# Patient Record
Sex: MALE | Race: WHITE | NOT HISPANIC OR LATINO | Employment: OTHER | URBAN - NONMETROPOLITAN AREA
[De-identification: names, ages, dates, MRNs, and addresses within clinical notes are randomized per-mention and may not be internally consistent; named-entity substitution may affect disease eponyms.]

---

## 2017-03-02 PROBLEM — D68.62: Status: ACTIVE | Noted: 2017-03-02

## 2017-03-02 PROBLEM — R79.89 LOW VITAMIN B12 LEVEL: Status: ACTIVE | Noted: 2017-03-02

## 2017-03-02 PROBLEM — R79.89 ELEVATED HOMOCYSTEINE: Status: ACTIVE | Noted: 2017-03-02

## 2017-03-02 PROBLEM — D50.9 IRON DEFICIENCY ANEMIA: Status: ACTIVE | Noted: 2017-03-02

## 2017-09-18 PROBLEM — D64.9 ANEMIA: Status: ACTIVE | Noted: 2017-09-18

## 2017-12-27 PROBLEM — I20.9 ANGINA, CLASS II: Status: ACTIVE | Noted: 2017-12-27

## 2018-03-19 PROBLEM — R79.1 ELEVATED PARTIAL THROMBOPLASTIN TIME (PTT): Status: ACTIVE | Noted: 2018-03-19

## 2019-09-03 PROBLEM — I25.10 CORONARY ARTERY DISEASE: Status: ACTIVE | Noted: 2019-09-03

## 2019-09-03 PROBLEM — E78.00 HYPERCHOLESTEREMIA: Status: ACTIVE | Noted: 2019-09-03

## 2019-09-03 PROBLEM — N18.30 STAGE 3 CHRONIC KIDNEY DISEASE: Status: ACTIVE | Noted: 2019-09-03

## 2019-09-03 PROBLEM — R58 BLEEDING: Status: ACTIVE | Noted: 2019-09-03

## 2020-04-28 ENCOUNTER — HISTORICAL (OUTPATIENT)
Dept: ADMINISTRATIVE | Facility: HOSPITAL | Age: 80
End: 2020-04-28

## 2020-04-28 LAB
BASOPHILS NFR BLD: 0 10 (ref 0–0.1)
BASOPHILS NFR BLD: 0.1 % (ref 0–1.5)
EOSINOPHIL NFR BLD: 0 10 (ref 0–0.7)
EOSINOPHIL NFR BLD: 0.1 % (ref 0–7)
ERYTHROCYTE [DISTWIDTH] IN BLOOD BY AUTOMATED COUNT: 15.6 % (ref 11.5–14.5)
GRAN #: 6.88 10 (ref 2–7.5)
GRAN%: 1.1 %
GRAN%: 76.9 % (ref 50–80)
HCT VFR BLD AUTO: 31.4 % (ref 43.5–53.7)
HGB BLD-MCNC: 10.2 G/DL (ref 14.1–18.1)
IMMATURE GRANULOCYTES #: 0.1 10
LYMPH #: 1.2 10 (ref 1–3.5)
LYMPH%: 12.8 % (ref 12–50)
MCH RBC QN AUTO: 28.9 PG (ref 27–31)
MCHC RBC AUTO-ENTMCNC: 32.5 G% (ref 32–35)
MCV RBC AUTO: 89 FL (ref 80–97)
MONO #: 0.8 10 (ref 0–0.8)
MONO%: 9 % (ref 0–12)
PMV BLD AUTO: 182 10 (ref 142–424)
PMV BLD AUTO: 9.8 FL (ref 7.4–10.4)
RBC # BLD AUTO: 3.53 M/UL (ref 4.69–6.13)
WBC # BLD AUTO: 9 10 (ref 4–10.2)

## 2020-06-01 ENCOUNTER — HISTORICAL (OUTPATIENT)
Dept: ADMINISTRATIVE | Facility: HOSPITAL | Age: 80
End: 2020-06-01

## 2020-06-01 LAB
ALBUMIN SERPL BCP-MCNC: 3 G/DL (ref 3.5–5)
ALBUMIN/GLOB SERPL ELPH: 0.6 {RATIO} (ref 1.5–2.2)
ALP SERPL-CCNC: 79 U/L (ref 50–136)
ALT SERPL W P-5'-P-CCNC: 20 U/L (ref 16–61)
ANION GAP SERPL CALC-SCNC: 11.5 MEQ/L (ref 10–20)
AST SERPL-CCNC: 15 U/L (ref 15–37)
BASOPHILS NFR BLD: 0 10 (ref 0–0.1)
BASOPHILS NFR BLD: 0.3 % (ref 0–1.5)
BILIRUB SERPL-MCNC: 0.4 MG/DL (ref 0.2–1)
BUN SERPL-MCNC: 24 MG/DL (ref 7–18)
CALCIUM SERPL-MCNC: 8.8 MG/DL (ref 8.5–10.1)
CHLORIDE SERPL-SCNC: 103 MMOL/L (ref 98–107)
CO2 SERPL-SCNC: 23 MMOL/L (ref 22–32)
CREAT SERPL-MCNC: 1.48 MG/DL (ref 0.7–1.3)
EGFR: 49 ML/MIN/1.73M
EOSINOPHIL NFR BLD: 0 10 (ref 0–0.7)
EOSINOPHIL NFR BLD: 0.1 % (ref 0–7)
ERYTHROCYTE [DISTWIDTH] IN BLOOD BY AUTOMATED COUNT: 15.2 % (ref 11.5–14.5)
ERYTHROCYTE [SEDIMENTATION RATE] IN BLOOD: 125 MM/HR (ref 0–20)
GLOBULIN: 4.7 G/DL (ref 2.3–3.5)
GLUCOSE SERPL-MCNC: 90 MG/DL (ref 70–99)
GRAN #: 5.51 10 (ref 2–7.5)
GRAN%: 1.1 %
GRAN%: 70.4 % (ref 50–80)
HCT VFR BLD AUTO: 28.2 % (ref 43.5–53.7)
HGB BLD-MCNC: 9.3 G/DL (ref 14.1–18.1)
IMMATURE GRANULOCYTES #: 0.09 10
LYMPH #: 1.2 10 (ref 1–3.5)
LYMPH%: 15.1 % (ref 12–50)
MAGNESIUM SERPL-MCNC: 2.02 MG/DL (ref 1.8–2.4)
MCH RBC QN AUTO: 28.9 PG (ref 27–31)
MCHC RBC AUTO-ENTMCNC: 33 G% (ref 32–35)
MCV RBC AUTO: 87.6 FL (ref 80–97)
MONO #: 1 10 (ref 0–0.8)
MONO%: 13 % (ref 0–12)
OSMOC: 270 MOSM/KG (ref 275–295)
PMV BLD AUTO: 10.3 FL (ref 7.4–10.4)
PMV BLD AUTO: 162 10 (ref 142–424)
POTASSIUM SERPL-SCNC: 4.5 MMOL/L (ref 3.5–5.1)
PROT SERPL-MCNC: 7.7 G/DL (ref 6.4–8.2)
RBC # BLD AUTO: 3.22 M/UL (ref 4.69–6.13)
SODIUM BLD-SCNC: 133 MMOL/L (ref 136–145)
WBC # BLD AUTO: 7.8 10 (ref 4–10.2)

## 2020-06-25 LAB — SARS-COV-2 RNA RESP QL NAA+PROBE: NOT DETECTED

## 2020-11-25 ENCOUNTER — LAB VISIT (OUTPATIENT)
Dept: LAB | Facility: HOSPITAL | Age: 80
End: 2020-11-25
Attending: INTERNAL MEDICINE
Payer: MEDICARE

## 2020-11-25 DIAGNOSIS — M13.0 POLYARTHRITIS: Primary | ICD-10-CM

## 2020-11-25 DIAGNOSIS — N91.2 AMENIA: Primary | ICD-10-CM

## 2020-11-25 LAB
ALBUMIN SERPL BCP-MCNC: 3.2 G/DL (ref 3.5–5.2)
ALP SERPL-CCNC: 52 U/L (ref 55–135)
ALT SERPL W/O P-5'-P-CCNC: 17 U/L (ref 10–44)
ANION GAP SERPL CALC-SCNC: 10 MMOL/L (ref 8–16)
AST SERPL-CCNC: 18 U/L (ref 10–40)
BASOPHILS # BLD AUTO: 0.02 K/UL (ref 0–0.2)
BASOPHILS NFR BLD: 0.3 % (ref 0–1.9)
BILIRUB SERPL-MCNC: 0.5 MG/DL (ref 0.1–1)
BUN SERPL-MCNC: 24 MG/DL (ref 8–23)
C3 SERPL-MCNC: 90 MG/DL (ref 50–180)
C4 SERPL-MCNC: 19 MG/DL (ref 11–44)
CALCIUM SERPL-MCNC: 9.3 MG/DL (ref 8.7–10.5)
CHLORIDE SERPL-SCNC: 107 MMOL/L (ref 95–110)
CO2 SERPL-SCNC: 22 MMOL/L (ref 23–29)
CORTIS SERPL-MCNC: 11.6 UG/DL
CREAT SERPL-MCNC: 1.4 MG/DL (ref 0.5–1.4)
DIFFERENTIAL METHOD: ABNORMAL
EOSINOPHIL # BLD AUTO: 0 K/UL (ref 0–0.5)
EOSINOPHIL NFR BLD: 0 % (ref 0–8)
ERYTHROCYTE [DISTWIDTH] IN BLOOD BY AUTOMATED COUNT: 16 % (ref 11.5–14.5)
EST. GFR  (AFRICAN AMERICAN): 54.9 ML/MIN/1.73 M^2
EST. GFR  (NON AFRICAN AMERICAN): 47.4 ML/MIN/1.73 M^2
GLUCOSE SERPL-MCNC: 80 MG/DL (ref 70–110)
HCT VFR BLD AUTO: 31.9 % (ref 40–54)
HGB BLD-MCNC: 10.4 G/DL (ref 14–18)
IMM GRANULOCYTES # BLD AUTO: 0.08 K/UL (ref 0–0.04)
IMM GRANULOCYTES NFR BLD AUTO: 1 % (ref 0–0.5)
LYMPHOCYTES # BLD AUTO: 1 K/UL (ref 1–4.8)
LYMPHOCYTES NFR BLD: 12.9 % (ref 18–48)
MCH RBC QN AUTO: 29.1 PG (ref 27–31)
MCHC RBC AUTO-ENTMCNC: 32.6 G/DL (ref 32–36)
MCV RBC AUTO: 89 FL (ref 82–98)
MONOCYTES # BLD AUTO: 0.9 K/UL (ref 0.3–1)
MONOCYTES NFR BLD: 11.4 % (ref 4–15)
NEUTROPHILS # BLD AUTO: 5.7 K/UL (ref 1.8–7.7)
NEUTROPHILS NFR BLD: 74.4 % (ref 38–73)
NRBC BLD-RTO: 0 /100 WBC
PLATELET # BLD AUTO: 168 K/UL (ref 150–350)
PMV BLD AUTO: 10.6 FL (ref 9.2–12.9)
POTASSIUM SERPL-SCNC: 4.2 MMOL/L (ref 3.5–5.1)
PROT SERPL-MCNC: 7.2 G/DL (ref 6–8.4)
PTH-INTACT SERPL-MCNC: 80 PG/ML (ref 9–77)
RBC # BLD AUTO: 3.58 M/UL (ref 4.6–6.2)
SODIUM SERPL-SCNC: 139 MMOL/L (ref 136–145)
WBC # BLD AUTO: 7.66 K/UL (ref 3.9–12.7)

## 2020-11-25 PROCEDURE — 83970 ASSAY OF PARATHORMONE: CPT

## 2020-11-25 PROCEDURE — 84443 ASSAY THYROID STIM HORMONE: CPT

## 2020-11-25 PROCEDURE — 85025 COMPLETE CBC W/AUTO DIFF WBC: CPT

## 2020-11-25 PROCEDURE — 86160 COMPLEMENT ANTIGEN: CPT | Mod: 59

## 2020-11-25 PROCEDURE — 82533 TOTAL CORTISOL: CPT

## 2020-11-25 PROCEDURE — 36415 COLL VENOUS BLD VENIPUNCTURE: CPT

## 2020-11-25 PROCEDURE — 86160 COMPLEMENT ANTIGEN: CPT

## 2020-11-25 PROCEDURE — 80053 COMPREHEN METABOLIC PANEL: CPT

## 2020-11-26 LAB — TSH SERPL DL<=0.005 MIU/L-ACNC: 2.33 UIU/ML (ref 0.4–4)

## 2020-11-27 DIAGNOSIS — M81.0 AGE-RELATED OSTEOPOROSIS WITHOUT CURRENT PATHOLOGICAL FRACTURE: ICD-10-CM

## 2020-11-27 DIAGNOSIS — M13.0 POLYARTHRITIS: Primary | ICD-10-CM

## 2020-12-02 ENCOUNTER — HOSPITAL ENCOUNTER (OUTPATIENT)
Dept: RADIOLOGY | Facility: HOSPITAL | Age: 80
Discharge: HOME | End: 2020-12-02
Attending: INTERNAL MEDICINE
Payer: MEDICARE

## 2020-12-02 DIAGNOSIS — M81.0 AGE-RELATED OSTEOPOROSIS WITHOUT CURRENT PATHOLOGICAL FRACTURE: ICD-10-CM

## 2020-12-02 DIAGNOSIS — M13.0 POLYARTHRITIS: ICD-10-CM

## 2020-12-02 PROCEDURE — 77080 DXA BONE DENSITY AXIAL: CPT | Mod: TC

## 2021-01-09 ENCOUNTER — IMMUNIZATION (OUTPATIENT)
Dept: OBSTETRICS AND GYNECOLOGY | Facility: CLINIC | Age: 81
End: 2021-01-09
Payer: MEDICARE

## 2021-01-09 DIAGNOSIS — Z23 NEED FOR VACCINATION: ICD-10-CM

## 2021-01-09 PROCEDURE — 91300 COVID-19, MRNA, LNP-S, PF, 30 MCG/0.3 ML DOSE VACCINE: CPT | Mod: PBBFAC

## 2021-01-09 PROCEDURE — 91300 COVID-19, MRNA, LNP-S, PF, 30 MCG/0.3 ML DOSE VACCINE: ICD-10-PCS | Mod: ,,, | Performed by: ANESTHESIOLOGY

## 2021-01-09 PROCEDURE — 91300 COVID-19, MRNA, LNP-S, PF, 30 MCG/0.3 ML DOSE VACCINE: CPT | Mod: ,,, | Performed by: ANESTHESIOLOGY

## 2021-01-09 PROCEDURE — 0001A COVID-19, MRNA, LNP-S, PF, 30 MCG/0.3 ML DOSE VACCINE: CPT | Mod: PBBFAC | Performed by: ANESTHESIOLOGY

## 2021-01-30 ENCOUNTER — IMMUNIZATION (OUTPATIENT)
Dept: OBSTETRICS AND GYNECOLOGY | Facility: CLINIC | Age: 81
End: 2021-01-30
Payer: MEDICARE

## 2021-01-30 DIAGNOSIS — Z23 NEED FOR VACCINATION: Primary | ICD-10-CM

## 2021-01-30 PROCEDURE — 0002A COVID-19, MRNA, LNP-S, PF, 30 MCG/0.3 ML DOSE VACCINE: CPT | Mod: PBBFAC

## 2021-01-30 PROCEDURE — 91300 COVID-19, MRNA, LNP-S, PF, 30 MCG/0.3 ML DOSE VACCINE: CPT | Mod: PBBFAC

## 2021-07-22 PROBLEM — I25.10 CAD (CORONARY ARTERY DISEASE): Status: ACTIVE | Noted: 2021-07-22

## 2021-08-20 ENCOUNTER — IMMUNIZATION (OUTPATIENT)
Dept: OBSTETRICS AND GYNECOLOGY | Facility: CLINIC | Age: 81
End: 2021-08-20
Payer: MEDICARE

## 2021-08-20 DIAGNOSIS — Z23 NEED FOR VACCINATION: Primary | ICD-10-CM

## 2021-08-20 PROCEDURE — 0003A COVID-19, MRNA, LNP-S, PF, 30 MCG/0.3 ML DOSE VACCINE: CPT | Mod: CV19,,, | Performed by: ANESTHESIOLOGY

## 2021-08-20 PROCEDURE — 91300 COVID-19, MRNA, LNP-S, PF, 30 MCG/0.3 ML DOSE VACCINE: ICD-10-PCS | Mod: ,,, | Performed by: ANESTHESIOLOGY

## 2021-08-20 PROCEDURE — 0003A COVID-19, MRNA, LNP-S, PF, 30 MCG/0.3 ML DOSE VACCINE: ICD-10-PCS | Mod: CV19,,, | Performed by: ANESTHESIOLOGY

## 2021-08-20 PROCEDURE — 91300 COVID-19, MRNA, LNP-S, PF, 30 MCG/0.3 ML DOSE VACCINE: CPT | Mod: ,,, | Performed by: ANESTHESIOLOGY

## 2021-10-25 ENCOUNTER — HOSPITAL ENCOUNTER (EMERGENCY)
Facility: HOSPITAL | Age: 81
Discharge: HOME | End: 2021-10-25
Attending: EMERGENCY MEDICINE
Payer: OTHER GOVERNMENT

## 2021-10-25 ENCOUNTER — HOSPITAL ENCOUNTER (OUTPATIENT)
Facility: HOSPITAL | Age: 81
Discharge: HOME HEALTH | End: 2021-10-27
Attending: STUDENT IN AN ORGANIZED HEALTH CARE EDUCATION/TRAINING PROGRAM | Admitting: INTERNAL MEDICINE
Payer: MEDICARE

## 2021-10-25 VITALS
WEIGHT: 194 LBS | DIASTOLIC BLOOD PRESSURE: 63 MMHG | HEART RATE: 76 BPM | TEMPERATURE: 99 F | OXYGEN SATURATION: 97 % | SYSTOLIC BLOOD PRESSURE: 137 MMHG | RESPIRATION RATE: 16 BRPM | BODY MASS INDEX: 27.77 KG/M2 | HEIGHT: 70 IN

## 2021-10-25 DIAGNOSIS — S20.229A CONTUSION OF BACK WALL OF THORAX, INITIAL ENCOUNTER: ICD-10-CM

## 2021-10-25 DIAGNOSIS — S00.03XA CONTUSION OF SCALP, INITIAL ENCOUNTER: Primary | ICD-10-CM

## 2021-10-25 DIAGNOSIS — R41.82 AMS (ALTERED MENTAL STATUS): Primary | ICD-10-CM

## 2021-10-25 DIAGNOSIS — R40.4 TRANSIENT ALTERATION OF AWARENESS: ICD-10-CM

## 2021-10-25 DIAGNOSIS — R41.0 DELIRIUM: ICD-10-CM

## 2021-10-25 LAB
ALBUMIN SERPL BCP-MCNC: 2.6 G/DL (ref 3.5–5.2)
ALP SERPL-CCNC: 75 U/L (ref 55–135)
ALT SERPL W/O P-5'-P-CCNC: 38 U/L (ref 10–44)
AMMONIA PLAS-SCNC: 46 UMOL/L (ref 10–50)
AMPHET+METHAMPHET UR QL: NEGATIVE
ANION GAP SERPL CALC-SCNC: 6 MMOL/L (ref 8–16)
AST SERPL-CCNC: 23 U/L (ref 10–40)
BARBITURATES UR QL SCN>200 NG/ML: NEGATIVE
BASOPHILS # BLD AUTO: 0.01 K/UL (ref 0–0.2)
BASOPHILS NFR BLD: 0.1 % (ref 0–1.9)
BENZODIAZ UR QL SCN>200 NG/ML: NEGATIVE
BILIRUB SERPL-MCNC: 0.4 MG/DL (ref 0.1–1)
BILIRUB UR QL STRIP: NEGATIVE
BUN SERPL-MCNC: 31 MG/DL (ref 8–23)
BZE UR QL SCN: NEGATIVE
CALCIUM SERPL-MCNC: 9.2 MG/DL (ref 8.7–10.5)
CANNABINOIDS UR QL SCN: NEGATIVE
CHLORIDE SERPL-SCNC: 109 MMOL/L (ref 95–110)
CLARITY UR: CLEAR
CO2 SERPL-SCNC: 26 MMOL/L (ref 23–29)
COLOR UR: YELLOW
CREAT SERPL-MCNC: 1.8 MG/DL (ref 0.5–1.4)
CREAT UR-MCNC: 61 MG/DL (ref 23–375)
DIFFERENTIAL METHOD: ABNORMAL
EOSINOPHIL # BLD AUTO: 0 K/UL (ref 0–0.5)
EOSINOPHIL NFR BLD: 0.1 % (ref 0–8)
ERYTHROCYTE [DISTWIDTH] IN BLOOD BY AUTOMATED COUNT: 14.9 % (ref 11.5–14.5)
EST. GFR  (AFRICAN AMERICAN): 40.2 ML/MIN/1.73 M^2
EST. GFR  (NON AFRICAN AMERICAN): 34.8 ML/MIN/1.73 M^2
GLUCOSE SERPL-MCNC: 121 MG/DL (ref 70–110)
GLUCOSE UR QL STRIP: NEGATIVE
HCT VFR BLD AUTO: 25.8 % (ref 40–54)
HGB BLD-MCNC: 8.5 G/DL (ref 14–18)
HGB UR QL STRIP: NEGATIVE
IMM GRANULOCYTES # BLD AUTO: 0.04 K/UL (ref 0–0.04)
IMM GRANULOCYTES NFR BLD AUTO: 0.6 % (ref 0–0.5)
KETONES UR QL STRIP: NEGATIVE
LEUKOCYTE ESTERASE UR QL STRIP: NEGATIVE
LYMPHOCYTES # BLD AUTO: 1 K/UL (ref 1–4.8)
LYMPHOCYTES NFR BLD: 14.1 % (ref 18–48)
MCH RBC QN AUTO: 29.1 PG (ref 27–31)
MCHC RBC AUTO-ENTMCNC: 32.9 G/DL (ref 32–36)
MCV RBC AUTO: 88 FL (ref 82–98)
METHADONE UR QL SCN>300 NG/ML: NEGATIVE
MONOCYTES # BLD AUTO: 0.8 K/UL (ref 0.3–1)
MONOCYTES NFR BLD: 11.3 % (ref 4–15)
NEUTROPHILS # BLD AUTO: 5.3 K/UL (ref 1.8–7.7)
NEUTROPHILS NFR BLD: 73.8 % (ref 38–73)
NITRITE UR QL STRIP: NEGATIVE
NRBC BLD-RTO: 0 /100 WBC
OPIATES UR QL SCN: NEGATIVE
PCP UR QL SCN>25 NG/ML: NEGATIVE
PH UR STRIP: 5 [PH] (ref 5–8)
PLATELET # BLD AUTO: 165 K/UL (ref 150–450)
PMV BLD AUTO: 11.4 FL (ref 9.2–12.9)
POCT GLUCOSE: 106 MG/DL (ref 70–110)
POCT GLUCOSE: 123 MG/DL (ref 70–110)
POTASSIUM SERPL-SCNC: 4.1 MMOL/L (ref 3.5–5.1)
PROT SERPL-MCNC: 7.2 G/DL (ref 6–8.4)
PROT UR QL STRIP: NEGATIVE
RBC # BLD AUTO: 2.92 M/UL (ref 4.6–6.2)
SODIUM SERPL-SCNC: 141 MMOL/L (ref 136–145)
SP GR UR STRIP: 1.01 (ref 1–1.03)
TOXICOLOGY INFORMATION: NORMAL
URN SPEC COLLECT METH UR: NORMAL
UROBILINOGEN UR STRIP-ACNC: NEGATIVE EU/DL
WBC # BLD AUTO: 7.15 K/UL (ref 3.9–12.7)

## 2021-10-25 PROCEDURE — 99284 EMERGENCY DEPT VISIT MOD MDM: CPT | Mod: 25

## 2021-10-25 PROCEDURE — 93010 EKG 12-LEAD: ICD-10-PCS | Mod: ,,, | Performed by: INTERNAL MEDICINE

## 2021-10-25 PROCEDURE — 36415 COLL VENOUS BLD VENIPUNCTURE: CPT | Performed by: STUDENT IN AN ORGANIZED HEALTH CARE EDUCATION/TRAINING PROGRAM

## 2021-10-25 PROCEDURE — 63600175 PHARM REV CODE 636 W HCPCS: Performed by: STUDENT IN AN ORGANIZED HEALTH CARE EDUCATION/TRAINING PROGRAM

## 2021-10-25 PROCEDURE — 82962 GLUCOSE BLOOD TEST: CPT

## 2021-10-25 PROCEDURE — 93005 ELECTROCARDIOGRAM TRACING: CPT

## 2021-10-25 PROCEDURE — 80307 DRUG TEST PRSMV CHEM ANLYZR: CPT | Performed by: STUDENT IN AN ORGANIZED HEALTH CARE EDUCATION/TRAINING PROGRAM

## 2021-10-25 PROCEDURE — 96372 THER/PROPH/DIAG INJ SC/IM: CPT

## 2021-10-25 PROCEDURE — 85025 COMPLETE CBC W/AUTO DIFF WBC: CPT | Performed by: STUDENT IN AN ORGANIZED HEALTH CARE EDUCATION/TRAINING PROGRAM

## 2021-10-25 PROCEDURE — 96361 HYDRATE IV INFUSION ADD-ON: CPT

## 2021-10-25 PROCEDURE — 96360 HYDRATION IV INFUSION INIT: CPT

## 2021-10-25 PROCEDURE — 63600175 PHARM REV CODE 636 W HCPCS: Performed by: EMERGENCY MEDICINE

## 2021-10-25 PROCEDURE — 81003 URINALYSIS AUTO W/O SCOPE: CPT | Mod: 59 | Performed by: STUDENT IN AN ORGANIZED HEALTH CARE EDUCATION/TRAINING PROGRAM

## 2021-10-25 PROCEDURE — 82140 ASSAY OF AMMONIA: CPT | Performed by: STUDENT IN AN ORGANIZED HEALTH CARE EDUCATION/TRAINING PROGRAM

## 2021-10-25 PROCEDURE — 80053 COMPREHEN METABOLIC PANEL: CPT | Performed by: STUDENT IN AN ORGANIZED HEALTH CARE EDUCATION/TRAINING PROGRAM

## 2021-10-25 PROCEDURE — 93010 ELECTROCARDIOGRAM REPORT: CPT | Mod: ,,, | Performed by: INTERNAL MEDICINE

## 2021-10-25 PROCEDURE — 99285 EMERGENCY DEPT VISIT HI MDM: CPT | Mod: 25,27

## 2021-10-25 RX ORDER — KETOROLAC TROMETHAMINE 30 MG/ML
30 INJECTION, SOLUTION INTRAMUSCULAR; INTRAVENOUS
Status: COMPLETED | OUTPATIENT
Start: 2021-10-25 | End: 2021-10-25

## 2021-10-25 RX ADMIN — SODIUM CHLORIDE, SODIUM LACTATE, POTASSIUM CHLORIDE, AND CALCIUM CHLORIDE 1000 ML: .6; .31; .03; .02 INJECTION, SOLUTION INTRAVENOUS at 11:10

## 2021-10-25 RX ADMIN — KETOROLAC TROMETHAMINE 30 MG: 30 INJECTION, SOLUTION INTRAMUSCULAR at 11:10

## 2021-10-25 RX ADMIN — SODIUM CHLORIDE, SODIUM LACTATE, POTASSIUM CHLORIDE, AND CALCIUM CHLORIDE 500 ML: .6; .31; .03; .02 INJECTION, SOLUTION INTRAVENOUS at 06:10

## 2021-10-26 PROBLEM — R41.0 DELIRIUM: Status: ACTIVE | Noted: 2021-10-26

## 2021-10-26 PROBLEM — E86.0 DEHYDRATION: Status: ACTIVE | Noted: 2021-10-26

## 2021-10-26 PROBLEM — R41.82 ALTERED MENTAL STATUS: Status: ACTIVE | Noted: 2021-10-26

## 2021-10-26 PROBLEM — S09.90XA HEAD INJURY: Status: ACTIVE | Noted: 2021-10-26

## 2021-10-26 LAB
ALBUMIN SERPL BCP-MCNC: 2.5 G/DL (ref 3.5–5.2)
ALP SERPL-CCNC: 69 U/L (ref 55–135)
ALT SERPL W/O P-5'-P-CCNC: 35 U/L (ref 10–44)
ANION GAP SERPL CALC-SCNC: 7 MMOL/L (ref 8–16)
AST SERPL-CCNC: 22 U/L (ref 10–40)
BILIRUB SERPL-MCNC: 0.4 MG/DL (ref 0.1–1)
BUN SERPL-MCNC: 25 MG/DL (ref 8–23)
CALCIUM SERPL-MCNC: 9.3 MG/DL (ref 8.7–10.5)
CHLORIDE SERPL-SCNC: 110 MMOL/L (ref 95–110)
CO2 SERPL-SCNC: 24 MMOL/L (ref 23–29)
CREAT SERPL-MCNC: 1.5 MG/DL (ref 0.5–1.4)
CTP QC/QA: YES
ERYTHROCYTE [DISTWIDTH] IN BLOOD BY AUTOMATED COUNT: 14.8 % (ref 11.5–14.5)
EST. GFR  (AFRICAN AMERICAN): 50.1 ML/MIN/1.73 M^2
EST. GFR  (NON AFRICAN AMERICAN): 43.3 ML/MIN/1.73 M^2
GLUCOSE SERPL-MCNC: 126 MG/DL (ref 70–110)
HCT VFR BLD AUTO: 26.7 % (ref 40–54)
HGB BLD-MCNC: 8.8 G/DL (ref 14–18)
MCH RBC QN AUTO: 29.1 PG (ref 27–31)
MCHC RBC AUTO-ENTMCNC: 33 G/DL (ref 32–36)
MCV RBC AUTO: 88 FL (ref 82–98)
PLATELET # BLD AUTO: 170 K/UL (ref 150–450)
PMV BLD AUTO: 11.1 FL (ref 9.2–12.9)
POCT GLUCOSE: 115 MG/DL (ref 70–110)
POTASSIUM SERPL-SCNC: 3.9 MMOL/L (ref 3.5–5.1)
PROT SERPL-MCNC: 6.9 G/DL (ref 6–8.4)
RBC # BLD AUTO: 3.02 M/UL (ref 4.6–6.2)
SARS-COV-2 RDRP RESP QL NAA+PROBE: NEGATIVE
SODIUM SERPL-SCNC: 141 MMOL/L (ref 136–145)
TROPONIN I SERPL DL<=0.01 NG/ML-MCNC: 0.2 NG/ML (ref 0–0.03)
WBC # BLD AUTO: 8.15 K/UL (ref 3.9–12.7)

## 2021-10-26 PROCEDURE — 80053 COMPREHEN METABOLIC PANEL: CPT | Performed by: INTERNAL MEDICINE

## 2021-10-26 PROCEDURE — 85027 COMPLETE CBC AUTOMATED: CPT | Performed by: INTERNAL MEDICINE

## 2021-10-26 PROCEDURE — G0378 HOSPITAL OBSERVATION PER HR: HCPCS

## 2021-10-26 PROCEDURE — 36415 COLL VENOUS BLD VENIPUNCTURE: CPT | Performed by: INTERNAL MEDICINE

## 2021-10-26 PROCEDURE — G0378 HOSPITAL OBSERVATION PER HR: HCPCS | Mod: OBSCO

## 2021-10-26 PROCEDURE — 25000003 PHARM REV CODE 250: Performed by: STUDENT IN AN ORGANIZED HEALTH CARE EDUCATION/TRAINING PROGRAM

## 2021-10-26 PROCEDURE — 25000003 PHARM REV CODE 250: Performed by: INTERNAL MEDICINE

## 2021-10-26 PROCEDURE — 11000001 HC ACUTE MED/SURG PRIVATE ROOM

## 2021-10-26 PROCEDURE — 96361 HYDRATE IV INFUSION ADD-ON: CPT

## 2021-10-26 PROCEDURE — 84484 ASSAY OF TROPONIN QUANT: CPT | Performed by: INTERNAL MEDICINE

## 2021-10-26 PROCEDURE — U0002 COVID-19 LAB TEST NON-CDC: HCPCS | Performed by: STUDENT IN AN ORGANIZED HEALTH CARE EDUCATION/TRAINING PROGRAM

## 2021-10-26 PROCEDURE — 97166 OT EVAL MOD COMPLEX 45 MIN: CPT

## 2021-10-26 RX ORDER — ACETAMINOPHEN 325 MG/1
650 TABLET ORAL EVERY 6 HOURS PRN
Status: DISCONTINUED | OUTPATIENT
Start: 2021-10-26 | End: 2021-10-27 | Stop reason: HOSPADM

## 2021-10-26 RX ORDER — DULOXETIN HYDROCHLORIDE 60 MG/1
60 CAPSULE, DELAYED RELEASE ORAL 2 TIMES DAILY
Status: DISCONTINUED | OUTPATIENT
Start: 2021-10-26 | End: 2021-10-26

## 2021-10-26 RX ORDER — FOLIC ACID 1 MG/1
1 TABLET ORAL DAILY
Status: DISCONTINUED | OUTPATIENT
Start: 2021-10-26 | End: 2021-10-27 | Stop reason: HOSPADM

## 2021-10-26 RX ORDER — EZETIMIBE 10 MG/1
10 TABLET ORAL DAILY
Status: DISCONTINUED | OUTPATIENT
Start: 2021-10-26 | End: 2021-10-27 | Stop reason: HOSPADM

## 2021-10-26 RX ORDER — TALC
6 POWDER (GRAM) TOPICAL NIGHTLY PRN
Status: DISCONTINUED | OUTPATIENT
Start: 2021-10-26 | End: 2021-10-27 | Stop reason: HOSPADM

## 2021-10-26 RX ORDER — TRAMADOL HYDROCHLORIDE 50 MG/1
50 TABLET ORAL EVERY 6 HOURS PRN
Status: DISCONTINUED | OUTPATIENT
Start: 2021-10-26 | End: 2021-10-27 | Stop reason: HOSPADM

## 2021-10-26 RX ORDER — HYDROXYZINE HYDROCHLORIDE 25 MG/1
25 TABLET, FILM COATED ORAL EVERY 6 HOURS PRN
Status: DISCONTINUED | OUTPATIENT
Start: 2021-10-26 | End: 2021-10-27 | Stop reason: HOSPADM

## 2021-10-26 RX ORDER — SODIUM CHLORIDE 9 MG/ML
INJECTION, SOLUTION INTRAVENOUS CONTINUOUS
Status: DISCONTINUED | OUTPATIENT
Start: 2021-10-26 | End: 2021-10-27 | Stop reason: HOSPADM

## 2021-10-26 RX ORDER — METOPROLOL SUCCINATE 50 MG/1
50 TABLET, EXTENDED RELEASE ORAL DAILY
Status: DISCONTINUED | OUTPATIENT
Start: 2021-10-26 | End: 2021-10-27 | Stop reason: HOSPADM

## 2021-10-26 RX ORDER — SODIUM CHLORIDE 0.9 % (FLUSH) 0.9 %
10 SYRINGE (ML) INJECTION
Status: DISCONTINUED | OUTPATIENT
Start: 2021-10-26 | End: 2021-10-27 | Stop reason: HOSPADM

## 2021-10-26 RX ORDER — LISINOPRIL 5 MG/1
5 TABLET ORAL DAILY
Status: DISCONTINUED | OUTPATIENT
Start: 2021-10-26 | End: 2021-10-27 | Stop reason: HOSPADM

## 2021-10-26 RX ORDER — ATORVASTATIN CALCIUM 40 MG/1
40 TABLET, FILM COATED ORAL DAILY
Status: DISCONTINUED | OUTPATIENT
Start: 2021-10-26 | End: 2021-10-27 | Stop reason: HOSPADM

## 2021-10-26 RX ADMIN — ATORVASTATIN CALCIUM 40 MG: 40 TABLET, FILM COATED ORAL at 08:10

## 2021-10-26 RX ADMIN — SODIUM CHLORIDE: 0.9 INJECTION, SOLUTION INTRAVENOUS at 08:10

## 2021-10-26 RX ADMIN — METOPROLOL SUCCINATE 50 MG: 50 TABLET, EXTENDED RELEASE ORAL at 08:10

## 2021-10-26 RX ADMIN — TICAGRELOR 90 MG: 90 TABLET ORAL at 08:10

## 2021-10-26 RX ADMIN — TRAMADOL HYDROCHLORIDE 50 MG: 50 TABLET, FILM COATED ORAL at 05:10

## 2021-10-26 RX ADMIN — LISINOPRIL 5 MG: 5 TABLET ORAL at 08:10

## 2021-10-26 RX ADMIN — EZETIMIBE 10 MG: 10 TABLET ORAL at 08:10

## 2021-10-26 RX ADMIN — FOLIC ACID 1 MG: 1 TABLET ORAL at 08:10

## 2021-10-26 RX ADMIN — SODIUM CHLORIDE: 0.9 INJECTION, SOLUTION INTRAVENOUS at 01:10

## 2021-10-27 VITALS
HEART RATE: 86 BPM | RESPIRATION RATE: 20 BRPM | SYSTOLIC BLOOD PRESSURE: 176 MMHG | WEIGHT: 192.88 LBS | DIASTOLIC BLOOD PRESSURE: 77 MMHG | OXYGEN SATURATION: 97 % | HEIGHT: 70 IN | TEMPERATURE: 99 F | BODY MASS INDEX: 27.61 KG/M2

## 2021-10-27 LAB
BASOPHILS # BLD AUTO: 0.02 K/UL (ref 0–0.2)
BASOPHILS NFR BLD: 0.2 % (ref 0–1.9)
DIFFERENTIAL METHOD: ABNORMAL
EOSINOPHIL # BLD AUTO: 0 K/UL (ref 0–0.5)
EOSINOPHIL NFR BLD: 0.1 % (ref 0–8)
ERYTHROCYTE [DISTWIDTH] IN BLOOD BY AUTOMATED COUNT: 14.7 % (ref 11.5–14.5)
HCT VFR BLD AUTO: 26.9 % (ref 40–54)
HGB BLD-MCNC: 8.9 G/DL (ref 14–18)
IMM GRANULOCYTES # BLD AUTO: 0.08 K/UL (ref 0–0.04)
IMM GRANULOCYTES NFR BLD AUTO: 0.9 % (ref 0–0.5)
LYMPHOCYTES # BLD AUTO: 1.2 K/UL (ref 1–4.8)
LYMPHOCYTES NFR BLD: 14.1 % (ref 18–48)
MCH RBC QN AUTO: 28.9 PG (ref 27–31)
MCHC RBC AUTO-ENTMCNC: 33.1 G/DL (ref 32–36)
MCV RBC AUTO: 87 FL (ref 82–98)
MONOCYTES # BLD AUTO: 1.1 K/UL (ref 0.3–1)
MONOCYTES NFR BLD: 11.9 % (ref 4–15)
NEUTROPHILS # BLD AUTO: 6.4 K/UL (ref 1.8–7.7)
NEUTROPHILS NFR BLD: 72.8 % (ref 38–73)
NRBC BLD-RTO: 0 /100 WBC
PLATELET # BLD AUTO: 182 K/UL (ref 150–450)
PMV BLD AUTO: 10.8 FL (ref 9.2–12.9)
RBC # BLD AUTO: 3.08 M/UL (ref 4.6–6.2)
WBC # BLD AUTO: 8.82 K/UL (ref 3.9–12.7)

## 2021-10-27 PROCEDURE — G0378 HOSPITAL OBSERVATION PER HR: HCPCS

## 2021-10-27 PROCEDURE — 25000003 PHARM REV CODE 250: Performed by: INTERNAL MEDICINE

## 2021-10-27 PROCEDURE — 36415 COLL VENOUS BLD VENIPUNCTURE: CPT | Performed by: INTERNAL MEDICINE

## 2021-10-27 PROCEDURE — 85025 COMPLETE CBC W/AUTO DIFF WBC: CPT | Performed by: INTERNAL MEDICINE

## 2021-10-27 RX ADMIN — EZETIMIBE 10 MG: 10 TABLET ORAL at 08:10

## 2021-10-27 RX ADMIN — ATORVASTATIN CALCIUM 40 MG: 40 TABLET, FILM COATED ORAL at 08:10

## 2021-10-27 RX ADMIN — METOPROLOL SUCCINATE 50 MG: 50 TABLET, EXTENDED RELEASE ORAL at 08:10

## 2021-10-27 RX ADMIN — TICAGRELOR 90 MG: 90 TABLET ORAL at 08:10

## 2021-10-27 RX ADMIN — LISINOPRIL 5 MG: 5 TABLET ORAL at 08:10

## 2021-10-27 RX ADMIN — FOLIC ACID 1 MG: 1 TABLET ORAL at 08:10

## 2021-11-01 ENCOUNTER — HOSPITAL ENCOUNTER (OUTPATIENT)
Dept: RADIOLOGY | Facility: HOSPITAL | Age: 81
Discharge: HOME | End: 2021-11-01
Attending: INTERNAL MEDICINE
Payer: MEDICARE

## 2021-11-01 DIAGNOSIS — R52 PAIN: ICD-10-CM

## 2021-11-01 DIAGNOSIS — R52 PAIN: Primary | ICD-10-CM

## 2021-11-01 PROCEDURE — 71046 X-RAY EXAM CHEST 2 VIEWS: CPT | Mod: TC

## 2021-12-29 ENCOUNTER — APPOINTMENT (OUTPATIENT)
Dept: LAB | Facility: HOSPITAL | Age: 81
End: 2021-12-29
Attending: INTERNAL MEDICINE
Payer: MEDICARE

## 2021-12-29 DIAGNOSIS — R09.81 NASAL CONGESTION: Primary | ICD-10-CM

## 2021-12-29 LAB
INFLUENZA A, MOLECULAR: NEGATIVE
INFLUENZA B, MOLECULAR: NEGATIVE
SARS-COV-2 RNA RESP QL NAA+PROBE: NOT DETECTED
SPECIMEN SOURCE: NORMAL

## 2021-12-29 PROCEDURE — 87502 INFLUENZA DNA AMP PROBE: CPT | Performed by: INTERNAL MEDICINE

## 2021-12-29 PROCEDURE — U0002 COVID-19 LAB TEST NON-CDC: HCPCS | Performed by: INTERNAL MEDICINE

## 2022-05-20 ENCOUNTER — IMMUNIZATION (OUTPATIENT)
Dept: PRIMARY CARE CLINIC | Facility: CLINIC | Age: 82
End: 2022-05-20
Payer: MEDICARE

## 2022-05-20 DIAGNOSIS — Z23 NEED FOR VACCINATION: Primary | ICD-10-CM

## 2022-05-20 PROCEDURE — 91305 COVID-19, MRNA, LNP-S, PF, 30 MCG/0.3 ML DOSE VACCINE (PFIZER): CPT | Mod: PBBFAC,PN

## 2023-02-15 ENCOUNTER — HOSPITAL ENCOUNTER (EMERGENCY)
Facility: HOSPITAL | Age: 83
Discharge: ANOTHER HOSPITAL | End: 2023-02-15
Attending: STUDENT IN AN ORGANIZED HEALTH CARE EDUCATION/TRAINING PROGRAM
Payer: MEDICARE

## 2023-02-15 VITALS
HEART RATE: 84 BPM | WEIGHT: 190 LBS | RESPIRATION RATE: 20 BRPM | BODY MASS INDEX: 26.6 KG/M2 | HEIGHT: 71 IN | TEMPERATURE: 98 F | OXYGEN SATURATION: 97 % | SYSTOLIC BLOOD PRESSURE: 152 MMHG | DIASTOLIC BLOOD PRESSURE: 70 MMHG

## 2023-02-15 DIAGNOSIS — R79.89 ELEVATED TROPONIN: ICD-10-CM

## 2023-02-15 DIAGNOSIS — R06.02 SOB (SHORTNESS OF BREATH): ICD-10-CM

## 2023-02-15 DIAGNOSIS — R06.89 RESPIRATORY INSUFFICIENCY: ICD-10-CM

## 2023-02-15 DIAGNOSIS — R07.9 CHEST PAIN: ICD-10-CM

## 2023-02-15 DIAGNOSIS — N28.9 RENAL INSUFFICIENCY: ICD-10-CM

## 2023-02-15 DIAGNOSIS — I21.4 NSTEMI (NON-ST ELEVATED MYOCARDIAL INFARCTION): Primary | ICD-10-CM

## 2023-02-15 PROBLEM — I25.5 ISCHEMIC CARDIOMYOPATHY: Status: ACTIVE | Noted: 2023-02-15

## 2023-02-15 LAB
ALBUMIN SERPL BCP-MCNC: 3.3 G/DL (ref 3.5–5.2)
ALP SERPL-CCNC: 74 U/L (ref 55–135)
ALT SERPL W/O P-5'-P-CCNC: 23 U/L (ref 10–44)
ANION GAP SERPL CALC-SCNC: 1 MMOL/L (ref 8–16)
APTT BLDCRRT: 43.9 SEC (ref 21–32)
AST SERPL-CCNC: 22 U/L (ref 10–40)
BASOPHILS # BLD AUTO: 0.01 K/UL (ref 0–0.2)
BASOPHILS NFR BLD: 0.1 % (ref 0–1.9)
BILIRUB SERPL-MCNC: 0.7 MG/DL (ref 0.1–1)
BUN SERPL-MCNC: 25 MG/DL (ref 8–23)
CALCIUM SERPL-MCNC: 9.2 MG/DL (ref 8.7–10.5)
CHLORIDE SERPL-SCNC: 107 MMOL/L (ref 95–110)
CO2 SERPL-SCNC: 29 MMOL/L (ref 23–29)
CREAT SERPL-MCNC: 1.7 MG/DL (ref 0.5–1.4)
CTP QC/QA: YES
DIFFERENTIAL METHOD: ABNORMAL
EOSINOPHIL # BLD AUTO: 0 K/UL (ref 0–0.5)
EOSINOPHIL NFR BLD: 0 % (ref 0–8)
ERYTHROCYTE [DISTWIDTH] IN BLOOD BY AUTOMATED COUNT: 14.7 % (ref 11.5–14.5)
EST. GFR  (NO RACE VARIABLE): 40 ML/MIN/1.73 M^2
GLUCOSE SERPL-MCNC: 126 MG/DL (ref 70–110)
HCT VFR BLD AUTO: 29.5 % (ref 40–54)
HGB BLD-MCNC: 9.8 G/DL (ref 14–18)
IMM GRANULOCYTES # BLD AUTO: 0.15 K/UL (ref 0–0.04)
IMM GRANULOCYTES NFR BLD AUTO: 2.1 % (ref 0–0.5)
INR PPP: 1.5 (ref 0.8–1.2)
LYMPHOCYTES # BLD AUTO: 1.2 K/UL (ref 1–4.8)
LYMPHOCYTES NFR BLD: 16.6 % (ref 18–48)
MCH RBC QN AUTO: 29.6 PG (ref 27–31)
MCHC RBC AUTO-ENTMCNC: 33.2 G/DL (ref 32–36)
MCV RBC AUTO: 89 FL (ref 82–98)
MONOCYTES # BLD AUTO: 0.7 K/UL (ref 0.3–1)
MONOCYTES NFR BLD: 10.4 % (ref 4–15)
NEUTROPHILS # BLD AUTO: 5 K/UL (ref 1.8–7.7)
NEUTROPHILS NFR BLD: 70.8 % (ref 38–73)
NRBC BLD-RTO: 0 /100 WBC
NT-PROBNP SERPL-MCNC: 8265 PG/ML (ref 5–1800)
PLATELET # BLD AUTO: 135 K/UL (ref 150–450)
PMV BLD AUTO: 11.4 FL (ref 9.2–12.9)
POTASSIUM SERPL-SCNC: 4 MMOL/L (ref 3.5–5.1)
PROT SERPL-MCNC: 7.8 G/DL (ref 6–8.4)
PROTHROMBIN TIME: 15.1 SEC (ref 9–12.5)
RBC # BLD AUTO: 3.31 M/UL (ref 4.6–6.2)
SARS-COV-2 RDRP RESP QL NAA+PROBE: NEGATIVE
SODIUM SERPL-SCNC: 137 MMOL/L (ref 136–145)
TROPONIN I SERPL DL<=0.01 NG/ML-MCNC: 1149.7 PG/ML (ref 0–60)
WBC # BLD AUTO: 7.1 K/UL (ref 3.9–12.7)

## 2023-02-15 PROCEDURE — 93005 ELECTROCARDIOGRAM TRACING: CPT

## 2023-02-15 PROCEDURE — 85610 PROTHROMBIN TIME: CPT | Performed by: STUDENT IN AN ORGANIZED HEALTH CARE EDUCATION/TRAINING PROGRAM

## 2023-02-15 PROCEDURE — 96374 THER/PROPH/DIAG INJ IV PUSH: CPT

## 2023-02-15 PROCEDURE — 93010 ELECTROCARDIOGRAM REPORT: CPT | Mod: ,,, | Performed by: INTERNAL MEDICINE

## 2023-02-15 PROCEDURE — 99291 CRITICAL CARE FIRST HOUR: CPT

## 2023-02-15 PROCEDURE — 93010 EKG 12-LEAD: ICD-10-PCS | Mod: ,,, | Performed by: INTERNAL MEDICINE

## 2023-02-15 PROCEDURE — 25000003 PHARM REV CODE 250: Performed by: STUDENT IN AN ORGANIZED HEALTH CARE EDUCATION/TRAINING PROGRAM

## 2023-02-15 PROCEDURE — 85025 COMPLETE CBC W/AUTO DIFF WBC: CPT | Performed by: STUDENT IN AN ORGANIZED HEALTH CARE EDUCATION/TRAINING PROGRAM

## 2023-02-15 PROCEDURE — 85730 THROMBOPLASTIN TIME PARTIAL: CPT | Performed by: STUDENT IN AN ORGANIZED HEALTH CARE EDUCATION/TRAINING PROGRAM

## 2023-02-15 PROCEDURE — 80053 COMPREHEN METABOLIC PANEL: CPT | Performed by: STUDENT IN AN ORGANIZED HEALTH CARE EDUCATION/TRAINING PROGRAM

## 2023-02-15 PROCEDURE — 84484 ASSAY OF TROPONIN QUANT: CPT | Performed by: STUDENT IN AN ORGANIZED HEALTH CARE EDUCATION/TRAINING PROGRAM

## 2023-02-15 PROCEDURE — 63600175 PHARM REV CODE 636 W HCPCS: Performed by: STUDENT IN AN ORGANIZED HEALTH CARE EDUCATION/TRAINING PROGRAM

## 2023-02-15 PROCEDURE — 83880 ASSAY OF NATRIURETIC PEPTIDE: CPT | Performed by: STUDENT IN AN ORGANIZED HEALTH CARE EDUCATION/TRAINING PROGRAM

## 2023-02-15 PROCEDURE — 36415 COLL VENOUS BLD VENIPUNCTURE: CPT | Mod: 59 | Performed by: STUDENT IN AN ORGANIZED HEALTH CARE EDUCATION/TRAINING PROGRAM

## 2023-02-15 RX ORDER — ASPIRIN 325 MG
325 TABLET ORAL
Status: COMPLETED | OUTPATIENT
Start: 2023-02-15 | End: 2023-02-15

## 2023-02-15 RX ORDER — HEPARIN SODIUM,PORCINE/D5W 25000/250
0-40 INTRAVENOUS SOLUTION INTRAVENOUS CONTINUOUS
Status: DISCONTINUED | OUTPATIENT
Start: 2023-02-15 | End: 2023-02-15 | Stop reason: HOSPADM

## 2023-02-15 RX ADMIN — ASPIRIN 325 MG ORAL TABLET 325 MG: 325 PILL ORAL at 09:02

## 2023-02-15 RX ADMIN — HEPARIN SODIUM 12 UNITS/KG/HR: 10000 INJECTION, SOLUTION INTRAVENOUS at 10:02

## 2023-02-15 NOTE — ED NOTES
"NEUROLOGICAL:   Patient is awake , alert , and oriented x 4 . Pupils are PERRL. Gait is steady.   Moves all extremities without difficulty.   Patient reports no neuro complaints..  GCS 15    HEENT:   Head appears normocephalic  and symmetric .   Eyes appear WNL to both eyes. Patient reports no complaints  to both eyes .   Ears appear WNL. Patient reports no complaints  to both ears.   Nares appear patent . Patient reports no nose complaints .  Mouth appears moist, pink, and teeth intact. Patient reports no mouth complaints.   Throat appears pink and moist . Patient reports no throat complaints.    CARDIOVASCULAR:     On palpation no edema noted , noted to none.   Patient reports substernal chest pain  and shortness of breath  . 10/10 pain scale - took 1 Ntg, which relieved the pain "some"  Patient vitals are NOT WNL. Pulse ox RA 89%    RESPIRATORY:   Airway Clear, Open, and Patent.  Respirations are labored.   Breath sounds inspiratory wheeze to all lung fields.   Patient reports shortness of breath on exertion.     GASTROINTESTINAL:   Abdomen is soft  and non-tender x 4 quadrants. Bowel sounds are normoactive to all quadrants .   Patient reports no GI complaints .     GENITOURINARY:   Patient reports no  complaints.     MUSCULOSKELETAL:   full range of motion to all extremities, no swelling noted , no tenderness noted, and no weakness noted.   Patient reports no musculoskeletal complaints     SKIN:   Skin appears warm , dry , good turgor, color normal for race, and intact. Patient reports no skin complaints.   "

## 2023-02-15 NOTE — ED NOTES
Per Zoey at [facility], [place] [facility] [place] Colmenares will set up STAT Transfer

## 2023-02-15 NOTE — ED PROVIDER NOTES
Encounter Date: 2/15/2023       History     Chief Complaint   Patient presents with    Chest Pain    Shortness of Breath     Pt stated that he began experiencing chest pain radiating to left arm along with dyspnea. Hx CAD / stents - NTG x1 taken prior to arrival with improvement.      82-year-old male with history of coronary artery disease with stent on Plavix, of anemia, arthritis, bronchitis, CKD2, CAD, GERD, hypercholesterolemia and hypertension presents with chest pain and shortness of breath since last night.  Patient said chest pain started last night substernal with radiation down the left arm improvement on nitroglycerin.  Patient also endorses some shortness of breath during this time.  Denies any leg swelling, travels, cough, trauma, fever    Review of patient's allergies indicates:   Allergen Reactions    Cardura [doxazosin] Hives    Lyrica [pregabalin] Other (See Comments)    Paxil [paroxetine hcl] Other (See Comments)    Restoril [temazepam] Hallucinations    Vioxx [rofecoxib] Swelling    Zithromax [azithromycin] Hives     Past Medical History:   Diagnosis Date    Acid reflux     Anemia     Arthritis     Bronchitis     Cataract     CKD (chronic kidney disease)     45% FUNCTION    Coronary artery disease     Encounter for blood transfusion     Enlarged prostate     Enlarged prostate     GERD (gastroesophageal reflux disease)     Hemorrhoids     Hypercholesteremia     Hypertension     Leaky heart valve     Leaky heart valve     Lupus anticoagulant disorder     Skin cancer     Unspecified chronic bronchitis     UTI (urinary tract infection)      Past Surgical History:   Procedure Laterality Date    ACF      BACK SURGERY      RODS IN BACK; 4 SURGIERIES    CARDIAC ANGIOGRAM WITH STENT      CATARACT SX Bilateral     HIP SURGERY Right     THR    KNEE SURGERY Right     ATS    LEFT HEART CATHETERIZATION Left 07/22/2021    Procedure: Left heart cath;  Surgeon: Curtis Loredo MD;  Location: [facility] CATH;   Service: Cardiology;  Laterality: Left;    LEFT HEART CATHETERIZATION Left 2022    Procedure: Left heart cath;  Surgeon: Giorgi Barker MD;  Location: [facility] CATH;  Service: Cardiology;  Laterality: Left;    SHOULDER SURGERY Bilateral     SPINAL CORD STIMULATOR IMPLANT       Family History   Problem Relation Age of Onset    Lung cancer Brother     Throat cancer Brother     Heart disease Father     Cancer Brother     Cancer Brother     Heart disease Brother         PPM    Heart disease Brother     Heart disease Brother      Social History     Tobacco Use    Smoking status: Former     Types: Cigarettes     Quit date:      Years since quittin.1    Smokeless tobacco: Never   Substance Use Topics    Alcohol use: Yes     Comment: RARELY    Drug use: No     Review of Systems   Constitutional: Negative.    HENT: Negative.     Respiratory:  Positive for shortness of breath.    Cardiovascular:  Positive for chest pain.   Gastrointestinal: Negative.    Genitourinary: Negative.    Musculoskeletal: Negative.    Skin: Negative.    Neurological: Negative.    Psychiatric/Behavioral: Negative.     All other systems reviewed and are negative.    Physical Exam     Initial Vitals   BP Pulse Resp Temp SpO2   02/15/23 0859 02/15/23 0859 02/15/23 0859 02/15/23 0902 02/15/23 0859   (!) 152/70 88 (!) 26 98.3 °F (36.8 °C) (!) 90 %      MAP       --                Physical Exam    Nursing note and vitals reviewed.  Constitutional: Vital signs are normal.   Chronically ill   HENT:   Head: Normocephalic and atraumatic.   Eyes: Conjunctivae and lids are normal.   Neck: Trachea normal. Neck supple.   Cardiovascular:  Normal rate, regular rhythm, normal heart sounds and normal pulses.           Pulmonary/Chest: Breath sounds normal. He has no wheezes. He has no rhonchi.   Abdominal: Abdomen is soft. Bowel sounds are normal. He exhibits no distension. There is no abdominal tenderness. There is no rebound and no guarding.   Musculoskeletal:          General: Normal range of motion.      Cervical back: Neck supple.     Neurological: He is alert and oriented to person, place, and time. He has normal strength. GCS eye subscore is 4. GCS verbal subscore is 5. GCS motor subscore is 6.   Skin: Skin is warm. Capillary refill takes less than 2 seconds.   Psychiatric: He has a normal mood and affect. His speech is normal. Thought content normal.       ED Course   Critical Care    Date/Time: 2/15/2023 9:16 AM  Performed by: Salomón Lemus MD  Authorized by: Salomón Lemus MD   Other critical care time: 45 minutes  Total critical care time (exclusive of procedural time) : 45 minutes      Labs Reviewed   CBC W/ AUTO DIFFERENTIAL - Abnormal; Notable for the following components:       Result Value    RBC 3.31 (*)     Hemoglobin 9.8 (*)     Hematocrit 29.5 (*)     RDW 14.7 (*)     Platelets 135 (*)     Immature Granulocytes 2.1 (*)     Immature Grans (Abs) 0.15 (*)     Lymph % 16.6 (*)     All other components within normal limits   COMPREHENSIVE METABOLIC PANEL - Abnormal; Notable for the following components:    Glucose 126 (*)     BUN 25 (*)     Creatinine 1.7 (*)     Albumin 3.3 (*)     Anion Gap 1 (*)     eGFR 40.0 (*)     All other components within normal limits   TROPONIN I HIGH SENSITIVITY - Abnormal; Notable for the following components:    Troponin I High Sensitivity 1149.7 (*)     All other components within normal limits   NT-PRO NATRIURETIC PEPTIDE - Abnormal; Notable for the following components:    NT-proBNP 8265 (*)     All other components within normal limits   PROTIME-INR - Abnormal; Notable for the following components:    Prothrombin Time 15.1 (*)     INR 1.5 (*)     All other components within normal limits   APTT - Abnormal; Notable for the following components:    aPTT 43.9 (*)     All other components within normal limits   SARS-COV-2 RDRP GENE    Narrative:     This test utilizes isothermal nucleic acid amplification technology to detect  the SARS-CoV-2 RdRp nucleic acid segment. The analytical sensitivity (limit of detection) is 500 copies/swab.     A POSITIVE result is indicative of the presence of SARS-CoV-2 RNA; clinical correlation with patient history and other diagnostic information is necessary to determine patient infection status.    A NEGATIVE result means that SARS-CoV-2 nucleic acids are not present above the limit of detection. A NEGATIVE result should be treated as presumptive. It does not rule out the possibility of COVID-19 and should not be the sole basis for treatment decisions. If COVID-19 is strongly suspected based on clinical and exposure history, re-testing using an alternate molecular assay should be considered.     This test is only for use under the Food and Drug Administration s Emergency Use Authorization (EUA).     Commercial kits are provided by [employer]. Performance characteristics of the EUA have been independently verified by [facility] Department of Pathology and Laboratory Medicine.   _________________________________________________________________   The authorized Fact Sheet for Healthcare Providers and the authorized Fact Sheet for Patients of the ID NOW COVID-19 are available on the FDA website:    https://www.fda.gov/media/691823/download      https://www.fda.gov/media/717350/download         EKG Readings: (Independently Interpreted)   Initial Reading: No STEMI. Rhythm: Normal Sinus Rhythm. Conduction: Normal. Axis: Normal.   Nonspecific ST abnormality.  ST depression and V5 V6 without reciprocal change   ECG Results              EKG 12-lead (In process)  Result time 02/15/23 09:08:55      In process by Interface, Lab In [facility] (02/15/23 09:08:55)                   Narrative:    Test Reason : R07.9,    Vent. Rate : 092 BPM     Atrial Rate : 092 BPM     P-R Int : 174 ms          QRS Dur : 098 ms      QT Int : 382 ms       P-R-T Axes : 058 016 112 degrees     QTc Int : 472 ms    Normal  sinus rhythm  Nonspecific ST and T wave abnormality  Prolonged QT  Abnormal ECG  When compared with ECG of 25-OCT-2021 18:13,  ST now depressed in Inferior leads  T wave inversion now evident in Lateral leads    Referred By: [email or web address]   SELF           Confirmed By:                                   Imaging Results              X-Ray Chest 1 View (Final result)  Result time 02/15/23 09:35:27      Final result by Jkae Morifn MD (02/15/23 09:35:27)                   Impression:      Diffuse interstitial prominence of the lungs, possibly reflecting mild interstitial edema.      Electronically signed by: Jake Morfin MD  Date:    02/15/2023  Time:    09:35               Narrative:    EXAMINATION:  XR CHEST 1 VIEW    CLINICAL HISTORY:  Chest pain, shortness of breath    COMPARISON:  09/20/2022    FINDINGS:  Cardiac silhouette does not appear enlarged.  Diffuse interstitial prominence of the lungs may reflect mild interstitial edema.  No focal consolidation or pleural effusion.  No acute osseous finding.                                       Medications   heparin 25,000 units in dextrose 5% (100 units/ml) IV bolus from bag INITIAL BOLUS (max bolus 4000 units) (has no administration in time range)   heparin 25,000 units in dextrose 5% 250 mL (100 units/mL) infusion LOW INTENSITY nomogram - [facility] (has no administration in time range)   heparin 25,000 units in dextrose 5% (100 units/ml) IV bolus from bag - ADDITIONAL PRN BOLUS - 60 units/kg (max bolus 4000 units) (has no administration in time range)   heparin 25,000 units in dextrose 5% (100 units/ml) IV bolus from bag - ADDITIONAL PRN BOLUS - 30 units/kg (max bolus 4000 units) (has no administration in time range)   aspirin tablet 325 mg (325 mg Oral Given 2/15/23 0950)     Medical Decision Making:   Initial Assessment:   82-year-old male with history of coronary artery disease with stent on Plavix, of anemia, arthritis, bronchitis, CKD2, CAD, GERD, hypercholesterolemia and  hypertension presents with chest pain and shortness of breath since last night.  Patient hypoxic with improvement on 2 liters.  Will check for ACS, CHF, infection.  Most likely admit for further medical  Clinical Tests:   Lab Tests: Ordered and Reviewed  The following lab test(s) were unremarkable: CBC, CMP, Troponin, PT, PTT and BNP  Radiological Study: Ordered and Reviewed  Medical Tests: Reviewed and Ordered           ED Course as of 02/15/23 1007   Wed Feb 15, 2023   0944 Held off until morning due to concerns of bleed but hemoglobin appears to be baseline.  Troponin noted.  Will give aspirin.  Started heparin.  Transfer patient to intermittent general cardiology [HD]      ED Course User Index  [HD] Salomón Lemus MD                 Clinical Impression:   Final diagnoses:  [R07.9] Chest pain  [R06.02] SOB (shortness of breath)  [I21.4] NSTEMI (non-ST elevated myocardial infarction) (Primary)  [R77.8] Elevated troponin  [N28.9] Renal insufficiency  [R06.89] Respiratory insufficiency        ED Disposition Condition    Transfer to Another Facility Fair                Salomón Lemus MD  02/15/23 1007

## 2023-02-16 DIAGNOSIS — I25.10 CORONARY ARTERY DISEASE, UNSPECIFIED VESSEL OR LESION TYPE, UNSPECIFIED WHETHER ANGINA PRESENT, UNSPECIFIED WHETHER NATIVE OR TRANSPLANTED HEART: Primary | ICD-10-CM

## 2023-02-17 ENCOUNTER — TELEPHONE (OUTPATIENT)
Dept: CARDIOTHORACIC SURGERY | Facility: CLINIC | Age: 83
End: 2023-02-17
Payer: OTHER GOVERNMENT

## 2023-02-17 NOTE — TELEPHONE ENCOUNTER
Attempted to return call to patient. Rang and then hang up. No option to leave message.    Julie Haase RN  CTS Nurse Navigator [phone]      ----- Message from Ambar Dodd sent at 2/17/2023  9:22 AM CST -----  Contact: Giovana @ [phone]  EDDIE PRICE wife calling regarding Patient Advice (message) for #calling to speak with someone in office about surgery 2/22, asking for urgent call back

## 2023-02-19 ENCOUNTER — HOSPITAL ENCOUNTER (INPATIENT)
Facility: HOSPITAL | Age: 83
LOS: 12 days | Discharge: NURSING, REHAB OR LONG-TERM CARE | DRG: 235 | End: 2023-03-03
Attending: INTERNAL MEDICINE | Admitting: INTERNAL MEDICINE
Payer: OTHER GOVERNMENT

## 2023-02-19 ENCOUNTER — ANESTHESIA EVENT (OUTPATIENT)
Dept: SURGERY | Facility: HOSPITAL | Age: 83
DRG: 235 | End: 2023-02-19
Payer: OTHER GOVERNMENT

## 2023-02-19 DIAGNOSIS — I49.9 IRREGULAR CARDIAC RHYTHM: ICD-10-CM

## 2023-02-19 DIAGNOSIS — I65.29 CAROTID STENOSIS: ICD-10-CM

## 2023-02-19 DIAGNOSIS — I25.10 CAD (CORONARY ARTERY DISEASE): ICD-10-CM

## 2023-02-19 DIAGNOSIS — Q24.9 HEART ABNORMALITY: ICD-10-CM

## 2023-02-19 DIAGNOSIS — I25.10 ATHEROSCLEROSIS OF NATIVE CORONARY ARTERY OF NATIVE HEART WITHOUT ANGINA PECTORIS: ICD-10-CM

## 2023-02-19 DIAGNOSIS — I50.9: ICD-10-CM

## 2023-02-19 DIAGNOSIS — I25.10 MULTIPLE VESSEL CORONARY ARTERY DISEASE: ICD-10-CM

## 2023-02-19 LAB
ALBUMIN SERPL-MCNC: 3.5 G/DL (ref 3.4–4.8)
ALBUMIN/GLOB SERPL: 1 RATIO (ref 1.1–2)
ALP SERPL-CCNC: 70 UNIT/L (ref 40–150)
ALT SERPL-CCNC: 24 UNIT/L (ref 0–55)
APTT PPP: 86.9 SECONDS (ref 23.2–33.7)
AST SERPL-CCNC: 29 UNIT/L (ref 5–34)
BASOPHILS # BLD AUTO: 0.02 X10(3)/MCL (ref 0–0.2)
BASOPHILS NFR BLD AUTO: 0.4 %
BILIRUBIN DIRECT+TOT PNL SERPL-MCNC: 0.9 MG/DL
BUN SERPL-MCNC: 29.5 MG/DL (ref 8.4–25.7)
CALCIUM SERPL-MCNC: 9.3 MG/DL (ref 8.8–10)
CHLORIDE SERPL-SCNC: 99 MMOL/L (ref 98–107)
CO2 SERPL-SCNC: 22 MMOL/L (ref 23–31)
CREAT SERPL-MCNC: 1.68 MG/DL (ref 0.73–1.18)
EOSINOPHIL # BLD AUTO: 0 X10(3)/MCL (ref 0–0.9)
EOSINOPHIL NFR BLD AUTO: 0 %
ERYTHROCYTE [DISTWIDTH] IN BLOOD BY AUTOMATED COUNT: 14.6 % (ref 11.5–17)
GFR SERPLBLD CREATININE-BSD FMLA CKD-EPI: 41 MLS/MIN/1.73/M2
GLOBULIN SER-MCNC: 3.5 GM/DL (ref 2.4–3.5)
GLUCOSE SERPL-MCNC: 87 MG/DL (ref 82–115)
HCT VFR BLD AUTO: 30.7 % (ref 42–52)
HGB BLD-MCNC: 10.2 G/DL (ref 14–18)
IMM GRANULOCYTES # BLD AUTO: 0.07 X10(3)/MCL (ref 0–0.04)
IMM GRANULOCYTES NFR BLD AUTO: 1.4 %
INR BLD: 1.24 (ref 0–1.3)
LYMPHOCYTES # BLD AUTO: 1.11 X10(3)/MCL (ref 0.6–4.6)
LYMPHOCYTES NFR BLD AUTO: 21.9 %
MAGNESIUM SERPL-MCNC: 1.98 MG/DL (ref 1.6–2.6)
MCH RBC QN AUTO: 29.4 PG
MCHC RBC AUTO-ENTMCNC: 33.2 G/DL (ref 33–36)
MCV RBC AUTO: 88.5 FL (ref 80–94)
MONOCYTES # BLD AUTO: 0.8 X10(3)/MCL (ref 0.1–1.3)
MONOCYTES NFR BLD AUTO: 15.8 %
NEUTROPHILS # BLD AUTO: 3.07 X10(3)/MCL (ref 2.1–9.2)
NEUTROPHILS NFR BLD AUTO: 60.5 %
NRBC BLD AUTO-RTO: 0 %
PLATELET # BLD AUTO: 141 X10(3)/MCL (ref 130–400)
PMV BLD AUTO: 11.3 FL (ref 7.4–10.4)
POTASSIUM SERPL-SCNC: 4.1 MMOL/L (ref 3.5–5.1)
PROT SERPL-MCNC: 7 GM/DL (ref 5.8–7.6)
PROTHROMBIN TIME: 15.5 SECONDS (ref 12.5–14.5)
RBC # BLD AUTO: 3.47 X10(6)/MCL (ref 4.7–6.1)
SODIUM SERPL-SCNC: 137 MMOL/L (ref 136–145)
WBC # SPEC AUTO: 5.1 X10(3)/MCL (ref 4.5–11.5)

## 2023-02-19 PROCEDURE — 85025 COMPLETE CBC W/AUTO DIFF WBC: CPT | Performed by: NURSE PRACTITIONER

## 2023-02-19 PROCEDURE — 80053 COMPREHEN METABOLIC PANEL: CPT | Performed by: NURSE PRACTITIONER

## 2023-02-19 PROCEDURE — 85610 PROTHROMBIN TIME: CPT | Performed by: NURSE PRACTITIONER

## 2023-02-19 PROCEDURE — 85730 THROMBOPLASTIN TIME PARTIAL: CPT | Performed by: NURSE PRACTITIONER

## 2023-02-19 PROCEDURE — 11000001 HC ACUTE MED/SURG PRIVATE ROOM

## 2023-02-19 PROCEDURE — 25000003 PHARM REV CODE 250: Performed by: INTERNAL MEDICINE

## 2023-02-19 PROCEDURE — 21400001 HC TELEMETRY ROOM

## 2023-02-19 PROCEDURE — 83735 ASSAY OF MAGNESIUM: CPT | Performed by: NURSE PRACTITIONER

## 2023-02-19 PROCEDURE — 25000003 PHARM REV CODE 250: Performed by: NURSE PRACTITIONER

## 2023-02-19 RX ORDER — METOPROLOL SUCCINATE 50 MG/1
50 TABLET, EXTENDED RELEASE ORAL DAILY
Status: DISCONTINUED | OUTPATIENT
Start: 2023-02-20 | End: 2023-02-19

## 2023-02-19 RX ORDER — LABETALOL HYDROCHLORIDE 5 MG/ML
20 INJECTION, SOLUTION INTRAVENOUS EVERY 4 HOURS PRN
Status: DISCONTINUED | OUTPATIENT
Start: 2023-02-19 | End: 2023-03-03 | Stop reason: HOSPADM

## 2023-02-19 RX ORDER — HYDRALAZINE HYDROCHLORIDE 20 MG/ML
10 INJECTION INTRAMUSCULAR; INTRAVENOUS EVERY 4 HOURS PRN
Status: DISCONTINUED | OUTPATIENT
Start: 2023-02-19 | End: 2023-02-19

## 2023-02-19 RX ORDER — ACETAMINOPHEN 325 MG/1
650 TABLET ORAL EVERY 6 HOURS PRN
Status: DISCONTINUED | OUTPATIENT
Start: 2023-02-19 | End: 2023-03-03 | Stop reason: HOSPADM

## 2023-02-19 RX ORDER — FOLIC ACID 1 MG/1
1 TABLET ORAL DAILY
Status: DISCONTINUED | OUTPATIENT
Start: 2023-02-20 | End: 2023-02-24

## 2023-02-19 RX ORDER — METOPROLOL TARTRATE 50 MG/1
50 TABLET ORAL 2 TIMES DAILY
Status: DISCONTINUED | OUTPATIENT
Start: 2023-02-19 | End: 2023-02-24

## 2023-02-19 RX ORDER — PANTOPRAZOLE SODIUM 40 MG/1
40 TABLET, DELAYED RELEASE ORAL DAILY
Status: DISCONTINUED | OUTPATIENT
Start: 2023-02-20 | End: 2023-02-24

## 2023-02-19 RX ORDER — ASPIRIN 81 MG/1
81 TABLET ORAL DAILY
Status: DISCONTINUED | OUTPATIENT
Start: 2023-02-20 | End: 2023-02-24

## 2023-02-19 RX ORDER — LISINOPRIL 5 MG/1
5 TABLET ORAL DAILY
Status: DISCONTINUED | OUTPATIENT
Start: 2023-02-20 | End: 2023-02-21

## 2023-02-19 RX ORDER — ATORVASTATIN CALCIUM 40 MG/1
40 TABLET, FILM COATED ORAL DAILY
Status: DISCONTINUED | OUTPATIENT
Start: 2023-02-20 | End: 2023-02-25

## 2023-02-19 RX ORDER — MORPHINE SULFATE 4 MG/ML
2 INJECTION, SOLUTION INTRAMUSCULAR; INTRAVENOUS
Status: DISCONTINUED | OUTPATIENT
Start: 2023-02-19 | End: 2023-03-03 | Stop reason: HOSPADM

## 2023-02-19 RX ORDER — HEPARIN SODIUM,PORCINE/D5W 25000/250
4 INTRAVENOUS SOLUTION INTRAVENOUS CONTINUOUS
Status: DISPENSED | OUTPATIENT
Start: 2023-02-19 | End: 2023-02-24

## 2023-02-19 RX ORDER — HYDRALAZINE HYDROCHLORIDE 20 MG/ML
20 INJECTION INTRAMUSCULAR; INTRAVENOUS EVERY 4 HOURS PRN
Status: DISCONTINUED | OUTPATIENT
Start: 2023-02-19 | End: 2023-03-03 | Stop reason: HOSPADM

## 2023-02-19 RX ORDER — HYDROCODONE BITARTRATE AND ACETAMINOPHEN 7.5; 325 MG/1; MG/1
1 TABLET ORAL EVERY 6 HOURS PRN
Status: DISCONTINUED | OUTPATIENT
Start: 2023-02-19 | End: 2023-03-03 | Stop reason: HOSPADM

## 2023-02-19 RX ORDER — NITROGLYCERIN 0.4 MG/1
0.4 TABLET SUBLINGUAL EVERY 5 MIN PRN
Status: DISCONTINUED | OUTPATIENT
Start: 2023-02-19 | End: 2023-02-24

## 2023-02-19 RX ORDER — ZOLPIDEM TARTRATE 5 MG/1
5 TABLET ORAL NIGHTLY PRN
Status: DISCONTINUED | OUTPATIENT
Start: 2023-02-19 | End: 2023-03-03 | Stop reason: HOSPADM

## 2023-02-19 RX ADMIN — METOPROLOL TARTRATE 50 MG: 50 TABLET, FILM COATED ORAL at 08:02

## 2023-02-19 RX ADMIN — PRAZOSIN HYDROCHLORIDE 3 MG: 1 CAPSULE ORAL at 08:02

## 2023-02-20 LAB
ABS NEUT (OLG): 3.34 X10(3)/MCL (ref 2.1–9.2)
APTT PPP: 87.3 SECONDS (ref 23.2–33.7)
BASOPHILS NFR BLD MANUAL: 0.05 X10(3)/MCL (ref 0–0.2)
BASOPHILS NFR BLD MANUAL: 1 %
ERYTHROCYTE [DISTWIDTH] IN BLOOD BY AUTOMATED COUNT: 14.6 % (ref 11.5–17)
HCT VFR BLD AUTO: 29.7 % (ref 42–52)
HGB BLD-MCNC: 9.9 G/DL (ref 14–18)
IMM GRANULOCYTES # BLD AUTO: 0.07 X10(3)/MCL (ref 0–0.04)
IMM GRANULOCYTES NFR BLD AUTO: 1.5 %
INSTRUMENT WBC (OLG): 4.7 X10(3)/MCL
LYMPHOCYTES NFR BLD MANUAL: 0.66 X10(3)/MCL
LYMPHOCYTES NFR BLD MANUAL: 14 %
MCH RBC QN AUTO: 29.4 PG
MCHC RBC AUTO-ENTMCNC: 33.3 G/DL (ref 33–36)
MCV RBC AUTO: 88.1 FL (ref 80–94)
MONOCYTES NFR BLD MANUAL: 0.66 X10(3)/MCL (ref 0.1–1.3)
MONOCYTES NFR BLD MANUAL: 14 %
NEUTROPHILS NFR BLD MANUAL: 71 %
NRBC BLD AUTO-RTO: 0 %
PLATELET # BLD AUTO: 143 X10(3)/MCL (ref 130–400)
PLATELET # BLD EST: NORMAL 10*3/UL
PMV BLD AUTO: 11.3 FL (ref 7.4–10.4)
RBC # BLD AUTO: 3.37 X10(6)/MCL (ref 4.7–6.1)
RBC MORPH BLD: NORMAL
WBC # SPEC AUTO: 4.7 X10(3)/MCL (ref 4.5–11.5)

## 2023-02-20 PROCEDURE — 25000003 PHARM REV CODE 250: Performed by: NURSE PRACTITIONER

## 2023-02-20 PROCEDURE — 21400001 HC TELEMETRY ROOM

## 2023-02-20 PROCEDURE — 63600175 PHARM REV CODE 636 W HCPCS: Performed by: NURSE PRACTITIONER

## 2023-02-20 PROCEDURE — 85730 THROMBOPLASTIN TIME PARTIAL: CPT | Performed by: NURSE PRACTITIONER

## 2023-02-20 PROCEDURE — 25000003 PHARM REV CODE 250: Performed by: INTERNAL MEDICINE

## 2023-02-20 PROCEDURE — 85027 COMPLETE CBC AUTOMATED: CPT | Performed by: NURSE PRACTITIONER

## 2023-02-20 RX ORDER — NITROGLYCERIN 20 MG/100ML
0-400 INJECTION INTRAVENOUS CONTINUOUS
Status: DISCONTINUED | OUTPATIENT
Start: 2023-02-20 | End: 2023-02-24

## 2023-02-20 RX ADMIN — PANTOPRAZOLE SODIUM 40 MG: 40 TABLET, DELAYED RELEASE ORAL at 09:02

## 2023-02-20 RX ADMIN — ATORVASTATIN CALCIUM 40 MG: 40 TABLET, FILM COATED ORAL at 09:02

## 2023-02-20 RX ADMIN — METOPROLOL TARTRATE 50 MG: 50 TABLET, FILM COATED ORAL at 09:02

## 2023-02-20 RX ADMIN — LISINOPRIL 5 MG: 5 TABLET ORAL at 09:02

## 2023-02-20 RX ADMIN — FOLIC ACID 1 MG: 1 TABLET ORAL at 09:02

## 2023-02-20 RX ADMIN — ASPIRIN 81 MG: 81 TABLET, COATED ORAL at 09:02

## 2023-02-20 RX ADMIN — ACETAMINOPHEN 325MG 650 MG: 325 TABLET ORAL at 08:02

## 2023-02-20 RX ADMIN — HEPARIN SODIUM 4 UNITS/KG/HR: 10000 INJECTION, SOLUTION INTRAVENOUS at 11:02

## 2023-02-20 RX ADMIN — NITROGLYCERIN 10 MCG/MIN: 20 INJECTION INTRAVENOUS at 11:02

## 2023-02-20 RX ADMIN — PRAZOSIN HYDROCHLORIDE 3 MG: 1 CAPSULE ORAL at 08:02

## 2023-02-20 RX ADMIN — METOPROLOL TARTRATE 50 MG: 50 TABLET, FILM COATED ORAL at 08:02

## 2023-02-20 NOTE — PLAN OF CARE
Spouse stated that patient had [facility] for Home Health in the past.       02/20/23 1441   Discharge Assessment   Assessment Type Discharge Planning Assessment   Confirmed/corrected address, phone number and insurance Yes   Confirmed Demographics Correct on Facesheet   Source of Information patient;family   Does patient/caregiver understand observation status Yes   Communicated BAMBI with patient/caregiver Yes;Date not available/Unable to determine   People in Home spouse   Do you expect to return to your current living situation? Yes   Prior to hospitilization cognitive status: Alert/Oriented   Current cognitive status: Alert/Oriented   Equipment Currently Used at Home cane, straight;walker, rolling   Readmission within 30 days? Yes   Do you currently have service(s) that help you manage your care at home? No   Do you take prescription medications? Yes   Do you have prescription coverage? Yes   Coverage Medicare Part A & B, [facility]   Do you have any problems affording any of your prescribed medications? No   Who is going to help you get home at discharge? Family will take home.   How do you get to doctors appointments? family or friend will provide   Are you on dialysis? No   Discharge Plan A Home Health   Discharge Plan B Home with family   Discharge Plan discussed with: Patient;Spouse/sig other   Name(s) and Number(s) NavarreteGiovana   Spouse    [phone]

## 2023-02-20 NOTE — ANESTHESIA PREPROCEDURE EVALUATION
Anesthesia Pre-Operative Evaluation         Patient Name: Hunter Navarrete  YOB: 1940  MRN: [id number]    SUBJECTIVE:     Pre-operative evaluation for Procedure(s) (LRB):  CORONARY ARTERY BYPASS GRAFT (CABG) (N/A)  HARVEST-VEIN-ENDOVASCULAR (N/A)     02/20/2023    Hunter Navarrete is a 82 y.o. male w/ a significant PMHx of HTN, HLD, ICM (EF 45-50%), CAD, NSTEMI on 2/15/23 (s/p stent), lupus anticoagulant. [facility] with significant coronary calcification, elevated LVEDP. Transferred to [facility] for CABG evaluation.    Patient now presents for the above procedure(s).    TTE 2/16/23:  1. The study quality is good.   2. Global left ventricular systolic function is borderline normal. The   left ventricular ejection fraction is 45-50%.     3. The left atrium is mildly enlarged.   4. Moderate calcification of the aortic valve is noted.   5. Mild aortic valve stenosis is present. Mild to moderate (1-2+)   aortic regurgitation.     6. Mild to moderate mitral annular calcification is noted.   7. Mild (1+) mitral regurgitation.   8. Trace tricuspid regurgitation.   9. The pulmonary artery systolic pressure is 40 mmHg.   10. Optison used to enhance endocardial border.     LDA:        Peripheral IV - Single Lumen 02/15/23 0000 18 G Left Antecubital (Active)   Site Assessment Clean;Dry;Intact 02/20/23 0400   Extremity Assessment Distal to IV No abnormal discoloration 02/19/23 2000   Line Status Flushed;Infusing 02/20/23 0400   Dressing Status Clean;Intact;Dry 02/20/23 0400   Dressing Intervention Integrity maintained 02/20/23 0400   Dressing Change Due 02/19/23 02/19/23 0705   Site Change Due 02/19/23 02/19/23 0705   Number of days: 5       Prev airway: None documented.    Drips: None documented.      Patient Active Problem List   Diagnosis    Lupus anticoagulant inhibitor syndrome    Iron deficiency anemia    Elevated homocysteine    Low vitamin B12 level    Anemia    Angina, class III    Elevated partial  thromboplastin time (PTT)    Coronary artery disease    Stage 3 chronic kidney disease    Hypercholesteremia    Bleeding    CAD (coronary artery disease)    Altered mental status    Hypertension    Head injury    Dehydration    NSTEMI (non-ST elevated myocardial infarction)    Ischemic cardiomyopathy       Review of patient's allergies indicates:   Allergen Reactions    Cardura [doxazosin] Hives    Lyrica [pregabalin] Other (See Comments)    Paxil [paroxetine hcl] Other (See Comments)    Restoril [temazepam] Hallucinations    Vioxx [rofecoxib] Swelling    Zithromax [azithromycin] Hives       Current Inpatient Medications:      No current facility-administered medications on file prior to encounter.     Current Outpatient Medications on File Prior to Encounter   Medication Sig Dispense Refill    albuterol-ipratropium (DUO-NEB) 2.5 mg-0.5 mg/3 mL nebulizer solution Take 3 mLs by nebulization every 4 (four) hours as needed for Wheezing. Rescue 75 mL 0    aspirin (ECOTRIN) 81 MG EC tablet Take 81 mg by mouth once daily.      atorvastatin (LIPITOR) 40 MG tablet Take 40 mg by mouth once daily.      calcium carbonate 400 mg calcium (1,000 mg) Chew Take 2 tablets by mouth once daily.      calcium carbonate-vitamin D3 500 mg-10 mcg (400 unit) Tab Take 1 tablet by mouth once daily.      clopidogreL (PLAVIX) 75 mg tablet Take 75 mg by mouth once daily.      ezetimibe (ZETIA) 10 mg tablet Take 10 mg by mouth once daily.      FLUoxetine 10 MG capsule Take 10 mg by mouth once daily.      folic acid (FOLVITE) 1 MG tablet Take 1 mg by mouth once daily.      furosemide (LASIX) 40 MG tablet Take 1 tablet (40 mg total) by mouth once daily. 30 tablet 11    gabapentin (NEURONTIN) 100 MG capsule 200 MG IN THE MORNING, 100 MG AT LUNCH, 100 MG AT SUPPERTIME      hydrALAZINE (APRESOLINE) 25 MG tablet Take 1 tablet (25 mg total) by mouth every 8 (eight) hours. 90 tablet 11    HYDROcodone-acetaminophen (NORCO)  5-325 mg per tablet Take 1 tablet by mouth every 8 (eight) hours as needed for Pain (USUALLY TAKES ONE TAB IN THE MORNING).      hydrOXYzine HCl (ATARAX) 25 MG tablet Take 25 mg by mouth every 6 (six) hours as needed for Itching.      isosorbide mononitrate (IMDUR) 30 MG 24 hr tablet Take 1 tablet (30 mg total) by mouth once daily. 30 tablet 11    lisinopriL 10 MG tablet Take 5 mg by mouth once daily.       meclizine (ANTIVERT) 25 mg tablet Take 25 mg by mouth 3 (three) times daily as needed.      metoprolol succinate (TOPROL-XL) 50 MG 24 hr tablet Take 50 mg by mouth once daily.      metoprolol tartrate (LOPRESSOR) 50 MG tablet Take 1 tablet (50 mg total) by mouth 3 (three) times daily. 90 tablet 11    nitroGLYCERIN (NITROSTAT) 0.4 MG SL tablet Place 0.4 mg under the tongue every 5 (five) minutes as needed.      omeprazole (PRILOSEC) 20 MG capsule Take 20 mg by mouth once daily.       predniSONE (DELTASONE) 2.5 MG tablet Take 2.5 mg by mouth once daily.      quetiapine (SEROQUEL) 50 MG tablet Take 50 mg by mouth every evening.      rOPINIRole (REQUIP) 1 MG tablet Take 1 mg by mouth 2 (two) times daily.       terazosin (HYTRIN) 5 MG capsule Take 3 mg by mouth every evening.      tiZANidine 4 mg Cap Take 4 mg by mouth 3 (three) times daily as needed.      traMADoL (ULTRAM) 50 mg tablet Take by mouth every 6 (six) hours as needed. 1/2 TO 1 TABLET 4 TIMES A DAY PRN         Past Surgical History:   Procedure Laterality Date    ACF      BACK SURGERY      RODS IN BACK; 4 SURGIERIES    BONE MARROW BIOPSY      CARDIAC ANGIOGRAM WITH STENT      CATARACT SX Bilateral     CORONARY ANGIOGRAPHY N/A 2/15/2023    Procedure: ANGIOGRAM, CORONARY ARTERY;  Surgeon: Rito Vega MD;  [facility] C[facility];  Service: Cardiology;  Laterality: N/A;    HIP SURGERY Right     THR    KNEE SURGERY Right     ATS    LEFT HEART CATHETERIZATION Left 07/22/2021    Procedure: Left heart cath;  Surgeon: Curtis Loredo MD;   Location: [facility] CATH;  Service: Cardiology;  Laterality: Left;    LEFT HEART CATHETERIZATION Left 2022    Procedure: Left heart cath;  Surgeon: Giorgi Barker MD;  Location: [facility] CATH;  Service: Cardiology;  Laterality: Left;    SHOULDER SURGERY Bilateral     SPINAL CORD STIMULATOR IMPLANT         Social History     Socioeconomic History    Marital status:    Tobacco Use    Smoking status: Former     Types: Cigarettes     Quit date:      Years since quittin.1    Smokeless tobacco: Never   Substance and Sexual Activity    Alcohol use: Yes     Comment: RARELY    Drug use: No     Social Determinants of Health     Financial Resource Strain: Low Risk     Difficulty of Paying Living Expenses: Not hard at all   Food Insecurity: No Food Insecurity    Worried About Running Out of Food in the Last Year: Never true    Ran Out of Food in the Last Year: Never true   Transportation Needs: No Transportation Needs    Lack of Transportation (Medical): No    Lack of Transportation (Non-Medical): No   Physical Activity: Inactive    Days of Exercise per Week: 0 days    Minutes of Exercise per Session: 0 min   Stress: No Stress Concern Present    Feeling of Stress : Not at all   Social Connections: Moderately Integrated    Frequency of Communication with Friends and Family: More than three times a week    Frequency of Social Gatherings with Friends and Family: More than three times a week    Attends [employer] Services: Never    Active Member of Clubs or Organizations: Yes    Attends Club or Organization Meetings: Never    Marital Status:        OBJECTIVE:     Vital Signs Range (Last 24H):  Temp:  [36.3 °C (97.4 °F)-37.1 °C (98.8 °F)]   Pulse:  [69-96]   Resp:  [18-20]   BP: (131-168)/(61-81)   SpO2:  [91 %-96 %]       Significant Labs:  Lab Results   Component Value Date    WBC 4.7 2023    HGB 9.9 (L) 2023    HCT 29.7 (L) 2023     2023    ALT 24 2023     AST 29 02/19/2023     02/19/2023    K 4.1 02/19/2023    CL 98 02/19/2023    CREATININE 1.68 (H) 02/19/2023    BUN 29.5 (H) 02/19/2023    CO2 22 (L) 02/19/2023    TSH 2.89 02/16/2023    INR 1.24 02/19/2023    HGBA1C 6.0 02/16/2023       Diagnostic Studies: No relevant studies.    EKG:   Results for orders placed or performed during the hospital encounter of 02/15/23   EKG 12-lead    Collection Time: 02/16/23  9:57 AM    Narrative    Test Reason : R07.9,    Vent. Rate : 103 BPM     Atrial Rate : 103 BPM     P-R Int : 166 ms          QRS Dur : 102 ms      QT Int : 372 ms       P-R-T Axes : 045 012 099 degrees     QTc Int : 487 ms    Sinus tachycardia  Marked ST abnormality, possible inferior subendocardial injury  Abnormal ECG  When compared with ECG of 15-FEB-2023 11:29,  No significant change was found  Confirmed by Rito Vega MD ([id number]) on 2/16/2023 4:38:35 PM    Referred By: Delvin Allan MD           Confirmed By:Rito Vega MD       2D ECHO:  TTE:  No results found for this or any previous visit.    VICKY:  No results found for this or any previous visit.    ASSESSMENT/PLAN:                                                                                                                  02/20/2023  Hunter Navarrete is a 82 y.o., male.      Pre-op Assessment    I have reviewed the Patient Summary Reports.     I have reviewed the Nursing Notes. I have reviewed the NPO Status.   I have reviewed the Medications.     Review of Systems  Anesthesia Hx:  No problems with previous Anesthesia  History of prior surgery of interest to airway management or planning: Denies Family Hx of Anesthesia complications.   Denies Personal Hx of Anesthesia complications.   Hematology/Oncology:         -- Anemia: Hematology Comments: Lupus anticoagulant   Cardiovascular:   Exercise tolerance: good Hypertension Past MI CAD  CABG/stent  Angina CHF hyperlipidemia ECG has been reviewed. ICM EF 45-50%   Pulmonary:   Denies COPD.   Denies Asthma.  Denies Sleep Apnea.    Renal/:   Chronic Renal Disease, CKD    Hepatic/GI:   Denies GERD.    Neurological:   Denies CVA.  Denies Headaches. Denies Seizures.    Endocrine:   Denies Diabetes.  Denies Obesity / BMI > 30  Psych:   Denies Psychiatric History.          Physical Exam  General: Well nourished, Cooperative, Alert and Oriented    Airway:  Mallampati: II   Mouth Opening: Normal  TM Distance: Normal  Tongue: Normal  Neck ROM: Normal ROM    Dental:  Many missing upper/lower      Anesthesia Plan  Type of Anesthesia, risks & benefits discussed:    Anesthesia Type: Gen ETT  Intra-op Monitoring Plan: Standard ASA Monitors, VICKY, Art Line and Central Line  Post Op Pain Control Plan: multimodal analgesia and IV/PO Opioids PRN  Induction:  IV  Airway Plan: Direct and Video, Post-Induction  ASA Score: 3  Day of Surgery Review of History & Physical: H&P Update referred to the surgeon/provider.    Ready For Surgery From Anesthesia Perspective.     .

## 2023-02-20 NOTE — CONSULTS
[facility]                       [address] [place] [address]    PATIENT NAME:       EDDIE GAMBOA  YOB: 1940  CSN:                [id number]   MRN:                [id number]  ADMIT DATE:         02/19/2023 17:43:00  PHYSICIAN:          Spencer Hagan MD                            CONSULTATION    DATE OF CONSULT:  02/20/2023 00:00:00    HISTORY OF PRESENT ILLNESS:  An 82-year-old male, patient of silvia Luke presented to the ER with chest pain and increased troponin.  He was   transferred to  [facility] for cardiology services.  He had multivessel disease   and elevated LVEDP.  He was further transferred for [facility] for further   diagnosis and treatment.  He has not ambulated well in a while.  He has leg   weakness.    PAST MEDICAL HISTORY:  Ischemic cardiomyopathy, hyperlipidemia, hypertension,   CKD, lupus, mild AS, pseudoaneurysm right  , abdominal aortic aneurysm,   status post right total hip replacement, back surgery, spinal cord stimulator.    FAMILY HISTORY:  Cancer, heart disease.    SOCIAL HISTORY:  Former smoker.    MEDICATIONS:  Per MAR.    ALLERGIES:  CARDURA, LYRICA, PAXIL, RESTORIL, VIOXX, ZITHROMAX.     REVIEW OF SYSTEMS:  Essentially positive for chest pain on exertion, inability to ambulate well,   chronic pain.    PHYSICAL EXAM:  GENERAL:  No acute distress.  VITAL SIGNS:  Stable.  ENT:  Trachea midline.  NECK:  No carotid bruits.  EYES:  EOMI, PERRLA.    LUNGS:  Clear anteriorly bilaterally.  HEART:  Normal S1 and S2.    ABDOMEN:  Soft.  EXTREMITIES:  Warm.  NEUROLOGIC:  Affect appropriate.  2+ motor power.  MUSCULOSKELETAL:  No gross deformity.    ASSESSMENT AND PLAN:  An 82-year-old with multiple medical problems, ischemic   cardiomyopathy, lupus anticoagulant, chronic renal failure, and inability to   ambulate.  I believe he is at significant high risk for coronary artery bypass    grafting.  I would like to explore stenting options, as he will be better suited   for that type of treatment.  Please reconsult if needed.        ______________________________  MD EDIN Reyes/ALONDRA  DD:  02/20/2023  Time:  03:11PM  DT:  02/20/2023  Time:  03:37PM  Job #:  [id number]      CONSULTATION

## 2023-02-20 NOTE — H&P
[facility] - 3rd Floor Medical Telemetry  Cardiology  History and Physical     Patient Name: Hunter Navarrete  MRN: [id number]  Admission Date: 2/19/2023  Code Status: Prior   Attending Provider: Lul Isaac MD   Primary Care Physician: Mariella Bethea MD  Principal Problem:<principal problem not specified>    Patient information was obtained from patient, past medical records, and ER records.     Subjective:     Chief Complaint:  chest pain    HPI:   Mr. Navarrete is an 81 y/o male, followed by Dr. Loredo who presented to [facility] ER with c/o chest pain. HS troponin 1149; therefore he was transferred to [facility] for cardiology services where he underwent LHC revealing multivessel disease and elevated LVEDP. He was started on diuretics, Plavix was placed on hold and he was transferred to [facility] for CABG evaluation. Patient is reporting mild chest tightness 3/10 currently      PMH: ICMO, Native CAD, HLD, HTN, CARLYLE, CKD, lupus anticoagulant inhibitor syndrome, VHD- mild AS, AAA, pseudoaneurysm RFA  PSH: right total hip replacement, LHC, back surgery, spinal cord stimulator implant, knee surgery, cataract surgery  Family History: Brother- cancer, heart disease, lung cancer; father heart disease  Social History: Former smoker, occasional ETOH; denies illicit drug use    Previous Cardiac Diagnostics:   CUS 02/20/23:  The right internal carotid artery demonstrated 50-69% stenosis.  The left internal carotid artery demonstrated 50-69% stenosis.  Bilateral vertebral arteries were patent with antegrade flow.    LHC 02/15/2023:  LM 0% stenosis  mLAD proximal 70% ISR  D1 ostial 50% stenosis  D2 ostial 50% stenosis  Lcx: previous stent; proximal Lcx  90% ISR; mid Lcx 30% stenosis  OM1 proximal 80% stenosis  RCA patent stents to ostium. Mid RCA proximal subsection- 60% stenosis; Mid RCA distal subsection 70& stenosis; distal RCA proximal subsection 100% stenosis. Fills left to right    TTE 02/16/2023:  Global  LV SF is borderline normal. LVEF 45-50%. LA is mildly enlarged. Moderate calcification of the aortic valve is noted. Mild AS is present. Mild to moderate AR. Mild to moderate MAC is noted. Mild MR. Trace TR. PASP 40 mmHg. Optison used to enhance endocardial border.     [facility] 09/22/2022:  LM 0% stenosis  mLAD proximal subsection 50% ISR  D1 ostial 50% stenosis  D2 ostial 50% stenosis  pLCx mid subsection 70% ISR reduced to 10%, Preprocedure AMALIA III flow  noted. Post procedure AMALIA III was present. Lesion previous treated on 7/22/21 with PTA/DAYAN  OM1 proximal 80% stenosis  mLCx distal subsection 30% stenosis  RCA proximal RCA stent widely patent  mRCA proximal subsection 60% subsection  mRCA distal subsection 70% stenosis  dRCA proximal subsection 100% stenosis. Fills left to right    CUS 05/13/2022:  60-79% stenosis in pRICA and mLICA  Antegrade flow to bilateral vertebral arteries      Past Medical History:   Diagnosis Date    Acid reflux     Anemia     Aortic aneurysm, abdominal     Arthritis     Bronchitis     Cataract     Celiac artery stenosis     50% by CTA abdomen 7/27/2020    CKD (chronic kidney disease)     45% FUNCTION    Coronary artery disease     Encounter for blood transfusion     Enlarged prostate     GERD (gastroesophageal reflux disease)     Hemorrhoids     Hypercholesteremia     Hypertension     Iron deficiency anemia, unspecified     Leaky heart valve     Lupus anticoagulant disorder     Renal artery stenosis     by CTA 7/27/2020    Skin cancer     Unspecified chronic bronchitis     UTI (urinary tract infection)        Past Surgical History:   Procedure Laterality Date    ACF      BACK SURGERY      RODS IN BACK; 4 SURGIERIES    BONE MARROW BIOPSY      CARDIAC ANGIOGRAM WITH STENT      CATARACT SX Bilateral     CORONARY ANGIOGRAPHY N/A 2/15/2023    Procedure: ANGIOGRAM, CORONARY ARTERY;  Surgeon: Rito Vega MD;  Location: [facility] CATH;  Service: Cardiology;  Laterality: N/A;    HIP SURGERY Right      THR    KNEE SURGERY Right     ATS    LEFT HEART CATHETERIZATION Left 07/22/2021    Procedure: Left heart cath;  Surgeon: Curtis Loredo MD;  Location: [facility] CATH;  Service: Cardiology;  Laterality: Left;    LEFT HEART CATHETERIZATION Left 09/22/2022    Procedure: Left heart cath;  Surgeon: Giorgi Barker MD;  Location: [facility] CATH;  Service: Cardiology;  Laterality: Left;    SHOULDER SURGERY Bilateral     SPINAL CORD STIMULATOR IMPLANT         Review of patient's allergies indicates:   Allergen Reactions    Cardura [doxazosin] Hives    Lyrica [pregabalin] Other (See Comments)    Paxil [paroxetine hcl] Other (See Comments)    Restoril [temazepam] Hallucinations    Vioxx [rofecoxib] Swelling    Zithromax [azithromycin] Hives       Current Facility-Administered Medications on File Prior to Encounter   Medication    [DISCONTINUED] acetaminophen tablet 650 mg    [DISCONTINUED] albuterol-ipratropium 2.5 mg-0.5 mg/3 mL nebulizer solution 3 mL    [DISCONTINUED] aspirin EC tablet 81 mg    [DISCONTINUED] atorvastatin tablet 40 mg    [DISCONTINUED] ezetimibe tablet 10 mg    [DISCONTINUED] FLUoxetine capsule 10 mg    [DISCONTINUED] folic acid tablet 1 mg    [DISCONTINUED] furosemide tablet 40 mg    [DISCONTINUED] heparin 25,000 units in dextrose 5% (100 units/ml) IV bolus from bag - ADDITIONAL PRN BOLUS - 30 units/kg (max bolus 4000 units)    [DISCONTINUED] heparin 25,000 units in dextrose 5% (100 units/ml) IV bolus from bag - ADDITIONAL PRN BOLUS - 60 units/kg (max bolus 4000 units)    [DISCONTINUED] heparin 25,000 units in dextrose 5% (100 units/ml) IV bolus from bag INITIAL BOLUS (max bolus 4000 units)    [DISCONTINUED] heparin 25,000 units in dextrose 5% 250 mL (100 units/mL) infusion LOW INTENSITY nomogram - [facility]    [DISCONTINUED] hydrALAZINE tablet 25 mg    [DISCONTINUED] HYDROcodone-acetaminophen 5-325 mg per tablet 1 tablet    [DISCONTINUED] isosorbide mononitrate 24 hr tablet 30 mg    [DISCONTINUED] melatonin tablet 6  mg    [DISCONTINUED] metoprolol tartrate (LOPRESSOR) tablet 50 mg    [DISCONTINUED] nitroGLYCERIN SL tablet 0.4 mg    [DISCONTINUED] ondansetron injection 4 mg    [DISCONTINUED] QUEtiapine tablet 50 mg    [DISCONTINUED] rOPINIRole tablet 1 mg    [DISCONTINUED] sodium chloride 0.9% flush 10 mL    [DISCONTINUED] sodium chloride 0.9% flush 10 mL    [DISCONTINUED] tamsulosin 24 hr capsule 0.4 mg     Current Outpatient Medications on File Prior to Encounter   Medication Sig    albuterol-ipratropium (DUO-NEB) 2.5 mg-0.5 mg/3 mL nebulizer solution Take 3 mLs by nebulization every 4 (four) hours as needed for Wheezing. Rescue    aspirin (ECOTRIN) 81 MG EC tablet Take 81 mg by mouth once daily.    atorvastatin (LIPITOR) 40 MG tablet Take 40 mg by mouth once daily.    calcium carbonate 400 mg calcium (1,000 mg) Chew Take 2 tablets by mouth once daily.    calcium carbonate-vitamin D3 500 mg-10 mcg (400 unit) Tab Take 1 tablet by mouth once daily.    clopidogreL (PLAVIX) 75 mg tablet Take 75 mg by mouth once daily.    ezetimibe (ZETIA) 10 mg tablet Take 10 mg by mouth once daily.    FLUoxetine 10 MG capsule Take 10 mg by mouth once daily.    folic acid (FOLVITE) 1 MG tablet Take 1 mg by mouth once daily.    furosemide (LASIX) 40 MG tablet Take 1 tablet (40 mg total) by mouth once daily.    gabapentin (NEURONTIN) 100 MG capsule 200 MG IN THE MORNING, 100 MG AT LUNCH, 100 MG AT SUPPERTIME    hydrALAZINE (APRESOLINE) 25 MG tablet Take 1 tablet (25 mg total) by mouth every 8 (eight) hours.    HYDROcodone-acetaminophen (NORCO) 5-325 mg per tablet Take 1 tablet by mouth every 8 (eight) hours as needed for Pain (USUALLY TAKES ONE TAB IN THE MORNING).    hydrOXYzine HCl (ATARAX) 25 MG tablet Take 25 mg by mouth every 6 (six) hours as needed for Itching.    isosorbide mononitrate (IMDUR) 30 MG 24 hr tablet Take 1 tablet (30 mg total) by mouth once daily.    lisinopriL 10 MG tablet Take 5 mg by mouth once daily.     meclizine (ANTIVERT)  25 mg tablet Take 25 mg by mouth 3 (three) times daily as needed.    metoprolol succinate (TOPROL-XL) 50 MG 24 hr tablet Take 50 mg by mouth once daily.    metoprolol tartrate (LOPRESSOR) 50 MG tablet Take 1 tablet (50 mg total) by mouth 3 (three) times daily.    nitroGLYCERIN (NITROSTAT) 0.4 MG SL tablet Place 0.4 mg under the tongue every 5 (five) minutes as needed.    omeprazole (PRILOSEC) 20 MG capsule Take 20 mg by mouth once daily.     predniSONE (DELTASONE) 2.5 MG tablet Take 2.5 mg by mouth once daily.    quetiapine (SEROQUEL) 50 MG tablet Take 50 mg by mouth every evening.    rOPINIRole (REQUIP) 1 MG tablet Take 1 mg by mouth 2 (two) times daily.     terazosin (HYTRIN) 5 MG capsule Take 3 mg by mouth every evening.    tiZANidine 4 mg Cap Take 4 mg by mouth 3 (three) times daily as needed.    traMADoL (ULTRAM) 50 mg tablet Take by mouth every 6 (six) hours as needed. 1/2 TO 1 TABLET 4 TIMES A DAY PRN     Family History       Problem Relation (Age of Onset)    Cancer Brother, Brother    Heart disease Father, Brother, Brother, Brother    Lung cancer Brother    Throat cancer Brother          Tobacco Use    Smoking status: Former     Types: Cigarettes     Quit date:      Years since quittin.1    Smokeless tobacco: Never   Substance and Sexual Activity    Alcohol use: Yes     Comment: RARELY    Drug use: No    Sexual activity: Not on file     Review of Systems   Cardiovascular:  Positive for chest pain. Negative for dyspnea on exertion, irregular heartbeat and orthopnea.   Respiratory:  Negative for cough and shortness of breath.    Skin: Negative.    Musculoskeletal: Negative.    Gastrointestinal: Negative.    Genitourinary: Negative.    Neurological: Negative.    Psychiatric/Behavioral: Negative.       Objective:     Vital Signs (Most Recent):  Temp: 98.4 °F (36.9 °C) (23 1108)  Pulse: 71 (23 1111)  Resp: 18 (23 0723)  BP: (!) 164/79 (23 1111)  SpO2: (!) 94 % (23 1111)    Vital Signs (24h Range):  Temp:  [97.4 °F (36.3 °C)-98.8 °F (37.1 °C)] 98.4 °F (36.9 °C)  Pulse:  [71-96] 71  Resp:  [18-20] 18  SpO2:  [91 %-96 %] 94 %  BP: (147-171)/(61-81) 164/79     Weight: 86.1 kg (189 lb 13.1 oz)  Body mass index is 27.24 kg/m².    SpO2: (!) 94 %         Intake/Output Summary (Last 24 hours) at 2/20/2023 1412  Last data filed at 2/20/2023 0634  Gross per 24 hour   Intake 1130 ml   Output --   Net 1130 ml       Lines/Drains/Airways       Peripheral Intravenous Line  Duration                  Peripheral IV - Single Lumen 02/15/23 0000 18 G Left Antecubital 5 days                    Physical Exam  HENT:      Mouth/Throat:      Mouth: Mucous membranes are moist.   Cardiovascular:      Rate and Rhythm: Normal rate and regular rhythm.      Pulses: Normal pulses.      Heart sounds: Normal heart sounds.   Pulmonary:      Effort: Pulmonary effort is normal.      Breath sounds: Normal breath sounds.   Abdominal:      Palpations: Abdomen is soft.   Musculoskeletal:         General: Normal range of motion.   Skin:     General: Skin is warm.      Capillary Refill: Capillary refill takes less than 2 seconds.   Neurological:      General: No focal deficit present.      Mental Status: He is alert.   Psychiatric:         Mood and Affect: Mood normal.       EKG:         Significant Labs: BMP:   Recent Labs   Lab 02/19/23  0536 02/19/23 1949   GLU 97  --    * 137   K 4.2 4.1   CL 98  --    CO2 30* 22*   BUN 30* 29.5*   CREATININE 1.56* 1.68*   CALCIUM 9.1 9.3   MG  --  1.98   , CBC   Recent Labs   Lab 02/19/23  0536 02/19/23 1949 02/19/23 1949 02/20/23  0314   WBC 6.20  6.20 5.1  --  4.7   HGB 10.2*  10.2* 10.2*  --  9.9*   HCT 29.7*  29.7* 30.7*   < > 29.7*     160 141  --  143    < > = values in this interval not displayed.   , Lipid Panel No results for input(s): CHOL, HDL, LDLCALC, TRIG, CHOLHDL in the last 48 hours., and Troponin No results for input(s): TROPONINI in the last 48  hours.    Significant Imaging:   EXAMINATION:  XR CHEST 1 VIEW     CLINICAL HISTORY:  Chest pain, shortness of breath     COMPARISON:  09/20/2022     FINDINGS:  Cardiac silhouette does not appear enlarged.  Diffuse interstitial prominence of the lungs may reflect mild interstitial edema.  No focal consolidation or pleural effusion.  No acute osseous finding.     Impression:     Diffuse interstitial prominence of the lungs, possibly reflecting mild interstitial edema.        Electronically signed by: Jake Morfin MD  Date:                                            02/15/2023  Time:                                           09:35  Assessment and Plan:   NSTEMI, Type I  MVCAD  --[facility] 02/15/23: LM 0% stenosis. mLAD proximal 70% ISR. D1 ostial 50% stenosis. D2 ostial 50% stenosis. Lcx: previous stent; proximal Lcx  90% ISR; mid Lcx 30% stenosis. OM1 proximal 80% stenosis. RCA patent stents to ostium. Mid RCA proximal subsection- 60% stenosis; Mid RCA distal subsection 70& stenosis; distal RCA proximal subsection 100% stenosis. Fills left to right  --[facility] 09/22/22: LM 0% stenosis. mLAD proximal subsection 50% ISR,. D1 ostial 50% stenosis  D2 ostial 50% stenosis. pLCx mid subsection 70% ISR reduced to 10%, Preprocedure AMALIA III flow  noted. Post procedure AMALIA III was present. Lesion previous treated on 7/22/21 with PTA/DAYAN. OM1 proximal 80% stenosis. mLCx distal subsection 30% stenosis. RCA proximal RCA stent widely patent. mRCA proximal subsection 60% subsection. mRCA distal subsection 70% stenosis. dRCA proximal subsection 100% stenosis. Fills left to right  ICMO  --EF 45-50% TTE 2/15/23 improved from 40% 7/21  VHD  --Mild AS, Mild to moderate AR. Mild to moderate MAC is noted. Mild MR. Trace TR.  B RICKEY  --CUS 05/22: 60-79% stenosis pRICA/mLICA  HLD  HTN  Lupus anticoagulant inhibitor syndrome  CARLYLE  VHD  --mild AS  AAA    Plan:  Consult CV surgery for CABG evaluation  Obtain Carotid US due to known B IRCKEY  Continue heparin  drip, aspirin, and atorvastatin  Start Tridil drip and titrate for chest pain  Continue protonix for PUD prophylaxis  Monitor H/H      VTE Risk Mitigation (From admission, onward)           Ordered     heparin 25,000 units in dextrose 5% 250 mL (100 units/mL) infusion LOW INTENSITY nomogram - [facility]  Continuous        Question Answer Comment   Begin at (in units/kg/hr) 12    Heparin Infusion Adjustment (DO NOT MODIFY ANSWER) \\[email or web address]\heparin LOW INTENSITY nomogram for [facility] [email or web address]        02/19/23 1825                    Thomas Cuevas MD  Cardiology  [facility] - 3rd Floor Medical Telemetry  02/20/2023 7:22 AM

## 2023-02-20 NOTE — NURSING
Nurses Note -- 4 Eyes      2/19/2023   7:47 PM      Skin assessed during: Admit      [x] No Pressure Injuries Present    [x]Prevention Measures Documented      [] Yes- Altered Skin Integrity Present or Discovered   [] LDA Added if Not in Epic (Describe Wound)   [] New Altered Skin Integrity was Present on Admit and Documented in LDA   [] Wound Image Taken    Wound Care Consulted? No    Attending Nurse:  Emi Mcgee RN     Second RN/Staff Member:  Gary MYERS RN

## 2023-02-21 LAB
ANION GAP SERPL CALC-SCNC: 10 MEQ/L
APTT PPP: 117.2 SECONDS (ref 23.2–33.7)
APTT PPP: 69.8 SECONDS (ref 23.2–33.7)
APTT PPP: 76.2 SECONDS (ref 23.2–33.7)
BASOPHILS # BLD AUTO: 0.02 X10(3)/MCL (ref 0–0.2)
BASOPHILS NFR BLD AUTO: 0.4 %
BUN SERPL-MCNC: 35.4 MG/DL (ref 8.4–25.7)
CALCIUM SERPL-MCNC: 9 MG/DL (ref 8.8–10)
CHLORIDE SERPL-SCNC: 101 MMOL/L (ref 98–107)
CO2 SERPL-SCNC: 23 MMOL/L (ref 23–31)
CREAT SERPL-MCNC: 2.07 MG/DL (ref 0.73–1.18)
CREAT/UREA NIT SERPL: 17
EOSINOPHIL # BLD AUTO: 0 X10(3)/MCL (ref 0–0.9)
EOSINOPHIL NFR BLD AUTO: 0 %
ERYTHROCYTE [DISTWIDTH] IN BLOOD BY AUTOMATED COUNT: 14.7 % (ref 11.5–17)
GFR SERPLBLD CREATININE-BSD FMLA CKD-EPI: 32 MLS/MIN/1.73/M2
GLUCOSE SERPL-MCNC: 98 MG/DL (ref 82–115)
HCT VFR BLD AUTO: 27.1 % (ref 42–52)
HGB BLD-MCNC: 8.8 G/DL (ref 14–18)
IMM GRANULOCYTES # BLD AUTO: 0.08 X10(3)/MCL (ref 0–0.04)
IMM GRANULOCYTES NFR BLD AUTO: 1.5 %
LYMPHOCYTES # BLD AUTO: 1.3 X10(3)/MCL (ref 0.6–4.6)
LYMPHOCYTES NFR BLD AUTO: 24.7 %
MCH RBC QN AUTO: 29.2 PG
MCHC RBC AUTO-ENTMCNC: 32.5 G/DL (ref 33–36)
MCV RBC AUTO: 90 FL (ref 80–94)
MONOCYTES # BLD AUTO: 0.98 X10(3)/MCL (ref 0.1–1.3)
MONOCYTES NFR BLD AUTO: 18.6 %
NEUTROPHILS # BLD AUTO: 2.88 X10(3)/MCL (ref 2.1–9.2)
NEUTROPHILS NFR BLD AUTO: 54.8 %
NRBC BLD AUTO-RTO: 0 %
PLATELET # BLD AUTO: 132 X10(3)/MCL (ref 130–400)
PMV BLD AUTO: 11.4 FL (ref 7.4–10.4)
POTASSIUM SERPL-SCNC: 4.2 MMOL/L (ref 3.5–5.1)
RBC # BLD AUTO: 3.01 X10(6)/MCL (ref 4.7–6.1)
SODIUM SERPL-SCNC: 134 MMOL/L (ref 136–145)
WBC # SPEC AUTO: 5.3 X10(3)/MCL (ref 4.5–11.5)

## 2023-02-21 PROCEDURE — 27000221 HC OXYGEN, UP TO 24 HOURS

## 2023-02-21 PROCEDURE — 25000003 PHARM REV CODE 250: Performed by: INTERNAL MEDICINE

## 2023-02-21 PROCEDURE — 85025 COMPLETE CBC W/AUTO DIFF WBC: CPT | Performed by: NURSE PRACTITIONER

## 2023-02-21 PROCEDURE — 63600175 PHARM REV CODE 636 W HCPCS: Performed by: NURSE PRACTITIONER

## 2023-02-21 PROCEDURE — 25000003 PHARM REV CODE 250: Performed by: NURSE PRACTITIONER

## 2023-02-21 PROCEDURE — 93010 EKG 12-LEAD: ICD-10-PCS | Mod: ,,, | Performed by: INTERNAL MEDICINE

## 2023-02-21 PROCEDURE — 93010 ELECTROCARDIOGRAM REPORT: CPT | Mod: ,,, | Performed by: INTERNAL MEDICINE

## 2023-02-21 PROCEDURE — 94761 N-INVAS EAR/PLS OXIMETRY MLT: CPT

## 2023-02-21 PROCEDURE — 85730 THROMBOPLASTIN TIME PARTIAL: CPT | Performed by: INTERNAL MEDICINE

## 2023-02-21 PROCEDURE — 21400001 HC TELEMETRY ROOM

## 2023-02-21 PROCEDURE — 80048 BASIC METABOLIC PNL TOTAL CA: CPT | Performed by: NURSE PRACTITIONER

## 2023-02-21 PROCEDURE — 93005 ELECTROCARDIOGRAM TRACING: CPT

## 2023-02-21 RX ORDER — SODIUM CHLORIDE 9 MG/ML
INJECTION, SOLUTION INTRAVENOUS CONTINUOUS
Status: DISCONTINUED | OUTPATIENT
Start: 2023-02-21 | End: 2023-02-24

## 2023-02-21 RX ADMIN — PANTOPRAZOLE SODIUM 40 MG: 40 TABLET, DELAYED RELEASE ORAL at 09:02

## 2023-02-21 RX ADMIN — ACETAMINOPHEN 325MG 650 MG: 325 TABLET ORAL at 06:02

## 2023-02-21 RX ADMIN — NITROGLYCERIN 25 MCG/MIN: 20 INJECTION INTRAVENOUS at 11:02

## 2023-02-21 RX ADMIN — FOLIC ACID 1 MG: 1 TABLET ORAL at 09:02

## 2023-02-21 RX ADMIN — ASPIRIN 81 MG: 81 TABLET, COATED ORAL at 09:02

## 2023-02-21 RX ADMIN — SODIUM CHLORIDE: 9 INJECTION, SOLUTION INTRAVENOUS at 06:02

## 2023-02-21 RX ADMIN — LISINOPRIL 5 MG: 5 TABLET ORAL at 09:02

## 2023-02-21 RX ADMIN — PRAZOSIN HYDROCHLORIDE 3 MG: 1 CAPSULE ORAL at 10:02

## 2023-02-21 RX ADMIN — ATORVASTATIN CALCIUM 40 MG: 40 TABLET, FILM COATED ORAL at 09:02

## 2023-02-21 RX ADMIN — METOPROLOL TARTRATE 50 MG: 50 TABLET, FILM COATED ORAL at 10:02

## 2023-02-21 RX ADMIN — HEPARIN SODIUM 2 UNITS/KG/HR: 10000 INJECTION, SOLUTION INTRAVENOUS at 07:02

## 2023-02-21 RX ADMIN — METOPROLOL TARTRATE 50 MG: 50 TABLET, FILM COATED ORAL at 09:02

## 2023-02-21 NOTE — PROGRESS NOTES
[facility] - 3rd Floor Medical Telemetry  Cardiology  Progress Note    Patient Name: Hunter Navarrete  MRN: [id number]  Admission Date: 2/19/2023  Hospital Length of Stay: 2 days  Code Status: Prior   Attending Physician: Lul Isaac MD   Primary Care Physician: Mariella Bethea MD  Expected Discharge Date:   Principal Problem:<principal problem not specified>    Subjective:   Chief Complaint:  Chest Pain (Known MV CAD)     HPI:   Mr. Navarrete is an 83 y/o male, followed by Dr. Loredo who presented to [facility] with c/o chest pain. HS troponin 1149; therefore he was transferred to [facility] for cardiology services where he underwent LHC revealing multivessel disease and elevated LVEDP. He was started on diuretics, Plavix was placed on hold and he was transferred to [facility] for CABG evaluation. Patient is reporting mild chest tightness 3/10 currently    Hospital Course:   2.21.23:  Vitals Stable. Shoulder Pain this morning. On Nitro & Heparin Infusion. EKG with no overt ischemic changes.      PMH: ICMO, Native CAD, HLD, HTN, CARLYLE, CKD, lupus anticoagulant inhibitor syndrome, VHD- mild AS, AAA, pseudoaneurysm RFA  PSH: right total hip replacement, LHC, back surgery, spinal cord stimulator implant, knee surgery, cataract surgery  Family History: Brother- cancer, heart disease, lung cancer; father heart disease  Social History: Former smoker, occasional ETOH; denies illicit drug use     Previous Cardiac Diagnostics:   CUS 02/20/23:  The right internal carotid artery demonstrated 50-69% stenosis.  The left internal carotid artery demonstrated 50-69% stenosis.  Bilateral vertebral arteries were patent with antegrade flow.     [facility] 02/15/2023:  LM 0% stenosis  mLAD proximal 70% ISR  D1 ostial 50% stenosis  D2 ostial 50% stenosis  Lcx: previous stent; proximal Lcx  90% ISR; mid Lcx 30% stenosis  OM1 proximal 80% stenosis  RCA patent stents to ostium. Mid RCA proximal subsection- 60% stenosis; Mid RCA  distal subsection 70& stenosis; distal RCA proximal subsection 100% stenosis. Fills left to right     TTE 02/16/2023:  Global LV SF is borderline normal. LVEF 45-50%. LA is mildly enlarged. Moderate calcification of the aortic valve is noted. Mild AS is present. Mild to moderate AR. Mild to moderate MAC is noted. Mild MR. Trace TR. PASP 40 mmHg. Optison used to enhance endocardial border.      [facility] 09/22/2022:  LM 0% stenosis  mLAD proximal subsection 50% ISR  D1 ostial 50% stenosis  D2 ostial 50% stenosis  pLCx mid subsection 70% ISR reduced to 10%, Preprocedure AMALIA III flow  noted. Post procedure AMALIA III was present. Lesion previous treated on 7/22/21 with PTA/DAYAN  OM1 proximal 80% stenosis  mLCx distal subsection 30% stenosis  RCA proximal RCA stent widely patent  mRCA proximal subsection 60% subsection  mRCA distal subsection 70% stenosis  dRCA proximal subsection 100% stenosis. Fills left to right     CUS 05/13/2022:  60-79% stenosis in pRICA and mLICA  Antegrade flow to bilateral vertebral arteries    Review of Systems   Cardiovascular:  Negative for chest pain.   All other systems reviewed and are negative.    Objective:     Vital Signs (Most Recent):  Temp: 98.8 °F (37.1 °C) (02/21/23 1232)  Pulse: (!) 56 (02/21/23 1309)  Resp: 18 (02/21/23 1232)  BP: 136/66 (02/21/23 1309)  SpO2: 95 % (02/21/23 1232)   Vital Signs (24h Range):  Temp:  [98 °F (36.7 °C)-99 °F (37.2 °C)] 98.8 °F (37.1 °C)  Pulse:  [] 56  Resp:  [18-20] 18  SpO2:  [92 %-95 %] 95 %  BP: (101-156)/(55-76) 136/66     Weight: 86.1 kg (189 lb 13.1 oz)  Body mass index is 27.24 kg/m².    SpO2: 95 %         Intake/Output Summary (Last 24 hours) at 2/21/2023 1323  Last data filed at 2/20/2023 1419  Gross per 24 hour   Intake 1160 ml   Output --   Net 1160 ml       Lines/Drains/Airways       Peripheral Intravenous Line  Duration                  Peripheral IV - Single Lumen 02/21/23 0800 20 G Anterior;Left;Proximal Forearm <1 day                     Significant Labs:   Recent Results (from the past 72 hour(s))   POCT glucose    Collection Time: 02/18/23  4:34 PM   Result Value Ref Range    POC Glucose 86 74 - 106 mg/dL   APTT    Collection Time: 02/18/23  6:58 PM   Result Value Ref Range    aPTT 84.4 (H) 25.2 - 35.6 sec   POCT glucose    Collection Time: 02/18/23  8:58 PM   Result Value Ref Range    POC Glucose 103 74 - 106 mg/dL   CBC auto differential    Collection Time: 02/19/23  5:36 AM   Result Value Ref Range    WBC 6.20 3.90 - 12.70 K/uL    RBC 3.50 (L) 4.60 - 6.20 M/uL    Hemoglobin 10.2 (L) 14.0 - 18.0 g/dL    Hematocrit 29.7 (L) 40.0 - 54.0 %    MCV 85 82 - 98 fL    MCH 29.2 27.0 - 31.0 pg    MCHC 34.4 32.0 - 36.0 g/dL    RDW 15.4 (H) 11.5 - 14.5 %    Platelets 160 150 - 450 K/uL    MPV 8.6 7.4 - 10.4 fL    Gran # (ANC) 4.3 1.8 - 7.7 K/uL    Lymph # 1.0 1.0 - 4.8 K/uL    Mono # 0.9 0.3 - 1.0 K/uL    Eos # 0.0 0.0 - 0.5 K/uL    Baso # 0.00 0.00 - 0.20 K/uL    nRBC 0 0 /100 WBC    Gran % 69.5 38.0 - 73.0 %    Lymph % 16.0 (L) 18.0 - 48.0 %    Mono % 13.8 4.0 - 15.0 %    Eosinophil % 0.1 0.0 - 8.0 %    Basophil % 0.6 0.0 - 1.9 %    Differential Method Automated    Comprehensive Metabolic Panel    Collection Time: 02/19/23  5:36 AM   Result Value Ref Range    Sodium 134 (L) 136 - 145 mmol/L    Potassium 4.2 3.5 - 5.1 mmol/L    Chloride 98 95 - 110 mmol/L    CO2 30 (H) 23 - 29 mmol/L    Glucose 97 74 - 106 mg/dL    BUN 30 (H) 9 - 20 mg/dL    Creatinine 1.56 (H) 0.80 - 1.50 mg/dL    Calcium 9.1 8.4 - 10.2 mg/dL    Total Protein 7.3 6.3 - 8.2 g/dL    Albumin 4.1 3.5 - 5.2 g/dL    Total Bilirubin 0.9 0.2 - 1.3 mg/dL    Alkaline Phosphatase 79 38 - 145 U/L    AST 36 17 - 59 U/L    ALT 20 10 - 44 U/L    Anion Gap 6 (L) 8 - 16 mmol/L    eGFR 44 (A) >60 mL/min/1.73 m^2   CBC auto differential    Collection Time: 02/19/23  5:36 AM   Result Value Ref Range    WBC 6.20 3.90 - 12.70 K/uL    RBC 3.50 (L) 4.60 - 6.20 M/uL    Hemoglobin 10.2 (L) 14.0 - 18.0 g/dL     Hematocrit 29.7 (L) 40.0 - 54.0 %    MCV 85 82 - 98 fL    MCH 29.2 27.0 - 31.0 pg    MCHC 34.4 32.0 - 36.0 g/dL    RDW 15.4 (H) 11.5 - 14.5 %    Platelets 160 150 - 450 K/uL    MPV 8.6 7.4 - 10.4 fL    Gran # (ANC) 4.3 1.8 - 7.7 K/uL    Lymph # 1.0 1.0 - 4.8 K/uL    Mono # 0.9 0.3 - 1.0 K/uL    Eos # 0.0 0.0 - 0.5 K/uL    Baso # 0.00 0.00 - 0.20 K/uL    nRBC 0 0 /100 WBC    Gran % 69.5 38.0 - 73.0 %    Lymph % 16.0 (L) 18.0 - 48.0 %    Mono % 13.8 4.0 - 15.0 %    Eosinophil % 0.1 0.0 - 8.0 %    Basophil % 0.6 0.0 - 1.9 %    Differential Method Automated    APTT    Collection Time: 02/19/23  5:36 AM   Result Value Ref Range    aPTT 87.4 (H) 25.2 - 35.6 sec   POCT glucose    Collection Time: 02/19/23  6:19 AM   Result Value Ref Range    POC Glucose 97 74 - 106 mg/dL   POCT glucose    Collection Time: 02/19/23 10:52 AM   Result Value Ref Range    POC Glucose 121 (A) 74 - 106 mg/dL   PTT Heparin Monitoring    Collection Time: 02/19/23  7:49 PM   Result Value Ref Range    PTT Heparin Monitor 86.9 (H) 23.2 - 33.7 seconds   Protime-INR    Collection Time: 02/19/23  7:49 PM   Result Value Ref Range    PT 15.5 (H) 12.5 - 14.5 seconds    INR 1.24 0.00 - 1.30   Comprehensive Metabolic Panel    Collection Time: 02/19/23  7:49 PM   Result Value Ref Range    Sodium Level 137 136 - 145 mmol/L    Potassium Level 4.1 3.5 - 5.1 mmol/L    Chloride 99 98 - 107 mmol/L    Carbon Dioxide 22 (L) 23 - 31 mmol/L    Glucose Level 87 82 - 115 mg/dL    Blood Urea Nitrogen 29.5 (H) 8.4 - 25.7 mg/dL    Creatinine 1.68 (H) 0.73 - 1.18 mg/dL    Calcium Level Total 9.3 8.8 - 10.0 mg/dL    Protein Total 7.0 5.8 - 7.6 gm/dL    Albumin Level 3.5 3.4 - 4.8 g/dL    Globulin 3.5 2.4 - 3.5 gm/dL    Albumin/Globulin Ratio 1.0 (L) 1.1 - 2.0 ratio    Bilirubin Total 0.9 <=1.5 mg/dL    Alkaline Phosphatase 70 40 - 150 unit/L    Alanine Aminotransferase 24 0 - 55 unit/L    Aspartate Aminotransferase 29 5 - 34 unit/L    eGFR 41 mls/min/1.73/m2   Magnesium     Collection Time: 02/19/23  7:49 PM   Result Value Ref Range    Magnesium Level 1.98 1.60 - 2.60 mg/dL   CBC with Differential    Collection Time: 02/19/23  7:49 PM   Result Value Ref Range    WBC 5.1 4.5 - 11.5 x10(3)/mcL    RBC 3.47 (L) 4.70 - 6.10 x10(6)/mcL    Hgb 10.2 (L) 14.0 - 18.0 g/dL    Hct 30.7 (L) 42.0 - 52.0 %    MCV 88.5 80.0 - 94.0 fL    MCH 29.4 pg    MCHC 33.2 33.0 - 36.0 g/dL    RDW 14.6 11.5 - 17.0 %    Platelet 141 130 - 400 x10(3)/mcL    MPV 11.3 (H) 7.4 - 10.4 fL    Neut % 60.5 %    Lymph % 21.9 %    Mono % 15.8 %    Eos % 0.0 %    Basophil % 0.4 %    Lymph # 1.11 0.6 - 4.6 x10(3)/mcL    Neut # 3.07 2.1 - 9.2 x10(3)/mcL    Mono # 0.80 0.1 - 1.3 x10(3)/mcL    Eos # 0.00 0 - 0.9 x10(3)/mcL    Baso # 0.02 0 - 0.2 x10(3)/mcL    IG# 0.07 (H) 0 - 0.04 x10(3)/mcL    IG% 1.4 %    NRBC% 0.0 %   APTT    Collection Time: 02/20/23  3:14 AM   Result Value Ref Range    PTT 87.3 (H) 23.2 - 33.7 seconds   CBC with Differential    Collection Time: 02/20/23  3:14 AM   Result Value Ref Range    WBC 4.7 4.5 - 11.5 x10(3)/mcL    RBC 3.37 (L) 4.70 - 6.10 x10(6)/mcL    Hgb 9.9 (L) 14.0 - 18.0 g/dL    Hct 29.7 (L) 42.0 - 52.0 %    MCV 88.1 80.0 - 94.0 fL    MCH 29.4 pg    MCHC 33.3 33.0 - 36.0 g/dL    RDW 14.6 11.5 - 17.0 %    Platelet 143 130 - 400 x10(3)/mcL    MPV 11.3 (H) 7.4 - 10.4 fL    IG# 0.07 (H) 0 - 0.04 x10(3)/mcL    IG% 1.5 %    NRBC% 0.0 %   Manual Differential    Collection Time: 02/20/23  3:14 AM   Result Value Ref Range    Neut Man 71 %    Lymph Man 14 %    Monocyte Man 14 %    Basophil Man 1 %    Instr WBC 4.7 x10(3)/mcL    Abs Mono 0.658 0.1 - 1.3 x10(3)/mcL    Abs Baso 0.047 0 - 0.2 x10(3)/mcL    Abs Lymp 0.658 0.6 - 4.6 x10(3)/mcL    Abs Neut 3.337 2.1 - 9.2 x10(3)/mcL    RBC Morph Normal Normal    Platelet Est Normal Normal, Adequate   CV Ultrasound Bilateral Doppler Carotid    Collection Time: 02/20/23  9:19 AM   Result Value Ref Range    Left ICA/CCA ratio 1.73     Right ICA/CCA ratio 2.08      Left Highest .00     Left Highest      Right Highest .00     Right Highest CCA 74     Right Highest EDV 16.00     LT Highest EDV 19.00     Right CCA prox sys 70 cm/s    Right CCA prox mayberry 7 cm/s    Right CCA dist sys 74 cm/s    Right CCA dist mayberry 0 cm/s    Right ICA prox sys 154 cm/s    Right ICA prox mayberry 8 cm/s    Right ICA mid sys 105 cm/s    Right ICA mid mayberry 16 cm/s    Right ICA dist sys 86 cm/s    Right ICA dist mayberry 13 cm/s    Right ECA sys 270 cm/s    Right vertebral sys 50 cm/s    Left CCA prox sys 130 cm/s    Left CCA prox mayberry 0 cm/s    Left CCA dist sys 111 cm/s    Left CCA dist mayberry 13 cm/s    Left ICA prox sys 173 cm/s    Left ICA prox mayberry 15 cm/s    Left ICA mid sys 192 cm/s    Left ICA mid mayberry 19 cm/s    Left ICA dist sys 133 cm/s    Left ICA dist mayberry 15 cm/s    Left ECA sys 194 cm/s    Left vertebral sys 62 cm/s    Right vertebral mayberry 4 cm/s    Right ECA mayberry 0 cm/s    Left vertebral mayberry 0 cm/s    Left ECA mayberry 0 cm/s   APTT    Collection Time: 02/21/23  3:37 AM   Result Value Ref Range    .2 (H) 23.2 - 33.7 seconds   Basic Metabolic Panel    Collection Time: 02/21/23  3:37 AM   Result Value Ref Range    Sodium Level 134 (L) 136 - 145 mmol/L    Potassium Level 4.2 3.5 - 5.1 mmol/L    Chloride 101 98 - 107 mmol/L    Carbon Dioxide 23 23 - 31 mmol/L    Glucose Level 98 82 - 115 mg/dL    Blood Urea Nitrogen 35.4 (H) 8.4 - 25.7 mg/dL    Creatinine 2.07 (H) 0.73 - 1.18 mg/dL    BUN/Creatinine Ratio 17     Calcium Level Total 9.0 8.8 - 10.0 mg/dL    Anion Gap 10.0 mEq/L    eGFR 32 mls/min/1.73/m2   CBC with Differential    Collection Time: 02/21/23  3:37 AM   Result Value Ref Range    WBC 5.3 4.5 - 11.5 x10(3)/mcL    RBC 3.01 (L) 4.70 - 6.10 x10(6)/mcL    Hgb 8.8 (L) 14.0 - 18.0 g/dL    Hct 27.1 (L) 42.0 - 52.0 %    MCV 90.0 80.0 - 94.0 fL    MCH 29.2 pg    MCHC 32.5 (L) 33.0 - 36.0 g/dL    RDW 14.7 11.5 - 17.0 %    Platelet 132 130 - 400 x10(3)/mcL    MPV 11.4 (H) 7.4  - 10.4 fL    Neut % 54.8 %    Lymph % 24.7 %    Mono % 18.6 %    Eos % 0.0 %    Basophil % 0.4 %    Lymph # 1.30 0.6 - 4.6 x10(3)/mcL    Neut # 2.88 2.1 - 9.2 x10(3)/mcL    Mono # 0.98 0.1 - 1.3 x10(3)/mcL    Eos # 0.00 0 - 0.9 x10(3)/mcL    Baso # 0.02 0 - 0.2 x10(3)/mcL    IG# 0.08 (H) 0 - 0.04 x10(3)/mcL    IG% 1.5 %    NRBC% 0.0 %     EKG:      Telemetry:  Sinus Rhythm    Physical Exam  Vitals and nursing note reviewed.   Constitutional:       Appearance: Normal appearance.   HENT:      Head: Normocephalic.      Mouth/Throat:      Mouth: Mucous membranes are moist.      Pharynx: Oropharynx is clear.   Cardiovascular:      Rate and Rhythm: Normal rate and regular rhythm.      Heart sounds: Normal heart sounds.   Pulmonary:      Effort: Pulmonary effort is normal. No respiratory distress.      Breath sounds: Normal breath sounds.   Abdominal:      General: There is no distension.      Palpations: Abdomen is soft.      Tenderness: There is no abdominal tenderness. There is no guarding.   Musculoskeletal:      Cervical back: Neck supple.   Skin:     General: Skin is warm and dry.   Neurological:      General: No focal deficit present.      Mental Status: He is alert. Mental status is at baseline.   Psychiatric:         Behavior: Behavior normal.       Current Inpatient Medications:    Current Facility-Administered Medications:     acetaminophen tablet 650 mg, 650 mg, Oral, Q6H PRN, Lul Isaac MD, 650 mg at 02/20/23 2008    aspirin EC tablet 81 mg, 81 mg, Oral, Daily, Rein PATRICK Lawson, MAGDAP, 81 mg at 02/21/23 0945    atorvastatin tablet 40 mg, 40 mg, Oral, Daily, MAGDA PradhanP, 40 mg at 02/21/23 0945    folic acid tablet 1 mg, 1 mg, Oral, Daily, Lul Isaac MD, 1 mg at 02/21/23 0945    heparin 25,000 units in dextrose 5% 250 mL (100 units/mL) infusion LOW INTENSITY nomogram - [facility], 4 Units/kg/hr, Intravenous, Continuous, SHIRLEY Pradhan, Last Rate: 1.7 mL/hr at 02/21/23 0709, 2 Units/kg/hr at 02/21/23 0709     hydrALAZINE injection 20 mg, 20 mg, Intravenous, Q4H PRN, Ga Q. Emily, NP    HYDROcodone-acetaminophen 7.5-325 mg per tablet 1 tablet, 1 tablet, Oral, Q6H PRN, Ga Q. Emily, NP    labetaloL injection 20 mg, 20 mg, Intravenous, Q4H PRN, Ga Q. Emily, NP    lisinopriL tablet 5 mg, 5 mg, Oral, Daily, Lul Isaac MD, 5 mg at 02/21/23 0945    metoprolol tartrate (LOPRESSOR) tablet 50 mg, 50 mg, Oral, BID, Ga Q. Emily, NP, 50 mg at 02/21/23 0945    morphine injection 2 mg, 2 mg, Intravenous, Q2H PRN, Ga Q. Emily, NP    nitroGLYCERIN in 5 % dextrose 50 mg/250 mL (200 mcg/mL) infusion, 0-400 mcg/min, Intravenous, Continuous, SHIRLEY Torres, Last Rate: 7.5 mL/hr at 02/21/23 1039, 25 mcg/min at 02/21/23 1039    nitroGLYCERIN SL tablet 0.4 mg, 0.4 mg, Sublingual, Q5 Min PRN, SHIRLEY Pradhan    pantoprazole EC tablet 40 mg, 40 mg, Oral, Daily, SHIRLEY Pradhan, 40 mg at 02/21/23 0945    prazosin capsule 3 mg, 3 mg, Oral, QHS, Lul Isaac MD, 3 mg at 02/20/23 2008    zolpidem tablet 5 mg, 5 mg, Oral, Nightly PRN, Ga Q. Emily, NP    VTE Risk Mitigation (From admission, onward)           Ordered     heparin 25,000 units in dextrose 5% 250 mL (100 units/mL) infusion LOW INTENSITY nomogram - [facility]  Continuous        Question Answer Comment   Begin at (in units/kg/hr) 12    Heparin Infusion Adjustment (DO NOT MODIFY ANSWER) [email or web address]\Images\[email or web address]\HeparinInfusions\heparin LOW INTENSITY nomogram for [facility] [id number]        02/19/23 2223                  Assessment:   NSTEMI, Type I  MVCAD  --[facility] 02/15/23: LM 0% stenosis. mLAD proximal 70% ISR. D1 ostial 50% stenosis. D2 ostial 50% stenosis. Lcx: previous stent; proximal Lcx  90% ISR; mid Lcx 30% stenosis. OM1 proximal 80% stenosis. RCA patent stents to ostium. Mid RCA proximal subsection- 60% stenosis; Mid RCA distal subsection 70& stenosis; distal RCA proximal subsection 100% stenosis. Fills left to right  --[facility] 09/22/22: LM 0%  stenosis. mLAD proximal subsection 50% ISR,. D1 ostial 50% stenosis  D2 ostial 50% stenosis. pLCx mid subsection 70% ISR reduced to 10%, Preprocedure AMALIA III flow  noted. Post procedure AMALIA III was present. Lesion previous treated on 7/22/21 with PTA/DAYAN. OM1 proximal 80% stenosis. mLCx distal subsection 30% stenosis. RCA proximal RCA stent widely patent. mRCA proximal subsection 60% subsection. mRCA distal subsection 70% stenosis. dRCA proximal subsection 100% stenosis. Fills left to right  Hypertension (Controlled)  ICMO  --EF 45-50% TTE 2/15/23 improved from 40% 7/21  VHD  --Mild AS, Mild to moderate AR. Mild to moderate MAC is noted. Mild MR. Trace TR.  B RICKEY    - Moderate Bilateral by US  HLD  Lupus anticoagulant inhibitor syndrome  CARLYLE  VHD  --mild AS  Renal Insufficiency  AAA  Anemia     Plan:   Continue Current Medical Therapy  Hold Lisinopril given worsening renal indices  Continue PPI  Check EKG (Done Reviewed)  Surgical Note Reviewed.  Will explore stenting options with interventional team this hospitalization  Labs in AM  Pt. seen and examined by me and plan made under my supervision.

## 2023-02-22 ENCOUNTER — ANESTHESIA (OUTPATIENT)
Dept: SURGERY | Facility: HOSPITAL | Age: 83
DRG: 235 | End: 2023-02-22
Payer: OTHER GOVERNMENT

## 2023-02-22 LAB
ABO + RH BLD: NORMAL
ABO + RH BLD: NORMAL
ABS NEUT (OLG): 4.1 X10(3)/MCL (ref 2.1–9.2)
ANION GAP SERPL CALC-SCNC: 8 MEQ/L
ANISOCYTOSIS BLD QL SMEAR: ABNORMAL
APTT PPP: 88.1 SECONDS (ref 23.2–33.7)
BLD PROD TYP BPU: NORMAL
BLD PROD TYP BPU: NORMAL
BLOOD UNIT EXPIRATION DATE: NORMAL
BLOOD UNIT EXPIRATION DATE: NORMAL
BLOOD UNIT TYPE CODE: 6200
BLOOD UNIT TYPE CODE: 6200
BUN SERPL-MCNC: 39.9 MG/DL (ref 8.4–25.7)
CALCIUM SERPL-MCNC: 8.7 MG/DL (ref 8.8–10)
CHLORIDE SERPL-SCNC: 103 MMOL/L (ref 98–107)
CO2 SERPL-SCNC: 24 MMOL/L (ref 23–31)
CREAT SERPL-MCNC: 1.93 MG/DL (ref 0.73–1.18)
CREAT/UREA NIT SERPL: 21
CROSSMATCH INTERPRETATION: NORMAL
CROSSMATCH INTERPRETATION: NORMAL
DISPENSE STATUS: NORMAL
DISPENSE STATUS: NORMAL
ERYTHROCYTE [DISTWIDTH] IN BLOOD BY AUTOMATED COUNT: 14.7 % (ref 11.5–17)
GFR SERPLBLD CREATININE-BSD FMLA CKD-EPI: 34 MLS/MIN/1.73/M2
GLUCOSE SERPL-MCNC: 93 MG/DL (ref 82–115)
GROUP & RH: NORMAL
HCT VFR BLD AUTO: 25.3 % (ref 42–52)
HGB BLD-MCNC: 8.2 G/DL (ref 14–18)
HYPOCHROMIA BLD QL SMEAR: ABNORMAL
IMM GRANULOCYTES # BLD AUTO: 0.07 X10(3)/MCL (ref 0–0.04)
IMM GRANULOCYTES NFR BLD AUTO: 1.3 %
INDIRECT COOMBS GEL: NORMAL
INR BLD: 1.32 (ref 0–1.3)
INSTRUMENT WBC (OLG): 5.4 X10(3)/MCL
LYMPHOCYTES NFR BLD MANUAL: 0.65 X10(3)/MCL
LYMPHOCYTES NFR BLD MANUAL: 12 %
MAGNESIUM SERPL-MCNC: 1.9 MG/DL (ref 1.6–2.6)
MCH RBC QN AUTO: 29.4 PG
MCHC RBC AUTO-ENTMCNC: 32.4 G/DL (ref 33–36)
MCV RBC AUTO: 90.7 FL (ref 80–94)
MONOCYTES NFR BLD MANUAL: 0.7 X10(3)/MCL (ref 0.1–1.3)
MONOCYTES NFR BLD MANUAL: 13 %
NEUTROPHILS NFR BLD MANUAL: 76 %
NRBC BLD AUTO-RTO: 0 %
OVALOCYTES (OLG): ABNORMAL
PLATELET # BLD AUTO: 137 X10(3)/MCL (ref 130–400)
PLATELET # BLD EST: NORMAL 10*3/UL
PMV BLD AUTO: 11.2 FL (ref 7.4–10.4)
POIKILOCYTOSIS BLD QL SMEAR: ABNORMAL
POTASSIUM SERPL-SCNC: 4.2 MMOL/L (ref 3.5–5.1)
PROTHROMBIN TIME: 16.2 SECONDS (ref 12.5–14.5)
RBC # BLD AUTO: 2.79 X10(6)/MCL (ref 4.7–6.1)
RBC MORPH BLD: ABNORMAL
SODIUM SERPL-SCNC: 135 MMOL/L (ref 136–145)
UNIT NUMBER: NORMAL
UNIT NUMBER: NORMAL
WBC # SPEC AUTO: 5.4 X10(3)/MCL (ref 4.5–11.5)

## 2023-02-22 PROCEDURE — 85730 THROMBOPLASTIN TIME PARTIAL: CPT | Performed by: INTERNAL MEDICINE

## 2023-02-22 PROCEDURE — 86920 COMPATIBILITY TEST SPIN: CPT | Performed by: PHYSICIAN ASSISTANT

## 2023-02-22 PROCEDURE — 25000003 PHARM REV CODE 250: Performed by: NURSE PRACTITIONER

## 2023-02-22 PROCEDURE — 86900 BLOOD TYPING SEROLOGIC ABO: CPT | Performed by: INTERNAL MEDICINE

## 2023-02-22 PROCEDURE — P9016 RBC LEUKOCYTES REDUCED: HCPCS | Performed by: NURSE PRACTITIONER

## 2023-02-22 PROCEDURE — 86920 COMPATIBILITY TEST SPIN: CPT | Performed by: NURSE PRACTITIONER

## 2023-02-22 PROCEDURE — 27000221 HC OXYGEN, UP TO 24 HOURS

## 2023-02-22 PROCEDURE — 21400001 HC TELEMETRY ROOM

## 2023-02-22 PROCEDURE — 25000003 PHARM REV CODE 250: Performed by: INTERNAL MEDICINE

## 2023-02-22 PROCEDURE — 36430 TRANSFUSION BLD/BLD COMPNT: CPT

## 2023-02-22 PROCEDURE — 97162 PT EVAL MOD COMPLEX 30 MIN: CPT

## 2023-02-22 PROCEDURE — 83735 ASSAY OF MAGNESIUM: CPT | Performed by: NURSE PRACTITIONER

## 2023-02-22 PROCEDURE — 85610 PROTHROMBIN TIME: CPT | Performed by: NURSE PRACTITIONER

## 2023-02-22 PROCEDURE — 85025 COMPLETE CBC W/AUTO DIFF WBC: CPT | Performed by: NURSE PRACTITIONER

## 2023-02-22 PROCEDURE — 85027 COMPLETE CBC AUTOMATED: CPT | Performed by: NURSE PRACTITIONER

## 2023-02-22 PROCEDURE — 80048 BASIC METABOLIC PNL TOTAL CA: CPT | Performed by: NURSE PRACTITIONER

## 2023-02-22 PROCEDURE — 63600175 PHARM REV CODE 636 W HCPCS: Performed by: NURSE PRACTITIONER

## 2023-02-22 RX ORDER — FUROSEMIDE 10 MG/ML
20 INJECTION INTRAMUSCULAR; INTRAVENOUS ONCE
Status: COMPLETED | OUTPATIENT
Start: 2023-02-22 | End: 2023-02-22

## 2023-02-22 RX ORDER — HYDROCODONE BITARTRATE AND ACETAMINOPHEN 500; 5 MG/1; MG/1
TABLET ORAL
Status: DISCONTINUED | OUTPATIENT
Start: 2023-02-22 | End: 2023-03-03 | Stop reason: HOSPADM

## 2023-02-22 RX ADMIN — ACETAMINOPHEN 325MG 650 MG: 325 TABLET ORAL at 05:02

## 2023-02-22 RX ADMIN — METOPROLOL TARTRATE 50 MG: 50 TABLET, FILM COATED ORAL at 09:02

## 2023-02-22 RX ADMIN — PANTOPRAZOLE SODIUM 40 MG: 40 TABLET, DELAYED RELEASE ORAL at 09:02

## 2023-02-22 RX ADMIN — METOPROLOL TARTRATE 50 MG: 50 TABLET, FILM COATED ORAL at 10:02

## 2023-02-22 RX ADMIN — PRAZOSIN HYDROCHLORIDE 3 MG: 1 CAPSULE ORAL at 10:02

## 2023-02-22 RX ADMIN — FUROSEMIDE 20 MG: 10 INJECTION, SOLUTION INTRAMUSCULAR; INTRAVENOUS at 06:02

## 2023-02-22 RX ADMIN — FOLIC ACID 1 MG: 1 TABLET ORAL at 09:02

## 2023-02-22 RX ADMIN — ASPIRIN 81 MG: 81 TABLET, COATED ORAL at 09:02

## 2023-02-22 RX ADMIN — ATORVASTATIN CALCIUM 40 MG: 40 TABLET, FILM COATED ORAL at 09:02

## 2023-02-22 NOTE — PROGRESS NOTES
[facility] - 3rd Floor Medical Telemetry  Cardiology  Progress Note    Patient Name: Hunter Navarrete  MRN: [id number]  Admission Date: 2/19/2023  Hospital Length of Stay: 3 days  Code Status: Prior   Attending Physician: Lul Isaac MD   Primary Care Physician: Mariella Bethea MD  Expected Discharge Date:   Principal Problem:<principal problem not specified>    Subjective:   Chief Complaint:  Chest Pain (Known MV CAD)     HPI:   Mr. Navarrete is an 83 y/o male, followed by Dr. Loredo who presented to [facility] ER with c/o chest pain. HS troponin 1149; therefore he was transferred to [facility] for cardiology services where he underwent LHC revealing multivessel disease and elevated LVEDP. He was started on diuretics, Plavix was placed on hold and he was transferred to [facility] for CABG evaluation. Patient is reporting mild chest tightness 3/10 currently    Hospital Course:   2.21.23:  Vitals Stable. Shoulder Pain this morning. On Nitro & Heparin Infusion. EKG with no overt ischemic changes.   2.22.23: Patient resting in bed. NAD. Denies CP/SOB. Creatinine 1.93 today- mildly decreased from 2.07 yesterday with addition of IVFs. H/H downtrending- 8.2/25.3 from 8.8/27.1 yesterday.      PMH: ICMO, Native CAD, HLD, HTN, CARLYLE, CKD, lupus anticoagulant inhibitor syndrome, VHD- mild AS, AAA, pseudoaneurysm RFA  PSH: right total hip replacement, LHC, back surgery, spinal cord stimulator implant, knee surgery, cataract surgery  Family History: Brother- cancer, heart disease, lung cancer; father heart disease  Social History: Former smoker, occasional ETOH; denies illicit drug use     Previous Cardiac Diagnostics:   CUS 02/20/23:  The right internal carotid artery demonstrated 50-69% stenosis.  The left internal carotid artery demonstrated 50-69% stenosis.  Bilateral vertebral arteries were patent with antegrade flow.     LHC 02/15/2023:  LM 0% stenosis  mLAD proximal 70% ISR  D1 ostial 50% stenosis  D2 ostial  50% stenosis  Lcx: previous stent; proximal Lcx  90% ISR; mid Lcx 30% stenosis  OM1 proximal 80% stenosis  RCA patent stents to ostium. Mid RCA proximal subsection- 60% stenosis; Mid RCA distal subsection 70& stenosis; distal RCA proximal subsection 100% stenosis. Fills left to right     TTE 02/16/2023:  Global LV SF is borderline normal. LVEF 45-50%. LA is mildly enlarged. Moderate calcification of the aortic valve is noted. Mild AS is present. Mild to moderate AR. Mild to moderate MAC is noted. Mild MR. Trace TR. PASP 40 mmHg. Optison used to enhance endocardial border.      [facility] 09/22/2022:  LM 0% stenosis  mLAD proximal subsection 50% ISR  D1 ostial 50% stenosis  D2 ostial 50% stenosis  pLCx mid subsection 70% ISR reduced to 10%, Preprocedure AMALIA III flow  noted. Post procedure AMALIA III was present. Lesion previous treated on 7/22/21 with PTA/DAYAN  OM1 proximal 80% stenosis  mLCx distal subsection 30% stenosis  RCA proximal RCA stent widely patent  mRCA proximal subsection 60% subsection  mRCA distal subsection 70% stenosis  dRCA proximal subsection 100% stenosis. Fills left to right     CUS 05/13/2022:  60-79% stenosis in pRICA and mLICA  Antegrade flow to bilateral vertebral arteries    Review of Systems   Cardiovascular:  Negative for chest pain.   All other systems reviewed and are negative.    Objective:     Vital Signs (Most Recent):  Temp: 98.6 °F (37 °C) (02/22/23 0716)  Pulse: 80 (02/22/23 0716)  Resp: 20 (02/22/23 0716)  BP: 130/67 (02/22/23 0716)  SpO2: 98 % (02/22/23 1019)   Vital Signs (24h Range):  Temp:  [97.9 °F (36.6 °C)-98.9 °F (37.2 °C)] 98.6 °F (37 °C)  Pulse:  [56-87] 80  Resp:  [18-20] 20  SpO2:  [94 %-98 %] 98 %  BP: (101-153)/(49-73) 130/67     Weight: 88 kg (194 lb)  Body mass index is 27.84 kg/m².    SpO2: 98 %         Intake/Output Summary (Last 24 hours) at 2/22/2023 1045  Last data filed at 2/22/2023 0720  Gross per 24 hour   Intake 1320 ml   Output 400 ml   Net 920 ml          Lines/Drains/Airways       Peripheral Intravenous Line  Duration                  Peripheral IV - Single Lumen 02/21/23 0800 20 G Anterior;Left;Proximal Forearm 1 day         Peripheral IV - Single Lumen 02/21/23 1840 20 G Anterior;Right Forearm <1 day                    Significant Labs:   Recent Results (from the past 72 hour(s))   POCT glucose    Collection Time: 02/19/23 10:52 AM   Result Value Ref Range    POC Glucose 121 (A) 74 - 106 mg/dL   PTT Heparin Monitoring    Collection Time: 02/19/23  7:49 PM   Result Value Ref Range    PTT Heparin Monitor 86.9 (H) 23.2 - 33.7 seconds   Protime-INR    Collection Time: 02/19/23  7:49 PM   Result Value Ref Range    PT 15.5 (H) 12.5 - 14.5 seconds    INR 1.24 0.00 - 1.30   Comprehensive Metabolic Panel    Collection Time: 02/19/23  7:49 PM   Result Value Ref Range    Sodium Level 137 136 - 145 mmol/L    Potassium Level 4.1 3.5 - 5.1 mmol/L    Chloride 99 98 - 107 mmol/L    Carbon Dioxide 22 (L) 23 - 31 mmol/L    Glucose Level 87 82 - 115 mg/dL    Blood Urea Nitrogen 29.5 (H) 8.4 - 25.7 mg/dL    Creatinine 1.68 (H) 0.73 - 1.18 mg/dL    Calcium Level Total 9.3 8.8 - 10.0 mg/dL    Protein Total 7.0 5.8 - 7.6 gm/dL    Albumin Level 3.5 3.4 - 4.8 g/dL    Globulin 3.5 2.4 - 3.5 gm/dL    Albumin/Globulin Ratio 1.0 (L) 1.1 - 2.0 ratio    Bilirubin Total 0.9 <=1.5 mg/dL    Alkaline Phosphatase 70 40 - 150 unit/L    Alanine Aminotransferase 24 0 - 55 unit/L    Aspartate Aminotransferase 29 5 - 34 unit/L    eGFR 41 mls/min/1.73/m2   Magnesium    Collection Time: 02/19/23  7:49 PM   Result Value Ref Range    Magnesium Level 1.98 1.60 - 2.60 mg/dL   CBC with Differential    Collection Time: 02/19/23  7:49 PM   Result Value Ref Range    WBC 5.1 4.5 - 11.5 x10(3)/mcL    RBC 3.47 (L) 4.70 - 6.10 x10(6)/mcL    Hgb 10.2 (L) 14.0 - 18.0 g/dL    Hct 30.7 (L) 42.0 - 52.0 %    MCV 88.5 80.0 - 94.0 fL    MCH 29.4 pg    MCHC 33.2 33.0 - 36.0 g/dL    RDW 14.6 11.5 - 17.0 %    Platelet  141 130 - 400 x10(3)/mcL    MPV 11.3 (H) 7.4 - 10.4 fL    Neut % 60.5 %    Lymph % 21.9 %    Mono % 15.8 %    Eos % 0.0 %    Basophil % 0.4 %    Lymph # 1.11 0.6 - 4.6 x10(3)/mcL    Neut # 3.07 2.1 - 9.2 x10(3)/mcL    Mono # 0.80 0.1 - 1.3 x10(3)/mcL    Eos # 0.00 0 - 0.9 x10(3)/mcL    Baso # 0.02 0 - 0.2 x10(3)/mcL    IG# 0.07 (H) 0 - 0.04 x10(3)/mcL    IG% 1.4 %    NRBC% 0.0 %   APTT    Collection Time: 02/20/23  3:14 AM   Result Value Ref Range    PTT 87.3 (H) 23.2 - 33.7 seconds   CBC with Differential    Collection Time: 02/20/23  3:14 AM   Result Value Ref Range    WBC 4.7 4.5 - 11.5 x10(3)/mcL    RBC 3.37 (L) 4.70 - 6.10 x10(6)/mcL    Hgb 9.9 (L) 14.0 - 18.0 g/dL    Hct 29.7 (L) 42.0 - 52.0 %    MCV 88.1 80.0 - 94.0 fL    MCH 29.4 pg    MCHC 33.3 33.0 - 36.0 g/dL    RDW 14.6 11.5 - 17.0 %    Platelet 143 130 - 400 x10(3)/mcL    MPV 11.3 (H) 7.4 - 10.4 fL    IG# 0.07 (H) 0 - 0.04 x10(3)/mcL    IG% 1.5 %    NRBC% 0.0 %   Manual Differential    Collection Time: 02/20/23  3:14 AM   Result Value Ref Range    Neut Man 71 %    Lymph Man 14 %    Monocyte Man 14 %    Basophil Man 1 %    Instr WBC 4.7 x10(3)/mcL    Abs Mono 0.658 0.1 - 1.3 x10(3)/mcL    Abs Baso 0.047 0 - 0.2 x10(3)/mcL    Abs Lymp 0.658 0.6 - 4.6 x10(3)/mcL    Abs Neut 3.337 2.1 - 9.2 x10(3)/mcL    RBC Morph Normal Normal    Platelet Est Normal Normal, Adequate   CV Ultrasound Bilateral Doppler Carotid    Collection Time: 02/20/23  9:19 AM   Result Value Ref Range    Left ICA/CCA ratio 1.73     Right ICA/CCA ratio 2.08     Left Highest .00     Left Highest      Right Highest .00     Right Highest CCA 74     Right Highest EDV 16.00     LT Highest EDV 19.00     Right CCA prox sys 70 cm/s    Right CCA prox mayberry 7 cm/s    Right CCA dist sys 74 cm/s    Right CCA dist mayberry 0 cm/s    Right ICA prox sys 154 cm/s    Right ICA prox mayberry 8 cm/s    Right ICA mid sys 105 cm/s    Right ICA mid mayberry 16 cm/s    Right ICA dist sys 86 cm/s    Right  ICA dist mayberry 13 cm/s    Right ECA sys 270 cm/s    Right vertebral sys 50 cm/s    Left CCA prox sys 130 cm/s    Left CCA prox mayberry 0 cm/s    Left CCA dist sys 111 cm/s    Left CCA dist mayberry 13 cm/s    Left ICA prox sys 173 cm/s    Left ICA prox mayberry 15 cm/s    Left ICA mid sys 192 cm/s    Left ICA mid mayberry 19 cm/s    Left ICA dist sys 133 cm/s    Left ICA dist mayberry 15 cm/s    Left ECA sys 194 cm/s    Left vertebral sys 62 cm/s    Right vertebral mayberry 4 cm/s    Right ECA mayberry 0 cm/s    Left vertebral mayberry 0 cm/s    Left ECA mayberry 0 cm/s   APTT    Collection Time: 02/21/23  3:37 AM   Result Value Ref Range    .2 (H) 23.2 - 33.7 seconds   Basic Metabolic Panel    Collection Time: 02/21/23  3:37 AM   Result Value Ref Range    Sodium Level 134 (L) 136 - 145 mmol/L    Potassium Level 4.2 3.5 - 5.1 mmol/L    Chloride 101 98 - 107 mmol/L    Carbon Dioxide 23 23 - 31 mmol/L    Glucose Level 98 82 - 115 mg/dL    Blood Urea Nitrogen 35.4 (H) 8.4 - 25.7 mg/dL    Creatinine 2.07 (H) 0.73 - 1.18 mg/dL    BUN/Creatinine Ratio 17     Calcium Level Total 9.0 8.8 - 10.0 mg/dL    Anion Gap 10.0 mEq/L    eGFR 32 mls/min/1.73/m2   CBC with Differential    Collection Time: 02/21/23  3:37 AM   Result Value Ref Range    WBC 5.3 4.5 - 11.5 x10(3)/mcL    RBC 3.01 (L) 4.70 - 6.10 x10(6)/mcL    Hgb 8.8 (L) 14.0 - 18.0 g/dL    Hct 27.1 (L) 42.0 - 52.0 %    MCV 90.0 80.0 - 94.0 fL    MCH 29.2 pg    MCHC 32.5 (L) 33.0 - 36.0 g/dL    RDW 14.7 11.5 - 17.0 %    Platelet 132 130 - 400 x10(3)/mcL    MPV 11.4 (H) 7.4 - 10.4 fL    Neut % 54.8 %    Lymph % 24.7 %    Mono % 18.6 %    Eos % 0.0 %    Basophil % 0.4 %    Lymph # 1.30 0.6 - 4.6 x10(3)/mcL    Neut # 2.88 2.1 - 9.2 x10(3)/mcL    Mono # 0.98 0.1 - 1.3 x10(3)/mcL    Eos # 0.00 0 - 0.9 x10(3)/mcL    Baso # 0.02 0 - 0.2 x10(3)/mcL    IG# 0.08 (H) 0 - 0.04 x10(3)/mcL    IG% 1.5 %    NRBC% 0.0 %   APTT    Collection Time: 02/21/23  1:07 PM   Result Value Ref Range    PTT 76.2 (H) 23.2 - 33.7  seconds   APTT    Collection Time: 02/21/23  7:38 PM   Result Value Ref Range    PTT 69.8 (H) 23.2 - 33.7 seconds   APTT    Collection Time: 02/22/23  3:47 AM   Result Value Ref Range    PTT 88.1 (H) 23.2 - 33.7 seconds   Basic Metabolic Panel    Collection Time: 02/22/23  3:47 AM   Result Value Ref Range    Sodium Level 135 (L) 136 - 145 mmol/L    Potassium Level 4.2 3.5 - 5.1 mmol/L    Chloride 103 98 - 107 mmol/L    Carbon Dioxide 24 23 - 31 mmol/L    Glucose Level 93 82 - 115 mg/dL    Blood Urea Nitrogen 39.9 (H) 8.4 - 25.7 mg/dL    Creatinine 1.93 (H) 0.73 - 1.18 mg/dL    BUN/Creatinine Ratio 21     Calcium Level Total 8.7 (L) 8.8 - 10.0 mg/dL    Anion Gap 8.0 mEq/L    eGFR 34 mls/min/1.73/m2   Magnesium    Collection Time: 02/22/23  3:47 AM   Result Value Ref Range    Magnesium Level 1.90 1.60 - 2.60 mg/dL   Protime-INR    Collection Time: 02/22/23  3:47 AM   Result Value Ref Range    PT 16.2 (H) 12.5 - 14.5 seconds    INR 1.32 (H) 0.00 - 1.30   CBC with Differential    Collection Time: 02/22/23  3:47 AM   Result Value Ref Range    WBC 5.4 4.5 - 11.5 x10(3)/mcL    RBC 2.79 (L) 4.70 - 6.10 x10(6)/mcL    Hgb 8.2 (L) 14.0 - 18.0 g/dL    Hct 25.3 (L) 42.0 - 52.0 %    MCV 90.7 80.0 - 94.0 fL    MCH 29.4 pg    MCHC 32.4 (L) 33.0 - 36.0 g/dL    RDW 14.7 11.5 - 17.0 %    Platelet 137 130 - 400 x10(3)/mcL    MPV 11.2 (H) 7.4 - 10.4 fL    IG# 0.07 (H) 0 - 0.04 x10(3)/mcL    IG% 1.3 %    NRBC% 0.0 %   Manual Differential    Collection Time: 02/22/23  3:47 AM   Result Value Ref Range    Neut Man 76 %    Lymph Man 12 %    Monocyte Man 13 %    Instr WBC 5.4 x10(3)/mcL    Abs Mono 0.702 0.1 - 1.3 x10(3)/mcL    Abs Lymp 0.648 0.6 - 4.6 x10(3)/mcL    Abs Neut 4.104 2.1 - 9.2 x10(3)/mcL    RBC Morph Abnormal (A) Normal    Anisocyte 1+ (A) (none)    Poik 2+ (A) (none)    Hypochrom 3+ (A) (none)    Ovalocytes 1+ (A) (none)    Platelet Est Normal Normal, Adequate       Telemetry:  Sinus Rhythm    Physical Exam  Vitals and nursing  note reviewed.   Constitutional:       Appearance: Normal appearance.   HENT:      Head: Normocephalic.      Mouth/Throat:      Mouth: Mucous membranes are moist.      Pharynx: Oropharynx is clear.   Cardiovascular:      Rate and Rhythm: Normal rate and regular rhythm.      Heart sounds: Normal heart sounds.   Pulmonary:      Effort: Pulmonary effort is normal. No respiratory distress.      Breath sounds: Normal breath sounds.   Abdominal:      General: There is no distension.      Palpations: Abdomen is soft.      Tenderness: There is no abdominal tenderness. There is no guarding.   Musculoskeletal:      Cervical back: Neck supple.   Skin:     General: Skin is warm and dry.   Neurological:      General: No focal deficit present.      Mental Status: He is alert. Mental status is at baseline.   Psychiatric:         Behavior: Behavior normal.       Current Inpatient Medications:    Current Facility-Administered Medications:     0.9%  NaCl infusion, , Intravenous, Continuous, MAGDA TorresP, Last Rate: 75 mL/hr at 02/21/23 1833, New Bag at 02/21/23 1833    acetaminophen tablet 650 mg, 650 mg, Oral, Q6H PRN, Lul Isaac MD, 650 mg at 02/21/23 1851    aspirin EC tablet 81 mg, 81 mg, Oral, Daily, Rein PATRICK Lawson, FNP, 81 mg at 02/22/23 0933    atorvastatin tablet 40 mg, 40 mg, Oral, Daily, Rein PATRICK Lawson, FNP, 40 mg at 02/22/23 0934    folic acid tablet 1 mg, 1 mg, Oral, Daily, uLl Isaac MD, 1 mg at 02/22/23 0934    heparin 25,000 units in dextrose 5% 250 mL (100 units/mL) infusion LOW INTENSITY nomogram - [facility], 4 Units/kg/hr, Intravenous, Continuous, MAGDA PradhanP, Last Rate: 1.7 mL/hr at 02/21/23 0709, 2 Units/kg/hr at 02/21/23 0709    hydrALAZINE injection 20 mg, 20 mg, Intravenous, Q4H PRN, Ga TIDWELL. Emily, NP    HYDROcodone-acetaminophen 7.5-325 mg per tablet 1 tablet, 1 tablet, Oral, Q6H PRN, Ga Q. Emily, NP    labetaloL injection 20 mg, 20 mg, Intravenous, Q4H PRN, Ga Q. Emily, NP     metoprolol tartrate (LOPRESSOR) tablet 50 mg, 50 mg, Oral, BID, Ga Q. Emily, NP, 50 mg at 02/22/23 0933    morphine injection 2 mg, 2 mg, Intravenous, Q2H PRN, Ga Q. Eimly, NP    nitroGLYCERIN in 5 % dextrose 50 mg/250 mL (200 mcg/mL) infusion, 0-400 mcg/min, Intravenous, Continuous, SHIRLEY Torres, Last Rate: 7.5 mL/hr at 02/21/23 2343, 25 mcg/min at 02/21/23 2343    nitroGLYCERIN SL tablet 0.4 mg, 0.4 mg, Sublingual, Q5 Min PRN, Rein T Renny, FNP    pantoprazole EC tablet 40 mg, 40 mg, Oral, Daily, Rein T Renny, FNP, 40 mg at 02/22/23 0933    prazosin capsule 3 mg, 3 mg, Oral, QHS, Lul Isaac MD, 3 mg at 02/21/23 2234    zolpidem tablet 5 mg, 5 mg, Oral, Nightly PRN, Ga Q. Emily, NP    VTE Risk Mitigation (From admission, onward)           Ordered     heparin 25,000 units in dextrose 5% 250 mL (100 units/mL) infusion LOW INTENSITY nomogram - [facility]  Continuous        Question Answer Comment   Begin at (in units/kg/hr) 12    Heparin Infusion Adjustment (DO NOT MODIFY ANSWER) [email or web address]\Images\[email or web address]\HeparinInfusions\heparin LOW INTENSITY nomogram for [facility] [email or web address]        02/19/23 9092                  Assessment:   NSTEMI, Type I  MVCAD  --[facility] 02/15/23: LM 0% stenosis. mLAD proximal 70% ISR. D1 ostial 50% stenosis. D2 ostial 50% stenosis. Lcx: previous stent; proximal Lcx  90% ISR; mid Lcx 30% stenosis. OM1 proximal 80% stenosis. RCA patent stents to ostium. Mid RCA proximal subsection- 60% stenosis; Mid RCA distal subsection 70& stenosis; distal RCA proximal subsection 100% stenosis. Fills left to right  --[facility] 09/22/22: LM 0% stenosis. mLAD proximal subsection 50% ISR,. D1 ostial 50% stenosis  D2 ostial 50% stenosis. pLCx mid subsection 70% ISR reduced to 10%, Preprocedure AMALIA III flow  noted. Post procedure AMALIA III was present. Lesion previous treated on 7/22/21 with PTA/DAYAN. OM1 proximal 80% stenosis. mLCx distal subsection 30% stenosis. RCA proximal RCA stent widely patent.  mRCA proximal subsection 60% subsection. mRCA distal subsection 70% stenosis. dRCA proximal subsection 100% stenosis. Fills left to right  Hypertension (Controlled)  ICMO  --EF 45-50% TTE 2/15/23 improved from 40% 7/21  VHD  --Mild AS, Mild to moderate AR. Mild to moderate MAC is noted. Mild MR. Trace TR.  B RICKEY    - Moderate Bilateral by US  HLD  Lupus anticoagulant inhibitor syndrome  CARLYLE  VHD  --mild AS  Renal Insufficiency  AAA  Anemia     Plan:   Continue asa, statin, BB, and heparin drip  Hold Lisinopril given worsening renal indices. Continue IVFs  Anemia worsening. Continue folic acid. Give 2 units PRBC with lasix 20mg IVP inbetween units due to known CAD and anemia  Continue PPI  Will Plan for LHC once renal function allows.  Consult PT for strengthening/mobilization    Danette Crenshaw, MAGDAP  Cardiology  [facility] - 3rd Floor Medical Telemetry  02/22/2023

## 2023-02-22 NOTE — PROGRESS NOTES
Inpatient Nutrition Evaluation    Admit Date: 2/19/2023   Total duration of encounter: 3 days    Nutrition Recommendation/Prescription     Continue current diet as tolerated.  Encouraged adequate PO intake.   Add JACE BID to promote wound healing (provides 90 kcal and 2.5 g protein per serving).  Monitor PO intake, tolerance, and weight.    Nutrition Assessment     Chart Review    Reason Seen: continuous nutrition monitoring partial thickness wound    Malnutrition Screening Tool Results   Have you recently lost weight without trying?: No  Have you been eating poorly because of a decreased appetite?: No   MST Score: 0     Diagnosis:  NSTEMI, Type I  MVCAD  --[facility] 02/15/23: LM 0% stenosis. mLAD proximal 70% ISR. D1 ostial 50% stenosis. D2 ostial 50% stenosis. Lcx: previous stent; proximal Lcx  90% ISR; mid Lcx 30% stenosis. OM1 proximal 80% stenosis. RCA patent stents to ostium. Mid RCA proximal subsection- 60% stenosis; Mid RCA distal subsection 70& stenosis; distal RCA proximal subsection 100% stenosis. Fills left to right  --[facility] 09/22/22: LM 0% stenosis. mLAD proximal subsection 50% ISR,. D1 ostial 50% stenosis  D2 ostial 50% stenosis. pLCx mid subsection 70% ISR reduced to 10%, Preprocedure AMALIA III flow  noted. Post procedure AMALIA III was present. Lesion previous treated on 7/22/21 with PTA/DAYAN. OM1 proximal 80% stenosis. mLCx distal subsection 30% stenosis. RCA proximal RCA stent widely patent. mRCA proximal subsection 60% subsection. mRCA distal subsection 70% stenosis. dRCA proximal subsection 100% stenosis. Fills left to right  Hypertension (Controlled)  ICMO  --EF 45-50% TTE 2/15/23 improved from 40% 7/21  VHD  --Mild AS, Mild to moderate AR. Mild to moderate MAC is noted. Mild MR. Trace TR.  B RICKEY    - Moderate Bilateral by US  HLD  Lupus anticoagulant inhibitor syndrome  CARLYLE  VHD  --mild AS  Renal Insufficiency  AAA  Anemia    Relevant Medical History:   Acid reflux       Anemia      Aortic aneurysm,  abdominal      Arthritis      Bronchitis      Cataract      Celiac artery stenosis       50% by CTA abdomen 7/27/2020    CKD (chronic kidney disease)       45% FUNCTION    Coronary artery disease      Encounter for blood transfusion      Enlarged prostate      GERD (gastroesophageal reflux disease)      Hemorrhoids      Hypercholesteremia      Hypertension      Iron deficiency anemia, unspecified      Leaky heart valve      Lupus anticoagulant disorder      Renal artery stenosis       by CTA 7/27/2020    Skin cancer      Unspecified chronic bronchitis      UTI (urinary tract infection)        Nutrition-Related Medications: Scheduled Meds:   aspirin  81 mg Oral Daily    atorvastatin  40 mg Oral Daily    folic acid  1 mg Oral Daily    furosemide (LASIX) injection  20 mg Intravenous Once    metoprolol tartrate  50 mg Oral BID    pantoprazole  40 mg Oral Daily    prazosin  3 mg Oral QHS     Continuous Infusions:   sodium chloride 0.9% 75 mL/hr at 02/21/23 1833    heparin (porcine) in D5W 2 Units/kg/hr (02/21/23 0709)    nitroGLYCERIN 25 mcg/min (02/21/23 2343)     PRN Meds:.sodium chloride, acetaminophen, hydrALAZINE, HYDROcodone-acetaminophen, labetalol, morphine, nitroGLYCERIN, zolpidem      Nutrition-Related Labs:  2/22/2023: H/H 8.2/25.3, Na 135, BUN 39.9, Crea 1.93, Ca 8.7, Gluc 93    Diet Order: Diet heart healthy  Oral Supplement Order: none  Appetite/Oral Intake: good/% of meals  Factors Affecting Nutritional Intake: none identified  Food/Amish/Cultural Preferences: none reported  Food Allergies: none reported    Skin Integrity: bruised (ecchymotic)  Wound(s):   partial thickness wound per report.    Comments    2/22/2023: Pt reports decreased appetite/PO intake ~3 days prior to admit. Pt reports appetite and PO intake improvement. Pt denies N/V and GI complaints. No chewing/swallowing difficulties noted. Pt denies unplanned wt loss. Will provide JACE BID to help with wound healing. Will monitor.  "NOTE: Pt under CABG evaluation.    Anthropometrics    Height: 5' 10" (177.8 cm)    Last Weight: 88 kg (194 lb) (02/22/23 0450) Weight Method: Standard Scale  BMI (Calculated): 27.8  BMI Classification: overweight (BMI 25-29.9)        Ideal Body Weight (IBW), Male: 166 lb     % Ideal Body Weight, Male (lb): 114.08 %                          Usual Weight Provided By: unable to obtain usual weight    Wt Readings from Last 3 Encounters:   02/22/23 0450 88 kg (194 lb)   02/20/23 0634 86.1 kg (189 lb 13.1 oz)   02/19/23 1823 85.9 kg (189 lb 6 oz)   02/15/23 1735 90.9 kg (200 lb 8 oz)   02/15/23 1135 91.5 kg (201 lb 12.8 oz)   02/15/23 0859 86.2 kg (190 lb)      Weight Change(s) Since Admission:  Admit Weight: 85.9 kg (189 lb 6 oz) (02/19/23 1823)  2/22/2023: 88 kg    Patient Education    Not applicable.    Monitoring & Evaluation     Dietitian will monitor food and beverage intake, weight, and electrolyte/renal panel.  Nutrition Risk/Follow-Up: low (follow-up in 5-7 days)  Patients assigned 'low nutrition risk' status do not qualify for a full nutritional assessment but will be monitored and re-evaluated in a 5-7 day time period. Please consult if re-evaluation needed sooner.    "

## 2023-02-22 NOTE — PLAN OF CARE
Problem: Physical Therapy  Goal: Physical Therapy Goal  Description: Goals to be met by: 3/22/23     Patient will increase functional independence with mobility by performin. Sit to stand transfer with Modified Bond  2. Gait  x 250 feet with Stand-by Assistance using Rolling Walker.     Outcome: Ongoing, Progressing

## 2023-02-22 NOTE — PT/OT/SLP EVAL
"Physical Therapy Evaluation    Patient Name:  Hunter Navarrete   MRN:  [id number]    Recommendations:     Discharge Recommendations: home (pending)   Discharge Equipment Recommendations: none   Barriers to discharge:  medical dx    Assessment:     Hunter Navarrete is a 82 y.o. male admitted with a medical diagnosis of NSTEMI, CAD s/p [facility] 2/15.  He presents with the following impairments/functional limitations: gait instability, weakness, impaired endurance, impaired balance, decreased lower extremity function, impaired functional mobility, impaired self care skills, impaired cardiopulmonary response to activity.    Rehab Prognosis: Good; patient would benefit from acute skilled PT services to address these deficits and reach maximum level of function.    Recent Surgery: * No surgery found *      Plan:     During this hospitalization, patient to be seen 5 x/week to address the identified rehab impairments via gait training, therapeutic activities, therapeutic exercises and progress toward the following goals:    Plan of Care Expires:  03/22/23    Subjective     Chief Complaint: n/a  Patient/Family Comments/goals: "for this to all be done"  Pain/Comfort:  Pain Rating 1: 0/10    Patients cultural, spiritual, Scientology conflicts given the current situation: no    Living Environment:  Pt lives with his spouse in a [facility]  Prior to admission, patients level of function was independent.    Equipment used at home: cane, straight, walker, rolling.  DME owned (not currently used): rolling walker and single point cane. Spouse reports they will be getting a lift chair upon d/c.   Patient ambulated within the home without an AD and uses a cane when out of the house  Upon discharge, patient will have assistance from family.    Objective:     Communicated with NSG prior to session.  Patient found HOB elevated with peripheral IV, pulse ox (continuous), telemetry  upon PT entry to room.    General Precautions: Standard, fall  Orthopedic " Precautions:N/A   Braces: N/A  Respiratory Status: Room air    Exams:  Cognitive Exam:  Patient is oriented to Person, Place, Time, and Situation  RLE Strength: WFL  LLE Strength: WFL    Functional Mobility:  Bed Mobility:     Supine to Sit: modified independence  Transfers:     Sit to Stand:  contact guard assistance with rolling walker  Gait: pt ambulated approx 100 feet with use of RW and Vijaya, occasional assistance needed for steadiness; step-through pattern      Patient left up in chair with all lines intact, call button in reach, family present, and food tray in front .    GOALS:   Multidisciplinary Problems       Physical Therapy Goals          Problem: Physical Therapy    Goal Priority Disciplines Outcome Goal Variances Interventions   Physical Therapy Goal     PT, PT/OT Ongoing, Progressing     Description: Goals to be met by: 3/22/23     Patient will increase functional independence with mobility by performin. Sit to stand transfer with Modified [place]  2. Gait  x 250 feet with Stand-by Assistance using Rolling Walker.                          History:     Past Medical History:   Diagnosis Date    Acid reflux     Anemia     Aortic aneurysm, abdominal     Arthritis     Bronchitis     Cataract     Celiac artery stenosis     50% by CTA abdomen 2020    CKD (chronic kidney disease)     45% FUNCTION    Coronary artery disease     Encounter for blood transfusion     Enlarged prostate     GERD (gastroesophageal reflux disease)     Hemorrhoids     Hypercholesteremia     Hypertension     Iron deficiency anemia, unspecified     Leaky heart valve     Lupus anticoagulant disorder     Renal artery stenosis     by CTA 2020    Skin cancer     Unspecified chronic bronchitis     UTI (urinary tract infection)        Past Surgical History:   Procedure Laterality Date    ACF      BACK SURGERY      RODS IN BACK; 4 SURGIERIES    BONE MARROW BIOPSY      CARDIAC ANGIOGRAM WITH STENT      CATARACT SX  Bilateral     CORONARY ANGIOGRAPHY N/A 2/15/2023    Procedure: ANGIOGRAM, CORONARY ARTERY;  Surgeon: Rito Vega MD;  Location: [facility] CATH;  Service: Cardiology;  Laterality: N/A;    HIP SURGERY Right     THR    KNEE SURGERY Right     ATS    LEFT HEART CATHETERIZATION Left 07/22/2021    Procedure: Left heart cath;  Surgeon: Curtis Loredo MD;  Location: [facility] CATH;  Service: Cardiology;  Laterality: Left;    LEFT HEART CATHETERIZATION Left 09/22/2022    Procedure: Left heart cath;  Surgeon: Giorgi Barker MD;  Location: [facility] CATH;  Service: Cardiology;  Laterality: Left;    SHOULDER SURGERY Bilateral     SPINAL CORD STIMULATOR IMPLANT         Time Tracking:     PT Received On: 02/22/23  PT Start Time: 1136     PT Stop Time: 1152  PT Total Time (min): 16 min     Billable Minutes: Evaluation mod      02/22/2023

## 2023-02-23 LAB
ANION GAP SERPL CALC-SCNC: 8 MEQ/L
APTT PPP: 77.4 SECONDS (ref 23.2–33.7)
BASOPHILS # BLD AUTO: 0.03 X10(3)/MCL (ref 0–0.2)
BASOPHILS NFR BLD AUTO: 0.5 %
BUN SERPL-MCNC: 35.6 MG/DL (ref 8.4–25.7)
CALCIUM SERPL-MCNC: 9.8 MG/DL (ref 8.8–10)
CHLORIDE SERPL-SCNC: 103 MMOL/L (ref 98–107)
CO2 SERPL-SCNC: 24 MMOL/L (ref 23–31)
CREAT SERPL-MCNC: 1.65 MG/DL (ref 0.73–1.18)
CREAT/UREA NIT SERPL: 22
EOSINOPHIL # BLD AUTO: 0.01 X10(3)/MCL (ref 0–0.9)
EOSINOPHIL NFR BLD AUTO: 0.2 %
ERYTHROCYTE [DISTWIDTH] IN BLOOD BY AUTOMATED COUNT: 15.5 % (ref 11.5–17)
GFR SERPLBLD CREATININE-BSD FMLA CKD-EPI: 41 MLS/MIN/1.73/M2
GLUCOSE SERPL-MCNC: 93 MG/DL (ref 82–115)
HCT VFR BLD AUTO: 31.5 % (ref 42–52)
HGB BLD-MCNC: 10.5 G/DL (ref 14–18)
IMM GRANULOCYTES # BLD AUTO: 0.08 X10(3)/MCL (ref 0–0.04)
IMM GRANULOCYTES NFR BLD AUTO: 1.4 %
LYMPHOCYTES # BLD AUTO: 1.23 X10(3)/MCL (ref 0.6–4.6)
LYMPHOCYTES NFR BLD AUTO: 21.4 %
MCH RBC QN AUTO: 28.8 PG
MCHC RBC AUTO-ENTMCNC: 33.3 G/DL (ref 33–36)
MCV RBC AUTO: 86.3 FL (ref 80–94)
MONOCYTES # BLD AUTO: 0.98 X10(3)/MCL (ref 0.1–1.3)
MONOCYTES NFR BLD AUTO: 17 %
NEUTROPHILS # BLD AUTO: 3.43 X10(3)/MCL (ref 2.1–9.2)
NEUTROPHILS NFR BLD AUTO: 59.5 %
NRBC BLD AUTO-RTO: 0 %
PLATELET # BLD AUTO: 143 X10(3)/MCL (ref 130–400)
PMV BLD AUTO: 11.1 FL (ref 7.4–10.4)
POTASSIUM SERPL-SCNC: 4.3 MMOL/L (ref 3.5–5.1)
RBC # BLD AUTO: 3.65 X10(6)/MCL (ref 4.7–6.1)
SODIUM SERPL-SCNC: 135 MMOL/L (ref 136–145)
WBC # SPEC AUTO: 5.8 X10(3)/MCL (ref 4.5–11.5)

## 2023-02-23 PROCEDURE — 99024 POSTOP FOLLOW-UP VISIT: CPT | Mod: ,,, | Performed by: PHYSICIAN ASSISTANT

## 2023-02-23 PROCEDURE — 25000003 PHARM REV CODE 250: Performed by: NURSE PRACTITIONER

## 2023-02-23 PROCEDURE — 25000003 PHARM REV CODE 250: Performed by: INTERNAL MEDICINE

## 2023-02-23 PROCEDURE — 21400001 HC TELEMETRY ROOM

## 2023-02-23 PROCEDURE — 99024 PR POST-OP FOLLOW-UP VISIT: ICD-10-PCS | Mod: ,,, | Performed by: PHYSICIAN ASSISTANT

## 2023-02-23 PROCEDURE — 80048 BASIC METABOLIC PNL TOTAL CA: CPT | Performed by: NURSE PRACTITIONER

## 2023-02-23 PROCEDURE — 97530 THERAPEUTIC ACTIVITIES: CPT

## 2023-02-23 PROCEDURE — 97116 GAIT TRAINING THERAPY: CPT

## 2023-02-23 PROCEDURE — 85025 COMPLETE CBC W/AUTO DIFF WBC: CPT | Performed by: NURSE PRACTITIONER

## 2023-02-23 PROCEDURE — 85730 THROMBOPLASTIN TIME PARTIAL: CPT | Performed by: INTERNAL MEDICINE

## 2023-02-23 RX ORDER — LIDOCAINE HYDROCHLORIDE 10 MG/ML
INJECTION INFILTRATION; PERINEURAL
Status: DISPENSED
Start: 2023-02-23 | End: 2023-02-24

## 2023-02-23 RX ORDER — HEPARIN 100 UNIT/ML
SYRINGE INTRAVENOUS
Status: DISPENSED
Start: 2023-02-23 | End: 2023-02-24

## 2023-02-23 RX ORDER — MUPIROCIN 20 MG/G
OINTMENT TOPICAL
Status: DISCONTINUED | OUTPATIENT
Start: 2023-02-23 | End: 2023-02-24 | Stop reason: HOSPADM

## 2023-02-23 RX ORDER — HYDROCODONE BITARTRATE AND ACETAMINOPHEN 500; 5 MG/1; MG/1
TABLET ORAL
Status: DISCONTINUED | OUTPATIENT
Start: 2023-02-23 | End: 2023-03-03 | Stop reason: HOSPADM

## 2023-02-23 RX ADMIN — ASPIRIN 81 MG: 81 TABLET, COATED ORAL at 08:02

## 2023-02-23 RX ADMIN — PANTOPRAZOLE SODIUM 40 MG: 40 TABLET, DELAYED RELEASE ORAL at 08:02

## 2023-02-23 RX ADMIN — PRAZOSIN HYDROCHLORIDE 3 MG: 1 CAPSULE ORAL at 08:02

## 2023-02-23 RX ADMIN — ATORVASTATIN CALCIUM 40 MG: 40 TABLET, FILM COATED ORAL at 08:02

## 2023-02-23 RX ADMIN — METOPROLOL TARTRATE 50 MG: 50 TABLET, FILM COATED ORAL at 08:02

## 2023-02-23 RX ADMIN — FOLIC ACID 1 MG: 1 TABLET ORAL at 08:02

## 2023-02-23 RX ADMIN — NITROGLYCERIN 25 MCG/MIN: 20 INJECTION INTRAVENOUS at 12:02

## 2023-02-23 NOTE — PROGRESS NOTES
[facility] - 3rd Floor Medical Telemetry  Cardiology  Progress Note    Patient Name: Hunter Navarrete  MRN: [id number]  Admission Date: 2/19/2023  Hospital Length of Stay: 4 days  Code Status: Full Code   Attending Physician: Lul Isaac MD   Primary Care Physician: Mariella Bethea MD  Expected Discharge Date:   Principal Problem:<principal problem not specified>    Subjective:   Chief Complaint:  Chest Pain (Known MV CAD)     HPI:   Mr. Navarrete is an 81 y/o male, followed by Dr. Loredo who presented to [facility] ER with c/o chest pain. HS troponin 1149; therefore he was transferred to [facility] for cardiology services where he underwent LHC revealing multivessel disease and elevated LVEDP. He was started on diuretics, Plavix was placed on hold and he was transferred to [facility] for CABG evaluation. Patient is reporting mild chest tightness 3/10 currently    Hospital Course:   2.21.23:  Vitals Stable. Shoulder Pain this morning. On Nitro & Heparin Infusion. EKG with no overt ischemic changes.   2.22.23: Patient resting in bed. NAD. Denies CP/SOB. Creatinine 1.93 today- mildly decreased from 2.07 yesterday with addition of IVFs. H/H downtrending- 8.2/25.3 from 8.8/27.1 yesterday.   2.23.23: Patient sitting up in bedside chair. NAD. VSS. Creatinine improved to 1.65 today. H/H    10.5/31.5 post transfusion 2  units PRBCs.    PMH: ICMO, Native CAD, HLD, HTN, CARLYLE, CKD, lupus anticoagulant inhibitor syndrome, VHD- mild AS, AAA, pseudoaneurysm RFA  PSH: right total hip replacement, LHC, back surgery, spinal cord stimulator implant, knee surgery, cataract surgery  Family History: Brother- cancer, heart disease, lung cancer; father heart disease  Social History: Former smoker, occasional ETOH; denies illicit drug use     Previous Cardiac Diagnostics:   CUS 02/20/23:  The right internal carotid artery demonstrated 50-69% stenosis.  The left internal carotid artery demonstrated 50-69% stenosis.  Bilateral  vertebral arteries were patent with antegrade flow.     [facility] 02/15/2023:  LM 0% stenosis  mLAD proximal 70% ISR  D1 ostial 50% stenosis  D2 ostial 50% stenosis  Lcx: previous stent; proximal Lcx  90% ISR; mid Lcx 30% stenosis  OM1 proximal 80% stenosis  RCA patent stents to ostium. Mid RCA proximal subsection- 60% stenosis; Mid RCA distal subsection 70& stenosis; distal RCA proximal subsection 100% stenosis. Fills left to right     TTE 02/16/2023:  Global LV SF is borderline normal. LVEF 45-50%. LA is mildly enlarged. Moderate calcification of the aortic valve is noted. Mild AS is present. Mild to moderate AR. Mild to moderate MAC is noted. Mild MR. Trace TR. PASP 40 mmHg. Optison used to enhance endocardial border.      [facility] 09/22/2022:  LM 0% stenosis  mLAD proximal subsection 50% ISR  D1 ostial 50% stenosis  D2 ostial 50% stenosis  pLCx mid subsection 70% ISR reduced to 10%, Preprocedure AMALIA III flow  noted. Post procedure AMALIA III was present. Lesion previous treated on 7/22/21 with PTA/DAYAN  OM1 proximal 80% stenosis  mLCx distal subsection 30% stenosis  RCA proximal RCA stent widely patent  mRCA proximal subsection 60% subsection  mRCA distal subsection 70% stenosis  dRCA proximal subsection 100% stenosis. Fills left to right     CUS 05/13/2022:  60-79% stenosis in pRICA and mLICA  Antegrade flow to bilateral vertebral arteries    Review of Systems   Cardiovascular:  Negative for chest pain, dyspnea on exertion and irregular heartbeat.   All other systems reviewed and are negative.    Objective:     Vital Signs (Most Recent):  Temp: 98.5 °F (36.9 °C) (02/23/23 1103)  Pulse: 64 (02/23/23 1103)  Resp: 18 (02/23/23 1103)  BP: 133/66 (02/23/23 1103)  SpO2: 95 % (02/23/23 1103)   Vital Signs (24h Range):  Temp:  [97.9 °F (36.6 °C)-98.7 °F (37.1 °C)] 98.5 °F (36.9 °C)  Pulse:  [64-95] 64  Resp:  [17-18] 18  SpO2:  [92 %-96 %] 95 %  BP: (107-152)/(56-72) 133/66     Weight: 87.8 kg (193 lb 9 oz)  Body mass index is  27.77 kg/m².    SpO2: 95 %         Intake/Output Summary (Last 24 hours) at 2/23/2023 1357  Last data filed at 2/23/2023 1335  Gross per 24 hour   Intake 2073.75 ml   Output 300 ml   Net 1773.75 ml         Lines/Drains/Airways       Peripheral Intravenous Line  Duration                  Peripheral IV - Single Lumen 02/21/23 0800 20 G Anterior;Left;Proximal Forearm 2 days         Peripheral IV - Single Lumen 02/22/23 2300 20 G Anterior;Right Upper Arm <1 day                    Significant Labs:   Recent Results (from the past 72 hour(s))   APTT    Collection Time: 02/21/23  3:37 AM   Result Value Ref Range    .2 (H) 23.2 - 33.7 seconds   Basic Metabolic Panel    Collection Time: 02/21/23  3:37 AM   Result Value Ref Range    Sodium Level 134 (L) 136 - 145 mmol/L    Potassium Level 4.2 3.5 - 5.1 mmol/L    Chloride 101 98 - 107 mmol/L    Carbon Dioxide 23 23 - 31 mmol/L    Glucose Level 98 82 - 115 mg/dL    Blood Urea Nitrogen 35.4 (H) 8.4 - 25.7 mg/dL    Creatinine 2.07 (H) 0.73 - 1.18 mg/dL    BUN/Creatinine Ratio 17     Calcium Level Total 9.0 8.8 - 10.0 mg/dL    Anion Gap 10.0 mEq/L    eGFR 32 mls/min/1.73/m2   CBC with Differential    Collection Time: 02/21/23  3:37 AM   Result Value Ref Range    WBC 5.3 4.5 - 11.5 x10(3)/mcL    RBC 3.01 (L) 4.70 - 6.10 x10(6)/mcL    Hgb 8.8 (L) 14.0 - 18.0 g/dL    Hct 27.1 (L) 42.0 - 52.0 %    MCV 90.0 80.0 - 94.0 fL    MCH 29.2 pg    MCHC 32.5 (L) 33.0 - 36.0 g/dL    RDW 14.7 11.5 - 17.0 %    Platelet 132 130 - 400 x10(3)/mcL    MPV 11.4 (H) 7.4 - 10.4 fL    Neut % 54.8 %    Lymph % 24.7 %    Mono % 18.6 %    Eos % 0.0 %    Basophil % 0.4 %    Lymph # 1.30 0.6 - 4.6 x10(3)/mcL    Neut # 2.88 2.1 - 9.2 x10(3)/mcL    Mono # 0.98 0.1 - 1.3 x10(3)/mcL    Eos # 0.00 0 - 0.9 x10(3)/mcL    Baso # 0.02 0 - 0.2 x10(3)/mcL    IG# 0.08 (H) 0 - 0.04 x10(3)/mcL    IG% 1.5 %    NRBC% 0.0 %   APTT    Collection Time: 02/21/23  1:07 PM   Result Value Ref Range    PTT 76.2 (H) 23.2 - 33.7  seconds   APTT    Collection Time: 02/21/23  7:38 PM   Result Value Ref Range    PTT 69.8 (H) 23.2 - 33.7 seconds   APTT    Collection Time: 02/22/23  3:47 AM   Result Value Ref Range    PTT 88.1 (H) 23.2 - 33.7 seconds   Basic Metabolic Panel    Collection Time: 02/22/23  3:47 AM   Result Value Ref Range    Sodium Level 135 (L) 136 - 145 mmol/L    Potassium Level 4.2 3.5 - 5.1 mmol/L    Chloride 103 98 - 107 mmol/L    Carbon Dioxide 24 23 - 31 mmol/L    Glucose Level 93 82 - 115 mg/dL    Blood Urea Nitrogen 39.9 (H) 8.4 - 25.7 mg/dL    Creatinine 1.93 (H) 0.73 - 1.18 mg/dL    BUN/Creatinine Ratio 21     Calcium Level Total 8.7 (L) 8.8 - 10.0 mg/dL    Anion Gap 8.0 mEq/L    eGFR 34 mls/min/1.73/m2   Magnesium    Collection Time: 02/22/23  3:47 AM   Result Value Ref Range    Magnesium Level 1.90 1.60 - 2.60 mg/dL   Protime-INR    Collection Time: 02/22/23  3:47 AM   Result Value Ref Range    PT 16.2 (H) 12.5 - 14.5 seconds    INR 1.32 (H) 0.00 - 1.30   CBC with Differential    Collection Time: 02/22/23  3:47 AM   Result Value Ref Range    WBC 5.4 4.5 - 11.5 x10(3)/mcL    RBC 2.79 (L) 4.70 - 6.10 x10(6)/mcL    Hgb 8.2 (L) 14.0 - 18.0 g/dL    Hct 25.3 (L) 42.0 - 52.0 %    MCV 90.7 80.0 - 94.0 fL    MCH 29.4 pg    MCHC 32.4 (L) 33.0 - 36.0 g/dL    RDW 14.7 11.5 - 17.0 %    Platelet 137 130 - 400 x10(3)/mcL    MPV 11.2 (H) 7.4 - 10.4 fL    IG# 0.07 (H) 0 - 0.04 x10(3)/mcL    IG% 1.3 %    NRBC% 0.0 %   Manual Differential    Collection Time: 02/22/23  3:47 AM   Result Value Ref Range    Neut Man 76 %    Lymph Man 12 %    Monocyte Man 13 %    Instr WBC 5.4 x10(3)/mcL    Abs Mono 0.702 0.1 - 1.3 x10(3)/mcL    Abs Lymp 0.648 0.6 - 4.6 x10(3)/mcL    Abs Neut 4.104 2.1 - 9.2 x10(3)/mcL    RBC Morph Abnormal (A) Normal    Anisocyte 1+ (A) (none)    Poik 2+ (A) (none)    Hypochrom 3+ (A) (none)    Ovalocytes 1+ (A) (none)    Platelet Est Normal Normal, Adequate   Prepare RBC 2 Units; symptomatic anemia    Collection Time:  02/22/23 12:21 PM   Result Value Ref Range    UNIT NUMBER [id number]     UNIT ABO/RH A POS     DISPENSE STATUS Transfused     Unit Expiration [id number]     Product Code S4818F48     Unit Blood Type Code 6200     CROSSMATCH INTERPRETATION Compatible     UNIT NUMBER [id number]     UNIT ABO/RH A POS     DISPENSE STATUS Transfused     Unit Expiration [id number]     Product Code G3058R28     Unit Blood Type Code 6200     CROSSMATCH INTERPRETATION Compatible    Type & Screen    Collection Time: 02/22/23 12:21 PM   Result Value Ref Range    Group & Rh A POS     Indirect Jose R GEL NEG    PTT Heparin Monitoring    Collection Time: 02/23/23  3:59 AM   Result Value Ref Range    PTT Heparin Monitor 77.4 (H) 23.2 - 33.7 seconds   CBC with Differential    Collection Time: 02/23/23  3:59 AM   Result Value Ref Range    WBC 5.8 4.5 - 11.5 x10(3)/mcL    RBC 3.65 (L) 4.70 - 6.10 x10(6)/mcL    Hgb 10.5 (L) 14.0 - 18.0 g/dL    Hct 31.5 (L) 42.0 - 52.0 %    MCV 86.3 80.0 - 94.0 fL    MCH 28.8 pg    MCHC 33.3 33.0 - 36.0 g/dL    RDW 15.5 11.5 - 17.0 %    Platelet 143 130 - 400 x10(3)/mcL    MPV 11.1 (H) 7.4 - 10.4 fL    Neut % 59.5 %    Lymph % 21.4 %    Mono % 17.0 %    Eos % 0.2 %    Basophil % 0.5 %    Lymph # 1.23 0.6 - 4.6 x10(3)/mcL    Neut # 3.43 2.1 - 9.2 x10(3)/mcL    Mono # 0.98 0.1 - 1.3 x10(3)/mcL    Eos # 0.01 0 - 0.9 x10(3)/mcL    Baso # 0.03 0 - 0.2 x10(3)/mcL    IG# 0.08 (H) 0 - 0.04 x10(3)/mcL    IG% 1.4 %    NRBC% 0.0 %   Basic Metabolic Panel    Collection Time: 02/23/23  7:03 AM   Result Value Ref Range    Sodium Level 135 (L) 136 - 145 mmol/L    Potassium Level 4.3 3.5 - 5.1 mmol/L    Chloride 103 98 - 107 mmol/L    Carbon Dioxide 24 23 - 31 mmol/L    Glucose Level 93 82 - 115 mg/dL    Blood Urea Nitrogen 35.6 (H) 8.4 - 25.7 mg/dL    Creatinine 1.65 (H) 0.73 - 1.18 mg/dL    BUN/Creatinine Ratio 22     Calcium Level Total 9.8 8.8 - 10.0 mg/dL    Anion Gap 8.0 mEq/L    eGFR 41 mls/min/1.73/m2        Telemetry:  Sinus Rhythm    Physical Exam  Vitals and nursing note reviewed.   Constitutional:       Appearance: Normal appearance.   HENT:      Head: Normocephalic.      Mouth/Throat:      Mouth: Mucous membranes are moist.      Pharynx: Oropharynx is clear.   Cardiovascular:      Rate and Rhythm: Normal rate and regular rhythm.      Heart sounds: Normal heart sounds.   Pulmonary:      Effort: Pulmonary effort is normal. No respiratory distress.      Breath sounds: Normal breath sounds.   Abdominal:      General: There is no distension.      Palpations: Abdomen is soft.      Tenderness: There is no abdominal tenderness. There is no guarding.   Musculoskeletal:      Cervical back: Neck supple.   Skin:     General: Skin is warm and dry.   Neurological:      General: No focal deficit present.      Mental Status: He is alert. Mental status is at baseline.   Psychiatric:         Behavior: Behavior normal.       Current Inpatient Medications:    Current Facility-Administered Medications:     0.9%  NaCl infusion (for blood administration), , Intravenous, Q24H PRN, Danette Crenshaw, FNP    0.9%  NaCl infusion (for blood administration), , Intravenous, Q24H PRN, Trenton Harvey PA-C    0.9%  NaCl infusion, , Intravenous, Continuous, MAGDA TorresP, Last Rate: 75 mL/hr at 02/21/23 1833, New Bag at 02/21/23 1833    acetaminophen tablet 650 mg, 650 mg, Oral, Q6H PRN, Lul Isaac MD, 650 mg at 02/22/23 1700    aspirin EC tablet 81 mg, 81 mg, Oral, Daily, Rein T Renny, FNP, 81 mg at 02/23/23 0840    atorvastatin tablet 40 mg, 40 mg, Oral, Daily, Rein T Renny, FNP, 40 mg at 02/23/23 0840    folic acid tablet 1 mg, 1 mg, Oral, Daily, Lul Isaac MD, 1 mg at 02/23/23 0840    heparin 25,000 units in dextrose 5% 250 mL (100 units/mL) infusion LOW INTENSITY nomogram - [facility], 4 Units/kg/hr, Intravenous, Continuous, Trenton Harvey PA-C, Last Rate: 1.7 mL/hr at 02/21/23 0709, 2 Units/kg/hr at 02/21/23 0709     hydrALAZINE injection 20 mg, 20 mg, Intravenous, Q4H PRN, Ga Q. Emily, NP    HYDROcodone-acetaminophen 7.5-325 mg per tablet 1 tablet, 1 tablet, Oral, Q6H PRN, Ga Q. Emily, NP    labetaloL injection 20 mg, 20 mg, Intravenous, Q4H PRN, Ga Q. Emily, NP    metoprolol tartrate (LOPRESSOR) tablet 50 mg, 50 mg, Oral, BID, Ga Q. Emily, NP, 50 mg at 02/23/23 0840    morphine injection 2 mg, 2 mg, Intravenous, Q2H PRN, Ga Q. Emily, NP    nitroGLYCERIN in 5 % dextrose 50 mg/250 mL (200 mcg/mL) infusion, 0-400 mcg/min, Intravenous, Continuous, Danette Crenshaw, FNP, Last Rate: 7.5 mL/hr at 02/23/23 0025, 25 mcg/min at 02/23/23 0025    nitroGLYCERIN SL tablet 0.4 mg, 0.4 mg, Sublingual, Q5 Min PRN, Rein PATRICK Lawson, FNP    pantoprazole EC tablet 40 mg, 40 mg, Oral, Daily, Rein PATRICK Lawson, MAGDAP, 40 mg at 02/23/23 0840    prazosin capsule 3 mg, 3 mg, Oral, QHS, Lul Isaac MD, 3 mg at 02/22/23 2213    zolpidem tablet 5 mg, 5 mg, Oral, Nightly PRN, Ga Q. Emily, NP    VTE Risk Mitigation (From admission, onward)           Ordered     heparin 25,000 units in dextrose 5% 250 mL (100 units/mL) infusion LOW INTENSITY nomogram - [facility]  Continuous        Question Answer Comment   Begin at (in units/kg/hr) 12    Heparin Infusion Adjustment (DO NOT MODIFY ANSWER) [email or web address]\[email or web address]\Images\Pharmacy\HeparinInfusions\heparin LOW INTENSITY nomogram for [facility] [id number]        02/19/23 2167                  Assessment:   NSTEMI, Type I  MVCAD  --[facility] 02/15/23: LM 0% stenosis. mLAD proximal 70% ISR. D1 ostial 50% stenosis. D2 ostial 50% stenosis. Lcx: previous stent; proximal Lcx  90% ISR; mid Lcx 30% stenosis. OM1 proximal 80% stenosis. RCA patent stents to ostium. Mid RCA proximal subsection- 60% stenosis; Mid RCA distal subsection 70& stenosis; distal RCA proximal subsection 100% stenosis. Fills left to right  --[facility] 09/22/22: LM 0% stenosis. mLAD proximal subsection 50% ISR,. D1 ostial 50% stenosis  D2 ostial 50% stenosis.  pLCx mid subsection 70% ISR reduced to 10%, Preprocedure AMALIA III flow  noted. Post procedure AMALIA III was present. Lesion previous treated on 7/22/21 with PTA/DAYAN. OM1 proximal 80% stenosis. mLCx distal subsection 30% stenosis. RCA proximal RCA stent widely patent. mRCA proximal subsection 60% subsection. mRCA distal subsection 70% stenosis. dRCA proximal subsection 100% stenosis. Fills left to right  Hypertension (Controlled)  ICMO  --EF 45-50% TTE 2/15/23 improved from 40% 7/21  VHD  --Mild AS, Mild to moderate AR. Mild to moderate MAC is noted. Mild MR. Trace TR.  B RICKEY    - Moderate Bilateral by US  HLD  Lupus anticoagulant inhibitor syndrome  CARLYLE  VHD  --mild AS  Renal Insufficiency  AAA  Anemia     Plan:   Continue asa, statin, BB, and heparin drip  Continue to hold Lisinopril given impaired renal indices. Continue IVFs  Anemia improved post transfusion 2 units of PRBCs. Continue folic acid.   Continue PPI  Discussed LHC with Dr. Meng- feels patient would be better served undergoing CABG. Dr. Hagan to speak to patient and family risks/benefits with proceeding with CABG tomorrow.  Continue PT for strengthening/mobilization  BMP,CBC in am

## 2023-02-23 NOTE — CONSULTS
[facility]                       [address] [place] [address]    PATIENT NAME:       EDDIE GAMBOA  YOB: 1940  CSN:                [id number]   MRN:                [id number]  ADMIT DATE:         02/19/2023 17:43:00  PHYSICIAN:          Spencer Hagan MD                            CONSULTATION    DATE OF CONSULT:  02/20/2023 00:00:00    HISTORY OF PRESENT ILLNESS:  An 82-year-old male, patient of silvia Luke presented to the ER with chest pain and increased troponin.  He was   transferred to  [facility] for cardiology services.  He had multivessel disease   and elevated LVEDP.  He was further transferred for [facility] for further   diagnosis and treatment.  He has not ambulated well in a while.  He has leg   weakness.    PAST MEDICAL HISTORY:  Ischemic cardiomyopathy, hyperlipidemia, hypertension,   CKD, lupus, mild AS, pseudoaneurysm right  , abdominal aortic aneurysm,   status post right total hip replacement, back surgery, spinal cord stimulator.    FAMILY HISTORY:  Cancer, heart disease.    SOCIAL HISTORY:  Former smoker.    MEDICATIONS:  Per MAR.    ALLERGIES:  CARDURA, LYRICA, PAXIL, RESTORIL, VIOXX, ZITHROMAX.     REVIEW OF SYSTEMS:  Essentially positive for chest pain on exertion, inability to ambulate well,   chronic pain.    PHYSICAL EXAM:  GENERAL:  No acute distress.  VITAL SIGNS:  Stable.  ENT:  Trachea midline.  NECK:  No carotid bruits.  EYES:  EOMI, PERRLA.    LUNGS:  Clear anteriorly bilaterally.  HEART:  Normal S1 and S2.    ABDOMEN:  Soft.  EXTREMITIES:  Warm.  NEUROLOGIC:  Affect appropriate.  2+ motor power.  MUSCULOSKELETAL:  No gross deformity.    ASSESSMENT AND PLAN:  An 82-year-old with multiple medical problems, ischemic   cardiomyopathy, lupus anticoagulant, chronic renal failure, and inability to   ambulate.  I believe he is at significant high risk for coronary artery bypass    grafting.  I would like to explore stenting options, as he will be better suited   for that type of treatment.  Please reconsult if needed.        ______________________________  MD EDIN Reyes/ALONDRA  DD:  02/20/2023  Time:  03:11PM  DT:  02/20/2023  Time:  03:37PM  Job    F/u visit- 2/23/23  [facility] requesting re-eval for CABG as he is not a good candidate for PTCA due to the specifics of his coronary disease.     Long discussion Dr Hagan with patient and family     Will plan CABG tomorrow

## 2023-02-23 NOTE — PT/OT/SLP PROGRESS
Physical Therapy Treatment    Patient Name:  Hunter Navarrete   MRN:  [id number]    Recommendations:     Discharge Recommendations:  (pending since pt is now scheduled for a CABG)  Discharge Equipment Recommendations: none  Barriers to discharge:  medical dx    Assessment:     Hunter Navarrete is a 82 y.o. male admitted with a medical diagnosis of NSTEMI, CAD s/p C. Pt is pending CABG, schedule for tomorrow; will f/u.  He presents with the following impairments/functional limitations: weakness, gait instability, impaired endurance, impaired balance, impaired functional mobility, impaired self care skills, decreased lower extremity function.    Rehab Prognosis: Good; patient would benefit from acute skilled PT services to address these deficits and reach maximum level of function.    Recent Surgery: Procedure(s) (LRB):  CORONARY ARTERY BYPASS GRAFT (CABG) (N/A)      Plan:     During this hospitalization, patient to be seen 5 x/week to address the identified rehab impairments via gait training, therapeutic activities, therapeutic exercises and progress toward the following goals:    Plan of Care Expires:  03/22/23    Subjective     Chief Complaint: n/a  Patient/Family Comments/goals: to get stronger   Pain/Comfort:  Pain Rating 1: 0/10      Objective:     Communicated with NSG prior to session.  Patient found up in chair with peripheral IV, pulse ox (continuous), telemetry upon PT entry to room.     General Precautions: Standard, fall  Orthopedic Precautions: N/A  Braces: N/A  Respiratory Status: Room air  Skin Integrity: Visible skin intact      Functional Mobility:  Transfers:     Sit to Stand:  contact guard assistance with rolling walker  When using B UE to push up on chair  Gait: pt ambulated approx 270 feet with use of RW; step through pattern, no overt LOB    Therapeutic Activities/Exercises:  Sit<>stand  Performed from bedside chair  Pt instructed to place B UE on knees in order to have more of a focus on LE in  preparation for upcoming CABG  Required min-modA    Education:  Patient and spouse provided with verbal education regarding POC.  Understanding was verbalized.     Patient left up in chair with all lines intact and call button in reach..    GOALS:   Multidisciplinary Problems       Physical Therapy Goals          Problem: Physical Therapy    Goal Priority Disciplines Outcome Goal Variances Interventions   Physical Therapy Goal     PT, PT/OT Ongoing, Progressing     Description: Goals to be met by: 3/22/23     Patient will increase functional independence with mobility by performin. Sit to stand transfer with Modified [place]  2. Gait  x 250 feet with Stand-by Assistance using Rolling Walker.                          Time Tracking:     PT Received On: 23  PT Start Time: 1332     PT Stop Time: 1355  PT Total Time (min): 23 min     Billable Minutes: Gait Training 1 and Therapeutic Activity 1    Treatment Type: Treatment  PT/PTA: PT     PTA Visit Number: 2023

## 2023-02-24 LAB
ANION GAP SERPL CALC-SCNC: 4 MEQ/L
ANION GAP SERPL CALC-SCNC: 6 MEQ/L
APTT PPP: 63.7 SECONDS (ref 23.2–33.7)
APTT PPP: 66.8 SECONDS (ref 23.2–33.7)
APTT PPP: 82.6 SECONDS (ref 23.2–33.7)
BASOPHILS # BLD AUTO: 0.01 X10(3)/MCL (ref 0–0.2)
BASOPHILS # BLD AUTO: 0.01 X10(3)/MCL (ref 0–0.2)
BASOPHILS # BLD AUTO: 0.02 X10(3)/MCL (ref 0–0.2)
BASOPHILS NFR BLD AUTO: 0.1 %
BASOPHILS NFR BLD AUTO: 0.1 %
BASOPHILS NFR BLD AUTO: 0.4 %
BSA FOR ECHO PROCEDURE: 2.06 M2
BUN SERPL-MCNC: 28.7 MG/DL (ref 8.4–25.7)
BUN SERPL-MCNC: 28.8 MG/DL (ref 8.4–25.7)
CALCIUM SERPL-MCNC: 9 MG/DL (ref 8.8–10)
CALCIUM SERPL-MCNC: 9.7 MG/DL (ref 8.8–10)
CHLORIDE SERPL-SCNC: 105 MMOL/L (ref 98–107)
CHLORIDE SERPL-SCNC: 107 MMOL/L (ref 98–107)
CO2 SERPL-SCNC: 21 MMOL/L (ref 23–31)
CO2 SERPL-SCNC: 24 MMOL/L (ref 23–31)
CORRECTED TEMPERATURE (PCO2): 45 MMHG (ref 35–45)
CORRECTED TEMPERATURE (PH): 7.37 (ref 7.35–7.45)
CORRECTED TEMPERATURE (PO2): 94 MMHG (ref 80–100)
CREAT SERPL-MCNC: 1.36 MG/DL (ref 0.73–1.18)
CREAT SERPL-MCNC: 1.42 MG/DL (ref 0.73–1.18)
CREAT/UREA NIT SERPL: 20
CREAT/UREA NIT SERPL: 21
EOSINOPHIL # BLD AUTO: 0 X10(3)/MCL (ref 0–0.9)
EOSINOPHIL # BLD AUTO: 0 X10(3)/MCL (ref 0–0.9)
EOSINOPHIL # BLD AUTO: 0.01 X10(3)/MCL (ref 0–0.9)
EOSINOPHIL NFR BLD AUTO: 0 %
EOSINOPHIL NFR BLD AUTO: 0 %
EOSINOPHIL NFR BLD AUTO: 0.1 %
ERYTHROCYTE [DISTWIDTH] IN BLOOD BY AUTOMATED COUNT: 15 % (ref 11.5–17)
ERYTHROCYTE [DISTWIDTH] IN BLOOD BY AUTOMATED COUNT: 15.1 % (ref 11.5–17)
ERYTHROCYTE [DISTWIDTH] IN BLOOD BY AUTOMATED COUNT: 15.4 % (ref 11.5–17)
FIBRINOGEN PPP-MCNC: 384 MG/DL (ref 210–463)
GFR SERPLBLD CREATININE-BSD FMLA CKD-EPI: 50 MLS/MIN/1.73/M2
GFR SERPLBLD CREATININE-BSD FMLA CKD-EPI: 52 MLS/MIN/1.73/M2
GLUCOSE SERPL-MCNC: 121 MG/DL (ref 82–115)
GLUCOSE SERPL-MCNC: 157 MG/DL (ref 82–115)
HCO3 UR-SCNC: 26 MMOL/L (ref 22–26)
HCT VFR BLD AUTO: 28.1 % (ref 42–52)
HCT VFR BLD AUTO: 30.9 % (ref 42–52)
HCT VFR BLD AUTO: 35 % (ref 42–52)
HCT VFR BLD AUTO: 36.3 % (ref 42–52)
HGB BLD-MCNC: 10.1 G/DL (ref 14–18)
HGB BLD-MCNC: 11.2 G/DL (ref 12–16)
HGB BLD-MCNC: 11.6 G/DL (ref 14–18)
HGB BLD-MCNC: 12 G/DL (ref 14–18)
HGB BLD-MCNC: 9.1 G/DL (ref 14–18)
IMM GRANULOCYTES # BLD AUTO: 0.05 X10(3)/MCL (ref 0–0.04)
IMM GRANULOCYTES # BLD AUTO: 0.13 X10(3)/MCL (ref 0–0.04)
IMM GRANULOCYTES # BLD AUTO: 0.21 X10(3)/MCL (ref 0–0.04)
IMM GRANULOCYTES NFR BLD AUTO: 1 %
IMM GRANULOCYTES NFR BLD AUTO: 1.4 %
IMM GRANULOCYTES NFR BLD AUTO: 3.1 %
INR BLD: 1.72 (ref 0–1.3)
LEFT CCA DIST DIAS: 13 CM/S
LEFT CCA DIST SYS: 111 CM/S
LEFT CCA PROX DIAS: 0 CM/S
LEFT CCA PROX SYS: 130 CM/S
LEFT ECA DIAS: 0 CM/S
LEFT ECA SYS: 194 CM/S
LEFT ICA DIST DIAS: 15 CM/S
LEFT ICA DIST SYS: 133 CM/S
LEFT ICA MID DIAS: 19 CM/S
LEFT ICA MID SYS: 192 CM/S
LEFT ICA PROX DIAS: 15 CM/S
LEFT ICA PROX SYS: 173 CM/S
LEFT VERTEBRAL DIAS: 0 CM/S
LEFT VERTEBRAL SYS: 62 CM/S
LYMPHOCYTES # BLD AUTO: 0.64 X10(3)/MCL (ref 0.6–4.6)
LYMPHOCYTES # BLD AUTO: 0.92 X10(3)/MCL (ref 0.6–4.6)
LYMPHOCYTES # BLD AUTO: 1.2 X10(3)/MCL (ref 0.6–4.6)
LYMPHOCYTES NFR BLD AUTO: 13.6 %
LYMPHOCYTES NFR BLD AUTO: 24.2 %
LYMPHOCYTES NFR BLD AUTO: 6.9 %
MCH RBC QN AUTO: 29 PG
MCH RBC QN AUTO: 29.3 PG
MCH RBC QN AUTO: 29.4 PG
MCHC RBC AUTO-ENTMCNC: 32.4 G/DL (ref 33–36)
MCHC RBC AUTO-ENTMCNC: 32.7 G/DL (ref 33–36)
MCHC RBC AUTO-ENTMCNC: 33.1 G/DL (ref 33–36)
MCV RBC AUTO: 88.5 FL (ref 80–94)
MCV RBC AUTO: 88.8 FL (ref 80–94)
MCV RBC AUTO: 90.9 FL (ref 80–94)
MONOCYTES # BLD AUTO: 0.3 X10(3)/MCL (ref 0.1–1.3)
MONOCYTES # BLD AUTO: 0.68 X10(3)/MCL (ref 0.1–1.3)
MONOCYTES # BLD AUTO: 0.77 X10(3)/MCL (ref 0.1–1.3)
MONOCYTES NFR BLD AUTO: 15.5 %
MONOCYTES NFR BLD AUTO: 4.4 %
MONOCYTES NFR BLD AUTO: 7.4 %
MRSA PCR SCRN (OHS): NOT DETECTED
NEUTROPHILS # BLD AUTO: 2.92 X10(3)/MCL (ref 2.1–9.2)
NEUTROPHILS # BLD AUTO: 5.3 X10(3)/MCL (ref 2.1–9.2)
NEUTROPHILS # BLD AUTO: 7.75 X10(3)/MCL (ref 2.1–9.2)
NEUTROPHILS NFR BLD AUTO: 58.9 %
NEUTROPHILS NFR BLD AUTO: 78.7 %
NEUTROPHILS NFR BLD AUTO: 84.2 %
NRBC BLD AUTO-RTO: 0 %
OHS CV CAROTID RIGHT ICA EDV HIGHEST: 16
OHS CV CAROTID ULTRASOUND LEFT ICA/CCA RATIO: 1.73
OHS CV CAROTID ULTRASOUND RIGHT ICA/CCA RATIO: 2.08
OHS CV PV CAROTID LEFT HIGHEST CCA: 130
OHS CV PV CAROTID LEFT HIGHEST ICA: 192
OHS CV PV CAROTID RIGHT HIGHEST CCA: 74
OHS CV PV CAROTID RIGHT HIGHEST ICA: 154
OHS CV US CAROTID LEFT HIGHEST EDV: 19
PCO2 BLDA: 40 MMHG
PCO2 BLDA: 43 MMHG
PCO2 BLDA: 45 MMHG (ref 35–45)
PH SMN: 7.35 [PH]
PH SMN: 7.37 [PH] (ref 7.35–7.45)
PH SMN: 7.38 [PH]
PLATELET # BLD AUTO: 146 X10(3)/MCL (ref 130–400)
PLATELET # BLD AUTO: 66 X10(3)/MCL (ref 130–400)
PLATELET # BLD AUTO: 99 X10(3)/MCL (ref 130–400)
PMV BLD AUTO: 11.2 FL (ref 7.4–10.4)
PMV BLD AUTO: 11.4 FL (ref 7.4–10.4)
PMV BLD AUTO: 11.5 FL (ref 7.4–10.4)
PO2 BLDA: 61 MMHG
PO2 BLDA: 71 MMHG
PO2 BLDA: 94 MMHG (ref 80–100)
POC BASE DEFICIT: -1.3 MMOL/L
POC BASE DEFICIT: -2 MMOL/L
POC BASE DEFICIT: 0.4 MMOL/L (ref -2–2)
POC COHB: 2.1 %
POC HCO3: 23.7 MMOL/L
POC HCO3: 23.7 MMOL/L
POC IONIZED CALCIUM: 1.26 MMOL/L (ref 1.12–1.23)
POC IONIZED CALCIUM: 1.29 MMOL/L (ref 1.12–1.23)
POC IONIZED CALCIUM: 1.36 MMOL/L (ref 1.12–1.23)
POC METHB: 0.7 % (ref 0.4–1.5)
POC O2HB: 95.2 % (ref 94–97)
POC SATURATED O2: 90 %
POC SATURATED O2: 93 %
POC SATURATED O2: 97.1 %
POC TEMPERATURE: 37 C
POC TEMPERATURE: 37 C
POC TEMPERATURE: 37 °C
POCT GLUCOSE: 102 MG/DL (ref 70–110)
POCT GLUCOSE: 121 MG/DL (ref 70–110)
POCT GLUCOSE: 122 MG/DL (ref 70–110)
POCT GLUCOSE: 122 MG/DL (ref 70–110)
POCT GLUCOSE: 124 MG/DL (ref 70–110)
POCT GLUCOSE: 129 MG/DL (ref 70–110)
POCT GLUCOSE: 130 MG/DL (ref 70–110)
POCT GLUCOSE: 130 MG/DL (ref 70–110)
POCT GLUCOSE: 141 MG/DL (ref 70–110)
POCT GLUCOSE: 141 MG/DL (ref 70–110)
POCT GLUCOSE: 145 MG/DL (ref 70–110)
POCT GLUCOSE: 153 MG/DL (ref 70–110)
POCT GLUCOSE: 89 MG/DL (ref 70–110)
POCT GLUCOSE: 95 MG/DL (ref 70–110)
POTASSIUM BLD-SCNC: 4.3 MMOL/L (ref 3.5–5)
POTASSIUM BLD-SCNC: 4.6 MMOL/L
POTASSIUM BLD-SCNC: 4.8 MMOL/L
POTASSIUM SERPL-SCNC: 4.5 MMOL/L (ref 3.5–5.1)
POTASSIUM SERPL-SCNC: 4.6 MMOL/L (ref 3.5–5.1)
POTASSIUM SERPL-SCNC: 5.6 MMOL/L (ref 3.5–5.1)
PROTHROMBIN TIME: 19.9 SECONDS (ref 12.5–14.5)
RBC # BLD AUTO: 3.09 X10(6)/MCL (ref 4.7–6.1)
RBC # BLD AUTO: 3.48 X10(6)/MCL (ref 4.7–6.1)
RBC # BLD AUTO: 4.1 X10(6)/MCL (ref 4.7–6.1)
RIGHT CCA DIST DIAS: 0 CM/S
RIGHT CCA DIST SYS: 74 CM/S
RIGHT CCA PROX DIAS: 7 CM/S
RIGHT CCA PROX SYS: 70 CM/S
RIGHT ECA DIAS: 0 CM/S
RIGHT ECA SYS: 270 CM/S
RIGHT ICA DIST DIAS: 13 CM/S
RIGHT ICA DIST SYS: 86 CM/S
RIGHT ICA MID DIAS: 16 CM/S
RIGHT ICA MID SYS: 105 CM/S
RIGHT ICA PROX DIAS: 8 CM/S
RIGHT ICA PROX SYS: 154 CM/S
RIGHT VERTEBRAL DIAS: 4 CM/S
RIGHT VERTEBRAL SYS: 50 CM/S
SODIUM BLD-SCNC: 134 MMOL/L (ref 137–145)
SODIUM BLD-SCNC: 135 MMOL/L (ref 137–145)
SODIUM BLD-SCNC: 135 MMOL/L (ref 137–145)
SODIUM SERPL-SCNC: 133 MMOL/L (ref 136–145)
SODIUM SERPL-SCNC: 134 MMOL/L (ref 136–145)
SPECIMEN SOURCE: ABNORMAL
WBC # SPEC AUTO: 5 X10(3)/MCL (ref 4.5–11.5)
WBC # SPEC AUTO: 6.8 X10(3)/MCL (ref 4.5–11.5)
WBC # SPEC AUTO: 9.2 X10(3)/MCL (ref 4.5–11.5)

## 2023-02-24 PROCEDURE — 80048 BASIC METABOLIC PNL TOTAL CA: CPT | Performed by: THORACIC SURGERY (CARDIOTHORACIC VASCULAR SURGERY)

## 2023-02-24 PROCEDURE — 33533 CABG ARTERIAL SINGLE: CPT | Mod: AS,,, | Performed by: PHYSICIAN ASSISTANT

## 2023-02-24 PROCEDURE — 63600175 PHARM REV CODE 636 W HCPCS: Performed by: NURSE PRACTITIONER

## 2023-02-24 PROCEDURE — 94761 N-INVAS EAR/PLS OXIMETRY MLT: CPT

## 2023-02-24 PROCEDURE — 36620 INSERTION CATHETER ARTERY: CPT | Performed by: NURSE ANESTHETIST, CERTIFIED REGISTERED

## 2023-02-24 PROCEDURE — 85025 COMPLETE CBC W/AUTO DIFF WBC: CPT | Performed by: NURSE PRACTITIONER

## 2023-02-24 PROCEDURE — 33533 CABG ARTERIAL SINGLE: CPT | Mod: ,,, | Performed by: THORACIC SURGERY (CARDIOTHORACIC VASCULAR SURGERY)

## 2023-02-24 PROCEDURE — 25000003 PHARM REV CODE 250: Performed by: THORACIC SURGERY (CARDIOTHORACIC VASCULAR SURGERY)

## 2023-02-24 PROCEDURE — 85610 PROTHROMBIN TIME: CPT | Performed by: THORACIC SURGERY (CARDIOTHORACIC VASCULAR SURGERY)

## 2023-02-24 PROCEDURE — 37799 UNLISTED PX VASCULAR SURGERY: CPT

## 2023-02-24 PROCEDURE — 33517 CABG ARTERY-VEIN SINGLE: CPT | Mod: ,,, | Performed by: THORACIC SURGERY (CARDIOTHORACIC VASCULAR SURGERY)

## 2023-02-24 PROCEDURE — 37000009 HC ANESTHESIA EA ADD 15 MINS: Performed by: THORACIC SURGERY (CARDIOTHORACIC VASCULAR SURGERY)

## 2023-02-24 PROCEDURE — 20000000 HC ICU ROOM

## 2023-02-24 PROCEDURE — 63600175 PHARM REV CODE 636 W HCPCS: Performed by: PHYSICIAN ASSISTANT

## 2023-02-24 PROCEDURE — 36000713 HC OR TIME LEV V EA ADD 15 MIN: Performed by: THORACIC SURGERY (CARDIOTHORACIC VASCULAR SURGERY)

## 2023-02-24 PROCEDURE — 63600175 PHARM REV CODE 636 W HCPCS: Performed by: NURSE ANESTHETIST, CERTIFIED REGISTERED

## 2023-02-24 PROCEDURE — 82803 BLOOD GASES ANY COMBINATION: CPT

## 2023-02-24 PROCEDURE — 99900035 HC TECH TIME PER 15 MIN (STAT)

## 2023-02-24 PROCEDURE — 63600175 PHARM REV CODE 636 W HCPCS: Mod: JG | Performed by: THORACIC SURGERY (CARDIOTHORACIC VASCULAR SURGERY)

## 2023-02-24 PROCEDURE — C1887 CATHETER, GUIDING: HCPCS | Performed by: THORACIC SURGERY (CARDIOTHORACIC VASCULAR SURGERY)

## 2023-02-24 PROCEDURE — C1729 CATH, DRAINAGE: HCPCS | Performed by: THORACIC SURGERY (CARDIOTHORACIC VASCULAR SURGERY)

## 2023-02-24 PROCEDURE — 25000003 PHARM REV CODE 250: Performed by: PHYSICIAN ASSISTANT

## 2023-02-24 PROCEDURE — 27201423 OPTIME MED/SURG SUP & DEVICES STERILE SUPPLY: Performed by: THORACIC SURGERY (CARDIOTHORACIC VASCULAR SURGERY)

## 2023-02-24 PROCEDURE — 27200966 HC CLOSED SUCTION SYSTEM

## 2023-02-24 PROCEDURE — 33533 PR CABG, ARTERIAL, SINGLE: ICD-10-PCS | Mod: ,,, | Performed by: THORACIC SURGERY (CARDIOTHORACIC VASCULAR SURGERY)

## 2023-02-24 PROCEDURE — C9248 INJ, CLEVIDIPINE BUTYRATE: HCPCS | Mod: JG | Performed by: THORACIC SURGERY (CARDIOTHORACIC VASCULAR SURGERY)

## 2023-02-24 PROCEDURE — 33508 PR ENDOSCOPY W/VIDEO-ASST VEIN HARVEST,CABG: ICD-10-PCS | Mod: 59,,, | Performed by: THORACIC SURGERY (CARDIOTHORACIC VASCULAR SURGERY)

## 2023-02-24 PROCEDURE — 63600175 PHARM REV CODE 636 W HCPCS: Performed by: INTERNAL MEDICINE

## 2023-02-24 PROCEDURE — 33508 ENDOSCOPIC VEIN HARVEST: CPT | Mod: 59,,, | Performed by: THORACIC SURGERY (CARDIOTHORACIC VASCULAR SURGERY)

## 2023-02-24 PROCEDURE — 33517 CABG ARTERY-VEIN SINGLE: CPT | Mod: AS,,, | Performed by: PHYSICIAN ASSISTANT

## 2023-02-24 PROCEDURE — 33533 PR CABG, ARTERIAL, SINGLE: ICD-10-PCS | Mod: AS,,, | Performed by: PHYSICIAN ASSISTANT

## 2023-02-24 PROCEDURE — S0017 INJECTION, AMINOCAPROIC ACID: HCPCS | Performed by: NURSE ANESTHETIST, CERTIFIED REGISTERED

## 2023-02-24 PROCEDURE — 27000221 HC OXYGEN, UP TO 24 HOURS

## 2023-02-24 PROCEDURE — 85025 COMPLETE CBC W/AUTO DIFF WBC: CPT | Performed by: THORACIC SURGERY (CARDIOTHORACIC VASCULAR SURGERY)

## 2023-02-24 PROCEDURE — 63600175 PHARM REV CODE 636 W HCPCS: Performed by: THORACIC SURGERY (CARDIOTHORACIC VASCULAR SURGERY)

## 2023-02-24 PROCEDURE — 76937 US GUIDE VASCULAR ACCESS: CPT | Performed by: NURSE ANESTHETIST, CERTIFIED REGISTERED

## 2023-02-24 PROCEDURE — 99900031 HC PATIENT EDUCATION (STAT)

## 2023-02-24 PROCEDURE — 25000003 PHARM REV CODE 250: Performed by: NURSE PRACTITIONER

## 2023-02-24 PROCEDURE — P9016 RBC LEUKOCYTES REDUCED: HCPCS | Performed by: PHYSICIAN ASSISTANT

## 2023-02-24 PROCEDURE — 85730 THROMBOPLASTIN TIME PARTIAL: CPT | Performed by: INTERNAL MEDICINE

## 2023-02-24 PROCEDURE — C1894 INTRO/SHEATH, NON-LASER: HCPCS | Performed by: THORACIC SURGERY (CARDIOTHORACIC VASCULAR SURGERY)

## 2023-02-24 PROCEDURE — 25000242 PHARM REV CODE 250 ALT 637 W/ HCPCS: Performed by: STUDENT IN AN ORGANIZED HEALTH CARE EDUCATION/TRAINING PROGRAM

## 2023-02-24 PROCEDURE — 33517 PR CABG, ARTERY-VEIN, SINGLE: ICD-10-PCS | Mod: AS,,, | Performed by: PHYSICIAN ASSISTANT

## 2023-02-24 PROCEDURE — 85384 FIBRINOGEN ACTIVITY: CPT | Performed by: THORACIC SURGERY (CARDIOTHORACIC VASCULAR SURGERY)

## 2023-02-24 PROCEDURE — 94002 VENT MGMT INPAT INIT DAY: CPT

## 2023-02-24 PROCEDURE — 33517 PR CABG, ARTERY-VEIN, SINGLE: ICD-10-PCS | Mod: ,,, | Performed by: THORACIC SURGERY (CARDIOTHORACIC VASCULAR SURGERY)

## 2023-02-24 PROCEDURE — 94640 AIRWAY INHALATION TREATMENT: CPT

## 2023-02-24 PROCEDURE — 36000712 HC OR TIME LEV V 1ST 15 MIN: Performed by: THORACIC SURGERY (CARDIOTHORACIC VASCULAR SURGERY)

## 2023-02-24 PROCEDURE — 85014 HEMATOCRIT: CPT | Performed by: THORACIC SURGERY (CARDIOTHORACIC VASCULAR SURGERY)

## 2023-02-24 PROCEDURE — 84132 ASSAY OF SERUM POTASSIUM: CPT | Performed by: THORACIC SURGERY (CARDIOTHORACIC VASCULAR SURGERY)

## 2023-02-24 PROCEDURE — 85730 THROMBOPLASTIN TIME PARTIAL: CPT | Performed by: THORACIC SURGERY (CARDIOTHORACIC VASCULAR SURGERY)

## 2023-02-24 PROCEDURE — 25000003 PHARM REV CODE 250: Performed by: NURSE ANESTHETIST, CERTIFIED REGISTERED

## 2023-02-24 PROCEDURE — S5010 5% DEXTROSE AND 0.45% SALINE: HCPCS | Performed by: PHYSICIAN ASSISTANT

## 2023-02-24 PROCEDURE — 87641 MR-STAPH DNA AMP PROBE: CPT | Performed by: INTERNAL MEDICINE

## 2023-02-24 PROCEDURE — 37000008 HC ANESTHESIA 1ST 15 MINUTES: Performed by: THORACIC SURGERY (CARDIOTHORACIC VASCULAR SURGERY)

## 2023-02-24 RX ORDER — DOCUSATE SODIUM 100 MG/1
100 CAPSULE, LIQUID FILLED ORAL 2 TIMES DAILY
Status: DISCONTINUED | OUTPATIENT
Start: 2023-02-25 | End: 2023-03-03 | Stop reason: HOSPADM

## 2023-02-24 RX ORDER — FAMOTIDINE 10 MG/ML
20 INJECTION INTRAVENOUS DAILY
Status: DISCONTINUED | OUTPATIENT
Start: 2023-02-24 | End: 2023-02-25

## 2023-02-24 RX ORDER — HEPARIN SODIUM 1000 [USP'U]/ML
INJECTION, SOLUTION INTRAVENOUS; SUBCUTANEOUS
Status: DISCONTINUED | OUTPATIENT
Start: 2023-02-24 | End: 2023-02-24

## 2023-02-24 RX ORDER — ACETAMINOPHEN 650 MG/20.3ML
650 LIQUID ORAL EVERY 6 HOURS PRN
Status: DISCONTINUED | OUTPATIENT
Start: 2023-02-24 | End: 2023-03-03 | Stop reason: HOSPADM

## 2023-02-24 RX ORDER — MUPIROCIN 20 MG/G
OINTMENT TOPICAL 2 TIMES DAILY
Status: DISPENSED | OUTPATIENT
Start: 2023-02-24 | End: 2023-03-01

## 2023-02-24 RX ORDER — ALBUMIN HUMAN 50 G/1000ML
12.5 SOLUTION INTRAVENOUS
Status: DISCONTINUED | OUTPATIENT
Start: 2023-02-24 | End: 2023-03-03 | Stop reason: HOSPADM

## 2023-02-24 RX ORDER — DEXMEDETOMIDINE HYDROCHLORIDE 4 UG/ML
0-1.4 INJECTION, SOLUTION INTRAVENOUS CONTINUOUS
Status: DISCONTINUED | OUTPATIENT
Start: 2023-02-24 | End: 2023-02-24

## 2023-02-24 RX ORDER — FENTANYL CITRATE 50 UG/ML
INJECTION, SOLUTION INTRAMUSCULAR; INTRAVENOUS
Status: DISCONTINUED | OUTPATIENT
Start: 2023-02-24 | End: 2023-02-24

## 2023-02-24 RX ORDER — HEPARIN SODIUM 1000 [USP'U]/ML
INJECTION, SOLUTION INTRAVENOUS; SUBCUTANEOUS
Status: DISCONTINUED | OUTPATIENT
Start: 2023-02-24 | End: 2023-02-24 | Stop reason: HOSPADM

## 2023-02-24 RX ORDER — VASOPRESSIN 20 [USP'U]/ML
INJECTION, SOLUTION INTRAMUSCULAR; SUBCUTANEOUS
Status: DISCONTINUED | OUTPATIENT
Start: 2023-02-24 | End: 2023-02-24

## 2023-02-24 RX ORDER — CEFAZOLIN SODIUM 2 G/50ML
2 SOLUTION INTRAVENOUS
Status: DISCONTINUED | OUTPATIENT
Start: 2023-02-24 | End: 2023-02-24

## 2023-02-24 RX ORDER — CALCIUM CHLORIDE INJECTION 100 MG/ML
INJECTION, SOLUTION INTRAVENOUS
Status: DISCONTINUED | OUTPATIENT
Start: 2023-02-24 | End: 2023-02-24 | Stop reason: HOSPADM

## 2023-02-24 RX ORDER — POTASSIUM CHLORIDE 14.9 MG/ML
40 INJECTION INTRAVENOUS
Status: DISCONTINUED | OUTPATIENT
Start: 2023-02-24 | End: 2023-03-03 | Stop reason: HOSPADM

## 2023-02-24 RX ORDER — CALCIUM GLUCONATE 20 MG/ML
2 INJECTION, SOLUTION INTRAVENOUS
Status: DISCONTINUED | OUTPATIENT
Start: 2023-02-24 | End: 2023-03-03 | Stop reason: HOSPADM

## 2023-02-24 RX ORDER — LACTULOSE 10 G/15ML
20 SOLUTION ORAL EVERY 6 HOURS PRN
Status: CANCELLED | OUTPATIENT
Start: 2023-02-24

## 2023-02-24 RX ORDER — OXYCODONE HYDROCHLORIDE 5 MG/1
10 TABLET ORAL EVERY 4 HOURS PRN
Status: DISCONTINUED | OUTPATIENT
Start: 2023-02-24 | End: 2023-03-03 | Stop reason: HOSPADM

## 2023-02-24 RX ORDER — ROCURONIUM BROMIDE 10 MG/ML
INJECTION, SOLUTION INTRAVENOUS
Status: DISCONTINUED | OUTPATIENT
Start: 2023-02-24 | End: 2023-02-24

## 2023-02-24 RX ORDER — MAGNESIUM SULFATE HEPTAHYDRATE 40 MG/ML
2 INJECTION, SOLUTION INTRAVENOUS
Status: DISCONTINUED | OUTPATIENT
Start: 2023-02-24 | End: 2023-03-03 | Stop reason: HOSPADM

## 2023-02-24 RX ORDER — CEFAZOLIN SODIUM 2 G/50ML
2 SOLUTION INTRAVENOUS ONCE
Status: COMPLETED | OUTPATIENT
Start: 2023-02-24 | End: 2023-02-24

## 2023-02-24 RX ORDER — FOLIC ACID 1 MG/1
1 TABLET ORAL DAILY
Status: DISCONTINUED | OUTPATIENT
Start: 2023-02-25 | End: 2023-02-25

## 2023-02-24 RX ORDER — ENOXAPARIN SODIUM 100 MG/ML
40 INJECTION SUBCUTANEOUS EVERY 24 HOURS
Status: DISCONTINUED | OUTPATIENT
Start: 2023-02-25 | End: 2023-02-25

## 2023-02-24 RX ORDER — AMINOCAPROIC ACID 250 MG/ML
INJECTION, SOLUTION INTRAVENOUS
Status: DISCONTINUED | OUTPATIENT
Start: 2023-02-24 | End: 2023-02-24

## 2023-02-24 RX ORDER — PROTAMINE SULFATE 10 MG/ML
INJECTION, SOLUTION INTRAVENOUS
Status: DISCONTINUED | OUTPATIENT
Start: 2023-02-24 | End: 2023-02-24

## 2023-02-24 RX ORDER — ETOMIDATE 2 MG/ML
INJECTION INTRAVENOUS
Status: DISCONTINUED | OUTPATIENT
Start: 2023-02-24 | End: 2023-02-24

## 2023-02-24 RX ORDER — HYDROCODONE BITARTRATE AND ACETAMINOPHEN 5; 325 MG/1; MG/1
1 TABLET ORAL EVERY 4 HOURS PRN
Status: DISCONTINUED | OUTPATIENT
Start: 2023-02-24 | End: 2023-03-03 | Stop reason: HOSPADM

## 2023-02-24 RX ORDER — CALCIUM CHLORIDE INJECTION 100 MG/ML
INJECTION, SOLUTION INTRAVENOUS
Status: DISCONTINUED | OUTPATIENT
Start: 2023-02-24 | End: 2023-02-24

## 2023-02-24 RX ORDER — CEFAZOLIN SODIUM 2 G/50ML
2 SOLUTION INTRAVENOUS
Status: COMPLETED | OUTPATIENT
Start: 2023-02-24 | End: 2023-02-25

## 2023-02-24 RX ORDER — SUCRALFATE 1 G/1
1 TABLET ORAL
Status: DISCONTINUED | OUTPATIENT
Start: 2023-02-25 | End: 2023-03-03 | Stop reason: HOSPADM

## 2023-02-24 RX ORDER — METOPROLOL TARTRATE 25 MG/1
12.5 TABLET ORAL 2 TIMES DAILY
Status: DISCONTINUED | OUTPATIENT
Start: 2023-02-25 | End: 2023-02-26

## 2023-02-24 RX ORDER — POTASSIUM CHLORIDE 14.9 MG/ML
20 INJECTION INTRAVENOUS
Status: DISCONTINUED | OUTPATIENT
Start: 2023-02-24 | End: 2023-03-03 | Stop reason: HOSPADM

## 2023-02-24 RX ORDER — MAGNESIUM SULFATE HEPTAHYDRATE 40 MG/ML
4 INJECTION, SOLUTION INTRAVENOUS
Status: DISCONTINUED | OUTPATIENT
Start: 2023-02-24 | End: 2023-03-03 | Stop reason: HOSPADM

## 2023-02-24 RX ORDER — MIDAZOLAM HYDROCHLORIDE 1 MG/ML
INJECTION INTRAMUSCULAR; INTRAVENOUS
Status: DISCONTINUED | OUTPATIENT
Start: 2023-02-24 | End: 2023-02-24

## 2023-02-24 RX ORDER — LOPERAMIDE HYDROCHLORIDE 2 MG/1
2 CAPSULE ORAL CONTINUOUS PRN
Status: CANCELLED | OUTPATIENT
Start: 2023-02-24

## 2023-02-24 RX ORDER — LIDOCAINE HYDROCHLORIDE 20 MG/ML
INJECTION, SOLUTION EPIDURAL; INFILTRATION; INTRACAUDAL; PERINEURAL
Status: DISCONTINUED | OUTPATIENT
Start: 2023-02-24 | End: 2023-02-24

## 2023-02-24 RX ORDER — MORPHINE SULFATE 10 MG/ML
4 INJECTION INTRAMUSCULAR; INTRAVENOUS; SUBCUTANEOUS EVERY 4 HOURS PRN
Status: DISCONTINUED | OUTPATIENT
Start: 2023-02-24 | End: 2023-02-25

## 2023-02-24 RX ORDER — METOCLOPRAMIDE HYDROCHLORIDE 5 MG/ML
5 INJECTION INTRAMUSCULAR; INTRAVENOUS EVERY 6 HOURS PRN
Status: CANCELLED | OUTPATIENT
Start: 2023-02-24

## 2023-02-24 RX ORDER — ASPIRIN 81 MG/1
81 TABLET ORAL DAILY
Status: DISCONTINUED | OUTPATIENT
Start: 2023-02-25 | End: 2023-03-03 | Stop reason: HOSPADM

## 2023-02-24 RX ORDER — CALCIUM GLUCONATE 20 MG/ML
1 INJECTION, SOLUTION INTRAVENOUS
Status: DISCONTINUED | OUTPATIENT
Start: 2023-02-24 | End: 2023-03-03 | Stop reason: HOSPADM

## 2023-02-24 RX ORDER — CALCIUM GLUCONATE 20 MG/ML
3 INJECTION, SOLUTION INTRAVENOUS
Status: DISCONTINUED | OUTPATIENT
Start: 2023-02-24 | End: 2023-03-03 | Stop reason: HOSPADM

## 2023-02-24 RX ORDER — PROPOFOL 10 MG/ML
VIAL (ML) INTRAVENOUS
Status: DISCONTINUED | OUTPATIENT
Start: 2023-02-24 | End: 2023-02-24

## 2023-02-24 RX ORDER — IPRATROPIUM BROMIDE AND ALBUTEROL SULFATE 2.5; .5 MG/3ML; MG/3ML
3 SOLUTION RESPIRATORY (INHALATION) EVERY 4 HOURS PRN
Status: DISCONTINUED | OUTPATIENT
Start: 2023-02-24 | End: 2023-03-03 | Stop reason: HOSPADM

## 2023-02-24 RX ORDER — ONDANSETRON 2 MG/ML
4 INJECTION INTRAMUSCULAR; INTRAVENOUS EVERY 4 HOURS PRN
Status: DISCONTINUED | OUTPATIENT
Start: 2023-02-24 | End: 2023-03-03 | Stop reason: HOSPADM

## 2023-02-24 RX ORDER — DEXTROSE MONOHYDRATE AND SODIUM CHLORIDE 5; .45 G/100ML; G/100ML
INJECTION, SOLUTION INTRAVENOUS CONTINUOUS
Status: DISCONTINUED | OUTPATIENT
Start: 2023-02-24 | End: 2023-02-25

## 2023-02-24 RX ORDER — PAPAVERINE HYDROCHLORIDE 30 MG/ML
INJECTION INTRAMUSCULAR; INTRAVENOUS
Status: DISCONTINUED | OUTPATIENT
Start: 2023-02-24 | End: 2023-02-24 | Stop reason: HOSPADM

## 2023-02-24 RX ADMIN — MUPIROCIN: 20 OINTMENT TOPICAL at 05:02

## 2023-02-24 RX ADMIN — PROTAMINE SULFATE 100 MG: 10 INJECTION, SOLUTION INTRAVENOUS at 10:02

## 2023-02-24 RX ADMIN — MORPHINE SULFATE 2 MG: 4 INJECTION INTRAVENOUS at 03:02

## 2023-02-24 RX ADMIN — MIDAZOLAM HYDROCHLORIDE 1.5 MG: 1 INJECTION, SOLUTION INTRAMUSCULAR; INTRAVENOUS at 09:02

## 2023-02-24 RX ADMIN — ONDANSETRON 4 MG: 2 INJECTION INTRAMUSCULAR; INTRAVENOUS at 11:02

## 2023-02-24 RX ADMIN — HYDROCODONE BITARTRATE AND ACETAMINOPHEN 1 TABLET: 5; 325 TABLET ORAL at 05:02

## 2023-02-24 RX ADMIN — FENTANYL CITRATE 250 MCG: 50 INJECTION, SOLUTION INTRAMUSCULAR; INTRAVENOUS at 07:02

## 2023-02-24 RX ADMIN — HYDROCORTISONE SODIUM SUCCINATE 100 MG: 100 INJECTION, POWDER, FOR SOLUTION INTRAMUSCULAR; INTRAVENOUS at 08:02

## 2023-02-24 RX ADMIN — IPRATROPIUM BROMIDE AND ALBUTEROL SULFATE 3 ML: 2.5; .5 SOLUTION RESPIRATORY (INHALATION) at 06:02

## 2023-02-24 RX ADMIN — FENTANYL CITRATE 100 MCG: 50 INJECTION, SOLUTION INTRAMUSCULAR; INTRAVENOUS at 08:02

## 2023-02-24 RX ADMIN — MUPIROCIN: 20 OINTMENT TOPICAL at 08:02

## 2023-02-24 RX ADMIN — HEPARIN SODIUM 25000 UNITS: 1000 INJECTION, SOLUTION INTRAVENOUS; SUBCUTANEOUS at 09:02

## 2023-02-24 RX ADMIN — SODIUM CHLORIDE: 9 INJECTION, SOLUTION INTRAVENOUS at 07:02

## 2023-02-24 RX ADMIN — ROCURONIUM BROMIDE 25 MG: 10 INJECTION, SOLUTION INTRAVENOUS at 09:02

## 2023-02-24 RX ADMIN — PROPOFOL 20 MG: 10 INJECTION, EMULSION INTRAVENOUS at 10:02

## 2023-02-24 RX ADMIN — CLEVIPIDINE 2 MG/HR: 0.5 EMULSION INTRAVENOUS at 01:02

## 2023-02-24 RX ADMIN — EPINEPHRINE 0.04 MCG/KG/MIN: 1 INJECTION INTRAMUSCULAR; INTRAVENOUS; SUBCUTANEOUS at 09:02

## 2023-02-24 RX ADMIN — PROPOFOL 20 MG: 10 INJECTION, EMULSION INTRAVENOUS at 08:02

## 2023-02-24 RX ADMIN — OXYCODONE HYDROCHLORIDE 10 MG: 5 TABLET ORAL at 10:02

## 2023-02-24 RX ADMIN — AMINOCAPROIC ACID 5 G: 250 INJECTION, SOLUTION INTRAVENOUS at 10:02

## 2023-02-24 RX ADMIN — INSULIN HUMAN 1 UNITS/HR: 1 INJECTION, SOLUTION INTRAVENOUS at 12:02

## 2023-02-24 RX ADMIN — ROCURONIUM BROMIDE 30 MG: 10 INJECTION, SOLUTION INTRAVENOUS at 08:02

## 2023-02-24 RX ADMIN — PROTAMINE SULFATE 250 MG: 10 INJECTION, SOLUTION INTRAVENOUS at 10:02

## 2023-02-24 RX ADMIN — CALCIUM CHLORIDE INJECTION 250 MG: 100 INJECTION, SOLUTION INTRAVENOUS at 10:02

## 2023-02-24 RX ADMIN — MIDAZOLAM HYDROCHLORIDE 2 MG: 1 INJECTION, SOLUTION INTRAMUSCULAR; INTRAVENOUS at 07:02

## 2023-02-24 RX ADMIN — ROCURONIUM BROMIDE 20 MG: 10 INJECTION, SOLUTION INTRAVENOUS at 10:02

## 2023-02-24 RX ADMIN — CLEVIPIDINE 3 MG/HR: 0.5 EMULSION INTRAVENOUS at 11:02

## 2023-02-24 RX ADMIN — AMINOCAPROIC ACID 5 G: 250 INJECTION, SOLUTION INTRAVENOUS at 08:02

## 2023-02-24 RX ADMIN — DEXMEDETOMIDINE HYDROCHLORIDE 0.4 MCG/KG/HR: 400 INJECTION INTRAVENOUS at 09:02

## 2023-02-24 RX ADMIN — CALCIUM CHLORIDE INJECTION 250 MG: 100 INJECTION, SOLUTION INTRAVENOUS at 11:02

## 2023-02-24 RX ADMIN — METOPROLOL TARTRATE 50 MG: 50 TABLET, FILM COATED ORAL at 05:02

## 2023-02-24 RX ADMIN — VASOPRESSIN 0.5 UNITS: 20 INJECTION INTRAVENOUS at 09:02

## 2023-02-24 RX ADMIN — ETOMIDATE 18 MG: 2 INJECTION INTRAVENOUS at 07:02

## 2023-02-24 RX ADMIN — LIDOCAINE HYDROCHLORIDE 80 MG: 20 INJECTION, SOLUTION EPIDURAL; INFILTRATION; INTRACAUDAL; PERINEURAL at 07:02

## 2023-02-24 RX ADMIN — ROCURONIUM BROMIDE 70 MG: 10 INJECTION, SOLUTION INTRAVENOUS at 07:02

## 2023-02-24 RX ADMIN — CEFAZOLIN SODIUM 2 G: 2 SOLUTION INTRAVENOUS at 03:02

## 2023-02-24 RX ADMIN — DEXTROSE AND SODIUM CHLORIDE: 5; 450 INJECTION, SOLUTION INTRAVENOUS at 12:02

## 2023-02-24 RX ADMIN — CEFAZOLIN SODIUM 2 G: 2 SOLUTION INTRAVENOUS at 07:02

## 2023-02-24 RX ADMIN — DEXTROSE AND SODIUM CHLORIDE: 5; 450 INJECTION, SOLUTION INTRAVENOUS at 11:02

## 2023-02-24 RX ADMIN — MORPHINE SULFATE 2 MG: 4 INJECTION INTRAVENOUS at 08:02

## 2023-02-24 RX ADMIN — FENTANYL CITRATE 150 MCG: 50 INJECTION, SOLUTION INTRAMUSCULAR; INTRAVENOUS at 08:02

## 2023-02-24 NOTE — ANESTHESIA PROCEDURE NOTES
Central Line    Diagnosis: Coronary Artery Bypass  Patient location during procedure: done in OR    Staffing  Authorizing Provider: Mabel Vargas MD  Performing Provider: Mabel Vargas MD    Staffing  Performed: anesthesiologist   Anesthesiologist: Mabel Vargas MD  Anesthesiologist was present at the time of the procedure.  Preanesthetic Checklist  Completed: patient identified, IV checked, site marked, risks and benefits discussed, surgical consent, monitors and equipment checked, pre-op evaluation, timeout performed and anesthesia consent given  Indication   Indication: hemodynamic monitoring, vascular access, med administration     Anesthesia   general anesthesia    Central Line   Skin Prep: skin prepped with ChloraPrep, skin prep agent completely dried prior to procedure  Sterile Barriers Followed: Yes    All five maximal barriers used- gloves, gown, cap, mask, and large sterile sheet    hand hygiene performed prior to central venous catheter insertion  Location: right internal jugular.   Catheter type: introducer  Catheter Size: 10 Fr  Inserted Catheter Length: 16 cm  Ultrasound: vascular probe with ultrasound   Vessel Caliber: medium, patent, compressibility normal  Needle advanced into vessel with real time Ultrasound guidance.  Image recorded and saved.  sterile gel and probe cover used in ultrasound-guided central venous catheter insertion  Manometry: none  Insertion Attempts: 1   Securement:line sutured, chlorhexidine patch, sterile dressing applied and blood return through all ports    Post-Procedure   X-Ray: successful placement   Adverse Events:none      Guidewire Guidewire removed intact.

## 2023-02-24 NOTE — H&P
[facility] - [facility] ICU  Pulmonary Critical Care Note    Patient Name: Hunter Navarrete  MRN: [id number]  Admission Date: 2/19/2023  Hospital Length of Stay: 5 days  Code Status: Full Code  Attending Provider: Lul Isaac MD  Primary Care Provider: Mariella Bethea MD     Subjective:     HPI:   This is an 82 year old male with a PMH of CAD, HTN, HLD, CKD, mild AS, and lupus anticoagulant inhibitor syndrome who presented to an outside hospital with chest pain. He underwent a LHC which revealed multivessel disease and EF 45-50%. He was transferred here for CV surgery evaluation and today was admitted on 2/24/23 and underwent CAB x 2 (operative report pending). He received 2u PBRCs intraoperatively. Upon arrival to ICU he was accidentally extubated and required reintubation.     Hospital Course/Significant events:  As above    24 Hour Interval History:  Currently intubated and sedated. On a small amount of epi for blood pressure support. Chest tubes with minimal output at this time.     Past Medical History:   Diagnosis Date    Acid reflux     Anemia     Aortic aneurysm, abdominal     Arthritis     Bronchitis     Cataract     Celiac artery stenosis     50% by CTA abdomen 7/27/2020    CKD (chronic kidney disease)     45% FUNCTION    Coronary artery disease     Encounter for blood transfusion     Enlarged prostate     GERD (gastroesophageal reflux disease)     Hemorrhoids     Hypercholesteremia     Hypertension     Iron deficiency anemia, unspecified     Leaky heart valve     Lupus anticoagulant disorder     Renal artery stenosis     by CTA 7/27/2020    Skin cancer     Unspecified chronic bronchitis     UTI (urinary tract infection)        Past Surgical History:   Procedure Laterality Date    ACF      BACK SURGERY      RODS IN BACK; 4 SURGIERIES    BONE MARROW BIOPSY      CARDIAC ANGIOGRAM WITH STENT      CATARACT SX Bilateral     CORONARY ANGIOGRAPHY N/A 2/15/2023    Procedure: ANGIOGRAM, CORONARY ARTERY;   Surgeon: Rito Vega MD;  Location: [facility] CATH;  Service: Cardiology;  Laterality: N/A;    HIP SURGERY Right     THR    KNEE SURGERY Right     ATS    LEFT HEART CATHETERIZATION Left 2021    Procedure: Left heart cath;  Surgeon: Curtis Loredo MD;  Location: [facility] CATH;  Service: Cardiology;  Laterality: Left;    LEFT HEART CATHETERIZATION Left 2022    Procedure: Left heart cath;  Surgeon: Giorgi Barker MD;  Location: [facility] CATH;  Service: Cardiology;  Laterality: Left;    SHOULDER SURGERY Bilateral     SPINAL CORD STIMULATOR IMPLANT         Social History     Socioeconomic History    Marital status:    Tobacco Use    Smoking status: Former     Types: Cigarettes     Quit date:      Years since quittin.1    Smokeless tobacco: Never   Substance and Sexual Activity    Alcohol use: Yes     Comment: RARELY    Drug use: No     Social Determinants of Health     Financial Resource Strain: Low Risk     Difficulty of Paying Living Expenses: Not hard at all   Food Insecurity: No Food Insecurity    Worried About Running Out of Food in the Last Year: Never true    Ran Out of Food in the Last Year: Never true   Transportation Needs: No Transportation Needs    Lack of Transportation (Medical): No    Lack of Transportation (Non-Medical): No   Physical Activity: Inactive    Days of Exercise per Week: 0 days    Minutes of Exercise per Session: 0 min   Stress: No Stress Concern Present    Feeling of Stress : Not at all   Social Connections: Moderately Integrated    Frequency of Communication with Friends and Family: More than three times a week    Frequency of Social Gatherings with Friends and Family: More than three times a week    Attends Baptism Services: Never    Active Member of Clubs or Organizations: Yes    Attends Club or Organization Meetings: Never    Marital Status:            Current Outpatient Medications   Medication Instructions    albuterol-ipratropium (DUO-NEB) 2.5 mg-0.5 mg/3  mL nebulizer solution 3 mLs, Nebulization, Every 4 hours PRN, Rescue    aspirin (ECOTRIN) 81 mg, Oral, Daily    atorvastatin (LIPITOR) 40 mg, Oral, Daily    calcium carbonate 400 mg calcium (1,000 mg) Chew 2 tablets, Oral, Daily    calcium carbonate-vitamin D3 500 mg-10 mcg (400 unit) Tab 1 tablet, Oral, Daily    clopidogreL (PLAVIX) 75 mg, Oral, Daily    ezetimibe (ZETIA) 10 mg, Oral, Daily    FLUoxetine 10 mg, Oral, Daily    folic acid (FOLVITE) 1 mg, Oral, Daily    furosemide (LASIX) 40 mg, Oral, Daily    gabapentin (NEURONTIN) 100 MG capsule 200 MG IN THE MORNING, 100 MG AT LUNCH, 100 MG AT SUPPERTIME    hydrALAZINE (APRESOLINE) 25 mg, Oral, Every 8 hours    HYDROcodone-acetaminophen (NORCO) 5-325 mg per tablet 1 tablet, Oral, Every 8 hours PRN    hydrOXYzine HCL (ATARAX) 25 mg, Oral, Every 6 hours PRN    isosorbide mononitrate (IMDUR) 30 mg, Oral, Daily    lisinopriL 5 mg, Oral, Daily    meclizine (ANTIVERT) 25 mg, Oral, 3 times daily PRN    metoprolol succinate (TOPROL-XL) 50 mg, Oral, Daily    metoprolol tartrate (LOPRESSOR) 50 mg, Oral, 3 times daily    nitroGLYCERIN (NITROSTAT) 0.4 mg, Sublingual, Every 5 min PRN    omeprazole (PRILOSEC) 20 mg, Oral, Daily    predniSONE (DELTASONE) 2.5 mg, Oral, Daily    QUEtiapine (SEROQUEL) 50 mg, Oral, Nightly    rOPINIRole (REQUIP) 1 mg, Oral, 2 times daily    terazosin (HYTRIN) 3 mg, Oral, Nightly    tiZANidine 4 mg, Oral, 3 times daily PRN    traMADoL (ULTRAM) 50 mg tablet Oral, Every 6 hours PRN, 1/2 TO 1 TABLET 4 TIMES A DAY PRN       Current Inpatient Medications   [START ON 2/25/2023] aspirin  81 mg Oral Daily    atorvastatin  40 mg Oral Daily    ceFAZolin (ANCEF) IVPB  2 g Intravenous Q8H    [START ON 2/25/2023] docusate sodium  100 mg Oral BID    [START ON 2/25/2023] enoxaparin  40 mg Subcutaneous Daily    famotidine (PF)  20 mg Intravenous Daily    [START ON 2/25/2023] folic acid  1 mg Oral Daily    [START ON 2/25/2023] metoprolol tartrate  12.5 mg Oral BID     mupirocin   Nasal BID    prazosin  3 mg Oral QHS    [START ON 2/25/2023] sucralfate  1 g Oral QID (AC & HS)       Current Intravenous Infusions   dexmedeTOMIDine (Precedex) infusion (titrating)      dextrose 5 % and 0.45 % NaCl      insulin regular 1 units/mL infusion orderable (CTS POST-OP)           Review of Systems   Unable to perform ROS: Intubated        Objective:       Intake/Output Summary (Last 24 hours) at 2/24/2023 1151  Last data filed at 2/24/2023 1055  Gross per 24 hour   Intake 2706 ml   Output 2418 ml   Net 288 ml         Vital Signs (Most Recent):  Temp: 99 °F (37.2 °C) (02/24/23 0334)  Pulse: 65 (02/24/23 1115)  Resp: 20 (02/24/23 1115)  BP: (!) 129/59 (02/24/23 1115)  SpO2: (!) 94 % (02/24/23 1115)    Body mass index is 27.62 kg/m².  Weight: 87.3 kg (192 lb 7.4 oz) Vital Signs (24h Range):  Temp:  [98.5 °F (36.9 °C)-99 °F (37.2 °C)] 99 °F (37.2 °C)  Pulse:  [65-89] 65  Resp:  [18-20] 20  SpO2:  [91 %-96 %] 94 %  BP: (129-171)/(56-68) 129/59     Physical Exam  GENERAL -  male lying in bed  HEENT - ET tube present   CHEST - slightly diminished breath sounds on left otherwise clear. Mediastinal incision dressing present c/d/i  ABD - mildly obese  EXT - RLE ace wrap present   NEURO - still sedated post operatively     Lines/Drains/Airways       Central Venous Catheter Line  Duration              Introducer Single Lumen 02/24/23 0903 <1 day     Introducer with Double Lumen 02/24/23 0903 <1 day    Introducer 02/24/23 0903 <1 day              Drain  Duration                  Chest Tube 02/24/23 1000 Tube - 1 Anterior Mediastinal 28 Fr. <1 day         Chest Tube 02/24/23 1000 Tube - 1 Left Midaxillary 28 Fr. <1 day         NG/OG Tube 02/24/23 1050 [place] sump 18 Fr. Right mouth <1 day         Urethral Catheter 02/24/23 0750 Silicone 16 Fr. <1 day              Arterial Line  Duration             Arterial Line 02/24/23 0720 Right Radial <1 day              Peripheral Intravenous Line  Duration                   Peripheral IV - Single Lumen 02/21/23 0800 20 G Anterior;Left;Proximal Forearm 3 days                    Significant Labs:    Lab Results   Component Value Date    WBC 6.8 02/24/2023    HGB 9.1 (L) 02/24/2023    HCT 28.1 (L) 02/24/2023    MCV 90.9 02/24/2023    PLT 66 (L) 02/24/2023         BMP  Lab Results   Component Value Date     (L) 02/24/2023    K 5.6 (H) 02/24/2023    CHLORIDE 105 02/24/2023    CO2 24 02/24/2023    BUN 28.8 (H) 02/24/2023    CREATININE 1.36 (H) 02/24/2023    CALCIUM 9.0 02/24/2023    AGAP 4.0 02/24/2023    ESTGFRAFRICA 50.1 (A) 10/26/2021    EGFRNONAA 43.3 (A) 10/26/2021       ABG  Recent Labs   Lab 02/24/23 0723   PH 7.37   PO2 94   PCO2 45   HCO3 26.0       Mechanical Ventilation Support:  Vent Mode: SIMV (PRVC) + PS (02/24/23 1125)  Ventilator Initiated: Yes (02/24/23 1125)  Set Rate: 20 BPM (02/24/23 1125)  Vt Set: 500 mL (02/24/23 1125)  Pressure Support: 10 cmH20 (02/24/23 1125)  PEEP/CPAP: 5 cmH20 (02/24/23 1125)  Oxygen Concentration (%): 70 (02/24/23 1125)  Peak Airway Pressure: 19 cmH20 (02/24/23 1125)  Total Ve: 9.5 L/m (02/24/23 1125)    Significant Imaging:  Post operative CXR pending         Assessment/Plan:     Assessment  Multivessel CAD s/p CABG x 2 (operative report pending)  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

## 2023-02-24 NOTE — ANESTHESIA PROCEDURE NOTES
VICKY    Diagnosis: Coronary artery disease  Patient location during procedure: OR  Exam type: Baseline    Staffing  Performed: anesthesiologist     Anesthesiologist: Mabel Vargas MD        Anesthesiologist Present  Yes      Setup & Induction  Patient preparation: bite block inserted  Probe Insertion: easy  Exam: completeDoppler Echo: 2D, 3D and color flow mapping.        After induction of general endotracheal anesthesia in the operating room, transesophageal echo probe was placed atraumatically the esophagus for evaluation of cardiovascular structures.      1. Ascending aorta, transverse arch and descending thoracic aorta to the level of diaphragm are well visualized.  There is a significant amount of calcium in the aortic root and the anterior portion of the sinotubular junction.  The remainder of the ascending aorta is free from obvious calcifications, but there is grade 2 and 3 atherosclerotic disease in the descending aorta.      2. Right atrium, left atrial, left atrial appendage are well-visualized.  Left atrium is mildly enlarged, there is no thrombus, tumor evidence of an interatrial septal defect.      3. Tricuspid valve is unremarkable with trivial regurgitation only seen.      4. Right ventricular size and function appears grossly normal.      5. Pulmonic valve is unremarkable.      6. Mitral valve annulus is nondilated, the leaflets appear freely mobile, there is a mild myxomatous appearance of the A2 segment with trivial regurgitation only and no stenosis.    7. The aortic valve is calcified trileaflet valve, the non coronary cusp has a calcification, and it appears to be restricted.  There is moderate insufficiency.  Maximum and mean gradients across the valve are likely underestimated but calculated at 30 and 13.  The aortic valve area is calculated by the continuity equation at 0.88 cm2.    8. The left ventricle is hypertrophic in the anterior and lateral portion, anterior wall measuring 2.1 cm in  thickness.  There appears to be an inferior/inferoseptal scar with myocardial Flattening and endocardial loss of contour.  The mid anterior and lateral walls appeared to be fairly normal kinetic the inferior toward the apex is also significantly hypokinetic.  Overall ejection fraction is moderately depressed EF is about 45%.    9. No pericardial or pleural effusions are identified.      The above findings were discussed with the surgeon prior to surgical incision.

## 2023-02-24 NOTE — PROGRESS NOTES
[facility] - 3rd Floor Medical Telemetry  Cardiology  Progress Note    Patient Name: Hunter Navarrete  MRN: [id number]  Admission Date: 2/19/2023  Hospital Length of Stay: 5 days  Code Status: Full Code   Attending Physician: Lul Isaac MD   Primary Care Physician: Mariella Bethea MD  Expected Discharge Date:   Principal Problem:<principal problem not specified>    Subjective:   Chief Complaint:  Chest Pain (Known MV CAD)     HPI:   Mr. Navarrete is an 83 y/o male, followed by Dr. Loredo who presented to [facility] ER with c/o chest pain. HS troponin 1149; therefore he was transferred to [facility] for cardiology services where he underwent LHC revealing multivessel disease and elevated LVEDP. He was started on diuretics, Plavix was placed on hold and he was transferred to [facility] for CABG evaluation. Patient is reporting mild chest tightness 3/10 currently    Hospital Course:   2.21.23:  Vitals Stable. Shoulder Pain this morning. On Nitro & Heparin Infusion. EKG with no overt ischemic changes.   2.22.23: Patient resting in bed. NAD. Denies CP/SOB. Creatinine 1.93 today- mildly decreased from 2.07 yesterday with addition of IVFs. H/H downtrending- 8.2/25.3 from 8.8/27.1 yesterday.   2.23.23: Patient sitting up in bedside chair. NAD. VSS. Creatinine improved to 1.65 today. H/H    10.5/31.5 post transfusion 2  units PRBCs.  2.24.23: Patient underwent 2V CABG today and is currently in ICU on MV, requiring pressor support. Mediastinal drain patent.     PMH: ICMO, Native CAD, HLD, HTN, CARLYLE, CKD, lupus anticoagulant inhibitor syndrome, VHD- mild AS, AAA, pseudoaneurysm RFA  PSH: right total hip replacement, LHC, back surgery, spinal cord stimulator implant, knee surgery, cataract surgery  Family History: Brother- cancer, heart disease, lung cancer; father heart disease  Social History: Former smoker, occasional ETOH; denies illicit drug use     Previous Cardiac Diagnostics:   [facility] 02/20/23:  The right  internal carotid artery demonstrated 50-69% stenosis.  The left internal carotid artery demonstrated 50-69% stenosis.  Bilateral vertebral arteries were patent with antegrade flow.     [facility] 02/15/2023:  LM 0% stenosis  mLAD proximal 70% ISR  D1 ostial 50% stenosis  D2 ostial 50% stenosis  Lcx: previous stent; proximal Lcx  90% ISR; mid Lcx 30% stenosis  OM1 proximal 80% stenosis  RCA patent stents to ostium. Mid RCA proximal subsection- 60% stenosis; Mid RCA distal subsection 70& stenosis; distal RCA proximal subsection 100% stenosis. Fills left to right     [facility] 02/16/2023:  Global LV SF is borderline normal. LVEF 45-50%. LA is mildly enlarged. Moderate calcification of the aortic valve is noted. Mild AS is present. Mild to moderate AR. Mild to moderate MAC is noted. Mild MR. Trace TR. PASP 40 mmHg. Optison used to enhance endocardial border.      [facility] 09/22/2022:  LM 0% stenosis  mLAD proximal subsection 50% ISR  D1 ostial 50% stenosis  D2 ostial 50% stenosis  pLCx mid subsection 70% ISR reduced to 10%, Preprocedure AMALIA III flow  noted. Post procedure AMALIA III was present. Lesion previous treated on 7/22/21 with PTA/DAYAN  OM1 proximal 80% stenosis  mLCx distal subsection 30% stenosis  RCA proximal RCA stent widely patent  mRCA proximal subsection 60% subsection  mRCA distal subsection 70% stenosis  dRCA proximal subsection 100% stenosis. Fills left to right     [facility] 05/13/2022:  60-79% stenosis in pRICA and mLICA  Antegrade flow to bilateral vertebral arteries    Review of Systems   Cardiovascular:  Negative for chest pain, dyspnea on exertion and irregular heartbeat.   All other systems reviewed and are negative.    Objective:     Vital Signs (Most Recent):  Temp: (!) 95.7 °F (35.4 °C) (02/24/23 1300)  Pulse: 69 (02/24/23 1330)  Resp: 20 (02/24/23 1330)  BP: 134/66 (02/24/23 1300)  SpO2: (!) 93 % (02/24/23 1330)   Vital Signs (24h Range):  Temp:  [95.7 °F (35.4 °C)-99 °F (37.2 °C)] 95.7 °F (35.4 °C)  Pulse:  [64-89]  69  Resp:  [18-20] 20  SpO2:  [91 %-96 %] 93 %  BP: (129-171)/(56-71) 134/66  Arterial Line BP: (111-148)/(45-62) 134/54     Weight: 87.3 kg (192 lb 7.4 oz)  Body mass index is 27.62 kg/m².    SpO2: (!) 93 %         Intake/Output Summary (Last 24 hours) at 2/24/2023 1344  Last data filed at 2/24/2023 1300  Gross per 24 hour   Intake 2166 ml   Output 2843 ml   Net -677 ml         Lines/Drains/Airways       Central Venous Catheter Line  Duration              Introducer Single Lumen 02/24/23 0903 <1 day     Introducer with Double Lumen 02/24/23 0903 <1 day    Introducer 02/24/23 0903 <1 day              Drain  Duration                  Chest Tube 02/24/23 1000 Tube - 1 Anterior Mediastinal 28 Fr. <1 day         Chest Tube 02/24/23 1000 Tube - 1 Left Midaxillary 28 Fr. <1 day         NG/OG Tube 02/24/23 1050 [place] sump 18 Fr. Right mouth <1 day         Urethral Catheter 02/24/23 0750 Silicone 16 Fr. <1 day              Airway  Duration                  Airway - Non-Surgical 02/24/23 1125 Endotracheal Tube <1 day              Arterial Line  Duration             Arterial Line 02/24/23 0720 Right Radial <1 day              Peripheral Intravenous Line  Duration                  Peripheral IV - Single Lumen 02/21/23 0800 20 G Anterior;Left;Proximal Forearm 3 days                    Significant Labs:   Recent Results (from the past 72 hour(s))   APTT    Collection Time: 02/21/23  7:38 PM   Result Value Ref Range    PTT 69.8 (H) 23.2 - 33.7 seconds   APTT    Collection Time: 02/22/23  3:47 AM   Result Value Ref Range    PTT 88.1 (H) 23.2 - 33.7 seconds   Basic Metabolic Panel    Collection Time: 02/22/23  3:47 AM   Result Value Ref Range    Sodium Level 135 (L) 136 - 145 mmol/L    Potassium Level 4.2 3.5 - 5.1 mmol/L    Chloride 103 98 - 107 mmol/L    Carbon Dioxide 24 23 - 31 mmol/L    Glucose Level 93 82 - 115 mg/dL    Blood Urea Nitrogen 39.9 (H) 8.4 - 25.7 mg/dL    Creatinine 1.93 (H) 0.73 - 1.18 mg/dL    BUN/Creatinine  Ratio 21     Calcium Level Total 8.7 (L) 8.8 - 10.0 mg/dL    Anion Gap 8.0 mEq/L    eGFR 34 mls/min/1.73/m2   Magnesium    Collection Time: 02/22/23  3:47 AM   Result Value Ref Range    Magnesium Level 1.90 1.60 - 2.60 mg/dL   Protime-INR    Collection Time: 02/22/23  3:47 AM   Result Value Ref Range    PT 16.2 (H) 12.5 - 14.5 seconds    INR 1.32 (H) 0.00 - 1.30   CBC with Differential    Collection Time: 02/22/23  3:47 AM   Result Value Ref Range    WBC 5.4 4.5 - 11.5 x10(3)/mcL    RBC 2.79 (L) 4.70 - 6.10 x10(6)/mcL    Hgb 8.2 (L) 14.0 - 18.0 g/dL    Hct 25.3 (L) 42.0 - 52.0 %    MCV 90.7 80.0 - 94.0 fL    MCH 29.4 pg    MCHC 32.4 (L) 33.0 - 36.0 g/dL    RDW 14.7 11.5 - 17.0 %    Platelet 137 130 - 400 x10(3)/mcL    MPV 11.2 (H) 7.4 - 10.4 fL    IG# 0.07 (H) 0 - 0.04 x10(3)/mcL    IG% 1.3 %    NRBC% 0.0 %   Manual Differential    Collection Time: 02/22/23  3:47 AM   Result Value Ref Range    Neut Man 76 %    Lymph Man 12 %    Monocyte Man 13 %    Instr WBC 5.4 x10(3)/mcL    Abs Mono 0.702 0.1 - 1.3 x10(3)/mcL    Abs Lymp 0.648 0.6 - 4.6 x10(3)/mcL    Abs Neut 4.104 2.1 - 9.2 x10(3)/mcL    RBC Morph Abnormal (A) Normal    Anisocyte 1+ (A) (none)    Poik 2+ (A) (none)    Hypochrom 3+ (A) (none)    Ovalocytes 1+ (A) (none)    Platelet Est Normal Normal, Adequate   Prepare RBC 2 Units; symptomatic anemia    Collection Time: 02/22/23 12:21 PM   Result Value Ref Range    UNIT NUMBER [id number]     UNIT ABO/RH A POS     DISPENSE STATUS Transfused     Unit Expiration [id number]     Product Code K2512B71     Unit Blood Type Code 6200     CROSSMATCH INTERPRETATION Compatible     UNIT NUMBER [id number]     UNIT ABO/RH A POS     DISPENSE STATUS Transfused     Unit Expiration [id number]     Product Code M8160W73     Unit Blood Type Code 6200     CROSSMATCH INTERPRETATION Compatible    Type & Screen    Collection Time: 02/22/23 12:21 PM   Result Value Ref Range    Group & Rh A POS     Indirect Jose R GEL NEG     Prepare RBC 4 Units; CABG tomorrow    Collection Time: 02/22/23 12:21 PM   Result Value Ref Range    UNIT NUMBER [id number]     UNIT ABO/RH A POS     DISPENSE STATUS Issued     Unit Expiration [id number]     Product Code O9191S01     Unit Blood Type Code 6200     CROSSMATCH INTERPRETATION Compatible     UNIT NUMBER [id number]     UNIT ABO/RH A POS     DISPENSE STATUS Issued     Unit Expiration [id number]     Product Code X8058J51     Unit Blood Type Code 6200     CROSSMATCH INTERPRETATION Compatible     UNIT NUMBER [id number]     UNIT ABO/RH A POS     DISPENSE STATUS Selected     Unit Expiration [id number]     Product Code L6297H82     Unit Blood Type Code 6200     CROSSMATCH INTERPRETATION Compatible     UNIT NUMBER [id number]     UNIT ABO/RH A POS     DISPENSE STATUS Selected     Unit Expiration [id number]     Product Code G9311H87     Unit Blood Type Code 6200     CROSSMATCH INTERPRETATION Compatible    PTT Heparin Monitoring    Collection Time: 02/23/23  3:59 AM   Result Value Ref Range    PTT Heparin Monitor 77.4 (H) 23.2 - 33.7 seconds   CBC with Differential    Collection Time: 02/23/23  3:59 AM   Result Value Ref Range    WBC 5.8 4.5 - 11.5 x10(3)/mcL    RBC 3.65 (L) 4.70 - 6.10 x10(6)/mcL    Hgb 10.5 (L) 14.0 - 18.0 g/dL    Hct 31.5 (L) 42.0 - 52.0 %    MCV 86.3 80.0 - 94.0 fL    MCH 28.8 pg    MCHC 33.3 33.0 - 36.0 g/dL    RDW 15.5 11.5 - 17.0 %    Platelet 143 130 - 400 x10(3)/mcL    MPV 11.1 (H) 7.4 - 10.4 fL    Neut % 59.5 %    Lymph % 21.4 %    Mono % 17.0 %    Eos % 0.2 %    Basophil % 0.5 %    Lymph # 1.23 0.6 - 4.6 x10(3)/mcL    Neut # 3.43 2.1 - 9.2 x10(3)/mcL    Mono # 0.98 0.1 - 1.3 x10(3)/mcL    Eos # 0.01 0 - 0.9 x10(3)/mcL    Baso # 0.03 0 - 0.2 x10(3)/mcL    IG# 0.08 (H) 0 - 0.04 x10(3)/mcL    IG% 1.4 %    NRBC% 0.0 %   Basic Metabolic Panel    Collection Time: 02/23/23  7:03 AM   Result Value Ref Range    Sodium Level 135 (L) 136 - 145 mmol/L    Potassium Level 4.3 3.5  - 5.1 mmol/L    Chloride 103 98 - 107 mmol/L    Carbon Dioxide 24 23 - 31 mmol/L    Glucose Level 93 82 - 115 mg/dL    Blood Urea Nitrogen 35.6 (H) 8.4 - 25.7 mg/dL    Creatinine 1.65 (H) 0.73 - 1.18 mg/dL    BUN/Creatinine Ratio 22     Calcium Level Total 9.8 8.8 - 10.0 mg/dL    Anion Gap 8.0 mEq/L    eGFR 41 mls/min/1.73/m2   MRSA PCR    Collection Time: 02/24/23  2:16 AM   Result Value Ref Range    MRSA PCR SCRN ([facility]) Not Detected Not Detected   APTT    Collection Time: 02/24/23  3:32 AM   Result Value Ref Range    PTT 82.6 (H) 23.2 - 33.7 seconds   PTT Heparin Monitoring    Collection Time: 02/24/23  3:32 AM   Result Value Ref Range    PTT Heparin Monitor 66.8 (H) 23.2 - 33.7 seconds   CBC with Differential    Collection Time: 02/24/23  3:32 AM   Result Value Ref Range    WBC 5.0 4.5 - 11.5 x10(3)/mcL    RBC 3.48 (L) 4.70 - 6.10 x10(6)/mcL    Hgb 10.1 (L) 14.0 - 18.0 g/dL    Hct 30.9 (L) 42.0 - 52.0 %    MCV 88.8 80.0 - 94.0 fL    MCH 29.0 pg    MCHC 32.7 (L) 33.0 - 36.0 g/dL    RDW 15.4 11.5 - 17.0 %    Platelet 146 130 - 400 x10(3)/mcL    MPV 11.5 (H) 7.4 - 10.4 fL    Neut % 58.9 %    Lymph % 24.2 %    Mono % 15.5 %    Eos % 0.0 %    Basophil % 0.4 %    Lymph # 1.20 0.6 - 4.6 x10(3)/mcL    Neut # 2.92 2.1 - 9.2 x10(3)/mcL    Mono # 0.77 0.1 - 1.3 x10(3)/mcL    Eos # 0.00 0 - 0.9 x10(3)/mcL    Baso # 0.02 0 - 0.2 x10(3)/mcL    IG# 0.05 (H) 0 - 0.04 x10(3)/mcL    IG% 1.0 %    NRBC% 0.0 %   POCT ARTERIAL BLOOD GAS    Collection Time: 02/24/23  7:23 AM   Result Value Ref Range    POC PH 7.37 7.35 - 7.45    POC PCO2 45 35 - 45 mmHg    POC PO2 94 80 - 100 mmHg    POC HEMOGLOBIN 11.2 (A) 12.0 - 16.0 g/dL    POC SATURATED O2 97.1 %    POC O2Hb 95.2 94.0 - 97.0 %    POC COHb 2.1 %    POC MetHb 0.70 0.40 - 1.5 %    POC Potassium 4.3 3.5 - 5.0 mmol/l    POC Sodium 135 (A) 137 - 145 mmol/l    POC Ionized Calcium 1.26 (A) 1.12 - 1.23 mmol/l    Correct Temperature (PH) 7.37 7.35 - 7.45    Corrected Temperature (pCO2) 45 35  - 45 mmHg    Corrected Temperature (pO2) 94 80 - 100 mmHg    POC HCO3 26.0 22.0 - 26.0 mmol/l    Base Deficit 0.40 -2.0 - 2.0 mmol/l    POC Temp 37.0 °C    Specimen source Arterial sample    POCT glucose    Collection Time: 02/24/23  7:32 AM   Result Value Ref Range    POCT Glucose 95 70 - 110 mg/dL   Basic Metabolic Panel    Collection Time: 02/24/23  8:58 AM   Result Value Ref Range    Sodium Level 133 (L) 136 - 145 mmol/L    Potassium Level 5.6 (H) 3.5 - 5.1 mmol/L    Chloride 105 98 - 107 mmol/L    Carbon Dioxide 24 23 - 31 mmol/L    Glucose Level 121 (H) 82 - 115 mg/dL    Blood Urea Nitrogen 28.8 (H) 8.4 - 25.7 mg/dL    Creatinine 1.36 (H) 0.73 - 1.18 mg/dL    BUN/Creatinine Ratio 21     Calcium Level Total 9.0 8.8 - 10.0 mg/dL    Anion Gap 4.0 mEq/L    eGFR 52 mls/min/1.73/m2   CBC with Differential    Collection Time: 02/24/23  8:58 AM   Result Value Ref Range    WBC 6.8 4.5 - 11.5 x10(3)/mcL    RBC 3.09 (L) 4.70 - 6.10 x10(6)/mcL    Hgb 9.1 (L) 14.0 - 18.0 g/dL    Hct 28.1 (L) 42.0 - 52.0 %    MCV 90.9 80.0 - 94.0 fL    MCH 29.4 pg    MCHC 32.4 (L) 33.0 - 36.0 g/dL    RDW 15.1 11.5 - 17.0 %    Platelet 66 (L) 130 - 400 x10(3)/mcL    MPV 11.2 (H) 7.4 - 10.4 fL    Neut % 78.7 %    Lymph % 13.6 %    Mono % 4.4 %    Eos % 0.1 %    Basophil % 0.1 %    Lymph # 0.92 0.6 - 4.6 x10(3)/mcL    Neut # 5.30 2.1 - 9.2 x10(3)/mcL    Mono # 0.30 0.1 - 1.3 x10(3)/mcL    Eos # 0.01 0 - 0.9 x10(3)/mcL    Baso # 0.01 0 - 0.2 x10(3)/mcL    IG# 0.21 (H) 0 - 0.04 x10(3)/mcL    IG% 3.1 %    NRBC% 0.0 %   Protime-INR    Collection Time: 02/24/23  8:58 AM   Result Value Ref Range    PT 19.9 (H) 12.5 - 14.5 seconds    INR 1.72 (H) 0.00 - 1.30   APTT    Collection Time: 02/24/23  8:58 AM   Result Value Ref Range    PTT 63.7 (H) 23.2 - 33.7 seconds   Fibrinogen    Collection Time: 02/24/23  9:46 AM   Result Value Ref Range    Fibrinogen 384.0 210.0 - 463.0 mg/dL   POCT glucose    Collection Time: 02/24/23 11:17 AM   Result Value Ref  Range    POCT Glucose 89 70 - 110 mg/dL   POCT glucose    Collection Time: 02/24/23 12:09 PM   Result Value Ref Range    POCT Glucose 141 (H) 70 - 110 mg/dL   POCT ARTERIAL BLOOD GAS    Collection Time: 02/24/23 12:12 PM   Result Value Ref Range    POC PH 7.38     POC PCO2 40 mmHg    POC PO2 61 (A) mmHg    POC SATURATED O2 90 %    POC Potassium 4.6 mmol/l    POC Sodium 134 (A) 137 - 145 mmol/l    POC Ionized Calcium 1.36 (A) 1.12 - 1.23 mmol/l    POC HCO3 23.7 mmol/l    Base Deficit -1.3 mmol/l    POC Temp 37.0 C    Specimen source Arterial sample    POCT glucose    Collection Time: 02/24/23 12:56 PM   Result Value Ref Range    POCT Glucose 153 (H) 70 - 110 mg/dL       Telemetry:  Sinus Rhythm    Physical Exam  Vitals and nursing note reviewed.   Constitutional:       Appearance: Normal appearance.   HENT:      Head: Normocephalic.   Cardiovascular:      Rate and Rhythm: Normal rate and regular rhythm.      Heart sounds: Normal heart sounds.      Comments: Mediastinal drain patent  Pulmonary:      Effort: Pulmonary effort is normal. No respiratory distress.      Breath sounds: Normal breath sounds.      Comments: Intubated on MV  Abdominal:      General: There is no distension.      Palpations: Abdomen is soft.      Tenderness: There is no abdominal tenderness. There is no guarding.   Musculoskeletal:      Cervical back: Neck supple.   Skin:     General: Skin is warm and dry.       Current Inpatient Medications:    Current Facility-Administered Medications:     0.9%  NaCl infusion (for blood administration), , Intravenous, Q24H PRN, SHIRLEY Torres    0.9%  NaCl infusion (for blood administration), , Intravenous, Q24H PRN, Trenton Harvey PA-C    acetaminophen oral solution 650 mg, 650 mg, Per OG tube, Q6H PRN, GILMAR Estrada    acetaminophen tablet 650 mg, 650 mg, Oral, Q6H PRN, Lul Isaac MD, 650 mg at 02/22/23 1700    albumin human 5% bottle 12.5 g, 12.5 g, Intravenous, PRN, Leon Reyes  PA    [START ON 2/25/2023] aspirin EC tablet 81 mg, 81 mg, Oral, Daily, GILMAR Estrada    atorvastatin tablet 40 mg, 40 mg, Oral, Daily, Pablo Lawson, FNP, 40 mg at 02/23/23 0840    calcium gluconate 1 g in NS IVPB (premixed), 1 g, Intravenous, PRN, GILMAR Estrada    calcium gluconate 1 g in NS IVPB (premixed), 2 g, Intravenous, PRN, GILMAR Estrada    calcium gluconate 1 g in NS IVPB (premixed), 3 g, Intravenous, PRN, GILMAR Estrada    cefazolin (ANCEF) 2 gram in dextrose 5% 50 mL IVPB (premix), 2 g, Intravenous, Q8H, GILMAR Estrada    clevidipine (CLEVIPREX) 25 mg/50 mL infusion, 0-16 mg/hr, Intravenous, Continuous, Spencer Hagan MD, Last Rate: 6 mL/hr at 02/24/23 1130, 3 mg/hr at 02/24/23 1130    dexmedetomidine (PRECEDEX) 400mcg/100mL 0.9% NaCL infusion, 0-1.4 mcg/kg/hr, Intravenous, Continuous, GILMAR Estrada, Stopped at 02/24/23 1130    dextrose 10% bolus 125 mL 125 mL, 12.5 g, Intravenous, PRN, GILMAR Estrada    dextrose 10% bolus 250 mL 250 mL, 25 g, Intravenous, PRN, GILMAR Estrada    dextrose 5 % and 0.45 % NaCl infusion, , Intravenous, Continuous, GILMAR Estrada, Last Rate: 100 mL/hr at 02/24/23 1210, New Bag at 02/24/23 1210    [START ON 2/25/2023] docusate sodium capsule 100 mg, 100 mg, Oral, BID, GILMAR Estrada    [START ON 2/25/2023] enoxaparin injection 40 mg, 40 mg, Subcutaneous, Daily, GILMAR Estrada    famotidine (PF) injection 20 mg, 20 mg, Intravenous, Daily, GILMAR Estrada    [START ON 2/25/2023] folic acid tablet 1 mg, 1 mg, Oral, Daily, GILMAR Estrada    hydrALAZINE injection 20 mg, 20 mg, Intravenous, Q4H PRN, Ga Diaz, NP    HYDROcodone-acetaminophen 5-325 mg per tablet 1 tablet, 1 tablet, Oral, Q4H PRN, GILMAR Estrada    HYDROcodone-acetaminophen 7.5-325 mg per tablet 1 tablet, 1 tablet, Oral, Q6H PRN, Ga Johnsono, NP    insulin regular in 0.9 % NaCl 100 unit/100 mL (1 unit/mL)  infusion, 1 Units/hr, Intravenous, Continuous, GILMAR Estrada, Last Rate: 1 mL/hr at 02/24/23 1210, 1 Units/hr at 02/24/23 1210    labetaloL injection 20 mg, 20 mg, Intravenous, Q4H PRN, Ga Q. Emily, NP    magnesium sulfate 2g in water 50mL IVPB (premix), 2 g, Intravenous, PRN, Leon Reyes, PA    magnesium sulfate 2g in water 50mL IVPB (premix), 4 g, Intravenous, PRN, GILMAR Estrada    [START ON 2/25/2023] metoprolol tartrate (LOPRESSOR) split tablet 12.5 mg, 12.5 mg, Oral, BID, GILMAR Estrada    morphine injection 2 mg, 2 mg, Intravenous, Q2H PRN, Ga Q. Emily, NP    morphine injection 4 mg, 4 mg, Intravenous, Q4H PRN, GILMAR Estrada    mupirocin 2 % ointment, , Nasal, BID, GILMAR Estrada    ondansetron injection 4 mg, 4 mg, Intravenous, Q4H PRN, GILMAR Estrada    oxyCODONE immediate release tablet 10 mg, 10 mg, Oral, Q4H PRN, GILMAR Estrada    potassium chloride 20 mEq in 100 mL IVPB (FOR CENTRAL LINE ADMINISTRATION ONLY), 20 mEq, Intravenous, PRN, Leon Reyes, PA    potassium chloride 20 mEq in 100 mL IVPB (FOR CENTRAL LINE ADMINISTRATION ONLY), 40 mEq, Intravenous, PRN, Leon Reyes, PA    potassium chloride 20 mEq in 100 mL IVPB (FOR CENTRAL LINE ADMINISTRATION ONLY), 20 mEq, Intravenous, PRN, Leon Reyes, PA    prazosin capsule 3 mg, 3 mg, Oral, QHS, Lul Isaac MD, 3 mg at 02/23/23 2030    sodium phosphate 15 mmol in dextrose 5 % (D5W) 250 mL IVPB, 15 mmol, Intravenous, PRN, GILMAR Estrada    sodium phosphate 20.01 mmol in dextrose 5 % (D5W) 250 mL IVPB, 20.01 mmol, Intravenous, PRN, GILMAR Estrada    sodium phosphate 30 mmol in dextrose 5 % (D5W) 250 mL IVPB, 30 mmol, Intravenous, PRN, GILMAR Estrada    [START ON 2/25/2023] sucralfate tablet 1 g, 1 g, Oral, QID (AC & HS), GILMAR Estrada    zolpidem tablet 5 mg, 5 mg, Oral, Nightly PRN, Ga Diaz NP    VTE Risk Mitigation (From admission,  onward)           Ordered     enoxaparin injection 40 mg  Daily         02/24/23 1046     Place sequential compression device  Until discontinued         02/24/23 1046     IP VTE HIGH RISK PATIENT  Once         02/24/23 1034     heparin, porcine (PF) (heparin flush 100 units/mL) 100 unit/mL injection flush         02/23/23 2215     Place sequential compression device  Until discontinued         02/23/23 1934     heparin 25,000 units in dextrose 5% 250 mL (100 units/mL) infusion LOW INTENSITY nomogram - [facility]  Continuous        Question Answer Comment   Begin at (in units/kg/hr) 12    Heparin Infusion Adjustment (DO NOT MODIFY ANSWER) [email or web address]\[email or web address]\heparin LOW INTENSITY nomogram for [facility] [id number]        02/19/23 4949                  Assessment:   NSTEMI, Type I  MVCAD  --[facility] 02/15/23: LM 0% stenosis. mLAD proximal 70% ISR. D1 ostial 50% stenosis. D2 ostial 50% stenosis. Lcx: previous stent; proximal Lcx  90% ISR; mid Lcx 30% stenosis. OM1 proximal 80% stenosis. RCA patent stents to ostium. Mid RCA proximal subsection- 60% stenosis; Mid RCA distal subsection 70& stenosis; distal RCA proximal subsection 100% stenosis. Fills left to right  --[facility] 09/22/22: LM 0% stenosis. mLAD proximal subsection 50% ISR,. D1 ostial 50% stenosis  D2 ostial 50% stenosis. pLCx mid subsection 70% ISR reduced to 10%, Preprocedure AMALIA III flow  noted. Post procedure AMALIA III was present. Lesion previous treated on 7/22/21 with PTA/DAYAN. OM1 proximal 80% stenosis. mLCx distal subsection 30% stenosis. RCA proximal RCA stent widely patent. mRCA proximal subsection 60% subsection. mRCA distal subsection 70% stenosis. dRCA proximal subsection 100% stenosis. Fills left to right  Hypertension (Controlled)  ICMO  --EF 45-50% TTE 2/15/23 improved from 40% 7/21  VHD  --Mild AS, Mild to moderate AR. Mild to moderate MAC is noted. Mild MR. Trace TR.  B RICKEY    - Moderate Bilateral by US  HLD  Lupus anticoagulant  inhibitor syndrome  CARLYLE  VHD  --mild AS  Renal Insufficiency  AAA  Anemia     Plan:   Continue asa and statin,  Continue to hold lisinopril and BB  Wean pressors for MAP > 65  Monitor H/H  Continue PPI  Ventilator management per intensivist    I have seen the patient, reviewed the Nurse Practitioner's consult note, assessment and plan. I have personally interviewed and examined the patient at bedside and agree with the findings.

## 2023-02-24 NOTE — ANESTHESIA PROCEDURE NOTES
Intubation    Date/Time: 2/24/2023 7:47 AM  Performed by: Becki Ku CRNA  Authorized by: Mabel Vargas MD     Intubation:     Induction:  Intravenous    Intubated:  Postinduction    Mask Ventilation:  Easy with oral airway    Attempts:  1    Attempted By:  Student    Method of Intubation:  Direct    Blade:  Gatica 2    Laryngeal View Grade: Grade I - full view of cords      Difficult Airway Encountered?: No      Complications:  None    Airway Device:  Oral endotracheal tube    Airway Device Size:  8.0    Style/Cuff Inflation:  Cuffed (inflated to minimal occlusive pressure)    Tube secured:  23    Secured at:  The lips    Placement Verified By:  Capnometry    Complicating Factors:  None    Findings Post-Intubation:  BS equal bilateral and atraumatic/condition of teeth unchanged  Notes:      Intubated per MARIAH Mathis.

## 2023-02-24 NOTE — PLAN OF CARE
Problem: Adult Inpatient Plan of Care  Goal: Plan of Care Review  Outcome: Ongoing, Progressing  Goal: Patient-Specific Goal (Individualized)  Outcome: Ongoing, Progressing  Goal: Absence of Hospital-Acquired Illness or Injury  Outcome: Ongoing, Progressing  Goal: Optimal Comfort and Wellbeing  Outcome: Ongoing, Progressing  Goal: Readiness for Transition of Care  Outcome: Ongoing, Progressing     Problem: Impaired Wound Healing  Goal: Optimal Wound Healing  Outcome: Ongoing, Progressing     Problem: Fall Injury Risk  Goal: Absence of Fall and Fall-Related Injury  Outcome: Ongoing, Progressing     Problem: Infection  Goal: Absence of Infection Signs and Symptoms  Outcome: Ongoing, Progressing     Problem: Skin Injury Risk Increased  Goal: Skin Health and Integrity  Outcome: Ongoing, Progressing     Problem: Communication Impairment (Mechanical Ventilation, Invasive)  Goal: Effective Communication  Outcome: Ongoing, Progressing     Problem: Device-Related Complication Risk (Mechanical Ventilation, Invasive)  Goal: Optimal Device Function  Outcome: Ongoing, Progressing     Problem: Inability to Wean (Mechanical Ventilation, Invasive)  Goal: Mechanical Ventilation Liberation  Outcome: Ongoing, Progressing     Problem: Nutrition Impairment (Mechanical Ventilation, Invasive)  Goal: Optimal Nutrition Delivery  Outcome: Ongoing, Progressing     Problem: Skin and Tissue Injury (Mechanical Ventilation, Invasive)  Goal: Absence of Device-Related Skin and Tissue Injury  Outcome: Ongoing, Progressing     Problem: Ventilator-Induced Lung Injury (Mechanical Ventilation, Invasive)  Goal: Absence of Ventilator-Induced Lung Injury  Outcome: Ongoing, Progressing     Problem: Communication Impairment (Artificial Airway)  Goal: Effective Communication  Outcome: Ongoing, Progressing     Problem: Device-Related Complication Risk (Artificial Airway)  Goal: Optimal Device Function  Outcome: Ongoing, Progressing     Problem: Skin and  Tissue Injury (Artificial Airway)  Goal: Absence of Device-Related Skin or Tissue Injury  Outcome: Ongoing, Progressing     Problem: Noninvasive Ventilation Acute  Goal: Effective Unassisted Ventilation and Oxygenation  Outcome: Ongoing, Progressing

## 2023-02-24 NOTE — PT/OT/SLP PROGRESS
Physical Therapy      Patient Name:  Hunter Navarrete   MRN:  [id number]    Patient not seen today for PT. Pt currently off floor for CABG. Pt to f/u post-operatively as appropriate.

## 2023-02-24 NOTE — PROCEDURES
[facility]   Endotracheal Intubation  Procedure Note    SUMMARY     Date of Procedure: 2/24/2023    Procedure: Endotracheal Intubation    Perfomed by: Rito Keller MD        Indication:  Inability to protect airway .    Pre-Procedure Diagnosis:  Stroke    Post-Procedure Diagnosis:  Stroke        Description of the Findings of the Procedure:     Sedation: etomidate.  Paralytic: rocuronium.  Lidocaine: no.  Atropine: no.  Equipment: glidescope  Cricoid Pressure:  none  Number of attempts: 1.  ETT location confirmed by by auscultation.        Complications: None; patient tolerated the procedure well.       Condition: critical        Tube secured at 23 cm from mouth, 8.0 ETT tube

## 2023-02-24 NOTE — ANESTHESIA PROCEDURE NOTES
Arterial    Diagnosis: CABG    Patient location during procedure: holding area  Procedure start time: 2/24/2023 7:20 AM  Timeout: 2/24/2023 7:19 AM  Procedure end time: 2/24/2023 7:25 AM    Staffing  Authorizing Provider: Mabel Vargas MD  Performing Provider: Becki Ku CRNA    Anesthesiologist was present at the time of the procedure.    Preanesthetic Checklist  Completed: patient identified, IV checked, site marked, risks and benefits discussed, surgical consent, monitors and equipment checked, pre-op evaluation, timeout performed and anesthesia consent givenArterial  Skin Prep: chlorhexidine gluconate  Local Infiltration: none  Orientation: right  Location: radial    Catheter Size: 20 G  Catheter placement by Anatomical landmarks and Ultrasound guidance. Heme positive aspiration all ports.   Vessel Caliber: large, medium, patent, compressibility normal  Needle advanced into vessel with real time Ultrasound guidance.  Guidewire confirmed in vessel.  Sterile sheath used.  Image recorded and saved.Insertion Attempts: 1  Assessment  Dressing: secured with tape and tegaderm  Patient: Tolerated well  Additional Notes  Arterial line performed by OMID Mathis

## 2023-02-24 NOTE — TRANSFER OF CARE
"Anesthesia Transfer of Care Note    Patient: Hunter Navarrete    Procedure(s) Performed: Procedure(s) (LRB):  CORONARY ARTERY BYPASS GRAFT (CABG) (N/A)    Patient location: ICU    Anesthesia Type: general    Transport from OR: Continuos invasive BP monitoring in transport. Continuous SpO2 monitoring in transport. Continuous ECG monitoring in transport. Transported from OR intubated on 100% O2  with adequate ventilation controlled by transport ventilator. Upon arrival to PACU/ICU, patient attached to ventilator and auscultated to confirm bilateral breath sounds and adequate TV    Post pain: adequate analgesia    Post assessment: no apparent anesthetic complications and tolerated procedure well    Post vital signs: stable    Level of consciousness: sedated    Nausea/Vomiting: no nausea/vomiting    Complications: none    Transfer of care protocol was followed      Last vitals:   Visit Vitals  BP (!) 129/59 (BP Location: Left arm, Patient Position: Lying)   Pulse 65   Temp 37.2 °C (99 °F) (Oral)   Resp 20   Ht 5' 10" (1.778 m)   Wt 87.3 kg (192 lb 7.4 oz)   SpO2 (!) 94%   BMI 27.62 kg/m²     "

## 2023-02-24 NOTE — PT/OT/SLP PROGRESS
Occupational Therapy      Patient Name:  Hunter Navarrete   MRN:  [id number]    Pt off floor for CABG. Will f/u when appropriate.    2/24/2023

## 2023-02-24 NOTE — NURSING
Nurses Note -- 4 Eyes      2/24/2023   7:35 AM      Skin assessed during: Admit      [x] No Pressure Injuries Present    []Prevention Measures Documented      [] Yes- Altered Skin Integrity Present or Discovered   [] LDA Added if Not in Epic (Describe Wound)   [] New Altered Skin Integrity was Present on Admit and Documented in LDA   [] Wound Image Taken    Wound Care Consulted? No    Attending Nurse:  Gary Mccormick RN     Second RN/Staff Member:  Debo LANGFORD

## 2023-02-24 NOTE — OP NOTE
[facility]                       [address] [place] [address]    PATIENT NAME:      EDDIE GAMBOA  YOB: 1940  CSN:               [id number]  MRN:               [id number]  ADMIT DATE:        02/19/2023 17:43:00  PHYSICIAN:         Spencer Hagan MD                          OPERATIVE REPORT      DATE OF SURGERY:    02/24/2023 00:00:00    SURGEON:  Spencer Hagan MD    PREOPERATIVE DIAGNOSIS:  Severe coronary artery disease.    PROCEDURE:    1. Coronary artery bypass grafting x2, left internal mammary artery to the left   anterior descending, saphenous venous graft to obtuse marginal.  2. Endoscopic venous harvesting, right greater saphenous vein.    POSTOPERATIVE DIAGNOSIS:  Severe coronary artery disease.    ASSISTANT:  Leon Reyes.    BLOOD LOSS:  Per perfusion.    ANESTHESIA:  General.    TECHNIQUE:  Under informed consent, the patient was taken to the OR, supine   position.  General anesthesia was induced and therefore maintained for the   remainder of the procedure.  All pressure points padded equally.  Skin of the   chest, abdomen, legs were prepped and draped in the usual sterile fashion.  IV   antibiotics were administered.  Endoscopic venous harvesting was performed of   right lower extremity.  The left lower extremity did not have a good vein in it.    The vein from the right lower extremity was marginal, but usable.  Midline   incision was made followed by taking down subcutaneous tissue and fascia,   exposing the sternum that was penetrated in the midline. Mammary was harvested   on a pedicle.  Patient was heparinized.  Midline retractor was placed.    Pericardium was opened.  The pericardium was very adhered to the heart and some   dissection was performed to be able to retract the pericardium to the sternum.    The heart still had a lot of adhesions at that time.  Ascending aorta    cannulation was performed.  The mammary was cut.  Distal pedicle was ligated and   clipped.  Proximal pedicle was controlled with bulldog.  It was shaped for   later anastomosis.  Mammary had good flow in it.  Dual-stage venous cannula was   placed in the right atrium. When the ACT was therapeutic, the patient went on   full cardiopulmonary bypass with good emptying.  Cross-clamp was placed followed   by antegrade dose of cardioplegia with 1 L of cold blood with good   isoelectricity throughout the whole case.  The patient's temperature was allowed   to drift to 34 degrees Celsius.  The remaining adhesions of the pericardium to   the heart were taken down, both sharply and bluntly.  Examination of the   inferior aspect of the heart revealed a lot of scarring.  There was no target in   that area that was suitable for bypass.  The OM vessel was opened, 1.5 to 1.75   mm in diameter.  It was anastomosed to reverse segment of saphenous vein with   good flow down the graft.  The vein was cut to the appropriate length.  Next,   the patient was being rewarmed.  Cross-clamp was removed.  Partial occlusion   clamp was placed.  Arteriotomies were performed.  Proximal anastomosis was made.    Partial occlusion clamp was released.  Left chest evacuated of fluid. Proximal   and distal anastomoses were checked for hemostasis.  When the patient was   warmed, he came off pump with no problem.  Heparin was reversed with protamine.    Mediastinum and left chest were drained.  Postop VICKY revealed better   contraction of the anterolateral wall.  The inferior wall was scarred.  The   patient was decannulated, sternum was wired, and the wounds were closed in   layers of absorbable sutures.  The patient tolerated the procedure well.  He was   transferred to the ICU in acceptable condition.        ______________________________  MD EDIN Reyes/ALONDRA  DD:  02/24/2023  Time:  11:23AM  DT:  02/24/2023  Time:  11:45AM  Job #:   [id number]      OPERATIVE REPORT

## 2023-02-25 LAB
ANION GAP SERPL CALC-SCNC: 7 MEQ/L
BASOPHILS # BLD AUTO: 0.01 X10(3)/MCL (ref 0–0.2)
BASOPHILS NFR BLD AUTO: 0.1 %
BUN SERPL-MCNC: 27.3 MG/DL (ref 8.4–25.7)
CALCIUM SERPL-MCNC: 9.3 MG/DL (ref 8.8–10)
CHLORIDE SERPL-SCNC: 108 MMOL/L (ref 98–107)
CO2 SERPL-SCNC: 22 MMOL/L (ref 23–31)
CREAT SERPL-MCNC: 1.55 MG/DL (ref 0.73–1.18)
CREAT/UREA NIT SERPL: 18
EOSINOPHIL # BLD AUTO: 0 X10(3)/MCL (ref 0–0.9)
EOSINOPHIL NFR BLD AUTO: 0 %
ERYTHROCYTE [DISTWIDTH] IN BLOOD BY AUTOMATED COUNT: 15.3 % (ref 11.5–17)
GFR SERPLBLD CREATININE-BSD FMLA CKD-EPI: 45 MLS/MIN/1.73/M2
GLUCOSE SERPL-MCNC: 106 MG/DL (ref 82–115)
HCT VFR BLD AUTO: 35.1 % (ref 42–52)
HGB BLD-MCNC: 11.3 G/DL (ref 14–18)
IMM GRANULOCYTES # BLD AUTO: 0.15 X10(3)/MCL (ref 0–0.04)
IMM GRANULOCYTES NFR BLD AUTO: 1.4 %
LYMPHOCYTES # BLD AUTO: 0.7 X10(3)/MCL (ref 0.6–4.6)
LYMPHOCYTES NFR BLD AUTO: 6.4 %
MCH RBC QN AUTO: 29.1 PG
MCHC RBC AUTO-ENTMCNC: 32.2 G/DL (ref 33–36)
MCV RBC AUTO: 90.5 FL (ref 80–94)
MONOCYTES # BLD AUTO: 1.7 X10(3)/MCL (ref 0.1–1.3)
MONOCYTES NFR BLD AUTO: 15.5 %
NEUTROPHILS # BLD AUTO: 8.41 X10(3)/MCL (ref 2.1–9.2)
NEUTROPHILS NFR BLD AUTO: 76.6 %
NRBC BLD AUTO-RTO: 0 %
PLATELET # BLD AUTO: 111 X10(3)/MCL (ref 130–400)
PMV BLD AUTO: 11.3 FL (ref 7.4–10.4)
POCT GLUCOSE: 101 MG/DL (ref 70–110)
POCT GLUCOSE: 105 MG/DL (ref 70–110)
POCT GLUCOSE: 107 MG/DL (ref 70–110)
POCT GLUCOSE: 110 MG/DL (ref 70–110)
POCT GLUCOSE: 120 MG/DL (ref 70–110)
POCT GLUCOSE: 123 MG/DL (ref 70–110)
POCT GLUCOSE: 125 MG/DL (ref 70–110)
POCT GLUCOSE: 94 MG/DL (ref 70–110)
POTASSIUM SERPL-SCNC: 4.2 MMOL/L (ref 3.5–5.1)
RBC # BLD AUTO: 3.88 X10(6)/MCL (ref 4.7–6.1)
SODIUM SERPL-SCNC: 137 MMOL/L (ref 136–145)
WBC # SPEC AUTO: 11 X10(3)/MCL (ref 4.5–11.5)

## 2023-02-25 PROCEDURE — 63600175 PHARM REV CODE 636 W HCPCS: Performed by: NURSE PRACTITIONER

## 2023-02-25 PROCEDURE — 25000003 PHARM REV CODE 250: Performed by: INTERNAL MEDICINE

## 2023-02-25 PROCEDURE — 25000003 PHARM REV CODE 250: Performed by: NURSE PRACTITIONER

## 2023-02-25 PROCEDURE — 80048 BASIC METABOLIC PNL TOTAL CA: CPT | Performed by: INTERNAL MEDICINE

## 2023-02-25 PROCEDURE — 25000003 PHARM REV CODE 250: Performed by: PHYSICIAN ASSISTANT

## 2023-02-25 PROCEDURE — 99024 POSTOP FOLLOW-UP VISIT: CPT | Mod: ,,, | Performed by: PHYSICIAN ASSISTANT

## 2023-02-25 PROCEDURE — 85025 COMPLETE CBC W/AUTO DIFF WBC: CPT | Performed by: PHYSICIAN ASSISTANT

## 2023-02-25 PROCEDURE — 63600175 PHARM REV CODE 636 W HCPCS: Performed by: PHYSICIAN ASSISTANT

## 2023-02-25 PROCEDURE — 97162 PT EVAL MOD COMPLEX 30 MIN: CPT

## 2023-02-25 PROCEDURE — 21400001 HC TELEMETRY ROOM

## 2023-02-25 PROCEDURE — 97166 OT EVAL MOD COMPLEX 45 MIN: CPT

## 2023-02-25 PROCEDURE — 63600175 PHARM REV CODE 636 W HCPCS: Performed by: INTERNAL MEDICINE

## 2023-02-25 PROCEDURE — 27000221 HC OXYGEN, UP TO 24 HOURS

## 2023-02-25 PROCEDURE — 63600175 PHARM REV CODE 636 W HCPCS: Mod: JG | Performed by: THORACIC SURGERY (CARDIOTHORACIC VASCULAR SURGERY)

## 2023-02-25 PROCEDURE — 99024 PR POST-OP FOLLOW-UP VISIT: ICD-10-PCS | Mod: ,,, | Performed by: PHYSICIAN ASSISTANT

## 2023-02-25 PROCEDURE — 63600175 PHARM REV CODE 636 W HCPCS: Performed by: STUDENT IN AN ORGANIZED HEALTH CARE EDUCATION/TRAINING PROGRAM

## 2023-02-25 PROCEDURE — C9248 INJ, CLEVIDIPINE BUTYRATE: HCPCS | Mod: JG | Performed by: THORACIC SURGERY (CARDIOTHORACIC VASCULAR SURGERY)

## 2023-02-25 RX ORDER — ACETAMINOPHEN 10 MG/ML
1000 INJECTION, SOLUTION INTRAVENOUS ONCE
Status: COMPLETED | OUTPATIENT
Start: 2023-02-25 | End: 2023-02-25

## 2023-02-25 RX ORDER — GABAPENTIN 100 MG/1
100 CAPSULE ORAL 3 TIMES DAILY
Status: DISCONTINUED | OUTPATIENT
Start: 2023-02-25 | End: 2023-03-03 | Stop reason: HOSPADM

## 2023-02-25 RX ORDER — FUROSEMIDE 40 MG/1
40 TABLET ORAL DAILY
Status: DISCONTINUED | OUTPATIENT
Start: 2023-02-25 | End: 2023-02-26

## 2023-02-25 RX ORDER — FLUOXETINE 10 MG/1
10 CAPSULE ORAL DAILY
Status: DISCONTINUED | OUTPATIENT
Start: 2023-02-25 | End: 2023-03-03 | Stop reason: HOSPADM

## 2023-02-25 RX ORDER — LISINOPRIL 5 MG/1
5 TABLET ORAL DAILY
Status: DISCONTINUED | OUTPATIENT
Start: 2023-02-25 | End: 2023-02-27

## 2023-02-25 RX ORDER — HYDROXYZINE HYDROCHLORIDE 25 MG/1
25 TABLET, FILM COATED ORAL EVERY 6 HOURS PRN
Status: DISCONTINUED | OUTPATIENT
Start: 2023-02-25 | End: 2023-03-03 | Stop reason: HOSPADM

## 2023-02-25 RX ORDER — PANTOPRAZOLE SODIUM 40 MG/1
40 TABLET, DELAYED RELEASE ORAL DAILY
Status: DISCONTINUED | OUTPATIENT
Start: 2023-02-25 | End: 2023-03-03 | Stop reason: HOSPADM

## 2023-02-25 RX ORDER — MECLIZINE HYDROCHLORIDE 25 MG/1
25 TABLET ORAL 3 TIMES DAILY PRN
Status: DISCONTINUED | OUTPATIENT
Start: 2023-02-25 | End: 2023-03-03 | Stop reason: HOSPADM

## 2023-02-25 RX ORDER — PROMETHAZINE HYDROCHLORIDE 25 MG/ML
12.5 INJECTION, SOLUTION INTRAMUSCULAR; INTRAVENOUS ONCE
Status: COMPLETED | OUTPATIENT
Start: 2023-02-25 | End: 2023-02-25

## 2023-02-25 RX ORDER — HYDRALAZINE HYDROCHLORIDE 25 MG/1
25 TABLET, FILM COATED ORAL EVERY 8 HOURS
Status: DISCONTINUED | OUTPATIENT
Start: 2023-02-25 | End: 2023-02-26

## 2023-02-25 RX ORDER — ROPINIROLE 0.25 MG/1
1 TABLET, FILM COATED ORAL 2 TIMES DAILY
Status: DISCONTINUED | OUTPATIENT
Start: 2023-02-25 | End: 2023-03-03 | Stop reason: HOSPADM

## 2023-02-25 RX ORDER — EZETIMIBE 10 MG/1
10 TABLET ORAL DAILY
Status: DISCONTINUED | OUTPATIENT
Start: 2023-02-25 | End: 2023-03-03 | Stop reason: HOSPADM

## 2023-02-25 RX ORDER — FERROUS SULFATE, DRIED 160(50) MG
1 TABLET, EXTENDED RELEASE ORAL DAILY
Status: DISCONTINUED | OUTPATIENT
Start: 2023-02-25 | End: 2023-02-25

## 2023-02-25 RX ORDER — ACETAMINOPHEN 10 MG/ML
1000 INJECTION, SOLUTION INTRAVENOUS ONCE
Status: DISCONTINUED | OUTPATIENT
Start: 2023-02-25 | End: 2023-02-25

## 2023-02-25 RX ORDER — TAMSULOSIN HYDROCHLORIDE 0.4 MG/1
0.4 CAPSULE ORAL DAILY
Status: DISCONTINUED | OUTPATIENT
Start: 2023-02-25 | End: 2023-02-25

## 2023-02-25 RX ORDER — PREDNISONE 2.5 MG/1
2.5 TABLET ORAL DAILY
Status: DISCONTINUED | OUTPATIENT
Start: 2023-02-25 | End: 2023-02-28

## 2023-02-25 RX ORDER — TIZANIDINE 4 MG/1
4 TABLET ORAL EVERY 8 HOURS
Status: DISCONTINUED | OUTPATIENT
Start: 2023-02-25 | End: 2023-02-25

## 2023-02-25 RX ORDER — ATORVASTATIN CALCIUM 40 MG/1
40 TABLET, FILM COATED ORAL DAILY
Status: DISCONTINUED | OUTPATIENT
Start: 2023-02-25 | End: 2023-03-03 | Stop reason: HOSPADM

## 2023-02-25 RX ORDER — QUETIAPINE FUMARATE 25 MG/1
50 TABLET, FILM COATED ORAL NIGHTLY
Status: DISCONTINUED | OUTPATIENT
Start: 2023-02-25 | End: 2023-03-03 | Stop reason: HOSPADM

## 2023-02-25 RX ORDER — FOLIC ACID 1 MG/1
1 TABLET ORAL DAILY
Status: DISCONTINUED | OUTPATIENT
Start: 2023-02-25 | End: 2023-03-03 | Stop reason: HOSPADM

## 2023-02-25 RX ORDER — TIZANIDINE 4 MG/1
4 TABLET ORAL EVERY 8 HOURS PRN
Status: DISCONTINUED | OUTPATIENT
Start: 2023-02-25 | End: 2023-03-03 | Stop reason: HOSPADM

## 2023-02-25 RX ORDER — CALCIUM CARBONATE 200(500)MG
500 TABLET,CHEWABLE ORAL DAILY
Status: DISCONTINUED | OUTPATIENT
Start: 2023-02-25 | End: 2023-03-03 | Stop reason: HOSPADM

## 2023-02-25 RX ADMIN — GABAPENTIN 100 MG: 100 CAPSULE ORAL at 03:02

## 2023-02-25 RX ADMIN — ONDANSETRON 4 MG: 2 INJECTION INTRAMUSCULAR; INTRAVENOUS at 01:02

## 2023-02-25 RX ADMIN — GABAPENTIN 100 MG: 100 CAPSULE ORAL at 09:02

## 2023-02-25 RX ADMIN — ROPINIROLE HYDROCHLORIDE 1 MG: 0.25 TABLET, FILM COATED ORAL at 09:02

## 2023-02-25 RX ADMIN — ACETAMINOPHEN 1000 MG: 10 INJECTION, SOLUTION INTRAVENOUS at 06:02

## 2023-02-25 RX ADMIN — FOLIC ACID 1 MG: 1 TABLET ORAL at 09:02

## 2023-02-25 RX ADMIN — FLUOXETINE 10 MG: 10 CAPSULE ORAL at 11:02

## 2023-02-25 RX ADMIN — METOPROLOL TARTRATE 12.5 MG: 25 TABLET, FILM COATED ORAL at 09:02

## 2023-02-25 RX ADMIN — OXYCODONE HYDROCHLORIDE 10 MG: 5 TABLET ORAL at 11:02

## 2023-02-25 RX ADMIN — PANTOPRAZOLE SODIUM 40 MG: 40 TABLET, DELAYED RELEASE ORAL at 09:02

## 2023-02-25 RX ADMIN — CALCIUM CARBONATE (ANTACID) CHEW TAB 500 MG 500 MG: 500 CHEW TAB at 09:02

## 2023-02-25 RX ADMIN — ACETAMINOPHEN 1000 MG: 10 INJECTION, SOLUTION INTRAVENOUS at 05:02

## 2023-02-25 RX ADMIN — SUCRALFATE 1 G: 1 TABLET ORAL at 06:02

## 2023-02-25 RX ADMIN — SUCRALFATE 1 G: 1 TABLET ORAL at 11:02

## 2023-02-25 RX ADMIN — SUCRALFATE 1 G: 1 TABLET ORAL at 09:02

## 2023-02-25 RX ADMIN — MUPIROCIN: 20 OINTMENT TOPICAL at 09:02

## 2023-02-25 RX ADMIN — ACETAMINOPHEN 1000 MG: 10 INJECTION, SOLUTION INTRAVENOUS at 11:02

## 2023-02-25 RX ADMIN — MUPIROCIN: 20 OINTMENT TOPICAL at 10:02

## 2023-02-25 RX ADMIN — PRAZOSIN HYDROCHLORIDE 3 MG: 1 CAPSULE ORAL at 09:02

## 2023-02-25 RX ADMIN — CEFAZOLIN SODIUM 2 G: 2 SOLUTION INTRAVENOUS at 09:02

## 2023-02-25 RX ADMIN — ATORVASTATIN CALCIUM 40 MG: 40 TABLET, FILM COATED ORAL at 09:02

## 2023-02-25 RX ADMIN — OXYCODONE HYDROCHLORIDE 10 MG: 5 TABLET ORAL at 05:02

## 2023-02-25 RX ADMIN — CLEVIPIDINE 2 MG/HR: 0.5 EMULSION INTRAVENOUS at 03:02

## 2023-02-25 RX ADMIN — FUROSEMIDE 40 MG: 40 TABLET ORAL at 11:02

## 2023-02-25 RX ADMIN — DOCUSATE SODIUM 100 MG: 100 CAPSULE, LIQUID FILLED ORAL at 09:02

## 2023-02-25 RX ADMIN — CEFAZOLIN SODIUM 2 G: 2 SOLUTION INTRAVENOUS at 12:02

## 2023-02-25 RX ADMIN — MORPHINE SULFATE 2 MG: 4 INJECTION INTRAVENOUS at 03:02

## 2023-02-25 RX ADMIN — PROMETHAZINE HYDROCHLORIDE 12.5 MG: 25 INJECTION INTRAMUSCULAR; INTRAVENOUS at 03:02

## 2023-02-25 RX ADMIN — QUETIAPINE FUMARATE 50 MG: 25 TABLET ORAL at 09:02

## 2023-02-25 RX ADMIN — Medication 81 MG: at 09:02

## 2023-02-25 RX ADMIN — ONDANSETRON 4 MG: 2 INJECTION INTRAMUSCULAR; INTRAVENOUS at 05:02

## 2023-02-25 NOTE — PLAN OF CARE
Problem: Occupational Therapy  Goal: Occupational Therapy Goal  Description: Pt will ambulate to bathroom with RW SBA  Pt will complete toilet t/f SBA  Pt will complete toileting tasks min A  Pt will complete LE dressing with AE PRN SBA  Pt will complete UE dressing with SBA  Pt will complete grooming in standing at sink SBA  Outcome: Ongoing, Progressing

## 2023-02-25 NOTE — PROGRESS NOTES
CT SURGERY PROGRESS NOTE  Hunter Navarrete  82 y.o.  1940    Patients Procedure: Procedure(s) (LRB):  CORONARY ARTERY BYPASS GRAFT (CABG) (N/A)    Subjective  Interval History: POD 1    ROS    Medication List  Infusions   clevidipine 2 mg/hr (02/25/23 0330)    dextrose 5 % and 0.45 % NaCl 100 mL/hr at 02/24/23 2303    insulin regular 1 units/mL infusion orderable (CTS POST-OP) 1.5 Units/hr (02/24/23 1600)     Scheduled   aspirin  81 mg Oral Daily    atorvastatin  40 mg Oral Daily    ceFAZolin (ANCEF) IVPB  2 g Intravenous Q8H    docusate sodium  100 mg Oral BID    enoxaparin  40 mg Subcutaneous Daily    famotidine (PF)  20 mg Intravenous Daily    folic acid  1 mg Oral Daily    metoprolol tartrate  12.5 mg Oral BID    mupirocin   Nasal BID    prazosin  3 mg Oral QHS    sucralfate  1 g Oral QID (AC & HS)       Objective:  Recent Vitals:  Temp:  [95.7 °F (35.4 °C)-97.5 °F (36.4 °C)] 97.5 °F (36.4 °C)  Pulse:  [64-97] 94  Resp:  [14-33] 20  SpO2:  [89 %-98 %] 94 %  BP: ()/(42-78) 134/60  Arterial Line BP: (103-148)/() 107/98    Physical Exam     I/O last 24 hrs:  Intake/Output - Last 3 Shifts         02/23 0700  02/24 0659 02/24 0700 02/25 0659 02/25 0700 02/26 0659    P.O. 1020      I.V. (mL/kg)  2565.9 (28.4)     Blood  636     IV Piggyback  1245     Total Intake(mL/kg) 1020 (11.7) 4446.9 (49.2)     Urine (mL/kg/hr) 300 (0.1) 1695 (0.8) 35 (0.6)    Emesis/NG output  0     Stool 0  0    Blood  1908     Chest Tube  114 10    Total Output 300 3717 45    Net +720 +729.9 -45           Urine Occurrence 3 x      Stool Occurrence 0 x  0 x    Emesis Occurrence  1 x             Labs  ABGs:   Recent Labs   Lab 02/24/23  1551   PH 7.35   PCO2 43   PO2 71*   HCO3 23.7   POCSATURATED 93     BMP:   Recent Labs   Lab 02/25/23  0207      K 4.2   CO2 22*   BUN 27.3*   CREATININE 1.55*   CALCIUM 9.3     CBC:   Recent Labs   Lab 02/25/23  0207   WBC 11.0   RBC 3.88*   HGB 11.3*   HCT 35.1*   *   MCV 90.5    MCH 29.1   MCHC 32.2*     CMP:   Recent Labs   Lab 02/25/23  0207   CALCIUM 9.3      K 4.2   CO2 22*   BUN 27.3*   CREATININE 1.55*     Coagulation:   Recent Labs   Lab 02/24/23  0858   INR 1.72*         Imaging:   CXR: X-Ray Chest AP Portable    Result Date: 2/24/2023  Postoperative changes of the chest with lines and tubes as described. Electronically signed by: Indiana Epstein Date:    02/24/2023 Time:    12:50         ASSESSMENT/PLAN:    Pain mgt   Cont chest tubes  Pulm toilet     Case and plan of care discussed with MD Trenton Harvey PA-C

## 2023-02-25 NOTE — PROGRESS NOTES
[facility] - 3rd Floor Medical Telemetry  Cardiology  Progress Note    Patient Name: Hunter Navarrete  MRN: [id number]  Admission Date: 2/19/2023  Hospital Length of Stay: 6 days  Code Status: Full Code   Attending Physician: Rito Keller MD   Primary Care Physician: Mariella Bethea MD  Expected Discharge Date:   Principal Problem:<principal problem not specified>    Subjective:   Chief Complaint:  Chest Pain (Known MV CAD)     HPI:   Mr. Navarrete is an 81 y/o male, followed by Dr. Loredo who presented to [facility] with c/o chest pain. HS troponin 1149; therefore he was transferred to [facility] for cardiology services where he underwent LHC revealing multivessel disease and elevated LVEDP. He was started on diuretics, Plavix was placed on hold and he was transferred to [facility] for CABG evaluation. Patient is reporting mild chest tightness 3/10 currently    Hospital Course:   2.21.23:  Vitals Stable. Shoulder Pain this morning. On Nitro & Heparin Infusion. EKG with no overt ischemic changes.   2.22.23: Patient resting in bed. NAD. Denies CP/SOB. Creatinine 1.93 today- mildly decreased from 2.07 yesterday with addition of IVFs. H/H downtrending- 8.2/25.3 from 8.8/27.1 yesterday.   2.23.23: Patient sitting up in bedside chair. NAD. VSS. Creatinine improved to 1.65 today. H/H    10.5/31.5 post transfusion 2  units PRBCs.  2.24.23: Patient underwent 2V CABG today and is currently in ICU on MV, requiring pressor support. Mediastinal drain patent. He received 2 units of PRBCs intraop  2.25.23: Awake in bed. C/o incisional sternal pain. CT x 2 patent. Pressors have been weaned off. Currently on cleviprex drip    PMH: ICMO, Native CAD, HLD, HTN, CARLYLE, CKD, lupus anticoagulant inhibitor syndrome, VHD- mild AS, AAA, pseudoaneurysm RFA  PSH: right total hip replacement, LHC, back surgery, spinal cord stimulator implant, knee surgery, cataract surgery  Family History: Brother- cancer, heart disease,  lung cancer; father heart disease  Social History: Former smoker, occasional ETOH; denies illicit drug use     Previous Cardiac Diagnostics:   CUS 02/20/23:  The right internal carotid artery demonstrated 50-69% stenosis.  The left internal carotid artery demonstrated 50-69% stenosis.  Bilateral vertebral arteries were patent with antegrade flow.     [facility] 02/15/2023:  LM 0% stenosis  mLAD proximal 70% ISR  D1 ostial 50% stenosis  D2 ostial 50% stenosis  Lcx: previous stent; proximal Lcx  90% ISR; mid Lcx 30% stenosis  OM1 proximal 80% stenosis  RCA patent stents to ostium. Mid RCA proximal subsection- 60% stenosis; Mid RCA distal subsection 70& stenosis; distal RCA proximal subsection 100% stenosis. Fills left to right     TTE 02/16/2023:  Global LV SF is borderline normal. LVEF 45-50%. LA is mildly enlarged. Moderate calcification of the aortic valve is noted. Mild AS is present. Mild to moderate AR. Mild to moderate MAC is noted. Mild MR. Trace TR. PASP 40 mmHg. Optison used to enhance endocardial border.      [facility] 09/22/2022:  LM 0% stenosis  mLAD proximal subsection 50% ISR  D1 ostial 50% stenosis  D2 ostial 50% stenosis  pLCx mid subsection 70% ISR reduced to 10%, Preprocedure AMALIA III flow  noted. Post procedure AMALIA III was present. Lesion previous treated on 7/22/21 with PTA/DAYAN  OM1 proximal 80% stenosis  mLCx distal subsection 30% stenosis  RCA proximal RCA stent widely patent  mRCA proximal subsection 60% subsection  mRCA distal subsection 70% stenosis  dRCA proximal subsection 100% stenosis. Fills left to right     CUS 05/13/2022:  60-79% stenosis in pRICA and mLICA  Antegrade flow to bilateral vertebral arteries    Review of Systems   Cardiovascular:  Positive for chest pain. Negative for dyspnea on exertion and irregular heartbeat.   All other systems reviewed and are negative.    Objective:     Vital Signs (Most Recent):  Temp: 98.2 °F (36.8 °C) (02/25/23 0800)  Pulse: 96 (02/25/23 0845)  Resp: 16  (02/25/23 1124)  BP: 120/62 (02/25/23 1120)  SpO2: 95 % (02/25/23 0845)   Vital Signs (24h Range):  Temp:  [95.7 °F (35.4 °C)-98.2 °F (36.8 °C)] 98.2 °F (36.8 °C)  Pulse:  [64-97] 96  Resp:  [14-33] 16  SpO2:  [89 %-98 %] 95 %  BP: ()/(42-78) 120/62  Arterial Line BP: (103-148)/() 107/98     Weight: 90.3 kg (199 lb 1.2 oz)  Body mass index is 28.56 kg/m².    SpO2: 95 %         Intake/Output Summary (Last 24 hours) at 2/25/2023 1128  Last data filed at 2/25/2023 0700  Gross per 24 hour   Intake 2760.85 ml   Output 1544 ml   Net 1216.85 ml         Lines/Drains/Airways       Central Venous Catheter Line  Duration              Introducer Single Lumen 02/24/23 0903 1 day     Introducer with Double Lumen 02/24/23 0903 1 day    Introducer 02/24/23 0903 1 day              Drain  Duration                  Chest Tube 02/24/23 1000 Tube - 1 Anterior Mediastinal 28 Fr. 1 day         Chest Tube 02/24/23 1000 Tube - 1 Left Midaxillary 28 Fr. 1 day         Urethral Catheter 02/24/23 0750 Silicone 16 Fr. 1 day              Arterial Line  Duration             Arterial Line 02/24/23 0720 Right Radial 1 day              Peripheral Intravenous Line  Duration                  Peripheral IV - Single Lumen 02/21/23 0800 20 G Anterior;Left;Proximal Forearm 4 days                    Significant Labs:   Recent Results (from the past 72 hour(s))   Prepare RBC 2 Units; symptomatic anemia    Collection Time: 02/22/23 12:21 PM   Result Value Ref Range    UNIT NUMBER [id number]     UNIT ABO/RH A POS     DISPENSE STATUS Transfused     Unit Expiration [id number]     Product Code V1062V99     Unit Blood Type Code 6200     CROSSMATCH INTERPRETATION Compatible     UNIT NUMBER [id number]     UNIT ABO/RH A POS     DISPENSE STATUS Transfused     Unit Expiration [id number]     Product Code D5878Q48     Unit Blood Type Code 6200     CROSSMATCH INTERPRETATION Compatible    Type & Screen    Collection Time: 02/22/23 12:21 PM   Result  Value Ref Range    Group & Rh A POS     Indirect Jose R GEL NEG    Prepare RBC 4 Units; CABG tomorrow    Collection Time: 02/22/23 12:21 PM   Result Value Ref Range    UNIT NUMBER [id number]     UNIT ABO/RH A POS     DISPENSE STATUS Transfused     Unit Expiration [id number]     Product Code C9740L75     Unit Blood Type Code 6200     CROSSMATCH INTERPRETATION Compatible     UNIT NUMBER [id number]     UNIT ABO/RH A POS     DISPENSE STATUS Transfused     Unit Expiration [id number]     Product Code N8023E73     Unit Blood Type Code 6200     CROSSMATCH INTERPRETATION Compatible     UNIT NUMBER [id number]     UNIT ABO/RH A POS     DISPENSE STATUS Selected     Unit Expiration [id number]     Product Code O0534E25     Unit Blood Type Code 6200     CROSSMATCH INTERPRETATION Compatible     UNIT NUMBER [id number]     UNIT ABO/RH A POS     DISPENSE STATUS Selected     Unit Expiration [id number]     Product Code V7810Z32     Unit Blood Type Code 6200     CROSSMATCH INTERPRETATION Compatible    PTT Heparin Monitoring    Collection Time: 02/23/23  3:59 AM   Result Value Ref Range    PTT Heparin Monitor 77.4 (H) 23.2 - 33.7 seconds   CBC with Differential    Collection Time: 02/23/23  3:59 AM   Result Value Ref Range    WBC 5.8 4.5 - 11.5 x10(3)/mcL    RBC 3.65 (L) 4.70 - 6.10 x10(6)/mcL    Hgb 10.5 (L) 14.0 - 18.0 g/dL    Hct 31.5 (L) 42.0 - 52.0 %    MCV 86.3 80.0 - 94.0 fL    MCH 28.8 pg    MCHC 33.3 33.0 - 36.0 g/dL    RDW 15.5 11.5 - 17.0 %    Platelet 143 130 - 400 x10(3)/mcL    MPV 11.1 (H) 7.4 - 10.4 fL    Neut % 59.5 %    Lymph % 21.4 %    Mono % 17.0 %    Eos % 0.2 %    Basophil % 0.5 %    Lymph # 1.23 0.6 - 4.6 x10(3)/mcL    Neut # 3.43 2.1 - 9.2 x10(3)/mcL    Mono # 0.98 0.1 - 1.3 x10(3)/mcL    Eos # 0.01 0 - 0.9 x10(3)/mcL    Baso # 0.03 0 - 0.2 x10(3)/mcL    IG# 0.08 (H) 0 - 0.04 x10(3)/mcL    IG% 1.4 %    NRBC% 0.0 %   Basic Metabolic Panel    Collection Time: 02/23/23  7:03 AM   Result Value Ref  Range    Sodium Level 135 (L) 136 - 145 mmol/L    Potassium Level 4.3 3.5 - 5.1 mmol/L    Chloride 103 98 - 107 mmol/L    Carbon Dioxide 24 23 - 31 mmol/L    Glucose Level 93 82 - 115 mg/dL    Blood Urea Nitrogen 35.6 (H) 8.4 - 25.7 mg/dL    Creatinine 1.65 (H) 0.73 - 1.18 mg/dL    BUN/Creatinine Ratio 22     Calcium Level Total 9.8 8.8 - 10.0 mg/dL    Anion Gap 8.0 mEq/L    eGFR 41 mls/min/1.73/m2   MRSA PCR    Collection Time: 02/24/23  2:16 AM   Result Value Ref Range    MRSA PCR SCRN ([facility]) Not Detected Not Detected   APTT    Collection Time: 02/24/23  3:32 AM   Result Value Ref Range    PTT 82.6 (H) 23.2 - 33.7 seconds   PTT Heparin Monitoring    Collection Time: 02/24/23  3:32 AM   Result Value Ref Range    PTT Heparin Monitor 66.8 (H) 23.2 - 33.7 seconds   CBC with Differential    Collection Time: 02/24/23  3:32 AM   Result Value Ref Range    WBC 5.0 4.5 - 11.5 x10(3)/mcL    RBC 3.48 (L) 4.70 - 6.10 x10(6)/mcL    Hgb 10.1 (L) 14.0 - 18.0 g/dL    Hct 30.9 (L) 42.0 - 52.0 %    MCV 88.8 80.0 - 94.0 fL    MCH 29.0 pg    MCHC 32.7 (L) 33.0 - 36.0 g/dL    RDW 15.4 11.5 - 17.0 %    Platelet 146 130 - 400 x10(3)/mcL    MPV 11.5 (H) 7.4 - 10.4 fL    Neut % 58.9 %    Lymph % 24.2 %    Mono % 15.5 %    Eos % 0.0 %    Basophil % 0.4 %    Lymph # 1.20 0.6 - 4.6 x10(3)/mcL    Neut # 2.92 2.1 - 9.2 x10(3)/mcL    Mono # 0.77 0.1 - 1.3 x10(3)/mcL    Eos # 0.00 0 - 0.9 x10(3)/mcL    Baso # 0.02 0 - 0.2 x10(3)/mcL    IG# 0.05 (H) 0 - 0.04 x10(3)/mcL    IG% 1.0 %    NRBC% 0.0 %   POCT ARTERIAL BLOOD GAS    Collection Time: 02/24/23  7:23 AM   Result Value Ref Range    POC PH 7.37 7.35 - 7.45    POC PCO2 45 35 - 45 mmHg    POC PO2 94 80 - 100 mmHg    POC HEMOGLOBIN 11.2 (A) 12.0 - 16.0 g/dL    POC SATURATED O2 97.1 %    POC O2Hb 95.2 94.0 - 97.0 %    POC COHb 2.1 %    POC MetHb 0.70 0.40 - 1.5 %    POC Potassium 4.3 3.5 - 5.0 mmol/l    POC Sodium 135 (A) 137 - 145 mmol/l    POC Ionized Calcium 1.26 (A) 1.12 - 1.23 mmol/l     Correct Temperature (PH) 7.37 7.35 - 7.45    Corrected Temperature (pCO2) 45 35 - 45 mmHg    Corrected Temperature (pO2) 94 80 - 100 mmHg    POC HCO3 26.0 22.0 - 26.0 mmol/l    Base Deficit 0.40 -2.0 - 2.0 mmol/l    POC Temp 37.0 °C    Specimen source Arterial sample    POCT glucose    Collection Time: 02/24/23  7:32 AM   Result Value Ref Range    POCT Glucose 95 70 - 110 mg/dL   Basic Metabolic Panel    Collection Time: 02/24/23  8:58 AM   Result Value Ref Range    Sodium Level 133 (L) 136 - 145 mmol/L    Potassium Level 5.6 (H) 3.5 - 5.1 mmol/L    Chloride 105 98 - 107 mmol/L    Carbon Dioxide 24 23 - 31 mmol/L    Glucose Level 121 (H) 82 - 115 mg/dL    Blood Urea Nitrogen 28.8 (H) 8.4 - 25.7 mg/dL    Creatinine 1.36 (H) 0.73 - 1.18 mg/dL    BUN/Creatinine Ratio 21     Calcium Level Total 9.0 8.8 - 10.0 mg/dL    Anion Gap 4.0 mEq/L    eGFR 52 mls/min/1.73/m2   CBC with Differential    Collection Time: 02/24/23  8:58 AM   Result Value Ref Range    WBC 6.8 4.5 - 11.5 x10(3)/mcL    RBC 3.09 (L) 4.70 - 6.10 x10(6)/mcL    Hgb 9.1 (L) 14.0 - 18.0 g/dL    Hct 28.1 (L) 42.0 - 52.0 %    MCV 90.9 80.0 - 94.0 fL    MCH 29.4 pg    MCHC 32.4 (L) 33.0 - 36.0 g/dL    RDW 15.1 11.5 - 17.0 %    Platelet 66 (L) 130 - 400 x10(3)/mcL    MPV 11.2 (H) 7.4 - 10.4 fL    Neut % 78.7 %    Lymph % 13.6 %    Mono % 4.4 %    Eos % 0.1 %    Basophil % 0.1 %    Lymph # 0.92 0.6 - 4.6 x10(3)/mcL    Neut # 5.30 2.1 - 9.2 x10(3)/mcL    Mono # 0.30 0.1 - 1.3 x10(3)/mcL    Eos # 0.01 0 - 0.9 x10(3)/mcL    Baso # 0.01 0 - 0.2 x10(3)/mcL    IG# 0.21 (H) 0 - 0.04 x10(3)/mcL    IG% 3.1 %    NRBC% 0.0 %   Protime-INR    Collection Time: 02/24/23  8:58 AM   Result Value Ref Range    PT 19.9 (H) 12.5 - 14.5 seconds    INR 1.72 (H) 0.00 - 1.30   APTT    Collection Time: 02/24/23  8:58 AM   Result Value Ref Range    PTT 63.7 (H) 23.2 - 33.7 seconds   Fibrinogen    Collection Time: 02/24/23  9:46 AM   Result Value Ref Range    Fibrinogen 384.0 210.0 - 463.0  mg/dL   POCT glucose    Collection Time: 02/24/23 11:17 AM   Result Value Ref Range    POCT Glucose 89 70 - 110 mg/dL   POCT glucose    Collection Time: 02/24/23 12:09 PM   Result Value Ref Range    POCT Glucose 141 (H) 70 - 110 mg/dL   POCT ARTERIAL BLOOD GAS    Collection Time: 02/24/23 12:12 PM   Result Value Ref Range    POC PH 7.38     POC PCO2 40 mmHg    POC PO2 61 (A) mmHg    POC SATURATED O2 90 %    POC Potassium 4.6 mmol/l    POC Sodium 134 (A) 137 - 145 mmol/l    POC Ionized Calcium 1.36 (A) 1.12 - 1.23 mmol/l    POC HCO3 23.7 mmol/l    Base Deficit -1.3 mmol/l    POC Temp 37.0 C    Specimen source Arterial sample    Basic Metabolic Panel    Collection Time: 02/24/23 12:52 PM   Result Value Ref Range    Sodium Level 134 (L) 136 - 145 mmol/L    Potassium Level 4.6 3.5 - 5.1 mmol/L    Chloride 107 98 - 107 mmol/L    Carbon Dioxide 21 (L) 23 - 31 mmol/L    Glucose Level 157 (H) 82 - 115 mg/dL    Blood Urea Nitrogen 28.7 (H) 8.4 - 25.7 mg/dL    Creatinine 1.42 (H) 0.73 - 1.18 mg/dL    BUN/Creatinine Ratio 20     Calcium Level Total 9.7 8.8 - 10.0 mg/dL    Anion Gap 6.0 mEq/L    eGFR 50 mls/min/1.73/m2   CBC with Differential    Collection Time: 02/24/23 12:52 PM   Result Value Ref Range    WBC 9.2 4.5 - 11.5 x10(3)/mcL    RBC 4.10 (L) 4.70 - 6.10 x10(6)/mcL    Hgb 12.0 (L) 14.0 - 18.0 g/dL    Hct 36.3 (L) 42.0 - 52.0 %    MCV 88.5 80.0 - 94.0 fL    MCH 29.3 pg    MCHC 33.1 33.0 - 36.0 g/dL    RDW 15.0 11.5 - 17.0 %    Platelet 99 (L) 130 - 400 x10(3)/mcL    MPV 11.4 (H) 7.4 - 10.4 fL    Neut % 84.2 %    Lymph % 6.9 %    Mono % 7.4 %    Eos % 0.0 %    Basophil % 0.1 %    Lymph # 0.64 0.6 - 4.6 x10(3)/mcL    Neut # 7.75 2.1 - 9.2 x10(3)/mcL    Mono # 0.68 0.1 - 1.3 x10(3)/mcL    Eos # 0.00 0 - 0.9 x10(3)/mcL    Baso # 0.01 0 - 0.2 x10(3)/mcL    IG# 0.13 (H) 0 - 0.04 x10(3)/mcL    IG% 1.4 %    NRBC% 0.0 %   POCT glucose    Collection Time: 02/24/23 12:56 PM   Result Value Ref Range    POCT Glucose 153 (H) 70 -  110 mg/dL   POCT glucose    Collection Time: 02/24/23  1:50 PM   Result Value Ref Range    POCT Glucose 102 70 - 110 mg/dL   POCT glucose    Collection Time: 02/24/23  2:49 PM   Result Value Ref Range    POCT Glucose 145 (H) 70 - 110 mg/dL   POCT glucose    Collection Time: 02/24/23  3:51 PM   Result Value Ref Range    POCT Glucose 141 (H) 70 - 110 mg/dL   POCT ARTERIAL BLOOD GAS    Collection Time: 02/24/23  3:51 PM   Result Value Ref Range    POC PH 7.35     POC PCO2 43 mmHg    POC PO2 71 (A) mmHg    POC SATURATED O2 93 %    POC Potassium 4.8 mmol/l    POC Sodium 135 (A) 137 - 145 mmol/l    POC Ionized Calcium 1.29 (A) 1.12 - 1.23 mmol/l    POC HCO3 23.7 mmol/l    Base Deficit -2.0 mmol/l    POC Temp 37.0 C    Specimen source Arterial sample    Transesophageal echo (VICKY)    Collection Time: 02/24/23  3:51 PM   Result Value Ref Range    BSA 2.06 m2   Hemoglobin and Hematocrit    Collection Time: 02/24/23  4:50 PM   Result Value Ref Range    Hgb 11.6 (L) 14.0 - 18.0 g/dL    Hct 35.0 (L) 42.0 - 52.0 %   Potassium    Collection Time: 02/24/23  4:50 PM   Result Value Ref Range    Potassium Level 4.5 3.5 - 5.1 mmol/L   POCT glucose    Collection Time: 02/24/23  4:56 PM   Result Value Ref Range    POCT Glucose 124 (H) 70 - 110 mg/dL   POCT glucose    Collection Time: 02/24/23  6:03 PM   Result Value Ref Range    POCT Glucose 129 (H) 70 - 110 mg/dL   POCT glucose    Collection Time: 02/24/23  7:06 PM   Result Value Ref Range    POCT Glucose 130 (H) 70 - 110 mg/dL   POCT glucose    Collection Time: 02/24/23  8:12 PM   Result Value Ref Range    POCT Glucose 130 (H) 70 - 110 mg/dL   POCT glucose    Collection Time: 02/24/23  9:02 PM   Result Value Ref Range    POCT Glucose 122 (H) 70 - 110 mg/dL   POCT glucose    Collection Time: 02/24/23 10:00 PM   Result Value Ref Range    POCT Glucose 121 (H) 70 - 110 mg/dL   POCT glucose    Collection Time: 02/24/23 11:04 PM   Result Value Ref Range    POCT Glucose 122 (H) 70 - 110 mg/dL    POCT glucose    Collection Time: 02/25/23 12:08 AM   Result Value Ref Range    POCT Glucose 120 (H) 70 - 110 mg/dL   POCT glucose    Collection Time: 02/25/23  2:04 AM   Result Value Ref Range    POCT Glucose 110 70 - 110 mg/dL   Basic Metabolic Panel    Collection Time: 02/25/23  2:07 AM   Result Value Ref Range    Sodium Level 137 136 - 145 mmol/L    Potassium Level 4.2 3.5 - 5.1 mmol/L    Chloride 108 (H) 98 - 107 mmol/L    Carbon Dioxide 22 (L) 23 - 31 mmol/L    Glucose Level 106 82 - 115 mg/dL    Blood Urea Nitrogen 27.3 (H) 8.4 - 25.7 mg/dL    Creatinine 1.55 (H) 0.73 - 1.18 mg/dL    BUN/Creatinine Ratio 18     Calcium Level Total 9.3 8.8 - 10.0 mg/dL    Anion Gap 7.0 mEq/L    eGFR 45 mls/min/1.73/m2   CBC with Differential    Collection Time: 02/25/23  2:07 AM   Result Value Ref Range    WBC 11.0 4.5 - 11.5 x10(3)/mcL    RBC 3.88 (L) 4.70 - 6.10 x10(6)/mcL    Hgb 11.3 (L) 14.0 - 18.0 g/dL    Hct 35.1 (L) 42.0 - 52.0 %    MCV 90.5 80.0 - 94.0 fL    MCH 29.1 pg    MCHC 32.2 (L) 33.0 - 36.0 g/dL    RDW 15.3 11.5 - 17.0 %    Platelet 111 (L) 130 - 400 x10(3)/mcL    MPV 11.3 (H) 7.4 - 10.4 fL    Neut % 76.6 %    Lymph % 6.4 %    Mono % 15.5 %    Eos % 0.0 %    Basophil % 0.1 %    Lymph # 0.70 0.6 - 4.6 x10(3)/mcL    Neut # 8.41 2.1 - 9.2 x10(3)/mcL    Mono # 1.70 (H) 0.1 - 1.3 x10(3)/mcL    Eos # 0.00 0 - 0.9 x10(3)/mcL    Baso # 0.01 0 - 0.2 x10(3)/mcL    IG# 0.15 (H) 0 - 0.04 x10(3)/mcL    IG% 1.4 %    NRBC% 0.0 %   POCT glucose    Collection Time: 02/25/23  3:22 AM   Result Value Ref Range    POCT Glucose 101 70 - 110 mg/dL   POCT glucose    Collection Time: 02/25/23  5:51 AM   Result Value Ref Range    POCT Glucose 94 70 - 110 mg/dL   POCT glucose    Collection Time: 02/25/23  6:59 AM   Result Value Ref Range    POCT Glucose 105 70 - 110 mg/dL   POCT glucose    Collection Time: 02/25/23  9:52 AM   Result Value Ref Range    POCT Glucose 125 (H) 70 - 110 mg/dL       Telemetry:  Sinus Rhythm    Physical  Exam  Vitals and nursing note reviewed.   Constitutional:       Appearance: Normal appearance.   HENT:      Head: Normocephalic.   Cardiovascular:      Rate and Rhythm: Normal rate and regular rhythm.      Heart sounds: Normal heart sounds.      Comments: Mediastinal drain patent  Pulmonary:      Effort: Pulmonary effort is normal. No respiratory distress.      Breath sounds: Normal breath sounds.   Abdominal:      General: There is no distension.      Palpations: Abdomen is soft.      Tenderness: There is no abdominal tenderness. There is no guarding.   Musculoskeletal:      Cervical back: Neck supple.   Skin:     General: Skin is warm and dry.      Comments: Sternotomy dressing CDI       Current Inpatient Medications:    Current Facility-Administered Medications:     0.9%  NaCl infusion (for blood administration), , Intravenous, Q24H PRN, SHIRLEY Torres    0.9%  NaCl infusion (for blood administration), , Intravenous, Q24H PRN, Trenton Harvey PA-C    acetaminophen 1,000 mg/100 mL (10 mg/mL) injection 1,000 mg, 1,000 mg, Intravenous, Once, Trenton Harvey PA-C, Last Rate: 400 mL/hr at 02/25/23 1124, 1,000 mg at 02/25/23 1124    acetaminophen oral solution 650 mg, 650 mg, Per OG tube, Q6H PRN, GILMAR Estrada    acetaminophen tablet 650 mg, 650 mg, Oral, Q6H PRN, Lul Isaac MD, 650 mg at 02/22/23 1700    albumin human 5% bottle 12.5 g, 12.5 g, Intravenous, PRN, GILMAR Estrada    albuterol-ipratropium 2.5 mg-0.5 mg/3 mL nebulizer solution 3 mL, 3 mL, Nebulization, Q4H PRN, Nima Baez DO, 3 mL at 02/24/23 1810    aspirin EC tablet 81 mg, 81 mg, Oral, Daily, GILMAR Estrada, 81 mg at 02/25/23 0937    atorvastatin tablet 40 mg, 40 mg, Oral, Daily, Trenton Harvey PA-C, 40 mg at 02/25/23 0937    calcium carbonate 200 mg calcium (500 mg) chewable tablet 500 mg, 500 mg, Oral, Daily, Trenton Harvey PA-C, 500 mg at 02/25/23 0937    calcium gluconate 1 g in NS IVPB (premixed), 1 g,  Intravenous, PRN, GILMAR Estrada    calcium gluconate 1 g in NS IVPB (premixed), 2 g, Intravenous, PRN, GILMAR Estrada    calcium gluconate 1 g in NS IVPB (premixed), 3 g, Intravenous, PRN, GILMAR Estrada    clevidipine (CLEVIPREX) 25 mg/50 mL infusion, 0-16 mg/hr, Intravenous, Continuous, Spencer Hagan MD, Last Rate: 4 mL/hr at 02/25/23 0330, 2 mg/hr at 02/25/23 0330    dextrose 10% bolus 125 mL 125 mL, 12.5 g, Intravenous, PRN, GILMAR Estrada    dextrose 10% bolus 250 mL 250 mL, 25 g, Intravenous, PRN, GILMAR Estrada    dextrose 5 % and 0.45 % NaCl infusion, , Intravenous, Continuous, GILMAR Estrada, Last Rate: 100 mL/hr at 02/24/23 2303, New Bag at 02/24/23 2303    docusate sodium capsule 100 mg, 100 mg, Oral, BID, GILMAR Estrada, 100 mg at 02/25/23 0937    ezetimibe tablet 10 mg, 10 mg, Oral, Daily, Trenton Harvey PA-C    FLUoxetine capsule 10 mg, 10 mg, Oral, Daily, Trenton Harvey PA-C, 10 mg at 02/25/23 1101    folic acid tablet 1 mg, 1 mg, Oral, Daily, Trenton Harvey PA-C, 1 mg at 02/25/23 0937    furosemide tablet 40 mg, 40 mg, Oral, Daily, Kelley Chery AGNP, 40 mg at 02/25/23 1120    gabapentin capsule 100 mg, 100 mg, Oral, TID, Trenton Harvey PA-C, 100 mg at 02/25/23 0937    hydrALAZINE injection 20 mg, 20 mg, Intravenous, Q4H PRN, Ga Q. Emily, NP    hydrALAZINE tablet 25 mg, 25 mg, Oral, Q8H, Trenton Harvey PA-C    HYDROcodone-acetaminophen 5-325 mg per tablet 1 tablet, 1 tablet, Oral, Q4H PRN, GILMAR Estrada, 1 tablet at 02/24/23 1708    HYDROcodone-acetaminophen 7.5-325 mg per tablet 1 tablet, 1 tablet, Oral, Q6H PRN, Ga Johnsono, MANAN    hydrOXYzine HCL tablet 25 mg, 25 mg, Oral, Q6H PRN, Trenton Harvey PA-C    insulin regular in 0.9 % NaCl 100 unit/100 mL (1 unit/mL) infusion, 1 Units/hr, Intravenous, Continuous, GILMAR Estrada, Last Rate: 1.5 mL/hr at 02/24/23 1600, 1.5 Units/hr at 02/24/23 1600    labetaloL  injection 20 mg, 20 mg, Intravenous, Q4H PRN, Ga Q. Emily, NP    lisinopriL tablet 5 mg, 5 mg, Oral, Daily, Trenton Harvey PA-C    magnesium sulfate 2g in water 50mL IVPB (premix), 2 g, Intravenous, PRN, GILMAR Estrada    magnesium sulfate 2g in water 50mL IVPB (premix), 4 g, Intravenous, PRN, GILMAR Estrada    meclizine tablet 25 mg, 25 mg, Oral, TID PRN, Trenton Harvey PA-C    metoprolol tartrate (LOPRESSOR) split tablet 12.5 mg, 12.5 mg, Oral, BID, GILMAR Estrada, 12.5 mg at 02/25/23 0937    morphine injection 2 mg, 2 mg, Intravenous, Q2H PRN, Ga Q. Emily, NP, 2 mg at 02/25/23 0330    morphine injection 4 mg, 4 mg, Intravenous, Q4H PRN, GILMAR Estrada    mupirocin 2 % ointment, , Nasal, BID, GILMAR Estrada, Given at 02/25/23 1058    ondansetron injection 4 mg, 4 mg, Intravenous, Q4H PRN, GILMAR Estrada, 4 mg at 02/25/23 0502    oxyCODONE immediate release tablet 10 mg, 10 mg, Oral, Q4H PRN, GILMAR Estrada, 10 mg at 02/25/23 1124    pantoprazole EC tablet 40 mg, 40 mg, Oral, Daily, Trenton Harvey PA-C, 40 mg at 02/25/23 0937    potassium chloride 20 mEq in 100 mL IVPB (FOR CENTRAL LINE ADMINISTRATION ONLY), 20 mEq, Intravenous, PRN, GILMAR Estrada    potassium chloride 20 mEq in 100 mL IVPB (FOR CENTRAL LINE ADMINISTRATION ONLY), 40 mEq, Intravenous, PRN, GILMAR Estrada    potassium chloride 20 mEq in 100 mL IVPB (FOR CENTRAL LINE ADMINISTRATION ONLY), 20 mEq, Intravenous, PRN, GILMAR Estrada    prazosin capsule 3 mg, 3 mg, Oral, QHS, Lul Isaac MD, 3 mg at 02/23/23 2030    predniSONE tablet 2.5 mg, 2.5 mg, Oral, Daily, Trenton Harvey PA-C    QUEtiapine tablet 50 mg, 50 mg, Oral, QHS, Trenton Harvey PA-C    rOPINIRole tablet 1 mg, 1 mg, Oral, BID, Trenton Harvey PA-C, 1 mg at 02/25/23 0937    sodium phosphate 15 mmol in dextrose 5 % (D5W) 250 mL IVPB, 15 mmol, Intravenous, PRN, GILMAR Estrada    sodium phosphate  20.01 mmol in dextrose 5 % (D5W) 250 mL IVPB, 20.01 mmol, Intravenous, PRN, GILMAR Estrada    sodium phosphate 30 mmol in dextrose 5 % (D5W) 250 mL IVPB, 30 mmol, Intravenous, PRN, GILMAR Estrada    sucralfate tablet 1 g, 1 g, Oral, QID (AC & HS), GILMAR Estrada, 1 g at 02/25/23 1101    tiZANidine tablet 4 mg, 4 mg, Oral, Q8H, Trenton Harvey PA-C    zolpidem tablet 5 mg, 5 mg, Oral, Nightly PRN, Ga Diaz NP    VTE Risk Mitigation (From admission, onward)           Ordered     Place sequential compression device  Until discontinued         02/24/23 1046     IP VTE HIGH RISK PATIENT  Once         02/24/23 1034     heparin, porcine (PF) (heparin flush 100 units/mL) 100 unit/mL injection flush         02/23/23 2215     Place sequential compression device  Until discontinued         02/23/23 1934     heparin 25,000 units in dextrose 5% 250 mL (100 units/mL) infusion LOW INTENSITY nomogram - [facility]  Continuous        Question Answer Comment   Begin at (in units/kg/hr) 12    Heparin Infusion Adjustment (DO NOT MODIFY ANSWER) [email or web address]\HeparinInfusions\heparin LOW INTENSITY nomogram for [facility] [id number]        02/19/23 4545                  Assessment:   NSTEMI, Type I  MVCAD  --s/p CABG 2V LIMA-LAD, rSVG-OM 02/24/23 with Right GSV EVH  --[facility] 02/15/23: LM 0% stenosis. mLAD proximal 70% ISR. D1 ostial 50% stenosis. D2 ostial 50% stenosis. Lcx: previous stent; proximal Lcx  90% ISR; mid Lcx 30% stenosis. OM1 proximal 80% stenosis. RCA patent stents to ostium. Mid RCA proximal subsection- 60% stenosis; Mid RCA distal subsection 70& stenosis; distal RCA proximal subsection 100% stenosis. Fills left to right  --[facility] 09/22/22: LM 0% stenosis. mLAD proximal subsection 50% ISR,. D1 ostial 50% stenosis  D2 ostial 50% stenosis. pLCx mid subsection 70% ISR reduced to 10%, Preprocedure AMALIA III flow  noted. Post procedure AMALIA III was present. Lesion previous treated on 7/22/21 with  PTA/DAYAN. OM1 proximal 80% stenosis. mLCx distal subsection 30% stenosis. RCA proximal RCA stent widely patent. mRCA proximal subsection 60% subsection. mRCA distal subsection 70% stenosis. dRCA proximal subsection 100% stenosis. Fills left to right  Hypertension (Controlled)  ICMO  --EF 45-50% TTE 2/15/23 improved from 40% 7/21  VHD  --Mild AS, Mild to moderate AR. Mild to moderate MAC is noted. Mild MR. Trace TR.  B RICKEY    - Moderate Bilateral by US  HLD  Lupus anticoagulant inhibitor syndrome  CARLYLE  VHD  --mild AS  Renal Insufficiency  AAA  Anemia     Plan:   Continue asa and statin,  Resume lisinopril, Metoprolol tartrate, and hydralazine, Wean Cleviprex as tolerated  Monitor H/H  Continue PPI  CT management per CV surgery    Pt. seen and examined by me and plan made under my supervision.

## 2023-02-25 NOTE — PT/OT/SLP EVAL
Occupational Therapy   Evaluation    Name: Hunter Navarrete  MRN: [id number]  Admitting Diagnosis: CABG x 2, ICMO EF 45-50% , CKD   Recent Surgery: Procedure(s) (LRB):  CORONARY ARTERY BYPASS GRAFT (CABG) (N/A) 1 Day Post-Op    Recommendations:     Discharge Recommendations: rehabilitation facility, home with home health (pending progress)  Discharge Equipment Recommendations:     Barriers to discharge:       Assessment:     Hunter Navarrete is a 82 y.o. male with a medical diagnosis of CABG x2.  He presents with generalized weakness, decreased activity tolerance, decreased balance, sternal precautions, c/o pain. Performance deficits affecting function: weakness, impaired endurance, impaired functional mobility, impaired self care skills, impaired balance, pain, other (comment) (sternal precautions).      Rehab Prognosis: Good; patient would benefit from acute skilled OT services to address these deficits and reach maximum level of function.       Plan:     Patient to be seen 6 x/week to address the above listed problems via self-care/home management, therapeutic activities, therapeutic exercises  Plan of Care Expires:    Plan of Care Reviewed with: patient    Subjective     Chief Complaint: chest pain 3-4/10  Patient/Family Comments/goals: to return to PLOF    Occupational Profile:  Living Environment: resides at home with spouse, no steps, tub/shower combo with shower stool  Previous level of function: I with ADL's and mobility, no AD indoors, uses cane for outdoors  Roles and Routines:   Equipment Used at Home: cane, straight, rollator  Assistance upon Discharge: states spouse can assist as needed    Pain/Comfort:  Pain Rating 1: 3/10  Location 1: chest  Pain Addressed 2: Nurse notified  Pain Rating Post-Intervention 2: 4/10    Patients cultural, spiritual, Sabianism conflicts given the current situation:      Objective:     Communicated with: RN prior to session.  Patient found HOB elevated with   oxygen, tele,  pulse ox, chest tubes x2, medellin cath upon OT entry to room.    General Precautions: Standard, sternal  Orthopedic Precautions:    Braces:    Respiratory Status: Room air upon entering room pt on room air eating jello O2 sats at 88%, RN requested to place pt back on 3L O2  93% O2 sat with 3L O2, 116/58 HR 86    Occupational Performance:    Bed Mobility:    Patient completed Rolling/Turning to Left with  maximal assistance  Patient completed Scooting/Bridging with maximal assistance  Patient completed Supine to Sit with maximal assistance    Functional Mobility/Transfers:  Patient completed Sit <> Stand Transfer with moderate assistance  with  sternal pxns   Patient completed Bed <> Chair Transfer using Step Transfer technique with moderate assistance with rolling walker  Functional Mobility: pt ambulated leesa 40ft with RW CGA, vc's for breathing technique    Activities of Daily Living:  Feeding:  modified independence .  Upper Body Dressing: moderate assistance .  Lower Body Dressing: total assistance .  Toileting: total assistance lacie    Cognitive/Visual Perceptual:  Cognitive/Psychosocial Skills:     -       Oriented to: Person, Place, and Situation   -       Follows Commands/attention:Follows two-step commands  -       Mood/Affect/Coping skills/emotional control: Cooperative    Physical Exam:  Balance: -       sitting SBA, standing CGA with UE support  Upper Extremity Strength:    -       Right Upper Extremity: WFL  -       Left Upper Extremity: WFL  Sternal pxns    AMPAC 6 Click ADL:  AMPAC Total Score:      Treatment & Education:  Education on OT POC, general sternal pxns, will need additional education and training on sternal pxns with ADL tasks    Patient left up in chair with all lines intact, call button in reach, and RN present    GOALS:   Multidisciplinary Problems       Occupational Therapy Goals          Problem: Occupational Therapy    Goal Priority Disciplines Outcome Interventions   Occupational  Therapy Goal     OT, PT/OT Ongoing, Progressing    Description: Pt will ambulate to bathroom with RW SBA  Pt will complete toilet t/f SBA  Pt will complete toileting tasks min A  Pt will complete LE dressing with AE PRN SBA  Pt will complete UE dressing with SBA  Pt will complete grooming in standing at sink SBA                       History:     Past Medical History:   Diagnosis Date    Acid reflux     Anemia     Aortic aneurysm, abdominal     Arthritis     Bronchitis     Cataract     Celiac artery stenosis     50% by CTA abdomen 7/27/2020    CKD (chronic kidney disease)     45% FUNCTION    Coronary artery disease     Encounter for blood transfusion     Enlarged prostate     GERD (gastroesophageal reflux disease)     Hemorrhoids     Hypercholesteremia     Hypertension     Iron deficiency anemia, unspecified     Leaky heart valve     Lupus anticoagulant disorder     Renal artery stenosis     by CTA 7/27/2020    Skin cancer     Unspecified chronic bronchitis     UTI (urinary tract infection)          Past Surgical History:   Procedure Laterality Date    ACF      BACK SURGERY      RODS IN BACK; 4 SURGIERIES    BONE MARROW BIOPSY      CARDIAC ANGIOGRAM WITH STENT      CATARACT SX Bilateral     CORONARY ANGIOGRAPHY N/A 2/15/2023    Procedure: ANGIOGRAM, CORONARY ARTERY;  Surgeon: Rito Vega MD;  [facility];  Service: Cardiology;  Laterality: N/A;    HIP SURGERY Right     THR    KNEE SURGERY Right     ATS    LEFT HEART CATHETERIZATION Left 07/22/2021    Procedure: Left heart cath;  Surgeon: Curtis Loredo MD;  [facility];  Service: Cardiology;  Laterality: Left;    LEFT HEART CATHETERIZATION Left 09/22/2022    Procedure: Left heart cath;  Surgeon: Giorgi Barker MD;  [facility];  Service: Cardiology;  Laterality: Left;    SHOULDER SURGERY Bilateral     SPINAL CORD STIMULATOR IMPLANT         Time Tracking:     OT Date of Treatment:    OT Start Time: 1256  OT Stop Time: 1315  OT Total Time  (min): 19 min    Billable Minutes:Evaluation mod comp    2/25/2023

## 2023-02-25 NOTE — PT/OT/SLP EVAL
Physical Therapy Evaluation    Patient Name:  Hunter Navarrete   MRN:  [id number]    Recommendations:     Discharge Recommendations: home health PT   Discharge Equipment Recommendations: none   Barriers to discharge:  decreased fxmobility    Assessment:     Hunter Navarrete is a 82 y.o. male admitted with a medical diagnosis of <principal problem not specified>.  He presents with the following impairments/functional limitations: weakness, impaired endurance, impaired functional mobility .    Rehab Prognosis: Good; patient would benefit from acute skilled PT services to address these deficits and reach maximum level of function.    Recent Surgery: Procedure(s) (LRB):  CORONARY ARTERY BYPASS GRAFT (CABG) (N/A) 1 Day Post-Op    Plan:     During this hospitalization, patient to be seen 5 x/week to address the identified rehab impairments via gait training, therapeutic activities, therapeutic exercises and progress toward the following goals:    Plan of Care Expires:  03/22/23    Subjective     Chief Complaint: sob  Patient/Family Comments/goals:   Pain/Comfort:       Patients cultural, spiritual, Hinduism conflicts given the current situation: no    Living Environment:  Pt lives with his wife in a house with no steps  Prior to admission, patients level of function was ind   Equipment used at home: cane, straight, rollator.  DME owned (not currently used):  Upon discharge, patient will have assistance from wife    Objective:     Communicated with nurse prior to session.  Patient found supine with blood pressure cuff, chest tube, medellin catheter, oxygen, peripheral IV, telemetry  upon PT entry to room.    General Precautions: Standard, sternal  Orthopedic Precautions:N/A   Braces: N/A  Respiratory Status: Non-rebreather mask, flow 3 L/min    Exams:  LUE Strength: WFL  RLE ROM: WFL  RLE Strength: WFL  LLE ROM: WFL    Functional Mobility:  Bed Mobility:     Supine to Sit: maximal assistance  Transfers:     Sit to Stand:   moderate assistance with rolling walker  Bed to Chair: moderate assistance with  rolling walker  using  Step Transfer  Gait: ambulated about 40ft with 4ww and oxygen. Walk limited by SOB      AM-PAC 6 CLICK MOBILITY  Total Score:        Treatment & Education:      Patient left up in chair with all lines intact and call button in reach.    GOALS:   Multidisciplinary Problems       Physical Therapy Goals          Problem: Physical Therapy    Goal Priority Disciplines Outcome Goal Variances Interventions   Physical Therapy Goal     PT, PT/OT Ongoing, Progressing     Description: Goals to be met by: 3/0323     Patient will increase functional independence with mobility by performin. Pt will be ada with sit to supine and supine to sit  2. Pt will be sba with sit to stand  3. Pt will ambulate 120ft with a rw sba                           History:     Past Medical History:   Diagnosis Date    Acid reflux     Anemia     Aortic aneurysm, abdominal     Arthritis     Bronchitis     Cataract     Celiac artery stenosis     50% by CTA abdomen 2020    CKD (chronic kidney disease)     45% FUNCTION    Coronary artery disease     Encounter for blood transfusion     Enlarged prostate     GERD (gastroesophageal reflux disease)     Hemorrhoids     Hypercholesteremia     Hypertension     Iron deficiency anemia, unspecified     Leaky heart valve     Lupus anticoagulant disorder     Renal artery stenosis     by CTA 2020    Skin cancer     Unspecified chronic bronchitis     UTI (urinary tract infection)        Past Surgical History:   Procedure Laterality Date    ACF      BACK SURGERY      RODS IN BACK; 4 SURGIERIES    BONE MARROW BIOPSY      CARDIAC ANGIOGRAM WITH STENT      CATARACT SX Bilateral     CORONARY ANGIOGRAPHY N/A 2/15/2023    Procedure: ANGIOGRAM, CORONARY ARTERY;  Surgeon: Rito Vega MD;  Location: [facility] CATH;  Service: Cardiology;  Laterality: N/A;    HIP SURGERY Right     THR    KNEE SURGERY Right     ATS     LEFT HEART CATHETERIZATION Left 07/22/2021    Procedure: Left heart cath;  Surgeon: Curtis Loredo MD;  [facility];  Service: Cardiology;  Laterality: Left;    LEFT HEART CATHETERIZATION Left 09/22/2022    Procedure: Left heart cath;  Surgeon: Giorgi Barker MD;  [facility];  Service: Cardiology;  Laterality: Left;    SHOULDER SURGERY Bilateral     SPINAL CORD STIMULATOR IMPLANT         Time Tracking:     PT Received On: 02/25/23  PT Start Time: 1300     PT Stop Time: 1325  PT Total Time (min): 25 min     Billable Minutes: Evaluation 25 02/25/2023

## 2023-02-25 NOTE — NURSING
Nurses Note -- 4 Eyes      2/25/2023   4:44 PM      Skin assessed during: Q Shift Change      [x] No Pressure Injuries Present    []Prevention Measures Documented      [] Yes- Altered Skin Integrity Present or Discovered   [] LDA Added if Not in Epic (Describe Wound)   [] New Altered Skin Integrity was Present on Admit and Documented in LDA   [] Wound Image Taken    Wound Care Consulted? No    Attending Nurse:  Camila Hu RN     Second RN/Staff Member:  Clyde Márquez RN

## 2023-02-25 NOTE — PROGRESS NOTES
[facility] - [facility]  Pulmonary Critical Care Note    Patient Name: Hunter Navarrete  MRN: [id number]  Admission Date: 2/19/2023  Hospital Length of Stay: 6 days  Code Status: Full Code  Attending Provider: Rito Keller MD  Primary Care Provider: Mariella Bethea MD     Subjective:     HPI:   This is an 82 year old male with a PMH of CAD, HTN, HLD, CKD, mild AS, and lupus anticoagulant inhibitor syndrome who presented to an outside hospital with chest pain. He underwent a LHC which revealed multivessel disease and EF 45-50%. He was transferred here for CV surgery evaluation and today was admitted on 2/24/23 and underwent CABG x 2 .He received 2u PBRCs intraoperatively. Upon arrival to ICU he was accidentally extubated and required reintubation.     Hospital Course/Significant events:  2/24: CABG x 2 LIMA to LAD, R-SVG to OM            Re-intubated due to inability to protect airway, extubated successfully later on per     protocol    24 Hour Interval History:  AFVSS. 24 hrTotal 100cc output from 2 CT, UOP 1260. On Oxymask 4L satting 96%. No longer requiring epi gtt, continued on nsulin gtt 1.5un/hr. C/o sternal/anterior chest pain, able to sit up in chair but obviously still hurting from the surgery    Past Medical History:   Diagnosis Date    Acid reflux     Anemia     Aortic aneurysm, abdominal     Arthritis     Bronchitis     Cataract     Celiac artery stenosis     50% by CTA abdomen 7/27/2020    CKD (chronic kidney disease)     45% FUNCTION    Coronary artery disease     Encounter for blood transfusion     Enlarged prostate     GERD (gastroesophageal reflux disease)     Hemorrhoids     Hypercholesteremia     Hypertension     Iron deficiency anemia, unspecified     Leaky heart valve     Lupus anticoagulant disorder     Renal artery stenosis     by CTA 7/27/2020    Skin cancer     Unspecified chronic bronchitis     UTI (urinary tract infection)        Past Surgical History:   Procedure  Laterality Date    ACF      BACK SURGERY      RODS IN BACK; 4 SURGIERIES    BONE MARROW BIOPSY      CARDIAC ANGIOGRAM WITH STENT      CATARACT SX Bilateral     CORONARY ANGIOGRAPHY N/A 2/15/2023    Procedure: ANGIOGRAM, CORONARY ARTERY;  Surgeon: Rito Vega MD;  Location: [facility] CATH;  Service: Cardiology;  Laterality: N/A;    HIP SURGERY Right     THR    KNEE SURGERY Right     ATS    LEFT HEART CATHETERIZATION Left 2021    Procedure: Left heart cath;  Surgeon: Curtis Loredo MD;  Location: [facility] CATH;  Service: Cardiology;  Laterality: Left;    LEFT HEART CATHETERIZATION Left 2022    Procedure: Left heart cath;  Surgeon: Giorgi Barker MD;  Location: [facility] CATH;  Service: Cardiology;  Laterality: Left;    SHOULDER SURGERY Bilateral     SPINAL CORD STIMULATOR IMPLANT         Social History     Socioeconomic History    Marital status:    Tobacco Use    Smoking status: Former     Types: Cigarettes     Quit date:      Years since quittin.1    Smokeless tobacco: Never   Substance and Sexual Activity    Alcohol use: Yes     Comment: RARELY    Drug use: No     Social Determinants of Health     Financial Resource Strain: Low Risk     Difficulty of Paying Living Expenses: Not hard at all   Food Insecurity: No Food Insecurity    Worried About Running Out of Food in the Last Year: Never true    Ran Out of Food in the Last Year: Never true   Transportation Needs: No Transportation Needs    Lack of Transportation (Medical): No    Lack of Transportation (Non-Medical): No   Physical Activity: Inactive    Days of Exercise per Week: 0 days    Minutes of Exercise per Session: 0 min   Stress: No Stress Concern Present    Feeling of Stress : Not at all   Social Connections: Moderately Integrated    Frequency of Communication with Friends and Family: More than three times a week    Frequency of Social Gatherings with Friends and Family: More than three times a week    Attends Yarsani Services: Never     Active Member of Clubs or Organizations: Yes    Attends Club or Organization Meetings: Never    Marital Status:            Current Outpatient Medications   Medication Instructions    albuterol-ipratropium (DUO-NEB) 2.5 mg-0.5 mg/3 mL nebulizer solution 3 mLs, Nebulization, Every 4 hours PRN, Rescue    aspirin (ECOTRIN) 81 mg, Oral, Daily    atorvastatin (LIPITOR) 40 mg, Oral, Daily    calcium carbonate 400 mg calcium (1,000 mg) Chew 2 tablets, Oral, Daily    calcium carbonate-vitamin D3 500 mg-10 mcg (400 unit) Tab 1 tablet, Oral, Daily    clopidogreL (PLAVIX) 75 mg, Oral, Daily    ezetimibe (ZETIA) 10 mg, Oral, Daily    FLUoxetine 10 mg, Oral, Daily    folic acid (FOLVITE) 1 mg, Oral, Daily    furosemide (LASIX) 40 mg, Oral, Daily    gabapentin (NEURONTIN) 100 MG capsule 200 MG IN THE MORNING, 100 MG AT LUNCH, 100 MG AT SUPPERTIME    hydrALAZINE (APRESOLINE) 25 mg, Oral, Every 8 hours    HYDROcodone-acetaminophen (NORCO) 5-325 mg per tablet 1 tablet, Oral, Every 8 hours PRN    hydrOXYzine HCL (ATARAX) 25 mg, Oral, Every 6 hours PRN    isosorbide mononitrate (IMDUR) 30 mg, Oral, Daily    lisinopriL 5 mg, Oral, Daily    meclizine (ANTIVERT) 25 mg, Oral, 3 times daily PRN    metoprolol succinate (TOPROL-XL) 50 mg, Oral, Daily    metoprolol tartrate (LOPRESSOR) 50 mg, Oral, 3 times daily    nitroGLYCERIN (NITROSTAT) 0.4 mg, Sublingual, Every 5 min PRN    omeprazole (PRILOSEC) 20 mg, Oral, Daily    predniSONE (DELTASONE) 2.5 mg, Oral, Daily    QUEtiapine (SEROQUEL) 50 mg, Oral, Nightly    rOPINIRole (REQUIP) 1 mg, Oral, 2 times daily    terazosin (HYTRIN) 3 mg, Oral, Nightly    tiZANidine 4 mg, Oral, 3 times daily PRN    traMADoL (ULTRAM) 50 mg tablet Oral, Every 6 hours PRN, 1/2 TO 1 TABLET 4 TIMES A DAY PRN       Current Inpatient Medications   aspirin  81 mg Oral Daily    atorvastatin  40 mg Oral Daily    ceFAZolin (ANCEF) IVPB  2 g Intravenous Q8H    docusate sodium  100 mg Oral BID    enoxaparin  40 mg  Subcutaneous Daily    famotidine (PF)  20 mg Intravenous Daily    folic acid  1 mg Oral Daily    metoprolol tartrate  12.5 mg Oral BID    mupirocin   Nasal BID    prazosin  3 mg Oral QHS    sucralfate  1 g Oral QID (AC & HS)       Current Intravenous Infusions   clevidipine 2 mg/hr (02/25/23 0330)    dextrose 5 % and 0.45 % NaCl 100 mL/hr at 02/24/23 2303    insulin regular 1 units/mL infusion orderable (CTS POST-OP) 1.5 Units/hr (02/24/23 1600)         Review of Systems   Constitutional:  Positive for malaise/fatigue.   HENT: Negative.     Eyes: Negative.    Respiratory:  Negative for cough, shortness of breath and wheezing.    Cardiovascular:         Chest wall pain   Gastrointestinal:  Negative for heartburn, nausea and vomiting.   Genitourinary: Negative.    Musculoskeletal: Negative.    Skin: Negative.    Neurological:  Negative for dizziness and headaches.   Endo/Heme/Allergies: Negative.    Psychiatric/Behavioral: Negative.          Objective:       Intake/Output Summary (Last 24 hours) at 2/25/2023 0519  Last data filed at 2/25/2023 0100  Gross per 24 hour   Intake 2882 ml   Output 3778 ml   Net -896 ml           Vital Signs (Most Recent):  Temp: 97.5 °F (36.4 °C) (02/24/23 1600)  Pulse: 86 (02/25/23 0200)  Resp: (!) 25 (02/25/23 0502)  BP: 126/67 (02/25/23 0200)  SpO2: (!) 89 % (02/25/23 0200)    Body mass index is 27.62 kg/m².  Weight: 87.3 kg (192 lb 7.4 oz) Vital Signs (24h Range):  Temp:  [95.7 °F (35.4 °C)-97.5 °F (36.4 °C)] 97.5 °F (36.4 °C)  Pulse:  [64-86] 86  Resp:  [14-29] 25  SpO2:  [89 %-98 %] 89 %  BP: (117-147)/(50-78) 126/67  Arterial Line BP: (111-148)/() 132/46     Physical Exam  GENERAL -  male sitting up in chair, mod distress due to pain  HEENT - on Oxymask 4L, PERRL, EOMI  CHEST - slightly diminished breath sounds on left otherwise clear. Mediastinal incision dressing present c/d/I, 2 CT with minimal drainage  ABD - soft, mild TTP, NABs  EXT - RLE ace wrap present   NEURO  - AAOx4, following all commands, moving all extremities spontaneously    Lines/Drains/Airways       Central Venous Catheter Line  Duration              Introducer Single Lumen 02/24/23 0903 <1 day     Introducer with Double Lumen 02/24/23 0903 <1 day    Introducer 02/24/23 0903 <1 day              Drain  Duration                  Chest Tube 02/24/23 1000 Tube - 1 Anterior Mediastinal 28 Fr. <1 day         Chest Tube 02/24/23 1000 Tube - 1 Left Midaxillary 28 Fr. <1 day         Urethral Catheter 02/24/23 0750 Silicone 16 Fr. <1 day              Arterial Line  Duration             Arterial Line 02/24/23 0720 Right Radial <1 day              Peripheral Intravenous Line  Duration                  Peripheral IV - Single Lumen 02/21/23 0800 20 G Anterior;Left;Proximal Forearm 3 days                    Significant Labs:    Lab Results   Component Value Date    WBC 11.0 02/25/2023    HGB 11.3 (L) 02/25/2023    HCT 35.1 (L) 02/25/2023    MCV 90.5 02/25/2023     (L) 02/25/2023         BMP  Lab Results   Component Value Date     02/25/2023    K 4.2 02/25/2023    CHLORIDE 108 (H) 02/25/2023    CO2 22 (L) 02/25/2023    BUN 27.3 (H) 02/25/2023    CREATININE 1.55 (H) 02/25/2023    CALCIUM 9.3 02/25/2023    AGAP 7.0 02/25/2023    ESTGFRAFRICA 50.1 (A) 10/26/2021    EGFRNONAA 43.3 (A) 10/26/2021       ABG  Recent Labs   Lab 02/24/23  1551   PH 7.35   PO2 71*   PCO2 43   HCO3 23.7         Mechanical Ventilation Support:  Vent Mode: CPAP / PSV (02/24/23 1545)  Ventilator Initiated: Yes (02/24/23 1125)  Set Rate: 16 BPM (02/24/23 1355)  Vt Set: 500 mL (02/24/23 1355)  Pressure Support: 10 cmH20 (02/24/23 1545)  PEEP/CPAP: 5 cmH20 (02/24/23 1545)  Oxygen Concentration (%): 30 (02/24/23 1545)  Peak Airway Pressure: 14 cmH20 (02/24/23 1545)  Total Ve: 8.7 L/m (02/24/23 1545)  F/VT Ratio<105 (RSBI): (!) 21.28 (02/24/23 1545)    Significant Imaging:  CXR 2/25 with persistent bilateral atelectasis & post-sternotomy  changes        Assessment/Plan:     Assessment  Multivessel CAD s/p CABG x 2  2/24  LIMA to LAD, R-SVG to OM  ICMO EF 45-50%  CKD3A  Mild AS      Plan  Continue post operative CV surgery orders per protocol  CT management per CVTS  Down titrate insulin gtt per protocol as tolerate  OOB, PT/OT, can likely advance diet as tolerated once evaluated by CVTS  Continue pain control with morphine 2q2h  H/H stable, Cr slightly elevated but stable with good UOP.  continue D51/2NS @100cc/hr  Can downgrade once insulin drip is lisa to be discontinued     Tom Mayfield MD  Pulmonary Critical Care Medicine  [facility] - [facility] ICU

## 2023-02-25 NOTE — CONSULTS
[facility]  Nephrology Consultation  Patient Name: Hunter Navarrete  Age: 82 y.o.  : 1940  MRN: [id number]  Admission Date: 2023    Date of Consultation: 23    Consultation Requested By: CV Surgery     Reason for Consultation: DANIELLA    Chief Complaint: No chief complaint on file.      History of Present Illness:  Mr Hunter Navarrete is a 82 y.o. White male with past medical history of CKD III and Lupus followed by Nephrology in [place]. He initially presented to [facility] facility with CP and later underwent LHC showing multivessel disease. Patient was later transferred to this facility and underwent CABG x2 on 23. He is now sitting up in bed on oxymask with no distress. There is a urinary catheter in place with clear light yellow urine draining. CXR this morning shows interval progression of left lower lung zone atelectasis. Nephrology consulted for CKD.     Patient is allergic to cardura [doxazosin], lyrica [pregabalin], paxil [paroxetine hcl], restoril [temazepam], vioxx [rofecoxib], and zithromax [azithromycin].     Review of systems: 12 point review of systems conducted, negative except as stated in the HPI.     Past Medical History:  has a past medical history of Acid reflux, Anemia, Aortic aneurysm, abdominal, Arthritis, Bronchitis, Cataract, Celiac artery stenosis, CKD (chronic kidney disease), Coronary artery disease, Encounter for blood transfusion, Enlarged prostate, GERD (gastroesophageal reflux disease), Hemorrhoids, Hypercholesteremia, Hypertension, Iron deficiency anemia, unspecified, Leaky heart valve, Lupus anticoagulant disorder, Renal artery stenosis, Skin cancer, Unspecified chronic bronchitis, and UTI (urinary tract infection).    Procedure History:  has a past surgical history that includes Knee surgery (Right); Shoulder surgery (Bilateral); Hip surgery (Right); CATARACT SX (Bilateral); CARDIAC ANGIOGRAM WITH STENT; Back surgery; Left heart  "catheterization (Left, 07/22/2021); Spinal cord stimulator implant; ACF; Left heart catheterization (Left, 09/22/2022); Bone marrow biopsy; and Coronary angiography (N/A, 2/15/2023).    Family History: family history includes Cancer in his brother and brother; Heart disease in his brother, brother, brother, and father; Lung cancer in his brother; Throat cancer in his brother.    Social History:  reports that he quit smoking about 21 years ago. His smoking use included cigarettes. He has never used smokeless tobacco. He reports current alcohol use. He reports that he does not use drugs.    Physical Exam:   BP (!) 134/50   Pulse 96   Temp 98.2 °F (36.8 °C)   Resp (!) 21   Ht 5' 10" (1.778 m)   Wt 90.3 kg (199 lb 1.2 oz)   SpO2 95%   BMI 28.56 kg/m²  Body mass index is 28.56 kg/m².    General Appearance:  Alert, cooperative, no distress, appropriate for age   Head:  Normocephalic, no obvious abnormality  EENT:  PERRL, EOM's intact, conjunctiva and corneas clear, mucosa clear and moist, teeth intact                     Neck:  Supple, symmetrical, no tenderness, R IJ central line   Lungs: bilaterally diminished, respirations somewhat shallow likely due to sternal incision and CT, oxymask Spo2 93%  Heart: tachycardic rhythm, mediastinal drains intact   Abdomen:  Soft, non-tender  Genitourinary:  urinary catheter with light yellow urine   Musculoskeletal:  no peripheral edema, RLE ACE wrap  Skin:  Skin warm, dry, sternal incision covered with occlusive dressing   Neurologic:  Alert and oriented x3, no cranial nerve deficits, normal strength and tone      Inpatient Medications:     Current Facility-Administered Medications:     0.9%  NaCl infusion (for blood administration), , Intravenous, Q24H PRN, SHIRLEY Torres    0.9%  NaCl infusion (for blood administration), , Intravenous, Q24H PRN, Trenton Harvey PA-C    acetaminophen oral solution 650 mg, 650 mg, Per OG tube, Q6H PRN, GILMAR Estrada    " acetaminophen tablet 650 mg, 650 mg, Oral, Q6H PRN, Lul Isaac MD, 650 mg at 02/22/23 1700    albumin human 5% bottle 12.5 g, 12.5 g, Intravenous, PRN, GILMAR Estrada    albuterol-ipratropium 2.5 mg-0.5 mg/3 mL nebulizer solution 3 mL, 3 mL, Nebulization, Q4H PRN, Nima Baez DO, 3 mL at 02/24/23 1810    aspirin EC tablet 81 mg, 81 mg, Oral, Daily, GILMAR Estrada, 81 mg at 02/25/23 0937    atorvastatin tablet 40 mg, 40 mg, Oral, Daily, Trenton Harvey PA-C, 40 mg at 02/25/23 0937    calcium carbonate 200 mg calcium (500 mg) chewable tablet 500 mg, 500 mg, Oral, Daily, Trenton Harvey PA-C, 500 mg at 02/25/23 0937    calcium gluconate 1 g in NS IVPB (premixed), 1 g, Intravenous, PRN, GILMAR Estrada    calcium gluconate 1 g in NS IVPB (premixed), 2 g, Intravenous, PRN, GILMAR Estrada    calcium gluconate 1 g in NS IVPB (premixed), 3 g, Intravenous, PRN, GILMAR Estrada    clevidipine (CLEVIPREX) 25 mg/50 mL infusion, 0-16 mg/hr, Intravenous, Continuous, Spencer Hagan MD, Last Rate: 4 mL/hr at 02/25/23 0330, 2 mg/hr at 02/25/23 0330    dextrose 10% bolus 125 mL 125 mL, 12.5 g, Intravenous, PRN, GILMAR Estrada    dextrose 10% bolus 250 mL 250 mL, 25 g, Intravenous, PRN, GILMAR Estrada    dextrose 5 % and 0.45 % NaCl infusion, , Intravenous, Continuous, GILMAR Estrada, Last Rate: 100 mL/hr at 02/24/23 2303, New Bag at 02/24/23 2303    docusate sodium capsule 100 mg, 100 mg, Oral, BID, GILMAR Estrada, 100 mg at 02/25/23 0937    ezetimibe tablet 10 mg, 10 mg, Oral, Daily, Trenton Harvey PA-C    FLUoxetine capsule 10 mg, 10 mg, Oral, Daily, Trenton Harvey PA-C, 10 mg at 02/25/23 1101    folic acid tablet 1 mg, 1 mg, Oral, Daily, Trenton Harvey PA-C, 1 mg at 02/25/23 0937    furosemide tablet 40 mg, 40 mg, Oral, Daily, ROSA MARIA Roman    gabapentin capsule 100 mg, 100 mg, Oral, TID, Trenton Harvey PA-C, 100 mg at 02/25/23 0937     hydrALAZINE injection 20 mg, 20 mg, Intravenous, Q4H PRN, Ga TIDWELL. Emily, NP    hydrALAZINE tablet 25 mg, 25 mg, Oral, Q8H, Trenton Harvey PA-C    HYDROcodone-acetaminophen 5-325 mg per tablet 1 tablet, 1 tablet, Oral, Q4H PRN, GILMAR Estrada, 1 tablet at 02/24/23 1708    HYDROcodone-acetaminophen 7.5-325 mg per tablet 1 tablet, 1 tablet, Oral, Q6H PRN, Ga Johnsono, NP    hydrOXYzine HCL tablet 25 mg, 25 mg, Oral, Q6H PRN, Trenton Harvey PA-C    insulin regular in 0.9 % NaCl 100 unit/100 mL (1 unit/mL) infusion, 1 Units/hr, Intravenous, Continuous, GILMAR Estrada, Last Rate: 1.5 mL/hr at 02/24/23 1600, 1.5 Units/hr at 02/24/23 1600    labetaloL injection 20 mg, 20 mg, Intravenous, Q4H PRN, Ga JohnsonoMANAN    lisinopriL tablet 5 mg, 5 mg, Oral, Daily, Trenton Harvey PA-C    magnesium sulfate 2g in water 50mL IVPB (premix), 2 g, Intravenous, PRN, GILMAR Estrada    magnesium sulfate 2g in water 50mL IVPB (premix), 4 g, Intravenous, PRN, GILMAR Estrada    meclizine tablet 25 mg, 25 mg, Oral, TID PRN, Trenton Harvey PA-C    metoprolol tartrate (LOPRESSOR) split tablet 12.5 mg, 12.5 mg, Oral, BID, GILMAR Estrada, 12.5 mg at 02/25/23 0937    morphine injection 2 mg, 2 mg, Intravenous, Q2H PRN, Ga Diaz NP, 2 mg at 02/25/23 0330    morphine injection 4 mg, 4 mg, Intravenous, Q4H PRN, GILMAR Estrada    mupirocin 2 % ointment, , Nasal, BID, GILMAR Estrada, Given at 02/25/23 1058    ondansetron injection 4 mg, 4 mg, Intravenous, Q4H PRN, GILMAR Estrada, 4 mg at 02/25/23 0502    oxyCODONE immediate release tablet 10 mg, 10 mg, Oral, Q4H PRN, GILMAR Estrada, 10 mg at 02/25/23 0502    pantoprazole EC tablet 40 mg, 40 mg, Oral, Daily, Trenton Harvey PA-C, 40 mg at 02/25/23 0937    potassium chloride 20 mEq in 100 mL IVPB (FOR CENTRAL LINE ADMINISTRATION ONLY), 20 mEq, Intravenous, PRN, GILMAR Estrada    potassium chloride 20 mEq in  100 mL IVPB (FOR CENTRAL LINE ADMINISTRATION ONLY), 40 mEq, Intravenous, PRN, GILMAR Estrada    potassium chloride 20 mEq in 100 mL IVPB (FOR CENTRAL LINE ADMINISTRATION ONLY), 20 mEq, Intravenous, PRN, GILMAR Estrada    prazosin capsule 3 mg, 3 mg, Oral, QHS, Lul Isaac MD, 3 mg at 02/23/23 2030    predniSONE tablet 2.5 mg, 2.5 mg, Oral, Daily, Trenton Harvey PA-C    QUEtiapine tablet 50 mg, 50 mg, Oral, QHS, Trenton Harvey PA-C    rOPINIRole tablet 1 mg, 1 mg, Oral, BID, Trenton Harvey PA-C, 1 mg at 02/25/23 0937    sodium phosphate 15 mmol in dextrose 5 % (D5W) 250 mL IVPB, 15 mmol, Intravenous, PRN, GILMAR Estrada    sodium phosphate 20.01 mmol in dextrose 5 % (D5W) 250 mL IVPB, 20.01 mmol, Intravenous, PRN, GILMAR Estrada    sodium phosphate 30 mmol in dextrose 5 % (D5W) 250 mL IVPB, 30 mmol, Intravenous, PRN, GILMAR Estrada    sucralfate tablet 1 g, 1 g, Oral, QID (AC & HS), GILMAR Estrada, 1 g at 02/25/23 1101    tiZANidine tablet 4 mg, 4 mg, Oral, Q8H, Trenton Harvey PA-C    zolpidem tablet 5 mg, 5 mg, Oral, Nightly PRN, Ga Diaz NP     Imaging:  X-Ray Chest 1 View   Final Result      Interval progression of left lower lung zone atelectasis.  Otherwise, no significant interval change with interval extubation.         Electronically signed by: Markie Yung   Date:    02/25/2023   Time:    07:45      X-Ray Chest AP Portable   Final Result      Postoperative changes of the chest with lines and tubes as described.         Electronically signed by: Indiana Epstein   Date:    02/24/2023   Time:    12:50      X-Ray Chest PA And Lateral   Final Result      No acute cardiopulmonary process identified.         Electronically signed by: Markie Yung   Date:    02/23/2023   Time:    18:35          Laboratory Data:  Recent Labs   Lab 02/19/23  0536 02/19/23  1949 02/25/23  0207   *   < > 137   K 4.2   < > 4.2   CL 98  --   --    CO2 30*   < > 22*   BUN  30*   < > 27.3*   CREATININE 1.56*   < > 1.55*   GLUCOSE  --    < > 106   CALCIUM 9.1   < > 9.3    < > = values in this interval not displayed.     Recent Labs   Lab 02/25/23  0207   WBC 11.0   HGB 11.3*   HCT 35.1*   *         Impression:   CKD III followed by nephrology in [facility]   CAD s/p CABG x2 vessel on 2/24  SLE on prednisone 5mg daily   HTN    Plan:   -Discontinue IVF and start home dose of Lasix oral 40mg daily   -Renal indices appear to be stable. He reports history of CKD III.   We will continue to monitor with you     Thank you very much for your consultation.     Kelley Chery NP  Nephrology  2/25/2023 11:12 AM

## 2023-02-25 NOTE — PLAN OF CARE
Problem: Physical Therapy  Goal: Physical Therapy Goal  Description: Goals to be met by: 3/0323     Patient will increase functional independence with mobility by performin. Pt will be ada with sit to supine and supine to sit  2. Pt will be sba with sit to stand  3. Pt will ambulate 120ft with a rw sba      Outcome: Ongoing, Progressing

## 2023-02-26 LAB
ABO + RH BLD: NORMAL
ABS NEUT (OLG): 8.61 X10(3)/MCL (ref 2.1–9.2)
ANION GAP SERPL CALC-SCNC: 9 MEQ/L
ANISOCYTOSIS BLD QL SMEAR: ABNORMAL
BLD PROD TYP BPU: NORMAL
BLOOD UNIT EXPIRATION DATE: NORMAL
BLOOD UNIT TYPE CODE: 6200
BUN SERPL-MCNC: 37 MG/DL (ref 8.4–25.7)
CALCIUM SERPL-MCNC: 8.7 MG/DL (ref 8.8–10)
CHLORIDE SERPL-SCNC: 105 MMOL/L (ref 98–107)
CO2 SERPL-SCNC: 22 MMOL/L (ref 23–31)
CREAT SERPL-MCNC: 1.98 MG/DL (ref 0.73–1.18)
CREAT/UREA NIT SERPL: 19
CROSSMATCH INTERPRETATION: NORMAL
DISPENSE STATUS: NORMAL
ERYTHROCYTE [DISTWIDTH] IN BLOOD BY AUTOMATED COUNT: 15.8 % (ref 11.5–17)
GFR SERPLBLD CREATININE-BSD FMLA CKD-EPI: 33 MLS/MIN/1.73/M2
GLUCOSE SERPL-MCNC: 110 MG/DL (ref 82–115)
HCT VFR BLD AUTO: 33.2 % (ref 42–52)
HGB BLD-MCNC: 10.4 G/DL (ref 14–18)
IMM GRANULOCYTES # BLD AUTO: 0.27 X10(3)/MCL (ref 0–0.04)
IMM GRANULOCYTES NFR BLD AUTO: 2.7 %
INSTRUMENT WBC (OLG): 9.9 X10(3)/MCL
LYMPHOCYTES NFR BLD MANUAL: 0.3 X10(3)/MCL
LYMPHOCYTES NFR BLD MANUAL: 3 %
MCH RBC QN AUTO: 29.2 PG
MCHC RBC AUTO-ENTMCNC: 31.3 G/DL (ref 33–36)
MCV RBC AUTO: 93.3 FL (ref 80–94)
MONOCYTES NFR BLD MANUAL: 0.99 X10(3)/MCL (ref 0.1–1.3)
MONOCYTES NFR BLD MANUAL: 10 %
NEUTROPHILS NFR BLD MANUAL: 87 %
NRBC BLD AUTO-RTO: 0 %
PLATELET # BLD AUTO: 91 X10(3)/MCL (ref 130–400)
PLATELET # BLD EST: ABNORMAL 10*3/UL
PMV BLD AUTO: 11.2 FL (ref 7.4–10.4)
POCT GLUCOSE: 103 MG/DL (ref 70–110)
POCT GLUCOSE: 117 MG/DL (ref 70–110)
POTASSIUM SERPL-SCNC: 4.8 MMOL/L (ref 3.5–5.1)
RBC # BLD AUTO: 3.56 X10(6)/MCL (ref 4.7–6.1)
RBC MORPH BLD: ABNORMAL
SODIUM SERPL-SCNC: 136 MMOL/L (ref 136–145)
UNIT NUMBER: NORMAL
WBC # SPEC AUTO: 9.9 X10(3)/MCL (ref 4.5–11.5)

## 2023-02-26 PROCEDURE — 63600175 PHARM REV CODE 636 W HCPCS: Performed by: INTERNAL MEDICINE

## 2023-02-26 PROCEDURE — 25000003 PHARM REV CODE 250: Performed by: INTERNAL MEDICINE

## 2023-02-26 PROCEDURE — 25000003 PHARM REV CODE 250: Performed by: PHYSICIAN ASSISTANT

## 2023-02-26 PROCEDURE — 25000003 PHARM REV CODE 250: Performed by: NURSE PRACTITIONER

## 2023-02-26 PROCEDURE — 25000242 PHARM REV CODE 250 ALT 637 W/ HCPCS: Performed by: STUDENT IN AN ORGANIZED HEALTH CARE EDUCATION/TRAINING PROGRAM

## 2023-02-26 PROCEDURE — 27000221 HC OXYGEN, UP TO 24 HOURS

## 2023-02-26 PROCEDURE — 99024 PR POST-OP FOLLOW-UP VISIT: ICD-10-PCS | Mod: ,,, | Performed by: PHYSICIAN ASSISTANT

## 2023-02-26 PROCEDURE — 94799 UNLISTED PULMONARY SVC/PX: CPT

## 2023-02-26 PROCEDURE — 94640 AIRWAY INHALATION TREATMENT: CPT

## 2023-02-26 PROCEDURE — 85025 COMPLETE CBC W/AUTO DIFF WBC: CPT | Performed by: PHYSICIAN ASSISTANT

## 2023-02-26 PROCEDURE — 99024 POSTOP FOLLOW-UP VISIT: CPT | Mod: ,,, | Performed by: PHYSICIAN ASSISTANT

## 2023-02-26 PROCEDURE — 85027 COMPLETE CBC AUTOMATED: CPT | Performed by: PHYSICIAN ASSISTANT

## 2023-02-26 PROCEDURE — 36410 VNPNXR 3YR/> PHY/QHP DX/THER: CPT

## 2023-02-26 PROCEDURE — 99900035 HC TECH TIME PER 15 MIN (STAT)

## 2023-02-26 PROCEDURE — 80048 BASIC METABOLIC PNL TOTAL CA: CPT | Performed by: INTERNAL MEDICINE

## 2023-02-26 PROCEDURE — 21400001 HC TELEMETRY ROOM

## 2023-02-26 PROCEDURE — C1751 CATH, INF, PER/CENT/MIDLINE: HCPCS

## 2023-02-26 PROCEDURE — 76937 US GUIDE VASCULAR ACCESS: CPT

## 2023-02-26 RX ORDER — TAMSULOSIN HYDROCHLORIDE 0.4 MG/1
0.4 CAPSULE ORAL DAILY
Status: DISCONTINUED | OUTPATIENT
Start: 2023-02-26 | End: 2023-02-27

## 2023-02-26 RX ORDER — SODIUM CHLORIDE 0.9 % (FLUSH) 0.9 %
10 SYRINGE (ML) INJECTION EVERY 6 HOURS
Status: DISCONTINUED | OUTPATIENT
Start: 2023-02-26 | End: 2023-03-03 | Stop reason: HOSPADM

## 2023-02-26 RX ORDER — DEXTROSE MONOHYDRATE AND SODIUM CHLORIDE 5; .9 G/100ML; G/100ML
INJECTION, SOLUTION INTRAVENOUS CONTINUOUS
Status: DISCONTINUED | OUTPATIENT
Start: 2023-02-26 | End: 2023-03-01

## 2023-02-26 RX ORDER — SODIUM CHLORIDE 0.9 % (FLUSH) 0.9 %
10 SYRINGE (ML) INJECTION
Status: DISCONTINUED | OUTPATIENT
Start: 2023-02-26 | End: 2023-03-03 | Stop reason: HOSPADM

## 2023-02-26 RX ADMIN — ATORVASTATIN CALCIUM 40 MG: 40 TABLET, FILM COATED ORAL at 08:02

## 2023-02-26 RX ADMIN — MUPIROCIN: 20 OINTMENT TOPICAL at 08:02

## 2023-02-26 RX ADMIN — ROPINIROLE HYDROCHLORIDE 1 MG: 0.25 TABLET, FILM COATED ORAL at 09:02

## 2023-02-26 RX ADMIN — Medication 81 MG: at 08:02

## 2023-02-26 RX ADMIN — ROPINIROLE HYDROCHLORIDE 1 MG: 0.25 TABLET, FILM COATED ORAL at 10:02

## 2023-02-26 RX ADMIN — CALCIUM CARBONATE (ANTACID) CHEW TAB 500 MG 500 MG: 500 CHEW TAB at 08:02

## 2023-02-26 RX ADMIN — QUETIAPINE FUMARATE 50 MG: 25 TABLET ORAL at 09:02

## 2023-02-26 RX ADMIN — EZETIMIBE 10 MG: 10 TABLET ORAL at 08:02

## 2023-02-26 RX ADMIN — IPRATROPIUM BROMIDE AND ALBUTEROL SULFATE 3 ML: 2.5; .5 SOLUTION RESPIRATORY (INHALATION) at 08:02

## 2023-02-26 RX ADMIN — GABAPENTIN 100 MG: 100 CAPSULE ORAL at 09:02

## 2023-02-26 RX ADMIN — MUPIROCIN: 20 OINTMENT TOPICAL at 09:02

## 2023-02-26 RX ADMIN — METOPROLOL TARTRATE 12.5 MG: 25 TABLET, FILM COATED ORAL at 08:02

## 2023-02-26 RX ADMIN — SUCRALFATE 1 G: 1 TABLET ORAL at 06:02

## 2023-02-26 RX ADMIN — FLUOXETINE 10 MG: 10 CAPSULE ORAL at 08:02

## 2023-02-26 RX ADMIN — OXYCODONE HYDROCHLORIDE 10 MG: 5 TABLET ORAL at 04:02

## 2023-02-26 RX ADMIN — OXYCODONE HYDROCHLORIDE 10 MG: 5 TABLET ORAL at 08:02

## 2023-02-26 RX ADMIN — ACETAMINOPHEN 325MG 650 MG: 325 TABLET ORAL at 04:02

## 2023-02-26 RX ADMIN — SUCRALFATE 1 G: 1 TABLET ORAL at 09:02

## 2023-02-26 RX ADMIN — GABAPENTIN 100 MG: 100 CAPSULE ORAL at 08:02

## 2023-02-26 RX ADMIN — DEXTROSE AND SODIUM CHLORIDE: 5; 900 INJECTION, SOLUTION INTRAVENOUS at 08:02

## 2023-02-26 RX ADMIN — DOCUSATE SODIUM 100 MG: 100 CAPSULE, LIQUID FILLED ORAL at 09:02

## 2023-02-26 RX ADMIN — TAMSULOSIN HYDROCHLORIDE 0.4 MG: 0.4 CAPSULE ORAL at 09:02

## 2023-02-26 RX ADMIN — FOLIC ACID 1 MG: 1 TABLET ORAL at 08:02

## 2023-02-26 RX ADMIN — PANTOPRAZOLE SODIUM 40 MG: 40 TABLET, DELAYED RELEASE ORAL at 08:02

## 2023-02-26 RX ADMIN — DOCUSATE SODIUM 100 MG: 100 CAPSULE, LIQUID FILLED ORAL at 08:02

## 2023-02-26 NOTE — PROGRESS NOTES
NEPHROLOGY: Progress     82-year-old [place] with stage III CKD be, GFR between 1.6 and 2.0 and GFR between 33 and 44.  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  Chest and mediastinal tubes have just been removed, he is in no distress            Current Facility-Administered Medications:     0.9%  NaCl infusion (for blood administration), , Intravenous, Q24H PRN, SHIRLEY Torres    0.9%  NaCl infusion (for blood administration), , Intravenous, Q24H PRN, Trenton Harvey PA-C    acetaminophen oral solution 650 mg, 650 mg, Per OG tube, Q6H PRN, GILMAR Estrada    acetaminophen tablet 650 mg, 650 mg, Oral, Q6H PRN, Lul Isaac MD, 650 mg at 02/22/23 1700    albumin human 5% bottle 12.5 g, 12.5 g, Intravenous, PRN, GILMAR Estrada    albuterol-ipratropium 2.5 mg-0.5 mg/3 mL nebulizer solution 3 mL, 3 mL, Nebulization, Q4H PRN, Nima Baez DO, 3 mL at 02/24/23 1810    aspirin EC tablet 81 mg, 81 mg, Oral, Daily, GILMAR Estrada, 81 mg at 02/25/23 0937    atorvastatin tablet 40 mg, 40 mg, Oral, Daily, Trenton Harvey PA-C, 40 mg at 02/25/23 0937    calcium carbonate 200 mg calcium (500 mg) chewable tablet 500 mg, 500 mg, Oral, Daily, Trenton Harvey PA-C, 500 mg at 02/25/23 0937    calcium gluconate 1 g in NS IVPB (premixed), 1 g, Intravenous, PRN, GILMAR Estrada    calcium gluconate 1 g in NS IVPB (premixed), 2 g, Intravenous, PRN, GILMAR Estrada    calcium gluconate 1 g in NS IVPB (premixed), 3 g, Intravenous, PRN, GILMAR Estrada    dextrose 10% bolus 125 mL 125 mL, 12.5 g, Intravenous, PRN, Leon Reyes PA    dextrose 10% bolus 250 mL 250 mL, 25 g, Intravenous, PRN, GILMAR Estrada    docusate sodium capsule 100 mg, 100 mg, Oral, BID, Leon Reyes,  PA, 100 mg at 02/25/23 2114    ezetimibe tablet 10 mg, 10 mg, Oral, Daily, Trenton Harvey PA-C    FLUoxetine capsule 10 mg, 10 mg, Oral, Daily, Trenton Harvey PA-C, 10 mg at 02/25/23 1101    folic acid tablet 1 mg, 1 mg, Oral, Daily, Trenton Harvey PA-C, 1 mg at 02/25/23 0937    furosemide tablet 40 mg, 40 mg, Oral, Daily, Kelley Chery, AGNP, 40 mg at 02/25/23 1120    gabapentin capsule 100 mg, 100 mg, Oral, TID, Trenton Harvey PA-C, 100 mg at 02/25/23 2114    hydrALAZINE injection 20 mg, 20 mg, Intravenous, Q4H PRN, Ga Johnsono, MANAN    hydrALAZINE tablet 25 mg, 25 mg, Oral, Q8H, Trenton Harvey PA-C    HYDROcodone-acetaminophen 5-325 mg per tablet 1 tablet, 1 tablet, Oral, Q4H PRN, GILMAR Estrada, 1 tablet at 02/24/23 1708    HYDROcodone-acetaminophen 7.5-325 mg per tablet 1 tablet, 1 tablet, Oral, Q6H PRN, Ga Diaz NP    hydrOXYzine HCL tablet 25 mg, 25 mg, Oral, Q6H PRN, Trenton Harvey PA-C    labetaloL injection 20 mg, 20 mg, Intravenous, Q4H PRN, Ga Johnsono, NP    lisinopriL tablet 5 mg, 5 mg, Oral, Daily, Trenton Harvey PA-C    magnesium sulfate 2g in water 50mL IVPB (premix), 2 g, Intravenous, PRN, GILMAR Estrada    magnesium sulfate 2g in water 50mL IVPB (premix), 4 g, Intravenous, PRN, GILMAR Estrada    meclizine tablet 25 mg, 25 mg, Oral, TID PRN, Trenton Harvey PA-C    metoprolol tartrate (LOPRESSOR) split tablet 12.5 mg, 12.5 mg, Oral, BID, GILMAR Estrada, 12.5 mg at 02/25/23 2114    morphine injection 2 mg, 2 mg, Intravenous, Q2H PRN, Ga TIDWELL. Emily, NP, 2 mg at 02/25/23 0330    mupirocin 2 % ointment, , Nasal, BID, GILMAR Estrada, Given at 02/25/23 2116    ondansetron injection 4 mg, 4 mg, Intravenous, Q4H PRN, GILMAR Estrada, 4 mg at 02/25/23 1326    oxyCODONE immediate release tablet 10 mg, 10 mg, Oral, Q4H PRN, GILMAR Estrada, 10 mg at 02/26/23 0427    pantoprazole EC tablet 40 mg, 40 mg, Oral, Daily, Trenton BUTT  "STEPHANIE Harvey, 40 mg at 02/25/23 0937    potassium chloride 20 mEq in 100 mL IVPB (FOR CENTRAL LINE ADMINISTRATION ONLY), 20 mEq, Intravenous, PRN, GILMAR Estrada    potassium chloride 20 mEq in 100 mL IVPB (FOR CENTRAL LINE ADMINISTRATION ONLY), 40 mEq, Intravenous, PRN, GILMAR Estrada    potassium chloride 20 mEq in 100 mL IVPB (FOR CENTRAL LINE ADMINISTRATION ONLY), 20 mEq, Intravenous, PRN, GILMAR Estrada    prazosin capsule 3 mg, 3 mg, Oral, QHS, Lul Isaac MD, 3 mg at 02/25/23 2114    predniSONE tablet 2.5 mg, 2.5 mg, Oral, Daily, Trenton Harvey PA-C    QUEtiapine tablet 50 mg, 50 mg, Oral, QHS, Trenton Harvey PA-C, 50 mg at 02/25/23 2114    rOPINIRole tablet 1 mg, 1 mg, Oral, BID, Trenton Harvey PA-C, 1 mg at 02/25/23 2114    sodium phosphate 15 mmol in dextrose 5 % (D5W) 250 mL IVPB, 15 mmol, Intravenous, PRN, GILMAR Estrada    sodium phosphate 20.01 mmol in dextrose 5 % (D5W) 250 mL IVPB, 20.01 mmol, Intravenous, PRN, GILMAR Estrada    sodium phosphate 30 mmol in dextrose 5 % (D5W) 250 mL IVPB, 30 mmol, Intravenous, PRN, GILMAR Estrada    sucralfate tablet 1 g, 1 g, Oral, QID (AC & HS), GILMAR Estrada, 1 g at 02/26/23 0618    tiZANidine tablet 4 mg, 4 mg, Oral, Q8H PRN, Trenton Harvey PA-C    zolpidem tablet 5 mg, 5 mg, Oral, Nightly PRN, Ga Diaz NP        BP (!) 86/47   Pulse 100   Temp 98.2 °F (36.8 °C) (Oral)   Resp (!) 21   Ht 5' 10" (1.778 m)   Wt 90.2 kg (198 lb 13.7 oz)   SpO2 (!) 89%   BMI 28.53 kg/m²     Physical Exam:    GEN: Awake and appropriate.   HEENT: Atraumatic. EOMI, no icterus  NECK : No JVD  CARD : RRR s rub or gallop, chest tube and mediastinal tube just removed.  LUNGS : Clear to auscultation  ABD : Soft,non-tender. BS active  EXT : No pitting edema.           Intake/Output Summary (Last 24 hours) at 2/26/2023 0802  Last data filed at 2/26/2023 0556  Gross per 24 hour   Intake 592.5 ml   Output 965 ml   Net " -372.5 ml         Laboratory:  Recent Results (from the past 24 hour(s))   POCT glucose    Collection Time: 02/25/23  9:52 AM   Result Value Ref Range    POCT Glucose 125 (H) 70 - 110 mg/dL   POCT glucose    Collection Time: 02/25/23  3:49 PM   Result Value Ref Range    POCT Glucose 107 70 - 110 mg/dL   POCT glucose    Collection Time: 02/25/23  8:36 PM   Result Value Ref Range    POCT Glucose 123 (H) 70 - 110 mg/dL   POCT glucose    Collection Time: 02/26/23 12:25 AM   Result Value Ref Range    POCT Glucose 103 70 - 110 mg/dL   Basic Metabolic Panel    Collection Time: 02/26/23  1:40 AM   Result Value Ref Range    Sodium Level 136 136 - 145 mmol/L    Potassium Level 4.8 3.5 - 5.1 mmol/L    Chloride 105 98 - 107 mmol/L    Carbon Dioxide 22 (L) 23 - 31 mmol/L    Glucose Level 110 82 - 115 mg/dL    Blood Urea Nitrogen 37.0 (H) 8.4 - 25.7 mg/dL    Creatinine 1.98 (H) 0.73 - 1.18 mg/dL    BUN/Creatinine Ratio 19     Calcium Level Total 8.7 (L) 8.8 - 10.0 mg/dL    Anion Gap 9.0 mEq/L    eGFR 33 mls/min/1.73/m2   CBC with Differential    Collection Time: 02/26/23  1:40 AM   Result Value Ref Range    WBC 9.9 4.5 - 11.5 x10(3)/mcL    RBC 3.56 (L) 4.70 - 6.10 x10(6)/mcL    Hgb 10.4 (L) 14.0 - 18.0 g/dL    Hct 33.2 (L) 42.0 - 52.0 %    MCV 93.3 80.0 - 94.0 fL    MCH 29.2 pg    MCHC 31.3 (L) 33.0 - 36.0 g/dL    RDW 15.8 11.5 - 17.0 %    Platelet 91 (L) 130 - 400 x10(3)/mcL    MPV 11.2 (H) 7.4 - 10.4 fL    IG# 0.27 (H) 0 - 0.04 x10(3)/mcL    IG% 2.7 %    NRBC% 0.0 %   Manual Differential    Collection Time: 02/26/23  1:40 AM   Result Value Ref Range    Neut Man 87 %    Lymph Man 3 %    Monocyte Man 10 %    Instr WBC 9.9 x10(3)/mcL    Abs Mono 0.99 0.1 - 1.3 x10(3)/mcL    Abs Lymp 0.297 (L) 0.6 - 4.6 x10(3)/mcL    Abs Neut 8.613 2.1 - 9.2 x10(3)/mcL    RBC Morph Abnormal (A) Normal    Anisocyte 1+ (A) (none)    Platelet Est Decreased (A) Normal, Adequate         Assessment/Plan:   CKD 3B with slight worsening of creatinine  Mild  intravascular volume depletion-poor oral intake  Anemia of chronic disease  History of systemic lupus   Hypertension    We will cautiously replace D5 normal x1 L today.  He does appear to be mildly volume depleted intravascularly.  Hold Lasix this morning.    Darinel Bedoya MD, [facility]

## 2023-02-26 NOTE — PROGRESS NOTES
[facility] - 3rd Floor Medical Telemetry  Cardiology  Progress Note    Patient Name: Hunter Navarrete  MRN: [id number]  Admission Date: 2/19/2023  Hospital Length of Stay: 7 days  Code Status: Full Code   Attending Physician: Dominick Weinstein MD   Primary Care Physician: Mariella Bethea MD  Expected Discharge Date:   Principal Problem:<principal problem not specified>    Subjective:   Chief Complaint:  Chest Pain (Known MV CAD)     HPI:   Mr. Navarrete is an 81 y/o male, followed by Dr. Loredo who presented to [facility] ER with c/o chest pain. HS troponin 1149; therefore he was transferred to [facility] for cardiology services where he underwent LHC revealing multivessel disease and elevated LVEDP. He was started on diuretics, Plavix was placed on hold and he was transferred to [facility] for CABG evaluation. Patient is reporting mild chest tightness 3/10 currently    Hospital Course:   2.21.23:  Vitals Stable. Shoulder Pain this morning. On Nitro & Heparin Infusion. EKG with no overt ischemic changes.   2.22.23: Patient resting in bed. NAD. Denies CP/SOB. Creatinine 1.93 today- mildly decreased from 2.07 yesterday with addition of IVFs. H/H downtrending- 8.2/25.3 from 8.8/27.1 yesterday.   2.23.23: Patient sitting up in bedside chair. NAD. VSS. Creatinine improved to 1.65 today. H/H    10.5/31.5 post transfusion 2  units PRBCs.  2.24.23: Patient underwent 2V CABG today and is currently in ICU on MV, requiring pressor support. Mediastinal drain patent. He received 2 units of PRBCs intraop  2.25.23: Awake in bed. C/o incisional sternal pain. CT x 2 patent. Pressors have been weaned off. Currently on cleviprex drip  2.26.23: Patient awake in bed. Mediastinal drains have been removed. Creatinine has bumped. Nephrology adding IVF x 1L due to IVVD    PMH: ICMO, Native CAD, HLD, HTN, CARLYLE, CKD, lupus anticoagulant inhibitor syndrome, VHD- mild AS, AAA, pseudoaneurysm RFA  PSH: right total hip replacement, LHC, back  surgery, spinal cord stimulator implant, knee surgery, cataract surgery  Family History: Brother- cancer, heart disease, lung cancer; father heart disease  Social History: Former smoker, occasional ETOH; denies illicit drug use     Previous Cardiac Diagnostics:   CUS 02/20/23:  The right internal carotid artery demonstrated 50-69% stenosis.  The left internal carotid artery demonstrated 50-69% stenosis.  Bilateral vertebral arteries were patent with antegrade flow.     [facility] 02/15/2023:  LM 0% stenosis  mLAD proximal 70% ISR  D1 ostial 50% stenosis  D2 ostial 50% stenosis  Lcx: previous stent; proximal Lcx  90% ISR; mid Lcx 30% stenosis  OM1 proximal 80% stenosis  RCA patent stents to ostium. Mid RCA proximal subsection- 60% stenosis; Mid RCA distal subsection 70& stenosis; distal RCA proximal subsection 100% stenosis. Fills left to right     [facility]E 02/16/2023:  Global LV SF is borderline normal. LVEF 45-50%. LA is mildly enlarged. Moderate calcification of the aortic valve is noted. Mild AS is present. Mild to moderate AR. Mild to moderate MAC is noted. Mild MR. Trace TR. PASP 40 mmHg. Optison used to enhance endocardial border.      [facility] 09/22/2022:  LM 0% stenosis  mLAD proximal subsection 50% ISR  D1 ostial 50% stenosis  D2 ostial 50% stenosis  pLCx mid subsection 70% ISR reduced to 10%, Preprocedure AMALIA III flow  noted. Post procedure AMALIA III was present. Lesion previous treated on 7/22/21 with PTA/DAYAN  OM1 proximal 80% stenosis  mLCx distal subsection 30% stenosis  RCA proximal RCA stent widely patent  mRCA proximal subsection 60% subsection  mRCA distal subsection 70% stenosis  dRCA proximal subsection 100% stenosis. Fills left to right     CUS 05/13/2022:  60-79% stenosis in pRICA and mLICA  Antegrade flow to bilateral vertebral arteries    Review of Systems   Cardiovascular:  Negative for dyspnea on exertion and irregular heartbeat.        Incisional Chest pain   All other systems reviewed and are  negative.    Objective:     Vital Signs (Most Recent):  Temp: 98.1 °F (36.7 °C) (02/26/23 0800)  Pulse: (!) 111 (02/26/23 0840)  Resp: 16 (02/26/23 0851)  BP: (!) 119/53 (02/26/23 0800)  SpO2: 95 % (02/26/23 0840)   Vital Signs (24h Range):  Temp:  [97.6 °F (36.4 °C)-98.4 °F (36.9 °C)] 98.1 °F (36.7 °C)  Pulse:  [] 111  Resp:  [14-28] 16  SpO2:  [88 %-98 %] 95 %  BP: ()/(47-69) 119/53     Weight: 90.2 kg (198 lb 13.7 oz)  Body mass index is 28.53 kg/m².    SpO2: 95 %         Intake/Output Summary (Last 24 hours) at 2/26/2023 1100  Last data filed at 2/26/2023 0800  Gross per 24 hour   Intake 593.5 ml   Output 885 ml   Net -291.5 ml         Lines/Drains/Airways       Central Venous Catheter Line  Duration              Introducer Single Lumen 02/24/23 0903 2 days     Introducer with Double Lumen 02/24/23 0903 2 days    Introducer 02/24/23 0903 2 days              Peripheral Intravenous Line  Duration                  Peripheral IV - Single Lumen 02/21/23 0800 20 G Anterior;Left;Proximal Forearm 5 days                    Significant Labs:   Recent Results (from the past 72 hour(s))   MRSA PCR    Collection Time: 02/24/23  2:16 AM   Result Value Ref Range    MRSA PCR SCRN ([facility]) Not Detected Not Detected   APTT    Collection Time: 02/24/23  3:32 AM   Result Value Ref Range    PTT 82.6 (H) 23.2 - 33.7 seconds   PTT Heparin Monitoring    Collection Time: 02/24/23  3:32 AM   Result Value Ref Range    PTT Heparin Monitor 66.8 (H) 23.2 - 33.7 seconds   CBC with Differential    Collection Time: 02/24/23  3:32 AM   Result Value Ref Range    WBC 5.0 4.5 - 11.5 x10(3)/mcL    RBC 3.48 (L) 4.70 - 6.10 x10(6)/mcL    Hgb 10.1 (L) 14.0 - 18.0 g/dL    Hct 30.9 (L) 42.0 - 52.0 %    MCV 88.8 80.0 - 94.0 fL    MCH 29.0 pg    MCHC 32.7 (L) 33.0 - 36.0 g/dL    RDW 15.4 11.5 - 17.0 %    Platelet 146 130 - 400 x10(3)/mcL    MPV 11.5 (H) 7.4 - 10.4 fL    Neut % 58.9 %    Lymph % 24.2 %    Mono % 15.5 %    Eos % 0.0 %    Basophil %  0.4 %    Lymph # 1.20 0.6 - 4.6 x10(3)/mcL    Neut # 2.92 2.1 - 9.2 x10(3)/mcL    Mono # 0.77 0.1 - 1.3 x10(3)/mcL    Eos # 0.00 0 - 0.9 x10(3)/mcL    Baso # 0.02 0 - 0.2 x10(3)/mcL    IG# 0.05 (H) 0 - 0.04 x10(3)/mcL    IG% 1.0 %    NRBC% 0.0 %   POCT ARTERIAL BLOOD GAS    Collection Time: 02/24/23  7:23 AM   Result Value Ref Range    POC PH 7.37 7.35 - 7.45    POC PCO2 45 35 - 45 mmHg    POC PO2 94 80 - 100 mmHg    POC HEMOGLOBIN 11.2 (A) 12.0 - 16.0 g/dL    POC SATURATED O2 97.1 %    POC O2Hb 95.2 94.0 - 97.0 %    POC COHb 2.1 %    POC MetHb 0.70 0.40 - 1.5 %    POC Potassium 4.3 3.5 - 5.0 mmol/l    POC Sodium 135 (A) 137 - 145 mmol/l    POC Ionized Calcium 1.26 (A) 1.12 - 1.23 mmol/l    Correct Temperature (PH) 7.37 7.35 - 7.45    Corrected Temperature (pCO2) 45 35 - 45 mmHg    Corrected Temperature (pO2) 94 80 - 100 mmHg    POC HCO3 26.0 22.0 - 26.0 mmol/l    Base Deficit 0.40 -2.0 - 2.0 mmol/l    POC Temp 37.0 °C    Specimen source Arterial sample    POCT glucose    Collection Time: 02/24/23  7:32 AM   Result Value Ref Range    POCT Glucose 95 70 - 110 mg/dL   Basic Metabolic Panel    Collection Time: 02/24/23  8:58 AM   Result Value Ref Range    Sodium Level 133 (L) 136 - 145 mmol/L    Potassium Level 5.6 (H) 3.5 - 5.1 mmol/L    Chloride 105 98 - 107 mmol/L    Carbon Dioxide 24 23 - 31 mmol/L    Glucose Level 121 (H) 82 - 115 mg/dL    Blood Urea Nitrogen 28.8 (H) 8.4 - 25.7 mg/dL    Creatinine 1.36 (H) 0.73 - 1.18 mg/dL    BUN/Creatinine Ratio 21     Calcium Level Total 9.0 8.8 - 10.0 mg/dL    Anion Gap 4.0 mEq/L    eGFR 52 mls/min/1.73/m2   CBC with Differential    Collection Time: 02/24/23  8:58 AM   Result Value Ref Range    WBC 6.8 4.5 - 11.5 x10(3)/mcL    RBC 3.09 (L) 4.70 - 6.10 x10(6)/mcL    Hgb 9.1 (L) 14.0 - 18.0 g/dL    Hct 28.1 (L) 42.0 - 52.0 %    MCV 90.9 80.0 - 94.0 fL    MCH 29.4 pg    MCHC 32.4 (L) 33.0 - 36.0 g/dL    RDW 15.1 11.5 - 17.0 %    Platelet 66 (L) 130 - 400 x10(3)/mcL    MPV 11.2  (H) 7.4 - 10.4 fL    Neut % 78.7 %    Lymph % 13.6 %    Mono % 4.4 %    Eos % 0.1 %    Basophil % 0.1 %    Lymph # 0.92 0.6 - 4.6 x10(3)/mcL    Neut # 5.30 2.1 - 9.2 x10(3)/mcL    Mono # 0.30 0.1 - 1.3 x10(3)/mcL    Eos # 0.01 0 - 0.9 x10(3)/mcL    Baso # 0.01 0 - 0.2 x10(3)/mcL    IG# 0.21 (H) 0 - 0.04 x10(3)/mcL    IG% 3.1 %    NRBC% 0.0 %   Protime-INR    Collection Time: 02/24/23  8:58 AM   Result Value Ref Range    PT 19.9 (H) 12.5 - 14.5 seconds    INR 1.72 (H) 0.00 - 1.30   APTT    Collection Time: 02/24/23  8:58 AM   Result Value Ref Range    PTT 63.7 (H) 23.2 - 33.7 seconds   Fibrinogen    Collection Time: 02/24/23  9:46 AM   Result Value Ref Range    Fibrinogen 384.0 210.0 - 463.0 mg/dL   POCT glucose    Collection Time: 02/24/23 11:17 AM   Result Value Ref Range    POCT Glucose 89 70 - 110 mg/dL   POCT glucose    Collection Time: 02/24/23 12:09 PM   Result Value Ref Range    POCT Glucose 141 (H) 70 - 110 mg/dL   POCT ARTERIAL BLOOD GAS    Collection Time: 02/24/23 12:12 PM   Result Value Ref Range    POC PH 7.38     POC PCO2 40 mmHg    POC PO2 61 (A) mmHg    POC SATURATED O2 90 %    POC Potassium 4.6 mmol/l    POC Sodium 134 (A) 137 - 145 mmol/l    POC Ionized Calcium 1.36 (A) 1.12 - 1.23 mmol/l    POC HCO3 23.7 mmol/l    Base Deficit -1.3 mmol/l    POC Temp 37.0 C    Specimen source Arterial sample    Basic Metabolic Panel    Collection Time: 02/24/23 12:52 PM   Result Value Ref Range    Sodium Level 134 (L) 136 - 145 mmol/L    Potassium Level 4.6 3.5 - 5.1 mmol/L    Chloride 107 98 - 107 mmol/L    Carbon Dioxide 21 (L) 23 - 31 mmol/L    Glucose Level 157 (H) 82 - 115 mg/dL    Blood Urea Nitrogen 28.7 (H) 8.4 - 25.7 mg/dL    Creatinine 1.42 (H) 0.73 - 1.18 mg/dL    BUN/Creatinine Ratio 20     Calcium Level Total 9.7 8.8 - 10.0 mg/dL    Anion Gap 6.0 mEq/L    eGFR 50 mls/min/1.73/m2   CBC with Differential    Collection Time: 02/24/23 12:52 PM   Result Value Ref Range    WBC 9.2 4.5 - 11.5 x10(3)/mcL     RBC 4.10 (L) 4.70 - 6.10 x10(6)/mcL    Hgb 12.0 (L) 14.0 - 18.0 g/dL    Hct 36.3 (L) 42.0 - 52.0 %    MCV 88.5 80.0 - 94.0 fL    MCH 29.3 pg    MCHC 33.1 33.0 - 36.0 g/dL    RDW 15.0 11.5 - 17.0 %    Platelet 99 (L) 130 - 400 x10(3)/mcL    MPV 11.4 (H) 7.4 - 10.4 fL    Neut % 84.2 %    Lymph % 6.9 %    Mono % 7.4 %    Eos % 0.0 %    Basophil % 0.1 %    Lymph # 0.64 0.6 - 4.6 x10(3)/mcL    Neut # 7.75 2.1 - 9.2 x10(3)/mcL    Mono # 0.68 0.1 - 1.3 x10(3)/mcL    Eos # 0.00 0 - 0.9 x10(3)/mcL    Baso # 0.01 0 - 0.2 x10(3)/mcL    IG# 0.13 (H) 0 - 0.04 x10(3)/mcL    IG% 1.4 %    NRBC% 0.0 %   POCT glucose    Collection Time: 02/24/23 12:56 PM   Result Value Ref Range    POCT Glucose 153 (H) 70 - 110 mg/dL   POCT glucose    Collection Time: 02/24/23  1:50 PM   Result Value Ref Range    POCT Glucose 102 70 - 110 mg/dL   POCT glucose    Collection Time: 02/24/23  2:49 PM   Result Value Ref Range    POCT Glucose 145 (H) 70 - 110 mg/dL   POCT glucose    Collection Time: 02/24/23  3:51 PM   Result Value Ref Range    POCT Glucose 141 (H) 70 - 110 mg/dL   POCT ARTERIAL BLOOD GAS    Collection Time: 02/24/23  3:51 PM   Result Value Ref Range    POC PH 7.35     POC PCO2 43 mmHg    POC PO2 71 (A) mmHg    POC SATURATED O2 93 %    POC Potassium 4.8 mmol/l    POC Sodium 135 (A) 137 - 145 mmol/l    POC Ionized Calcium 1.29 (A) 1.12 - 1.23 mmol/l    POC HCO3 23.7 mmol/l    Base Deficit -2.0 mmol/l    POC Temp 37.0 C    Specimen source Arterial sample    Transesophageal echo (VICKY)    Collection Time: 02/24/23  3:51 PM   Result Value Ref Range    BSA 2.06 m2   Hemoglobin and Hematocrit    Collection Time: 02/24/23  4:50 PM   Result Value Ref Range    Hgb 11.6 (L) 14.0 - 18.0 g/dL    Hct 35.0 (L) 42.0 - 52.0 %   Potassium    Collection Time: 02/24/23  4:50 PM   Result Value Ref Range    Potassium Level 4.5 3.5 - 5.1 mmol/L   POCT glucose    Collection Time: 02/24/23  4:56 PM   Result Value Ref Range    POCT Glucose 124 (H) 70 - 110 mg/dL    POCT glucose    Collection Time: 02/24/23  6:03 PM   Result Value Ref Range    POCT Glucose 129 (H) 70 - 110 mg/dL   POCT glucose    Collection Time: 02/24/23  7:06 PM   Result Value Ref Range    POCT Glucose 130 (H) 70 - 110 mg/dL   POCT glucose    Collection Time: 02/24/23  8:12 PM   Result Value Ref Range    POCT Glucose 130 (H) 70 - 110 mg/dL   POCT glucose    Collection Time: 02/24/23  9:02 PM   Result Value Ref Range    POCT Glucose 122 (H) 70 - 110 mg/dL   POCT glucose    Collection Time: 02/24/23 10:00 PM   Result Value Ref Range    POCT Glucose 121 (H) 70 - 110 mg/dL   POCT glucose    Collection Time: 02/24/23 11:04 PM   Result Value Ref Range    POCT Glucose 122 (H) 70 - 110 mg/dL   POCT glucose    Collection Time: 02/25/23 12:08 AM   Result Value Ref Range    POCT Glucose 120 (H) 70 - 110 mg/dL   POCT glucose    Collection Time: 02/25/23  2:04 AM   Result Value Ref Range    POCT Glucose 110 70 - 110 mg/dL   Basic Metabolic Panel    Collection Time: 02/25/23  2:07 AM   Result Value Ref Range    Sodium Level 137 136 - 145 mmol/L    Potassium Level 4.2 3.5 - 5.1 mmol/L    Chloride 108 (H) 98 - 107 mmol/L    Carbon Dioxide 22 (L) 23 - 31 mmol/L    Glucose Level 106 82 - 115 mg/dL    Blood Urea Nitrogen 27.3 (H) 8.4 - 25.7 mg/dL    Creatinine 1.55 (H) 0.73 - 1.18 mg/dL    BUN/Creatinine Ratio 18     Calcium Level Total 9.3 8.8 - 10.0 mg/dL    Anion Gap 7.0 mEq/L    eGFR 45 mls/min/1.73/m2   CBC with Differential    Collection Time: 02/25/23  2:07 AM   Result Value Ref Range    WBC 11.0 4.5 - 11.5 x10(3)/mcL    RBC 3.88 (L) 4.70 - 6.10 x10(6)/mcL    Hgb 11.3 (L) 14.0 - 18.0 g/dL    Hct 35.1 (L) 42.0 - 52.0 %    MCV 90.5 80.0 - 94.0 fL    MCH 29.1 pg    MCHC 32.2 (L) 33.0 - 36.0 g/dL    RDW 15.3 11.5 - 17.0 %    Platelet 111 (L) 130 - 400 x10(3)/mcL    MPV 11.3 (H) 7.4 - 10.4 fL    Neut % 76.6 %    Lymph % 6.4 %    Mono % 15.5 %    Eos % 0.0 %    Basophil % 0.1 %    Lymph # 0.70 0.6 - 4.6 x10(3)/mcL    Neut #  8.41 2.1 - 9.2 x10(3)/mcL    Mono # 1.70 (H) 0.1 - 1.3 x10(3)/mcL    Eos # 0.00 0 - 0.9 x10(3)/mcL    Baso # 0.01 0 - 0.2 x10(3)/mcL    IG# 0.15 (H) 0 - 0.04 x10(3)/mcL    IG% 1.4 %    NRBC% 0.0 %   POCT glucose    Collection Time: 02/25/23  3:22 AM   Result Value Ref Range    POCT Glucose 101 70 - 110 mg/dL   POCT glucose    Collection Time: 02/25/23  5:51 AM   Result Value Ref Range    POCT Glucose 94 70 - 110 mg/dL   POCT glucose    Collection Time: 02/25/23  6:59 AM   Result Value Ref Range    POCT Glucose 105 70 - 110 mg/dL   POCT glucose    Collection Time: 02/25/23  9:52 AM   Result Value Ref Range    POCT Glucose 125 (H) 70 - 110 mg/dL   POCT glucose    Collection Time: 02/25/23  3:49 PM   Result Value Ref Range    POCT Glucose 107 70 - 110 mg/dL   POCT glucose    Collection Time: 02/25/23  8:36 PM   Result Value Ref Range    POCT Glucose 123 (H) 70 - 110 mg/dL   POCT glucose    Collection Time: 02/26/23 12:25 AM   Result Value Ref Range    POCT Glucose 103 70 - 110 mg/dL   Basic Metabolic Panel    Collection Time: 02/26/23  1:40 AM   Result Value Ref Range    Sodium Level 136 136 - 145 mmol/L    Potassium Level 4.8 3.5 - 5.1 mmol/L    Chloride 105 98 - 107 mmol/L    Carbon Dioxide 22 (L) 23 - 31 mmol/L    Glucose Level 110 82 - 115 mg/dL    Blood Urea Nitrogen 37.0 (H) 8.4 - 25.7 mg/dL    Creatinine 1.98 (H) 0.73 - 1.18 mg/dL    BUN/Creatinine Ratio 19     Calcium Level Total 8.7 (L) 8.8 - 10.0 mg/dL    Anion Gap 9.0 mEq/L    eGFR 33 mls/min/1.73/m2   CBC with Differential    Collection Time: 02/26/23  1:40 AM   Result Value Ref Range    WBC 9.9 4.5 - 11.5 x10(3)/mcL    RBC 3.56 (L) 4.70 - 6.10 x10(6)/mcL    Hgb 10.4 (L) 14.0 - 18.0 g/dL    Hct 33.2 (L) 42.0 - 52.0 %    MCV 93.3 80.0 - 94.0 fL    MCH 29.2 pg    MCHC 31.3 (L) 33.0 - 36.0 g/dL    RDW 15.8 11.5 - 17.0 %    Platelet 91 (L) 130 - 400 x10(3)/mcL    MPV 11.2 (H) 7.4 - 10.4 fL    IG# 0.27 (H) 0 - 0.04 x10(3)/mcL    IG% 2.7 %    NRBC% 0.0 %   Manual  Differential    Collection Time: 02/26/23  1:40 AM   Result Value Ref Range    Neut Man 87 %    Lymph Man 3 %    Monocyte Man 10 %    Instr WBC 9.9 x10(3)/mcL    Abs Mono 0.99 0.1 - 1.3 x10(3)/mcL    Abs Lymp 0.297 (L) 0.6 - 4.6 x10(3)/mcL    Abs Neut 8.613 2.1 - 9.2 x10(3)/mcL    RBC Morph Abnormal (A) Normal    Anisocyte 1+ (A) (none)    Platelet Est Decreased (A) Normal, Adequate       Telemetry:  Sinus Rhythm    Physical Exam  Vitals and nursing note reviewed.   Constitutional:       Appearance: Normal appearance.   HENT:      Head: Normocephalic.   Cardiovascular:      Rate and Rhythm: Normal rate and regular rhythm.      Heart sounds: Normal heart sounds.      Comments: Sternotomy dressing CDI  Pulmonary:      Effort: Pulmonary effort is normal. No respiratory distress.      Breath sounds: Normal breath sounds.   Abdominal:      General: There is no distension.      Palpations: Abdomen is soft.      Tenderness: There is no abdominal tenderness. There is no guarding.   Musculoskeletal:      Cervical back: Neck supple.   Skin:     General: Skin is warm and dry.      Comments: Sternotomy dressing CDI       Current Inpatient Medications:    Current Facility-Administered Medications:     0.9%  NaCl infusion (for blood administration), , Intravenous, Q24H PRN, SHIRLEY Torres    0.9%  NaCl infusion (for blood administration), , Intravenous, Q24H PRN, Trenton Harvey PA-C    acetaminophen oral solution 650 mg, 650 mg, Per OG tube, Q6H PRN, GILMAR Estrada    acetaminophen tablet 650 mg, 650 mg, Oral, Q6H PRN, Lul Isaac MD, 650 mg at 02/22/23 1700    albumin human 5% bottle 12.5 g, 12.5 g, Intravenous, PRN, GILMAR Estrada    albuterol-ipratropium 2.5 mg-0.5 mg/3 mL nebulizer solution 3 mL, 3 mL, Nebulization, Q4H PRN, Nima Baez DO, 3 mL at 02/26/23 0840    aspirin EC tablet 81 mg, 81 mg, Oral, Daily, GILMAR Estrada, 81 mg at 02/26/23 0830    atorvastatin tablet 40 mg, 40 mg,  Oral, Daily, Trenton Harvey PA-C, 40 mg at 02/26/23 0829    calcium carbonate 200 mg calcium (500 mg) chewable tablet 500 mg, 500 mg, Oral, Daily, Trenton Harvey PA-C, 500 mg at 02/26/23 0830    calcium gluconate 1 g in NS IVPB (premixed), 1 g, Intravenous, PRN, Leon Reyes, PA    calcium gluconate 1 g in NS IVPB (premixed), 2 g, Intravenous, PRN, Leon Reyes, PA    calcium gluconate 1 g in NS IVPB (premixed), 3 g, Intravenous, PRN, Leon Reyes, PA    dextrose 10% bolus 125 mL 125 mL, 12.5 g, Intravenous, PRN, Leon Reyes, PA    dextrose 10% bolus 250 mL 250 mL, 25 g, Intravenous, PRN, Leon Reyes, PA    dextrose 5 % and 0.9 % NaCl infusion, , Intravenous, Continuous, Darinel Bedoya MD, Last Rate: 75 mL/hr at 02/26/23 0831, New Bag at 02/26/23 0831    docusate sodium capsule 100 mg, 100 mg, Oral, BID, GILMAR Estrada, 100 mg at 02/26/23 0829    ezetimibe tablet 10 mg, 10 mg, Oral, Daily, Trenton Harvey PA-C, 10 mg at 02/26/23 0830    FLUoxetine capsule 10 mg, 10 mg, Oral, Daily, Trenton Harvey PA-C, 10 mg at 02/26/23 0841    folic acid tablet 1 mg, 1 mg, Oral, Daily, Trenton Harvey PA-C, 1 mg at 02/26/23 0830    gabapentin capsule 100 mg, 100 mg, Oral, TID, Trenton Harvey PA-C, 100 mg at 02/26/23 0830    hydrALAZINE injection 20 mg, 20 mg, Intravenous, Q4H PRN, Ga TIDWELL. Emily, NP    hydrALAZINE tablet 25 mg, 25 mg, Oral, Q8H, Trenton Harvey PA-C    HYDROcodone-acetaminophen 5-325 mg per tablet 1 tablet, 1 tablet, Oral, Q4H PRN, GILMAR Estrada, 1 tablet at 02/24/23 1708    HYDROcodone-acetaminophen 7.5-325 mg per tablet 1 tablet, 1 tablet, Oral, Q6H PRN, Ga TIDWELL. Emily, NP    hydrOXYzine HCL tablet 25 mg, 25 mg, Oral, Q6H PRN, Trenton Harvey PA-C    labetaloL injection 20 mg, 20 mg, Intravenous, Q4H PRN, Ga Q. Emily, NP    lisinopriL tablet 5 mg, 5 mg, Oral, Daily, Trenton Harvey PA-C    magnesium sulfate 2g in water 50mL IVPB (premix), 2 g,  Intravenous, PRN, GILMAR Estrada    magnesium sulfate 2g in water 50mL IVPB (premix), 4 g, Intravenous, PRN, GILMAR Estrada    meclizine tablet 25 mg, 25 mg, Oral, TID PRN, Trenton Harvey PA-C    metoprolol tartrate (LOPRESSOR) split tablet 12.5 mg, 12.5 mg, Oral, BID, GILMAR Estrada, 12.5 mg at 02/26/23 0829    morphine injection 2 mg, 2 mg, Intravenous, Q2H PRN, Ga Q. Emily, NP, 2 mg at 02/25/23 0330    mupirocin 2 % ointment, , Nasal, BID, GILMAR Estrada, Given at 02/26/23 0838    ondansetron injection 4 mg, 4 mg, Intravenous, Q4H PRN, GILMAR Estrada, 4 mg at 02/25/23 1326    oxyCODONE immediate release tablet 10 mg, 10 mg, Oral, Q4H PRN, GILMAR Estrada, 10 mg at 02/26/23 0851    pantoprazole EC tablet 40 mg, 40 mg, Oral, Daily, Trenton Harvey PA-C, 40 mg at 02/26/23 0830    potassium chloride 20 mEq in 100 mL IVPB (FOR CENTRAL LINE ADMINISTRATION ONLY), 20 mEq, Intravenous, PRN, GILMAR Estrada    potassium chloride 20 mEq in 100 mL IVPB (FOR CENTRAL LINE ADMINISTRATION ONLY), 40 mEq, Intravenous, PRN, GILMAR Estrada    potassium chloride 20 mEq in 100 mL IVPB (FOR CENTRAL LINE ADMINISTRATION ONLY), 20 mEq, Intravenous, PRN, GILMAR Estrada    prazosin capsule 3 mg, 3 mg, Oral, QHS, Lul Isaac MD, 3 mg at 02/25/23 2114    predniSONE tablet 2.5 mg, 2.5 mg, Oral, Daily, Trenton Harvey PA-C    QUEtiapine tablet 50 mg, 50 mg, Oral, QHS, Trenton Harvey PA-C, 50 mg at 02/25/23 2114    rOPINIRole tablet 1 mg, 1 mg, Oral, BID, Trenton Harvey PA-C, 1 mg at 02/26/23 1054    sodium phosphate 15 mmol in dextrose 5 % (D5W) 250 mL IVPB, 15 mmol, Intravenous, PRN, GILMAR Estrada    sodium phosphate 20.01 mmol in dextrose 5 % (D5W) 250 mL IVPB, 20.01 mmol, Intravenous, PRN, GILMAR Estrada    sodium phosphate 30 mmol in dextrose 5 % (D5W) 250 mL IVPB, 30 mmol, Intravenous, PRN, GILMAR Estrada    sucralfate tablet 1 g, 1 g, Oral,  QID (AC & HS), GILMAR Estrada, 1 g at 02/26/23 0618    tiZANidine tablet 4 mg, 4 mg, Oral, Q8H PRN, Trenton Harvey PA-C    zolpidem tablet 5 mg, 5 mg, Oral, Nightly PRN, Ga TIDWELL. Emily, NP    VTE Risk Mitigation (From admission, onward)           Ordered     Place sequential compression device  Until discontinued         02/24/23 1046     IP VTE HIGH RISK PATIENT  Once         02/24/23 1034     heparin, porcine (PF) (heparin flush 100 units/mL) 100 unit/mL injection flush         02/23/23 2215     Place sequential compression device  Until discontinued         02/23/23 1934     heparin 25,000 units in dextrose 5% 250 mL (100 units/mL) infusion LOW INTENSITY nomogram - [facility]  Continuous        Question Answer Comment   Begin at (in units/kg/hr) 12    Heparin Infusion Adjustment (DO NOT MODIFY ANSWER) [email or web address] LOW INTENSITY nomogram for [facility] [id number]        02/19/23 2730                  Assessment:   NSTEMI, Type I  MVCAD  --s/p CABG 2V LIMA-LAD, rSVG-OM 02/24/23 with Right GSV EVH  --[facility] 02/15/23: LM 0% stenosis. mLAD proximal 70% ISR. D1 ostial 50% stenosis. D2 ostial 50% stenosis. Lcx: previous stent; proximal Lcx  90% ISR; mid Lcx 30% stenosis. OM1 proximal 80% stenosis. RCA patent stents to ostium. Mid RCA proximal subsection- 60% stenosis; Mid RCA distal subsection 70& stenosis; distal RCA proximal subsection 100% stenosis. Fills left to right  --[facility] 09/22/22: LM 0% stenosis. mLAD proximal subsection 50% ISR,. D1 ostial 50% stenosis. D2 ostial 50% stenosis. pLCx mid subsection 70% ISR reduced to 10%, Preprocedure AMALIA III flow  noted. Post procedure AMALIA III was present. Lesion previous treated on 7/22/21 with PTA/DAYAN. OM1 proximal 80% stenosis. mLCx distal subsection 30% stenosis. RCA proximal RCA stent widely patent. mRCA proximal subsection 60% subsection. mRCA distal subsection 70% stenosis. dRCA proximal subsection 100% stenosis. Fills left to  right  Hypertension (Controlled)  ICMO  --EF 45-50% TTE 2/15/23 improved from 40% 7/21  VHD  --Mild AS, Mild to moderate AR. Mild to moderate MAC is noted. Mild MR. Trace TR.  B RICKEY    - Moderate Bilateral by US  DANIELLA/CKD IIIb  HLD  Lupus anticoagulant inhibitor syndrome  CARLYLE  VHD  --mild AS  Renal Insufficiency  AAA  Anemia     Plan:   Continue asa, statin. Change metoprolol tartrate to succinate.    Will need placement back on Plavix prior to DC home  BP marginal at times. Hold lisinopril, lasix, and hydralazine.   Monitor H/H and renal indices. Nephrology following  Continue PPI    Pt. seen and examined by me and plan made under my supervision.

## 2023-02-26 NOTE — PROGRESS NOTES
CT SURGERY PROGRESS NOTE  Hunter Navarrete  82 y.o.  1940    Patients Procedure: Procedure(s) (LRB):  CORONARY ARTERY BYPASS GRAFT (CABG) (N/A)    Subjective  Interval History: POD 2    ROS    Medication List  Infusions    Scheduled   aspirin  81 mg Oral Daily    atorvastatin  40 mg Oral Daily    calcium carbonate  500 mg Oral Daily    docusate sodium  100 mg Oral BID    ezetimibe  10 mg Oral Daily    FLUoxetine  10 mg Oral Daily    folic acid  1 mg Oral Daily    furosemide  40 mg Oral Daily    gabapentin  100 mg Oral TID    hydrALAZINE  25 mg Oral Q8H    lisinopriL  5 mg Oral Daily    metoprolol tartrate  12.5 mg Oral BID    mupirocin   Nasal BID    pantoprazole  40 mg Oral Daily    prazosin  3 mg Oral QHS    predniSONE  2.5 mg Oral Daily    QUEtiapine  50 mg Oral QHS    rOPINIRole  1 mg Oral BID    sucralfate  1 g Oral QID (AC & HS)       Objective:  Recent Vitals:  Temp:  [97.6 °F (36.4 °C)-98.4 °F (36.9 °C)] 98.2 °F (36.8 °C)  Pulse:  [] 100  Resp:  [14-31] 21  SpO2:  [88 %-98 %] 89 %  BP: ()/(47-72) 86/47    Physical Exam     I/O last 24 hrs:  Intake/Output - Last 3 Shifts         02/24 0700  02/25 0659 02/25 0700  02/26 0659 02/26 0700  02/27 0659    P.O.       I.V. (mL/kg) 2565.9 (28.4) 592.5 (6.6)     Blood 636      IV Piggyback 1245      Total Intake(mL/kg) 4446.9 (49.2) 592.5 (6.6)     Urine (mL/kg/hr) 1695 (0.8) 990 (0.5)     Emesis/NG output 0      Stool  0     Blood 1908      Chest Tube 114 65     Total Output 3717 1055     Net +729.9 -462.5            Stool Occurrence  0 x     Emesis Occurrence 1 x              Labs  BMP:   Recent Labs   Lab 02/26/23  0140      K 4.8   CO2 22*   BUN 37.0*   CREATININE 1.98*   CALCIUM 8.7*     CBC:   Recent Labs   Lab 02/26/23  0140   WBC 9.9   RBC 3.56*   HGB 10.4*   HCT 33.2*   PLT 91*   MCV 93.3   MCH 29.2   MCHC 31.3*     CMP:   Recent Labs   Lab 02/26/23 0140   CALCIUM 8.7*      K 4.8   CO2 22*   BUN 37.0*   CREATININE 1.98*          Imaging:   CXR: X-Ray Chest 1 View    Result Date: 2/26/2023  No significant interval change. Electronically signed by: Markie Yung Date:    02/26/2023 Time:    07:34         ASSESSMENT/PLAN:    DC drains  Telem      Case and plan of care discussed with MD Trenton Harvey PA-C

## 2023-02-26 NOTE — PROCEDURES
"Hunter Navarrete is a 82 y.o. male patient.    Temp: 99.3 °F (37.4 °C) (02/26/23 1200)  Pulse: 108 (02/26/23 1600)  Resp: 17 (02/26/23 1600)  BP: (!) 117/53 (02/26/23 1600)  SpO2: 98 % (02/26/23 1600)  Weight: 90.2 kg (198 lb 13.7 oz) (02/26/23 0529)  Height: 5' 10" (177.8 cm) (02/19/23 1823)    PICC  Time out: Immediately prior to procedure a time out was called to verify the correct patient, procedure, equipment, support staff and site/side marked as required  Indications: med administration  Preparation: skin prepped with ChloraPrep  Skin prep agent dried: skin prep agent completely dried prior to procedure  Sterile barriers: all five maximum sterile barriers used - cap, mask, sterile gown, sterile gloves, and large sterile sheet  Hand hygiene: hand hygiene performed prior to central venous catheter insertion  Location details: left basilic  Catheter type: single lumen  Catheter size: 4 Fr  Catheter Length: 12cm    Ultrasound guidance: yes  Needle advanced into vessel with real time Ultrasound guidance.  Guidewire confirmed in vessel.  Sterile sheath used.    Arm circumference 32 cm      Name Asher portillo   2/26/2023    "

## 2023-02-26 NOTE — NURSING
Nurses Note -- 4 Eyes      2/26/2023   2:53 PM      Skin assessed during: Daily Assessment      [x] No Pressure Injuries Present    []Prevention Measures Documented      [] Yes- Altered Skin Integrity Present or Discovered   [] LDA Added if Not in Epic (Describe Wound)   [] New Altered Skin Integrity was Present on Admit and Documented in LDA   [] Wound Image Taken    Wound Care Consulted? No    Attending Nurse:  Camila Hu RN     Second RN/Staff Member:  Shabbir Hernandez RN

## 2023-02-27 LAB
ANION GAP SERPL CALC-SCNC: 6 MEQ/L
BASOPHILS # BLD AUTO: 0.01 X10(3)/MCL (ref 0–0.2)
BASOPHILS NFR BLD AUTO: 0.1 %
BSA FOR ECHO PROCEDURE: 2.06 M2
BUN SERPL-MCNC: 57.9 MG/DL (ref 8.4–25.7)
CALCIUM SERPL-MCNC: 9 MG/DL (ref 8.8–10)
CHLORIDE SERPL-SCNC: 105 MMOL/L (ref 98–107)
CO2 SERPL-SCNC: 22 MMOL/L (ref 23–31)
CREAT SERPL-MCNC: 2.53 MG/DL (ref 0.73–1.18)
CREAT/UREA NIT SERPL: 23
EJECTION FRACTION: 50 %
EOSINOPHIL # BLD AUTO: 0.01 X10(3)/MCL (ref 0–0.9)
EOSINOPHIL NFR BLD AUTO: 0.1 %
ERYTHROCYTE [DISTWIDTH] IN BLOOD BY AUTOMATED COUNT: 16 % (ref 11.5–17)
GFR SERPLBLD CREATININE-BSD FMLA CKD-EPI: 25 MLS/MIN/1.73/M2
GLUCOSE SERPL-MCNC: 108 MG/DL (ref 82–115)
HCT VFR BLD AUTO: 31.8 % (ref 42–52)
HGB BLD-MCNC: 9.9 G/DL (ref 14–18)
IMM GRANULOCYTES # BLD AUTO: 0.34 X10(3)/MCL (ref 0–0.04)
IMM GRANULOCYTES NFR BLD AUTO: 3.6 %
LYMPHOCYTES # BLD AUTO: 0.91 X10(3)/MCL (ref 0.6–4.6)
LYMPHOCYTES NFR BLD AUTO: 9.8 %
MCH RBC QN AUTO: 29.3 PG
MCHC RBC AUTO-ENTMCNC: 31.1 G/DL (ref 33–36)
MCV RBC AUTO: 94.1 FL (ref 80–94)
MONOCYTES # BLD AUTO: 1.91 X10(3)/MCL (ref 0.1–1.3)
MONOCYTES NFR BLD AUTO: 20.5 %
NEUTROPHILS # BLD AUTO: 6.14 X10(3)/MCL (ref 2.1–9.2)
NEUTROPHILS NFR BLD AUTO: 65.9 %
NRBC BLD AUTO-RTO: 0 %
PLATELET # BLD AUTO: 88 X10(3)/MCL (ref 130–400)
PMV BLD AUTO: 11 FL (ref 7.4–10.4)
POTASSIUM SERPL-SCNC: 4.5 MMOL/L (ref 3.5–5.1)
RBC # BLD AUTO: 3.38 X10(6)/MCL (ref 4.7–6.1)
SODIUM SERPL-SCNC: 133 MMOL/L (ref 136–145)
WBC # SPEC AUTO: 9.3 X10(3)/MCL (ref 4.5–11.5)

## 2023-02-27 PROCEDURE — 93010 ELECTROCARDIOGRAM REPORT: CPT | Mod: ,,, | Performed by: INTERNAL MEDICINE

## 2023-02-27 PROCEDURE — 25000003 PHARM REV CODE 250: Performed by: INTERNAL MEDICINE

## 2023-02-27 PROCEDURE — C1751 CATH, INF, PER/CENT/MIDLINE: HCPCS

## 2023-02-27 PROCEDURE — 97535 SELF CARE MNGMENT TRAINING: CPT | Mod: CO

## 2023-02-27 PROCEDURE — 25000003 PHARM REV CODE 250: Performed by: PHYSICIAN ASSISTANT

## 2023-02-27 PROCEDURE — 21400001 HC TELEMETRY ROOM

## 2023-02-27 PROCEDURE — 80048 BASIC METABOLIC PNL TOTAL CA: CPT | Performed by: INTERNAL MEDICINE

## 2023-02-27 PROCEDURE — 85025 COMPLETE CBC W/AUTO DIFF WBC: CPT | Performed by: PHYSICIAN ASSISTANT

## 2023-02-27 PROCEDURE — 93010 EKG 12-LEAD: ICD-10-PCS | Mod: ,,, | Performed by: INTERNAL MEDICINE

## 2023-02-27 PROCEDURE — A4216 STERILE WATER/SALINE, 10 ML: HCPCS | Performed by: INTERNAL MEDICINE

## 2023-02-27 PROCEDURE — 25000242 PHARM REV CODE 250 ALT 637 W/ HCPCS: Performed by: STUDENT IN AN ORGANIZED HEALTH CARE EDUCATION/TRAINING PROGRAM

## 2023-02-27 PROCEDURE — 99024 POSTOP FOLLOW-UP VISIT: CPT | Mod: ,,, | Performed by: PHYSICIAN ASSISTANT

## 2023-02-27 PROCEDURE — 63600175 PHARM REV CODE 636 W HCPCS: Performed by: INTERNAL MEDICINE

## 2023-02-27 PROCEDURE — 93005 ELECTROCARDIOGRAM TRACING: CPT

## 2023-02-27 PROCEDURE — 97116 GAIT TRAINING THERAPY: CPT | Mod: CQ

## 2023-02-27 PROCEDURE — 36569 INSJ PICC 5 YR+ W/O IMAGING: CPT

## 2023-02-27 PROCEDURE — 94640 AIRWAY INHALATION TREATMENT: CPT

## 2023-02-27 PROCEDURE — 99900031 HC PATIENT EDUCATION (STAT)

## 2023-02-27 PROCEDURE — 25000003 PHARM REV CODE 250: Performed by: NURSE PRACTITIONER

## 2023-02-27 PROCEDURE — 94761 N-INVAS EAR/PLS OXIMETRY MLT: CPT

## 2023-02-27 PROCEDURE — 27000221 HC OXYGEN, UP TO 24 HOURS

## 2023-02-27 PROCEDURE — 99024 PR POST-OP FOLLOW-UP VISIT: ICD-10-PCS | Mod: ,,, | Performed by: PHYSICIAN ASSISTANT

## 2023-02-27 RX ORDER — SODIUM CHLORIDE 9 MG/ML
INJECTION, SOLUTION INTRAVENOUS CONTINUOUS
Status: DISCONTINUED | OUTPATIENT
Start: 2023-02-27 | End: 2023-03-01

## 2023-02-27 RX ORDER — DOBUTAMINE HYDROCHLORIDE 400 MG/100ML
2.5 INJECTION INTRAVENOUS CONTINUOUS
Status: DISCONTINUED | OUTPATIENT
Start: 2023-02-27 | End: 2023-03-03 | Stop reason: HOSPADM

## 2023-02-27 RX ADMIN — SUCRALFATE 1 G: 1 TABLET ORAL at 05:02

## 2023-02-27 RX ADMIN — PANTOPRAZOLE SODIUM 40 MG: 40 TABLET, DELAYED RELEASE ORAL at 08:02

## 2023-02-27 RX ADMIN — FOLIC ACID 1 MG: 1 TABLET ORAL at 08:02

## 2023-02-27 RX ADMIN — SUCRALFATE 1 G: 1 TABLET ORAL at 08:02

## 2023-02-27 RX ADMIN — ROPINIROLE HYDROCHLORIDE 1 MG: 0.25 TABLET, FILM COATED ORAL at 08:02

## 2023-02-27 RX ADMIN — SODIUM CHLORIDE, PRESERVATIVE FREE 10 ML: 5 INJECTION INTRAVENOUS at 12:02

## 2023-02-27 RX ADMIN — HYDROCODONE BITARTRATE AND ACETAMINOPHEN 1 TABLET: 7.5; 325 TABLET ORAL at 01:02

## 2023-02-27 RX ADMIN — SODIUM CHLORIDE, PRESERVATIVE FREE 10 ML: 5 INJECTION INTRAVENOUS at 06:02

## 2023-02-27 RX ADMIN — Medication 81 MG: at 08:02

## 2023-02-27 RX ADMIN — DOBUTAMINE HYDROCHLORIDE 2.5 MCG/KG/MIN: 400 INJECTION INTRAVENOUS at 12:02

## 2023-02-27 RX ADMIN — METOPROLOL SUCCINATE 12.5 MG: 25 TABLET, EXTENDED RELEASE ORAL at 02:02

## 2023-02-27 RX ADMIN — MUPIROCIN: 20 OINTMENT TOPICAL at 08:02

## 2023-02-27 RX ADMIN — QUETIAPINE FUMARATE 50 MG: 25 TABLET ORAL at 08:02

## 2023-02-27 RX ADMIN — GABAPENTIN 100 MG: 100 CAPSULE ORAL at 08:02

## 2023-02-27 RX ADMIN — IPRATROPIUM BROMIDE AND ALBUTEROL SULFATE 3 ML: 2.5; .5 SOLUTION RESPIRATORY (INHALATION) at 03:02

## 2023-02-27 RX ADMIN — IPRATROPIUM BROMIDE AND ALBUTEROL SULFATE 3 ML: 2.5; .5 SOLUTION RESPIRATORY (INHALATION) at 04:02

## 2023-02-27 RX ADMIN — GABAPENTIN 100 MG: 100 CAPSULE ORAL at 03:02

## 2023-02-27 RX ADMIN — DOCUSATE SODIUM 100 MG: 100 CAPSULE, LIQUID FILLED ORAL at 08:02

## 2023-02-27 RX ADMIN — ATORVASTATIN CALCIUM 40 MG: 40 TABLET, FILM COATED ORAL at 08:02

## 2023-02-27 RX ADMIN — SUCRALFATE 1 G: 1 TABLET ORAL at 03:02

## 2023-02-27 RX ADMIN — HYDROCODONE BITARTRATE AND ACETAMINOPHEN 1 TABLET: 7.5; 325 TABLET ORAL at 05:02

## 2023-02-27 RX ADMIN — HYDROCODONE BITARTRATE AND ACETAMINOPHEN 1 TABLET: 7.5; 325 TABLET ORAL at 10:02

## 2023-02-27 RX ADMIN — EZETIMIBE 10 MG: 10 TABLET ORAL at 08:02

## 2023-02-27 RX ADMIN — SODIUM CHLORIDE: 9 INJECTION, SOLUTION INTRAVENOUS at 12:02

## 2023-02-27 RX ADMIN — FUROSEMIDE 2 MG/HR: 10 INJECTION, SOLUTION INTRAMUSCULAR; INTRAVENOUS at 12:02

## 2023-02-27 RX ADMIN — OXYCODONE HYDROCHLORIDE 10 MG: 5 TABLET ORAL at 12:02

## 2023-02-27 RX ADMIN — HYDROCODONE BITARTRATE AND ACETAMINOPHEN 1 TABLET: 7.5; 325 TABLET ORAL at 08:02

## 2023-02-27 RX ADMIN — FLUOXETINE 10 MG: 10 CAPSULE ORAL at 08:02

## 2023-02-27 RX ADMIN — SUCRALFATE 1 G: 1 TABLET ORAL at 12:02

## 2023-02-27 NOTE — PROGRESS NOTES
NEPHROLOGY: Progress     82-year-old male from the [facility] with stage III CKD B, creatinine between 1.6 and 2.0 and GFR between 33 and 44.  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  Chest and mediastinal tubes have been removed.  Patient is becoming oliguric and increased respiratory distress noted.  Chest x-ray report by my reading with bilateral pleural effusions and significantly enlarged cardio mediastinal silhouette.   I am going to start a diuretic drip on patient with cautious normal saline replacement.  Bladder scan this morning with only 80 cc of urine in the bladder            Current Facility-Administered Medications:     0.9%  NaCl infusion (for blood administration), , Intravenous, Q24H PRN, SHIRLEY Torres    0.9%  NaCl infusion (for blood administration), , Intravenous, Q24H PRN, Trenton Harvey PA-C    acetaminophen oral solution 650 mg, 650 mg, Per OG tube, Q6H PRN, GILMAR Estrada    acetaminophen tablet 650 mg, 650 mg, Oral, Q6H PRN, Lul Isaac MD, 650 mg at 02/26/23 1614    albumin human 5% bottle 12.5 g, 12.5 g, Intravenous, PRN, GILMAR Estrada    albuterol-ipratropium 2.5 mg-0.5 mg/3 mL nebulizer solution 3 mL, 3 mL, Nebulization, Q4H PRN, Nima Baez DO, 3 mL at 02/27/23 0344    aspirin EC tablet 81 mg, 81 mg, Oral, Daily, GILMAR Estrada, 81 mg at 02/27/23 0805    atorvastatin tablet 40 mg, 40 mg, Oral, Daily, Trenton Harvey PA-C, 40 mg at 02/27/23 0805    calcium carbonate 200 mg calcium (500 mg) chewable tablet 500 mg, 500 mg, Oral, Daily, Trenton Harvey PA-C, 500 mg at 02/26/23 0830    calcium gluconate 1 g in NS IVPB (premixed), 1 g, Intravenous, PRN, GILMAR Estrada    calcium gluconate 1 g in NS IVPB (premixed), 2 g, Intravenous, PRN, GILMAR Estrada     calcium gluconate 1 g in NS IVPB (premixed), 3 g, Intravenous, PRN, GILMAR Estrada    dextrose 10% bolus 125 mL 125 mL, 12.5 g, Intravenous, PRN, GILMAR Estrada    dextrose 10% bolus 250 mL 250 mL, 25 g, Intravenous, PRN, GILMAR Estrada    dextrose 5 % and 0.9 % NaCl infusion, , Intravenous, Continuous, Darinel Bedoya MD, Last Rate: 75 mL/hr at 02/26/23 0831, New Bag at 02/26/23 0831    docusate sodium capsule 100 mg, 100 mg, Oral, BID, GILMAR Estrada, 100 mg at 02/27/23 0805    ezetimibe tablet 10 mg, 10 mg, Oral, Daily, Trenton Harvey PA-C, 10 mg at 02/27/23 0805    FLUoxetine capsule 10 mg, 10 mg, Oral, Daily, Trenton Harvey PA-C, 10 mg at 02/27/23 0805    folic acid tablet 1 mg, 1 mg, Oral, Daily, Trenton Harvey PA-C, 1 mg at 02/27/23 0805    gabapentin capsule 100 mg, 100 mg, Oral, TID, Trenton Harvey PA-C, 100 mg at 02/27/23 0806    hydrALAZINE injection 20 mg, 20 mg, Intravenous, Q4H PRN, Ga TIDWELL. Emily, NP    HYDROcodone-acetaminophen 5-325 mg per tablet 1 tablet, 1 tablet, Oral, Q4H PRN, GILMAR Estrada, 1 tablet at 02/24/23 1708    HYDROcodone-acetaminophen 7.5-325 mg per tablet 1 tablet, 1 tablet, Oral, Q6H PRN, Ga Q. Emily, NP, 1 tablet at 02/27/23 0805    hydrOXYzine HCL tablet 25 mg, 25 mg, Oral, Q6H PRN, Trenton Harvey PA-C    labetaloL injection 20 mg, 20 mg, Intravenous, Q4H PRN, Ga Q. Emily, NP    lisinopriL tablet 5 mg, 5 mg, Oral, Daily, Trenton Harvey PA-C    magnesium sulfate 2g in water 50mL IVPB (premix), 2 g, Intravenous, PRN, GILMAR Estrada    magnesium sulfate 2g in water 50mL IVPB (premix), 4 g, Intravenous, PRN, GILMAR Estrada    meclizine tablet 25 mg, 25 mg, Oral, TID PRN, Trenton Harvey PA-C    metoprolol succinate (TOPROL-XL) 24 hr split tablet 12.5 mg, 12.5 mg, Oral, Daily, Dominick Weinstein MD, 12.5 mg at 02/27/23 0215    morphine injection 2 mg, 2 mg, Intravenous, Q2H PRN, Ga Diaz NP, 2 mg at 02/25/23  0330    mupirocin 2 % ointment, , Nasal, BID, GILMAR Estrada, Given at 02/27/23 0807    ondansetron injection 4 mg, 4 mg, Intravenous, Q4H PRN, GILMAR Estrada, 4 mg at 02/25/23 1326    oxyCODONE immediate release tablet 10 mg, 10 mg, Oral, Q4H PRN, GILMAR Estrada, 10 mg at 02/26/23 0851    pantoprazole EC tablet 40 mg, 40 mg, Oral, Daily, Trenton Harvey PA-C, 40 mg at 02/27/23 0806    potassium chloride 20 mEq in 100 mL IVPB (FOR CENTRAL LINE ADMINISTRATION ONLY), 20 mEq, Intravenous, PRN, GILMAR Estrada    potassium chloride 20 mEq in 100 mL IVPB (FOR CENTRAL LINE ADMINISTRATION ONLY), 40 mEq, Intravenous, PRN, GILMAR Estrada    potassium chloride 20 mEq in 100 mL IVPB (FOR CENTRAL LINE ADMINISTRATION ONLY), 20 mEq, Intravenous, PRN, GILMAR Estrada    predniSONE tablet 2.5 mg, 2.5 mg, Oral, Daily, Trenton Harvey PA-C    QUEtiapine tablet 50 mg, 50 mg, Oral, QHS, Trenton Harvey PA-C, 50 mg at 02/26/23 2114    rOPINIRole tablet 1 mg, 1 mg, Oral, BID, Trenton Harvey PA-C, 1 mg at 02/27/23 0806    Flushing PICC Protocol, , , Until Discontinued **AND** sodium chloride 0.9% flush 10 mL, 10 mL, Intravenous, Q6H, 10 mL at 02/27/23 0600 **AND** sodium chloride 0.9% flush 10 mL, 10 mL, Intravenous, PRN, Dominick Weinstein MD    sodium phosphate 15 mmol in dextrose 5 % (D5W) 250 mL IVPB, 15 mmol, Intravenous, PRN, GILMAR Estrada    sodium phosphate 20.01 mmol in dextrose 5 % (D5W) 250 mL IVPB, 20.01 mmol, Intravenous, PRN, GILMAR Estrada    sodium phosphate 30 mmol in dextrose 5 % (D5W) 250 mL IVPB, 30 mmol, Intravenous, PRN, GILMAR Estrada    sucralfate tablet 1 g, 1 g, Oral, QID (AC & HS), GILMAR Estrada, 1 g at 02/27/23 0549    tamsulosin 24 hr capsule 0.4 mg, 0.4 mg, Oral, Daily, Kelley Chery, AGNP, 0.4 mg at 02/26/23 2115    tiZANidine tablet 4 mg, 4 mg, Oral, Q8H PRN, Trenton Harvey PA-C    zolpidem tablet 5 mg, 5 mg, Oral, Nightly PRN, Ga  "Q. Emily, NP        /71   Pulse (!) 112   Temp 97.4 °F (36.3 °C) (Oral)   Resp (!) 24   Ht 5' 10" (1.778 m)   Wt 90.6 kg (199 lb 11.8 oz)   SpO2 96%   BMI 28.66 kg/m²     Physical Exam:    GEN:  Somewhat acutely ill and dyspneic appearing elderly gentleman.    NECK : No JVD  CARD :  Tachycardic and distant.  LUNGS :  Scattered crackles and diminished breath sounds at the bases.  ABD : Soft,non-tender. BS active.  Suprapubic bladder scan with only 80 mL urine.  EXT : No pitting edema.           Intake/Output Summary (Last 24 hours) at 2/27/2023 0854  Last data filed at 2/27/2023 0604  Gross per 24 hour   Intake 1037 ml   Output 200 ml   Net 837 ml         Laboratory:  Recent Results (from the past 24 hour(s))   POCT glucose    Collection Time: 02/26/23  1:02 PM   Result Value Ref Range    POCT Glucose 117 (H) 70 - 110 mg/dL   Basic Metabolic Panel    Collection Time: 02/27/23  1:54 AM   Result Value Ref Range    Sodium Level 133 (L) 136 - 145 mmol/L    Potassium Level 4.5 3.5 - 5.1 mmol/L    Chloride 105 98 - 107 mmol/L    Carbon Dioxide 22 (L) 23 - 31 mmol/L    Glucose Level 108 82 - 115 mg/dL    Blood Urea Nitrogen 57.9 (H) 8.4 - 25.7 mg/dL    Creatinine 2.53 (H) 0.73 - 1.18 mg/dL    BUN/Creatinine Ratio 23     Calcium Level Total 9.0 8.8 - 10.0 mg/dL    Anion Gap 6.0 mEq/L    eGFR 25 mls/min/1.73/m2   CBC with Differential    Collection Time: 02/27/23  1:54 AM   Result Value Ref Range    WBC 9.3 4.5 - 11.5 x10(3)/mcL    RBC 3.38 (L) 4.70 - 6.10 x10(6)/mcL    Hgb 9.9 (L) 14.0 - 18.0 g/dL    Hct 31.8 (L) 42.0 - 52.0 %    MCV 94.1 (H) 80.0 - 94.0 fL    MCH 29.3 pg    MCHC 31.1 (L) 33.0 - 36.0 g/dL    RDW 16.0 11.5 - 17.0 %    Platelet 88 (L) 130 - 400 x10(3)/mcL    MPV 11.0 (H) 7.4 - 10.4 fL    Neut % 65.9 %    Lymph % 9.8 %    Mono % 20.5 %    Eos % 0.1 %    Basophil % 0.1 %    Lymph # 0.91 0.6 - 4.6 x10(3)/mcL    Neut # 6.14 2.1 - 9.2 x10(3)/mcL    Mono # 1.91 (H) 0.1 - 1.3 x10(3)/mcL    Eos # 0.01 0 - " 0.9 x10(3)/mcL    Baso # 0.01 0 - 0.2 x10(3)/mcL    IG# 0.34 (H) 0 - 0.04 x10(3)/mcL    IG% 3.6 %    NRBC% 0.0 %         Assessment/Plan:  DANIELLA on CKD 3B -becoming oliguric  Developing fluid overload and heart failure  Anemia of chronic disease  History of systemic lupus   Hypertension    We will try to enhance urine flow with diuretic infusion.  Patient may benefit from dobutamine infusion as an ionotropic agent.  Patient's wife and son were visiting and they were detailed as well as the patient that he might require renal replacement therapy in the next 24 hours if urine output does not improve or if azotemia continues to worsen.      Darinel Bedoya MD, TARAN

## 2023-02-27 NOTE — PT/OT/SLP PROGRESS
Occupational Therapy  Treatment    Hunter Navarrete   MRN: [id number]   Admitting Diagnosis: <principal problem not specified>    OT Date of Treatment: 02/27/23   OT Start Time: 1400  OT Stop Time: 1415  OT Total Time (min): 15 min     Billable Minutes:  Self Care/Home Management 1  Total Minutes: 15     OT/LONDON: LONDON CALLAHAN Visit Number: 1    General Precautions: Standard, sternal  Orthopedic Precautions:    Braces:           Subjective:  Communicated with RN prior to session.  118HR, 93o2, 4L oxymizer 118/54  Alert  Distracted  Orientated x 2    Objective:  Pt. UIC upon entry.   (Sit to stand-Mod A)  Pt. Ambulating from BS chair to sink using RW for UE support with balance, Min A with increased cueing required for step progression. Pt. Standing at sink with Min A for balance while performing grooming task (brushing teeth) with assistance required for setup, pt. Using R UE for excursion to mouth.          Patient left up in chair with all lines intact and call button in reach    ASSESSMENT:  Hunter Navarrete is a 82 y.o. male with a medical diagnosis of <principal problem not specified> Pt. Tolerated session well overall increased activity tolerance noted,     Activity tolerance: Good    Discharge recommendations:       Equipment recommendations:       GOALS:   Multidisciplinary Problems       Occupational Therapy Goals          Problem: Occupational Therapy    Goal Priority Disciplines Outcome Interventions   Occupational Therapy Goal     OT, PT/OT Ongoing, Progressing    Description: Pt will ambulate to bathroom with RW SBA  Pt will complete toilet t/f SBA  Pt will complete toileting tasks min A  Pt will complete LE dressing with AE PRN SBA  Pt will complete UE dressing with SBA  Pt will complete grooming in standing at sink SBA                       Plan:  Patient to be seen 6 x/week to address the above listed problems via self-care/home management, therapeutic activities, therapeutic exercises  Plan of Care  expires:    Plan of Care reviewed with: patient         02/27/2023

## 2023-02-27 NOTE — PROCEDURES
"Hunter Navarrete is a 82 y.o. male patient.    Temp: 97.4 °F (36.3 °C) (02/27/23 0745)  Pulse: 108 (02/27/23 1017)  Resp: (!) 27 (02/27/23 1017)  BP: (!) 118/57 (02/27/23 1017)  SpO2: 97 % (02/27/23 1017)  Weight: 90.6 kg (199 lb 11.8 oz) (02/27/23 0600)  Height: 5' 10" (177.8 cm) (02/19/23 1823)    PICC  Time out: Immediately prior to procedure a time out was called to verify the correct patient, procedure, equipment, support staff and site/side marked as required  Indications: med administration  Preparation: skin prepped with ChloraPrep  Skin prep agent dried: skin prep agent completely dried prior to procedure  Sterile barriers: all five maximum sterile barriers used - cap, mask, sterile gown, sterile gloves, and large sterile sheet  Hand hygiene: hand hygiene performed prior to central venous catheter insertion  Location details: left basilic  Catheter type: triple lumen  Catheter size: 5 Fr  Catheter Length: 48cm    no    For dobutamine and lasix    Name Asher Huerta   2/27/2023    "

## 2023-02-27 NOTE — PROGRESS NOTES
"[facility] - 3rd Floor Medical Telemetry  Cardiology  Progress Note    Patient Name: Hunter Navarrete  MRN: [id number]  Admission Date: 2/19/2023  Hospital Length of Stay: 8 days  Code Status: Full Code   Attending Physician: Dominick Weinstein MD   Primary Care Physician: Mariella Bethea MD  Expected Discharge Date:   Principal Problem:<principal problem not specified>    Subjective:   Chief Complaint: Chest Pain (Known MVCAD)     HPI: Mr. Navarrete is an 83 y/o male, followed by Dr. Loredo who presented to [facility] ER with c/o chest pain. HS troponin 1149; therefore he was transferred to [facility] for cardiology services where he underwent LHC revealing multivessel disease and elevated LVEDP. He was started on diuretics, Plavix was placed on hold and he was transferred to [facility] for CABG evaluation. Patient is reporting mild chest tightness 3/10 currently    Hospital Course:   2.21.23:  Vitals Stable. Shoulder Pain this morning. On Nitro & Heparin Infusion. EKG with no overt ischemic changes.   2.22.23: Patient resting in bed. NAD. Denies CP/SOB. Creatinine 1.93 today- mildly decreased from 2.07 yesterday with addition of IVFs. H/H downtrending- 8.2/25.3 from 8.8/27.1 yesterday.   2.23.23: Patient sitting up in bedside chair. NAD. VSS. Creatinine improved to 1.65 today. H/H    10.5/31.5 post transfusion 2  units PRBCs.  2.24.23: Patient underwent 2V CABG today and is currently in ICU on MV, requiring pressor support. Mediastinal drain patent. He received 2 units of PRBCs intraop  2.25.23: Awake in bed. C/o incisional sternal pain. CT x 2 patent. Pressors have been weaned off. Currently on cleviprex drip  2.26.23: Patient awake in bed. Mediastinal drains have been removed. Creatinine has bumped. Nephrology adding IVF x 1L due to IVVD  2.27.23: NAD. Sitting in Bedside Chair. Denies SOB and Palps. + CP/Incisional. Crea 2.53 "I am ok."     PMH: ICMO, Native CAD, HLD, HTN, CARLYLE, CKD, lupus anticoagulant " inhibitor syndrome, VHD- mild AS, AAA, pseudoaneurysm RFA  PSH: right total hip replacement, LHC, back surgery, spinal cord stimulator implant, knee surgery, cataract surgery  Family History: Brother- cancer, heart disease, lung cancer; father heart disease  Social History: Former smoker, occasional ETOH; denies illicit drug use     Previous Cardiac Diagnostics:   CUS 02/20/23:  The right internal carotid artery demonstrated 50-69% stenosis.  The left internal carotid artery demonstrated 50-69% stenosis.  Bilateral vertebral arteries were patent with antegrade flow.     [facility] 02/15/2023:  LM 0% stenosis  mLAD proximal 70% ISR  D1 ostial 50% stenosis  D2 ostial 50% stenosis  Lcx: previous stent; proximal Lcx  90% ISR; mid Lcx 30% stenosis  OM1 proximal 80% stenosis  RCA patent stents to ostium. Mid RCA proximal subsection- 60% stenosis; Mid RCA distal subsection 70& stenosis; distal RCA proximal subsection 100% stenosis. Fills left to right     TTE 02/16/2023:  Global LV SF is borderline normal. LVEF 45-50%. LA is mildly enlarged. Moderate calcification of the aortic valve is noted. Mild AS is present. Mild to moderate AR. Mild to moderate MAC is noted. Mild MR. Trace TR. PASP 40 mmHg. Optison used to enhance endocardial border.      [facility] 09/22/2022:  LM 0% stenosis  mLAD proximal subsection 50% ISR  D1 ostial 50% stenosis  D2 ostial 50% stenosis  pLCx mid subsection 70% ISR reduced to 10%, Preprocedure AMALIA III flow  noted. Post procedure AMALIA III was present. Lesion previous treated on 7/22/21 with PTA/DAYAN  OM1 proximal 80% stenosis  mLCx distal subsection 30% stenosis  RCA proximal RCA stent widely patent  mRCA proximal subsection 60% subsection  mRCA distal subsection 70% stenosis  dRCA proximal subsection 100% stenosis. Fills left to right     CUS 05/13/2022:  60-79% stenosis in pRICA and mLICA  Antegrade flow to bilateral vertebral arteries    Review of Systems   Constitutional: Positive for malaise/fatigue.    Cardiovascular:  Positive for chest pain. Negative for dyspnea on exertion and irregular heartbeat.        Incisional Chest Pain   Respiratory:  Negative for shortness of breath.    All other systems reviewed and are negative.    Objective:     Vital Signs (Most Recent):  Temp: 97.6 °F (36.4 °C) (02/27/23 1200)  Pulse: (!) 124 (02/27/23 1410)  Resp: (!) 23 (02/27/23 1410)  BP: (!) 118/54 (02/27/23 1202)  SpO2: (!) 92 % (02/27/23 1405)   Vital Signs (24h Range):  Temp:  [97.4 °F (36.3 °C)-99.7 °F (37.6 °C)] 97.6 °F (36.4 °C)  Pulse:  [105-223] 124  Resp:  [17-29] 23  SpO2:  [83 %-98 %] 92 %  BP: (117-131)/(53-71) 118/54     Weight: 90.6 kg (199 lb 11.8 oz)  Body mass index is 28.66 kg/m².    SpO2: (!) 92 %         Intake/Output Summary (Last 24 hours) at 2/27/2023 1421  Last data filed at 2/27/2023 0604  Gross per 24 hour   Intake 1037 ml   Output 200 ml   Net 837 ml       Lines/Drains/Airways       Peripherally Inserted Central Catheter Line  Duration             PICC Triple Lumen 02/27/23 1045 left basilic <1 day                  Significant Labs:   Recent Results (from the past 72 hour(s))   POCT glucose    Collection Time: 02/24/23  2:49 PM   Result Value Ref Range    POCT Glucose 145 (H) 70 - 110 mg/dL   POCT glucose    Collection Time: 02/24/23  3:51 PM   Result Value Ref Range    POCT Glucose 141 (H) 70 - 110 mg/dL   POCT ARTERIAL BLOOD GAS    Collection Time: 02/24/23  3:51 PM   Result Value Ref Range    POC PH 7.35     POC PCO2 43 mmHg    POC PO2 71 (A) mmHg    POC SATURATED O2 93 %    POC Potassium 4.8 mmol/l    POC Sodium 135 (A) 137 - 145 mmol/l    POC Ionized Calcium 1.29 (A) 1.12 - 1.23 mmol/l    POC HCO3 23.7 mmol/l    Base Deficit -2.0 mmol/l    POC Temp 37.0 C    Specimen source Arterial sample    Transesophageal echo (VICKY)    Collection Time: 02/24/23  3:51 PM   Result Value Ref Range    BSA 2.06 m2   Hemoglobin and Hematocrit    Collection Time: 02/24/23  4:50 PM   Result Value Ref Range    Hgb  11.6 (L) 14.0 - 18.0 g/dL    Hct 35.0 (L) 42.0 - 52.0 %   Potassium    Collection Time: 02/24/23  4:50 PM   Result Value Ref Range    Potassium Level 4.5 3.5 - 5.1 mmol/L   POCT glucose    Collection Time: 02/24/23  4:56 PM   Result Value Ref Range    POCT Glucose 124 (H) 70 - 110 mg/dL   POCT glucose    Collection Time: 02/24/23  6:03 PM   Result Value Ref Range    POCT Glucose 129 (H) 70 - 110 mg/dL   POCT glucose    Collection Time: 02/24/23  7:06 PM   Result Value Ref Range    POCT Glucose 130 (H) 70 - 110 mg/dL   POCT glucose    Collection Time: 02/24/23  8:12 PM   Result Value Ref Range    POCT Glucose 130 (H) 70 - 110 mg/dL   POCT glucose    Collection Time: 02/24/23  9:02 PM   Result Value Ref Range    POCT Glucose 122 (H) 70 - 110 mg/dL   POCT glucose    Collection Time: 02/24/23 10:00 PM   Result Value Ref Range    POCT Glucose 121 (H) 70 - 110 mg/dL   POCT glucose    Collection Time: 02/24/23 11:04 PM   Result Value Ref Range    POCT Glucose 122 (H) 70 - 110 mg/dL   POCT glucose    Collection Time: 02/25/23 12:08 AM   Result Value Ref Range    POCT Glucose 120 (H) 70 - 110 mg/dL   POCT glucose    Collection Time: 02/25/23  2:04 AM   Result Value Ref Range    POCT Glucose 110 70 - 110 mg/dL   Basic Metabolic Panel    Collection Time: 02/25/23  2:07 AM   Result Value Ref Range    Sodium Level 137 136 - 145 mmol/L    Potassium Level 4.2 3.5 - 5.1 mmol/L    Chloride 108 (H) 98 - 107 mmol/L    Carbon Dioxide 22 (L) 23 - 31 mmol/L    Glucose Level 106 82 - 115 mg/dL    Blood Urea Nitrogen 27.3 (H) 8.4 - 25.7 mg/dL    Creatinine 1.55 (H) 0.73 - 1.18 mg/dL    BUN/Creatinine Ratio 18     Calcium Level Total 9.3 8.8 - 10.0 mg/dL    Anion Gap 7.0 mEq/L    eGFR 45 mls/min/1.73/m2   CBC with Differential    Collection Time: 02/25/23  2:07 AM   Result Value Ref Range    WBC 11.0 4.5 - 11.5 x10(3)/mcL    RBC 3.88 (L) 4.70 - 6.10 x10(6)/mcL    Hgb 11.3 (L) 14.0 - 18.0 g/dL    Hct 35.1 (L) 42.0 - 52.0 %    MCV 90.5 80.0 -  94.0 fL    MCH 29.1 pg    MCHC 32.2 (L) 33.0 - 36.0 g/dL    RDW 15.3 11.5 - 17.0 %    Platelet 111 (L) 130 - 400 x10(3)/mcL    MPV 11.3 (H) 7.4 - 10.4 fL    Neut % 76.6 %    Lymph % 6.4 %    Mono % 15.5 %    Eos % 0.0 %    Basophil % 0.1 %    Lymph # 0.70 0.6 - 4.6 x10(3)/mcL    Neut # 8.41 2.1 - 9.2 x10(3)/mcL    Mono # 1.70 (H) 0.1 - 1.3 x10(3)/mcL    Eos # 0.00 0 - 0.9 x10(3)/mcL    Baso # 0.01 0 - 0.2 x10(3)/mcL    IG# 0.15 (H) 0 - 0.04 x10(3)/mcL    IG% 1.4 %    NRBC% 0.0 %   POCT glucose    Collection Time: 02/25/23  3:22 AM   Result Value Ref Range    POCT Glucose 101 70 - 110 mg/dL   POCT glucose    Collection Time: 02/25/23  5:51 AM   Result Value Ref Range    POCT Glucose 94 70 - 110 mg/dL   POCT glucose    Collection Time: 02/25/23  6:59 AM   Result Value Ref Range    POCT Glucose 105 70 - 110 mg/dL   POCT glucose    Collection Time: 02/25/23  9:52 AM   Result Value Ref Range    POCT Glucose 125 (H) 70 - 110 mg/dL   POCT glucose    Collection Time: 02/25/23  3:49 PM   Result Value Ref Range    POCT Glucose 107 70 - 110 mg/dL   POCT glucose    Collection Time: 02/25/23  8:36 PM   Result Value Ref Range    POCT Glucose 123 (H) 70 - 110 mg/dL   POCT glucose    Collection Time: 02/26/23 12:25 AM   Result Value Ref Range    POCT Glucose 103 70 - 110 mg/dL   Basic Metabolic Panel    Collection Time: 02/26/23  1:40 AM   Result Value Ref Range    Sodium Level 136 136 - 145 mmol/L    Potassium Level 4.8 3.5 - 5.1 mmol/L    Chloride 105 98 - 107 mmol/L    Carbon Dioxide 22 (L) 23 - 31 mmol/L    Glucose Level 110 82 - 115 mg/dL    Blood Urea Nitrogen 37.0 (H) 8.4 - 25.7 mg/dL    Creatinine 1.98 (H) 0.73 - 1.18 mg/dL    BUN/Creatinine Ratio 19     Calcium Level Total 8.7 (L) 8.8 - 10.0 mg/dL    Anion Gap 9.0 mEq/L    eGFR 33 mls/min/1.73/m2   CBC with Differential    Collection Time: 02/26/23  1:40 AM   Result Value Ref Range    WBC 9.9 4.5 - 11.5 x10(3)/mcL    RBC 3.56 (L) 4.70 - 6.10 x10(6)/mcL    Hgb 10.4 (L) 14.0  - 18.0 g/dL    Hct 33.2 (L) 42.0 - 52.0 %    MCV 93.3 80.0 - 94.0 fL    MCH 29.2 pg    MCHC 31.3 (L) 33.0 - 36.0 g/dL    RDW 15.8 11.5 - 17.0 %    Platelet 91 (L) 130 - 400 x10(3)/mcL    MPV 11.2 (H) 7.4 - 10.4 fL    IG# 0.27 (H) 0 - 0.04 x10(3)/mcL    IG% 2.7 %    NRBC% 0.0 %   Manual Differential    Collection Time: 02/26/23  1:40 AM   Result Value Ref Range    Neut Man 87 %    Lymph Man 3 %    Monocyte Man 10 %    Instr WBC 9.9 x10(3)/mcL    Abs Mono 0.99 0.1 - 1.3 x10(3)/mcL    Abs Lymp 0.297 (L) 0.6 - 4.6 x10(3)/mcL    Abs Neut 8.613 2.1 - 9.2 x10(3)/mcL    RBC Morph Abnormal (A) Normal    Anisocyte 1+ (A) (none)    Platelet Est Decreased (A) Normal, Adequate   POCT glucose    Collection Time: 02/26/23  1:02 PM   Result Value Ref Range    POCT Glucose 117 (H) 70 - 110 mg/dL   Basic Metabolic Panel    Collection Time: 02/27/23  1:54 AM   Result Value Ref Range    Sodium Level 133 (L) 136 - 145 mmol/L    Potassium Level 4.5 3.5 - 5.1 mmol/L    Chloride 105 98 - 107 mmol/L    Carbon Dioxide 22 (L) 23 - 31 mmol/L    Glucose Level 108 82 - 115 mg/dL    Blood Urea Nitrogen 57.9 (H) 8.4 - 25.7 mg/dL    Creatinine 2.53 (H) 0.73 - 1.18 mg/dL    BUN/Creatinine Ratio 23     Calcium Level Total 9.0 8.8 - 10.0 mg/dL    Anion Gap 6.0 mEq/L    eGFR 25 mls/min/1.73/m2   CBC with Differential    Collection Time: 02/27/23  1:54 AM   Result Value Ref Range    WBC 9.3 4.5 - 11.5 x10(3)/mcL    RBC 3.38 (L) 4.70 - 6.10 x10(6)/mcL    Hgb 9.9 (L) 14.0 - 18.0 g/dL    Hct 31.8 (L) 42.0 - 52.0 %    MCV 94.1 (H) 80.0 - 94.0 fL    MCH 29.3 pg    MCHC 31.1 (L) 33.0 - 36.0 g/dL    RDW 16.0 11.5 - 17.0 %    Platelet 88 (L) 130 - 400 x10(3)/mcL    MPV 11.0 (H) 7.4 - 10.4 fL    Neut % 65.9 %    Lymph % 9.8 %    Mono % 20.5 %    Eos % 0.1 %    Basophil % 0.1 %    Lymph # 0.91 0.6 - 4.6 x10(3)/mcL    Neut # 6.14 2.1 - 9.2 x10(3)/mcL    Mono # 1.91 (H) 0.1 - 1.3 x10(3)/mcL    Eos # 0.01 0 - 0.9 x10(3)/mcL    Baso # 0.01 0 - 0.2 x10(3)/mcL    IG#  0.34 (H) 0 - 0.04 x10(3)/mcL    IG% 3.6 %    NRBC% 0.0 %   Echo    Collection Time: 02/27/23 12:42 PM   Result Value Ref Range    BSA 2.06 m2    EF 50 %     Telemetry: SR    Physical Exam  Vitals reviewed.   Constitutional:       Appearance: Normal appearance. He is obese.   HENT:      Head: Normocephalic.      Mouth/Throat:      Mouth: Mucous membranes are moist.   Eyes:      Extraocular Movements: Extraocular movements intact.   Cardiovascular:      Rate and Rhythm: Normal rate and regular rhythm.      Pulses: Normal pulses.      Heart sounds: Normal heart sounds.   Pulmonary:      Effort: Pulmonary effort is normal. No respiratory distress.      Breath sounds: Normal breath sounds.   Abdominal:      Palpations: Abdomen is soft.   Skin:     General: Skin is warm and dry.      Comments: Midline Sternotomy Dressing C/D/I   Neurological:      General: No focal deficit present.      Mental Status: He is alert and oriented to person, place, and time.   Psychiatric:         Mood and Affect: Mood normal.         Behavior: Behavior normal.     Current Inpatient Medications:    Current Facility-Administered Medications:     0.9%  NaCl infusion (for blood administration), , Intravenous, Q24H PRN, SHIRLEY Torres    0.9%  NaCl infusion (for blood administration), , Intravenous, Q24H PRN, Trenton Harvey PA-C    0.9%  NaCl infusion, , Intravenous, Continuous, Darinel Bedoya MD, Last Rate: 50 mL/hr at 02/27/23 1200, New Bag at 02/27/23 1200    acetaminophen oral solution 650 mg, 650 mg, Per OG tube, Q6H PRN, GILMAR Estrada    acetaminophen tablet 650 mg, 650 mg, Oral, Q6H PRN, Lul Isaac MD, 650 mg at 02/26/23 1614    albumin human 5% bottle 12.5 g, 12.5 g, Intravenous, PRN, GILMAR Estrada    albuterol-ipratropium 2.5 mg-0.5 mg/3 mL nebulizer solution 3 mL, 3 mL, Nebulization, Q4H PRN, Nima Baez DO, 3 mL at 02/27/23 0344    aspirin EC tablet 81 mg, 81 mg, Oral, Daily, GILMAR Estrada, 81  mg at 02/27/23 0805    atorvastatin tablet 40 mg, 40 mg, Oral, Daily, Trenton Harvey PA-C, 40 mg at 02/27/23 0805    calcium carbonate 200 mg calcium (500 mg) chewable tablet 500 mg, 500 mg, Oral, Daily, Trenton Harvey PA-C, 500 mg at 02/26/23 0830    calcium gluconate 1 g in NS IVPB (premixed), 1 g, Intravenous, PRN, Leon P Langbibis, PA    calcium gluconate 1 g in NS IVPB (premixed), 2 g, Intravenous, PRN, Leon P Langataais, PA    calcium gluconate 1 g in NS IVPB (premixed), 3 g, Intravenous, PRN, Leon P Cathis, PA    dextrose 10% bolus 125 mL 125 mL, 12.5 g, Intravenous, PRN, Leon P Langataais, PA    dextrose 10% bolus 250 mL 250 mL, 25 g, Intravenous, PRN, Leon P Langbibis, PA    dextrose 5 % and 0.9 % NaCl infusion, , Intravenous, Continuous, Darinel Bedoya MD, Last Rate: 75 mL/hr at 02/26/23 0831, New Bag at 02/26/23 0831    DOBUtamine 1000 mg in D5W 250 mL infusion, 2.5 mcg/kg/min, Intravenous, Continuous, Darinel Bedoya MD, Last Rate: 3.4 mL/hr at 02/27/23 1200, 2.5 mcg/kg/min at 02/27/23 1200    docusate sodium capsule 100 mg, 100 mg, Oral, BID, GILMAR Estrada, 100 mg at 02/27/23 0805    ezetimibe tablet 10 mg, 10 mg, Oral, Daily, Trenton Harvey PA-C, 10 mg at 02/27/23 0805    FLUoxetine capsule 10 mg, 10 mg, Oral, Daily, Trenton Harvey PA-C, 10 mg at 02/27/23 0805    folic acid tablet 1 mg, 1 mg, Oral, Daily, Trenton Harvey PA-C, 1 mg at 02/27/23 0805    furosemide (LASIX) 200 mg in dextrose 5 % (D5W) SolP 100 mL continuous infusion (conc: 2 mg/mL), 2 mg/hr, Intravenous, Continuous, Darinel Bedoya MD, Last Rate: 1 mL/hr at 02/27/23 1200, 2 mg/hr at 02/27/23 1200    gabapentin capsule 100 mg, 100 mg, Oral, TID, Trenton Harvey PA-C, 100 mg at 02/27/23 0806    hydrALAZINE injection 20 mg, 20 mg, Intravenous, Q4H PRN, Ga TSANG Emily, NP    HYDROcodone-acetaminophen 5-325 mg per tablet 1 tablet, 1 tablet, Oral, Q4H PRN, GILMAR Estrada, 1 tablet at 02/24/23 1708     HYDROcodone-acetaminophen 7.5-325 mg per tablet 1 tablet, 1 tablet, Oral, Q6H PRN, Ga Q. Emily, NP, 1 tablet at 02/27/23 0805    hydrOXYzine HCL tablet 25 mg, 25 mg, Oral, Q6H PRN, Trenton Harvey PA-C    labetaloL injection 20 mg, 20 mg, Intravenous, Q4H PRN, Ga Q. Emily, NP    magnesium sulfate 2g in water 50mL IVPB (premix), 2 g, Intravenous, PRN, GILMAR Estrada    magnesium sulfate 2g in water 50mL IVPB (premix), 4 g, Intravenous, PRN, GILMAR Estrada    meclizine tablet 25 mg, 25 mg, Oral, TID PRN, Trenton Harvey PA-C    metoprolol succinate (TOPROL-XL) 24 hr split tablet 12.5 mg, 12.5 mg, Oral, Daily, Dominick Weinstein MD, 12.5 mg at 02/27/23 0215    morphine injection 2 mg, 2 mg, Intravenous, Q2H PRN, Ga Q. Emily, NP, 2 mg at 02/25/23 0330    mupirocin 2 % ointment, , Nasal, BID, GILMAR Estrada, Given at 02/27/23 0807    ondansetron injection 4 mg, 4 mg, Intravenous, Q4H PRN, GILMAR Estrada, 4 mg at 02/25/23 1326    oxyCODONE immediate release tablet 10 mg, 10 mg, Oral, Q4H PRN, GILMAR Estrada, 10 mg at 02/27/23 1201    pantoprazole EC tablet 40 mg, 40 mg, Oral, Daily, Trenton Harvey PA-C, 40 mg at 02/27/23 0806    potassium chloride 20 mEq in 100 mL IVPB (FOR CENTRAL LINE ADMINISTRATION ONLY), 20 mEq, Intravenous, PRN, GILMAR Estrada    potassium chloride 20 mEq in 100 mL IVPB (FOR CENTRAL LINE ADMINISTRATION ONLY), 40 mEq, Intravenous, PRN, GILMAR Estrada    potassium chloride 20 mEq in 100 mL IVPB (FOR CENTRAL LINE ADMINISTRATION ONLY), 20 mEq, Intravenous, PRN, GILMAR Estrada    predniSONE tablet 2.5 mg, 2.5 mg, Oral, Daily, Trenton Harvey PA-C    QUEtiapine tablet 50 mg, 50 mg, Oral, QHS, Trenton Harvey PA-C, 50 mg at 02/26/23 2114    rOPINIRole tablet 1 mg, 1 mg, Oral, BID, Trenton Harvey PA-C, 1 mg at 02/27/23 0806    Flushing PICC Protocol, , , Until Discontinued **AND** sodium chloride 0.9% flush 10 mL, 10 mL, Intravenous,  Q6H, 10 mL at 02/27/23 0600 **AND** sodium chloride 0.9% flush 10 mL, 10 mL, Intravenous, PRN, Dominick Weinstein MD    sodium phosphate 15 mmol in dextrose 5 % (D5W) 250 mL IVPB, 15 mmol, Intravenous, PRN, GILMAR Estrada    sodium phosphate 20.01 mmol in dextrose 5 % (D5W) 250 mL IVPB, 20.01 mmol, Intravenous, PRN, GILMAR Estrada    sodium phosphate 30 mmol in dextrose 5 % (D5W) 250 mL IVPB, 30 mmol, Intravenous, PRN, GILMAR Estrada    sucralfate tablet 1 g, 1 g, Oral, QID (AC & HS), GILMAR Estrada, 1 g at 02/27/23 1201    tiZANidine tablet 4 mg, 4 mg, Oral, Q8H PRN, Trenton Harvey PA-C    zolpidem tablet 5 mg, 5 mg, Oral, Nightly PRN, Ga Diaz NP    VTE Risk Mitigation (From admission, onward)           Ordered     Place sequential compression device  Until discontinued         02/24/23 1046     IP VTE HIGH RISK PATIENT  Once         02/24/23 1034     heparin, porcine (PF) (heparin flush 100 units/mL) 100 unit/mL injection flush         02/23/23 2215     heparin 25,000 units in dextrose 5% 250 mL (100 units/mL) infusion LOW INTENSITY nomogram - [facility]  Continuous        Question Answer Comment   Begin at (in units/kg/hr) 12    Heparin Infusion Adjustment (DO NOT MODIFY ANSWER) [email or web address]\HeparinInfusions\heparin LOW INTENSITY nomogram for [facility] [id number]        02/19/23 9956                  Assessment:   NSTEMI Type I  MVCAD    - s/p CABG 2V LIMA-LAD, rSVG-OM 02/24/23 with Right GSV EVH    - LHC 02/15/23: LM 0% stenosis. mLAD proximal 70% ISR. D1 ostial 50% stenosis. D2 ostial 50% stenosis. Lcx: previous stent; proximal Lcx  90% ISR; mid Lcx 30% stenosis. OM1 proximal 80% stenosis. RCA patent stents to ostium. Mid RCA proximal subsection- 60% stenosis; Mid RCA distal subsection 70& stenosis; distal RCA proximal subsection 100% stenosis. Fills left to right    - [facility] 09/22/22: LM 0% stenosis. mLAD proximal subsection 50% ISR,. D1 ostial 50% stenosis. D2 ostial 50%  stenosis. pLCx mid subsection 70% ISR reduced to 10%, Preprocedure AMALIA III flow  noted. Post procedure AMALIA III was present. Lesion previous treated on 7/22/21 with PTA/DAYAN. OM1 proximal 80% stenosis. mLCx distal subsection 30% stenosis. RCA proximal RCA stent widely patent. mRCA proximal subsection 60% subsection. mRCA distal subsection 70% stenosis. dRCA proximal subsection 100% stenosis. Fills left to right  Hypertension (Controlled)  ICMO    - EF 45-50% TTE 2/15/23 improved from 40% 7/21  VHD    - Mild AS, Mild to moderate AR. Mild to moderate MAC is noted. Mild MR. Trace TR.  B RICKEY    - Moderate Bilateral by US  DANIELLA/CKD IIIb  HLD  Lupus Anticoagulant Inhibitor Syndrome  CARLYLE  VHD    - Mild AS  Renal Insufficiency  AAA  Anemia  No Hx of GIB     Plan:   Continue ASA, Statin, BB  Start Plavix 75mg PO Qday when Ok with CVS (NSTEMI)  No ACEi/ARB/ARNI secondary to DANIELLA  Nephrology Following for DANIELLA  Aggressive Mobilization of PT and Q1HR IS  Labs in AM: CBC, CMP and Mg    Pt. seen and examined by me and plan made under my supervision.

## 2023-02-27 NOTE — PT/OT/SLP PROGRESS
Physical Therapy Treatment    Patient Name:  Hunter Navarrete   MRN:  [id number]    Recommendations:     Discharge Recommendations:  (rehab vs SNF)  Discharge Equipment Recommendations: none  Barriers to discharge:  Medical dx.    Assessment:     Hunter Navarrete is a 82 y.o. male admitted with a medical diagnosis of S/P CABG  He presents with the following impairments/functional limitations: weakness, impaired endurance, impaired self care skills, impaired functional mobility, gait instability, impaired balance .    Rehab Prognosis: Good; patient would benefit from acute skilled PT services to address these deficits and reach maximum level of function.    Recent Surgery: Procedure(s) (LRB):  CORONARY ARTERY BYPASS GRAFT (CABG) (N/A) 3 Days Post-Op    Plan:     During this hospitalization, patient to be seen 5 x/week to address the identified rehab impairments via gait training, therapeutic activities, therapeutic exercises and progress toward the following goals:    Plan of Care Expires:  03/22/23    Subjective     Chief Complaint: weakness  Patient/Family Comments/goals:   Pain/Comfort:         Objective:     Communicated with RN prior to session.  Patient found up in chair with oxygen upon PT entry to room.     General Precautions: Standard, sternal  Orthopedic Precautions: N/A  Braces: N/A  Respiratory Status: Nasal cannula, flow 4 L/min  Blood Pressure: 118/57  O2: 97%  Skin Integrity: Visible skin intact      Functional Mobility:  Transfers:     Sit to Stand:  moderate assistance with rolling walker  Gait: Pt amb 10ft with RW modA. Fwd lean posture and unsteadiness noted. Pt easily fatigued.  Balance: Standing balance modA, post lean.    Therapeutic Activities/Exercises:  Performed LAQ 10x1  UIC.    Education:  Patient provided with verbal education regarding poc.  Understanding was verbalized, however additional teaching warranted.     Patient left up in chair with all lines intact, call button in reach, and RN  present..    GOALS:   Multidisciplinary Problems       Physical Therapy Goals          Problem: Physical Therapy    Goal Priority Disciplines Outcome Goal Variances Interventions   Physical Therapy Goal     PT, PT/OT Ongoing, Progressing     Description: Goals to be met by: 3/0323     Patient will increase functional independence with mobility by performin. Pt will be ada with sit to supine and supine to sit  2. Pt will be sba with sit to stand  3. Pt will ambulate 120ft with a rw sba                           Time Tracking:     PT Received On: 23  PT Start Time: 1012     PT Stop Time: 1028  PT Total Time (min): 16 min     Billable Minutes: Gait Training 16       PT/PTA: PTA     PTA Visit Number: 2     2023

## 2023-02-28 LAB
ALBUMIN SERPL-MCNC: 2.3 G/DL (ref 3.4–4.8)
ALBUMIN/GLOB SERPL: 0.6 RATIO (ref 1.1–2)
ALP SERPL-CCNC: 69 UNIT/L (ref 40–150)
ALT SERPL-CCNC: 7 UNIT/L (ref 0–55)
AST SERPL-CCNC: 35 UNIT/L (ref 5–34)
BASOPHILS # BLD AUTO: 0.01 X10(3)/MCL (ref 0–0.2)
BASOPHILS NFR BLD AUTO: 0.1 %
BILIRUBIN DIRECT+TOT PNL SERPL-MCNC: 0.6 MG/DL
BUN SERPL-MCNC: 65.7 MG/DL (ref 8.4–25.7)
CALCIUM SERPL-MCNC: 9.1 MG/DL (ref 8.8–10)
CHLORIDE SERPL-SCNC: 105 MMOL/L (ref 98–107)
CO2 SERPL-SCNC: 20 MMOL/L (ref 23–31)
CREAT SERPL-MCNC: 2.26 MG/DL (ref 0.73–1.18)
EOSINOPHIL # BLD AUTO: 0 X10(3)/MCL (ref 0–0.9)
EOSINOPHIL NFR BLD AUTO: 0 %
ERYTHROCYTE [DISTWIDTH] IN BLOOD BY AUTOMATED COUNT: 15.6 % (ref 11.5–17)
GFR SERPLBLD CREATININE-BSD FMLA CKD-EPI: 28 MLS/MIN/1.73/M2
GLOBULIN SER-MCNC: 3.8 GM/DL (ref 2.4–3.5)
GLUCOSE SERPL-MCNC: 103 MG/DL (ref 82–115)
HCT VFR BLD AUTO: 27.4 % (ref 42–52)
HGB BLD-MCNC: 8.6 G/DL (ref 14–18)
IMM GRANULOCYTES # BLD AUTO: 0.23 X10(3)/MCL (ref 0–0.04)
IMM GRANULOCYTES NFR BLD AUTO: 3 %
LYMPHOCYTES # BLD AUTO: 0.62 X10(3)/MCL (ref 0.6–4.6)
LYMPHOCYTES NFR BLD AUTO: 8.1 %
MAGNESIUM SERPL-MCNC: 2.1 MG/DL (ref 1.6–2.6)
MCH RBC QN AUTO: 29 PG
MCHC RBC AUTO-ENTMCNC: 31.4 G/DL (ref 33–36)
MCV RBC AUTO: 92.3 FL (ref 80–94)
MONOCYTES # BLD AUTO: 1.37 X10(3)/MCL (ref 0.1–1.3)
MONOCYTES NFR BLD AUTO: 17.9 %
NEUTROPHILS # BLD AUTO: 5.42 X10(3)/MCL (ref 2.1–9.2)
NEUTROPHILS NFR BLD AUTO: 70.9 %
NRBC BLD AUTO-RTO: 0 %
PLATELET # BLD AUTO: 100 X10(3)/MCL (ref 130–400)
PMV BLD AUTO: 11.4 FL (ref 7.4–10.4)
POCT GLUCOSE: 106 MG/DL (ref 70–110)
POTASSIUM SERPL-SCNC: 4.4 MMOL/L (ref 3.5–5.1)
PROT SERPL-MCNC: 6.1 GM/DL (ref 5.8–7.6)
RBC # BLD AUTO: 2.97 X10(6)/MCL (ref 4.7–6.1)
SODIUM SERPL-SCNC: 133 MMOL/L (ref 136–145)
WBC # SPEC AUTO: 7.7 X10(3)/MCL (ref 4.5–11.5)

## 2023-02-28 PROCEDURE — 25000003 PHARM REV CODE 250: Performed by: PHYSICIAN ASSISTANT

## 2023-02-28 PROCEDURE — 63600175 PHARM REV CODE 636 W HCPCS: Performed by: STUDENT IN AN ORGANIZED HEALTH CARE EDUCATION/TRAINING PROGRAM

## 2023-02-28 PROCEDURE — 25000003 PHARM REV CODE 250: Performed by: INTERNAL MEDICINE

## 2023-02-28 PROCEDURE — 93010 EKG 12-LEAD: ICD-10-PCS | Mod: ,,, | Performed by: INTERNAL MEDICINE

## 2023-02-28 PROCEDURE — 27000221 HC OXYGEN, UP TO 24 HOURS

## 2023-02-28 PROCEDURE — 97530 THERAPEUTIC ACTIVITIES: CPT

## 2023-02-28 PROCEDURE — 80053 COMPREHEN METABOLIC PANEL: CPT | Performed by: INTERNAL MEDICINE

## 2023-02-28 PROCEDURE — C1751 CATH, INF, PER/CENT/MIDLINE: HCPCS

## 2023-02-28 PROCEDURE — 99024 POSTOP FOLLOW-UP VISIT: CPT | Mod: ,,, | Performed by: PHYSICIAN ASSISTANT

## 2023-02-28 PROCEDURE — 25000242 PHARM REV CODE 250 ALT 637 W/ HCPCS: Performed by: STUDENT IN AN ORGANIZED HEALTH CARE EDUCATION/TRAINING PROGRAM

## 2023-02-28 PROCEDURE — 21400001 HC TELEMETRY ROOM

## 2023-02-28 PROCEDURE — A4216 STERILE WATER/SALINE, 10 ML: HCPCS | Performed by: INTERNAL MEDICINE

## 2023-02-28 PROCEDURE — 93005 ELECTROCARDIOGRAM TRACING: CPT

## 2023-02-28 PROCEDURE — 99024 PR POST-OP FOLLOW-UP VISIT: ICD-10-PCS | Mod: ,,, | Performed by: PHYSICIAN ASSISTANT

## 2023-02-28 PROCEDURE — 94761 N-INVAS EAR/PLS OXIMETRY MLT: CPT

## 2023-02-28 PROCEDURE — 93010 ELECTROCARDIOGRAM REPORT: CPT | Mod: ,,, | Performed by: INTERNAL MEDICINE

## 2023-02-28 PROCEDURE — 25000003 PHARM REV CODE 250: Performed by: NURSE PRACTITIONER

## 2023-02-28 PROCEDURE — 94640 AIRWAY INHALATION TREATMENT: CPT

## 2023-02-28 PROCEDURE — 85025 COMPLETE CBC W/AUTO DIFF WBC: CPT | Performed by: INTERNAL MEDICINE

## 2023-02-28 PROCEDURE — 97116 GAIT TRAINING THERAPY: CPT

## 2023-02-28 PROCEDURE — 83735 ASSAY OF MAGNESIUM: CPT | Performed by: NURSE PRACTITIONER

## 2023-02-28 RX ORDER — PREDNISOLONE SODIUM PHOSPHATE 15 MG/5ML
2.5 SOLUTION ORAL DAILY
Status: DISCONTINUED | OUTPATIENT
Start: 2023-02-28 | End: 2023-03-03 | Stop reason: HOSPADM

## 2023-02-28 RX ORDER — PREDNISONE 2.5 MG/1
2.5 TABLET ORAL DAILY
Status: DISCONTINUED | OUTPATIENT
Start: 2023-02-28 | End: 2023-02-28

## 2023-02-28 RX ORDER — METOPROLOL TARTRATE 25 MG/1
25 TABLET, FILM COATED ORAL 3 TIMES DAILY
Status: DISCONTINUED | OUTPATIENT
Start: 2023-02-28 | End: 2023-03-02

## 2023-02-28 RX ADMIN — PANTOPRAZOLE SODIUM 40 MG: 40 TABLET, DELAYED RELEASE ORAL at 08:02

## 2023-02-28 RX ADMIN — HYDROCODONE BITARTRATE AND ACETAMINOPHEN 1 TABLET: 7.5; 325 TABLET ORAL at 11:02

## 2023-02-28 RX ADMIN — Medication 81 MG: at 08:02

## 2023-02-28 RX ADMIN — FOLIC ACID 1 MG: 1 TABLET ORAL at 08:02

## 2023-02-28 RX ADMIN — METOPROLOL TARTRATE 25 MG: 25 TABLET, FILM COATED ORAL at 03:02

## 2023-02-28 RX ADMIN — GABAPENTIN 100 MG: 100 CAPSULE ORAL at 08:02

## 2023-02-28 RX ADMIN — MUPIROCIN: 20 OINTMENT TOPICAL at 09:02

## 2023-02-28 RX ADMIN — SUCRALFATE 1 G: 1 TABLET ORAL at 11:02

## 2023-02-28 RX ADMIN — SODIUM CHLORIDE: 9 INJECTION, SOLUTION INTRAVENOUS at 10:02

## 2023-02-28 RX ADMIN — ROPINIROLE HYDROCHLORIDE 1 MG: 0.25 TABLET, FILM COATED ORAL at 08:02

## 2023-02-28 RX ADMIN — FLUOXETINE 10 MG: 10 CAPSULE ORAL at 09:02

## 2023-02-28 RX ADMIN — CALCIUM CARBONATE (ANTACID) CHEW TAB 500 MG 500 MG: 500 CHEW TAB at 08:02

## 2023-02-28 RX ADMIN — SODIUM CHLORIDE, PRESERVATIVE FREE 10 ML: 5 INJECTION INTRAVENOUS at 06:02

## 2023-02-28 RX ADMIN — PREDNISOLONE SODIUM PHOSPHATE 2.5 MG: 15 SOLUTION ORAL at 11:02

## 2023-02-28 RX ADMIN — SODIUM CHLORIDE, PRESERVATIVE FREE 10 ML: 5 INJECTION INTRAVENOUS at 12:02

## 2023-02-28 RX ADMIN — MUPIROCIN: 20 OINTMENT TOPICAL at 08:02

## 2023-02-28 RX ADMIN — METOPROLOL TARTRATE 25 MG: 25 TABLET, FILM COATED ORAL at 08:02

## 2023-02-28 RX ADMIN — QUETIAPINE FUMARATE 50 MG: 25 TABLET ORAL at 08:02

## 2023-02-28 RX ADMIN — HYDROCODONE BITARTRATE AND ACETAMINOPHEN 1 TABLET: 7.5; 325 TABLET ORAL at 10:02

## 2023-02-28 RX ADMIN — SUCRALFATE 1 G: 1 TABLET ORAL at 06:02

## 2023-02-28 RX ADMIN — GABAPENTIN 100 MG: 100 CAPSULE ORAL at 03:02

## 2023-02-28 RX ADMIN — DOCUSATE SODIUM 100 MG: 100 CAPSULE, LIQUID FILLED ORAL at 08:02

## 2023-02-28 RX ADMIN — OXYCODONE HYDROCHLORIDE 10 MG: 5 TABLET ORAL at 12:02

## 2023-02-28 RX ADMIN — IPRATROPIUM BROMIDE AND ALBUTEROL SULFATE 3 ML: 2.5; .5 SOLUTION RESPIRATORY (INHALATION) at 10:02

## 2023-02-28 RX ADMIN — SUCRALFATE 1 G: 1 TABLET ORAL at 04:02

## 2023-02-28 RX ADMIN — EZETIMIBE 10 MG: 10 TABLET ORAL at 08:02

## 2023-02-28 RX ADMIN — ATORVASTATIN CALCIUM 40 MG: 40 TABLET, FILM COATED ORAL at 08:02

## 2023-02-28 RX ADMIN — METOPROLOL SUCCINATE 12.5 MG: 25 TABLET, EXTENDED RELEASE ORAL at 08:02

## 2023-02-28 RX ADMIN — SUCRALFATE 1 G: 1 TABLET ORAL at 08:02

## 2023-02-28 NOTE — PT/OT/SLP PROGRESS
Physical Therapy Treatment    Patient Name:  Hunter Navarrete   MRN:  [id number]    Recommendations:     Discharge Recommendations: rehabilitation facility  Discharge Equipment Recommendations: none  Barriers to discharge:  medical dx, decreased functional independence     Assessment:     Hunter Navarrete is a 82 y.o. male admitted with a medical diagnosis of NSTEMI, CAD s/p C s/p CABG. He presents with the following impairments/functional limitations: weakness, gait instability, impaired endurance, impaired balance, decreased lower extremity function, decreased upper extremity function, impaired cardiopulmonary response to activity, impaired self care skills, impaired functional mobility.    Rehab Prognosis: Good; patient would benefit from acute skilled PT services to address these deficits and reach maximum level of function.    Recent Surgery: Procedure(s) (LRB):  CORONARY ARTERY BYPASS GRAFT (CABG) (N/A) 4 Days Post-Op    Plan:     During this hospitalization, patient to be seen 6 x/week to address the identified rehab impairments via gait training, therapeutic activities, therapeutic exercises and progress toward the following goals:    Plan of Care Expires:  03/22/23    Subjective     Chief Complaint: fatigue  Patient/Family Comments/goals: to get stronger   Pain/Comfort:  Pain Rating 1: 5/10  Location - Orientation 1: midline  Location 1: chest    A&O x3-- not oriented to year    Objective:     Communicated with NSG prior to session.  Patient found up in chair with pulse ox (continuous), telemetry, blood pressure cuff, oxygen, peripheral IV, medellin catheter upon PT entry to room.     General Precautions: Standard, sternal, fall  Orthopedic Precautions: N/A  Braces: N/A  Respiratory Status:  3.5 L oximizer  Blood Pressure: 101/65 (sitting, prior to ax), 114/59 (standing, complaints of dizziness)   Heart Rate: 108 (at rest), 126 (w ax)  SpO2: 96% (at rest), 92% (w ax)  Skin Integrity: Visible skin  intact      Functional Mobility:  Transfers:     Sit to Stand:  moderate assistance and of 2 persons with rolling walker  VC for proper hand positions to maintain pxns  X4 trials performed throughout session  Demonstrated NBOS and posterior lean upon standing on all trials; constant VC/TC for and upright, midline, and balanced posture  Gait: pt ambulated 22ft + 32ft  + 40ft with use of RW and modA; chair close in tow 2/2 need for seated rest breaks in btw all trials  Slowed pace, excessive forward flexed head/trunk requiring constant VC/TC, step-to pattern  Pt demonstrated decreased hip/knee flexion + decreased initial heel strike of LLE and then a quick short step of the RLE with decreased foot clearance-- verbal and visual cues provided for a normalized step-through gait pattern with adequate foot clearance however pt with poor carryover    Education:  Patient provided with verbal education regarding POC, mobility, safety, and pxns.  Understanding was verbalized, however additional teaching warranted.     Patient left up in chair with all lines intact, call button in reach, and RN notified.      GOALS:   Multidisciplinary Problems       Physical Therapy Goals          Problem: Physical Therapy    Goal Priority Disciplines Outcome Goal Variances Interventions   Physical Therapy Goal     PT, PT/OT Ongoing, Progressing     Description: Goals to be met by: 3/0323     Patient will increase functional independence with mobility by performin. Pt will be ada with sit to supine and supine to sit  2. Pt will be sba with sit to stand  3. Pt will ambulate 120ft with a rw sba                           Time Tracking:     PT Received On: 23  PT Start Time: 1024     PT Stop Time: 1053  PT Total Time (min): 29 min     Billable Minutes: Gait Training 2    Treatment Type: Treatment  PT/PTA: PT     PTA Visit Number: 3     2023

## 2023-02-28 NOTE — PROGRESS NOTES
"[facility] - 3rd Floor Medical Telemetry  Cardiology  Progress Note    Patient Name: Hunter Navarrete  MRN: [id number]  Admission Date: 2/19/2023  Hospital Length of Stay: 9 days  Code Status: Full Code   Attending Physician: Dominick Weinstein MD   Primary Care Physician: Mariella Bethea MD  Expected Discharge Date:   Principal Problem:<principal problem not specified>    Subjective:   Chief Complaint: Chest Pain (Known MVCAD)     HPI: Mr. Navarrete is an 81 y/o male, followed by Dr. Loredo who presented to [facility] ER with c/o chest pain. HS troponin 1149; therefore he was transferred to [facility] for cardiology services where he underwent LHC revealing multivessel disease and elevated LVEDP. He was started on diuretics, Plavix was placed on hold and he was transferred to [facility] for CABG evaluation. Patient is reporting mild chest tightness 3/10 currently    Hospital Course:   2.21.23:  Vitals Stable. Shoulder Pain this morning. On Nitro & Heparin Infusion. EKG with no overt ischemic changes.   2.22.23: Patient resting in bed. NAD. Denies CP/SOB. Creatinine 1.93 today- mildly decreased from 2.07 yesterday with addition of IVFs. H/H downtrending- 8.2/25.3 from 8.8/27.1 yesterday.   2.23.23: Patient sitting up in bedside chair. NAD. VSS. Creatinine improved to 1.65 today. H/H    10.5/31.5 post transfusion 2  units PRBCs.  2.24.23: Patient underwent 2V CABG today and is currently in ICU on MV, requiring pressor support. Mediastinal drain patent. He received 2 units of PRBCs intraop  2.25.23: Awake in bed. C/o incisional sternal pain. CT x 2 patent. Pressors have been weaned off. Currently on cleviprex drip  2.26.23: Patient awake in bed. Mediastinal drains have been removed. Creatinine has bumped. Nephrology adding IVF x 1L due to IVVD  2.27.23: NAD. Sitting in Bedside Chair. Denies SOB and Palps. + CP/Incisional. Crea 2.53 "I am ok."   2.28.23: NAD. Sitting in Bedside Chair. Denies SOB and Palps. + " "CP/Incisional. Crea 2.26. "I am ok." + Sinus Tachycardia.     PMH: ICMO, Native CAD, HLD, HTN, CARLYLE, CKD, lupus anticoagulant inhibitor syndrome, VHD- mild AS, AAA, pseudoaneurysm RFA  PSH: right total hip replacement, LHC, back surgery, spinal cord stimulator implant, knee surgery, cataract surgery  Family History: Brother- cancer, heart disease, lung cancer; father heart disease  Social History: Former smoker, occasional ETOH; denies illicit drug use     Previous Cardiac Diagnostics:   CUS 02/20/23:  The right internal carotid artery demonstrated 50-69% stenosis.  The left internal carotid artery demonstrated 50-69% stenosis.  Bilateral vertebral arteries were patent with antegrade flow.     [facility] 02/15/2023:  LM 0% stenosis  mLAD proximal 70% ISR  D1 ostial 50% stenosis  D2 ostial 50% stenosis  Lcx: previous stent; proximal Lcx  90% ISR; mid Lcx 30% stenosis  OM1 proximal 80% stenosis  RCA patent stents to ostium. Mid RCA proximal subsection- 60% stenosis; Mid RCA distal subsection 70& stenosis; distal RCA proximal subsection 100% stenosis. Fills left to right     TTE 02/16/2023:  Global LV SF is borderline normal. LVEF 45-50%. LA is mildly enlarged. Moderate calcification of the aortic valve is noted. Mild AS is present. Mild to moderate AR. Mild to moderate MAC is noted. Mild MR. Trace TR. PASP 40 mmHg. Optison used to enhance endocardial border.      [facility] 09/22/2022:  LM 0% stenosis  mLAD proximal subsection 50% ISR  D1 ostial 50% stenosis  D2 ostial 50% stenosis  pLCx mid subsection 70% ISR reduced to 10%, Preprocedure AMALIA III flow  noted. Post procedure AMALIA III was present. Lesion previous treated on 7/22/21 with PTA/DAYAN  OM1 proximal 80% stenosis  mLCx distal subsection 30% stenosis  RCA proximal RCA stent widely patent  mRCA proximal subsection 60% subsection  mRCA distal subsection 70% stenosis  dRCA proximal subsection 100% stenosis. Fills left to right     CUS 05/13/2022:  60-79% stenosis in pRICA and " mLICA  Antegrade flow to bilateral vertebral arteries    Review of Systems   Constitutional: Positive for malaise/fatigue.   Cardiovascular:  Positive for chest pain. Negative for dyspnea on exertion, irregular heartbeat and palpitations.        Incisional Chest Pain   Respiratory:  Negative for shortness of breath.    All other systems reviewed and are negative.    Objective:     Vital Signs (Most Recent):  Temp: 98.2 °F (36.8 °C) (02/28/23 0815)  Pulse: (!) 111 (02/28/23 1000)  Resp: (!) 24 (02/28/23 1233)  BP: 137/60 (02/28/23 1000)  SpO2: 95 % (02/28/23 1000)   Vital Signs (24h Range):  Temp:  [97.7 °F (36.5 °C)-98.2 °F (36.8 °C)] 98.2 °F (36.8 °C)  Pulse:  [100-124] 111  Resp:  [14-28] 24  SpO2:  [89 %-100 %] 95 %  BP: ()/(23-83) 137/60     Weight: 92.1 kg (203 lb 0.7 oz)  Body mass index is 29.13 kg/m².    SpO2: 95 %         Intake/Output Summary (Last 24 hours) at 2/28/2023 1327  Last data filed at 2/28/2023 1007  Gross per 24 hour   Intake 935.6 ml   Output 895 ml   Net 40.6 ml       Lines/Drains/Airways       Peripherally Inserted Central Catheter Line  Duration             PICC Triple Lumen 02/27/23 1045 left basilic 1 day              Drain  Duration                  Urethral Catheter 02/27/23 1150 1 day                  Significant Labs:   Recent Results (from the past 72 hour(s))   POCT glucose    Collection Time: 02/25/23  3:49 PM   Result Value Ref Range    POCT Glucose 107 70 - 110 mg/dL   POCT glucose    Collection Time: 02/25/23  8:36 PM   Result Value Ref Range    POCT Glucose 123 (H) 70 - 110 mg/dL   POCT glucose    Collection Time: 02/26/23 12:25 AM   Result Value Ref Range    POCT Glucose 103 70 - 110 mg/dL   Basic Metabolic Panel    Collection Time: 02/26/23  1:40 AM   Result Value Ref Range    Sodium Level 136 136 - 145 mmol/L    Potassium Level 4.8 3.5 - 5.1 mmol/L    Chloride 105 98 - 107 mmol/L    Carbon Dioxide 22 (L) 23 - 31 mmol/L    Glucose Level 110 82 - 115 mg/dL    Blood Urea  Nitrogen 37.0 (H) 8.4 - 25.7 mg/dL    Creatinine 1.98 (H) 0.73 - 1.18 mg/dL    BUN/Creatinine Ratio 19     Calcium Level Total 8.7 (L) 8.8 - 10.0 mg/dL    Anion Gap 9.0 mEq/L    eGFR 33 mls/min/1.73/m2   CBC with Differential    Collection Time: 02/26/23  1:40 AM   Result Value Ref Range    WBC 9.9 4.5 - 11.5 x10(3)/mcL    RBC 3.56 (L) 4.70 - 6.10 x10(6)/mcL    Hgb 10.4 (L) 14.0 - 18.0 g/dL    Hct 33.2 (L) 42.0 - 52.0 %    MCV 93.3 80.0 - 94.0 fL    MCH 29.2 pg    MCHC 31.3 (L) 33.0 - 36.0 g/dL    RDW 15.8 11.5 - 17.0 %    Platelet 91 (L) 130 - 400 x10(3)/mcL    MPV 11.2 (H) 7.4 - 10.4 fL    IG# 0.27 (H) 0 - 0.04 x10(3)/mcL    IG% 2.7 %    NRBC% 0.0 %   Manual Differential    Collection Time: 02/26/23  1:40 AM   Result Value Ref Range    Neut Man 87 %    Lymph Man 3 %    Monocyte Man 10 %    Instr WBC 9.9 x10(3)/mcL    Abs Mono 0.99 0.1 - 1.3 x10(3)/mcL    Abs Lymp 0.297 (L) 0.6 - 4.6 x10(3)/mcL    Abs Neut 8.613 2.1 - 9.2 x10(3)/mcL    RBC Morph Abnormal (A) Normal    Anisocyte 1+ (A) (none)    Platelet Est Decreased (A) Normal, Adequate   POCT glucose    Collection Time: 02/26/23  1:02 PM   Result Value Ref Range    POCT Glucose 117 (H) 70 - 110 mg/dL   Basic Metabolic Panel    Collection Time: 02/27/23  1:54 AM   Result Value Ref Range    Sodium Level 133 (L) 136 - 145 mmol/L    Potassium Level 4.5 3.5 - 5.1 mmol/L    Chloride 105 98 - 107 mmol/L    Carbon Dioxide 22 (L) 23 - 31 mmol/L    Glucose Level 108 82 - 115 mg/dL    Blood Urea Nitrogen 57.9 (H) 8.4 - 25.7 mg/dL    Creatinine 2.53 (H) 0.73 - 1.18 mg/dL    BUN/Creatinine Ratio 23     Calcium Level Total 9.0 8.8 - 10.0 mg/dL    Anion Gap 6.0 mEq/L    eGFR 25 mls/min/1.73/m2   CBC with Differential    Collection Time: 02/27/23  1:54 AM   Result Value Ref Range    WBC 9.3 4.5 - 11.5 x10(3)/mcL    RBC 3.38 (L) 4.70 - 6.10 x10(6)/mcL    Hgb 9.9 (L) 14.0 - 18.0 g/dL    Hct 31.8 (L) 42.0 - 52.0 %    MCV 94.1 (H) 80.0 - 94.0 fL    MCH 29.3 pg    MCHC 31.1 (L) 33.0  - 36.0 g/dL    RDW 16.0 11.5 - 17.0 %    Platelet 88 (L) 130 - 400 x10(3)/mcL    MPV 11.0 (H) 7.4 - 10.4 fL    Neut % 65.9 %    Lymph % 9.8 %    Mono % 20.5 %    Eos % 0.1 %    Basophil % 0.1 %    Lymph # 0.91 0.6 - 4.6 x10(3)/mcL    Neut # 6.14 2.1 - 9.2 x10(3)/mcL    Mono # 1.91 (H) 0.1 - 1.3 x10(3)/mcL    Eos # 0.01 0 - 0.9 x10(3)/mcL    Baso # 0.01 0 - 0.2 x10(3)/mcL    IG# 0.34 (H) 0 - 0.04 x10(3)/mcL    IG% 3.6 %    NRBC% 0.0 %   Echo    Collection Time: 02/27/23 12:42 PM   Result Value Ref Range    BSA 2.06 m2    EF 50 %   POCT glucose    Collection Time: 02/28/23  1:35 AM   Result Value Ref Range    POCT Glucose 106 70 - 110 mg/dL   Comprehensive Metabolic Panel    Collection Time: 02/28/23  1:36 AM   Result Value Ref Range    Sodium Level 133 (L) 136 - 145 mmol/L    Potassium Level 4.4 3.5 - 5.1 mmol/L    Chloride 105 98 - 107 mmol/L    Carbon Dioxide 20 (L) 23 - 31 mmol/L    Glucose Level 103 82 - 115 mg/dL    Blood Urea Nitrogen 65.7 (H) 8.4 - 25.7 mg/dL    Creatinine 2.26 (H) 0.73 - 1.18 mg/dL    Calcium Level Total 9.1 8.8 - 10.0 mg/dL    Protein Total 6.1 5.8 - 7.6 gm/dL    Albumin Level 2.3 (L) 3.4 - 4.8 g/dL    Globulin 3.8 (H) 2.4 - 3.5 gm/dL    Albumin/Globulin Ratio 0.6 (L) 1.1 - 2.0 ratio    Bilirubin Total 0.6 <=1.5 mg/dL    Alkaline Phosphatase 69 40 - 150 unit/L    Alanine Aminotransferase 7 0 - 55 unit/L    Aspartate Aminotransferase 35 (H) 5 - 34 unit/L    eGFR 28 mls/min/1.73/m2   Magnesium    Collection Time: 02/28/23  1:36 AM   Result Value Ref Range    Magnesium Level 2.10 1.60 - 2.60 mg/dL   CBC with Differential    Collection Time: 02/28/23  1:36 AM   Result Value Ref Range    WBC 7.7 4.5 - 11.5 x10(3)/mcL    RBC 2.97 (L) 4.70 - 6.10 x10(6)/mcL    Hgb 8.6 (L) 14.0 - 18.0 g/dL    Hct 27.4 (L) 42.0 - 52.0 %    MCV 92.3 80.0 - 94.0 fL    MCH 29.0 pg    MCHC 31.4 (L) 33.0 - 36.0 g/dL    RDW 15.6 11.5 - 17.0 %    Platelet 100 (L) 130 - 400 x10(3)/mcL    MPV 11.4 (H) 7.4 - 10.4 fL    Neut %  70.9 %    Lymph % 8.1 %    Mono % 17.9 %    Eos % 0.0 %    Basophil % 0.1 %    Lymph # 0.62 0.6 - 4.6 x10(3)/mcL    Neut # 5.42 2.1 - 9.2 x10(3)/mcL    Mono # 1.37 (H) 0.1 - 1.3 x10(3)/mcL    Eos # 0.00 0 - 0.9 x10(3)/mcL    Baso # 0.01 0 - 0.2 x10(3)/mcL    IG# 0.23 (H) 0 - 0.04 x10(3)/mcL    IG% 3.0 %    NRBC% 0.0 %     Telemetry: SR    Physical Exam  Vitals reviewed.   Constitutional:       Appearance: Normal appearance. He is obese.   HENT:      Head: Normocephalic.      Mouth/Throat:      Mouth: Mucous membranes are moist.   Eyes:      Extraocular Movements: Extraocular movements intact.   Cardiovascular:      Rate and Rhythm: Regular rhythm. Tachycardia present.      Pulses: Normal pulses.      Heart sounds: Normal heart sounds.   Pulmonary:      Effort: Pulmonary effort is normal. No respiratory distress.      Breath sounds: Normal breath sounds.   Abdominal:      Palpations: Abdomen is soft.   Skin:     General: Skin is warm and dry.      Comments: Midline Sternotomy Dressing C/D/I   Neurological:      General: No focal deficit present.      Mental Status: He is alert and oriented to person, place, and time.   Psychiatric:         Mood and Affect: Mood normal.         Behavior: Behavior normal.     Current Inpatient Medications:    Current Facility-Administered Medications:     0.9%  NaCl infusion (for blood administration), , Intravenous, Q24H PRN, SHIRLEY Torres    0.9%  NaCl infusion (for blood administration), , Intravenous, Q24H PRN, Trenton Harvey PA-C    0.9%  NaCl infusion, , Intravenous, Continuous, Darinel Bedoya MD, Last Rate: 75 mL/hr at 02/28/23 0820, Rate Change at 02/28/23 0820    acetaminophen oral solution 650 mg, 650 mg, Per OG tube, Q6H PRN, GILMAR Estrada    acetaminophen tablet 650 mg, 650 mg, Oral, Q6H PRN, Lul Isaac MD, 650 mg at 02/26/23 1614    albumin human 5% bottle 12.5 g, 12.5 g, Intravenous, PRN, Leon Reyes, PA    albuterol-ipratropium 2.5 mg-0.5 mg/3  mL nebulizer solution 3 mL, 3 mL, Nebulization, Q4H PRN, Nima Baez, DO, 3 mL at 02/27/23 1641    aspirin EC tablet 81 mg, 81 mg, Oral, Daily, GILMAR Estrada, 81 mg at 02/28/23 0821    atorvastatin tablet 40 mg, 40 mg, Oral, Daily, Trenton Harvey PA-C, 40 mg at 02/28/23 0821    calcium carbonate 200 mg calcium (500 mg) chewable tablet 500 mg, 500 mg, Oral, Daily, Trenton Harvey PA-C, 500 mg at 02/28/23 0821    calcium gluconate 1 g in NS IVPB (premixed), 1 g, Intravenous, PRN, Leon P Binaais, PA    calcium gluconate 1 g in NS IVPB (premixed), 2 g, Intravenous, PRN, Leon P Cathis, PA    calcium gluconate 1 g in NS IVPB (premixed), 3 g, Intravenous, PRN, Leon Reyes, PA    dextrose 10% bolus 125 mL 125 mL, 12.5 g, Intravenous, PRN, Leon P Langlinais, PA    dextrose 10% bolus 250 mL 250 mL, 25 g, Intravenous, PRN, Leon P Cathis, PA    dextrose 5 % and 0.9 % NaCl infusion, , Intravenous, Continuous, Darinel Bedoya MD, Last Rate: 75 mL/hr at 02/26/23 0831, New Bag at 02/26/23 0831    DOBUtamine 1000 mg in D5W 250 mL infusion, 2.5 mcg/kg/min, Intravenous, Continuous, Darinel Bedoya MD, Stopped at 02/27/23 1420    docusate sodium capsule 100 mg, 100 mg, Oral, BID, GILMAR Estrada, 100 mg at 02/28/23 0822    ezetimibe tablet 10 mg, 10 mg, Oral, Daily, Trenton Harvey PA-C, 10 mg at 02/28/23 0821    FLUoxetine capsule 10 mg, 10 mg, Oral, Daily, Trenton Harvey PA-C, 10 mg at 02/28/23 0943    folic acid tablet 1 mg, 1 mg, Oral, Daily, Trenton Harvey PA-C, 1 mg at 02/28/23 0821    furosemide (LASIX) 200 mg in dextrose 5 % (D5W) SolP 100 mL continuous infusion (conc: 2 mg/mL), 4 mg/hr, Intravenous, Continuous, Darinel Bedoya MD, Last Rate: 2 mL/hr at 02/28/23 0803, 4 mg/hr at 02/28/23 0803    gabapentin capsule 100 mg, 100 mg, Oral, TID, Trenton Harvey PA-C, 100 mg at 02/28/23 0821    hydrALAZINE injection 20 mg, 20 mg, Intravenous, Q4H PRN, Ga Diaz, NP     HYDROcodone-acetaminophen 5-325 mg per tablet 1 tablet, 1 tablet, Oral, Q4H PRN, GILMAR Estrada, 1 tablet at 02/24/23 1708    HYDROcodone-acetaminophen 7.5-325 mg per tablet 1 tablet, 1 tablet, Oral, Q6H PRN, Ga TIDWELL. Emily, NP, 1 tablet at 02/28/23 1107    hydrOXYzine HCL tablet 25 mg, 25 mg, Oral, Q6H PRN, Trenton Harvey PA-C    labetaloL injection 20 mg, 20 mg, Intravenous, Q4H PRN, Ga TSANG Emily, MANAN    magnesium sulfate 2g in water 50mL IVPB (premix), 2 g, Intravenous, PRN, GILMAR Estrada    magnesium sulfate 2g in water 50mL IVPB (premix), 4 g, Intravenous, PRN, GILMAR Estrada    meclizine tablet 25 mg, 25 mg, Oral, TID PRN, Trenton Harvey PA-C    metoprolol succinate (TOPROL-XL) 24 hr split tablet 12.5 mg, 12.5 mg, Oral, Daily, Dominick Weinstein MD, 12.5 mg at 02/28/23 0822    morphine injection 2 mg, 2 mg, Intravenous, Q2H PRN, Ga Diaz, MANAN, 2 mg at 02/25/23 0330    mupirocin 2 % ointment, , Nasal, BID, GILMAR Estrada, Given at 02/28/23 0943    ondansetron injection 4 mg, 4 mg, Intravenous, Q4H PRN, GILMAR Estrada, 4 mg at 02/25/23 1326    oxyCODONE immediate release tablet 10 mg, 10 mg, Oral, Q4H PRN, GILMAR Estrada, 10 mg at 02/28/23 1233    pantoprazole EC tablet 40 mg, 40 mg, Oral, Daily, Trenton Harvey PA-C, 40 mg at 02/28/23 0821    potassium chloride 20 mEq in 100 mL IVPB (FOR CENTRAL LINE ADMINISTRATION ONLY), 20 mEq, Intravenous, PRN, GILMAR Estrada    potassium chloride 20 mEq in 100 mL IVPB (FOR CENTRAL LINE ADMINISTRATION ONLY), 40 mEq, Intravenous, PRN, GILMAR Estrada    potassium chloride 20 mEq in 100 mL IVPB (FOR CENTRAL LINE ADMINISTRATION ONLY), 20 mEq, Intravenous, PRN, GILMAR Estrada    prednisoLONE 15 mg/5 mL (3 mg/mL) solution 2.5 mg, 2.5 mg, Oral, Daily, Tom Mayfield MD, 2.5 mg at 02/28/23 1110    QUEtiapine tablet 50 mg, 50 mg, Oral, QHS, Trenton Harvey PA-C, 50 mg at 02/27/23 2015    rOPINIRole tablet 1  mg, 1 mg, Oral, BID, Trenton Harvey PA-C, 1 mg at 02/28/23 0821    Flushing PICC Protocol, , , Until Discontinued **AND** sodium chloride 0.9% flush 10 mL, 10 mL, Intravenous, Q6H, 10 mL at 02/28/23 1234 **AND** sodium chloride 0.9% flush 10 mL, 10 mL, Intravenous, PRN, Dominick Weinstein MD    sodium phosphate 15 mmol in dextrose 5 % (D5W) 250 mL IVPB, 15 mmol, Intravenous, PRN, GILMAR Estrada    sodium phosphate 20.01 mmol in dextrose 5 % (D5W) 250 mL IVPB, 20.01 mmol, Intravenous, PRN, GILMAR Estrada    sodium phosphate 30 mmol in dextrose 5 % (D5W) 250 mL IVPB, 30 mmol, Intravenous, PRN, GILMAR Estrada    sucralfate tablet 1 g, 1 g, Oral, QID (AC & HS), GILMAR Estrada, 1 g at 02/28/23 1107    tiZANidine tablet 4 mg, 4 mg, Oral, Q8H PRN, Trenton Harvey PA-C    zolpidem tablet 5 mg, 5 mg, Oral, Nightly PRN, Ga Diaz NP    VTE Risk Mitigation (From admission, onward)           Ordered     Place sequential compression device  Until discontinued         02/24/23 1046     IP VTE HIGH RISK PATIENT  Once         02/24/23 1034     heparin, porcine (PF) (heparin flush 100 units/mL) 100 unit/mL injection flush         02/23/23 2215     heparin 25,000 units in dextrose 5% 250 mL (100 units/mL) infusion LOW INTENSITY nomogram - [facility]  Continuous        Question Answer Comment   Begin at (in units/kg/hr) 12    Heparin Infusion Adjustment (DO NOT MODIFY ANSWER) [email or web address]\HeparinInfusions\heparin LOW INTENSITY nomogram for [facility] [id number]        02/19/23 7470                  Assessment:   NSTEMI Type I  MVCAD    - s/p CABG 2V LIMA-LAD, rSVG-OM 02/24/23 with Right GSV EVH    - LHC 02/15/23: LM 0% stenosis. mLAD proximal 70% ISR. D1 ostial 50% stenosis. D2 ostial 50% stenosis. Lcx: previous stent; proximal Lcx  90% ISR; mid Lcx 30% stenosis. OM1 proximal 80% stenosis. RCA patent stents to ostium. Mid RCA proximal subsection- 60% stenosis; Mid RCA distal subsection 70&  stenosis; distal RCA proximal subsection 100% stenosis. Fills left to right    - [facility] 09/22/22: LM 0% stenosis. mLAD proximal subsection 50% ISR,. D1 ostial 50% stenosis. D2 ostial 50% stenosis. pLCx mid subsection 70% ISR reduced to 10%, Preprocedure AMALIA III flow  noted. Post procedure AMALIA III was present. Lesion previous treated on 7/22/21 with PTA/DAYAN. OM1 proximal 80% stenosis. mLCx distal subsection 30% stenosis. RCA proximal RCA stent widely patent. mRCA proximal subsection 60% subsection. mRCA distal subsection 70% stenosis. dRCA proximal subsection 100% stenosis. 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 Mild to moderate MAC is noted. Mild MR. Trace TR.  B RICKEY    - Moderate Bilateral by US  DANIELLA/CKD IIIb  HLD  Lupus Anticoagulant Inhibitor Syndrome  CARLYLE  VHD    - Mild AS  AAA  Anemia  No Hx of GIB     Plan:   Continue ASA, Statin, BB  D/C Toprol and Change to Metoprolol Tartrate 25mg PO TID for Rate Control  Start Plavix 75mg PO Qday when Ok with CVS (NSTEMI)  No ACEi/ARB/ARNI secondary to DANIELLA  Nephrology Following for DANIELLA (Lasix Infusion)  Aggressive Mobilization of PT and Q1HR IS  Labs in AM: CBC, CMP and Mg    I have seen the patient, reviewed the Nurse Practitioner's note, assessment and plan. I have personally interviewed and examined the patient at bedside and agree with the findings. Medical decision making listed above were done under my guidance.

## 2023-02-28 NOTE — ANESTHESIA POSTPROCEDURE EVALUATION
Anesthesia Post Evaluation    Patient: Hunter Navarrete    Procedure(s) Performed: Procedure(s) (LRB):  CORONARY ARTERY BYPASS GRAFT (CABG) (N/A)    Final Anesthesia Type: general      Patient location during evaluation: PACU  Patient participation: Yes- Able to Participate  Level of consciousness: awake and alert  Post-procedure vital signs: reviewed and stable  Pain management: adequate  Airway patency: patent      Anesthetic complications: no      Cardiovascular status: hemodynamically stable  Respiratory status: unassisted  Hydration status: euvolemic  Follow-up not needed.          Vitals Value Taken Time   /54 02/28/23 1102   Temp 36.8 °C (98.2 °F) 02/28/23 0815   Pulse 110 02/28/23 1118   Resp 20 02/28/23 1118   SpO2 97 % 02/28/23 1118   Vitals shown include unvalidated device data.      No case tracking events are documented in the log.      Pain/Amparo Score: Pain Rating Prior to Med Admin: 3 (2/28/2023 11:07 AM)  Pain Rating Post Med Admin: 0 (2/27/2023  2:21 AM)

## 2023-02-28 NOTE — PROGRESS NOTES
NEPHROLOGY: Progress     82-year-old male from the [facility] with stage III CKD B, creatinine between 1.6 and 2.0 and GFR between 33 and 44.  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  Chest and mediastinal tubes have been removed.  Patient is becoming oliguric and increased respiratory distress noted.  Chest x-ray report by my reading with bilateral pleural effusions and significantly enlarged cardio mediastinal silhouette.   I am going to start a diuretic drip on patient with cautious normal saline replacement.  Bladder scan this morning with only 80 cc of urine in the bladder            Current Facility-Administered Medications:     0.9%  NaCl infusion (for blood administration), , Intravenous, Q24H PRN, SHIRLEY Torres    0.9%  NaCl infusion (for blood administration), , Intravenous, Q24H PRN, Trenton Harvey PA-C    0.9%  NaCl infusion, , Intravenous, Continuous, Darinel Bedoya MD, Last Rate: 50 mL/hr at 02/27/23 1200, New Bag at 02/27/23 1200    acetaminophen oral solution 650 mg, 650 mg, Per OG tube, Q6H PRN, GILMAR Estrada    acetaminophen tablet 650 mg, 650 mg, Oral, Q6H PRN, Lul Isaac MD, 650 mg at 02/26/23 1614    albumin human 5% bottle 12.5 g, 12.5 g, Intravenous, PRN, GILMAR Estrada    albuterol-ipratropium 2.5 mg-0.5 mg/3 mL nebulizer solution 3 mL, 3 mL, Nebulization, Q4H PRN, Nima Baez DO, 3 mL at 02/27/23 1641    aspirin EC tablet 81 mg, 81 mg, Oral, Daily, GILMAR Estrada, 81 mg at 02/27/23 0805    atorvastatin tablet 40 mg, 40 mg, Oral, Daily, Trenton Harvey PA-C, 40 mg at 02/27/23 0805    calcium carbonate 200 mg calcium (500 mg) chewable tablet 500 mg, 500 mg, Oral, Daily, Trenton Harvey PA-C, 500 mg at 02/26/23 0830    calcium gluconate 1 g in NS IVPB (premixed), 1 g,  Intravenous, PRN, GILMAR Estrada    calcium gluconate 1 g in NS IVPB (premixed), 2 g, Intravenous, PRN, Leon Reyes, PA    calcium gluconate 1 g in NS IVPB (premixed), 3 g, Intravenous, PRN, Leon Reyes PA    dextrose 10% bolus 125 mL 125 mL, 12.5 g, Intravenous, PRN, Leon Reyes, PA    dextrose 10% bolus 250 mL 250 mL, 25 g, Intravenous, PRN, Leon Reyes, PA    dextrose 5 % and 0.9 % NaCl infusion, , Intravenous, Continuous, Darinel Bedoya MD, Last Rate: 75 mL/hr at 02/26/23 0831, New Bag at 02/26/23 0831    DOBUtamine 1000 mg in D5W 250 mL infusion, 2.5 mcg/kg/min, Intravenous, Continuous, Darinel Bedoya MD, Stopped at 02/27/23 1420    docusate sodium capsule 100 mg, 100 mg, Oral, BID, GILMAR Estrada, 100 mg at 02/27/23 2016    ezetimibe tablet 10 mg, 10 mg, Oral, Daily, Trenton Harvey PA-C, 10 mg at 02/27/23 0805    FLUoxetine capsule 10 mg, 10 mg, Oral, Daily, Trenton Harvey PA-C, 10 mg at 02/27/23 0805    folic acid tablet 1 mg, 1 mg, Oral, Daily, Trenton Harvey PA-C, 1 mg at 02/27/23 0805    furosemide (LASIX) 200 mg in dextrose 5 % (D5W) SolP 100 mL continuous infusion (conc: 2 mg/mL), 2 mg/hr, Intravenous, Continuous, Darinel Bedoya MD, Last Rate: 1 mL/hr at 02/27/23 1200, 2 mg/hr at 02/27/23 1200    gabapentin capsule 100 mg, 100 mg, Oral, TID, Trenton Harvey PA-C, 100 mg at 02/27/23 2015    hydrALAZINE injection 20 mg, 20 mg, Intravenous, Q4H PRN, Ga Q. Emily, NP    HYDROcodone-acetaminophen 5-325 mg per tablet 1 tablet, 1 tablet, Oral, Q4H PRN, GILMAR Estrada, 1 tablet at 02/24/23 1708    HYDROcodone-acetaminophen 7.5-325 mg per tablet 1 tablet, 1 tablet, Oral, Q6H PRN, Ga Diaz, NP, 1 tablet at 02/27/23 2231    hydrOXYzine HCL tablet 25 mg, 25 mg, Oral, Q6H PRN, Trenton Harvey PA-C    labetaloL injection 20 mg, 20 mg, Intravenous, Q4H PRN, Ga Johnsono, NP    magnesium sulfate 2g in water 50mL IVPB (premix), 2 g, Intravenous, PRN, Leon  GILMAR Greenfield    magnesium sulfate 2g in water 50mL IVPB (premix), 4 g, Intravenous, PRN, GILMAR Estrada    meclizine tablet 25 mg, 25 mg, Oral, TID PRN, Trenton Harvey PA-C    metoprolol succinate (TOPROL-XL) 24 hr split tablet 12.5 mg, 12.5 mg, Oral, Daily, Dominick Weinstein MD, 12.5 mg at 02/27/23 0215    morphine injection 2 mg, 2 mg, Intravenous, Q2H PRN, Ga Q. Emily, NP, 2 mg at 02/25/23 0330    mupirocin 2 % ointment, , Nasal, BID, GILMAR Estrada, Given at 02/27/23 2016    ondansetron injection 4 mg, 4 mg, Intravenous, Q4H PRN, GILMAR Estrada, 4 mg at 02/25/23 1326    oxyCODONE immediate release tablet 10 mg, 10 mg, Oral, Q4H PRN, GILMAR Estrada, 10 mg at 02/27/23 1201    pantoprazole EC tablet 40 mg, 40 mg, Oral, Daily, Trenton Harvey PA-C, 40 mg at 02/27/23 0806    potassium chloride 20 mEq in 100 mL IVPB (FOR CENTRAL LINE ADMINISTRATION ONLY), 20 mEq, Intravenous, PRN, GILMAR Estrada    potassium chloride 20 mEq in 100 mL IVPB (FOR CENTRAL LINE ADMINISTRATION ONLY), 40 mEq, Intravenous, PRN, GILMAR Estrada    potassium chloride 20 mEq in 100 mL IVPB (FOR CENTRAL LINE ADMINISTRATION ONLY), 20 mEq, Intravenous, PRN, GILMAR Estrada    predniSONE tablet 2.5 mg, 2.5 mg, Oral, Daily, Trenton Harvey PA-C    QUEtiapine tablet 50 mg, 50 mg, Oral, QHS, Trenton Harvey PA-C, 50 mg at 02/27/23 2015    rOPINIRole tablet 1 mg, 1 mg, Oral, BID, Trenton Harvey PA-C, 1 mg at 02/27/23 2016    Flushing PICC Protocol, , , Until Discontinued **AND** sodium chloride 0.9% flush 10 mL, 10 mL, Intravenous, Q6H, 10 mL at 02/28/23 0632 **AND** sodium chloride 0.9% flush 10 mL, 10 mL, Intravenous, PRN, Dominick Weinstein MD    sodium phosphate 15 mmol in dextrose 5 % (D5W) 250 mL IVPB, 15 mmol, Intravenous, PRN, GILMAR Estrada    sodium phosphate 20.01 mmol in dextrose 5 % (D5W) 250 mL IVPB, 20.01 mmol, Intravenous, PRN, GILMAR Estrada    sodium phosphate 30 mmol in  "dextrose 5 % (D5W) 250 mL IVPB, 30 mmol, Intravenous, PRN, GILMAR Estrada    sucralfate tablet 1 g, 1 g, Oral, QID (AC & HS), GILMAR Estrada, 1 g at 02/28/23 0627    tiZANidine tablet 4 mg, 4 mg, Oral, Q8H PRN, Trenton Harvey PA-C    zolpidem tablet 5 mg, 5 mg, Oral, Nightly PRN, Ga Diaz NP        /66   Pulse 108   Temp 98.1 °F (36.7 °C) (Oral)   Resp 15   Ht 5' 10" (1.778 m)   Wt 92.1 kg (203 lb 0.7 oz)   SpO2 (!) 94%   BMI 29.13 kg/m²     Physical Exam:    GEN:  Somewhat acutely ill and dyspneic appearing elderly gentleman.    NECK : No JVD  CARD :  Tachycardic and distant.  LUNGS :  Slightly improved BS, stil scattered crackles and diminished breath sounds at the bases.  ABD : Soft,non-tender. BS active.    EXT : 2+ pitting edema.           Intake/Output Summary (Last 24 hours) at 2/28/2023 0800  Last data filed at 2/28/2023 0635  Gross per 24 hour   Intake 935.6 ml   Output 1160 ml   Net -224.4 ml         Laboratory:  Recent Results (from the past 24 hour(s))   Echo    Collection Time: 02/27/23 12:42 PM   Result Value Ref Range    BSA 2.06 m2    EF 50 %   POCT glucose    Collection Time: 02/28/23  1:35 AM   Result Value Ref Range    POCT Glucose 106 70 - 110 mg/dL   Comprehensive Metabolic Panel    Collection Time: 02/28/23  1:36 AM   Result Value Ref Range    Sodium Level 133 (L) 136 - 145 mmol/L    Potassium Level 4.4 3.5 - 5.1 mmol/L    Chloride 105 98 - 107 mmol/L    Carbon Dioxide 20 (L) 23 - 31 mmol/L    Glucose Level 103 82 - 115 mg/dL    Blood Urea Nitrogen 65.7 (H) 8.4 - 25.7 mg/dL    Creatinine 2.26 (H) 0.73 - 1.18 mg/dL    Calcium Level Total 9.1 8.8 - 10.0 mg/dL    Protein Total 6.1 5.8 - 7.6 gm/dL    Albumin Level 2.3 (L) 3.4 - 4.8 g/dL    Globulin 3.8 (H) 2.4 - 3.5 gm/dL    Albumin/Globulin Ratio 0.6 (L) 1.1 - 2.0 ratio    Bilirubin Total 0.6 <=1.5 mg/dL    Alkaline Phosphatase 69 40 - 150 unit/L    Alanine Aminotransferase 7 0 - 55 unit/L    Aspartate " Aminotransferase 35 (H) 5 - 34 unit/L    eGFR 28 mls/min/1.73/m2   Magnesium    Collection Time: 02/28/23  1:36 AM   Result Value Ref Range    Magnesium Level 2.10 1.60 - 2.60 mg/dL   CBC with Differential    Collection Time: 02/28/23  1:36 AM   Result Value Ref Range    WBC 7.7 4.5 - 11.5 x10(3)/mcL    RBC 2.97 (L) 4.70 - 6.10 x10(6)/mcL    Hgb 8.6 (L) 14.0 - 18.0 g/dL    Hct 27.4 (L) 42.0 - 52.0 %    MCV 92.3 80.0 - 94.0 fL    MCH 29.0 pg    MCHC 31.4 (L) 33.0 - 36.0 g/dL    RDW 15.6 11.5 - 17.0 %    Platelet 100 (L) 130 - 400 x10(3)/mcL    MPV 11.4 (H) 7.4 - 10.4 fL    Neut % 70.9 %    Lymph % 8.1 %    Mono % 17.9 %    Eos % 0.0 %    Basophil % 0.1 %    Lymph # 0.62 0.6 - 4.6 x10(3)/mcL    Neut # 5.42 2.1 - 9.2 x10(3)/mcL    Mono # 1.37 (H) 0.1 - 1.3 x10(3)/mcL    Eos # 0.00 0 - 0.9 x10(3)/mcL    Baso # 0.01 0 - 0.2 x10(3)/mcL    IG# 0.23 (H) 0 - 0.04 x10(3)/mcL    IG% 3.0 %    NRBC% 0.0 %         Assessment/Plan:  DANIELLA on CKD 3B -becoming oliguric  Developing fluid overload and heart failure  Anemia of chronic disease  History of systemic lupus   Hypertension    Will increase diuretic 4 mg an hour and saline to 75 cc an hour.  Hopefully we will not need dialysis.    Patient's wife and son were visiting and they were detailed as well as the patient that he might require renal replacement therapy in the next 24 hours if urine output does not improve or if azotemia continues to worsen.      Darinel Bedoya MD, AMRITA

## 2023-02-28 NOTE — PT/OT/SLP PROGRESS
Occupational Therapy   Treatment    Name: Hunter Navarrete  MRN: [id number]  Admitting Diagnosis:  CABG x2 4 Days Post-Op    Recommendations:     Discharge Recommendations:  (rehab vs SNF)  Discharge Equipment Recommendations:  walker, rolling  Barriers to discharge:   (ongoing medical needs)    Assessment:     Hunter Navarrete is a 82 y.o. male with a medical diagnosis of CKD s/p CABG x2.  He presents with BLE weakness limiting his ability to perform ADLs independently. Pt noted to be O&A x3 (name/, location, situation), but difficulty with command following likely d/t [place]. Performance deficits affecting function are weakness, impaired self care skills, impaired functional mobility, impaired balance, orthopedic precautions. Recommend rehab vs SNF upon discharge.     Rehab Prognosis:  Good; patient would benefit from acute skilled OT services to address these deficits and reach maximum level of function.       Plan:     Patient to be seen 5 x/week, 6 x/week to address the above listed problems via self-care/home management, therapeutic activities, therapeutic exercises  Plan of Care Expires: 23  Plan of Care Reviewed with: patient    Subjective     Pain/Comfort:  Pain Rating 1: 0/10    Objective:     Communicated with: RN prior to session.  Patient found up in chair with oxygen, pulse ox (continuous), telemetry, blood pressure cuff upon OT entry to room.    General Precautions: Standard, sternal    Orthopedic Precautions: (sternal)  Braces: N/A  Vital Signs: Blood Pressure: 141/57  Sp02: 97%  Supplemental 02: oxymask 3.5 L  With Activity: 93%     Occupational Performance:     Functional Mobility Training/Transfer Training:  Anterior/Posterior Scooting: total assistance. Educated on sternal precautions  Sit<>Stand Transition: moderate assistance x2, good compliance to sternal precautions     Activities of Daily Living Training:  Feeding:  set-up assist for container management, independent for excursion to mouth  with tremors noted. May benefit from built up handle for improved    Upper Body Dressing: moderate assistance for donning robe in chair. Trained on technique for compliance with sternal precautions    [facility] 6 Click ADL  Total Score: 14    Additional Treatment:  Therapeutic Exercise: Pt performed sit<>stand x5 trails with mod A x2 persons from chair. Facilitation needed to upright posture and forward lean for safe descend. Pt able to weight shift and march in place with Mod A.    Therapeutic Positioning  Skin integrity:  skin assessment with RN with red sacrum noted, placed on geomat with prophylactic sacral dressing in place.       The following interventions were performed in an effort to prevent and/or reduce acquired pressure ulcers: geomat was provided for sacral protection while [facility]    Patient/Caregiver Education:  Patient provided with verbal education regarding OT role/goals/POC and safety awareness.  Understanding was verbalized.      Patient left up in chair with all lines intact and call button in reach    GOALS:   Multidisciplinary Problems       Occupational Therapy Goals          Problem: Occupational Therapy    Goal Priority Disciplines Outcome Interventions   Occupational Therapy Goal     OT, PT/OT Ongoing, Progressing    Description: Pt will ambulate to bathroom with RW SBA  Pt will complete toilet t/f SBA  Pt will complete toileting tasks min A  Pt will complete LE dressing with AE PRN SBA  Pt will complete UE dressing with SBA  Pt will complete grooming in standing at sink SBA                       Time Tracking:     OT Date of Treatment: 02/28/23  OT Start Time: 0812  OT Stop Time: 0837  OT Total Time (min): 25 min    Billable Minutes:Therapeutic Activity 2    OT/LONDON: OT     LONDON Visit Number: 2    2/28/2023

## 2023-03-01 LAB
ALBUMIN SERPL-MCNC: 2.2 G/DL (ref 3.4–4.8)
ALBUMIN/GLOB SERPL: 0.6 RATIO (ref 1.1–2)
ALP SERPL-CCNC: 76 UNIT/L (ref 40–150)
ALT SERPL-CCNC: 8 UNIT/L (ref 0–55)
AST SERPL-CCNC: 28 UNIT/L (ref 5–34)
BASOPHILS # BLD AUTO: 0.01 X10(3)/MCL (ref 0–0.2)
BASOPHILS NFR BLD AUTO: 0.2 %
BILIRUBIN DIRECT+TOT PNL SERPL-MCNC: 0.5 MG/DL
BUN SERPL-MCNC: 76.4 MG/DL (ref 8.4–25.7)
CALCIUM SERPL-MCNC: 9.2 MG/DL (ref 8.8–10)
CHLORIDE SERPL-SCNC: 107 MMOL/L (ref 98–107)
CO2 SERPL-SCNC: 21 MMOL/L (ref 23–31)
CREAT SERPL-MCNC: 2.08 MG/DL (ref 0.73–1.18)
EOSINOPHIL # BLD AUTO: 0.01 X10(3)/MCL (ref 0–0.9)
EOSINOPHIL NFR BLD AUTO: 0.2 %
ERYTHROCYTE [DISTWIDTH] IN BLOOD BY AUTOMATED COUNT: 15.9 % (ref 11.5–17)
GFR SERPLBLD CREATININE-BSD FMLA CKD-EPI: 31 MLS/MIN/1.73/M2
GLOBULIN SER-MCNC: 3.8 GM/DL (ref 2.4–3.5)
GLUCOSE SERPL-MCNC: 111 MG/DL (ref 82–115)
GROUP & RH: NORMAL
HCT VFR BLD AUTO: 26.8 % (ref 42–52)
HGB BLD-MCNC: 8.2 G/DL (ref 14–18)
IMM GRANULOCYTES # BLD AUTO: 0.08 X10(3)/MCL (ref 0–0.04)
IMM GRANULOCYTES NFR BLD AUTO: 1.4 %
INDIRECT COOMBS GEL: NORMAL
LYMPHOCYTES # BLD AUTO: 0.69 X10(3)/MCL (ref 0.6–4.6)
LYMPHOCYTES NFR BLD AUTO: 11.8 %
MAGNESIUM SERPL-MCNC: 2.1 MG/DL (ref 1.6–2.6)
MCH RBC QN AUTO: 28.8 PG
MCHC RBC AUTO-ENTMCNC: 30.6 G/DL (ref 33–36)
MCV RBC AUTO: 94 FL (ref 80–94)
MONOCYTES # BLD AUTO: 1.11 X10(3)/MCL (ref 0.1–1.3)
MONOCYTES NFR BLD AUTO: 19 %
NEUTROPHILS # BLD AUTO: 3.95 X10(3)/MCL (ref 2.1–9.2)
NEUTROPHILS NFR BLD AUTO: 67.4 %
NRBC BLD AUTO-RTO: 0 %
PLATELET # BLD AUTO: 123 X10(3)/MCL (ref 130–400)
PMV BLD AUTO: 11.3 FL (ref 7.4–10.4)
POTASSIUM SERPL-SCNC: 4.1 MMOL/L (ref 3.5–5.1)
PROT SERPL-MCNC: 6 GM/DL (ref 5.8–7.6)
RBC # BLD AUTO: 2.85 X10(6)/MCL (ref 4.7–6.1)
SODIUM SERPL-SCNC: 136 MMOL/L (ref 136–145)
WBC # SPEC AUTO: 5.9 X10(3)/MCL (ref 4.5–11.5)

## 2023-03-01 PROCEDURE — 99024 PR POST-OP FOLLOW-UP VISIT: ICD-10-PCS | Mod: ,,, | Performed by: PHYSICIAN ASSISTANT

## 2023-03-01 PROCEDURE — 80053 COMPREHEN METABOLIC PANEL: CPT | Performed by: INTERNAL MEDICINE

## 2023-03-01 PROCEDURE — 25000003 PHARM REV CODE 250: Performed by: NURSE PRACTITIONER

## 2023-03-01 PROCEDURE — 94640 AIRWAY INHALATION TREATMENT: CPT

## 2023-03-01 PROCEDURE — 25000003 PHARM REV CODE 250: Performed by: PHYSICIAN ASSISTANT

## 2023-03-01 PROCEDURE — 25000003 PHARM REV CODE 250: Performed by: INTERNAL MEDICINE

## 2023-03-01 PROCEDURE — 97535 SELF CARE MNGMENT TRAINING: CPT | Mod: CO

## 2023-03-01 PROCEDURE — 27000221 HC OXYGEN, UP TO 24 HOURS

## 2023-03-01 PROCEDURE — 21400001 HC TELEMETRY ROOM

## 2023-03-01 PROCEDURE — 63600175 PHARM REV CODE 636 W HCPCS: Performed by: NURSE PRACTITIONER

## 2023-03-01 PROCEDURE — 25000242 PHARM REV CODE 250 ALT 637 W/ HCPCS: Performed by: STUDENT IN AN ORGANIZED HEALTH CARE EDUCATION/TRAINING PROGRAM

## 2023-03-01 PROCEDURE — 83735 ASSAY OF MAGNESIUM: CPT | Performed by: NURSE PRACTITIONER

## 2023-03-01 PROCEDURE — 85025 COMPLETE CBC W/AUTO DIFF WBC: CPT | Performed by: INTERNAL MEDICINE

## 2023-03-01 PROCEDURE — 86900 BLOOD TYPING SEROLOGIC ABO: CPT | Performed by: THORACIC SURGERY (CARDIOTHORACIC VASCULAR SURGERY)

## 2023-03-01 PROCEDURE — 63600175 PHARM REV CODE 636 W HCPCS: Performed by: STUDENT IN AN ORGANIZED HEALTH CARE EDUCATION/TRAINING PROGRAM

## 2023-03-01 PROCEDURE — 99900031 HC PATIENT EDUCATION (STAT)

## 2023-03-01 PROCEDURE — 99024 POSTOP FOLLOW-UP VISIT: CPT | Mod: ,,, | Performed by: PHYSICIAN ASSISTANT

## 2023-03-01 PROCEDURE — 86923 COMPATIBILITY TEST ELECTRIC: CPT | Performed by: THORACIC SURGERY (CARDIOTHORACIC VASCULAR SURGERY)

## 2023-03-01 PROCEDURE — A4216 STERILE WATER/SALINE, 10 ML: HCPCS | Performed by: INTERNAL MEDICINE

## 2023-03-01 PROCEDURE — 94761 N-INVAS EAR/PLS OXIMETRY MLT: CPT

## 2023-03-01 PROCEDURE — 97116 GAIT TRAINING THERAPY: CPT

## 2023-03-01 RX ORDER — HYDROCODONE BITARTRATE AND ACETAMINOPHEN 500; 5 MG/1; MG/1
TABLET ORAL
Status: DISCONTINUED | OUTPATIENT
Start: 2023-03-01 | End: 2023-03-03 | Stop reason: HOSPADM

## 2023-03-01 RX ORDER — FUROSEMIDE 10 MG/ML
40 INJECTION INTRAMUSCULAR; INTRAVENOUS 3 TIMES DAILY
Status: DISCONTINUED | OUTPATIENT
Start: 2023-03-01 | End: 2023-03-03

## 2023-03-01 RX ADMIN — FLUOXETINE 10 MG: 10 CAPSULE ORAL at 08:03

## 2023-03-01 RX ADMIN — PANTOPRAZOLE SODIUM 40 MG: 40 TABLET, DELAYED RELEASE ORAL at 08:03

## 2023-03-01 RX ADMIN — QUETIAPINE FUMARATE 50 MG: 25 TABLET ORAL at 08:03

## 2023-03-01 RX ADMIN — SODIUM CHLORIDE, PRESERVATIVE FREE 10 ML: 5 INJECTION INTRAVENOUS at 06:03

## 2023-03-01 RX ADMIN — SODIUM CHLORIDE, PRESERVATIVE FREE 10 ML: 5 INJECTION INTRAVENOUS at 12:03

## 2023-03-01 RX ADMIN — SUCRALFATE 1 G: 1 TABLET ORAL at 08:03

## 2023-03-01 RX ADMIN — HYDROCODONE BITARTRATE AND ACETAMINOPHEN 1 TABLET: 7.5; 325 TABLET ORAL at 08:03

## 2023-03-01 RX ADMIN — GABAPENTIN 100 MG: 100 CAPSULE ORAL at 08:03

## 2023-03-01 RX ADMIN — METOPROLOL TARTRATE 25 MG: 25 TABLET, FILM COATED ORAL at 08:03

## 2023-03-01 RX ADMIN — SUCRALFATE 1 G: 1 TABLET ORAL at 06:03

## 2023-03-01 RX ADMIN — ROPINIROLE HYDROCHLORIDE 1 MG: 0.25 TABLET, FILM COATED ORAL at 08:03

## 2023-03-01 RX ADMIN — SUCRALFATE 1 G: 1 TABLET ORAL at 04:03

## 2023-03-01 RX ADMIN — FUROSEMIDE 40 MG: 10 INJECTION, SOLUTION INTRAMUSCULAR; INTRAVENOUS at 11:03

## 2023-03-01 RX ADMIN — ATORVASTATIN CALCIUM 40 MG: 40 TABLET, FILM COATED ORAL at 08:03

## 2023-03-01 RX ADMIN — Medication 81 MG: at 08:03

## 2023-03-01 RX ADMIN — IPRATROPIUM BROMIDE AND ALBUTEROL SULFATE 3 ML: 2.5; .5 SOLUTION RESPIRATORY (INHALATION) at 01:03

## 2023-03-01 RX ADMIN — DOCUSATE SODIUM 100 MG: 100 CAPSULE, LIQUID FILLED ORAL at 08:03

## 2023-03-01 RX ADMIN — SUCRALFATE 1 G: 1 TABLET ORAL at 11:03

## 2023-03-01 RX ADMIN — MORPHINE SULFATE 2 MG: 4 INJECTION INTRAVENOUS at 12:03

## 2023-03-01 RX ADMIN — PREDNISOLONE SODIUM PHOSPHATE 2.5 MG: 15 SOLUTION ORAL at 08:03

## 2023-03-01 RX ADMIN — GABAPENTIN 100 MG: 100 CAPSULE ORAL at 04:03

## 2023-03-01 RX ADMIN — FUROSEMIDE 40 MG: 10 INJECTION, SOLUTION INTRAMUSCULAR; INTRAVENOUS at 08:03

## 2023-03-01 RX ADMIN — EZETIMIBE 10 MG: 10 TABLET ORAL at 08:03

## 2023-03-01 RX ADMIN — FOLIC ACID 1 MG: 1 TABLET ORAL at 08:03

## 2023-03-01 RX ADMIN — FUROSEMIDE 40 MG: 10 INJECTION, SOLUTION INTRAMUSCULAR; INTRAVENOUS at 04:03

## 2023-03-01 RX ADMIN — METOPROLOL TARTRATE 25 MG: 25 TABLET, FILM COATED ORAL at 03:03

## 2023-03-01 NOTE — PT/OT/SLP PROGRESS
Physical Therapy Treatment    Patient Name:  Hunter Navarrete   MRN:  [id number]    Recommendations:     Discharge Recommendations: rehabilitation facility  Discharge Equipment Recommendations: none  Barriers to discharge:  medical dx, decreased functional independence    Assessment:     Hunter Navarrete is a 82 y.o. male admitted with a medical diagnosis of s/p CABG.  He presents with the following impairments/functional limitations: weakness, gait instability, impaired endurance, impaired balance, decreased lower extremity function, decreased upper extremity function, impaired cardiopulmonary response to activity, impaired self care skills, impaired functional mobility.    Rehab Prognosis: Good; patient would benefit from acute skilled PT services to address these deficits and reach maximum level of function.    Recent Surgery: Procedure(s) (LRB):  CORONARY ARTERY BYPASS GRAFT (CABG) (N/A) 5 Days Post-Op    Plan:     During this hospitalization, patient to be seen 6 x/week to address the identified rehab impairments via gait training, therapeutic exercises, therapeutic activities, neuromuscular re-education and progress toward the following goals:    Plan of Care Expires:  03/22/23    Subjective     Chief Complaint: n/a  Patient/Family Comments/goals: to get stronger   Pain/Comfort:  Pain Rating 1: 0/10      Objective:     Communicated with NSG prior to session.  Patient found up in chair with blood pressure cuff, pulse ox (continuous), telemetry, peripheral IV, oxygen, medellin catheter upon PT entry to room.     General Precautions: Standard, fall, sternal  Orthopedic Precautions: N/A  Braces: N/A  Respiratory Status:  5 L oximizer  Skin Integrity: Visible skin intact, noted to have bleeding on chux pad, appeared to be from scrotum region however difficulty to see at this time, RN aware      Functional Mobility:  Transfers:     Sit to Stand:  moderate assistance and of 2 persons with rolling walker; x4 trials; pt with  a R lateral and posterior lean requiring TC/VC upon standing   Gait: pt ambulated approx 46 ft x2 trials with a seated rest break in btw  Pt demonstrated a slight R lateral lean and excessive forward flexed head/trunk-- required constant TC/VC, difficult for pt to maintain a fully upright position when stepping  Patient demonstrated a step to pattern; decreased step length of B LE, but R worse than L. B LE also with decreased foot clearance-- constant verbal cues provided, pt only able to slightly increase step length for a few steps before reverting back to quick, shuffle-like steps; visual cue of normalized stepping sequence/pattern also provided in btw trials, poor carryover  Limited by decreased tolerance to ax; after second gait trial pt with notable wheezing and reports of feeling SOB; vitals monitored and WNL      Education:  Patient provided with verbal education regarding POC, safety, pxns, and mobility.  Understanding was verbalized, however additional teaching warranted.     Patient left up in chair with all lines intact, call button in reach, RN notified, and spouse present. Of note, even in a seated position in the bedside chair pt demonstrates a R lateral lean therefore pillows were positioned alongside him to promote a midline, upright position.     GOALS:   Multidisciplinary Problems       Physical Therapy Goals          Problem: Physical Therapy    Goal Priority Disciplines Outcome Goal Variances Interventions   Physical Therapy Goal     PT, PT/OT Ongoing, Progressing     Description: Goals to be met by: 3/0323     Patient will increase functional independence with mobility by performin. Pt will be ada with sit to supine and supine to sit  2. Pt will be sba with sit to stand  3. Pt will ambulate 120ft with a rw sba                           Time Tracking:     PT Received On: 23  PT Start Time: 1048     PT Stop Time: 1115  PT Total Time (min): 27 min     Billable Minutes: Gait Training  2    Treatment Type: Treatment  PT/PTA: PT     PTA Visit Number: 4     03/01/2023

## 2023-03-01 NOTE — PLAN OF CARE
Chart review completed, PT/OT recommending inpt rehab, pt remains on 5L/oxymizer. Will continue following.

## 2023-03-01 NOTE — PROGRESS NOTES
Pod 5  In chair  On lasix gtt  Uop 1900cc  Heart sinus  Wounds c/d/I  Hct 26  Bun/cr 76/2.08  Plans per renal

## 2023-03-01 NOTE — PROGRESS NOTES
Inpatient Nutrition Evaluation    Admit Date: 2/19/2023   Total duration of encounter: 10 days    Nutrition Recommendation/Prescription     Continue current diet as tolerated.  Encouraged adequate PO intake.   Encourage Aiden BID to promote wound healing (provides 90 kcal and 2.5 g protein per serving).  Monitor PO intake, tolerance, and weight.    Nutrition Assessment     Chart Review    Reason Seen: continuous nutrition monitoring and follow-up partial thickness wound    Malnutrition Screening Tool Results   Have you recently lost weight without trying?: No  Have you been eating poorly because of a decreased appetite?: No   MST Score: 0     Diagnosis:  NSTEMI, Type I  MVCAD  --[facility] 02/15/23: LM 0% stenosis. mLAD proximal 70% ISR. D1 ostial 50% stenosis. D2 ostial 50% stenosis. Lcx: previous stent; proximal Lcx  90% ISR; mid Lcx 30% stenosis. OM1 proximal 80% stenosis. RCA patent stents to ostium. Mid RCA proximal subsection- 60% stenosis; Mid RCA distal subsection 70& stenosis; distal RCA proximal subsection 100% stenosis. Fills left to right  --[facility] 09/22/22: LM 0% stenosis. mLAD proximal subsection 50% ISR,. D1 ostial 50% stenosis  D2 ostial 50% stenosis. pLCx mid subsection 70% ISR reduced to 10%, Preprocedure AMALIA III flow  noted. Post procedure AMALIA III was present. Lesion previous treated on 7/22/21 with PTA/DAYAN. OM1 proximal 80% stenosis. mLCx distal subsection 30% stenosis. RCA proximal RCA stent widely patent. mRCA proximal subsection 60% subsection. mRCA distal subsection 70% stenosis. dRCA proximal subsection 100% stenosis. Fills left to right  Hypertension (Controlled)  ICMO  --EF 45-50% TTE 2/15/23 improved from 40% 7/21  VHD  --Mild AS, Mild to moderate AR. Mild to moderate MAC is noted. Mild MR. Trace TR.  B RICKEY    - Moderate Bilateral by US  HLD  Lupus anticoagulant inhibitor syndrome  CARLYLE  VHD  --mild AS  Renal Insufficiency  AAA  Anemia    Relevant Medical History:   Acid reflux       Anemia       Aortic aneurysm, abdominal      Arthritis      Bronchitis      Cataract      Celiac artery stenosis       50% by CTA abdomen 7/27/2020    CKD (chronic kidney disease)       45% FUNCTION    Coronary artery disease      Encounter for blood transfusion      Enlarged prostate      GERD (gastroesophageal reflux disease)      Hemorrhoids      Hypercholesteremia      Hypertension      Iron deficiency anemia, unspecified      Leaky heart valve      Lupus anticoagulant disorder      Renal artery stenosis       by CTA 7/27/2020    Skin cancer      Unspecified chronic bronchitis      UTI (urinary tract infection)        Nutrition-Related Medications: Scheduled Meds:   aspirin  81 mg Oral Daily    atorvastatin  40 mg Oral Daily    calcium carbonate  500 mg Oral Daily    docusate sodium  100 mg Oral BID    ezetimibe  10 mg Oral Daily    FLUoxetine  10 mg Oral Daily    folic acid  1 mg Oral Daily    gabapentin  100 mg Oral TID    metoprolol tartrate  25 mg Oral TID    pantoprazole  40 mg Oral Daily    prednisoLONE  2.5 mg Oral Daily    QUEtiapine  50 mg Oral QHS    rOPINIRole  1 mg Oral BID    sodium chloride 0.9%  10 mL Intravenous Q6H    sucralfate  1 g Oral QID (AC & HS)     Continuous Infusions:   sodium chloride 0.9% 75 mL/hr at 02/28/23 2206    dextrose 5 % and 0.9 % NaCl 75 mL/hr at 02/26/23 0831    DOBUTamine IV infusion (non-titrating) Stopped (02/27/23 1420)    furosemide (LASIX) 2 mg/mL continuous infusion (non-titrating) 4 mg/hr (02/28/23 0803)     PRN Meds:.sodium chloride, sodium chloride, acetaminophen, acetaminophen, albumin human 5%, albuterol-ipratropium, calcium gluconate IVPB, calcium gluconate IVPB, calcium gluconate IVPB, dextrose 10%, dextrose 10%, hydrALAZINE, HYDROcodone-acetaminophen, HYDROcodone-acetaminophen, hydrOXYzine HCL, labetalol, magnesium sulfate IVPB, magnesium sulfate IVPB, meclizine, morphine, ondansetron, oxyCODONE, potassium chloride in water, potassium chloride in water, potassium  "chloride in water, Flushing PICC Protocol **AND** sodium chloride 0.9% **AND** sodium chloride 0.9%, sodium phosphate IVPB, sodium phosphate IVPB, sodium phosphate IVPB, tiZANidine, zolpidem      Nutrition-Related Labs:  2/22/2023: H/H 8.2/25.3, Na 135, BUN 39.9, Crea 1.93, Ca 8.7, Gluc 93  3/1/23: Glu 111, GFR>60    Diet Order: DIET RENAL NON-DIALYSIS  Oral Supplement Order: none  Appetite/Oral Intake: good/% of meals  Factors Affecting Nutritional Intake: none identified  Food/Caodaism/Cultural Preferences: none reported  Food Allergies: none reported    Skin Integrity: incision, drain/device(s)  Wound(s):      Altered Skin Integrity 02/15/23 1929 Left posterior Elbow #2 Abrasion(s) -Tissue loss description: Partial thickness partial thickness wound per report.    Comments    2/22/2023: Pt reports decreased appetite/PO intake ~3 days prior to admit. Pt reports appetite and PO intake improvement. Pt denies N/V and GI complaints. No chewing/swallowing difficulties noted. Pt denies unplanned wt loss. Will provide AIDEN BID to help with wound healing. Will monitor. NOTE: Pt under CABG evaluation.    3/1/23: Pt reports fair to good intake; had not eaten any of his breakfast yet; denies n/v; does state that he is not taking the Aiden; encouraged it and explained to him that it was for wound healing.     Anthropometrics    Height: 5' 10" (177.8 cm)    Last Weight: 92.1 kg (203 lb 0.7 oz) (02/28/23 0627) Weight Method: Standard Scale  BMI (Calculated): 29.1  BMI Classification: overweight (BMI 25-29.9)        Ideal Body Weight (IBW), Male: 166 lb     % Ideal Body Weight, Male (lb): 114.08 %                          Usual Weight Provided By: unable to obtain usual weight    Wt Readings from Last 3 Encounters:   02/28/23 0627 92.1 kg (203 lb 0.7 oz)   02/27/23 0600 90.6 kg (199 lb 11.8 oz)   02/26/23 0529 90.2 kg (198 lb 13.7 oz)   02/25/23 0556 90.3 kg (199 lb 1.2 oz)   02/24/23 0615 87.3 kg (192 lb 7.4 oz) "   02/23/23 0624 87.8 kg (193 lb 9 oz)   02/22/23 0450 88 kg (194 lb)   02/20/23 0634 86.1 kg (189 lb 13.1 oz)   02/19/23 1823 85.9 kg (189 lb 6 oz)   02/15/23 1735 90.9 kg (200 lb 8 oz)   02/15/23 1135 91.5 kg (201 lb 12.8 oz)   02/15/23 0859 86.2 kg (190 lb)      Weight Change(s) Since Admission:  Admit Weight: 85.9 kg (189 lb 6 oz) (02/19/23 1823)  2/22/2023: 88 kg  3/1/2023: 92.1 kg    Patient Education    Not applicable.    Monitoring & Evaluation     Dietitian will monitor food and beverage intake, weight, and electrolyte/renal panel.  Nutrition Risk/Follow-Up: low (follow-up in 5-7 days)  Patients assigned 'low nutrition risk' status do not qualify for a full nutritional assessment but will be monitored and re-evaluated in a 5-7 day time period. Please consult if re-evaluation needed sooner.

## 2023-03-01 NOTE — NURSING
Nurses Note -- 4 Eyes      3/1/2023   7:21 AM      Skin assessed during: Q Shift Change      [x] No Pressure Injuries Present    []Prevention Measures Documented      [] Yes- Altered Skin Integrity Present or Discovered   [] LDA Added if Not in Epic (Describe Wound)   [] New Altered Skin Integrity was Present on Admit and Documented in LDA   [] Wound Image Taken    Wound Care Consulted? No    Attending Nurse:  Nelia Hilton RN     Second RN/Staff Member:  Shabbir Hernandez RN

## 2023-03-01 NOTE — PT/OT/SLP PROGRESS
Occupational Therapy  Treatment    Hunter Navarrete   MRN: [id number]   Admitting Diagnosis: <principal problem not specified>    OT Date of Treatment: 03/01/23   OT Start Time: 1005  OT Stop Time: 1029  OT Total Time (min): 24 min     Billable Minutes:  Self Care/Home Management 2  Total Minutes: 24     OT/LONDON: LONDON     LONDON Visit Number: 3    General Precautions: Standard, sternal  Orthopedic Precautions:    Braces:           Subjective:  Communicated with RN prior to session.  94o2 5L oxymizer, 99 HR, 108/46  Distracted  Appears Depressed    Objective:  Pt. UIC upon entry.  (Mod A required for sit to stand from BS chair.)   Pt. Ambulating from BS chair to sink using RW for UE support with balance, slow avani noted with increased cueing required for step progression.   Pt. Standing at sink with Mod A for standing balance while performing grooming task (brushing teeth) with minimal assistance required for setup and preparation. Pt. Demonstrating a posterior lean.   Seated rest break required intermittently.   Cueing required for adherence to sternal pxns.    Patient left up in chair with all lines intact and call button in reach    ASSESSMENT:  Hunter Navarrete is a 82 y.o. male with a medical diagnosis of <principal problem not specified> and presents with limited endurance, increased weakness noted. Continue POC.    Activity tolerance: Fair    Discharge recommendations:       Equipment recommendations: walker, rolling     GOALS:   Multidisciplinary Problems       Occupational Therapy Goals          Problem: Occupational Therapy    Goal Priority Disciplines Outcome Interventions   Occupational Therapy Goal     OT, PT/OT Ongoing, Progressing    Description: Pt will ambulate to bathroom with RW SBA  Pt will complete toilet t/f SBA  Pt will complete toileting tasks min A  Pt will complete LE dressing with AE PRN SBA  Pt will complete UE dressing with SBA  Pt will complete grooming in standing at sink SBA                        Plan:  Patient to be seen 5 x/week, 6 x/week to address the above listed problems via self-care/home management, therapeutic activities, therapeutic exercises  Plan of Care expires: 03/14/23  Plan of Care reviewed with: patient         03/01/2023

## 2023-03-01 NOTE — NURSING
Nurses Note -- 4 Eyes      3/1/2023   2:55 AM      Skin assessed during: Q Shift Change      [x] No Pressure Injuries Present    [x]Prevention Measures Documented      [] Yes- Altered Skin Integrity Present or Discovered   [] LDA Added if Not in Epic (Describe Wound)   [] New Altered Skin Integrity was Present on Admit and Documented in LDA   [] Wound Image Taken    Wound Care Consulted? No    Attending Nurse:  Kayden Barajas RN     Second RN/Staff Member:  BISI Ceron RN

## 2023-03-01 NOTE — PROGRESS NOTES
Nephrology consult follow up note    HPI:      Hunter Navarrete is a 82 y.o. male with stage III CKD B, creatinine between 1.6 and 2.0 and GFR between 33 and 44.  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    Interval history:     NO acute events overnight. He made better UOP over the last day. He has been getting both IVF and a lasix infusion. He denies SOB but is wearing supplemental O2.     Review of Systems:     Comprehensive 10pt ROS negative except as noted per HPI.    Past medical, family, surgical, and social history reviewed and unchanged from initial consult note.     Objective:       VITAL SIGNS: 24 HR MIN & MAX LAST    Temp  Min: 97.4 °F (36.3 °C)  Max: 98.3 °F (36.8 °C)  97.4 °F (36.3 °C)        BP  Min: 95/63  Max: 134/62  112/66     Pulse  Min: 82  Max: 110  90     Resp  Min: 14  Max: 32  (!) 32    SpO2  Min: 93 %  Max: 100 %  100 %      GEN: Elderly WM in NAD  HEENT: Conjunctiva anicteric, pupils equal  CV: RRR +S1,S2 without murmur  PULM: Unlabored, basilar ronchi, 4L NC  ABD: Soft, NT/ND abdomen with NABS  EXT: No LE edema  SKIN: Warm and dry  PSYCH: Awake, alert and appropriately conversant.   Vascular access: NO HD access            Component Value Date/Time     03/01/2023 0158     (L) 02/28/2023 0136     (L) 02/19/2023 0536     (L) 02/18/2023 0539    K 4.1 03/01/2023 0158    K 4.4 02/28/2023 0136    K 4.2 02/19/2023 0536    K 4.0 02/18/2023 0539    CHLORIDE 107 03/01/2023 0158    CHLORIDE 105 02/28/2023 0136    CO2 21 (L) 03/01/2023 0158    CO2 20 (L) 02/28/2023 0136    CO2 30 (H) 02/19/2023 0536    CO2 31 (H) 02/18/2023 0539    BUN 76.4 (H) 03/01/2023 0158    BUN 65.7 (H) 02/28/2023 0136    BUN 30 (H) 02/19/2023 0536    BUN 32 (H) 02/18/2023 0539    CREATININE 2.08 (H) 03/01/2023 0158    CREATININE 2.26 (H) 02/28/2023 0136    CREATININE 1.56 (H) 02/19/2023 0536    CREATININE 1.58 (H) 02/18/2023 0539    CALCIUM 9.2  03/01/2023 0158    CALCIUM 9.1 02/28/2023 0136    CALCIUM 9.1 02/19/2023 0536    CALCIUM 9.0 02/18/2023 0539            Component Value Date/Time    WBC 5.9 03/01/2023 0158    WBC 7.7 02/28/2023 0136    WBC 6.20 02/19/2023 0536    WBC 6.20 02/19/2023 0536    HGB 8.2 (L) 03/01/2023 0158    HGB 8.6 (L) 02/28/2023 0136    HGB 10.2 (L) 02/19/2023 0536    HGB 10.2 (L) 02/19/2023 0536    HCT 26.8 (L) 03/01/2023 0158    HCT 27.4 (L) 02/28/2023 0136    HCT 29.7 (L) 02/19/2023 0536    HCT 29.7 (L) 02/19/2023 0536     (L) 03/01/2023 0158     (L) 02/28/2023 0136     02/19/2023 0536     02/19/2023 0536         Imaging reviewed      Assessment / Plan:       There are no hospital problems to display for this patient.      DANIELLA on CKD 3B -baseline Cr 1.6-2.0  Developing fluid overload and heart failure  Anemia of chronic disease  History of systemic lupus   Hypertension    Plan:  Cr improving, but BUN has worsened. He has no peripheral edema but does seem to have pulmonary edema. He is on both IVF and lasix infusion, so I am unsure of which is helping. Will stop both and place pt on IV lasix 40mg tid. Labs in AM. Will follow.       Denilson Medina DO  Nephrology  [facility]  Clinic number: [phone]

## 2023-03-01 NOTE — PROGRESS NOTES
"[facility] - 3rd Floor Medical Telemetry  Cardiology  Progress Note    Patient Name: Hunter Navarrete  MRN: [id number]  Admission Date: 2/19/2023  Hospital Length of Stay: 10 days  Code Status: Full Code   Attending Physician: Spencer Hagan MD   Primary Care Physician: Mariella Bethea MD  Expected Discharge Date:   Principal Problem:<principal problem not specified>    Subjective:   Chief Complaint: Chest Pain (Known MVCAD)     HPI: Mr. Navarrete is an 83 y/o male, followed by Dr. Loredo who presented to [facility] with c/o chest pain. HS troponin 1149; therefore [place] was [place]ology services where he underwent LHC revealing multivessel disease and elevated LVEDP. He was started on diuretics, Plavix was placed on hold and he was transferred to [facility] for CABG evaluation. Patient is reporting mild chest tightness 3/10 currently    Hospital Course:   2.21.23:  Vitals Stable. Shoulder Pain this morning. On Nitro & Heparin Infusion. EKG with no overt ischemic changes.   2.22.23: Patient resting in bed. NAD. Denies CP/SOB. Creatinine 1.93 today- mildly decreased from 2.07 yesterday with addition of IVFs. H/H downtrending- 8.2/25.3 from 8.8/27.1 yesterday.   2.23.23: Patient sitting up in bedside chair. NAD. VSS. Creatinine improved to 1.65 today. H/H    10.5/31.5 post transfusion 2  units PRBCs.  2.24.23: Patient underwent 2V CABG today and is currently in ICU on MV, requiring pressor support. Mediastinal drain patent. He received 2 units of PRBCs intraop  2.25.23: Awake in bed. C/o incisional sternal pain. CT x 2 patent. Pressors have been weaned off. Currently on cleviprex drip  2.26.23: Patient awake in bed. Mediastinal drains have been removed. Creatinine has bumped. Nephrology adding IVF x 1L due to IVVD  2.27.23: NAD. Sitting in Bedside Chair. Denies SOB and Palps. + CP/Incisional. Crea 2.53 "I am ok."   2.28.23: NAD. Sitting in Bedside Chair. Denies SOB and Palps. + " "CP/Incisional. Crea 2.26. "I am ok." + Sinus Tachycardia.   3.1.23: NAD. Sitting in Bedside Chair. Denies SOB and Palps. + CP/Incisional. Crea Improved. "I am feeling better." SR Mostly/Tachycardia Improved.     PMH: ICMO, Native CAD, HLD, HTN, CARLYLE, CKD, lupus anticoagulant inhibitor syndrome, VHD- mild AS, AAA, pseudoaneurysm RFA  PSH: right total hip replacement, LHC, back surgery, spinal cord stimulator implant, knee surgery, cataract surgery  Family History: Brother- cancer, heart disease, lung cancer; father heart disease  Social History: Former smoker, occasional ETOH; denies illicit drug use     Previous Cardiac Diagnostics:   CUS 02/20/23:  The right internal carotid artery demonstrated 50-69% stenosis.  The left internal carotid artery demonstrated 50-69% stenosis.  Bilateral vertebral arteries were patent with antegrade flow.     [facility] 02/15/2023:  LM 0% stenosis  mLAD proximal 70% ISR  D1 ostial 50% stenosis  D2 ostial 50% stenosis  Lcx: previous stent; proximal Lcx  90% ISR; mid Lcx 30% stenosis  OM1 proximal 80% stenosis  RCA patent stents to ostium. Mid RCA proximal subsection- 60% stenosis; Mid RCA distal subsection 70& stenosis; distal RCA proximal subsection 100% stenosis. Fills left to right     TTE 02/16/2023:  Global LV SF is borderline normal. LVEF 45-50%. LA is mildly enlarged. Moderate calcification of the aortic valve is noted. Mild AS is present. Mild to moderate AR. Mild to moderate MAC is noted. Mild MR. Trace TR. PASP 40 mmHg. Optison used to enhance endocardial border.      [facility] 09/22/2022:  LM 0% stenosis  mLAD proximal subsection 50% ISR  D1 ostial 50% stenosis  D2 ostial 50% stenosis  pLCx mid subsection 70% ISR reduced to 10%, Preprocedure AMALIA III flow  noted. Post procedure AMALIA III was present. Lesion previous treated on 7/22/21 with PTA/DAYAN  OM1 proximal 80% stenosis  mLCx distal subsection 30% stenosis  RCA proximal RCA stent widely patent  mRCA proximal subsection 60% " subsection  mRCA distal subsection 70% stenosis  dRCA proximal subsection 100% stenosis. Fills left to right     CUS 05/13/2022:  60-79% stenosis in pRICA and mLICA  Antegrade flow to bilateral vertebral arteries    Review of Systems   Constitutional: Positive for malaise/fatigue.   Cardiovascular:  Negative for chest pain, dyspnea on exertion, irregular heartbeat and palpitations.   Respiratory:  Negative for shortness of breath.    All other systems reviewed and are negative.    Objective:     Vital Signs (Most Recent):  Temp: 97.5 °F (36.4 °C) (03/01/23 1200)  Pulse: 99 (03/01/23 1224)  Resp: (!) 24 (03/01/23 1224)  BP: (!) 113/53 (03/01/23 1212)  SpO2: 99 % (03/01/23 1224)   Vital Signs (24h Range):  Temp:  [97.4 °F (36.3 °C)-98.3 °F (36.8 °C)] 97.5 °F (36.4 °C)  Pulse:  [] 99  Resp:  [14-32] 24  SpO2:  [94 %-100 %] 99 %  BP: ()/() 113/53     Weight: 92.1 kg (203 lb 0.7 oz)  Body mass index is 29.13 kg/m².    SpO2: 99 %         Intake/Output Summary (Last 24 hours) at 3/1/2023 1252  Last data filed at 3/1/2023 0630  Gross per 24 hour   Intake 1123.7 ml   Output 1415 ml   Net -291.3 ml       Lines/Drains/Airways       Peripherally Inserted Central Catheter Line  Duration             PICC Triple Lumen 02/27/23 1045 left basilic 2 days              Drain  Duration                  Urethral Catheter 02/27/23 1150 2 days                  Significant Labs:   Recent Results (from the past 72 hour(s))   POCT glucose    Collection Time: 02/26/23  1:02 PM   Result Value Ref Range    POCT Glucose 117 (H) 70 - 110 mg/dL   Basic Metabolic Panel    Collection Time: 02/27/23  1:54 AM   Result Value Ref Range    Sodium Level 133 (L) 136 - 145 mmol/L    Potassium Level 4.5 3.5 - 5.1 mmol/L    Chloride 105 98 - 107 mmol/L    Carbon Dioxide 22 (L) 23 - 31 mmol/L    Glucose Level 108 82 - 115 mg/dL    Blood Urea Nitrogen 57.9 (H) 8.4 - 25.7 mg/dL    Creatinine 2.53 (H) 0.73 - 1.18 mg/dL    BUN/Creatinine Ratio 23      Calcium Level Total 9.0 8.8 - 10.0 mg/dL    Anion Gap 6.0 mEq/L    eGFR 25 mls/min/1.73/m2   CBC with Differential    Collection Time: 02/27/23  1:54 AM   Result Value Ref Range    WBC 9.3 4.5 - 11.5 x10(3)/mcL    RBC 3.38 (L) 4.70 - 6.10 x10(6)/mcL    Hgb 9.9 (L) 14.0 - 18.0 g/dL    Hct 31.8 (L) 42.0 - 52.0 %    MCV 94.1 (H) 80.0 - 94.0 fL    MCH 29.3 pg    MCHC 31.1 (L) 33.0 - 36.0 g/dL    RDW 16.0 11.5 - 17.0 %    Platelet 88 (L) 130 - 400 x10(3)/mcL    MPV 11.0 (H) 7.4 - 10.4 fL    Neut % 65.9 %    Lymph % 9.8 %    Mono % 20.5 %    Eos % 0.1 %    Basophil % 0.1 %    Lymph # 0.91 0.6 - 4.6 x10(3)/mcL    Neut # 6.14 2.1 - 9.2 x10(3)/mcL    Mono # 1.91 (H) 0.1 - 1.3 x10(3)/mcL    Eos # 0.01 0 - 0.9 x10(3)/mcL    Baso # 0.01 0 - 0.2 x10(3)/mcL    IG# 0.34 (H) 0 - 0.04 x10(3)/mcL    IG% 3.6 %    NRBC% 0.0 %   Echo    Collection Time: 02/27/23 12:42 PM   Result Value Ref Range    BSA 2.06 m2    EF 50 %   POCT glucose    Collection Time: 02/28/23  1:35 AM   Result Value Ref Range    POCT Glucose 106 70 - 110 mg/dL   Comprehensive Metabolic Panel    Collection Time: 02/28/23  1:36 AM   Result Value Ref Range    Sodium Level 133 (L) 136 - 145 mmol/L    Potassium Level 4.4 3.5 - 5.1 mmol/L    Chloride 105 98 - 107 mmol/L    Carbon Dioxide 20 (L) 23 - 31 mmol/L    Glucose Level 103 82 - 115 mg/dL    Blood Urea Nitrogen 65.7 (H) 8.4 - 25.7 mg/dL    Creatinine 2.26 (H) 0.73 - 1.18 mg/dL    Calcium Level Total 9.1 8.8 - 10.0 mg/dL    Protein Total 6.1 5.8 - 7.6 gm/dL    Albumin Level 2.3 (L) 3.4 - 4.8 g/dL    Globulin 3.8 (H) 2.4 - 3.5 gm/dL    Albumin/Globulin Ratio 0.6 (L) 1.1 - 2.0 ratio    Bilirubin Total 0.6 <=1.5 mg/dL    Alkaline Phosphatase 69 40 - 150 unit/L    Alanine Aminotransferase 7 0 - 55 unit/L    Aspartate Aminotransferase 35 (H) 5 - 34 unit/L    eGFR 28 mls/min/1.73/m2   Magnesium    Collection Time: 02/28/23  1:36 AM   Result Value Ref Range    Magnesium Level 2.10 1.60 - 2.60 mg/dL   CBC with  Differential    Collection Time: 02/28/23  1:36 AM   Result Value Ref Range    WBC 7.7 4.5 - 11.5 x10(3)/mcL    RBC 2.97 (L) 4.70 - 6.10 x10(6)/mcL    Hgb 8.6 (L) 14.0 - 18.0 g/dL    Hct 27.4 (L) 42.0 - 52.0 %    MCV 92.3 80.0 - 94.0 fL    MCH 29.0 pg    MCHC 31.4 (L) 33.0 - 36.0 g/dL    RDW 15.6 11.5 - 17.0 %    Platelet 100 (L) 130 - 400 x10(3)/mcL    MPV 11.4 (H) 7.4 - 10.4 fL    Neut % 70.9 %    Lymph % 8.1 %    Mono % 17.9 %    Eos % 0.0 %    Basophil % 0.1 %    Lymph # 0.62 0.6 - 4.6 x10(3)/mcL    Neut # 5.42 2.1 - 9.2 x10(3)/mcL    Mono # 1.37 (H) 0.1 - 1.3 x10(3)/mcL    Eos # 0.00 0 - 0.9 x10(3)/mcL    Baso # 0.01 0 - 0.2 x10(3)/mcL    IG# 0.23 (H) 0 - 0.04 x10(3)/mcL    IG% 3.0 %    NRBC% 0.0 %   Comprehensive Metabolic Panel    Collection Time: 03/01/23  1:58 AM   Result Value Ref Range    Sodium Level 136 136 - 145 mmol/L    Potassium Level 4.1 3.5 - 5.1 mmol/L    Chloride 107 98 - 107 mmol/L    Carbon Dioxide 21 (L) 23 - 31 mmol/L    Glucose Level 111 82 - 115 mg/dL    Blood Urea Nitrogen 76.4 (H) 8.4 - 25.7 mg/dL    Creatinine 2.08 (H) 0.73 - 1.18 mg/dL    Calcium Level Total 9.2 8.8 - 10.0 mg/dL    Protein Total 6.0 5.8 - 7.6 gm/dL    Albumin Level 2.2 (L) 3.4 - 4.8 g/dL    Globulin 3.8 (H) 2.4 - 3.5 gm/dL    Albumin/Globulin Ratio 0.6 (L) 1.1 - 2.0 ratio    Bilirubin Total 0.5 <=1.5 mg/dL    Alkaline Phosphatase 76 40 - 150 unit/L    Alanine Aminotransferase 8 0 - 55 unit/L    Aspartate Aminotransferase 28 5 - 34 unit/L    eGFR 31 mls/min/1.73/m2   Magnesium    Collection Time: 03/01/23  1:58 AM   Result Value Ref Range    Magnesium Level 2.10 1.60 - 2.60 mg/dL   CBC with Differential    Collection Time: 03/01/23  1:58 AM   Result Value Ref Range    WBC 5.9 4.5 - 11.5 x10(3)/mcL    RBC 2.85 (L) 4.70 - 6.10 x10(6)/mcL    Hgb 8.2 (L) 14.0 - 18.0 g/dL    Hct 26.8 (L) 42.0 - 52.0 %    MCV 94.0 80.0 - 94.0 fL    MCH 28.8 pg    MCHC 30.6 (L) 33.0 - 36.0 g/dL    RDW 15.9 11.5 - 17.0 %    Platelet 123 (L) 130  - 400 x10(3)/mcL    MPV 11.3 (H) 7.4 - 10.4 fL    Neut % 67.4 %    Lymph % 11.8 %    Mono % 19.0 %    Eos % 0.2 %    Basophil % 0.2 %    Lymph # 0.69 0.6 - 4.6 x10(3)/mcL    Neut # 3.95 2.1 - 9.2 x10(3)/mcL    Mono # 1.11 0.1 - 1.3 x10(3)/mcL    Eos # 0.01 0 - 0.9 x10(3)/mcL    Baso # 0.01 0 - 0.2 x10(3)/mcL    IG# 0.08 (H) 0 - 0.04 x10(3)/mcL    IG% 1.4 %    NRBC% 0.0 %     Telemetry:     Physical Exam  Vitals reviewed.   Constitutional:       Appearance: Normal appearance. He is obese.   HENT:      Head: Normocephalic.      Mouth/Throat:      Mouth: Mucous membranes are moist.   Eyes:      Extraocular Movements: Extraocular movements intact.   Cardiovascular:      Rate and Rhythm: Regular rhythm. Tachycardia present.      Pulses: Normal pulses.      Heart sounds: Normal heart sounds.   Pulmonary:      Effort: Pulmonary effort is normal. No respiratory distress.      Breath sounds: Normal breath sounds.   Abdominal:      Palpations: Abdomen is soft.   Skin:     General: Skin is warm and dry.      Comments: Midline Sternotomy Dressing C/D/I   Neurological:      General: No focal deficit present.      Mental Status: He is alert and oriented to person, place, and time.   Psychiatric:         Mood and Affect: Mood normal.         Behavior: Behavior normal.     Current Inpatient Medications:    Current Facility-Administered Medications:     0.9%  NaCl infusion (for blood administration), , Intravenous, Q24H PRN, SHIRLEY Torres    0.9%  NaCl infusion (for blood administration), , Intravenous, Q24H PRN, Trenton Harvey PA-C    0.9%  NaCl infusion (for blood administration), , Intravenous, Q24H PRN, Spencer Hagan MD    acetaminophen oral solution 650 mg, 650 mg, Per OG tube, Q6H PRN, GILMAR sEtrada    acetaminophen tablet 650 mg, 650 mg, Oral, Q6H PRN, Lul Isaac MD, 650 mg at 02/26/23 1614    albumin human 5% bottle 12.5 g, 12.5 g, Intravenous, PRN, GILMAR Estrada    albuterol-ipratropium  2.5 mg-0.5 mg/3 mL nebulizer solution 3 mL, 3 mL, Nebulization, Q4H PRN, Nima Baez DO, 3 mL at 02/28/23 2242    aspirin EC tablet 81 mg, 81 mg, Oral, Daily, GILMAR Estrada, 81 mg at 03/01/23 0836    atorvastatin tablet 40 mg, 40 mg, Oral, Daily, Trenton Harvey PA-C, 40 mg at 03/01/23 0836    calcium carbonate 200 mg calcium (500 mg) chewable tablet 500 mg, 500 mg, Oral, Daily, Trenton Harvey PA-C, 500 mg at 02/28/23 0821    calcium gluconate 1 g in NS IVPB (premixed), 1 g, Intravenous, PRN, GILMAR Estrada    calcium gluconate 1 g in NS IVPB (premixed), 2 g, Intravenous, PRN, GILMAR Estrada    calcium gluconate 1 g in NS IVPB (premixed), 3 g, Intravenous, PRN, GILMAR Estrada    dextrose 10% bolus 125 mL 125 mL, 12.5 g, Intravenous, PRN, GILMAR Estrada    dextrose 10% bolus 250 mL 250 mL, 25 g, Intravenous, PRN, GILMAR Estrada    DOBUtamine 1000 mg in D5W 250 mL infusion, 2.5 mcg/kg/min, Intravenous, Continuous, Darinel Bedoya MD, Stopped at 02/27/23 1420    docusate sodium capsule 100 mg, 100 mg, Oral, BID, GILMAR Estrada, 100 mg at 03/01/23 0836    ezetimibe tablet 10 mg, 10 mg, Oral, Daily, Trenton Harvey PA-C, 10 mg at 03/01/23 0836    FLUoxetine capsule 10 mg, 10 mg, Oral, Daily, Trenton Harvey PA-C, 10 mg at 03/01/23 0836    folic acid tablet 1 mg, 1 mg, Oral, Daily, Trenton Harvey PA-C, 1 mg at 03/01/23 0836    furosemide injection 40 mg, 40 mg, Intravenous, TID, Denilson Medina DO    gabapentin capsule 100 mg, 100 mg, Oral, TID, Trenton Harvey PA-C, 100 mg at 03/01/23 0836    hydrALAZINE injection 20 mg, 20 mg, Intravenous, Q4H PRN, Ga Diaz NP    HYDROcodone-acetaminophen 5-325 mg per tablet 1 tablet, 1 tablet, Oral, Q4H PRN, GILMAR Estrada, 1 tablet at 02/24/23 1708    HYDROcodone-acetaminophen 7.5-325 mg per tablet 1 tablet, 1 tablet, Oral, Q6H PRN, Ga Diaz NP, 1 tablet at 02/28/23 2204    hydrOXYzine HCL tablet  25 mg, 25 mg, Oral, Q6H PRN, Trenton Harvey PA-C    labetaloL injection 20 mg, 20 mg, Intravenous, Q4H PRN, Ga Q. Emily, NP    magnesium sulfate 2g in water 50mL IVPB (premix), 2 g, Intravenous, PRN, GILMAR Estrada    magnesium sulfate 2g in water 50mL IVPB (premix), 4 g, Intravenous, PRN, GILMAR Estrada    meclizine tablet 25 mg, 25 mg, Oral, TID PRN, Trenton Harvey PA-C    metoprolol tartrate (LOPRESSOR) tablet 25 mg, 25 mg, Oral, TID, RASHAD Hernandez, 25 mg at 02/28/23 2011    morphine injection 2 mg, 2 mg, Intravenous, Q2H PRN, Ga Q. Emily, NP, 2 mg at 03/01/23 0033    ondansetron injection 4 mg, 4 mg, Intravenous, Q4H PRN, GILMAR Estrada, 4 mg at 02/25/23 1326    oxyCODONE immediate release tablet 10 mg, 10 mg, Oral, Q4H PRN, GILMAR Estrada, 10 mg at 02/28/23 1233    pantoprazole EC tablet 40 mg, 40 mg, Oral, Daily, Trenton Harvey PA-C, 40 mg at 03/01/23 0836    potassium chloride 20 mEq in 100 mL IVPB (FOR CENTRAL LINE ADMINISTRATION ONLY), 20 mEq, Intravenous, PRN, GILMAR Estrada    potassium chloride 20 mEq in 100 mL IVPB (FOR CENTRAL LINE ADMINISTRATION ONLY), 40 mEq, Intravenous, PRN, GILMAR Estrada    potassium chloride 20 mEq in 100 mL IVPB (FOR CENTRAL LINE ADMINISTRATION ONLY), 20 mEq, Intravenous, PRN, GILMAR Estrada    prednisoLONE 15 mg/5 mL (3 mg/mL) solution 2.5 mg, 2.5 mg, Oral, Daily, Tom Mayfield MD, 2.5 mg at 03/01/23 0836    QUEtiapine tablet 50 mg, 50 mg, Oral, QHS, Trenton Harvey PA-C, 50 mg at 02/28/23 2011    rOPINIRole tablet 1 mg, 1 mg, Oral, BID, Trenton Harvey PA-C, 1 mg at 03/01/23 0836    Flushing PICC Protocol, , , Until Discontinued **AND** sodium chloride 0.9% flush 10 mL, 10 mL, Intravenous, Q6H, 10 mL at 03/01/23 0629 **AND** sodium chloride 0.9% flush 10 mL, 10 mL, Intravenous, PRN, Dominick Weinstein MD    sodium phosphate 15 mmol in dextrose 5 % (D5W) 250 mL IVPB, 15 mmol, Intravenous, PRN, Leon Reyes,  PA    sodium phosphate 20.01 mmol in dextrose 5 % (D5W) 250 mL IVPB, 20.01 mmol, Intravenous, PRN, GILMAR Estrada    sodium phosphate 30 mmol in dextrose 5 % (D5W) 250 mL IVPB, 30 mmol, Intravenous, PRN, GILMAR Estrada    sucralfate tablet 1 g, 1 g, Oral, QID (AC & HS), GILMAR Estrada, 1 g at 03/01/23 0629    tiZANidine tablet 4 mg, 4 mg, Oral, Q8H PRN, Trenton Harvey PA-C    zolpidem tablet 5 mg, 5 mg, Oral, Nightly PRN, Ga Q. MANAN Diaz    VTE Risk Mitigation (From admission, onward)           Ordered     Place sequential compression device  Until discontinued         02/24/23 1046     IP VTE HIGH RISK PATIENT  Once         02/24/23 1034     heparin, porcine (PF) (heparin flush 100 units/mL) 100 unit/mL injection flush         02/23/23 2215     heparin 25,000 units in dextrose 5% 250 mL (100 units/mL) infusion LOW INTENSITY nomogram - [facility]  Continuous        Question Answer Comment   Begin at (in units/kg/hr) 12    Heparin Infusion Adjustment (DO NOT MODIFY ANSWER) [email or web address]\Images\Pharmacy\HeparinInfusions\heparin LOW INTENSITY nomogram for [facility] [id number]        02/19/23 5328                  Assessment:   NSTEMI Type I  MVCAD    - s/p CABG 2V LIMA-LAD, rSVG-OM 02/24/23 with Right GSV EVH    - [facility] 02/15/23: LM 0% stenosis. mLAD proximal 70% ISR. D1 ostial 50% stenosis. D2 ostial 50% stenosis. Lcx: previous stent; proximal Lcx  90% ISR; mid Lcx 30% stenosis. OM1 proximal 80% stenosis. RCA patent stents to ostium. Mid RCA proximal subsection- 60% stenosis; Mid RCA distal subsection 70& stenosis; distal RCA proximal subsection 100% stenosis. Fills left to right    - [facility] 09/22/22: LM 0% stenosis. mLAD proximal subsection 50% ISR,. D1 ostial 50% stenosis. D2 ostial 50% stenosis. pLCx mid subsection 70% ISR reduced to 10%, Preprocedure AMALIA III flow  noted. Post procedure AMALIA III was present. Lesion previous treated on 7/22/21 with PTA/DAYAN. OM1 proximal 80% stenosis. mLCx distal  subsection 30% stenosis. RCA proximal RCA stent widely patent. mRCA proximal subsection 60% subsection. mRCA distal subsection 70% stenosis. dRCA proximal subsection 100% stenosis. 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 Mild to moderate MAC is noted. Mild MR. Trace TR.  B RICKEY    - Moderate Bilateral by US  DANIELLA/CKD IIIb  HLD  Lupus Anticoagulant Inhibitor Syndrome  CARLYLE  VHD    - Mild AS  AAA  Anemia  No Hx of GIB     Plan:   Continue ASA, Statin, BB  Start Plavix 75mg PO Qday when Ok with CVS (NSTEMI)  No ACEi/ARB/ARNI secondary to DANIELLA  Nephrology Following for DANIELLA (Lasix Infusion)  Aggressive Mobilization of PT and Q1HR IS  Labs in AM: CBC, CMP and Mg    I have seen the patient, reviewed the Nurse Practitioner's note, assessment and plan. I have personally interviewed and examined the patient at bedside and agree with the findings. Medical decision making listed above were done under my guidance.

## 2023-03-02 LAB
ALBUMIN SERPL-MCNC: 2.2 G/DL (ref 3.4–4.8)
ALBUMIN/GLOB SERPL: 0.6 RATIO (ref 1.1–2)
ALP SERPL-CCNC: 87 UNIT/L (ref 40–150)
ALT SERPL-CCNC: 12 UNIT/L (ref 0–55)
AST SERPL-CCNC: 29 UNIT/L (ref 5–34)
BASOPHILS # BLD AUTO: 0.01 X10(3)/MCL (ref 0–0.2)
BASOPHILS NFR BLD AUTO: 0.1 %
BILIRUBIN DIRECT+TOT PNL SERPL-MCNC: 0.6 MG/DL
BUN SERPL-MCNC: 77.7 MG/DL (ref 8.4–25.7)
CALCIUM SERPL-MCNC: 9.2 MG/DL (ref 8.8–10)
CHLORIDE SERPL-SCNC: 107 MMOL/L (ref 98–107)
CO2 SERPL-SCNC: 23 MMOL/L (ref 23–31)
CREAT SERPL-MCNC: 2.21 MG/DL (ref 0.73–1.18)
EOSINOPHIL # BLD AUTO: 0.01 X10(3)/MCL (ref 0–0.9)
EOSINOPHIL NFR BLD AUTO: 0.1 %
ERYTHROCYTE [DISTWIDTH] IN BLOOD BY AUTOMATED COUNT: 15.6 % (ref 11.5–17)
GFR SERPLBLD CREATININE-BSD FMLA CKD-EPI: 29 MLS/MIN/1.73/M2
GLOBULIN SER-MCNC: 3.7 GM/DL (ref 2.4–3.5)
GLUCOSE SERPL-MCNC: 98 MG/DL (ref 82–115)
HCT VFR BLD AUTO: 25.6 % (ref 42–52)
HGB BLD-MCNC: 8.3 G/DL (ref 14–18)
IMM GRANULOCYTES # BLD AUTO: 0.14 X10(3)/MCL (ref 0–0.04)
IMM GRANULOCYTES NFR BLD AUTO: 1.8 %
LYMPHOCYTES # BLD AUTO: 0.86 X10(3)/MCL (ref 0.6–4.6)
LYMPHOCYTES NFR BLD AUTO: 10.9 %
MAGNESIUM SERPL-MCNC: 1.9 MG/DL (ref 1.6–2.6)
MCH RBC QN AUTO: 29.9 PG
MCHC RBC AUTO-ENTMCNC: 32.4 G/DL (ref 33–36)
MCV RBC AUTO: 92.1 FL (ref 80–94)
MONOCYTES # BLD AUTO: 1.23 X10(3)/MCL (ref 0.1–1.3)
MONOCYTES NFR BLD AUTO: 15.5 %
NEUTROPHILS # BLD AUTO: 5.66 X10(3)/MCL (ref 2.1–9.2)
NEUTROPHILS NFR BLD AUTO: 71.6 %
NRBC BLD AUTO-RTO: 0 %
PLATELET # BLD AUTO: 159 X10(3)/MCL (ref 130–400)
PMV BLD AUTO: 11.3 FL (ref 7.4–10.4)
POTASSIUM SERPL-SCNC: 3.8 MMOL/L (ref 3.5–5.1)
PROT SERPL-MCNC: 5.9 GM/DL (ref 5.8–7.6)
RBC # BLD AUTO: 2.78 X10(6)/MCL (ref 4.7–6.1)
SODIUM SERPL-SCNC: 139 MMOL/L (ref 136–145)
WBC # SPEC AUTO: 7.9 X10(3)/MCL (ref 4.5–11.5)

## 2023-03-02 PROCEDURE — 99024 POSTOP FOLLOW-UP VISIT: CPT | Mod: POP,,, | Performed by: PHYSICIAN ASSISTANT

## 2023-03-02 PROCEDURE — 85025 COMPLETE CBC W/AUTO DIFF WBC: CPT | Performed by: NURSE PRACTITIONER

## 2023-03-02 PROCEDURE — A4216 STERILE WATER/SALINE, 10 ML: HCPCS | Performed by: INTERNAL MEDICINE

## 2023-03-02 PROCEDURE — 97535 SELF CARE MNGMENT TRAINING: CPT

## 2023-03-02 PROCEDURE — 25000003 PHARM REV CODE 250

## 2023-03-02 PROCEDURE — 99024 PR POST-OP FOLLOW-UP VISIT: ICD-10-PCS | Mod: POP,,, | Performed by: PHYSICIAN ASSISTANT

## 2023-03-02 PROCEDURE — 25000003 PHARM REV CODE 250: Performed by: PHYSICIAN ASSISTANT

## 2023-03-02 PROCEDURE — 21400001 HC TELEMETRY ROOM

## 2023-03-02 PROCEDURE — 25000003 PHARM REV CODE 250: Performed by: NURSE PRACTITIONER

## 2023-03-02 PROCEDURE — 27000221 HC OXYGEN, UP TO 24 HOURS

## 2023-03-02 PROCEDURE — 83735 ASSAY OF MAGNESIUM: CPT | Performed by: NURSE PRACTITIONER

## 2023-03-02 PROCEDURE — 63600175 PHARM REV CODE 636 W HCPCS: Performed by: STUDENT IN AN ORGANIZED HEALTH CARE EDUCATION/TRAINING PROGRAM

## 2023-03-02 PROCEDURE — 97116 GAIT TRAINING THERAPY: CPT | Mod: CQ

## 2023-03-02 PROCEDURE — 80053 COMPREHEN METABOLIC PANEL: CPT | Performed by: NURSE PRACTITIONER

## 2023-03-02 PROCEDURE — 25000003 PHARM REV CODE 250: Performed by: INTERNAL MEDICINE

## 2023-03-02 PROCEDURE — 97530 THERAPEUTIC ACTIVITIES: CPT | Mod: CQ

## 2023-03-02 PROCEDURE — 94761 N-INVAS EAR/PLS OXIMETRY MLT: CPT

## 2023-03-02 RX ORDER — METOPROLOL TARTRATE 25 MG/1
25 TABLET, FILM COATED ORAL ONCE
Status: COMPLETED | OUTPATIENT
Start: 2023-03-02 | End: 2023-03-02

## 2023-03-02 RX ORDER — METOPROLOL TARTRATE 50 MG/1
50 TABLET ORAL 2 TIMES DAILY
Status: DISCONTINUED | OUTPATIENT
Start: 2023-03-02 | End: 2023-03-03 | Stop reason: HOSPADM

## 2023-03-02 RX ADMIN — GABAPENTIN 100 MG: 100 CAPSULE ORAL at 03:03

## 2023-03-02 RX ADMIN — EZETIMIBE 10 MG: 10 TABLET ORAL at 08:03

## 2023-03-02 RX ADMIN — METOPROLOL TARTRATE 25 MG: 25 TABLET, FILM COATED ORAL at 08:03

## 2023-03-02 RX ADMIN — FUROSEMIDE 40 MG: 10 INJECTION, SOLUTION INTRAMUSCULAR; INTRAVENOUS at 08:03

## 2023-03-02 RX ADMIN — Medication 81 MG: at 08:03

## 2023-03-02 RX ADMIN — SUCRALFATE 1 G: 1 TABLET ORAL at 08:03

## 2023-03-02 RX ADMIN — QUETIAPINE FUMARATE 50 MG: 25 TABLET ORAL at 08:03

## 2023-03-02 RX ADMIN — GABAPENTIN 100 MG: 100 CAPSULE ORAL at 08:03

## 2023-03-02 RX ADMIN — SUCRALFATE 1 G: 1 TABLET ORAL at 12:03

## 2023-03-02 RX ADMIN — DOCUSATE SODIUM 100 MG: 100 CAPSULE, LIQUID FILLED ORAL at 08:03

## 2023-03-02 RX ADMIN — ROPINIROLE HYDROCHLORIDE 1 MG: 0.25 TABLET, FILM COATED ORAL at 08:03

## 2023-03-02 RX ADMIN — SUCRALFATE 1 G: 1 TABLET ORAL at 03:03

## 2023-03-02 RX ADMIN — HYDROCODONE BITARTRATE AND ACETAMINOPHEN 1 TABLET: 7.5; 325 TABLET ORAL at 08:03

## 2023-03-02 RX ADMIN — SODIUM CHLORIDE, PRESERVATIVE FREE 10 ML: 5 INJECTION INTRAVENOUS at 06:03

## 2023-03-02 RX ADMIN — CALCIUM CARBONATE (ANTACID) CHEW TAB 500 MG 500 MG: 500 CHEW TAB at 08:03

## 2023-03-02 RX ADMIN — METOPROLOL TARTRATE 50 MG: 50 TABLET, FILM COATED ORAL at 08:03

## 2023-03-02 RX ADMIN — PANTOPRAZOLE SODIUM 40 MG: 40 TABLET, DELAYED RELEASE ORAL at 08:03

## 2023-03-02 RX ADMIN — FOLIC ACID 1 MG: 1 TABLET ORAL at 08:03

## 2023-03-02 RX ADMIN — ATORVASTATIN CALCIUM 40 MG: 40 TABLET, FILM COATED ORAL at 08:03

## 2023-03-02 RX ADMIN — METOPROLOL TARTRATE 25 MG: 25 TABLET, FILM COATED ORAL at 12:03

## 2023-03-02 RX ADMIN — PREDNISOLONE SODIUM PHOSPHATE 2.5 MG: 15 SOLUTION ORAL at 08:03

## 2023-03-02 RX ADMIN — FLUOXETINE 10 MG: 10 CAPSULE ORAL at 08:03

## 2023-03-02 RX ADMIN — FUROSEMIDE 40 MG: 10 INJECTION, SOLUTION INTRAMUSCULAR; INTRAVENOUS at 03:03

## 2023-03-02 RX ADMIN — SODIUM CHLORIDE, PRESERVATIVE FREE 10 ML: 5 INJECTION INTRAVENOUS at 12:03

## 2023-03-02 RX ADMIN — SUCRALFATE 1 G: 1 TABLET ORAL at 06:03

## 2023-03-02 NOTE — PT/OT/SLP PROGRESS
Physical Therapy Treatment    Patient Name:  Hunter Navarrete   MRN:  [id number]    Recommendations:     Discharge Recommendations: rehabilitation facility  Discharge Equipment Recommendations: none  Barriers to discharge:  Placement    Assessment:     Hunter Navarrete is a 82 y.o. male admitted with a medical diagnosis of S/p CABG. He presents with the following impairments/functional limitations: weakness, impaired endurance, impaired self care skills, impaired functional mobility, gait instability, impaired balance, decreased coordination .    Rehab Prognosis: Good; patient would benefit from acute skilled PT services to address these deficits and reach maximum level of function.    Recent Surgery: Procedure(s) (LRB):  CORONARY ARTERY BYPASS GRAFT (CABG) (N/A) 6 Days Post-Op    Plan:     During this hospitalization, patient to be seen 6 x/week to address the identified rehab impairments via gait training, therapeutic activities, therapeutic exercises and progress toward the following goals:    Plan of Care Expires:  03/22/23    Subjective     Chief Complaint: Fatigue  Patient/Family Comments/goals: get better  Pain/Comfort:         Objective:     Communicated with RN prior to session.  Patient found up in chair with oxygen, telemetry upon PT entry to room.     General Precautions: Standard, fall, sternal  Orthopedic Precautions: N/A  Braces: N/A  Respiratory Status:  OXYMIZER 5L  Blood Pressure: 125/55  BP post activity: 160/73  Skin Integrity:  redness on bottom      Functional Mobility:  Transfers:     Sit to Stand:  moderate assistance with rolling walker  Gait: Pt amb 40ft with RW Vijaya. Cuing for errect posture and to increase step length. Pt easily fatigued    Therapeutic Activities/Exercises:  Performed sit<>stands with RW modA. Vcs to maintain sternal pxns. Educated pt on therex to perform daily while UIC.    Education:  Patient provided with verbal education regarding POC.  Understanding was verbalized,  however additional teaching warranted.     Patient left up in chair with all lines intact, call button in reach, and wife present..    GOALS:   Multidisciplinary Problems       Physical Therapy Goals          Problem: Physical Therapy    Goal Priority Disciplines Outcome Goal Variances Interventions   Physical Therapy Goal     PT, PT/OT Ongoing, Progressing     Description: Goals to be met by: 3/0323     Patient will increase functional independence with mobility by performin. Pt will be ada with sit to supine and supine to sit  2. Pt will be sba with sit to stand  3. Pt will ambulate 120ft with a rw sba                           Time Tracking:     PT Received On: 23  PT Start Time: 1005     PT Stop Time: 1029  PT Total Time (min): 24 min     Billable Minutes: Gait Training 15 and Therapeutic Activity 9    Treatment Type: Treatment  PT/PTA: PTA     PTA Visit Number: 2023

## 2023-03-02 NOTE — PROGRESS NOTES
"[facility] - 3rd Floor Medical Telemetry  Cardiology  Progress Note    Patient Name: Hunter Navarrete  MRN: [id number]  Admission Date: 2/19/2023  Hospital Length of Stay: 11 days  Code Status: Full Code   Attending Physician: Spencer Hagan MD   Primary Care Physician: Mariella Bethea MD  Expected Discharge Date:   Principal Problem:<principal problem not specified>    Subjective:   Chief Complaint: Chest Pain (Known MVCAD)     HPI: Mr. Navarrete is an 83 y/o male, followed by Dr. Loredo who presented to [facility] with c/o chest pain. HS troponin 1149; therefore he was transferred to [facility] for cardiology services where he underwent LHC revealing multivessel disease and elevated LVEDP. He was started on diuretics, Plavix was placed on hold and he was transferred to [facility] for CABG evaluation. Patient is reporting mild chest tightness 3/10 currently    Hospital Course:   2.21.23:  Vitals Stable. Shoulder Pain this morning. On Nitro & Heparin Infusion. EKG with no overt ischemic changes.   2.22.23: Patient resting in bed. NAD. Denies CP/SOB. Creatinine 1.93 today- mildly decreased from 2.07 yesterday with addition of IVFs. H/H downtrending- 8.2/25.3 from 8.8/27.1 yesterday.   2.23.23: Patient sitting up in bedside chair. NAD. VSS. Creatinine improved to 1.65 today. H/H    10.5/31.5 post transfusion 2  units PRBCs.  2.24.23: Patient underwent 2V CABG today and is currently in ICU on MV, requiring pressor support. Mediastinal drain patent. He received 2 units of PRBCs intraop  2.25.23: Awake in bed. C/o incisional sternal pain. CT x 2 patent. Pressors have been weaned off. Currently on cleviprex drip  2.26.23: Patient awake in bed. Mediastinal drains have been removed. Creatinine has bumped. Nephrology adding IVF x 1L due to IVVD  2.27.23: NAD. Sitting in Bedside Chair. Denies SOB and Palps. + CP/Incisional. Crea 2.53 "I am ok."   2.28.23: NAD. Sitting in Bedside Chair. Denies SOB and Palps. + " "CP/Incisional. Crea 2.26. "I am ok." + Sinus Tachycardia.   3.1.23: NAD. Sitting in Bedside Chair. Denies SOB and Palps. + CP/Incisional. Crea Improved. "I am feeling better." SR Mostly/Tachycardia Improved.   3.2.23: NAD. Sitting up in chair. Denies SOB or palps. + Incisional CP. ST on tele. "I am doing okay."     PMH: ICMO, Native CAD, HLD, HTN, CARLYLE, CKD, lupus anticoagulant inhibitor syndrome, VHD- mild AS, AAA, pseudoaneurysm RFA  PSH: right total hip replacement, LHC, back surgery, spinal cord stimulator implant, knee surgery, cataract surgery  Family History: Brother- cancer, heart disease, lung cancer; father heart disease  Social History: Former smoker, occasional ETOH; denies illicit drug use     Previous Cardiac Diagnostics:   CUS 02/20/23:  The right internal carotid artery demonstrated 50-69% stenosis.  The left internal carotid artery demonstrated 50-69% stenosis.  Bilateral vertebral arteries were patent with antegrade flow.     [facility] 02/15/2023:  LM 0% stenosis  mLAD proximal 70% ISR  D1 ostial 50% stenosis  D2 ostial 50% stenosis  Lcx: previous stent; proximal Lcx  90% ISR; mid Lcx 30% stenosis  OM1 proximal 80% stenosis  RCA patent stents to ostium. Mid RCA proximal subsection- 60% stenosis; Mid RCA distal subsection 70& stenosis; distal RCA proximal subsection 100% stenosis. Fills left to right     TTE 02/16/2023:  Global LV SF is borderline normal. LVEF 45-50%. LA is mildly enlarged. Moderate calcification of the aortic valve is noted. Mild AS is present. Mild to moderate AR. Mild to moderate MAC is noted. Mild MR. Trace TR. PASP 40 mmHg. Optison used to enhance endocardial border.      [facility] 09/22/2022:  LM 0% stenosis  mLAD proximal subsection 50% ISR  D1 ostial 50% stenosis  D2 ostial 50% stenosis  pLCx mid subsection 70% ISR reduced to 10%, Preprocedure AMALIA III flow  noted. Post procedure AMALIA III was present. Lesion previous treated on 7/22/21 with PTA/DAYAN  OM1 proximal 80% stenosis  mLCx distal " subsection 30% stenosis  RCA proximal RCA stent widely patent  mRCA proximal subsection 60% subsection  mRCA distal subsection 70% stenosis  dRCA proximal subsection 100% stenosis. Fills left to right     CUS 05/13/2022:  60-79% stenosis in pRICA and mLICA  Antegrade flow to bilateral vertebral arteries    Review of Systems   Constitutional: Positive for malaise/fatigue.   Cardiovascular:  Negative for chest pain, dyspnea on exertion, irregular heartbeat and palpitations.   Respiratory:  Negative for shortness of breath.    All other systems reviewed and are negative.    Objective:     Vital Signs (Most Recent):  Temp: 97.7 °F (36.5 °C) (03/02/23 0800)  Pulse: 90 (03/02/23 0400)  Resp: 18 (03/02/23 0812)  BP: 123/69 (03/02/23 0812)  SpO2: 98 % (03/02/23 0400)   Vital Signs (24h Range):  Temp:  [97.5 °F (36.4 °C)-98.1 °F (36.7 °C)] 97.7 °F (36.5 °C)  Pulse:  [] 90  Resp:  [18-26] 18  SpO2:  [85 %-99 %] 98 %  BP: ()/(50-76) 123/69     Weight: 91.1 kg (200 lb 13.4 oz)  Body mass index is 28.82 kg/m².    SpO2: 98 %         Intake/Output Summary (Last 24 hours) at 3/2/2023 1137  Last data filed at 3/2/2023 1000  Gross per 24 hour   Intake --   Output 3955 ml   Net -3955 ml       Lines/Drains/Airways       Peripherally Inserted Central Catheter Line  Duration             PICC Triple Lumen 02/27/23 1045 left basilic 3 days              Drain  Duration                  Urethral Catheter 02/27/23 1150 2 days                  Significant Labs:   Recent Results (from the past 72 hour(s))   Echo    Collection Time: 02/27/23 12:42 PM   Result Value Ref Range    BSA 2.06 m2    EF 50 %   POCT glucose    Collection Time: 02/28/23  1:35 AM   Result Value Ref Range    POCT Glucose 106 70 - 110 mg/dL   Comprehensive Metabolic Panel    Collection Time: 02/28/23  1:36 AM   Result Value Ref Range    Sodium Level 133 (L) 136 - 145 mmol/L    Potassium Level 4.4 3.5 - 5.1 mmol/L    Chloride 105 98 - 107 mmol/L    Carbon Dioxide  20 (L) 23 - 31 mmol/L    Glucose Level 103 82 - 115 mg/dL    Blood Urea Nitrogen 65.7 (H) 8.4 - 25.7 mg/dL    Creatinine 2.26 (H) 0.73 - 1.18 mg/dL    Calcium Level Total 9.1 8.8 - 10.0 mg/dL    Protein Total 6.1 5.8 - 7.6 gm/dL    Albumin Level 2.3 (L) 3.4 - 4.8 g/dL    Globulin 3.8 (H) 2.4 - 3.5 gm/dL    Albumin/Globulin Ratio 0.6 (L) 1.1 - 2.0 ratio    Bilirubin Total 0.6 <=1.5 mg/dL    Alkaline Phosphatase 69 40 - 150 unit/L    Alanine Aminotransferase 7 0 - 55 unit/L    Aspartate Aminotransferase 35 (H) 5 - 34 unit/L    eGFR 28 mls/min/1.73/m2   Magnesium    Collection Time: 02/28/23  1:36 AM   Result Value Ref Range    Magnesium Level 2.10 1.60 - 2.60 mg/dL   CBC with Differential    Collection Time: 02/28/23  1:36 AM   Result Value Ref Range    WBC 7.7 4.5 - 11.5 x10(3)/mcL    RBC 2.97 (L) 4.70 - 6.10 x10(6)/mcL    Hgb 8.6 (L) 14.0 - 18.0 g/dL    Hct 27.4 (L) 42.0 - 52.0 %    MCV 92.3 80.0 - 94.0 fL    MCH 29.0 pg    MCHC 31.4 (L) 33.0 - 36.0 g/dL    RDW 15.6 11.5 - 17.0 %    Platelet 100 (L) 130 - 400 x10(3)/mcL    MPV 11.4 (H) 7.4 - 10.4 fL    Neut % 70.9 %    Lymph % 8.1 %    Mono % 17.9 %    Eos % 0.0 %    Basophil % 0.1 %    Lymph # 0.62 0.6 - 4.6 x10(3)/mcL    Neut # 5.42 2.1 - 9.2 x10(3)/mcL    Mono # 1.37 (H) 0.1 - 1.3 x10(3)/mcL    Eos # 0.00 0 - 0.9 x10(3)/mcL    Baso # 0.01 0 - 0.2 x10(3)/mcL    IG# 0.23 (H) 0 - 0.04 x10(3)/mcL    IG% 3.0 %    NRBC% 0.0 %   Comprehensive Metabolic Panel    Collection Time: 03/01/23  1:58 AM   Result Value Ref Range    Sodium Level 136 136 - 145 mmol/L    Potassium Level 4.1 3.5 - 5.1 mmol/L    Chloride 107 98 - 107 mmol/L    Carbon Dioxide 21 (L) 23 - 31 mmol/L    Glucose Level 111 82 - 115 mg/dL    Blood Urea Nitrogen 76.4 (H) 8.4 - 25.7 mg/dL    Creatinine 2.08 (H) 0.73 - 1.18 mg/dL    Calcium Level Total 9.2 8.8 - 10.0 mg/dL    Protein Total 6.0 5.8 - 7.6 gm/dL    Albumin Level 2.2 (L) 3.4 - 4.8 g/dL    Globulin 3.8 (H) 2.4 - 3.5 gm/dL    Albumin/Globulin Ratio  0.6 (L) 1.1 - 2.0 ratio    Bilirubin Total 0.5 <=1.5 mg/dL    Alkaline Phosphatase 76 40 - 150 unit/L    Alanine Aminotransferase 8 0 - 55 unit/L    Aspartate Aminotransferase 28 5 - 34 unit/L    eGFR 31 mls/min/1.73/m2   Magnesium    Collection Time: 03/01/23  1:58 AM   Result Value Ref Range    Magnesium Level 2.10 1.60 - 2.60 mg/dL   CBC with Differential    Collection Time: 03/01/23  1:58 AM   Result Value Ref Range    WBC 5.9 4.5 - 11.5 x10(3)/mcL    RBC 2.85 (L) 4.70 - 6.10 x10(6)/mcL    Hgb 8.2 (L) 14.0 - 18.0 g/dL    Hct 26.8 (L) 42.0 - 52.0 %    MCV 94.0 80.0 - 94.0 fL    MCH 28.8 pg    MCHC 30.6 (L) 33.0 - 36.0 g/dL    RDW 15.9 11.5 - 17.0 %    Platelet 123 (L) 130 - 400 x10(3)/mcL    MPV 11.3 (H) 7.4 - 10.4 fL    Neut % 67.4 %    Lymph % 11.8 %    Mono % 19.0 %    Eos % 0.2 %    Basophil % 0.2 %    Lymph # 0.69 0.6 - 4.6 x10(3)/mcL    Neut # 3.95 2.1 - 9.2 x10(3)/mcL    Mono # 1.11 0.1 - 1.3 x10(3)/mcL    Eos # 0.01 0 - 0.9 x10(3)/mcL    Baso # 0.01 0 - 0.2 x10(3)/mcL    IG# 0.08 (H) 0 - 0.04 x10(3)/mcL    IG% 1.4 %    NRBC% 0.0 %   Type & Screen    Collection Time: 03/01/23 12:41 PM   Result Value Ref Range    Group & Rh A POS     Indirect Jose R GEL NEG    Prepare RBC    Collection Time: 03/01/23 12:41 PM   Result Value Ref Range    UNIT NUMBER [id number]     UNIT ABO/RH A POS     DISPENSE STATUS Selected     Unit Expiration [id number]     Product Code B5300E05     Unit Blood Type Code 6200     CROSSMATCH INTERPRETATION Compatible     UNIT NUMBER [id number]     UNIT ABO/RH A POS     DISPENSE STATUS Selected     Unit Expiration [id number]     Product Code X9800R00     Unit Blood Type Code 6200     CROSSMATCH INTERPRETATION Compatible    Comprehensive Metabolic Panel    Collection Time: 03/02/23  2:08 AM   Result Value Ref Range    Sodium Level 139 136 - 145 mmol/L    Potassium Level 3.8 3.5 - 5.1 mmol/L    Chloride 107 98 - 107 mmol/L    Carbon Dioxide 23 23 - 31 mmol/L    Glucose Level 98 82 -  115 mg/dL    Blood Urea Nitrogen 77.7 (H) 8.4 - 25.7 mg/dL    Creatinine 2.21 (H) 0.73 - 1.18 mg/dL    Calcium Level Total 9.2 8.8 - 10.0 mg/dL    Protein Total 5.9 5.8 - 7.6 gm/dL    Albumin Level 2.2 (L) 3.4 - 4.8 g/dL    Globulin 3.7 (H) 2.4 - 3.5 gm/dL    Albumin/Globulin Ratio 0.6 (L) 1.1 - 2.0 ratio    Bilirubin Total 0.6 <=1.5 mg/dL    Alkaline Phosphatase 87 40 - 150 unit/L    Alanine Aminotransferase 12 0 - 55 unit/L    Aspartate Aminotransferase 29 5 - 34 unit/L    eGFR 29 mls/min/1.73/m2   Magnesium    Collection Time: 03/02/23  2:08 AM   Result Value Ref Range    Magnesium Level 1.90 1.60 - 2.60 mg/dL   CBC with Differential    Collection Time: 03/02/23  2:09 AM   Result Value Ref Range    WBC 7.9 4.5 - 11.5 x10(3)/mcL    RBC 2.78 (L) 4.70 - 6.10 x10(6)/mcL    Hgb 8.3 (L) 14.0 - 18.0 g/dL    Hct 25.6 (L) 42.0 - 52.0 %    MCV 92.1 80.0 - 94.0 fL    MCH 29.9 pg    MCHC 32.4 (L) 33.0 - 36.0 g/dL    RDW 15.6 11.5 - 17.0 %    Platelet 159 130 - 400 x10(3)/mcL    MPV 11.3 (H) 7.4 - 10.4 fL    Neut % 71.6 %    Lymph % 10.9 %    Mono % 15.5 %    Eos % 0.1 %    Basophil % 0.1 %    Lymph # 0.86 0.6 - 4.6 x10(3)/mcL    Neut # 5.66 2.1 - 9.2 x10(3)/mcL    Mono # 1.23 0.1 - 1.3 x10(3)/mcL    Eos # 0.01 0 - 0.9 x10(3)/mcL    Baso # 0.01 0 - 0.2 x10(3)/mcL    IG# 0.14 (H) 0 - 0.04 x10(3)/mcL    IG% 1.8 %    NRBC% 0.0 %     Telemetry: SR    Physical Exam  Vitals reviewed.   Constitutional:       Appearance: Normal appearance. He is obese.   HENT:      Head: Normocephalic.      Mouth/Throat:      Mouth: Mucous membranes are moist.   Eyes:      Extraocular Movements: Extraocular movements intact.   Cardiovascular:      Rate and Rhythm: Regular rhythm. Tachycardia present.      Pulses: Normal pulses.      Heart sounds: Normal heart sounds.   Pulmonary:      Effort: Pulmonary effort is normal. No respiratory distress.      Breath sounds: Normal breath sounds.   Abdominal:      Palpations: Abdomen is soft.   Skin:      General: Skin is warm and dry.      Comments: Midline Sternotomy Dressing C/D/I   Neurological:      General: No focal deficit present.      Mental Status: He is alert and oriented to person, place, and time.   Psychiatric:         Mood and Affect: Mood normal.         Behavior: Behavior normal.     Current Inpatient Medications:    Current Facility-Administered Medications:     0.9%  NaCl infusion (for blood administration), , Intravenous, Q24H PRN, SHIRLEY Torres    0.9%  NaCl infusion (for blood administration), , Intravenous, Q24H PRN, Trenton Harvey PA-C    0.9%  NaCl infusion (for blood administration), , Intravenous, Q24H PRN, Spencer Hagan MD    acetaminophen oral solution 650 mg, 650 mg, Per OG tube, Q6H PRN, GILMAR Estrada    acetaminophen tablet 650 mg, 650 mg, Oral, Q6H PRN, Lul Isaac MD, 650 mg at 02/26/23 1614    albumin human 5% bottle 12.5 g, 12.5 g, Intravenous, PRN, GILMAR Estrada    albuterol-ipratropium 2.5 mg-0.5 mg/3 mL nebulizer solution 3 mL, 3 mL, Nebulization, Q4H PRN, Nima Baez DO, 3 mL at 03/01/23 1347    aspirin EC tablet 81 mg, 81 mg, Oral, Daily, GILMAR Estrada, 81 mg at 03/02/23 0812    atorvastatin tablet 40 mg, 40 mg, Oral, Daily, Trenton Harvey PA-C, 40 mg at 03/02/23 0814    calcium carbonate 200 mg calcium (500 mg) chewable tablet 500 mg, 500 mg, Oral, Daily, Trenton Harvey PA-C, 500 mg at 03/02/23 0812    calcium gluconate 1 g in NS IVPB (premixed), 1 g, Intravenous, PRN, GILMAR Estrada    calcium gluconate 1 g in NS IVPB (premixed), 2 g, Intravenous, PRN, GILMAR Estrada    calcium gluconate 1 g in NS IVPB (premixed), 3 g, Intravenous, PRN, GILMAR Estrada    dextrose 10% bolus 125 mL 125 mL, 12.5 g, Intravenous, PRN, Leon Reyes, PA    dextrose 10% bolus 250 mL 250 mL, 25 g, Intravenous, PRN, Leon Reyes, PA    DOBUtamine 1000 mg in D5W 250 mL infusion, 2.5 mcg/kg/min, Intravenous, Continuous,  Darinel Bedoya MD, Stopped at 02/27/23 1420    docusate sodium capsule 100 mg, 100 mg, Oral, BID, GILMAR Estrada, 100 mg at 03/02/23 0812    ezetimibe tablet 10 mg, 10 mg, Oral, Daily, Trenton Harvey PA-C, 10 mg at 03/02/23 0812    FLUoxetine capsule 10 mg, 10 mg, Oral, Daily, Trenton Harvey PA-C, 10 mg at 03/02/23 0814    folic acid tablet 1 mg, 1 mg, Oral, Daily, Trenton Harvey PA-C, 1 mg at 03/02/23 0814    furosemide injection 40 mg, 40 mg, Intravenous, TID, Denilson Medina DO, 40 mg at 03/02/23 0815    gabapentin capsule 100 mg, 100 mg, Oral, TID, Trenton Harvey PA-C, 100 mg at 03/02/23 0814    hydrALAZINE injection 20 mg, 20 mg, Intravenous, Q4H PRN, Ga TIDWELL. Emily, MANAN    HYDROcodone-acetaminophen 5-325 mg per tablet 1 tablet, 1 tablet, Oral, Q4H PRN, GILMAR Estrada, 1 tablet at 02/24/23 1708    HYDROcodone-acetaminophen 7.5-325 mg per tablet 1 tablet, 1 tablet, Oral, Q6H PRN, Ga Q. Emily, NP, 1 tablet at 03/02/23 0812    hydrOXYzine HCL tablet 25 mg, 25 mg, Oral, Q6H PRN, Trenton Harvey PA-C    labetaloL injection 20 mg, 20 mg, Intravenous, Q4H PRN, Ga Q. Emily, NP    magnesium sulfate 2g in water 50mL IVPB (premix), 2 g, Intravenous, PRN, GILMAR Estrada    magnesium sulfate 2g in water 50mL IVPB (premix), 4 g, Intravenous, PRN, GILMAR Estrada    meclizine tablet 25 mg, 25 mg, Oral, TID PRN, Trenton Harvey PA-C    metoprolol tartrate (LOPRESSOR) tablet 25 mg, 25 mg, Oral, Once, SHIRLEY Moore    metoprolol tartrate (LOPRESSOR) tablet 50 mg, 50 mg, Oral, BID, Yasmeen Valverde, SHIRLEY    morphine injection 2 mg, 2 mg, Intravenous, Q2H PRN, Ga Diaz NP, 2 mg at 03/01/23 0033    ondansetron injection 4 mg, 4 mg, Intravenous, Q4H PRN, GILMAR Estrada, 4 mg at 02/25/23 1326    oxyCODONE immediate release tablet 10 mg, 10 mg, Oral, Q4H PRN, GILMAR Estrada, 10 mg at 02/28/23 1233    pantoprazole EC tablet 40 mg, 40 mg, Oral, Daily, Trenton BUTT  STEPHANIE Harvey, 40 mg at 03/02/23 0812    potassium chloride 20 mEq in 100 mL IVPB (FOR CENTRAL LINE ADMINISTRATION ONLY), 20 mEq, Intravenous, PRN, Leon P Cathis, PA    potassium chloride 20 mEq in 100 mL IVPB (FOR CENTRAL LINE ADMINISTRATION ONLY), 40 mEq, Intravenous, PRN, Leon P Cathis, PA    potassium chloride 20 mEq in 100 mL IVPB (FOR CENTRAL LINE ADMINISTRATION ONLY), 20 mEq, Intravenous, PRN, Leon Reyes PA    prednisoLONE 15 mg/5 mL (3 mg/mL) solution 2.5 mg, 2.5 mg, Oral, Daily, Tom Mayfield MD, 2.5 mg at 03/02/23 0822    QUEtiapine tablet 50 mg, 50 mg, Oral, QHS, Trenton Harvey PA-C, 50 mg at 03/01/23 2018    rOPINIRole tablet 1 mg, 1 mg, Oral, BID, Trenton Harvey PA-C, 1 mg at 03/02/23 0812    [facility], , , Until Discontinued **AND** sodium chloride 0.9% flush 10 mL, 10 mL, Intravenous, Q6H, 10 mL at 03/02/23 0600 **AND** sodium chloride 0.9% flush 10 mL, 10 mL, Intravenous, PRN, Dominick Weinstein MD    sodium phosphate 15 mmol in dextrose 5 % (D5W) 250 mL IVPB, 15 mmol, Intravenous, PRN, Leon Winklers, PA    sodium phosphate 20.01 mmol in dextrose 5 % (D5W) 250 mL IVPB, 20.01 mmol, Intravenous, PRN, Leon P Binaais, PA    sodium phosphate 30 mmol in dextrose 5 % (D5W) 250 mL IVPB, 30 mmol, Intravenous, PRN, Leon P Cathis, PA    sucralfate tablet 1 g, 1 g, Oral, QID (AC & HS), Leon Reyes, PA, 1 g at 03/02/23 0657    tiZANidine tablet 4 mg, 4 mg, Oral, Q8H PRN, Trenton Harvey PA-C    zolpidem tablet 5 mg, 5 mg, Oral, Nightly PRN, Ga Q. Emily, NP    VTE Risk Mitigation (From admission, onward)           Ordered     Place sequential compression device  Until discontinued         02/24/23 1046     IP VTE HIGH RISK PATIENT  Once         02/24/23 1034     heparin, porcine (PF) (heparin flush 100 units/mL) 100 unit/mL injection flush         02/23/23 2215     heparin 25,000 units in dextrose 5% 250 mL (100 units/mL) infusion LOW INTENSITY nomogram - [facility]   Continuous        Question Answer Comment   Begin at (in units/kg/hr) 12    Heparin Infusion Adjustment (DO NOT MODIFY ANSWER) [email or web address]\HeparinInfusions\heparin LOW INTENSITY nomogram for [facility] [id number]        02/19/23 0846                  Assessment:   NSTEMI Type I  MVCAD    - s/p CABG 2V LIMA-LAD, rSVG-OM 02/24/23 with Right GSV EVH    - [facility] 02/15/23: LM 0% stenosis. mLAD proximal 70% ISR. D1 ostial 50% stenosis. D2 ostial 50% stenosis. Lcx: previous stent; proximal Lcx  90% ISR; mid Lcx 30% stenosis. OM1 proximal 80% stenosis. RCA patent stents to ostium. Mid RCA proximal subsection- 60% stenosis; Mid RCA distal subsection 70& stenosis; distal RCA proximal subsection 100% stenosis. Fills left to right    - [facility] 09/22/22: LM 0% stenosis. mLAD proximal subsection 50% ISR,. D1 ostial 50% stenosis. D2 ostial 50% stenosis. pLCx mid subsection 70% ISR reduced to 10%, Preprocedure AMALIA III flow  noted. Post procedure AMALIA III was present. Lesion previous treated on 7/22/21 with PTA/DAYAN. OM1 proximal 80% stenosis. mLCx distal subsection 30% stenosis. RCA proximal RCA stent widely patent. mRCA proximal subsection 60% subsection. mRCA distal subsection 70% stenosis. dRCA proximal subsection 100% stenosis. 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 Mild to moderate MAC is noted. Mild MR. Trace TR.  B RICKEY    - Moderate Bilateral by US  DANIELLA/CKD IIIb  HLD  Lupus Anticoagulant Inhibitor Syndrome  CARLYLE  VHD    - Mild AS  AAA  Anemia  No Hx of GIB     Plan:   Continue ASA & statin.  Increase to metoprolol tartrate 50 mg BID. Give addition 25 mg x 1 now.   Start Plavix 75mg PO Qday when Ok with CVS (NSTEMI)  No ACEi/ARB/ARNI secondary to DANIELLA  Nephrology Following for DANIELLA (managing diuresis)  Aggressive Mobilization of PT and Q1HR  IS  Labs in AM: CBC, CMP and Mg      I have seen the patient, reviewed the Nurse Practitioner's note, assessment and plan. I have personally interviewed and examined the patient at bedside and agree with the findings. Medical decision making listed above were done under my guidance.

## 2023-03-02 NOTE — PROGRESS NOTES
Hunter Navarrete is a 82 y.o. male patient.   1. Multiple vessel coronary artery disease    2. Carotid stenosis    3. Heart abnormality    4. CAD (coronary artery disease)    5. Atherosclerosis of native coronary artery of native heart without angina pectoris    6. Irregular cardiac rhythm    7. Heart failure as complication of care      Past Medical History:   Diagnosis Date    Acid reflux     Anemia     Aortic aneurysm, abdominal     Arthritis     Bronchitis     Cataract     Celiac artery stenosis     50% by CTA abdomen 7/27/2020    CKD (chronic kidney disease)     45% FUNCTION    Coronary artery disease     Encounter for blood transfusion     Enlarged prostate     GERD (gastroesophageal reflux disease)     Hemorrhoids     Hypercholesteremia     Hypertension     Iron deficiency anemia, unspecified     Leaky heart valve     Lupus anticoagulant disorder     Renal artery stenosis     by CTA 7/27/2020    Skin cancer     Unspecified chronic bronchitis     UTI (urinary tract infection)      No past surgical history pertinent negatives on file.  Scheduled Meds:   aspirin  81 mg Oral Daily    atorvastatin  40 mg Oral Daily    calcium carbonate  500 mg Oral Daily    docusate sodium  100 mg Oral BID    ezetimibe  10 mg Oral Daily    FLUoxetine  10 mg Oral Daily    folic acid  1 mg Oral Daily    furosemide (LASIX) injection  40 mg Intravenous TID    gabapentin  100 mg Oral TID    metoprolol tartrate  25 mg Oral TID    pantoprazole  40 mg Oral Daily    prednisoLONE  2.5 mg Oral Daily    QUEtiapine  50 mg Oral QHS    rOPINIRole  1 mg Oral BID    sodium chloride 0.9%  10 mL Intravenous Q6H    sucralfate  1 g Oral QID (AC & HS)     Continuous Infusions:   DOBUTamine IV infusion (non-titrating) Stopped (02/27/23 1420)     PRN Meds:sodium chloride, sodium chloride, sodium chloride, acetaminophen, acetaminophen, albumin human 5%, albuterol-ipratropium, calcium gluconate IVPB, calcium gluconate IVPB, calcium gluconate IVPB, dextrose  "10%, dextrose 10%, hydrALAZINE, HYDROcodone-acetaminophen, HYDROcodone-acetaminophen, hydrOXYzine HCL, labetalol, magnesium sulfate IVPB, magnesium sulfate IVPB, meclizine, morphine, ondansetron, oxyCODONE, potassium chloride in water, potassium chloride in water, potassium chloride in water, Flushing PICC Protocol **AND** sodium chloride 0.9% **AND** sodium chloride 0.9%, sodium phosphate IVPB, sodium phosphate IVPB, sodium phosphate IVPB, tiZANidine, zolpidem    Review of patient's allergies indicates:   Allergen Reactions    Cardura [doxazosin] Hives    Lyrica [pregabalin] Other (See Comments)    Paxil [paroxetine hcl] Other (See Comments)    Restoril [temazepam] Hallucinations    Vioxx [rofecoxib] Swelling    Zithromax [azithromycin] Hives     There are no hospital problems to display for this patient.    Blood pressure 123/69, pulse 90, temperature 97.7 °F (36.5 °C), temperature source Oral, resp. rate 18, height 5' 10" (1.778 m), weight 91.1 kg (200 lb 13.4 oz), SpO2 98 %.    Subjective:    POD #6  Awake. Alert.   Sitting up in chair  Diuresing per renal    Objective:   AFVSS  Heart: Regular rate, tachycardic  Lungs: nonlabored, diminished at bases  Incision: c/d/i  H/h: 8/25  Cr: 2.2      Assesment/Plan:    S/p CAB  DANIELLA on CKD per renal  Anemia of chronic disease  H/o Lupus  HTN    Plans per renal  Ambulate  IS          GILMAR Thrasher  3/2/2023    "

## 2023-03-02 NOTE — PT/OT/SLP PROGRESS
Occupational Therapy   Treatment    Name: Hunter Navarrete  MRN: [id number]  Admitting Diagnosis:  CABG x2 6 Days Post-Op    Recommendations:     Discharge Recommendations: rehabilitation facility (vs SNF)  Discharge Equipment Recommendations:  walker, rolling  Barriers to discharge:   (ongoing medical needs)    Assessment:     Hunter Navarrete is a 82 y.o. male with a medical diagnosis of CKD s/p CABG x2.  He presents with progression to functional mobility using RW. Performance deficits affecting function are weakness, impaired endurance, impaired self care skills, impaired functional mobility, impaired balance, orthopedic precautions. Recommend rehab vs SNF upon d/c.    Rehab Prognosis:  Good; patient would benefit from acute skilled OT services to address these deficits and reach maximum level of function.       Plan:     Patient to be seen 5 x/week, 6 x/week to address the above listed problems via self-care/home management, therapeutic activities, therapeutic exercises  Plan of Care Expires: 03/14/23  Plan of Care Reviewed with: patient, spouse    Subjective     Pain/Comfort:  Pain Rating 1: 0/10    Objective:     Patient found up in chair with pulse ox (continuous), oxygen, telemetry, blood pressure cuff upon OT entry to room.    General Precautions: Standard, sternal  , [place]  Orthopedic Precautions: (sternal)  Braces: N/A  Vital Signs: Blood Pressure: 131/65  HR: 99  Sp02: 95  Supplemental 02: oxymizer 5L     Occupational Performance:     Functional Mobility Training/Transfer Training:  Anterior/Posterior Scooting: maximal assistance in chair   Sit<>Stand Transition: moderate assistance x 2 persons using RW. Max tactile cues for forwards lean.   Toilet Transfer: moderate assistance x 2 persons using RW. Posterior lean noted   *good compliance with sternal precautions throughout     Activities of Daily Living Training:  Upper Body Dressing: maximal assistance for donning robe in sitting. Pt educated on technique  for sternal pxns. Pt needed assist for orientation of robe and to bring robe around head. Tactile and verbal cues provided for threading BUE.     [facility] 6 Click ADL  Total Score: 14    Therapeutic Positioning  Skin integrity:  redness noted on sacrum       The following interventions were performed in an effort to prevent and/or reduce acquired pressure ulcers: all visible skin was assessed during the course of treatment and geomat was provided for sacral protection while UIC    Patient/Caregiver Education:  Patient and spouse provided with verbal education regarding OT role/goals/POC and safety awareness.  Understanding was verbalized.      Patient left up in chair with all lines intact, call button in reach, and spouse present    GOALS:   Multidisciplinary Problems       Occupational Therapy Goals          Problem: Occupational Therapy    Goal Priority Disciplines Outcome Interventions   Occupational Therapy Goal     OT, PT/OT Ongoing, Progressing    Description: Pt will ambulate to bathroom with RW SBA  Pt will complete toilet t/f SBA  Pt will complete toileting tasks min A  Pt will complete LE dressing with AE PRN SBA  Pt will complete UE dressing with SBA  Pt will complete grooming in standing at sink SBA                       Time Tracking:     OT Date of Treatment: 03/02/23  OT Start Time: 0943  OT Stop Time: 1006  OT Total Time (min): 23 min    Billable Minutes:Self Care/Home Management 2    OT/LONDON: OT     LONDON Visit Number: 4    3/2/2023

## 2023-03-02 NOTE — PROGRESS NOTES
NEPHROLOGY: Progress     82-year-old male from the [place], [place] CKD3B, creatinine between 1.6 and 2.0 and GFR between 33 and 44.  Patient s/p CABG on 02/24/2023 and we were asked to follow for CKD.  Chest and mediastinal tubes have been removed.      Patient developed oliguric DANIELLA requiring increase diuresis.  Initially treated with diuretic infusion and over the last 24 hours has been intravenous furosemide 40 IV every 8 hours.             Current Facility-Administered Medications:     0.9%  NaCl infusion (for blood administration), , Intravenous, Q24H PRN, Danette Crenshaw, SHIRLEY    0.9%  NaCl infusion (for blood administration), , Intravenous, Q24H PRN, Trenton Harvey PA-C    0.9%  NaCl infusion (for blood administration), , Intravenous, Q24H PRN, Spencer Hagan MD    acetaminophen oral solution 650 mg, 650 mg, Per OG tube, Q6H PRN, GILMAR Estrada    acetaminophen tablet 650 mg, 650 mg, Oral, Q6H PRN, Lul Isaac MD, 650 mg at 02/26/23 1614    albumin human 5% bottle 12.5 g, 12.5 g, Intravenous, PRN, GILMAR Estrada    albuterol-ipratropium 2.5 mg-0.5 mg/3 mL nebulizer solution 3 mL, 3 mL, Nebulization, Q4H PRN, Nima Baez DO, 3 mL at 03/01/23 1347    aspirin EC tablet 81 mg, 81 mg, Oral, Daily, GILMAR Estrada, 81 mg at 03/02/23 0812    atorvastatin tablet 40 mg, 40 mg, Oral, Daily, Trenton Harvey PA-C, 40 mg at 03/02/23 0814    calcium carbonate 200 mg calcium (500 mg) chewable tablet 500 mg, 500 mg, Oral, Daily, Trenton Harvey PA-C, 500 mg at 03/02/23 0812    calcium gluconate 1 g in NS IVPB (premixed), 1 g, Intravenous, PRN, GILMAR Estrada    calcium gluconate 1 g in NS IVPB (premixed), 2 g, Intravenous, PRN, GILMAR Estrada    calcium gluconate 1 g in NS IVPB (premixed), 3 g, Intravenous, PRN, GILMAR Estrada    dextrose 10% bolus 125  mL 125 mL, 12.5 g, Intravenous, PRN, GILMAR Estrada    dextrose 10% bolus 250 mL 250 mL, 25 g, Intravenous, PRN, GILMAR Estrada    DOBUtamine 1000 mg in D5W 250 mL infusion, 2.5 mcg/kg/min, Intravenous, Continuous, Darinel Bedoya MD, Stopped at 02/27/23 1420    docusate sodium capsule 100 mg, 100 mg, Oral, BID, GILMAR Estrada, 100 mg at 03/02/23 0812    ezetimibe tablet 10 mg, 10 mg, Oral, Daily, Trenton Harvey PA-C, 10 mg at 03/02/23 0812    FLUoxetine capsule 10 mg, 10 mg, Oral, Daily, Trenton Harvey PA-C, 10 mg at 03/02/23 0814    folic acid tablet 1 mg, 1 mg, Oral, Daily, Trenton Harvey PA-C, 1 mg at 03/02/23 0814    furosemide injection 40 mg, 40 mg, Intravenous, TID, Denilson Medina DO, 40 mg at 03/02/23 0815    gabapentin capsule 100 mg, 100 mg, Oral, TID, Trenton Harvey PA-C, 100 mg at 03/02/23 0814    hydrALAZINE injection 20 mg, 20 mg, Intravenous, Q4H PRN, Ga TSANG Emily, NP    HYDROcodone-acetaminophen 5-325 mg per tablet 1 tablet, 1 tablet, Oral, Q4H PRN, GILMAR Estrada, 1 tablet at 02/24/23 1708    HYDROcodone-acetaminophen 7.5-325 mg per tablet 1 tablet, 1 tablet, Oral, Q6H PRN, Ga TIDWELL. Emily, NP, 1 tablet at 03/02/23 0812    hydrOXYzine HCL tablet 25 mg, 25 mg, Oral, Q6H PRN, Trenton Harvey PA-C    labetaloL injection 20 mg, 20 mg, Intravenous, Q4H PRN, Ga Q. Emily, NP    magnesium sulfate 2g in water 50mL IVPB (premix), 2 g, Intravenous, PRN, GILMAR Estrada    magnesium sulfate 2g in water 50mL IVPB (premix), 4 g, Intravenous, PRN, GILMAR Estrada    meclizine tablet 25 mg, 25 mg, Oral, TID PRN, Trenton Harvey PA-C    metoprolol tartrate (LOPRESSOR) tablet 25 mg, 25 mg, Oral, TID, Wang Reno, RASHAD, 25 mg at 03/02/23 0813    morphine injection 2 mg, 2 mg, Intravenous, Q2H PRN, Ga Diaz NP, 2 mg at 03/01/23 0033    ondansetron injection 4 mg, 4 mg, Intravenous, Q4H PRN, GILMAR Estrada, 4 mg at 02/25/23 1326    oxyCODONE  "immediate release tablet 10 mg, 10 mg, Oral, Q4H PRN, GILMAR Estrada, 10 mg at 02/28/23 1233    pantoprazole EC tablet 40 mg, 40 mg, Oral, Daily, Trenton Harvey PA-C, 40 mg at 03/02/23 0812    potassium chloride 20 mEq in 100 mL IVPB (FOR CENTRAL LINE ADMINISTRATION ONLY), 20 mEq, Intravenous, PRN, Leon Reyes PA    potassium chloride 20 mEq in 100 mL IVPB (FOR CENTRAL LINE ADMINISTRATION ONLY), 40 mEq, Intravenous, PRN, Leon Reyes PA    potassium chloride 20 mEq in 100 mL IVPB (FOR CENTRAL LINE ADMINISTRATION ONLY), 20 mEq, Intravenous, PRN, GILMAR Estrada    prednisoLONE 15 mg/5 mL (3 mg/mL) solution 2.5 mg, 2.5 mg, Oral, Daily, Tom Mayfield MD, 2.5 mg at 03/02/23 0822    QUEtiapine tablet 50 mg, 50 mg, Oral, QHS, Trenton Harvey PA-C, 50 mg at 03/01/23 2018    rOPINIRole tablet 1 mg, 1 mg, Oral, BID, Trenton Harvey PA-C, 1 mg at 03/02/23 0812    [facility], , , Until Discontinued **AND** sodium chloride 0.9% flush 10 mL, 10 mL, Intravenous, Q6H, 10 mL at 03/02/23 0600 **AND** sodium chloride 0.9% flush 10 mL, 10 mL, Intravenous, PRN, Dominick Weinstein MD    sodium phosphate 15 mmol in dextrose 5 % (D5W) 250 mL IVPB, 15 mmol, Intravenous, PRN, GILMAR Estrada    sodium phosphate 20.01 mmol in dextrose 5 % (D5W) 250 mL IVPB, 20.01 mmol, Intravenous, PRN, GILMAR Estrada    sodium phosphate 30 mmol in dextrose 5 % (D5W) 250 mL IVPB, 30 mmol, Intravenous, PRN, GILMAR Estrada    sucralfate tablet 1 g, 1 g, Oral, QID (AC & HS), GILMAR Estrada, 1 g at 03/02/23 0657    tiZANidine tablet 4 mg, 4 mg, Oral, Q8H PRN, Trenton Harvey PA-C    zolpidem tablet 5 mg, 5 mg, Oral, Nightly PRN, Ga Diaz NP        /69   Pulse 90   Temp 97.7 °F (36.5 °C) (Oral)   Resp 18   Ht 5' 10" (1.778 m)   Wt 91.1 kg (200 lb 13.4 oz)   SpO2 98%   BMI 28.82 kg/m²     Physical Exam:    GEN:    Patient is currently awake, alert.  Appears much more comfortable " and less dyspneic.  No voice complaints.    NECK : No JVD  CARD :   Irregular, rate low 100s   LUNGS :   improved breath sounds still with diminished breath sounds at the bases.  ABD : Soft,non-tender. BS active.    EXT : 1-2+ pitting edema.           Intake/Output Summary (Last 24 hours) at 3/2/2023 0827  Last data filed at 3/2/2023 0800  Gross per 24 hour   Intake --   Output 3595 ml   Net -3595 ml         Laboratory:  Recent Results (from the past 24 hour(s))   Type & Screen    Collection Time: 03/01/23 12:41 PM   Result Value Ref Range    Group & Rh A POS     Indirect Jose R GEL NEG    Prepare RBC    Collection Time: 03/01/23 12:41 PM   Result Value Ref Range    UNIT NUMBER [id number]     UNIT ABO/RH A POS     DISPENSE STATUS Selected     Unit Expiration [id number]     Product Code O8043X55     Unit Blood Type Code 6200     CROSSMATCH INTERPRETATION Compatible     UNIT NUMBER [id number]     UNIT ABO/RH A POS     DISPENSE STATUS Selected     Unit Expiration [id number]     Product Code Q0105I72     Unit Blood Type Code 6200     CROSSMATCH INTERPRETATION Compatible    Comprehensive Metabolic Panel    Collection Time: 03/02/23  2:08 AM   Result Value Ref Range    Sodium Level 139 136 - 145 mmol/L    Potassium Level 3.8 3.5 - 5.1 mmol/L    Chloride 107 98 - 107 mmol/L    Carbon Dioxide 23 23 - 31 mmol/L    Glucose Level 98 82 - 115 mg/dL    Blood Urea Nitrogen 77.7 (H) 8.4 - 25.7 mg/dL    Creatinine 2.21 (H) 0.73 - 1.18 mg/dL    Calcium Level Total 9.2 8.8 - 10.0 mg/dL    Protein Total 5.9 5.8 - 7.6 gm/dL    Albumin Level 2.2 (L) 3.4 - 4.8 g/dL    Globulin 3.7 (H) 2.4 - 3.5 gm/dL    Albumin/Globulin Ratio 0.6 (L) 1.1 - 2.0 ratio    Bilirubin Total 0.6 <=1.5 mg/dL    Alkaline Phosphatase 87 40 - 150 unit/L    Alanine Aminotransferase 12 0 - 55 unit/L    Aspartate Aminotransferase 29 5 - 34 unit/L    eGFR 29 mls/min/1.73/m2   Magnesium    Collection Time: 03/02/23  2:08 AM   Result Value Ref Range     Magnesium Level 1.90 1.60 - 2.60 mg/dL   CBC with Differential    Collection Time: 03/02/23  2:09 AM   Result Value Ref Range    WBC 7.9 4.5 - 11.5 x10(3)/mcL    RBC 2.78 (L) 4.70 - 6.10 x10(6)/mcL    Hgb 8.3 (L) 14.0 - 18.0 g/dL    Hct 25.6 (L) 42.0 - 52.0 %    MCV 92.1 80.0 - 94.0 fL    MCH 29.9 pg    MCHC 32.4 (L) 33.0 - 36.0 g/dL    RDW 15.6 11.5 - 17.0 %    Platelet 159 130 - 400 x10(3)/mcL    MPV 11.3 (H) 7.4 - 10.4 fL    Neut % 71.6 %    Lymph % 10.9 %    Mono % 15.5 %    Eos % 0.1 %    Basophil % 0.1 %    Lymph # 0.86 0.6 - 4.6 x10(3)/mcL    Neut # 5.66 2.1 - 9.2 x10(3)/mcL    Mono # 1.23 0.1 - 1.3 x10(3)/mcL    Eos # 0.01 0 - 0.9 x10(3)/mcL    Baso # 0.01 0 - 0.2 x10(3)/mcL    IG# 0.14 (H) 0 - 0.04 x10(3)/mcL    IG% 1.8 %    NRBC% 0.0 %         Assessment/Plan:  DANIELLA on CKD 3B -becoming oliguric  Developing fluid overload and heart failure  Anemia of chronic disease  History of systemic lupus   Hypertension     For patient has received this morning dose of furosemide and will receive 2 more doses today.  Discontinue after this evening's dose.      Darinel Bedoya MD, FASN

## 2023-03-02 NOTE — PLAN OF CARE
Met with patient and wife for dc planning. Pt is now on 2L/NC.  Verbal FOC obtained for [facility]. Referral sent via [facility].

## 2023-03-03 ENCOUNTER — HOSPITAL ENCOUNTER (INPATIENT)
Facility: HOSPITAL | Age: 83
LOS: 14 days | Discharge: HOME HEALTH | DRG: 698 | End: 2023-03-17
Attending: INTERNAL MEDICINE | Admitting: INTERNAL MEDICINE
Payer: MEDICARE

## 2023-03-03 VITALS
SYSTOLIC BLOOD PRESSURE: 130 MMHG | RESPIRATION RATE: 21 BRPM | BODY MASS INDEX: 28 KG/M2 | OXYGEN SATURATION: 96 % | TEMPERATURE: 100 F | HEIGHT: 70 IN | DIASTOLIC BLOOD PRESSURE: 63 MMHG | HEART RATE: 90 BPM | WEIGHT: 195.56 LBS

## 2023-03-03 DIAGNOSIS — Z95.1 S/P CABG X 2: ICD-10-CM

## 2023-03-03 DIAGNOSIS — R33.9 URINARY RETENTION: Primary | ICD-10-CM

## 2023-03-03 DIAGNOSIS — I21.4 NSTEMI (NON-ST ELEVATED MYOCARDIAL INFARCTION): ICD-10-CM

## 2023-03-03 PROBLEM — I25.10 CAD (CORONARY ARTERY DISEASE): Status: RESOLVED | Noted: 2021-07-22 | Resolved: 2023-03-03

## 2023-03-03 PROBLEM — I25.10 CAD (CORONARY ARTERY DISEASE): Status: ACTIVE | Noted: 2023-03-03

## 2023-03-03 LAB
ALBUMIN SERPL-MCNC: 2.4 G/DL (ref 3.4–4.8)
ALBUMIN/GLOB SERPL: 0.6 RATIO (ref 1.1–2)
ALP SERPL-CCNC: 101 UNIT/L (ref 40–150)
ALT SERPL-CCNC: 26 UNIT/L (ref 0–55)
AST SERPL-CCNC: 45 UNIT/L (ref 5–34)
BILIRUBIN DIRECT+TOT PNL SERPL-MCNC: 0.7 MG/DL
BUN SERPL-MCNC: 77.7 MG/DL (ref 8.4–25.7)
CALCIUM SERPL-MCNC: 9.7 MG/DL (ref 8.8–10)
CHLORIDE SERPL-SCNC: 103 MMOL/L (ref 98–107)
CO2 SERPL-SCNC: 24 MMOL/L (ref 23–31)
CREAT SERPL-MCNC: 1.8 MG/DL (ref 0.73–1.18)
GFR SERPLBLD CREATININE-BSD FMLA CKD-EPI: 37 MLS/MIN/1.73/M2
GLOBULIN SER-MCNC: 4.1 GM/DL (ref 2.4–3.5)
GLUCOSE SERPL-MCNC: 96 MG/DL (ref 82–115)
POTASSIUM SERPL-SCNC: 3.6 MMOL/L (ref 3.5–5.1)
PROT SERPL-MCNC: 6.5 GM/DL (ref 5.8–7.6)
SODIUM SERPL-SCNC: 138 MMOL/L (ref 136–145)

## 2023-03-03 PROCEDURE — 80053 COMPREHEN METABOLIC PANEL: CPT | Performed by: INTERNAL MEDICINE

## 2023-03-03 PROCEDURE — 25000003 PHARM REV CODE 250: Performed by: NURSE PRACTITIONER

## 2023-03-03 PROCEDURE — 99223 PR INITIAL HOSPITAL CARE,LEVL III: ICD-10-PCS | Mod: ,,, | Performed by: NURSE PRACTITIONER

## 2023-03-03 PROCEDURE — 63600175 PHARM REV CODE 636 W HCPCS: Performed by: STUDENT IN AN ORGANIZED HEALTH CARE EDUCATION/TRAINING PROGRAM

## 2023-03-03 PROCEDURE — 97535 SELF CARE MNGMENT TRAINING: CPT | Mod: CO

## 2023-03-03 PROCEDURE — 25000003 PHARM REV CODE 250: Performed by: PHYSICIAN ASSISTANT

## 2023-03-03 PROCEDURE — 11800000 HC REHAB PRIVATE ROOM

## 2023-03-03 PROCEDURE — 25000003 PHARM REV CODE 250: Performed by: INTERNAL MEDICINE

## 2023-03-03 PROCEDURE — 94761 N-INVAS EAR/PLS OXIMETRY MLT: CPT

## 2023-03-03 PROCEDURE — 99223 1ST HOSP IP/OBS HIGH 75: CPT | Mod: ,,, | Performed by: NURSE PRACTITIONER

## 2023-03-03 PROCEDURE — 63600175 PHARM REV CODE 636 W HCPCS: Performed by: NURSE PRACTITIONER

## 2023-03-03 PROCEDURE — 27000221 HC OXYGEN, UP TO 24 HOURS

## 2023-03-03 PROCEDURE — 97164 PT RE-EVAL EST PLAN CARE: CPT

## 2023-03-03 PROCEDURE — 25000003 PHARM REV CODE 250

## 2023-03-03 RX ORDER — PREDNISONE 2.5 MG/1
2.5 TABLET ORAL DAILY
Status: DISCONTINUED | OUTPATIENT
Start: 2023-03-04 | End: 2023-03-14

## 2023-03-03 RX ORDER — METOPROLOL TARTRATE 50 MG/1
50 TABLET ORAL 2 TIMES DAILY
Status: DISCONTINUED | OUTPATIENT
Start: 2023-03-03 | End: 2023-03-05

## 2023-03-03 RX ORDER — HYDROCODONE BITARTRATE AND ACETAMINOPHEN 5; 325 MG/1; MG/1
1 TABLET ORAL EVERY 4 HOURS PRN
Status: DISCONTINUED | OUTPATIENT
Start: 2023-03-03 | End: 2023-03-17 | Stop reason: HOSPADM

## 2023-03-03 RX ORDER — HYDRALAZINE HYDROCHLORIDE 20 MG/ML
10 INJECTION INTRAMUSCULAR; INTRAVENOUS EVERY 6 HOURS PRN
Status: DISCONTINUED | OUTPATIENT
Start: 2023-03-03 | End: 2023-03-17 | Stop reason: HOSPADM

## 2023-03-03 RX ORDER — CLOPIDOGREL BISULFATE 75 MG/1
75 TABLET ORAL DAILY
Status: DISCONTINUED | OUTPATIENT
Start: 2023-03-04 | End: 2023-03-17 | Stop reason: HOSPADM

## 2023-03-03 RX ORDER — FAMOTIDINE 20 MG/1
20 TABLET, FILM COATED ORAL 2 TIMES DAILY PRN
Status: DISCONTINUED | OUTPATIENT
Start: 2023-03-03 | End: 2023-03-03

## 2023-03-03 RX ORDER — ASPIRIN 81 MG/1
81 TABLET ORAL DAILY
Status: DISCONTINUED | OUTPATIENT
Start: 2023-03-04 | End: 2023-03-17 | Stop reason: HOSPADM

## 2023-03-03 RX ORDER — EZETIMIBE 10 MG/1
10 TABLET ORAL DAILY
Status: DISCONTINUED | OUTPATIENT
Start: 2023-03-04 | End: 2023-03-17 | Stop reason: HOSPADM

## 2023-03-03 RX ORDER — FUROSEMIDE 40 MG/1
40 TABLET ORAL DAILY
Status: DISCONTINUED | OUTPATIENT
Start: 2023-03-04 | End: 2023-03-17 | Stop reason: HOSPADM

## 2023-03-03 RX ORDER — TAMSULOSIN HYDROCHLORIDE 0.4 MG/1
0.4 CAPSULE ORAL NIGHTLY
Status: DISCONTINUED | OUTPATIENT
Start: 2023-03-03 | End: 2023-03-17 | Stop reason: HOSPADM

## 2023-03-03 RX ORDER — ATORVASTATIN CALCIUM 40 MG/1
40 TABLET, FILM COATED ORAL NIGHTLY
Status: DISCONTINUED | OUTPATIENT
Start: 2023-03-03 | End: 2023-03-17 | Stop reason: HOSPADM

## 2023-03-03 RX ORDER — LABETALOL HCL 20 MG/4 ML
10 SYRINGE (ML) INTRAVENOUS EVERY 4 HOURS PRN
Status: DISCONTINUED | OUTPATIENT
Start: 2023-03-03 | End: 2023-03-17 | Stop reason: HOSPADM

## 2023-03-03 RX ORDER — SUCRALFATE 1 G/1
1 TABLET ORAL
Status: DISCONTINUED | OUTPATIENT
Start: 2023-03-03 | End: 2023-03-17 | Stop reason: HOSPADM

## 2023-03-03 RX ORDER — BISACODYL 10 MG
10 SUPPOSITORY, RECTAL RECTAL DAILY PRN
Status: DISCONTINUED | OUTPATIENT
Start: 2023-03-03 | End: 2023-03-17 | Stop reason: HOSPADM

## 2023-03-03 RX ORDER — QUETIAPINE FUMARATE 25 MG/1
50 TABLET, FILM COATED ORAL NIGHTLY
Status: DISCONTINUED | OUTPATIENT
Start: 2023-03-03 | End: 2023-03-17 | Stop reason: HOSPADM

## 2023-03-03 RX ORDER — MUPIROCIN 20 MG/G
OINTMENT TOPICAL 2 TIMES DAILY
Status: DISPENSED | OUTPATIENT
Start: 2023-03-03 | End: 2023-03-08

## 2023-03-03 RX ORDER — ROPINIROLE 1 MG/1
1 TABLET, FILM COATED ORAL 2 TIMES DAILY
Status: DISCONTINUED | OUTPATIENT
Start: 2023-03-03 | End: 2023-03-17 | Stop reason: HOSPADM

## 2023-03-03 RX ORDER — MECLIZINE HYDROCHLORIDE 25 MG/1
25 TABLET ORAL 3 TIMES DAILY PRN
Status: DISCONTINUED | OUTPATIENT
Start: 2023-03-03 | End: 2023-03-17 | Stop reason: HOSPADM

## 2023-03-03 RX ORDER — METOPROLOL TARTRATE 50 MG/1
50 TABLET ORAL 2 TIMES DAILY
Qty: 60 TABLET | Refills: 11 | Status: ON HOLD | OUTPATIENT
Start: 2023-03-03 | End: 2023-03-17 | Stop reason: HOSPADM

## 2023-03-03 RX ORDER — FUROSEMIDE 40 MG/1
40 TABLET ORAL DAILY
Status: DISCONTINUED | OUTPATIENT
Start: 2023-03-03 | End: 2023-03-03 | Stop reason: HOSPADM

## 2023-03-03 RX ORDER — FAMOTIDINE 20 MG/1
20 TABLET, FILM COATED ORAL DAILY PRN
Status: DISCONTINUED | OUTPATIENT
Start: 2023-03-04 | End: 2023-03-17 | Stop reason: HOSPADM

## 2023-03-03 RX ORDER — METOPROLOL TARTRATE 1 MG/ML
10 INJECTION, SOLUTION INTRAVENOUS
Status: DISCONTINUED | OUTPATIENT
Start: 2023-03-03 | End: 2023-03-17 | Stop reason: HOSPADM

## 2023-03-03 RX ORDER — ENOXAPARIN SODIUM 100 MG/ML
40 INJECTION SUBCUTANEOUS EVERY 24 HOURS
Status: DISCONTINUED | OUTPATIENT
Start: 2023-03-03 | End: 2023-03-17 | Stop reason: HOSPADM

## 2023-03-03 RX ORDER — PANTOPRAZOLE SODIUM 40 MG/1
40 TABLET, DELAYED RELEASE ORAL DAILY
Status: DISCONTINUED | OUTPATIENT
Start: 2023-03-04 | End: 2023-03-17 | Stop reason: HOSPADM

## 2023-03-03 RX ORDER — ONDANSETRON 4 MG/1
4 TABLET, ORALLY DISINTEGRATING ORAL EVERY 6 HOURS PRN
Status: DISCONTINUED | OUTPATIENT
Start: 2023-03-03 | End: 2023-03-17 | Stop reason: HOSPADM

## 2023-03-03 RX ORDER — GABAPENTIN 100 MG/1
100 CAPSULE ORAL 3 TIMES DAILY
Status: DISCONTINUED | OUTPATIENT
Start: 2023-03-03 | End: 2023-03-17 | Stop reason: HOSPADM

## 2023-03-03 RX ORDER — HYDROXYZINE PAMOATE 50 MG/1
50 CAPSULE ORAL NIGHTLY PRN
Status: DISCONTINUED | OUTPATIENT
Start: 2023-03-03 | End: 2023-03-17 | Stop reason: HOSPADM

## 2023-03-03 RX ORDER — BENZONATATE 100 MG/1
100 CAPSULE ORAL 3 TIMES DAILY PRN
Status: DISCONTINUED | OUTPATIENT
Start: 2023-03-03 | End: 2023-03-17 | Stop reason: HOSPADM

## 2023-03-03 RX ORDER — FLUOXETINE 10 MG/1
10 CAPSULE ORAL DAILY
Status: DISCONTINUED | OUTPATIENT
Start: 2023-03-04 | End: 2023-03-14

## 2023-03-03 RX ORDER — DOCUSATE SODIUM 100 MG/1
100 CAPSULE, LIQUID FILLED ORAL 2 TIMES DAILY
Status: DISCONTINUED | OUTPATIENT
Start: 2023-03-03 | End: 2023-03-17 | Stop reason: HOSPADM

## 2023-03-03 RX ADMIN — QUETIAPINE 50 MG: 25 TABLET ORAL at 08:03

## 2023-03-03 RX ADMIN — DOCUSATE SODIUM 100 MG: 100 CAPSULE, LIQUID FILLED ORAL at 08:03

## 2023-03-03 RX ADMIN — Medication 81 MG: at 08:03

## 2023-03-03 RX ADMIN — MUPIROCIN: 20 OINTMENT TOPICAL at 08:03

## 2023-03-03 RX ADMIN — GABAPENTIN 100 MG: 100 CAPSULE ORAL at 08:03

## 2023-03-03 RX ADMIN — ATORVASTATIN CALCIUM 40 MG: 40 TABLET, FILM COATED ORAL at 08:03

## 2023-03-03 RX ADMIN — BISACODYL 10 MG: 10 SUPPOSITORY RECTAL at 07:03

## 2023-03-03 RX ADMIN — METOPROLOL TARTRATE 50 MG: 50 TABLET, FILM COATED ORAL at 08:03

## 2023-03-03 RX ADMIN — PANTOPRAZOLE SODIUM 40 MG: 40 TABLET, DELAYED RELEASE ORAL at 08:03

## 2023-03-03 RX ADMIN — ROPINIROLE HYDROCHLORIDE 1 MG: 0.25 TABLET, FILM COATED ORAL at 08:03

## 2023-03-03 RX ADMIN — HYDROCODONE BITARTRATE AND ACETAMINOPHEN 1 TABLET: 7.5; 325 TABLET ORAL at 06:03

## 2023-03-03 RX ADMIN — TAMSULOSIN HYDROCHLORIDE 0.4 MG: 0.4 CAPSULE ORAL at 08:03

## 2023-03-03 RX ADMIN — ENOXAPARIN SODIUM 40 MG: 40 INJECTION SUBCUTANEOUS at 05:03

## 2023-03-03 RX ADMIN — SUCRALFATE 1 G: 1 TABLET ORAL at 06:03

## 2023-03-03 RX ADMIN — FUROSEMIDE 40 MG: 40 TABLET ORAL at 10:03

## 2023-03-03 RX ADMIN — FLUOXETINE 10 MG: 10 CAPSULE ORAL at 08:03

## 2023-03-03 RX ADMIN — ROPINIROLE HYDROCHLORIDE 1 MG: 1 TABLET, FILM COATED ORAL at 08:03

## 2023-03-03 RX ADMIN — SUCRALFATE 1 G: 1 TABLET ORAL at 05:03

## 2023-03-03 RX ADMIN — SUCRALFATE 1 G: 1 TABLET ORAL at 10:03

## 2023-03-03 RX ADMIN — FOLIC ACID 1 MG: 1 TABLET ORAL at 08:03

## 2023-03-03 RX ADMIN — SUCRALFATE 1 G: 1 TABLET ORAL at 08:03

## 2023-03-03 RX ADMIN — CALCIUM CARBONATE (ANTACID) CHEW TAB 500 MG 500 MG: 500 CHEW TAB at 08:03

## 2023-03-03 RX ADMIN — PREDNISOLONE SODIUM PHOSPHATE 2.5 MG: 15 SOLUTION ORAL at 10:03

## 2023-03-03 RX ADMIN — EZETIMIBE 10 MG: 10 TABLET ORAL at 08:03

## 2023-03-03 NOTE — DISCHARGE SUMMARY
[facility] [address] ICU  Discharge Note  Short Stay    Procedure(s) (LRB):  CORONARY ARTERY BYPASS GRAFT (CABG) (N/A)      OUTCOME: Patient tolerated treatment/procedure well without complication and is now ready for discharge.    DISPOSITION: Rehab    FINAL DIAGNOSIS:  CAD (coronary artery disease)    FOLLOWUP: In clinic    DISCHARGE INSTRUCTIONS:  No discharge procedures on file.      Clinical Reference Documents Added to Patient Instructions         Document    CARDIAC REHAB INFORMATION (ENGLISH)    HEART HEALTHY DIET (ENGLISH)    RECOVERY AFTER CORONARY ARTERY BYPASS GRAFT SURGERY (CABG) (ENGLISH)            TIME SPENT ON DISCHARGE: 20 minutes

## 2023-03-03 NOTE — PROGRESS NOTES
NEPHROLOGY: Progress     82-year-old male from the [place], [place] CKD3B, creatinine between 1.6 and 2.0 and GFR between 33 and 44.  Patient s/p CABG on 02/24/2023 and we were asked to follow for CKD.  Chest and mediastinal tubes have been removed.      Patient developed oliguric DANIELLA requiring increase diuresis.  Initially treated with diuretic infusion and over the last 24 hours has been intravenous furosemide 40 IV every 8 hours.             Current Facility-Administered Medications:     0.9%  NaCl infusion (for blood administration), , Intravenous, Q24H PRN, Danette Crenshaw, SHIRLEY    0.9%  NaCl infusion (for blood administration), , Intravenous, Q24H PRN, Trenton Harvey PA-C    0.9%  NaCl infusion (for blood administration), , Intravenous, Q24H PRN, Spencer Hagan MD    acetaminophen oral solution 650 mg, 650 mg, Per OG tube, Q6H PRN, GILMAR Estrada    acetaminophen tablet 650 mg, 650 mg, Oral, Q6H PRN, Lul Isaac MD, 650 mg at 02/26/23 1614    albumin human 5% bottle 12.5 g, 12.5 g, Intravenous, PRN, GILMAR Estrada    albuterol-ipratropium 2.5 mg-0.5 mg/3 mL nebulizer solution 3 mL, 3 mL, Nebulization, Q4H PRN, Nima Baez DO, 3 mL at 03/01/23 1347    aspirin EC tablet 81 mg, 81 mg, Oral, Daily, GILMAR Estrada, 81 mg at 03/03/23 0856    atorvastatin tablet 40 mg, 40 mg, Oral, Daily, Trenton Harvey PA-C, 40 mg at 03/03/23 0856    calcium carbonate 200 mg calcium (500 mg) chewable tablet 500 mg, 500 mg, Oral, Daily, Trenton Harvey PA-C, 500 mg at 03/03/23 0856    calcium gluconate 1 g in NS IVPB (premixed), 1 g, Intravenous, PRN, GILMAR Estrada    calcium gluconate 1 g in NS IVPB (premixed), 2 g, Intravenous, PRN, GILMAR Estrada    calcium gluconate 1 g in NS IVPB (premixed), 3 g, Intravenous, PRN, GILMAR Estrada    dextrose 10% bolus 125  mL 125 mL, 12.5 g, Intravenous, PRN, GILMAR Estrada    dextrose 10% bolus 250 mL 250 mL, 25 g, Intravenous, PRN, GILMAR Estrada    DOBUtamine 1000 mg in D5W 250 mL infusion, 2.5 mcg/kg/min, Intravenous, Continuous, Darinel Bedoya MD, Stopped at 02/27/23 1420    docusate sodium capsule 100 mg, 100 mg, Oral, BID, GILMAR Estrada, 100 mg at 03/03/23 0856    ezetimibe tablet 10 mg, 10 mg, Oral, Daily, Trenton Harvey PA-C, 10 mg at 03/03/23 0856    FLUoxetine capsule 10 mg, 10 mg, Oral, Daily, Trenton Harvey PA-C, 10 mg at 03/03/23 0856    folic acid tablet 1 mg, 1 mg, Oral, Daily, Trenton Harvey PA-C, 1 mg at 03/03/23 0856    gabapentin capsule 100 mg, 100 mg, Oral, TID, Trenton Harvey PA-C, 100 mg at 03/03/23 0856    hydrALAZINE injection 20 mg, 20 mg, Intravenous, Q4H PRN, Ga Johnsono, MANAN    HYDROcodone-acetaminophen 5-325 mg per tablet 1 tablet, 1 tablet, Oral, Q4H PRN, GILMAR Estrada, 1 tablet at 02/24/23 1708    HYDROcodone-acetaminophen 7.5-325 mg per tablet 1 tablet, 1 tablet, Oral, Q6H PRN, Ga TIDWELL. Emily, NP, 1 tablet at 03/03/23 0601    hydrOXYzine HCL tablet 25 mg, 25 mg, Oral, Q6H PRN, Trenton Harvey PA-C    labetaloL injection 20 mg, 20 mg, Intravenous, Q4H PRN, Ga TSANG Emily, NP    magnesium sulfate 2g in water 50mL IVPB (premix), 2 g, Intravenous, PRN, GILMAR Estrada    magnesium sulfate 2g in water 50mL IVPB (premix), 4 g, Intravenous, PRN, GILMAR Estrada    meclizine tablet 25 mg, 25 mg, Oral, TID PRN, Trenton Harvey PA-C    metoprolol tartrate (LOPRESSOR) tablet 50 mg, 50 mg, Oral, BID, Yasmeen Valverde, FNP, 50 mg at 03/03/23 0856    morphine injection 2 mg, 2 mg, Intravenous, Q2H PRN, Ga TIDWELL. MANAN Diaz, 2 mg at 03/01/23 0033    ondansetron injection 4 mg, 4 mg, Intravenous, Q4H PRN, GILMAR Estrada, 4 mg at 02/25/23 1326    oxyCODONE immediate release tablet 10 mg, 10 mg, Oral, Q4H PRN, GILMAR Estrada, 10 mg at 02/28/23  "1233    pantoprazole EC tablet 40 mg, 40 mg, Oral, Daily, Trenton Harvey PA-C, 40 mg at 03/03/23 0856    potassium chloride 20 mEq in 100 mL IVPB (FOR CENTRAL LINE ADMINISTRATION ONLY), 20 mEq, Intravenous, PRN, Leon Reyes PA    potassium chloride 20 mEq in 100 mL IVPB (FOR CENTRAL LINE ADMINISTRATION ONLY), 40 mEq, Intravenous, PRN, Leon Reyes PA    potassium chloride 20 mEq in 100 mL IVPB (FOR CENTRAL LINE ADMINISTRATION ONLY), 20 mEq, Intravenous, PRN, GILMAR Estrada    prednisoLONE 15 mg/5 mL (3 mg/mL) solution 2.5 mg, 2.5 mg, Oral, Daily, Tom Mayfield MD, 2.5 mg at 03/02/23 0822    QUEtiapine tablet 50 mg, 50 mg, Oral, QHS, Trenton Harvey PA-C, 50 mg at 03/02/23 2034    rOPINIRole tablet 1 mg, 1 mg, Oral, BID, Trenton Harvey PA-C, 1 mg at 03/03/23 0856    Flushing PICC Protocol, , , Until Discontinued **AND** sodium chloride 0.9% flush 10 mL, 10 mL, Intravenous, Q6H, 10 mL at 03/02/23 0600 **AND** sodium chloride 0.9% flush 10 mL, 10 mL, Intravenous, PRN, Dominick Weinstein MD    sodium phosphate 15 mmol in dextrose 5 % (D5W) 250 mL IVPB, 15 mmol, Intravenous, PRN, GILMAR Estrada    sodium phosphate 20.01 mmol in dextrose 5 % (D5W) 250 mL IVPB, 20.01 mmol, Intravenous, PRN, Leon Reyes, PA    sodium phosphate 30 mmol in dextrose 5 % (D5W) 250 mL IVPB, 30 mmol, Intravenous, PRN, GILMAR Estrada    sucralfate tablet 1 g, 1 g, Oral, QID (AC & HS), GILMAR Estrada, 1 g at 03/03/23 0601    tiZANidine tablet 4 mg, 4 mg, Oral, Q8H PRN, Trenton Harvey PA-C    zolpidem tablet 5 mg, 5 mg, Oral, Nightly PRN, Ga TIDWELL. Emily, NP        BP (!) 151/68   Pulse 101   Temp 97.8 °F (36.6 °C)   Resp (!) 25   Ht 5' 10" (1.778 m)   Wt 88.7 kg (195 lb 8.8 oz)   SpO2 (!) 94%   BMI 28.06 kg/m²     Physical Exam:    GEN:    Patient is currently awake, alert.  Appears much more comfortable and less dyspneic.  No voice complaints.    NECK : No JVD  CARD :   Irregular, rate low " 100s   LUNGS :   improved breath sounds still with diminished breath sounds at the bases.  ABD : Soft,non-tender. BS active.    EXT : 1-2+ pitting edema.           Intake/Output Summary (Last 24 hours) at 3/3/2023 0947  Last data filed at 3/3/2023 0903  Gross per 24 hour   Intake 500 ml   Output 3665 ml   Net -3165 ml         Laboratory:  Recent Results (from the past 24 hour(s))   Comprehensive Metabolic Panel    Collection Time: 03/03/23  1:29 AM   Result Value Ref Range    Sodium Level 138 136 - 145 mmol/L    Potassium Level 3.6 3.5 - 5.1 mmol/L    Chloride 103 98 - 107 mmol/L    Carbon Dioxide 24 23 - 31 mmol/L    Glucose Level 96 82 - 115 mg/dL    Blood Urea Nitrogen 77.7 (H) 8.4 - 25.7 mg/dL    Creatinine 1.80 (H) 0.73 - 1.18 mg/dL    Calcium Level Total 9.7 8.8 - 10.0 mg/dL    Protein Total 6.5 5.8 - 7.6 gm/dL    Albumin Level 2.4 (L) 3.4 - 4.8 g/dL    Globulin 4.1 (H) 2.4 - 3.5 gm/dL    Albumin/Globulin Ratio 0.6 (L) 1.1 - 2.0 ratio    Bilirubin Total 0.7 <=1.5 mg/dL    Alkaline Phosphatase 101 40 - 150 unit/L    Alanine Aminotransferase 26 0 - 55 unit/L    Aspartate Aminotransferase 45 (H) 5 - 34 unit/L    eGFR 37 mls/min/1.73/m2         Assessment/Plan:  DANIELLA on CKD 3B -good urine output and slowly resolving  Anemia of chronic disease  History of systemic lupus   Hypertension     Intravenous furosemide was discontinued.  Resume patient's home dose of furosemide 40 mg daily beginning today.    Darinel Bedoya MD, FASN

## 2023-03-03 NOTE — CONSULTS
Chief Complaint  S/p CABG    Reason for Consultation  Physiatry    History of Present Illness  Admit MD: Xander Enriquez MD  Consult Physiatry: Dilip Hargrove MD  HPI: 81 yo WM with PMH of male, followed by Dr. Loredo who presented to [place] [facility] with c/o chest pain. HS troponin 1149; therefore he was transferred to [facility] for cardiology services where he underwent LHC revealing multivessel disease and elevated LVEDP. He was started on diuretics. Plavix was placed on hold and he was transferred to [facility] for CABG evaluation on 2/21/23. Patient is reporting mild chest tightness 3/10 on arrival.  Vitals Stable. Shoulder Pain that morning. On Nitro & Heparin Infusion. EKG with no overt ischemic changes. Denies CP/SOB. Creatinine 1.93 mildly decreased from 2.07 day prior with addition of IVFs. H/H downtrending- 8.2/25.3 from 8.8/27.1 as well. Creatinine  further improved to 1.65. H/H 10.5/31.5 post transfusion 2  units PRBCs. Underwent  CABG x 2 on 2/24 by Dr Hagan, and brought to ICU on MV, requiring pressor support. Mediastinal drain patent. Received 2 units of PRBCs intraop.  CT x 2 patent. Pressors have been weaned off. On cleviprex drip. 2/26 Mediastinal drains have been removed. Creatinine has bumped. Nephrology adding IVF x 1L due to IVVD. O2 weaned to 2L/NC. Patient did not require O2 prior to hospitalization. On 3/3, labs showed elevated BUN/Cr of 77.7/ 1.80 with creatinine trending down. [place] catheter remained. Last BM on 2/26 charted. Patient is AAOx4; confusion at times with pain meds.  Participating with therapy. Functional status includes bed mobility and transfers requiring moderate to maximal assistance. Max assist needed for upper body dressing; total assist toileting due to medellin;  setup/supervision for eating; and minimal assist for grooming. Amb w/RW for 40ft at Min Assist. Patient was evaluated, accepted, and admitted to inpatient rehab to improve functional status. Transferred to [facility]  "on 3/3 without incident.   3/6: Seen in patient room, seated in bed. Noted BUE tremor at rest. States that he has that for a while. No noted history of Parkinson's disease or Essential tremor. Monitor movement with therapy and consider medication. Denies any issues with eating do to movement with tremor. Tells me appetite is "medium". Bowels last moved day before yesterday, but tells me he is having rectal spasms. He explains that as having the urge and the feces stopping right before his rectum. Feels like he is a little constipated, but getting "something for that". Documented to have 3 Bms yesterday.Taking pain medication and reports it working. States that the Little catheter is a pain and wishes it could come out. Believes that he would be urinating a lot because he is on Lasix. No noted edema. Tells me he was not able to do much therapy this weekend. Continue evaluation. Admitted to some SOB even with O2 via NC. VSSAF with mild SBP elevation. IM following.     Review of Systems  Barriers to Discharge:  Social: Completed high school. He was in the [employer]. He is retired from electrical/refrigeration work.  Wife manages the patients medications and finances. She also cooks, cleans, does laundry, and grocery shops.  Children (3).    Medical: Multivessel CAD, CKD, hypertension, mild aortic stenosis, lupus anticoagulant inhibitor syndrome, and hyperlipidemia     Functional:    Prior Level of Fx:  Was independent with most ADLs but needed assistance to don/doff his shoes and socks per his wife. Pt. Used a rollator or cane at times for mobility for long distances. He was still driving. They hire someone for lawn maintenance. He does not have a medic alert.    Residence:  Lives with his spouse in [place] in a single-story home with no steps to enter the residence. He uses a tub/shower combination with grab bars, HHS, and a tub chair. He has an elevated toilet with no grab bars.    DME: 3 canes, rollator, " standard walker, grab bars, tub chair, and HHS.    Psychiatric:  Hx mental health/substance abuse: Spouse denies history of mental health illnesses. He is a former tobacco smoker   Depression/Anxiety: no complaints     FLUoxetine capsule 10 mg qd  Pain: incisional-controlled  gabapentin capsule 100 mg TID  rOPINIRole tablet 1 mg BID     HYDROcodone-acetaminophen 5-325 mg 1 tab q4hr PRN pain  Bowels/Bladder: last BM 3/5 x 3      Appetite: fair     Sleep: good      QUEtiapine tablet 50 mg qHS    Physical Exam  General: well-developed, well-nourished, tremor at rest  Eye: EOMI, clear conjunctiva, eyelids normal  HENT: normocephalic, oropharynx and nasal mucosal surfaces moist  Neck: full range of motion, supple  Respiratory: equal chest rise, no SOB, no audible wheeze  Cardiovascular: regular rate and rhythm, no edema  Gastrointestinal: soft, non-tender, non-distended   Musculoskeletal: full range of motion of all extremities/spine with generalized weakness  Integumentary: no rashes or skin lesions present, midline sternal ulkgccwr-XZQ-p/d/i, chest tube site x 2 -nxucec-XVC-r/d/I, RLE and LLE harvest incision sites-LONDON-c/d/i, Right groin puncture site-LONDON-c/d/I, sacral/buttocks-reddened  Neurologic: cranial nerves intact, no signs of peripheral neurological deficit, motor/sensory function intact, resting tremor-BUE  *MD performed and documented physical examination     Labs:   Latest Reference Range & Units 03/06/23 06:46   WBC 4.5 - 11.5 x10(3)/mcL 13.1 (H)   RBC 4.70 - 6.10 x10(6)/mcL 3.47 (L)   Hemoglobin 14.0 - 18.0 g/dL 10.2 (L)   Hematocrit 42.0 - 52.0 % 31.6 (L)   MCV 80.0 - 94.0 fL 91.1   MCH pg 29.4   MCHC 33.0 - 36.0 g/dL 32.3 (L)   RDW 11.5 - 17.0 % 15.4   Platelets 130 - 400 x10(3)/mcL 231   MPV 7.4 - 10.4 fL 10.8 (H)   Neut % % 78.6   LYMPH % % 7.9   Mono % % 11.7   Eosinophil % % 0.1   Basophil % % 0.2   Immature Granulocytes % 1.5   Neut # 2.1 - 9.2 x10(3)/mcL 10.33 (H)   Lymph # 0.6 - 4.6 x10(3)/mcL  1.04   Mono # 0.1 - 1.3 x10(3)/mcL 1.54 (H)   Eos # 0 - 0.9 x10(3)/mcL 0.01   Baso # 0 - 0.2 x10(3)/mcL 0.02   Immature Grans (Abs) 0 - 0.04 x10(3)/mcL 0.20 (H)   nRBC % 0.0   Sodium 136 - 145 mmol/L 140   Potassium 3.5 - 5.1 mmol/L 3.1 (L)   Chloride 98 - 107 mmol/L 102   CO2 23 - 31 mmol/L 26   BUN 8.4 - 25.7 mg/dL 40.3 (H)   Creatinine 0.73 - 1.18 mg/dL 1.51 (H)   eGFR mls/min/1.73/m2 46   Glucose 82 - 115 mg/dL 105   Calcium 8.8 - 10.0 mg/dL 9.2   Phosphorus 2.3 - 4.7 mg/dL 3.2   Magnesium 1.60 - 2.60 mg/dL 1.60   Alkaline Phosphatase 40 - 150 unit/L 92   PROTEIN TOTAL 5.8 - 7.6 gm/dL 6.3   Albumin 3.4 - 4.8 g/dL 2.3 (L)   Albumin/Globulin Ratio 1.1 - 2.0 ratio 0.6 (L)   Prealbumin 16.0 - 42.0 mg/dL 13.4 (L)   BILIRUBIN TOTAL <=1.5 mg/dL 0.7   AST 5 - 34 unit/L 45 (H)   ALT 0 - 55 unit/L 44   Globulin, Total 2.4 - 3.5 gm/dL 4.0 (H)   (H): Data is abnormally high  (L): Data is abnormally low   Latest Reference Range & Units 03/04/23 05:33   Iron 65 - 175 ug/dL 23 (L)   TIBC 250 - 450 ug/dL 136 (L)   Iron Binding Capacity Unsaturated 69 - 240 ug/dL 113   Transferrin mg/dL 120   Ferritin 21.81 - 274.66 ng/mL 3,606.92 (H)   Iron Saturation 20 - 50 % 17 (L)   (L): Data is abnormally low  (H): Data is abnormally high        Diagnostics:  XR CHEST PA AND LATERAL  Impression:  No acute cardiopulmonary process identified.  Date:                                            02/23/2023  Time:                                           18:35    CT HEAD WITHOUT CONTRAST  Impression:  No acute intracranial findings.  Date:                                            03/01/2023  Time:                                           16:34    XR CHEST 1 VIEW  FINDINGS:  Left-sided PICC line has its tip over the cavoatrial junction.  There is stable cardiomegaly.  There is improved aeration in the right lung base, with similar small left pleural effusion and basilar airspace opacity.  There is no definite visible  pneumothorax.  Impression:  Improving aeration in the right lung base with otherwise stable exam.  Date:                                            03/04/2023  Time:                                           09:01          Assessment/Plan  Hospital   CAD (coronary artery disease)   S/P CABG x 2     Non-Hospital   Lupus anticoagulant inhibitor syndrome   Iron deficiency anemia   Elevated homocysteine   Low vitamin B12 level   Anemia   Angina, class III   Elevated partial thromboplastin time (PTT)   Coronary artery disease   Stage 3 chronic kidney disease   Hypercholesteremia   Bleeding   Altered mental status   Hypertension   Head injury   Dehydration   NSTEMI (non-ST elevated myocardial infarction)   Ischemic cardiomyopathy   GERD (gastroesophageal reflux disease)   Enlarged prostate   Aortic aneurysm, abdominal   Skin cancer       Wounds: midline sternal utndvawj-GXH-h/d/i, chest tube site x 2 -jgjbmj-VNB-d/d/I, RLE and LLE harvest incision sites-LONDON-c/d/i, Right groin puncture site-LONDON-c/d/I, sacral/buttocks-reddened  S/p CABG x 2 on 2/24 by Dr Hagan  Precautions:sternal   Bracing:   Swallowing:   Function: Tolerating therapy. Continue PT/OT  VTE Prophylaxis:   enoxaparin injection 40 mg SubQ q24hr  Code Status: FULL CODE   Discharge:Lives with his spouse in [place] in a single-story home with no steps to enter the residence. Completed high school. He was in the [employer]. He is retired from electrical/refrigeration work.  Wife manages the patients medications and finances. She also cooks, cleans, does laundry, and grocery shops.  Children (3). Date pending.   Dos 3/6/23  I have evaluated the patient on initial IRF admit. I have been involved in rehab prescreen. I have reviewed records.I have reviewed admit orders.I have discussed case with IM team.  I find the patient appropriate for, in need of and should tolerate an aggressive IRF program with good potential for functional improvement.  I am in agreement  with initial Plan of Care  I have been involved in the creation of this initial PM&R consult with Jaylene Mcgee NP, conducted additional independent physical examination and assisted with medical documentation.

## 2023-03-03 NOTE — PLAN OF CARE
PHQ-9 completed (score=22 severe symptoms).  Pt reports that, immediately following surgery, he felt he would be better off dead, but now, he is feeling better and is more hopeful for recovery.    Pt also reports having a spinal cord stimulator (Dr. Mendoza in [place], [place]) that was turned off prior to surgery and has not been turned back on.    Will relay above to medical team.

## 2023-03-03 NOTE — H&P
[facility] ([facility])  Rehab Admission H&P    Patient Name: Hunter Navarrete  MRN: [id number]  Age: 82 y.o. Sex: male  : 1940  Hospital Length of Stay: 0 days  Date of Service: 3/3/2023   Chief Complaint:  Multivessel CAD s/p left heart catheterization with InStent restenosis and multivessel coronary disease on 02/15/2023 s/p CABG x2 (lima to LAD, RSVG to OM with right GSV EVH) on 2023    Subjective:   History of Present Illness   82-year-old WM presented to [facility] on 02/15 with complaints of substernal chest pain with shortness of breath.  PMH significant for CAD with PCI, CKD, hypertension, mild aortic stenosis, lupus anticoagulant inhibitor syndrome, and hyperlipidemia.  Workup significant for elevated troponin.  Transferred to [facility] for cardiology evaluation.  Initiated heparin drip.  Tolerated left heart catheterization on 02/15 significant for multivessel coronary disease with InStent restenosis.  Hematology recommended transfer to tertiary facility for CABG 2/2 history of lupus anticoagulant.  Tolerated transfer to [facility] on .  Initiated Tridil drip and titrated for chest pain.  Required 2 units PRBC transfusion on .  On  tolerated CABG x2 (lima to LAD, RSVG to OM with right GSV EVH) without perioperative complications.  Postoperatively required IV fluid gentle hydration per Nephrology 2/2 CKD stage IIIB.  Chest tubes removed.  Continued with postoperative DANIELLA on CKD.  Nephrology will continue to follow.  Discontinue Lasix IV on  and initiated home dose of Lasix 40 mg daily.  Initiated Plavix on . Tolerated transfer to [facility] inpatient rehab unit on 3/3 without incident.    Tolerated transfer.  Sitting in bed comfortably.  Reports good sleep and appetite.  Last BM .  Vital signs overall at goal with no recent recorded fevers.  Most recent labs and imaging reviewed and documented below.    Past Medical History: Multivessel  CAD, CKD, hypertension, mild aortic stenosis, lupus anticoagulant inhibitor syndrome, and hyperlipidemia  Procedure history:  left heart catheterization with InStent restenosis and multivessel coronary disease on 02/15/2023 s/p CABG x2 (lima to LAD, RSVG to OM with right GSV EVH) on 2023, Bilateral shoulder surgery, spinal cord stimulator implant, right knee surgery, right hip replacement, cataract surgery, spinal surgery x4  Family History:  Father:  at unknown age + heart disease  Social History:  Completed high school.  Was in the [employer].  Retired from electrical/refrigeration work.  .  Three children.  Wife manages medications and finances.  Wife cooks, cleans, does laundry, grocery shops.  Does not have medical alert.  (-) TOB.  Former smoker, quit   (-) ETOH.   (-) Illicit drug use.   Prior level of function: Independent with most ADLs but needed assistance to Don and doff shoes and socks.  Used a Rollator or cane at times for mobility for long distances.  Still driving.  Hire someone for lawn maintenance.  Residence:  Lives with spouse in [place] in a single-story home with no steps to enter the residence.  Uses a tub/shower combo with grab bars, HHS, tub chair.  Has an elevated toilet with no grab bars.  DME:  3 cane, Rollator, standard walker, grab bars, tub chair, HHS  Anticipated discharge destination:  Home with home health    Review of patient's allergies indicates:   Allergen Reactions    Cardura [doxazosin] Hives    Lyrica [pregabalin] Other (See Comments)    Paxil [paroxetine hcl] Other (See Comments)    Restoril [temazepam] Hallucinations    Vioxx [rofecoxib] Swelling    Zithromax [azithromycin] Hives        Current Facility-Administered Medications:     [START ON 3/4/2023] aspirin EC tablet 81 mg, 81 mg, Oral, Daily, SHIRLEY Haider    atorvastatin tablet 40 mg, 40 mg, Oral, QHS, SHIRLEY Haider    benzonatate capsule 100 mg, 100 mg, Oral, TID PRNYaneth  SHIRLEY Oneill    [START ON 3/4/2023] clopidogreL tablet 75 mg, 75 mg, Oral, Daily, SHIRLEY Haider    docusate sodium capsule 100 mg, 100 mg, Oral, BID, SHIRLEY Haider    enoxaparin injection 40 mg, 40 mg, Subcutaneous, Daily, SHIRLEY Haider    [START ON 3/4/2023] ezetimibe tablet 10 mg, 10 mg, Oral, Daily, SHIRLEY Haider    famotidine tablet 20 mg, 20 mg, Oral, BID PRN, SHIRLEY Haider    [START ON 3/4/2023] FLUoxetine capsule 10 mg, 10 mg, Oral, Daily, SHIRLEY Haider    [START ON 3/4/2023] furosemide tablet 40 mg, 40 mg, Oral, Daily, SHIRLEY Haider    gabapentin capsule 100 mg, 100 mg, Oral, TID, SHIRLEY Haider    hydrALAZINE injection 10 mg, 10 mg, Intravenous, Q6H PRN, SHIRLEY Haider    HYDROcodone-acetaminophen 5-325 mg per tablet 1 tablet, 1 tablet, Oral, Q4H PRN, SHIRLEY Hadier    hydrOXYzine pamoate capsule 50 mg, 50 mg, Oral, Nightly PRN, SHIRLEY Haider    labetalol 20 mg/4 mL (5 mg/mL) IV syring, 10 mg, Intravenous, Q4H PRN, SHIRLEY Haider    meclizine tablet 25 mg, 25 mg, Oral, TID PRN, SHIRLEY Haider    metoprolol injection 10 mg, 10 mg, Intravenous, Q2H PRN, SHIRLEY Haider    metoprolol tartrate (LOPRESSOR) tablet 50 mg, 50 mg, Oral, BID, SHIRLEY Haider    ondansetron disintegrating tablet 4 mg, 4 mg, Oral, Q6H PRN, SHIRLEY Haider    [START ON 3/4/2023] pantoprazole EC tablet 40 mg, 40 mg, Oral, Daily, SHIRLEY Haider    [START ON 3/4/2023] predniSONE tablet 2.5 mg, 2.5 mg, Oral, Daily, SHIRLEY Haider    QUEtiapine tablet 50 mg, 50 mg, Oral, QHS, SHIRLEY Haider    rOPINIRole tablet 1 mg, 1 mg, Oral, BID, SHIRLEY Haider    sucralfate tablet 1 g, 1 g, Oral, QID (AC & HS), SHIRLEY Haider    tamsulosin 24 hr capsule 0.4 mg, 0.4 mg, Oral, QHS, SHIRLEY Haider     Review of Systems   Complete 12-point review of symptoms negative except for what's mentioned in  "HPI     Objective:     /70   Pulse 90   Temp 98.1 °F (36.7 °C) (Oral)   Resp 18   Ht 5' 11" (1.803 m)   Wt 90 kg (198 lb 6.6 oz)   SpO2 96%   BMI 27.67 kg/m²       Physical Exam  Vitals reviewed.   Eyes:      Pupils: Pupils are equal, round, and reactive to light.   Cardiovascular:      Rate and Rhythm: Normal rate and regular rhythm.      Heart sounds: Normal heart sounds.   Pulmonary:      Effort: Pulmonary effort is normal.      Breath sounds: Normal breath sounds.   Abdominal:      General: Bowel sounds are normal.   Musculoskeletal:         General: Normal range of motion.   Skin:     General: Skin is warm.      Comments: Midline sternal incision open air dry and intact   Neurological:      General: No focal deficit present.      Mental Status: He is alert and oriented to person, place, and time.      Motor: Weakness present.   Psychiatric:         Mood and Affect: Mood normal.   *MD performed and documented physical examination         Lines/Drains/Airways       Peripherally Inserted Central Catheter Line  Duration             PICC Triple Lumen 02/27/23 1045 left basilic 4 days              Drain  Duration                  Urethral Catheter 02/27/23 1150 4 days                    Labs  Recent Results (from the past 24 hour(s))   Comprehensive Metabolic Panel    Collection Time: 03/03/23  1:29 AM   Result Value Ref Range    Sodium Level 138 136 - 145 mmol/L    Potassium Level 3.6 3.5 - 5.1 mmol/L    Chloride 103 98 - 107 mmol/L    Carbon Dioxide 24 23 - 31 mmol/L    Glucose Level 96 82 - 115 mg/dL    Blood Urea Nitrogen 77.7 (H) 8.4 - 25.7 mg/dL    Creatinine 1.80 (H) 0.73 - 1.18 mg/dL    Calcium Level Total 9.7 8.8 - 10.0 mg/dL    Protein Total 6.5 5.8 - 7.6 gm/dL    Albumin Level 2.4 (L) 3.4 - 4.8 g/dL    Globulin 4.1 (H) 2.4 - 3.5 gm/dL    Albumin/Globulin Ratio 0.6 (L) 1.1 - 2.0 ratio    Bilirubin Total 0.7 <=1.5 mg/dL    Alkaline Phosphatase 101 40 - 150 unit/L    Alanine Aminotransferase 26 0 " - 55 unit/L    Aspartate Aminotransferase 45 (H) 5 - 34 unit/L    eGFR 37 mls/min/1.73/m2       Radiology  CUS 02/20/23: The right internal carotid artery demonstrated 50-69% stenosis. The left internal carotid artery demonstrated 50-69% stenosis.  Bilateral vertebral arteries were patent with antegrade flow.    [facility] 02/15/2023: LM 0% stenosis mLAD proximal 70% ISR D1 ostial 50% stenosis D2 ostial 50% stenosis Lcx: previous stent; proximal Lcx  90% ISR; mid Lcx 30% stenosis OM1 proximal 80% stenosis RCA patent stents to ostium. Mid RCA proximal subsection- 60% stenosis; Mid RCA distal subsection 70& stenosis; distal RCA proximal subsection 100% stenosis. Fills left to right     TTE 02/16/2023: Global LV SF is borderline normal. LVEF 45-50%. LA is mildly enlarged. Moderate calcification of the aortic valve is noted. Mild AS is present. Mild to moderate AR. Mild to moderate MAC is noted. Mild MR. Trace TR. PASP 40 mmHg. Optison used to enhance endocardial border.   Assessment/Plan:     82 y.o. WM admitted on 3/3/2023    Multivessel CAD  - s/p left heart catheterization with InStent restenosis and multivessel coronary disease on 02/15/2023  - s/p CABG x2 (lima to LAD, RSVG to OM with right GSV EVH) on 02/24/2023  - sternal precautions  - daily weights  - continue   Lasix 40 mg daily   Metoprolol tartrate 50 mg b.i.d.   Aspirin 81 mg daily   Plavix 75 mg daily    Atorvastatin 40 mg at night   Hydrocodone-acetaminophen 5-325 mg p.r.n. every 4 hours  - not on Ace/Arb 2/2 DANIELLA  - follow-up with cardiology and CV surgery outpatient    ICMO  - EF 45-50% TTE 02/15/2023 improved from 40%  - continue   Lasix 40 mg daily   Metoprolol tartrate 50 mg b.i.d.   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   Aspirin 81 mg daily   Plavix 75 mg daily    Atorvastatin 40 mg at night  - not on Ace/Arb 2/2 DANIELLA  - follow-up with cardiology outpatient    DANIELLA on CKD stage III  - Renal indices improving  - Avoid NSAIDs (Advil, ibuprofen, naproxen...) and poe-2 inhibitors (Mobic, Celebrex)    - encourage oral hydration  - defer to Nephrology    HTN  - at goal!!  - continue   Metoprolol tartrate 50 mg b.i.d.   Hydralazine 10 mg every 2 hours as needed for BP > 160/90   Labetalol 10 mg every 2 hours as needed for BP > 160/90    HLD  - FLP at goal!!  - continue   Atorvastatin 40 mg at night  Zetia 10 mg daily     RLS  - stable  - continue   Gabapentin 100 mg t.i.d.   Ropinirole 1 mg b.i.d.    Bilateral RICKEY  - moderate bilateral  - continue   Atorvastatin 40 mg at night  Zetia 10 mg daily   Plavix 75 mg daily   - follow-up with cardiology outpatient    Constipation  - stable  - continue  Colace 100 mg b.i.d.    GERD  - Avoid spicy foods, and nothing to eat or drink within x2 hours of bedtime or laying flat (water is ok)   - Avoid NSAIDs (Advil, ibuprofen, naproxen...) and poe-2 inhibitors (Mobic, Celebrex)    - continue   Protonix 40 mg daily    Carafate 1 g a.c. and HS     Lupus anticoagulant inhibitor syndrome  - stable  - continue  Prednisone 2.5 mg daily   - follow-up with Hematology outpatient    Major depressive disorder  - in remission  - continue  Fluoxetine 10 mg daily    Insomnia  - stable  - continue  Seroquel 50 mg at bedtime    BPH  - stable  - continue  Flomax 0.4 mg at bedtime     Normocytic anemia  - asymptomatic  - s/p transfusion   x2 units PRBC on 2/24/2023   x2 units PRBC on 2/23/2023  - H/H stable   - no evidence of active bleeds  - will closely monitor and transfuse if needed     MEDICAL PLAN:  - Admit to [facility] Unit  - Medical reconciliation completed and included in the electronic health record  - Draw admit labs including  CBC, CMP, and Prealbumin  - Maintain weightbearing status as ordered  - Monitor postoperative surgical incision changing dressing daily  - Teach skin protection and wound prevention techniques  - Initiate fall precaution  - Initiate bowel training program  - Initiate bladder training as indicated  - Pain management  - IS q2 hours while awake    THERAPEUTIC PLAN:  - Physical therapy 60-90 minutes 5 to week to increase functional mobility, maintain weightbearing precautions, increase lower extremity restrengthening, increase overall activity tolerance, teach balance and safety measures as well as fall precautions, make equipment and orthotic recommendations, be involved in family training and discharge planning, monitor pain levels and vital signs during therapy adjusting treatment accordingly  - Occupational therapy 60-90 minutes 5 times a week to increase functional independence with activities of daily living, maintain weightbearing precautions, teach use of adaptive equipment, increase upper extremity restrengthening, increase overall activity tolerance, teach balance and safety measures as well as fall precautions, make equipment and orthotic recommendations, be involved in family training and discharge planning, monitor pain levels and vital signs during therapy adjusting treatment accordingly  - Speech and language pathology will do an in-depth evaluation of patient's cognition, phonation, and swallowing. They will initiate therapy 30- 60 minutes 5 times a week as indicated  - Recreational therapy will incorporate all patient's functional abilities  into recreational activities that will require visual, spatial, and perceptual abilities; gross and fine motor coordination skills; the cognition to follow multistep commands; dynamic and static balance and reaching abilities. They will also incorporate animal assisted therapy into their weekly program  - Rehabilitation nursing will act as patient family physician  liaison. They will integrate patient's functional abilities, after discussions with therapist, into everyday activities with the CNA's,  LPN's and RNs that will include but are not limited to dressing, bathing, feeding, grooming, toileting, transfers, hygiene etc. They will administer medications watching for wanted as well as unwanted effects. They will initiate DVT/VTE prophylaxis as ordered. Will monitor vital signs, lab values, and pain levels, making appropriate physician notification. They will monitor skin integrity including postop incision, making daily dressing changes. They will teach skin protection and wound prevention techniques. They will initiate bowel training They will initiate bladder training as indicated. They will initiate fall precautions  - Rehabilitation counseling/case management will act as patient and family liaison. They will set up family conferences, family training, aid in discharge planning, and aid in the procurement of assistive devices  - Patient will have 24 hour availability to physiatric care  - Patient will have nutritional services involved, monitoring oral input and visceral protein stores. They will make dietary and supplement recommendations and follow-up as necessary  - Patient will have weekly interdisciplinary team conferences  - Patient will have initial family conference and follow up conferences as indicated  - Patient will have family training prior to discharge     Estimated length of stay - 14-17 days  Anticipated discharge destination - Home with   Medical status - stabilizing postoperatively; will need to monitor pain levels, postoperative skin integrity, watch for evidence of deep vein thrombosis, monitor postoperative H&H, monitor vital signs closely.  Medical prognosis - Good for post-op healing and functional improvement  Previous functional Status:  Independent with most ADLs but needed assistance to Don and doff shoes and socks.  Used a Rollator or cane  at times for mobility for long distances.  Still driving.  Hire someone for lawn maintenance.  Present Functional Status:  Min to mod assist    VTE Prophylaxis:  Lovenox 40 mg daily  COVID-19 testing:  Negative on 03/03  COVID-19 vaccination status:  Vaccinated x4    POA:  Yes (Romy, daughter and Nav, son)  Living will: yes  Contacts:  Giovana Navarrete (spouse) [phone]       Romy (daughter) [phone]    CODE STATUS: Full  Internal Medicine (attending): Xander Enriquez MD  Physiatry (consulting):  Dilip Hargrove MD    OUTPATIENT PROVIDERS  PCP:  Mariella Bethea MD   Hematology:  Missy Davila MD in [facility]  Cardiology: Curtis Loredo MD at [facility]   CV surgery:  Spencer Hagan MD  Nephrology:  Darinel Bedoya MD    DISPOSITION: Condition stable. Tolerated transfer.  Recent labs and imaging reviewed.  Rehab admission orders initiated.  Med reconciliation completed.  Consult physiatry for rehab and pain management.  PT/OT/RT/ST to eval and treat.  Last BM 2/28.  Initiate Dulcolax suppository once now, if no BM give soapsuds enema.  Sleep hygiene and appetite at goal.  Vital signs at goal.  Labs in a.m..  CV surgery okayed to start Plavix per Cardiology recommendations.  Discussed with CV surgery.  Consult Nephrology for continued DANIELLA on CKD.    PHYSICIAN ATTESTATION: I have been involved in the patient's rehabilitation prescreening process and I have now completed the post admission physician evaluation. Patient is in need of, appropriate for, and should tolerate the above outlined inpatient rehabilitation plan. I believe this is necessary to reach maximal postoperative healing and maximal functional abilities. I do not believe this would be possible in a timely fashion in a less intensive setting      Yaneth Douglas NP conducted independent physical examination and assisted with medical documentation.    Total time spent on this encounter including chart review and direct MD + NP 1-on-1 patient  interaction: 76 minutes   Over 50% of this time was spent in counseling and coordination of care

## 2023-03-03 NOTE — PLAN OF CARE
Problem: Physical Therapy  Goal: Physical Therapy Goal  Description: Goals to be met by: 4/3/23    Patient will increase functional independence with mobility by performin. Pt will be ada with sit to supine and supine to sit  2. Pt will be sba with sit to stand  3. Pt will ambulate 150ft with a rw sba      Outcome: Ongoing, Progressing

## 2023-03-03 NOTE — PT/OT/SLP RE-EVAL
Physical Therapy Re-evaluation    Patient Name:  Hunter Navarrete   MRN:  [id number]    Recommendations:     Discharge Recommendations: rehabilitation facility  Discharge Equipment Recommendations: none   Barriers to discharge:  medical dx, decreased functional independence     Assessment:     Hunter Navarrete is a 82 y.o. male admitted with a medical diagnosis of CAD (coronary artery disease) s/p CABG.  He presents with the following impairments/functional limitations: weakness, gait instability, impaired endurance, impaired balance, decreased lower extremity function, impaired cardiopulmonary response to activity, impaired self care skills.    Patient tolerated PT re-eval well. Patient was more steady today when standing and ambulating. For stands, he did not demonstrate a strong posterior lean like previous days however still requiring modA for sit<>stand + VC for proper hand positioning.  Pt was able to ambulate 60ft x2 trials with Vijaya and RW. Patient is making appropriate progress but would still benefit from acute services; still recommending placement beyond this stay in order to maximize functional independence.    Rehab Prognosis:  good; patient would benefit from acute skilled PT services to address these deficits and reach maximum level of function.      Recent Surgery: Procedure(s) (LRB):  CORONARY ARTERY BYPASS GRAFT (CABG) (N/A) 7 Days Post-Op    Plan:     During this hospitalization, patient to be seen 6 x/week to address the above listed problems via gait training, therapeutic exercises, therapeutic activities  Plan of Care Expires:  03/22/23  Plan of Care Reviewed with: patient, spouse    Subjective     Communicated with NSG prior to session.  Patient found up in chair with pulse ox (continuous), telemetry, medellin catheter, blood pressure cuff, oxygen upon PT entry to room, agreeable to evaluation.      Chief Complaint: n/a  Patient comments/goals: to get stronger   Pain/Comfort:  Pain Rating 1:  0/10    Patients cultural, spiritual, Islam conflicts given the current situation: no      Objective:     Patient found with: pulse ox (continuous), telemetry, medellin catheter, blood pressure cuff, oxygen     General Precautions: Standard, fall, sternal  Orthopedic Precautions: N/A  Braces: N/A  Respiratory Status: Nasal cannula, flow 1 L/min    Exams:  Cognitive Exam:  Patient is oriented to Person and Place  RLE Strength: WFL  LLE Strength: WFL    Functional Mobility:  Transfers:     Sit to Stand:  moderate assistance with rolling walker  Gait: pt ambulated 60 ft x2 trials with a RW and Ada; seated rest break taken in btw trials   On trial 1, pt demonstrated a step to gait pattern with B decreased foot clearance and step length, and despite max VC pt unable to correct  On trial 2, pt was able to demo a slow but steady step through gait pattern consistently  On both trial pt intermittently required VC/TC for upright head/trunk 2/2 forward flexed posture    Patient left up in chair with all lines intact and call button in reach.    GOALS:   Multidisciplinary Problems       Physical Therapy Goals          Problem: Physical Therapy    Goal Priority Disciplines Outcome Goal Variances Interventions   Physical Therapy Goal     PT, PT/OT Ongoing, Progressing     Description: Goals to be met by: 4/3/23    Patient will increase functional independence with mobility by performin. Pt will be ada with sit to supine and supine to sit  2. Pt will be sba with sit to stand  3. Pt will ambulate 150ft with a rw sba                           History:     Past Medical History:   Diagnosis Date    Acid reflux     Anemia     Aortic aneurysm, abdominal     Arthritis     Bronchitis     Cataract     Celiac artery stenosis     50% by CTA abdomen 2020    CKD (chronic kidney disease)     45% FUNCTION    Coronary artery disease     Encounter for blood transfusion     Enlarged prostate     GERD (gastroesophageal reflux disease)      Hemorrhoids     Hypercholesteremia     Hypertension     Iron deficiency anemia, unspecified     Leaky heart valve     Lupus anticoagulant disorder     Renal artery stenosis     by CTA 7/27/2020    Skin cancer     Unspecified chronic bronchitis     UTI (urinary tract infection)        Past Surgical History:   Procedure Laterality Date    ACF      BACK SURGERY      RODS IN BACK; 4 SURGIERIES    BONE MARROW BIOPSY      CARDIAC ANGIOGRAM WITH STENT      CATARACT SX Bilateral     CORONARY ANGIOGRAPHY N/A 2/15/2023    Procedure: ANGIOGRAM, CORONARY ARTERY;  Surgeon: Rito Vega MD;  Location: [facility] CATH;  Service: Cardiology;  Laterality: N/A;    CORONARY ARTERY BYPASS GRAFT (CABG) N/A 2/24/2023    Procedure: CORONARY ARTERY BYPASS GRAFT (CABG);  Surgeon: Spencer Hagan MD;  [facility];  Service: Cardiothoracic;  Laterality: N/A;  CABG / EVH //   ECHO NOTIFIED    HIP SURGERY Right     THR    KNEE SURGERY Right     ATS    LEFT HEART CATHETERIZATION Left 07/22/2021    Procedure: Left heart cath;  Surgeon: Curtis Loredo MD;  Location: [facility] CATH;  Service: Cardiology;  Laterality: Left;    LEFT HEART CATHETERIZATION Left 09/22/2022    Procedure: Left heart cath;  Surgeon: Giorgi Barker MD;  Location: [facility] CATH;  Service: Cardiology;  Laterality: Left;    SHOULDER SURGERY Bilateral     SPINAL CORD STIMULATOR IMPLANT         Time Tracking:     PT Received On: 03/03/23  PT Start Time: 1048     PT Stop Time: 1106  PT Total Time (min): 18 min     Billable Minutes: Re-eval 1      03/03/2023

## 2023-03-03 NOTE — NURSING
Nurses Note -- 4 Eyes      3/3/2023   4:52 AM      Skin assessed during: Daily Assessment      [x] No Pressure Injuries Present    []Prevention Measures Documented      [] Yes- Altered Skin Integrity Present or Discovered   [] LDA Added if Not in Epic (Describe Wound)   [] New Altered Skin Integrity was Present on Admit and Documented in LDA   [] Wound Image Taken    Wound Care Consulted? No    Attending Nurse:  Elza Ceballos RN     Second RN/Staff Member:  Destinee Chun

## 2023-03-03 NOTE — PROGRESS NOTES
Hunter Navarrete is a 82 y.o. male patient.   1. Multiple vessel coronary artery disease    2. Carotid stenosis    3. Heart abnormality    4. CAD (coronary artery disease)    5. Atherosclerosis of native coronary artery of native heart without angina pectoris    6. Irregular cardiac rhythm    7. Heart failure as complication of care      Past Medical History:   Diagnosis Date    Acid reflux     Anemia     Aortic aneurysm, abdominal     Arthritis     Bronchitis     Cataract     Celiac artery stenosis     50% by CTA abdomen 7/27/2020    CKD (chronic kidney disease)     45% FUNCTION    Coronary artery disease     Encounter for blood transfusion     Enlarged prostate     GERD (gastroesophageal reflux disease)     Hemorrhoids     Hypercholesteremia     Hypertension     Iron deficiency anemia, unspecified     Leaky heart valve     Lupus anticoagulant disorder     Renal artery stenosis     by CTA 7/27/2020    Skin cancer     Unspecified chronic bronchitis     UTI (urinary tract infection)      No past surgical history pertinent negatives on file.  Scheduled Meds:   aspirin  81 mg Oral Daily    atorvastatin  40 mg Oral Daily    calcium carbonate  500 mg Oral Daily    docusate sodium  100 mg Oral BID    ezetimibe  10 mg Oral Daily    FLUoxetine  10 mg Oral Daily    folic acid  1 mg Oral Daily    furosemide  40 mg Oral Daily    gabapentin  100 mg Oral TID    metoprolol tartrate  50 mg Oral BID    pantoprazole  40 mg Oral Daily    prednisoLONE  2.5 mg Oral Daily    QUEtiapine  50 mg Oral QHS    rOPINIRole  1 mg Oral BID    sodium chloride 0.9%  10 mL Intravenous Q6H    sucralfate  1 g Oral QID (AC & HS)     Continuous Infusions:   DOBUTamine IV infusion (non-titrating) Stopped (02/27/23 1420)     PRN Meds:sodium chloride, sodium chloride, sodium chloride, acetaminophen, acetaminophen, albumin human 5%, albuterol-ipratropium, calcium gluconate IVPB, calcium gluconate IVPB, calcium gluconate IVPB, dextrose 10%, dextrose 10%,  "hydrALAZINE, HYDROcodone-acetaminophen, HYDROcodone-acetaminophen, hydrOXYzine HCL, labetalol, magnesium sulfate IVPB, magnesium sulfate IVPB, meclizine, morphine, ondansetron, oxyCODONE, potassium chloride in water, potassium chloride in water, potassium chloride in water, Flushing PICC Protocol **AND** sodium chloride 0.9% **AND** sodium chloride 0.9%, sodium phosphate IVPB, sodium phosphate IVPB, sodium phosphate IVPB, tiZANidine, zolpidem    Review of patient's allergies indicates:   Allergen Reactions    Cardura [doxazosin] Hives    Lyrica [pregabalin] Other (See Comments)    Paxil [paroxetine hcl] Other (See Comments)    Restoril [temazepam] Hallucinations    Vioxx [rofecoxib] Swelling    Zithromax [azithromycin] Hives     There are no hospital problems to display for this patient.    Blood pressure (!) 151/68, pulse 101, temperature 97.8 °F (36.6 °C), resp. rate (!) 25, height 5' 10" (1.778 m), weight 88.7 kg (195 lb 8.8 oz), SpO2 (!) 94 %.    Subjective:    POD #7  Awake. Alert.   Sitting up in chair  Diuresing per renal     Objective:   AFVSS  Heart: Regular rate, tachycardic  Lungs: nonlabored, diminished at bases  Incision: c/d/i  Cr: 1.8        Assesment/Plan:    S/p CAB  DANIELLA on CKD per renal  Anemia of chronic disease  H/o Lupus  HTN     Plans per renal  Ambulate  IS  Rehab pending    GILMAR Thrasher  3/3/2023    "

## 2023-03-03 NOTE — PT/OT/SLP PROGRESS
Occupational Therapy  Treatment    Hunter Navarrete   MRN: [id number]   Admitting Diagnosis: CAD (coronary artery disease)    OT Date of Treatment: 03/03/23   OT Start Time: 1321  OT Stop Time: 1344  OT Total Time (min): 23 min     Billable Minutes:  Self Care/Home Management 2  Total Minutes: 23     OT/LONDON: LONDON     LONDON Visit Number: 5    General Precautions: Standard, fall  Orthopedic Precautions:    Braces:      Spiritual, Cultural Beliefs, Latter day Practices, Values that Affect Care: no    Subjective:  Communicated with RN prior to session.  85HR, 94o2, 130/65    Objective:  (Mod A-Sit to stand from low surface)  Pt. Performing stand step t/f from BS chair to BSC using RW for UE support with balance, Minimal assistance required for safe descend with total A for pericare. Adherence given to sternal pxns.   Pt. Ambulating to sink requiring Min A for balance demonstrating a posterior lean while performing grooming task (brushing teeth) with appropriate preparation noted.          Patient left up in chair with all lines intact and call button in reach    ASSESSMENT:  Hunter Navarrete is a 82 y.o. male with a medical diagnosis of CAD (coronary artery disease) Pt. Tolerated session well overall increased activity tolerance noted. Continue POC  Rehab potential is good    Activity tolerance: Good    Discharge recommendations: rehabilitation facility     Equipment recommendations: walker, rolling     GOALS:   Multidisciplinary Problems       Occupational Therapy Goals          Problem: Occupational Therapy    Goal Priority Disciplines Outcome Interventions   Occupational Therapy Goal     OT, PT/OT Ongoing, Progressing    Description: Pt will ambulate to bathroom with RW SBA  Pt will complete toilet t/f SBA  Pt will complete toileting tasks min A  Pt will complete LE dressing with AE PRN SBA  Pt will complete UE dressing with SBA  Pt will complete grooming in standing at sink SBA                       Plan:  Patient to be  seen 5 x/week, 6 x/week to address the above listed problems via self-care/home management, therapeutic activities, therapeutic exercises  Plan of Care expires: 03/14/23  Plan of Care reviewed with: patient         03/03/2023

## 2023-03-03 NOTE — PROGRESS NOTES
"[facility] - 3rd Floor Medical Telemetry  Cardiology  Progress Note    Patient Name: Hunter Navarrete  MRN: [id number]  Admission Date: 2/19/2023  Hospital Length of Stay: 12 days  Code Status: Full Code   Attending Physician: Spencer Hagan MD   Primary Care Physician: Mariella Bethea MD  Expected Discharge Date:   Principal Problem:<principal problem not specified>    Subjective:   Chief Complaint: Chest Pain (Known MVCAD)     HPI: Mr. Navarrete is an 81 y/o male, followed by Dr. Loredo who presented to [facility] with c/o chest pain. HS troponin 1149; therefore he was transferred to [facility] for cardiology services where he underwent LHC revealing multivessel disease and elevated LVEDP. He was started on diuretics, Plavix was placed on hold and he was transferred to [facility] for CABG evaluation. Patient is reporting mild chest tightness 3/10 currently    Hospital Course:   2.21.23:  Vitals Stable. Shoulder Pain this morning. On Nitro & Heparin Infusion. EKG with no overt ischemic changes.   2.22.23: Patient resting in bed. NAD. Denies CP/SOB. Creatinine 1.93 today- mildly decreased from 2.07 yesterday with addition of IVFs. H/H downtrending- 8.2/25.3 from 8.8/27.1 yesterday.   2.23.23: Patient sitting up in bedside chair. NAD. VSS. Creatinine improved to 1.65 today. H/H    10.5/31.5 post transfusion 2  units PRBCs.  2.24.23: Patient underwent 2V CABG today and is currently in ICU on MV, requiring pressor support. Mediastinal drain patent. He received 2 units of PRBCs intraop  2.25.23: Awake in bed. C/o incisional sternal pain. CT x 2 patent. Pressors have been weaned off. Currently on cleviprex drip  2.26.23: Patient awake in bed. Mediastinal drains have been removed. Creatinine has bumped. Nephrology adding IVF x 1L due to IVVD  2.27.23: NAD. Sitting in Bedside Chair. Denies SOB and Palps. + CP/Incisional. Crea 2.53 "I am ok."   2.28.23: NAD. Sitting in Bedside Chair. Denies SOB and Palps. + " "CP/Incisional. Crea 2.26. "I am ok." + Sinus Tachycardia.   3.1.23: NAD. Sitting in Bedside Chair. Denies SOB and Palps. + CP/Incisional. Crea Improved. "I am feeling better." SR Mostly/Tachycardia Improved.   3.2.23: NAD. Sitting up in chair. Denies SOB or palps. + Incisional CP. ST on tele. "I am doing okay."   3.3.23: NAD. Sitting up in chair. Denies SOB or palps. + Incisional CP. SR on tele. "I am feeling better."     PMH: ICMO, Native CAD, HLD, HTN, CARLYLE, CKD, lupus anticoagulant inhibitor syndrome, VHD- mild AS, AAA, pseudoaneurysm RFA  PSH: right total hip replacement, LHC, back surgery, spinal cord stimulator implant, knee surgery, cataract surgery  Family History: Brother- cancer, heart disease, lung cancer; father heart disease  Social History: Former smoker, occasional ETOH; denies illicit drug use     Previous Cardiac Diagnostics:   CUS 02/20/23:  The right internal carotid artery demonstrated 50-69% stenosis.  The left internal carotid artery demonstrated 50-69% stenosis.  Bilateral vertebral arteries were patent with antegrade flow.     [facility] 02/15/2023:  LM 0% stenosis  mLAD proximal 70% ISR  D1 ostial 50% stenosis  D2 ostial 50% stenosis  Lcx: previous stent; proximal Lcx  90% ISR; mid Lcx 30% stenosis  OM1 proximal 80% stenosis  RCA patent stents to ostium. Mid RCA proximal subsection- 60% stenosis; Mid RCA distal subsection 70& stenosis; distal RCA proximal subsection 100% stenosis. Fills left to right     TTE 02/16/2023:  Global LV SF is borderline normal. LVEF 45-50%. LA is mildly enlarged. Moderate calcification of the aortic valve is noted. Mild AS is present. Mild to moderate AR. Mild to moderate MAC is noted. Mild MR. Trace TR. PASP 40 mmHg. Optison used to enhance endocardial border.      [facility] 09/22/2022:  LM 0% stenosis  mLAD proximal subsection 50% ISR  D1 ostial 50% stenosis  D2 ostial 50% stenosis  pLCx mid subsection 70% ISR reduced to 10%, Preprocedure AMALIA III flow  noted. Post procedure " AMALIA III was present. Lesion previous treated on 7/22/21 with PTA/DAYAN  OM1 proximal 80% stenosis  mLCx distal subsection 30% stenosis  RCA proximal RCA stent widely patent  mRCA proximal subsection 60% subsection  mRCA distal subsection 70% stenosis  dRCA proximal subsection 100% stenosis. Fills left to right     CUS 05/13/2022:  60-79% stenosis in pRICA and mLICA  Antegrade flow to bilateral vertebral arteries    Review of Systems   Constitutional: Positive for malaise/fatigue.   Cardiovascular:  Negative for chest pain, dyspnea on exertion, irregular heartbeat and palpitations.   Respiratory:  Negative for shortness of breath.    All other systems reviewed and are negative.    Objective:     Vital Signs (Most Recent):  Temp: 98.2 °F (36.8 °C) (03/03/23 0400)  Pulse: 101 (03/03/23 0600)  Resp: (!) 22 (03/03/23 0601)  BP: (!) 158/72 (03/03/23 0600)  SpO2: 96 % (03/03/23 0600)   Vital Signs (24h Range):  Temp:  [97.5 °F (36.4 °C)-98.2 °F (36.8 °C)] 98.2 °F (36.8 °C)  Pulse:  [] 101  Resp:  [16-31] 22  SpO2:  [89 %-99 %] 96 %  BP: (104-158)/() 158/72     Weight: 88.7 kg (195 lb 8.8 oz)  Body mass index is 28.06 kg/m².    SpO2: 96 %         Intake/Output Summary (Last 24 hours) at 3/3/2023 0749  Last data filed at 3/3/2023 0545  Gross per 24 hour   Intake 500 ml   Output 3735 ml   Net -3235 ml       Lines/Drains/Airways       Peripherally Inserted Central Catheter Line  Duration             PICC Triple Lumen 02/27/23 1045 left basilic 3 days              Drain  Duration                  Urethral Catheter 02/27/23 1150 3 days                  Significant Labs:   Recent Results (from the past 72 hour(s))   Comprehensive Metabolic Panel    Collection Time: 03/01/23  1:58 AM   Result Value Ref Range    Sodium Level 136 136 - 145 mmol/L    Potassium Level 4.1 3.5 - 5.1 mmol/L    Chloride 107 98 - 107 mmol/L    Carbon Dioxide 21 (L) 23 - 31 mmol/L    Glucose Level 111 82 - 115 mg/dL    Blood Urea Nitrogen 76.4  (H) 8.4 - 25.7 mg/dL    Creatinine 2.08 (H) 0.73 - 1.18 mg/dL    Calcium Level Total 9.2 8.8 - 10.0 mg/dL    Protein Total 6.0 5.8 - 7.6 gm/dL    Albumin Level 2.2 (L) 3.4 - 4.8 g/dL    Globulin 3.8 (H) 2.4 - 3.5 gm/dL    Albumin/Globulin Ratio 0.6 (L) 1.1 - 2.0 ratio    Bilirubin Total 0.5 <=1.5 mg/dL    Alkaline Phosphatase 76 40 - 150 unit/L    Alanine Aminotransferase 8 0 - 55 unit/L    Aspartate Aminotransferase 28 5 - 34 unit/L    eGFR 31 mls/min/1.73/m2   Magnesium    Collection Time: 03/01/23  1:58 AM   Result Value Ref Range    Magnesium Level 2.10 1.60 - 2.60 mg/dL   CBC with Differential    Collection Time: 03/01/23  1:58 AM   Result Value Ref Range    WBC 5.9 4.5 - 11.5 x10(3)/mcL    RBC 2.85 (L) 4.70 - 6.10 x10(6)/mcL    Hgb 8.2 (L) 14.0 - 18.0 g/dL    Hct 26.8 (L) 42.0 - 52.0 %    MCV 94.0 80.0 - 94.0 fL    MCH 28.8 pg    MCHC 30.6 (L) 33.0 - 36.0 g/dL    RDW 15.9 11.5 - 17.0 %    Platelet 123 (L) 130 - 400 x10(3)/mcL    MPV 11.3 (H) 7.4 - 10.4 fL    Neut % 67.4 %    Lymph % 11.8 %    Mono % 19.0 %    Eos % 0.2 %    Basophil % 0.2 %    Lymph # 0.69 0.6 - 4.6 x10(3)/mcL    Neut # 3.95 2.1 - 9.2 x10(3)/mcL    Mono # 1.11 0.1 - 1.3 x10(3)/mcL    Eos # 0.01 0 - 0.9 x10(3)/mcL    Baso # 0.01 0 - 0.2 x10(3)/mcL    IG# 0.08 (H) 0 - 0.04 x10(3)/mcL    IG% 1.4 %    NRBC% 0.0 %   Type & Screen    Collection Time: 03/01/23 12:41 PM   Result Value Ref Range    Group & Rh A POS     Indirect Jose R GEL NEG    Prepare RBC    Collection Time: 03/01/23 12:41 PM   Result Value Ref Range    UNIT NUMBER [id number]     UNIT ABO/RH A POS     DISPENSE STATUS Selected     Unit Expiration [id number]     Product Code R9540G22     Unit Blood Type Code 6200     CROSSMATCH INTERPRETATION Compatible     UNIT NUMBER [id number]     UNIT ABO/RH A POS     DISPENSE STATUS Selected     Unit Expiration [id number]     Product Code M1191D03     Unit Blood Type Code 6200     CROSSMATCH INTERPRETATION Compatible    Comprehensive  Metabolic Panel    Collection Time: 03/02/23  2:08 AM   Result Value Ref Range    Sodium Level 139 136 - 145 mmol/L    Potassium Level 3.8 3.5 - 5.1 mmol/L    Chloride 107 98 - 107 mmol/L    Carbon Dioxide 23 23 - 31 mmol/L    Glucose Level 98 82 - 115 mg/dL    Blood Urea Nitrogen 77.7 (H) 8.4 - 25.7 mg/dL    Creatinine 2.21 (H) 0.73 - 1.18 mg/dL    Calcium Level Total 9.2 8.8 - 10.0 mg/dL    Protein Total 5.9 5.8 - 7.6 gm/dL    Albumin Level 2.2 (L) 3.4 - 4.8 g/dL    Globulin 3.7 (H) 2.4 - 3.5 gm/dL    Albumin/Globulin Ratio 0.6 (L) 1.1 - 2.0 ratio    Bilirubin Total 0.6 <=1.5 mg/dL    Alkaline Phosphatase 87 40 - 150 unit/L    Alanine Aminotransferase 12 0 - 55 unit/L    Aspartate Aminotransferase 29 5 - 34 unit/L    eGFR 29 mls/min/1.73/m2   Magnesium    Collection Time: 03/02/23  2:08 AM   Result Value Ref Range    Magnesium Level 1.90 1.60 - 2.60 mg/dL   CBC with Differential    Collection Time: 03/02/23  2:09 AM   Result Value Ref Range    WBC 7.9 4.5 - 11.5 x10(3)/mcL    RBC 2.78 (L) 4.70 - 6.10 x10(6)/mcL    Hgb 8.3 (L) 14.0 - 18.0 g/dL    Hct 25.6 (L) 42.0 - 52.0 %    MCV 92.1 80.0 - 94.0 fL    MCH 29.9 pg    MCHC 32.4 (L) 33.0 - 36.0 g/dL    RDW 15.6 11.5 - 17.0 %    Platelet 159 130 - 400 x10(3)/mcL    MPV 11.3 (H) 7.4 - 10.4 fL    Neut % 71.6 %    Lymph % 10.9 %    Mono % 15.5 %    Eos % 0.1 %    Basophil % 0.1 %    Lymph # 0.86 0.6 - 4.6 x10(3)/mcL    Neut # 5.66 2.1 - 9.2 x10(3)/mcL    Mono # 1.23 0.1 - 1.3 x10(3)/mcL    Eos # 0.01 0 - 0.9 x10(3)/mcL    Baso # 0.01 0 - 0.2 x10(3)/mcL    IG# 0.14 (H) 0 - 0.04 x10(3)/mcL    IG% 1.8 %    NRBC% 0.0 %   Comprehensive Metabolic Panel    Collection Time: 03/03/23  1:29 AM   Result Value Ref Range    Sodium Level 138 136 - 145 mmol/L    Potassium Level 3.6 3.5 - 5.1 mmol/L    Chloride 103 98 - 107 mmol/L    Carbon Dioxide 24 23 - 31 mmol/L    Glucose Level 96 82 - 115 mg/dL    Blood Urea Nitrogen 77.7 (H) 8.4 - 25.7 mg/dL    Creatinine 1.80 (H) 0.73 - 1.18  mg/dL    Calcium Level Total 9.7 8.8 - 10.0 mg/dL    Protein Total 6.5 5.8 - 7.6 gm/dL    Albumin Level 2.4 (L) 3.4 - 4.8 g/dL    Globulin 4.1 (H) 2.4 - 3.5 gm/dL    Albumin/Globulin Ratio 0.6 (L) 1.1 - 2.0 ratio    Bilirubin Total 0.7 <=1.5 mg/dL    Alkaline Phosphatase 101 40 - 150 unit/L    Alanine Aminotransferase 26 0 - 55 unit/L    Aspartate Aminotransferase 45 (H) 5 - 34 unit/L    eGFR 37 mls/min/1.73/m2     Telemetry:     Physical Exam  Vitals reviewed.   Constitutional:       Appearance: Normal appearance. He is obese.   HENT:      Head: Normocephalic.      Mouth/Throat:      Mouth: Mucous membranes are moist.   Eyes:      Extraocular Movements: Extraocular movements intact.   Cardiovascular:      Rate and Rhythm: Regular rhythm. Tachycardia present.      Pulses: Normal pulses.      Heart sounds: Normal heart sounds.   Pulmonary:      Effort: Pulmonary effort is normal. No respiratory distress.      Breath sounds: Normal breath sounds.   Abdominal:      Palpations: Abdomen is soft.   Skin:     General: Skin is warm and dry.      Comments: Midline Sternotomy Dressing C/D/I   Neurological:      General: No focal deficit present.      Mental Status: He is alert and oriented to person, place, and time.   Psychiatric:         Mood and Affect: Mood normal.         Behavior: Behavior normal.     Current Inpatient Medications:    Current Facility-Administered Medications:     0.9%  NaCl infusion (for blood administration), , Intravenous, Q24H PRN, SHIRLEY Torres    0.9%  NaCl infusion (for blood administration), , Intravenous, Q24H PRN, Trenton Harvey PA-C    0.9%  NaCl infusion (for blood administration), , Intravenous, Q24H PRN, Spencer Hagan MD    acetaminophen oral solution 650 mg, 650 mg, Per OG tube, Q6H PRN, GILMAR Estrada    acetaminophen tablet 650 mg, 650 mg, Oral, Q6H PRN, Lul Isaac MD, 650 mg at 02/26/23 1614    albumin human 5% bottle 12.5 g, 12.5 g, Intravenous, PRN, Leon DE LA CRUZ  GILMAR Reyes    albuterol-ipratropium 2.5 mg-0.5 mg/3 mL nebulizer solution 3 mL, 3 mL, Nebulization, Q4H PRN, Nima Baez DO, 3 mL at 03/01/23 1347    aspirin EC tablet 81 mg, 81 mg, Oral, Daily, GILMAR Estrada, 81 mg at 03/02/23 0812    atorvastatin tablet 40 mg, 40 mg, Oral, Daily, Trenton Harvey PA-C, 40 mg at 03/02/23 0814    calcium carbonate 200 mg calcium (500 mg) chewable tablet 500 mg, 500 mg, Oral, Daily, Trenton Harvey PA-C, 500 mg at 03/02/23 0812    calcium gluconate 1 g in NS IVPB (premixed), 1 g, Intravenous, PRN, GILMAR Estrada    calcium gluconate 1 g in NS IVPB (premixed), 2 g, Intravenous, PRN, GILMAR Estrada    calcium gluconate 1 g in NS IVPB (premixed), 3 g, Intravenous, PRN, GILMAR Estrada    dextrose 10% bolus 125 mL 125 mL, 12.5 g, Intravenous, PRN, GILMAR Estrada    dextrose 10% bolus 250 mL 250 mL, 25 g, Intravenous, PRN, GILMAR Estrada    DOBUtamine 1000 mg in D5W 250 mL infusion, 2.5 mcg/kg/min, Intravenous, Continuous, Darinel Bedoya MD, Stopped at 02/27/23 1420    docusate sodium capsule 100 mg, 100 mg, Oral, BID, GILMAR Estrada, 100 mg at 03/02/23 2034    ezetimibe tablet 10 mg, 10 mg, Oral, Daily, Trenton Harvey PA-C, 10 mg at 03/02/23 0812    FLUoxetine capsule 10 mg, 10 mg, Oral, Daily, Trenton Harvey PA-C, 10 mg at 03/02/23 0814    folic acid tablet 1 mg, 1 mg, Oral, Daily, Trenton Harvey PA-C, 1 mg at 03/02/23 0814    gabapentin capsule 100 mg, 100 mg, Oral, TID, Trenton Harvey PA-C, 100 mg at 03/02/23 2034    hydrALAZINE injection 20 mg, 20 mg, Intravenous, Q4H PRN, Ga Diaz NP    HYDROcodone-acetaminophen 5-325 mg per tablet 1 tablet, 1 tablet, Oral, Q4H PRN, GILMAR Estrada, 1 tablet at 02/24/23 1708    HYDROcodone-acetaminophen 7.5-325 mg per tablet 1 tablet, 1 tablet, Oral, Q6H PRN, Ga Diaz NP, 1 tablet at 03/03/23 0601    hydrOXYzine HCL tablet 25 mg, 25 mg, Oral, Q6H PRN, Trenton BUTT  STEPHANIE Harvey    labetaloL injection 20 mg, 20 mg, Intravenous, Q4H PRN, Ga Q. Emily, NP    magnesium sulfate 2g in water 50mL IVPB (premix), 2 g, Intravenous, PRN, GILMAR Estrada    magnesium sulfate 2g in water 50mL IVPB (premix), 4 g, Intravenous, PRN, GILMAR Estrada    meclizine tablet 25 mg, 25 mg, Oral, TID PRN, Trenton Harvey PA-C    metoprolol tartrate (LOPRESSOR) tablet 50 mg, 50 mg, Oral, BID, Yasmeen Valverde, FNP, 50 mg at 03/02/23 2034    morphine injection 2 mg, 2 mg, Intravenous, Q2H PRN, Ga Q. Emily, NP, 2 mg at 03/01/23 0033    ondansetron injection 4 mg, 4 mg, Intravenous, Q4H PRN, GILMAR Estrada, 4 mg at 02/25/23 1326    oxyCODONE immediate release tablet 10 mg, 10 mg, Oral, Q4H PRN, GILMAR Estrada, 10 mg at 02/28/23 1233    pantoprazole EC tablet 40 mg, 40 mg, Oral, Daily, Trenton Harvey PA-C, 40 mg at 03/02/23 0812    potassium chloride 20 mEq in 100 mL IVPB (FOR CENTRAL LINE ADMINISTRATION ONLY), 20 mEq, Intravenous, PRN, GILMAR Estrada    potassium chloride 20 mEq in 100 mL IVPB (FOR CENTRAL LINE ADMINISTRATION ONLY), 40 mEq, Intravenous, PRN, GILMAR Estrada    potassium chloride 20 mEq in 100 mL IVPB (FOR CENTRAL LINE ADMINISTRATION ONLY), 20 mEq, Intravenous, PRN, GILMAR Estrada    prednisoLONE 15 mg/5 mL (3 mg/mL) solution 2.5 mg, 2.5 mg, Oral, Daily, Tom Mayfield MD, 2.5 mg at 03/02/23 0822    QUEtiapine tablet 50 mg, 50 mg, Oral, QHS, Trenton Harvey PA-C, 50 mg at 03/02/23 2034    rOPINIRole tablet 1 mg, 1 mg, Oral, BID, Trenton Harvey PA-C, 1 mg at 03/02/23 2034    Flushing PICC Protocol, , , Until Discontinued **AND** sodium chloride 0.9% flush 10 mL, 10 mL, Intravenous, Q6H, 10 mL at 03/02/23 0600 **AND** sodium chloride 0.9% flush 10 mL, 10 mL, Intravenous, PRN, Dominick Weinstein MD    sodium phosphate 15 mmol in dextrose 5 % (D5W) 250 mL IVPB, 15 mmol, Intravenous, PRN, GILMAR Estrada    sodium phosphate 20.01  mmol in dextrose 5 % (D5W) 250 mL IVPB, 20.01 mmol, Intravenous, PRN, GILMAR Estrada    sodium phosphate 30 mmol in dextrose 5 % (D5W) 250 mL IVPB, 30 mmol, Intravenous, PRN, GILMAR Estrada    sucralfate tablet 1 g, 1 g, Oral, QID (AC & HS), GILMAR Estrada, 1 g at 03/03/23 0601    tiZANidine tablet 4 mg, 4 mg, Oral, Q8H PRN, Trenton Harvey PA-C    zolpidem tablet 5 mg, 5 mg, Oral, Nightly PRN, Ga Diaz NP    VTE Risk Mitigation (From admission, onward)           Ordered     Place sequential compression device  Until discontinued         02/24/23 1046     IP VTE HIGH RISK PATIENT  Once         02/24/23 1034     heparin, porcine (PF) (heparin flush 100 units/mL) 100 unit/mL injection flush         02/23/23 2215     heparin 25,000 units in dextrose 5% 250 mL (100 units/mL) infusion LOW INTENSITY nomogram - [facility]  Continuous        Question Answer Comment   Begin at (in units/kg/hr) 12    Heparin Infusion Adjustment (DO NOT MODIFY ANSWER) [email or web address]\Images\[email or web address]\HeparinInfusions\heparin LOW INTENSITY nomogram for G [id number]        02/19/23 8876                  Assessment:   NSTEMI Type I  MVCAD    - s/p CABG 2V LIMA-LAD, rSVG-OM 02/24/23 with Right GSV EVH    - [facility] 02/15/23: LM 0% stenosis. mLAD proximal 70% ISR. D1 ostial 50% stenosis. D2 ostial 50% stenosis. Lcx: previous stent; proximal Lcx  90% ISR; mid Lcx 30% stenosis. OM1 proximal 80% stenosis. RCA patent stents to ostium. Mid RCA proximal subsection- 60% stenosis; Mid RCA distal subsection 70& stenosis; distal RCA proximal subsection 100% stenosis. Fills left to right    - [facility] 09/22/22: LM 0% stenosis. mLAD proximal subsection 50% ISR,. D1 ostial 50% stenosis. D2 ostial 50% stenosis. pLCx mid subsection 70% ISR reduced to 10%, Preprocedure AMALIA III flow  noted. Post procedure AMALIA III was present. Lesion previous treated on 7/22/21 with PTA/DAYAN. OM1 proximal 80% stenosis. mLCx distal subsection 30% stenosis. RCA  proximal RCA stent widely patent. mRCA proximal subsection 60% subsection. mRCA distal subsection 70% stenosis. dRCA proximal subsection 100% stenosis. 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 Mild to moderate MAC is noted. Mild MR. Trace TR.  B RICKEY    - Moderate Bilateral by US  DANIELLA/CKD IIIb  HLD  Lupus Anticoagulant Inhibitor Syndrome  CARLYLE  VHD    - Mild AS  AAA  Anemia  No Hx of GIB     Plan:   Continue ASA & statin.  Continue metoprolol tartrate 50 mg BID.   Start Plavix 75mg PO Qday when Ok with CVS (NSTEMI)  No ACEi/ARB/ARNI secondary to DANIELLA  Nephrology Following for DANIELLA (managing diuresis)  Aggressive Mobilization of PT and Q1HR IS  Labs in AM: CBC, CMP and Mg      I have seen the patient, reviewed the Nurse Practitioner's note, assessment and plan. I have personally interviewed and examined the patient at bedside and agree with the findings. Medical decision making listed above were done under my guidance.

## 2023-03-03 NOTE — CONSULTS
[facility] - Rehab Inpatient Services  Inpatient Rehab Prescreen    PATIENT INFORMATION     Assessment date/time: 3/3/23 0902    Name: Hunter Navarrete Phone: [phone]   Address:   [address] [place] [id number] SSN:    YOB: 1940 Age: 82 y.o. Gender: male   Race: White   Marital Status:    Advance Directives: Full code     COVERAGE INFORMATION     Patient Medicare #:      Primary Insurance Type: [employer] administration / VA CCN [employer] Secondary Insurance Type: Medicare / Part A&B effective 12/1/95   Policy #:  [id number] Policy #:  [id number]   Insurance contact name/number: Ruthann Galarza # [phone]  # [phone] Insurance contact name/number:    Authorization #:  Authorization #:    Verified Coverage: Insurance Carrier   Pending Coverage:    Prescription Coverage:  Insurance details/comments:      PHYSICIAN/REFERRAL INFORMATION     Primary Care Physician: Mariella Bethea MD Attending Physician: Xander Enriquez MD   Consulting physician/specialist:  Referring Physician:    Referring facility:  Referring contact name/phone:    Physician details/comments:      CONTACT INFORMATION   Extended Emergency Contact Information  Primary Emergency Contact: Giovana Navarrete   [facility]  Home Phone: [phone]  Relation: Spouse    PRIOR LIVING SITUATION    Lives with wife in a single story home with no steps to enter     Bedroom location/setup: single story   Bathroom location/setup: tub/shower combo with railing and tub chair, elevated commode without railing.    Equipment at home: 2 canes, rollator walker, standard walker   Prior device use:  canes and rollator walker at times     PRIOR LEVEL OF FUNCTION     Did a helper need to assist with the following activities prior to the current illness, exacerbation, or injury?   Self-care:  Yes, required assistance for don/doff shoes and socks   Indoor mobility:  No, independent   Stairs: unknown    Functional Cognition: No, independent. Wife did manage medications and finances   Comments: Patient was independent to modified independent with use of cane or rollator walker at times for mobility. Patient was independent with most ADLs, but did require assistance from wife for don/doff of socks and shoes.   Caregivers providing assistance:Doroteo coulter- [phone]   Pre-hospital home care service: none Name of Agency: Patient did have home health in past with [facility]   Home/personal responsibilities: Personal ADLs and mobility, driving   Pre-hospital vocational category: Retired for age   Pre-hospital vocational effort: Retired for age   Occupation/profession:  Return to work/school plan:    Educational history:    Hobbies/leisure activities:  Resources used prior to admission:    Available resources:  Resource information comments:      REHABILITATION DIAGNOSIS     History of present illness: 83 y/o male, followed by Dr. Loredo who presented to [facility] with c/o chest pain. HS troponin 1149; therefore he was transferred to [facility] for cardiology services where he underwent LHC revealing multivessel disease and elevated LVEDP. He was started on diuretics, Plavix was placed on hold and he was transferred to [facility] for CABG evaluation. Patient is reporting mild chest tightness 3/10 on arrival. 2.21.23:  Vitals Stable. Shoulder Pain this morning. On Nitro & Heparin Infusion. EKG with no overt ischemic changes.   2.22.23: Patient resting in bed. NAD. Denies CP/SOB. Creatinine 1.93 today- mildly decreased from 2.07 yesterday with addition of IVFs. H/H downtrending- 8.2/25.3 from 8.8/27.1 yesterday. 2.23.23: Patient sitting up in bedside chair. NAD. VSS. Creatinine improved to 1.65 today. H/H 10.5/31.5 post transfusion 2  units PRBCs.  2.24.23: Patient underwent 2V CABG today by Dr Hagan, and is currently in ICU on MV, requiring pressor support. Mediastinal drain patent. He received 2 units of  "PRBCs intraop. 2.25.23: Awake in bed. C/o incisional sternal pain. CT x 2 patent. Pressors have been weaned off. Currently on cleviprex drip. 2.26.23: Patient awake in bed. Mediastinal drains have been removed. Creatinine has bumped. Nephrology adding IVF x 1L due to IVVD. 2.27.23: NAD. Sitting in Bedside Chair. Denies SOB and Palps. + CP/Incisional. Crea 2.53. 2.28.23: NAD. Sitting in Bedside Chair. Denies SOB and Palps. + CP/Incisional. Crea 2.26. "I am ok." + Sinus Tachycardia.   3.1.23: NAD. Sitting in Bedside Chair. Denies SOB and Palps. + CP/Incisional. Crea Improved. "I am feeling better." SR Mostly/Tachycardia Improved. 3.2.23: NAD. Sitting up in chair. Denies SOB or palps. + Incisional CP. ST on tele. "I am doing okay." O2 weaned to 2L/NC. Patient did not require O2 prior to hospitalization. On 3.3.23, labs showed elevated BUN/Cr of 77.7/ 1.80 with creatinine trending down. Medellin catheter remained. Last BM on 2/26 charted. Prior to admission, patient was living with wife in a single story home with no steps to enter; has tub/shower combo without railing and tub chair; and elevated commode without railing. Wife reported they are getting a walkin shower placed. Patient was independent to modified independent with mobility with use of cane or rollator walker at times for long distances. Patient was mostly independent with ADLs, but did require assist from wife for don/doff of shoes. Patient was still driving. Currently, patient is AAOx4; confusion at times with pain meds; max assist for upper body dressing, moderate assist for transfers, max assist bed mobility, total assist toileting due to medellin; minimal assist for walking 40ft with RW ; setup/supervision for eating; and minimal assist for grooming. Pt. Requires acute inpatient rehab admission with 24-hour nursing and active physician oversight to monitor and manage acute medical comorbid conditions, labs, pain, and functional deficits.  Patient/family will " also require teaching and integration of improving functional skills into daily living.  He will also require an individualized, interdisciplinary approach to his care, receiving PT, OT services 3 hours per day, 5 days per week.    Required care cannot be provided at a lower level of care. Patient is anticipated to require approximately 10-12 days LOS with expected discharge home with spouse with HH   Impairment group (IGC):   Cardiac 09 Etiologic diagnosis/description: NSTEMI, CAD s/p CABG    Date of onset:  2/15/23 Date of surgery: 2/24/23   Allergies: Cardura [doxazosin], Lyrica [pregabalin], Paxil [paroxetine hcl], Restoril [temazepam], Vioxx [rofecoxib], and Zithromax [azithromycin]   Comorbid condition: Congestive heart failure, Coronary artery disease, and Hypertension, CKD 3, Lupus, hyperlipidemia, VHD, moderate carotid artery stenosis, AAA, anemia, ICMO, tachycardia   Medical/functional conditions requiring inpatient rehabilitation: This patient requires medical management/24-hour nursing of complex   comorbidities ( NSTEMI, CAD s/p CABG, Congestive heart failure, Coronary artery disease, and Hypertension, CKD 3, Lupus, hyperlipidemia, VHD, moderate carotid artery stenosis, AAA, anemia, ICMO, tachycardia), labs, medications (see medications list), pain, sleep hygiene, anticoagulation, nutrition, hydration, neurological, pulmonary, cardiac status, and preventive healthcare.      This patient requires intense therapy and an integrated,   interdisciplinary approach to address safety, impaired mobility,   impaired ADL's (dressing, toileting, grooming, showering),   pulmonary insufficiency, bowel/bladder problems, surgical wound care,  preventive healthcare, medication management, integration of   improving functional skills into daily living, and home caregiver   support and training.   Risk for medical/clinical complications: This patient is at risk for the following complications: DVT/PE, pneumonia,  malnutrition, neurological decline, respiratory insufficiency, worsening activity intolerance, complications from anticoagulation,   infection, skin breakdown, inadequate sleep, stroke, and constipation.     SPECIAL REHABILITATION NEEDS     PICC: triple lumen to left basilic Preferred Language: english           PRECAUTIONS     Safety/fall precautions: High fall risk and Sternal precautions: S/P CABG     PAST MEDICAL, SOCIAL, FAMILY HISTORY     Pertinent past medical history:   Past Medical History:   Diagnosis Date    Acid reflux     Anemia     Aortic aneurysm, abdominal     Arthritis     Bronchitis     Cataract     Celiac artery stenosis     50% by CTA abdomen 2020    CKD (chronic kidney disease)     45% FUNCTION    Coronary artery disease     Encounter for blood transfusion     Enlarged prostate     GERD (gastroesophageal reflux disease)     Hemorrhoids     Hypercholesteremia     Hypertension     Iron deficiency anemia, unspecified     Leaky heart valve     Lupus anticoagulant disorder     Renal artery stenosis     by CTA 2020    Skin cancer     Unspecified chronic bronchitis     UTI (urinary tract infection)       Has the patient had two or more falls in the past year or any fall with injury in the past year: Yes   Has the patient had major surgery during the 100 days prior to admission: Yes   Family Medical History:   Family History   Problem Relation Age of Onset    Lung cancer Brother     Throat cancer Brother     Heart disease Father     Cancer Brother     Cancer Brother     Heart disease Brother         PPM    Heart disease Brother     Heart disease Brother       Substance use history:   Social History     Substance and Sexual Activity   Alcohol Use Yes    Comment: RARELY     Social History     Tobacco Use   Smoking Status Former    Types: Cigarettes    Quit date:     Years since quittin.1   Smokeless Tobacco Never     Social History     Substance and Sexual Activity   Drug Use No     "  VITALS   BP:  (!) 158/72     Temp: 98.2 °F (36.8 °C)     HR: 101     Resp: (!) 22     SpO2: 95 %     Height: 5' 10" (1.778 m)     Weight: 88.7 kg (195 lb 8.8 oz)     BMI: 28.1          MED/LABS/DIAGNOSITICS   No current facility-administered medications for this encounter.     No current outpatient medications on file.     Facility-Administered Medications Ordered in Other Encounters   Medication Dose Route Frequency Provider Last Rate Last Admin    0.9%  NaCl infusion (for blood administration)   Intravenous Q24H PRN SHIRLEY Torres        0.9%  NaCl infusion (for blood administration)   Intravenous Q24H PRN Trenton Harvey PA-C        0.9%  NaCl infusion (for blood administration)   Intravenous Q24H PRN Spencer Hagan MD        acetaminophen oral solution 650 mg  650 mg Per OG tube Q6H PRN GILMAR Estrada        acetaminophen tablet 650 mg  650 mg Oral Q6H PRN Lul Isaac MD   650 mg at 02/26/23 1614    albumin human 5% bottle 12.5 g  12.5 g Intravenous PRN GILMAR Estrada        albuterol-ipratropium 2.5 mg-0.5 mg/3 mL nebulizer solution 3 mL  3 mL Nebulization Q4H PRN Nima Baez DO   3 mL at 03/01/23 1347    aspirin EC tablet 81 mg  81 mg Oral Daily GILMAR Estrada   81 mg at 03/03/23 0856    atorvastatin tablet 40 mg  40 mg Oral Daily Trenton Harvey PA-C   40 mg at 03/03/23 0856    calcium carbonate 200 mg calcium (500 mg) chewable tablet 500 mg  500 mg Oral Daily Trenton Harvey PA-C   500 mg at 03/03/23 0856    calcium gluconate 1 g in NS IVPB (premixed)  1 g Intravenous PRN GILMAR Estrada        calcium gluconate 1 g in NS IVPB (premixed)  2 g Intravenous PRN GILMAR Estrada        calcium gluconate 1 g in NS IVPB (premixed)  3 g Intravenous PRN GILMAR Estrada        dextrose 10% bolus 125 mL 125 mL  12.5 g Intravenous PRN GILMAR Estrada        dextrose 10% bolus 250 mL 250 mL  25 g Intravenous PRN GILMAR Estrada        DOBUtamine " 1000 mg in D5W 250 mL infusion  2.5 mcg/kg/min Intravenous Continuous Darinel Bedoya MD   Stopped at 02/27/23 1420    docusate sodium capsule 100 mg  100 mg Oral BID GILMAR Estrada   100 mg at 03/03/23 0856    ezetimibe tablet 10 mg  10 mg Oral Daily Trenton Harvey PA-C   10 mg at 03/03/23 0856    FLUoxetine capsule 10 mg  10 mg Oral Daily Trenton Harvey PA-C   10 mg at 03/03/23 0856    folic acid tablet 1 mg  1 mg Oral Daily Trenton Harvey PA-C   1 mg at 03/03/23 0856    gabapentin capsule 100 mg  100 mg Oral TID Trenton Harvey PA-C   100 mg at 03/03/23 0856    hydrALAZINE injection 20 mg  20 mg Intravenous Q4H PRN Ga Q. Emily, NP        HYDROcodone-acetaminophen 5-325 mg per tablet 1 tablet  1 tablet Oral Q4H PRN GILMAR Estrada   1 tablet at 02/24/23 1708    HYDROcodone-acetaminophen 7.5-325 mg per tablet 1 tablet  1 tablet Oral Q6H PRN Ga Q. Emily, NP   1 tablet at 03/03/23 0601    hydrOXYzine HCL tablet 25 mg  25 mg Oral Q6H PRN Trenton Harvey PA-C        labetaloL injection 20 mg  20 mg Intravenous Q4H PRN Ga Q. Emily, NP        magnesium sulfate 2g in water 50mL IVPB (premix)  2 g Intravenous PRN GILMAR Estrada        magnesium sulfate 2g in water 50mL IVPB (premix)  4 g Intravenous PRN GILMAR Estrada        meclizine tablet 25 mg  25 mg Oral TID PRN Trenton Harvey PA-C        metoprolol tartrate (LOPRESSOR) tablet 50 mg  50 mg Oral BID SHIRLEY Moore   50 mg at 03/03/23 0856    morphine injection 2 mg  2 mg Intravenous Q2H PRN Ga Q. Emily, NP   2 mg at 03/01/23 0033    ondansetron injection 4 mg  4 mg Intravenous Q4H PRN GILMAR Estrada   4 mg at 02/25/23 1326    oxyCODONE immediate release tablet 10 mg  10 mg Oral Q4H PRN GILMAR Estrada   10 mg at 02/28/23 1233    pantoprazole EC tablet 40 mg  40 mg Oral Daily Trenton Harvey PA-C   40 mg at 03/03/23 0856    potassium chloride 20 mEq in 100 mL IVPB (FOR CENTRAL LINE  ADMINISTRATION ONLY)  20 mEq Intravenous PRN GILMAR Estrada        potassium chloride 20 mEq in 100 mL IVPB (FOR CENTRAL LINE ADMINISTRATION ONLY)  40 mEq Intravenous PRN GILMAR Estrada        potassium chloride 20 mEq in 100 mL IVPB (FOR CENTRAL LINE ADMINISTRATION ONLY)  20 mEq Intravenous PRN GILMAR Estrada        prednisoLONE 15 mg/5 mL (3 mg/mL) solution 2.5 mg  2.5 mg Oral Daily Tom Mayfield MD   2.5 mg at 03/02/23 0822    QUEtiapine tablet 50 mg  50 mg Oral QHS Trenton Harvey PA-C   50 mg at 03/02/23 2034    rOPINIRole tablet 1 mg  1 mg Oral BID Trenton Harvey PA-C   1 mg at 03/03/23 0856    sodium chloride 0.9% flush 10 mL  10 mL Intravenous Q6H Dominick Weinstein MD   10 mL at 03/02/23 0600    And    sodium chloride 0.9% flush 10 mL  10 mL Intravenous PRN Dominick Weinstein MD        sodium phosphate 15 mmol in dextrose 5 % (D5W) 250 mL IVPB  15 mmol Intravenous PRN GILMAR Estrada        sodium phosphate 20.01 mmol in dextrose 5 % (D5W) 250 mL IVPB  20.01 mmol Intravenous PRN GILMAR Estrada        sodium phosphate 30 mmol in dextrose 5 % (D5W) 250 mL IVPB  30 mmol Intravenous PRN GILMAR Estrada        sucralfate tablet 1 g  1 g Oral QID (AC & HS) GILMAR Estrada   1 g at 03/03/23 0601    tiZANidine tablet 4 mg  4 mg Oral Q8H PRN Trenton Harvey PA-C        zolpidem tablet 5 mg  5 mg Oral Nightly PRN Ga Q. Emily, NP          Pertinent lab results:   Lab Results   Component Value Date    WBC 7.9 03/02/2023    HGB 8.3 (L) 03/02/2023    HCT 25.6 (L) 03/02/2023     03/02/2023     03/03/2023    K 3.6 03/03/2023    BUN 77.7 (H) 03/03/2023    CO2 24 03/03/2023    CL 98 02/19/2023      Pertinent diagnostic studies:    Additional labs and diagnostic studies required prior to admission:      QI: GG Care Tool     QI: GG Care Tool  Therapy Evaluation   Swallowing/  Nutritional Status   Diet/Feeding/  Swallowing Feeding:  set-up assist for container management,  independent for excursion to mouth with tremors noted. May benefit from built up handle for improved     Eating       Oral Hygiene   Grooming Pt. Standing at sink with Mod A for standing balance while performing grooming task (brushing teeth) with minimal assistance required for setup and preparation. Pt. Demonstrating a posterior lean.   Seated rest break required intermittently.   Cueing required for adherence to sternal pxns.   Toileting Hygiene       Shower/Bathe   Bathing    Upper Body Dressing   Dressing Upper  maximal assistance for donning robe in sitting. Pt educated on technique for sternal pxns. Pt needed assist for orientation of robe and to bring robe around head. Tactile and verbal cues provided for threading BUE.    Lower Body Dressing   Dressing Lower Lower Body Dressing: total assistance    Putting On/Taking Off Footwear       Toilet Transfer   Toileting Toilet Transfer: moderate assistance x 2 persons using RW. Posterior lean noted   Total assist for toileting    Bladder Continence Status   Bladder     Bowel Continence Status   Bowel     Roll Left and Right   Bed Mobility Patient completed Rolling/Turning to Left with  maximal assistance  Patient completed Scooting/Bridging with maximal assistance  Patient completed Supine to Sit with maximal assistance   Sit to Lying       Lying to Sitting on Side of Bed       Sit to Stand       Chair/Bed-to- Chair   Transfers Sit to Stand:  moderate assistance with rolling walker     Car Transfer       Walk 10 Feet   Equipment      Walk 50 Feet with Two Turns   Balance    Walk 150 Feet   Endurance Poor endurance   Walk 10 Feet on Uneven Surfaces   Gait Pt amb 40ft with RW Vijaya. Cuing for errect posture and to increase step length.   Picking up an Object       Wheel 50 Feet with Two Turns   Wheelchair    Wheel 150 Feet       1 Step (Curb)   Stairs    4 Steps       12 Steps       Expression of Ideas and Wants   Communication independent   Understanding  Verbal  and Non-Verbal Content       Cognitive Patterns   Cognition independent      Safety Precautions       Lower Extremity      Strength       ROM       Upper Extremity      Strength       ROM      Care Score Value Definitions  6: Independent. [place] provides no assistance with tasks. A device may or may not have been used.  5: Set-up or clean-up assistance. [place] sets up or cleans up, but does not assist with tasks. [place] may have assisted prior to or following the activity.  4: Supervision or touching assistance. [place] provides verbal cues or touching/steadying or contact guard assistance. Assistance may be provided throughout the activity or intermittently.  3: Partial/moderate assistance. [place] does less than half the effort. [place] lifts, holds, or supports trunk or limbs, but provides less than half the effort.  2: Substantial/maximal assistance. [place] does more than half the effort. [place] lifts or holds trunk or limbs, and provides more than half the effort.  1: Dependent. [place] does all of the effort, or the assistance of two or more helpers is required for the patient to complete the activity.  Care Score Activity Not Attempted Value Definitions  7: Patient refused.  9: Not applicable. Not attempted and the patient did not perform this activity prior to the current illness, exacerbation, or injury.  10: Not attempted due to environmental limitations (e.g., lack of equipment, weather constraints).  88: Not attempted due to medical condition or safety concerns.    DISCHARGE GOALS/ANTICPATED INTERVENTIONS/SERVICES     Expected Level of Improvement for Safe Discharge: Patient/caregivers anticipate discharge home at or near baseline level of functioning and/or decreased burden of care.   Patient/Family/Caregiver Goals: Patient and wife desire for patient to increase current level of functioning to modified independent so he is able to discharge home safely, and decrease burden of care.   Required  Treatments/Services: Rehabilitation Nursing, Respiratory Therapy, Dietitian/nutrition, Social , and Case Management   Required Therapy Therapy Type Min/Day Days/Week Duration of Therapy   Physical Therapy 90 5 10-12 days   Occupational Therapy 90 5 10-12 days   Speech and Language Pathology      Prosthetic/Orthotics      Recreational Therapy      Anticipated Services upon Discharge:  home health with therapy   Additional rehabilitation needs:  none   Expected Discharge Destination:  home with wife and home health   Barriers to Discharge: None   Discharge Support: Patient has a caregiver available, Discharge plan has been verified with patient's caregiver, and Caregiver is in agreement with the discharge plan   Patient/Family/Caregiver Orientation: Patient/family/caregiver oriented and agreeable to inpatient rehabilitation plan   Estimated Length of Stay: 10-12 days   Projected Admission Date: 3/3/23   Medical Prognosis: fair   Physicians Review and Admission Determination:   I have discussed patient with Nurse Liaison. I have reviewed the prescreen. Patient is in need of, appropriate for and should tolerate and benefit from an aggressive IRF stay

## 2023-03-04 LAB
ALBUMIN SERPL-MCNC: 2.5 G/DL (ref 3.4–4.8)
ALBUMIN/GLOB SERPL: 0.6 RATIO (ref 1.1–2)
ALP SERPL-CCNC: 97 UNIT/L (ref 40–150)
ALT SERPL-CCNC: 32 UNIT/L (ref 0–55)
AST SERPL-CCNC: 39 UNIT/L (ref 5–34)
BASOPHILS # BLD AUTO: 0.03 X10(3)/MCL (ref 0–0.2)
BASOPHILS NFR BLD AUTO: 0.3 %
BILIRUBIN DIRECT+TOT PNL SERPL-MCNC: 0.7 MG/DL
BUN SERPL-MCNC: 69.3 MG/DL (ref 8.4–25.7)
CALCIUM SERPL-MCNC: 9.6 MG/DL (ref 8.8–10)
CHLORIDE SERPL-SCNC: 105 MMOL/L (ref 98–107)
CO2 SERPL-SCNC: 26 MMOL/L (ref 23–31)
CREAT SERPL-MCNC: 1.68 MG/DL (ref 0.73–1.18)
EOSINOPHIL # BLD AUTO: 0 X10(3)/MCL (ref 0–0.9)
EOSINOPHIL NFR BLD AUTO: 0 %
ERYTHROCYTE [DISTWIDTH] IN BLOOD BY AUTOMATED COUNT: 15.3 % (ref 11.5–17)
FERRITIN SERPL-MCNC: 3606.92 NG/ML (ref 21.81–274.66)
GFR SERPLBLD CREATININE-BSD FMLA CKD-EPI: 41 MLS/MIN/1.73/M2
GLOBULIN SER-MCNC: 4.1 GM/DL (ref 2.4–3.5)
GLUCOSE SERPL-MCNC: 115 MG/DL (ref 82–115)
HCT VFR BLD AUTO: 30.6 % (ref 42–52)
HGB BLD-MCNC: 9.9 G/DL (ref 14–18)
IMM GRANULOCYTES # BLD AUTO: 0.24 X10(3)/MCL (ref 0–0.04)
IMM GRANULOCYTES NFR BLD AUTO: 2.1 %
IRON SATN MFR SERPL: 17 % (ref 20–50)
IRON SERPL-MCNC: 23 UG/DL (ref 65–175)
LYMPHOCYTES # BLD AUTO: 0.84 X10(3)/MCL (ref 0.6–4.6)
LYMPHOCYTES NFR BLD AUTO: 7.4 %
MAGNESIUM SERPL-MCNC: 1.7 MG/DL (ref 1.6–2.6)
MCH RBC QN AUTO: 29.6 PG
MCHC RBC AUTO-ENTMCNC: 32.4 G/DL (ref 33–36)
MCV RBC AUTO: 91.6 FL (ref 80–94)
MONOCYTES # BLD AUTO: 1.2 X10(3)/MCL (ref 0.1–1.3)
MONOCYTES NFR BLD AUTO: 10.5 %
NEUTROPHILS # BLD AUTO: 9.07 X10(3)/MCL (ref 2.1–9.2)
NEUTROPHILS NFR BLD AUTO: 79.7 %
NRBC BLD AUTO-RTO: 0 %
PHOSPHATE SERPL-MCNC: 2.9 MG/DL (ref 2.3–4.7)
PLATELET # BLD AUTO: 212 X10(3)/MCL (ref 130–400)
PMV BLD AUTO: 11.3 FL (ref 7.4–10.4)
POTASSIUM SERPL-SCNC: 3.6 MMOL/L (ref 3.5–5.1)
PREALB SERPL-MCNC: 13.8 MG/DL (ref 16–42)
PROT SERPL-MCNC: 6.6 GM/DL (ref 5.8–7.6)
RBC # BLD AUTO: 3.34 X10(6)/MCL (ref 4.7–6.1)
SODIUM SERPL-SCNC: 142 MMOL/L (ref 136–145)
TIBC SERPL-MCNC: 113 UG/DL (ref 69–240)
TIBC SERPL-MCNC: 136 UG/DL (ref 250–450)
TRANSFERRIN SERPL-MCNC: 120 MG/DL
WBC # SPEC AUTO: 11.4 X10(3)/MCL (ref 4.5–11.5)

## 2023-03-04 PROCEDURE — 83735 ASSAY OF MAGNESIUM: CPT | Performed by: NURSE PRACTITIONER

## 2023-03-04 PROCEDURE — 82728 ASSAY OF FERRITIN: CPT | Performed by: NURSE PRACTITIONER

## 2023-03-04 PROCEDURE — 94761 N-INVAS EAR/PLS OXIMETRY MLT: CPT

## 2023-03-04 PROCEDURE — 97530 THERAPEUTIC ACTIVITIES: CPT

## 2023-03-04 PROCEDURE — 27000221 HC OXYGEN, UP TO 24 HOURS

## 2023-03-04 PROCEDURE — 25000003 PHARM REV CODE 250: Performed by: NURSE PRACTITIONER

## 2023-03-04 PROCEDURE — 85025 COMPLETE CBC W/AUTO DIFF WBC: CPT | Performed by: NURSE PRACTITIONER

## 2023-03-04 PROCEDURE — 84134 ASSAY OF PREALBUMIN: CPT | Performed by: NURSE PRACTITIONER

## 2023-03-04 PROCEDURE — 83550 IRON BINDING TEST: CPT | Performed by: NURSE PRACTITIONER

## 2023-03-04 PROCEDURE — 63600175 PHARM REV CODE 636 W HCPCS: Performed by: NURSE PRACTITIONER

## 2023-03-04 PROCEDURE — 11800000 HC REHAB PRIVATE ROOM

## 2023-03-04 PROCEDURE — 84100 ASSAY OF PHOSPHORUS: CPT | Performed by: NURSE PRACTITIONER

## 2023-03-04 PROCEDURE — 97162 PT EVAL MOD COMPLEX 30 MIN: CPT

## 2023-03-04 PROCEDURE — 97166 OT EVAL MOD COMPLEX 45 MIN: CPT

## 2023-03-04 PROCEDURE — 80053 COMPREHEN METABOLIC PANEL: CPT | Performed by: NURSE PRACTITIONER

## 2023-03-04 RX ORDER — LANOLIN ALCOHOL/MO/W.PET/CERES
1 CREAM (GRAM) TOPICAL 2 TIMES DAILY
Status: DISCONTINUED | OUTPATIENT
Start: 2023-03-04 | End: 2023-03-17 | Stop reason: HOSPADM

## 2023-03-04 RX ADMIN — FLUOXETINE 10 MG: 10 CAPSULE ORAL at 07:03

## 2023-03-04 RX ADMIN — FUROSEMIDE 40 MG: 40 TABLET ORAL at 07:03

## 2023-03-04 RX ADMIN — METOPROLOL TARTRATE 50 MG: 50 TABLET, FILM COATED ORAL at 07:03

## 2023-03-04 RX ADMIN — FERROUS SULFATE TAB 325 MG (65 MG ELEMENTAL FE) 1 EACH: 325 (65 FE) TAB at 08:03

## 2023-03-04 RX ADMIN — QUETIAPINE 50 MG: 25 TABLET ORAL at 08:03

## 2023-03-04 RX ADMIN — DOCUSATE SODIUM 100 MG: 100 CAPSULE, LIQUID FILLED ORAL at 07:03

## 2023-03-04 RX ADMIN — EZETIMIBE 10 MG: 10 TABLET ORAL at 07:03

## 2023-03-04 RX ADMIN — ROPINIROLE HYDROCHLORIDE 1 MG: 1 TABLET, FILM COATED ORAL at 07:03

## 2023-03-04 RX ADMIN — METOPROLOL TARTRATE 50 MG: 50 TABLET, FILM COATED ORAL at 08:03

## 2023-03-04 RX ADMIN — MUPIROCIN: 20 OINTMENT TOPICAL at 08:03

## 2023-03-04 RX ADMIN — GABAPENTIN 100 MG: 100 CAPSULE ORAL at 01:03

## 2023-03-04 RX ADMIN — TAMSULOSIN HYDROCHLORIDE 0.4 MG: 0.4 CAPSULE ORAL at 08:03

## 2023-03-04 RX ADMIN — GABAPENTIN 100 MG: 100 CAPSULE ORAL at 05:03

## 2023-03-04 RX ADMIN — SUCRALFATE 1 G: 1 TABLET ORAL at 11:03

## 2023-03-04 RX ADMIN — PANTOPRAZOLE SODIUM 40 MG: 40 TABLET, DELAYED RELEASE ORAL at 07:03

## 2023-03-04 RX ADMIN — FERROUS SULFATE TAB 325 MG (65 MG ELEMENTAL FE) 1 EACH: 325 (65 FE) TAB at 11:03

## 2023-03-04 RX ADMIN — PREDNISONE 2.5 MG: 2.5 TABLET ORAL at 07:03

## 2023-03-04 RX ADMIN — CLOPIDOGREL BISULFATE 75 MG: 75 TABLET ORAL at 07:03

## 2023-03-04 RX ADMIN — ASPIRIN 81 MG: 81 TABLET, COATED ORAL at 07:03

## 2023-03-04 RX ADMIN — ROPINIROLE HYDROCHLORIDE 1 MG: 1 TABLET, FILM COATED ORAL at 08:03

## 2023-03-04 RX ADMIN — ATORVASTATIN CALCIUM 40 MG: 40 TABLET, FILM COATED ORAL at 08:03

## 2023-03-04 RX ADMIN — ENOXAPARIN SODIUM 40 MG: 40 INJECTION SUBCUTANEOUS at 04:03

## 2023-03-04 RX ADMIN — SUCRALFATE 1 G: 1 TABLET ORAL at 08:03

## 2023-03-04 RX ADMIN — SUCRALFATE 1 G: 1 TABLET ORAL at 05:03

## 2023-03-04 RX ADMIN — GABAPENTIN 100 MG: 100 CAPSULE ORAL at 08:03

## 2023-03-04 RX ADMIN — SUCRALFATE 1 G: 1 TABLET ORAL at 04:03

## 2023-03-04 NOTE — CONSULTS
Inpatient Nutrition Assessment    Admit Date: 3/3/2023   Total duration of encounter: 1 day     Nutrition Recommendation/Prescription     Continue current diet as tolerated.   Monitor intake, weight, and labs.     RD following and available as needed.  Thank you.     Communication of Recommendations:  EMR.    Nutrition Assessment     Malnutrition Assessment/Nutrition-Focused Physical Exam    Malnutrition in the context of acute illness or injury  Degree of Malnutrition: unable to complete  Energy Intake: unable to obtain  Interpretation of Weight Loss: unable to obtain  Body Fat:unable to obtain  Area of Body Fat Loss: unable to obtain  Muscle Mass Loss: unable to obtain  Area of Muscle Mass Loss: unable to obtain  Fluid Accumulation: unable to obtain  Edema: unable to obtain   Reduced  Strength: unable to obtain  A minimum of two characteristics is recommended for diagnosis of either severe or non-severe malnutrition.    Chart Review    Reason Seen: physician consult for [place] [facility].    Malnutrition Screening Tool Results   Have you recently lost weight without trying?: No  Have you been eating poorly because of a decreased appetite?: No   MST Score: 0     Diagnosis:  Multivessel CAD s/p left heart catheterization with InStent restenosis and multivessel coronary disease on 02/15/2023 s/p CABG x2 (lima to LAD, RSVG to OM with right GSV EVH) on 02/24/2023    Relevant Medical History:   Acid reflux      Anemia      Aortic aneurysm, abdominal      Arthritis      Bronchitis      Cataract      Celiac artery stenosis  50% by CTA abdomen 7/27/2020    CKD (chronic kidney disease)  45% FUNCTION    Coronary artery disease      Encounter for blood transfusion      Enlarged prostate      GERD (gastroesophageal reflux disease)      Hemorrhoids      Hypercholesteremia      Hypertension      Iron deficiency anemia, unspecified      Leaky heart valve      Lupus anticoagulant disorder      Renal artery stenosis  by CTA  "7/27/2020    Skin cancer      Unspecified chronic bronchitis      UTI (urinary tract infection)          Nutrition-Related Medications:   Lasix; Lovenox; Ferrous Sulfate; Prednisone;Sucralfate; Protonix    Calorie Containing IV Medications: no significant kcals from medications at this time    Nutrition-Related Labs:  3/4: BUN/Crea 69.3/1.68(H); GFR 41(L); AST 39(L); PreAlb 13.8; H/H: 9.9/30.6(L).    Diet/PN Order: DIET RENAL NON-DIALYSIS  Oral Supplement Order: none  Tube Feeding Order: none  Appetite/Oral Intake: good/% of meals  Factors Affecting Nutritional Intake: none identified  Food/Baptism/Cultural Preferences: none reported  Food Allergies: none reported    Skin Integrity: incision  Wound(s):       Comments    3/4: Pt with good appetite and intake. Consuming 100% of meals per EMR recorded intake. Renal indices abnormal and pt is not receiving HD at this time; therefore; protein restriction is recommended. Agree with current diet - Renal Non-Dialysis Diet. Plans for NFPE at f/u. Will continue to monitor during stay.     Anthropometrics    Height: 5' 10.98" (180.3 cm) Height Method: Stated  Last Weight: 91.6 kg (201 lb 15.1 oz) (03/04/23 0526) Weight Method: Bed Scale  BMI (Calculated): 28.2  BMI Classification: overweight (BMI 25-29.9)        Ideal Body Weight (IBW), Male: 171.88 lb     % Ideal Body Weight, Male (lb): 115.36 %                          Usual Weight Provided By: EMR weight history    Wt Readings from Last 5 Encounters:   03/04/23 91.6 kg (201 lb 15.1 oz)   03/03/23 88.7 kg (195 lb 8.8 oz)   02/15/23 90.9 kg (200 lb 8 oz)   02/15/23 86.2 kg (190 lb)   02/15/23 86.2 kg (190 lb)     Weight Change(s) Since Admission:  Admit Weight: 90 kg (198 lb 6.6 oz) (03/03/23 1545)      Estimated Needs    Weight Used For Calorie Calculations: 91 kg (200 lb 9.9 oz)  Energy Calorie Requirements (kcal): 1820 kcal/day (20 kcal/kg/CBW).  Energy Need Method: Kcal/kg  Weight Used For Protein Calculations: 78 " kg (171 lb 15.3 oz) (IBW used to estimate protein needs.)  Protein Requirements: 65 g/day (0.8 g/kg/IBW)  Fluid Requirements (mL): 1820 ml/d (1 ml/kcal)  Temp: 98.3 °F (36.8 °C)       Enteral Nutrition    Patient not receiving enteral nutrition at this time.    Parenteral Nutrition    Patient not receiving parenteral nutrition support at this time.    Evaluation of Received Nutrient Intake    Calories: meeting estimated needs  Protein: meeting estimated needs    Patient Education    Not applicable.    Nutrition Diagnosis     PES: No nutrition diagnosis at this time.  new)    Interventions/Goals     Intervention(s): modified composition of meals/snacks and collaboration with other providers  Goal: Meet greater than 75% of nutritional needs by follow-up. (new)    Monitoring & Evaluation     Dietitian will monitor food and beverage intake, weight, electrolyte/renal panel, glucose/endocrine profile, and gastrointestinal profile.  Nutrition Risk/Follow-Up: low (follow-up in 5-7 days)   Please consult if re-assessment needed sooner.

## 2023-03-04 NOTE — PLAN OF CARE
Problem: Rehabilitation ([facility]) Plan of Care  Goal: Plan of Care Review  Flowsheets (Taken 3/3/2023 1808)  Plan of Care Reviewed With: patient  Goal: Absence of New-Onset Illness or Injury  Outcome: Ongoing, Progressing  Intervention: Prevent Fall and Fall Injury  Flowsheets (Taken 3/3/2023 1808)  Safety Promotion/Fall Prevention:   assistive device/personal item within reach   bed alarm set   chair alarm set   Fall Risk reviewed with patient/family   Fall Risk signage in place   gait belt with ambulation   high risk medications identified   lighting adjusted   medications reviewed   nonskid shoes/socks when out of bed   side rails raised x 2   instructed to call staff for mobility  Intervention: Prevent Skin Injury  Flowsheets (Taken 3/3/2023 1808)  Skin Protection:   transparent dressing maintained   adhesive use limited   incontinence pads utilized  Intervention: Prevent Infection  Flowsheets (Taken 3/3/2023 1808)  Infection Prevention:   equipment surfaces disinfected   environmental surveillance performed   personal protective equipment utilized   hand hygiene promoted   rest/sleep promoted   single patient room provided  Intervention: Prevent VTE (Venous Thromboembolism)  Flowsheets (Taken 3/3/2023 1808)  VTE Prevention/Management:   bleeding precations maintained   bleeding risk assessed   bleeding risk factor(s) identified, provider notified   dorsiflexion/plantar flexion performed   fluids promoted  Goal: Optimal Comfort and Wellbeing  Outcome: Ongoing, Progressing  Intervention: Provide Person-Centered Care  Flowsheets (Taken 3/3/2023 1808)  Trust Relationship/Rapport:   care explained   choices provided   empathic listening provided   emotional support provided   thoughts/feelings acknowledged   reassurance provided   questions encouraged   questions answered  Intervention: Monitor Pain and Promote Comfort  Flowsheets (Taken 3/3/2023 1808)  Pain Management Interventions:   pain management plan reviewed  with patient/caregiver   pillow support provided   quiet environment facilitated   relaxation techniques promoted   medication offered but refused     Problem: Infection  Goal: Absence of Infection Signs and Symptoms  Outcome: Ongoing, Progressing  Intervention: Prevent or Manage Infection  Flowsheets (Taken 3/3/2023 1808)  Infection Management: aseptic technique maintained  Isolation Precautions: protective     Problem: Impaired Wound Healing  Goal: Optimal Wound Healing  Outcome: Ongoing, Progressing  Intervention: Promote Wound Healing  Flowsheets (Taken 3/3/2023 1808)  Oral Nutrition Promotion:   rest periods promoted   physical activity promoted  Pain Management Interventions:   pain management plan reviewed with patient/caregiver   pillow support provided   quiet environment facilitated   relaxation techniques promoted   medication offered but refused     Problem: Fall Injury Risk  Goal: Absence of Fall and Fall-Related Injury  Outcome: Ongoing, Progressing  Intervention: Identify and Manage Contributors  Flowsheets (Taken 3/3/2023 1808)  Self-Care Promotion:   independence encouraged   BADL personal objects within reach   BADL personal routines maintained   safe use of adaptive equipment encouraged  Medication Review/Management:   medications reviewed   high-risk medications identified  Intervention: Promote Injury-Free Environment  Flowsheets (Taken 3/3/2023 1808)  Safety Promotion/Fall Prevention:   assistive device/personal item within reach   bed alarm set   chair alarm set   Fall Risk reviewed with patient/family   Fall Risk signage in place   gait belt with ambulation   high risk medications identified   lighting adjusted   medications reviewed   nonskid shoes/socks when out of bed   side rails raised x 2   instructed to call staff for mobility     Problem: Skin Injury Risk Increased  Goal: Skin Health and Integrity  Outcome: Ongoing, Progressing  Intervention: Optimize Skin Protection  Flowsheets  (Taken 3/3/2023 1808)  Pressure Reduction Techniques:   frequent weight shift encouraged   positioned off wounds   pressure points protected   rest period provided between sit times   weight shift assistance provided  Skin Protection:   transparent dressing maintained   adhesive use limited   incontinence pads utilized  Intervention: Promote and Optimize Oral Intake  Flowsheets (Taken 3/3/2023 1808)  Oral Nutrition Promotion:   rest periods promoted   physical activity promoted

## 2023-03-04 NOTE — PT/OT/SLP PROGRESS
"Physical Therapy      Patient Name:  Hunter Navarrete   MRN:  [id number]    Patient not seen today secondary to Patient fatigue, Patient unwilling to participate.   Attempted 1030 AM - pt sleeping, able to awaken but unable to open eyes nor maintain conversation  Attempted 1400 - pt sleeping, able to awaken but pt refusing tx stating "I want to go home".    Amount of therapy minutes missed:  30 Minutes.    Will follow-up as schedule permits.    3/4/2023  "

## 2023-03-04 NOTE — PT/OT/SLP EVAL
Physical Therapy Rehab Evaluation    Patient Name:  Hunter Navarrete   MRN:  [id number]    Recommendations:     Discharge Recommendations:  nursing facility, skilled   Discharge Equipment Recommendations: walker, rolling   Barriers to discharge:  decreased endurance    Assessment:     Hunter Navarrete is a 82 y.o. male admitted with a medical diagnosis of S/P CABG x 2.  He presents with the following impairments/functional limitations:  impaired endurance, impaired sensation, weakness, impaired functional mobility, gait instability, impaired balance, decreased coordination, decreased lower extremity function, decreased safety awareness, pain, decreased ROM, impaired cardiopulmonary response to activity.    Rehab Diagnosis: s/p CABG    Recent Surgery: * No surgery found *      General Precautions: Standard, fall, sternal     Orthopedic Precautions:  (sternal precautions)     Braces: N/A    Rehab Prognosis: Good; patient would benefit from acute skilled PT services to address these deficits and reach maximum level of function.      History:     Past Medical History:   Diagnosis Date    Acid reflux     Anemia     Aortic aneurysm, abdominal     Arthritis     Bronchitis     Cataract     Celiac artery stenosis     50% by CTA abdomen 7/27/2020    CKD (chronic kidney disease)     45% FUNCTION    Coronary artery disease     Encounter for blood transfusion     Enlarged prostate     GERD (gastroesophageal reflux disease)     Hemorrhoids     Hypercholesteremia     Hypertension     Iron deficiency anemia, unspecified     Leaky heart valve     Lupus anticoagulant disorder     Renal artery stenosis     by CTA 7/27/2020    Skin cancer     Unspecified chronic bronchitis     UTI (urinary tract infection)        Past Surgical History:   Procedure Laterality Date    ACF      BACK SURGERY      RODS IN BACK; 4 SURGIERIES    BONE MARROW BIOPSY      CARDIAC ANGIOGRAM WITH STENT      CATARACT SX Bilateral     CORONARY ANGIOGRAPHY N/A 2/15/2023  "   Procedure: ANGIOGRAM, CORONARY ARTERY;  Surgeon: Rito Vega MD;  Location: [facility] CATH;  Service: Cardiology;  Laterality: N/A;    CORONARY ARTERY BYPASS GRAFT (CABG) N/A 2/24/2023    Procedure: CORONARY ARTERY BYPASS GRAFT (CABG);  Surgeon: Spencer Hagan MD;  Location: [facility];  Service: Cardiothoracic;  Laterality: N/A;  CABG / EVH //   ECHO NOTIFIED    HIP SURGERY Right     THR    KNEE SURGERY Right     ATS    LEFT HEART CATHETERIZATION Left 07/22/2021    Procedure: Left heart cath;  Surgeon: Curtis Loredo MD;  Location: [facility] CATH;  Service: Cardiology;  Laterality: Left;    LEFT HEART CATHETERIZATION Left 09/22/2022    Procedure: Left heart cath;  Surgeon: Giorgi Barker MD;  Location: [facility] CATH;  Service: Cardiology;  Laterality: Left;    SHOULDER SURGERY Bilateral     SPINAL CORD STIMULATOR IMPLANT         Subjective     Chief Complaint: fatigue  Patient/Family Comments/goals: "to return to doing what I was doing before"    Patients cultural, spiritual, Lutheran conflicts given the current situation: no       Living Environment  People in Home: spouse  Living Arrangements: house  Number of Stairs, Main Entrance: none  Home Layout: Able to live on 1st floor  Transportation Anticipated: family or friend will provide  Equipment Currently Used at Home: rollator (only on occasion)    Prior Level of Function       Equipment used at home: rollator (only on occasion).  DME owned (not currently used): none.      Upon discharge, patient will have assistance from family.    Objective:     Communicated with RN prior to session.  Patient found supine with medellin catheter  upon PT entry to room.    Vitals   Vitals at Rest  BP     HR     O2 Sat     Pain       Vitals With Activity  BP     HR     O2 Sat     Pain       Respiratory Status: Nasal cannula, flow 0.5 L/min    Exams  Cognitive Exam:  Patient is oriented to Person  Sensation: -       WNL  RLE ROM: -       WFL  RLE Strength: gross LE strength 3+/5  LLE ROM: " -       WFL  LLE Strength: gross LE strength 3+/5      GGs   Admit Current   Status Goal   Functional Area: Care Score: Care Score: Care Score:   Roll Left and Right 2 2 Supervision or touching assistance   Sit to Lying 2 2 Supervision or touching assistance   Lying to Sitting on Side of Bed 2 2 Supervision or touching assistance   Sit to Stand 2 2 Supervision or touching assistance   Chair/Bed-to-Chair Transfer 2 2 Supervision or touching assistance   Car Transfer 88 88 Supervision or touching assistance   Walk 10 Feet 1 1 Supervision or touching assistance   Walk 50 Feet with Two Turns 1; 50' with RW and close follow of w/c 1 Supervision or touching assistance   Walk 150 Feet 88 88 Supervision or touching assistance   Walk 10 Feet Uneven Surface 88 88 Supervision or touching assistance   1 Step (Curb) 88 88 Supervision or touching assistance   4 Steps 88 88 Supervision or touching assistance   12 Steps 88 88 Not applicable   Picking Up Object 88 88 Supervision or touching assistance   Wheel 50 Feet with Two Turns 88, ~10' with VC for proper technique, distance limited 2/2 fatigue 88 Supervision or touching assistance   Wheel 150 Feet 88 88 Supervision or touching assistance     Therapeutic Activities and Exercises:  Bed mobility performed at start of session due to BM requiring clean up and change of brief.   Toilet transfer to [facility] with RW, requiring max A  Several sit to stands performed during toileting req. Mod-max A with VC for forward trunk lean       Activity Tolerance: Fair    Patient left supine with call button in reach, RN notified, and family present.    Education Provided: roles and goals of PT/PTA, transfer training, bed mob, gait training, balance training, safety awareness, wheelchair management, strengthening exercises, and fall prevention    Expected compliance: Moderate compliance    GOALS:   Multidisciplinary Problems       Physical Therapy Goals          Problem: Physical Therapy    Goal  Priority Disciplines Outcome Goal Variances Interventions   Physical Therapy Goal     PT, PT/OT Ongoing, Progressing     Description: Pt will improve functional mobility by performing:     Bed Mobility   Roll Right and Left Partial/ Moderate Assistance .  Lying to sitting Partial/ Moderate Assistance .  Sitting to lying Partial/ Moderate Assistance .    Transfers    Pt will be able to perform Stand step chair/bed to chair transfer With RW Partial/ Moderate Assistance .  Pt will be able to perform Sit to stand with RW Partial/ Moderate Assistance .  Pt will be able to perform Car transfer with RW Partial/ Moderate Assistance .    Ambulation    Pt will ambulate 50 Feet with RW Partial/ Moderate Assistance .  Stair negotiation to be assessed      Wheelchair Mobility   Pt will be able to propel 150 feet in steven wheelchair Partial/ Moderate Assistance .      Timeframe: By Re-eval                         Plan:     During this hospitalization, patient to be seen 5 x/week to address the identified rehab impairments via neuromuscular re-education, wheelchair management/training, therapeutic exercises, therapeutic activities, gait training and progress toward the following goals:    Plan of Care Expires:  03/10/23  PT Next Visit Date:    Plan of Care reviewed with: patient, spouse      Additional Infomation:           Time Tracking:     Therapy Time   PT Start Time: 0830  PT Stop Time: 1000  PT Total Time (min): 90 min  PT Individual: 90  Missed Time:    Time Missed due to:      Billable Minutes: Evaluation 60 and Therapeutic Activity 30    03/04/2023

## 2023-03-04 NOTE — PROGRESS NOTES
[facility] ([facility])  Rehab Progress Note    Patient Name: Hunter Navarrete  MRN: [id number]  Age: 82 y.o. Sex: male  : 1940  Hospital Length of Stay: 1 days  Date of Service: 3/4/2023   Chief Complaint: Multivessel CAD s/p left heart catheterization with InStent restenosis and multivessel coronary disease on 02/15/2023 s/p CABG x2 (lima to LAD, RSVG to OM with right GSV EVH) on 2023    Subjective:     Basic Information  Admit Information: 82-year-old WM presented to [facility] on 02/15 with complaints of substernal chest pain with shortness of breath.  PMH significant for CAD with PCI, CKD, hypertension, mild aortic stenosis, lupus anticoagulant inhibitor syndrome, and hyperlipidemia.  Workup significant for elevated troponin.  Transferred to [facility] for cardiology evaluation.  Initiated heparin drip.  Tolerated left heart catheterization on 02/15 significant for multivessel coronary disease with InStent restenosis.  Hematology recommended transfer to tertiary facility for CABG 2/2 history of lupus anticoagulant.  Tolerated transfer to [facility] on .  Initiated Tridil drip and titrated for chest pain.  Required 2 units PRBC transfusion on .  On  tolerated CABG x2 (lima to LAD, RSVG to OM with right GSV EVH) without perioperative complications.  Postoperatively required IV fluid gentle hydration per Nephrology 2/2 CKD stage IIIB.  Chest tubes removed.  Continued with postoperative DANIELLA on CKD.  Nephrology will continue to follow.  Discontinue Lasix IV on  and initiated home dose of Lasix 40 mg daily.  Initiated Plavix on . Tolerated transfer to [facility] inpatient rehab unit on 3/3 without incident.  Today's Information: No acute events overnight.  Vital signs at goal with no recent recorded fevers.  Blood pressure is moderately controlled.  Monitor closely.  Last BM 3/3.  Reports good sleep and appetite.  Chest x-ray significant for improving  aeration in the right lung base with otherwise stable exam.  Labs reviewed, H&H stable, iron levels low, renal indices with improvement, albumin 2.5, pre-albumin 13.8, mild transaminitis, otherwise unremarkable.    Review of patient's allergies indicates:   Allergen Reactions    Cardura [doxazosin] Hives    Lyrica [pregabalin] Other (See Comments)    Paxil [paroxetine hcl] Other (See Comments)    Restoril [temazepam] Hallucinations    Vioxx [rofecoxib] Swelling    Zithromax [azithromycin] Hives        Current Facility-Administered Medications:     aspirin EC tablet 81 mg, 81 mg, Oral, Daily, Yaneth Douglas, FNP, 81 mg at 03/04/23 0745    atorvastatin tablet 40 mg, 40 mg, Oral, QHS, Yaneth Douglas, FNP, 40 mg at 03/03/23 2032    benzonatate capsule 100 mg, 100 mg, Oral, TID PRN, Yaneth Douglas, FNP    bisacodyL suppository 10 mg, 10 mg, Rectal, Daily PRN, Yaneth Douglas, FNP, 10 mg at 03/03/23 1909    clopidogreL tablet 75 mg, 75 mg, Oral, Daily, Yaneth Douglas, FNP, 75 mg at 03/04/23 0745    docusate sodium capsule 100 mg, 100 mg, Oral, BID, Yaneth Douglas, FNP, 100 mg at 03/04/23 0745    enoxaparin injection 40 mg, 40 mg, Subcutaneous, Daily, Yaneth Douglas, FNP, 40 mg at 03/03/23 1706    ezetimibe tablet 10 mg, 10 mg, Oral, Daily, Yaneth B Misael, FNP, 10 mg at 03/04/23 0745    famotidine tablet 20 mg, 20 mg, Oral, Daily PRN, Xander Enriquez MD    FLUoxetine capsule 10 mg, 10 mg, Oral, Daily, Yaneth Douglas, FNP, 10 mg at 03/04/23 0745    furosemide tablet 40 mg, 40 mg, Oral, Daily, Yaneth Douglas, FNP, 40 mg at 03/04/23 0745    gabapentin capsule 100 mg, 100 mg, Oral, TID, Yaneth Douglas, FNP, 100 mg at 03/04/23 0528    hydrALAZINE injection 10 mg, 10 mg, Intravenous, Q6H PRN, SHIRLEY Haider    HYDROcodone-acetaminophen 5-325 mg per tablet 1 tablet, 1 tablet, Oral, Q4H PRN, SHIRLEY Hiader    hydrOXYzine pamoate capsule 50 mg, 50 mg, Oral, Nightly PRN, SHIRLEY Haider     "labetalol 20 mg/4 mL (5 mg/mL) IV syring, 10 mg, Intravenous, Q4H PRN, Yaneth Douglas, FNP    meclizine tablet 25 mg, 25 mg, Oral, TID PRN, Yaneth Douglas, FNP    metoprolol injection 10 mg, 10 mg, Intravenous, Q2H PRN, Yaneth Douglas, FNP    metoprolol tartrate (LOPRESSOR) tablet 50 mg, 50 mg, Oral, BID, Yaneth B , FNP, 50 mg at 03/04/23 0746    mupirocin 2 % ointment, , Nasal, BID, Xander Enriquez MD, Given at 03/04/23 0800    ondansetron disintegrating tablet 4 mg, 4 mg, Oral, Q6H PRN, Yaneth Douglas, FNP    pantoprazole EC tablet 40 mg, 40 mg, Oral, Daily, Yaneth Douglas, FNP, 40 mg at 03/04/23 0745    predniSONE tablet 2.5 mg, 2.5 mg, Oral, Daily, Yaneth B Misael, FNP, 2.5 mg at 03/04/23 0745    QUEtiapine tablet 50 mg, 50 mg, Oral, QHS, Yaneth B , FNP, 50 mg at 03/03/23 2035    rOPINIRole tablet 1 mg, 1 mg, Oral, BID, Yaneth B , FNP, 1 mg at 03/04/23 0745    sucralfate tablet 1 g, 1 g, Oral, QID (AC & HS), Yaneth Douglas, FNP, 1 g at 03/04/23 0528    tamsulosin 24 hr capsule 0.4 mg, 0.4 mg, Oral, QHS, Yaneth B , FNP, 0.4 mg at 03/03/23 2035     Review of Systems   Complete 12-point review of symptoms negative except for what's mentioned in HPI     Objective:     BP (!) 157/70   Pulse 101   Temp 99 °F (37.2 °C) (Axillary)   Resp 20   Ht 5' 11" (1.803 m)   Wt 91.6 kg (201 lb 15.1 oz)   SpO2 (!) 92%   BMI 28.17 kg/m²      Physical Exam  Vitals reviewed.   Eyes:      Pupils: Pupils are equal, round, and reactive to light.   Cardiovascular:      Rate and Rhythm: Normal rate and regular rhythm.      Heart sounds: Normal heart sounds.   Pulmonary:      Effort: Pulmonary effort is normal.      Breath sounds: Normal breath sounds.   Abdominal:      General: Bowel sounds are normal.   Musculoskeletal:         General: Normal range of motion.   Skin:     General: Skin is warm.      Comments: Midline sternal incision open air dry and intact    Neurological:      General: No " focal deficit present.      Mental Status: He is alert and oriented to person, place, and time.      Motor: Weakness present.   Psychiatric:         Mood and Affect: Mood normal.       Lines/Drains/Airways       Peripherally Inserted Central Catheter Line  Duration             PICC Triple Lumen 02/27/23 1045 left basilic 4 days              Drain  Duration                  Urethral Catheter 02/27/23 1150 4 days                    Labs  Recent Results (from the past 24 hour(s))   Comprehensive Metabolic Panel    Collection Time: 03/04/23  5:33 AM   Result Value Ref Range    Sodium Level 142 136 - 145 mmol/L    Potassium Level 3.6 3.5 - 5.1 mmol/L    Chloride 105 98 - 107 mmol/L    Carbon Dioxide 26 23 - 31 mmol/L    Glucose Level 115 82 - 115 mg/dL    Blood Urea Nitrogen 69.3 (H) 8.4 - 25.7 mg/dL    Creatinine 1.68 (H) 0.73 - 1.18 mg/dL    Calcium Level Total 9.6 8.8 - 10.0 mg/dL    Protein Total 6.6 5.8 - 7.6 gm/dL    Albumin Level 2.5 (L) 3.4 - 4.8 g/dL    Globulin 4.1 (H) 2.4 - 3.5 gm/dL    Albumin/Globulin Ratio 0.6 (L) 1.1 - 2.0 ratio    Bilirubin Total 0.7 <=1.5 mg/dL    Alkaline Phosphatase 97 40 - 150 unit/L    Alanine Aminotransferase 32 0 - 55 unit/L    Aspartate Aminotransferase 39 (H) 5 - 34 unit/L    eGFR 41 mls/min/1.73/m2   Prealbumin    Collection Time: 03/04/23  5:33 AM   Result Value Ref Range    Prealbumin 13.8 (L) 16.0 - 42.0 mg/dL   Iron and TIBC    Collection Time: 03/04/23  5:33 AM   Result Value Ref Range    Iron Binding Capacity Unsaturated 113 69 - 240 ug/dL    Iron Level 23 (L) 65 - 175 ug/dL    Transferrin 120 mg/dL    Iron Binding Capacity Total 136 (L) 250 - 450 ug/dL    Iron Saturation 17 (L) 20 - 50 %   Magnesium    Collection Time: 03/04/23  5:33 AM   Result Value Ref Range    Magnesium Level 1.70 1.60 - 2.60 mg/dL   Phosphorus    Collection Time: 03/04/23  5:33 AM   Result Value Ref Range    Phosphorus Level 2.9 2.3 - 4.7 mg/dL   CBC with Differential    Collection Time: 03/04/23   5:33 AM   Result Value Ref Range    WBC 11.4 4.5 - 11.5 x10(3)/mcL    RBC 3.34 (L) 4.70 - 6.10 x10(6)/mcL    Hgb 9.9 (L) 14.0 - 18.0 g/dL    Hct 30.6 (L) 42.0 - 52.0 %    MCV 91.6 80.0 - 94.0 fL    MCH 29.6 pg    MCHC 32.4 (L) 33.0 - 36.0 g/dL    RDW 15.3 11.5 - 17.0 %    Platelet 212 130 - 400 x10(3)/mcL    MPV 11.3 (H) 7.4 - 10.4 fL    Neut % 79.7 %    Lymph % 7.4 %    Mono % 10.5 %    Eos % 0.0 %    Basophil % 0.3 %    Lymph # 0.84 0.6 - 4.6 x10(3)/mcL    Neut # 9.07 2.1 - 9.2 x10(3)/mcL    Mono # 1.20 0.1 - 1.3 x10(3)/mcL    Eos # 0.00 0 - 0.9 x10(3)/mcL    Baso # 0.03 0 - 0.2 x10(3)/mcL    IG# 0.24 (H) 0 - 0.04 x10(3)/mcL    IG% 2.1 %    NRBC% 0.0 %     Radiology  CUS 02/20/23: The right internal carotid artery demonstrated 50-69% stenosis. The left internal carotid artery demonstrated 50-69% stenosis.  Bilateral vertebral arteries were patent with antegrade flow.     [facility] 02/15/2023: LM 0% stenosis mLAD proximal 70% ISR D1 ostial 50% stenosis D2 ostial 50% stenosis Lcx: previous stent; proximal Lcx  90% ISR; mid Lcx 30% stenosis OM1 proximal 80% stenosis RCA patent stents to ostium. Mid RCA proximal subsection- 60% stenosis; Mid RCA distal subsection 70& stenosis; distal RCA proximal subsection 100% stenosis. Fills left to right     TTE 02/16/2023: Global LV SF is borderline normal. LVEF 45-50%. LA is mildly enlarged. Moderate calcification of the aortic valve is noted. Mild AS is present. Mild to moderate AR. Mild to moderate MAC is noted. Mild MR. Trace TR. PASP 40 mmHg. Optison used to enhance endocardial border.     Assessment/Plan:     82 y.o. WM admitted on 3/3/2023     Multivessel CAD  - s/p left heart catheterization with InStent restenosis and multivessel coronary disease on 02/15/2023  - s/p CABG x2 (lima to LAD, RSVG to OM with right GSV EVH) on 02/24/2023  - sternal precautions  - daily weights  - continue                Lasix 40 mg daily                Metoprolol tartrate 50 mg b.i.d.                 Aspirin 81 mg daily                Plavix 75 mg daily                 Atorvastatin 40 mg at night                Hydrocodone-acetaminophen 5-325 mg p.r.n. every 4 hours  - not on Ace/Arb 2/2 DANIELLA  - follow-up with cardiology and CV surgery outpatient     ICMO  - EF 45-50% TTE 02/15/2023 improved from 40%  - continue                Lasix 40 mg daily                Metoprolol tartrate 50 mg b.i.d.                Aspirin 81 mg daily                Plavix 75 mg daily                 Atorvastatin 40 mg at night  - not on Ace/Arb 2/2 DANIELLA  - follow-up with cardiology outpatient     Acute on chronic combined systolic/diastolic heart failure  - EF 45-50% TTE 02/15/2023 improved from 40%  - continue                Lasix 40 mg daily                Metoprolol tartrate 50 mg b.i.d.                Aspirin 81 mg daily                Plavix 75 mg daily                 Atorvastatin 40 mg at night  - not on Ace/Arb 2/2 DANIELLA  - follow-up with cardiology outpatient     DANIELLA on CKD stage III  - Renal indices improving  - Avoid NSAIDs (Advil, ibuprofen, naproxen...) and poe-2 inhibitors (Mobic, Celebrex)    - encourage oral hydration  - defer to Nephrology     HTN  - at goal!!  - continue                Metoprolol tartrate 50 mg b.i.d.                Hydralazine 10 mg every 2 hours as needed for BP > 160/90                Labetalol 10 mg every 2 hours as needed for BP > 160/90     HLD  - FLP at goal!!  - continue                Atorvastatin 40 mg at night  Zetia 10 mg daily      RLS  - stable  - continue                Gabapentin 100 mg t.i.d.                Ropinirole 1 mg b.i.d.     Bilateral RICKEY  - moderate bilateral  - continue                Atorvastatin 40 mg at night  Zetia 10 mg daily   Plavix 75 mg daily   - follow-up with cardiology outpatient     Constipation  - stable  - continue  Colace 100 mg b.i.d.     GERD  - Avoid spicy foods, and nothing to eat or drink within x2 hours of bedtime or laying flat (water is ok)   - Avoid  NSAIDs (Advil, ibuprofen, naproxen...) and poe-2 inhibitors (Mobic, Celebrex)    - continue                Protonix 40 mg daily                 Carafate 1 g a.c. and HS      Lupus anticoagulant inhibitor syndrome  - stable  - continue  Prednisone 2.5 mg daily   - follow-up with Hematology outpatient     Major depressive disorder  - in remission  - continue  Fluoxetine 10 mg daily     Insomnia  - stable  - continue  Seroquel 50 mg at bedtime     BPH  - stable  - continue  Flomax 0.4 mg at bedtime      Normocytic anemia  - asymptomatic  - s/p transfusion                x2 units PRBC on 2/24/2023                x2 units PRBC on 2/23/2023  - H/H stable   - no evidence of active bleeds  - low iron levels  - continue   Ferrous sulfate 325 mg b.i.d. (initiated 3/4)  - will closely monitor and transfuse if needed     Acute hypoxic respiratory failure  - on 2 L nasal cannula  - chest x-ray stable  - continue   Lasix 40 mg daily      VTE Prophylaxis:  Lovenox 40 mg daily  COVID-19 testing:  Negative on 03/03  COVID-19 vaccination status:  Vaccinated x4     POA:  Yes (Romy, daughter and Nav, son)  Living will: yes  Contacts:  Giovana Navarrete (spouse) [phone]                           Romy (daughter) [phone]     CODE STATUS: Full  Internal Medicine (attending): Xander Enriquez MD  Physiatry (consulting):  Dilip Hargrove MD     OUTPATIENT PROVIDERS  PCP:  Mariella Bethea MD   Hematology:  Missy Davila MD in [facility]  Cardiology: Curtis Loredo MD at [facility]   CV surgery:  Spencer Hagan MD  Nephrology:  Darinel Bedoya MD     DISPOSITION:  Vital signs at goal.  Chest x-ray significant for improving aeration in the right lung base with overall stable exam.  Labs reviewed.  H&H stable.  Iron levels low.  Initiate ferrous sulfate 325 mg b.i.d..  Renal indices with improvement.  Nephrology to follow.  Appreciate Nephrology recommendations.  Discussed with Nephrology.  Albumin 2.5, pre-albumin 13.8.  Registered  dietitian to follow.  Blood pressure is moderately controlled.  Monitor closely.  Bowel management, sleep hygiene, and appetite at goal.  Encourage oral nutrition.  Continue acute and chronic medications.  Thoroughly reviewed medications.  Continues with acute hypoxemic respiratory failure on 2 L nasal cannula.  Decrease O2 as tolerated.  Encourage IS every hour while awake as tolerated.  Monitor closely.  Notify of any acute changes.  Aggressively mobilize as tolerated.     Total time spent on this encounter including chart review and direct 1-on-1 patient interaction: 51 minutes   Over 50% of this time was spent in counseling and coordination of care

## 2023-03-04 NOTE — PLAN OF CARE
Hunter Navarrete age 82 * sternal precautions     Dx: Severe coronary artery disease, s/p CABG x 2 2/24/2023. Pt. Has a past medical history of CAD, HTN, HLD, CKD, mild AS, and lupus anticoagulant inhibitor syndrome. *See chart for full and complete medical history*     Hx mental health/substance abuse: Spouse denies history of mental health illnesses. He is a former tobacco smoker     Is there evidence of an acute change in mental status from the patients baseline? Yes, confused at times with pain meds.     Insurance:  [employer]/Medicare Part A&B/[employer] Medicare Supplement     Education//Work Hx: Pt. Completed high school. He was in the [employer]. He is retired from electrical/refrigeration work.      Pt. Is  to Giovana Navarrete ([phone]) Giovana manages the patients medications and finances. She also cooks, cleans, does laundry, and grocery shops.      Children (3)/Friends/Family: 1) Giovana Navarrete, spouse ([phone]) 2) Romy, daughter ([phone])     Power of  (yes-Romy, daughter and Nav, son) Living Will (yes)       Prior Level of Fx: Pt. Was independent with most ADLs but needed assistance to don/doff his shoes and socks per his wife. Pt. Used a rollator or cane at times for mobility for long distances. He was still driving. They hire someone for lawn maintenance. He does not have a medic alert.      Residence: Pt. Lives with his spouse in [place] in a single-story home with no steps to enter the residence. He uses a tub/shower combination with grab bars, HHS, and a tub chair. He has an elevated toilet with no grab bars.      DME: 3 canes, rollator, standard walker, grab bars, tub chair, and HHS. Will use DME company approved per [facility].     HH/OP tx:   Pt. Has a history of home health with [facility]. They will use HH company approved per [facility].     FOC obtained for DME and HH companies approved per [facility].     Pharmacy: [facility] [place] /  (has prescription drug coverage)     MD(s): PCP: Dr. Mariella Bethea ([phone]); Hematologist: Dr. Missy Davila in [facility]; Cardiologist: Dr. Curtis Loredo at [facility] in [place]

## 2023-03-04 NOTE — PLAN OF CARE
Problem: Physical Therapy  Goal: Physical Therapy Goal  Description: Pt will improve functional mobility by performing:     Bed Mobility   Roll Right and Left Partial/ Moderate Assistance .  Lying to sitting Partial/ Moderate Assistance .  Sitting to lying Partial/ Moderate Assistance .    Transfers    Pt will be able to perform Stand step chair/bed to chair transfer With RW Partial/ Moderate Assistance .  Pt will be able to perform Sit to stand with RW Partial/ Moderate Assistance .  Pt will be able to perform Car transfer with RW Partial/ Moderate Assistance .    Ambulation    Pt will ambulate 50 Feet with RW Partial/ Moderate Assistance .  Stair negotiation to be assessed      Wheelchair Mobility   Pt will be able to propel 150 feet in steven wheelchair Partial/ Moderate Assistance .      Timeframe: By Re-eval    Outcome: Ongoing, Progressing

## 2023-03-04 NOTE — PT/OT/SLP EVAL
Occupational Therapy Inpatient Rehab Evaluation    Name: Hunter Navarrete  MRN: [id number]    Recommendations:     Discharge Recommendations:  nursing facility, skilled   Discharge Equipment Recommendations:     Barriers to discharge: None    Assessment:  Hunter Navarrete is a 82 y.o. male admitted with a medical diagnosis of S/P CABG x 2.  He presents with the following impairments/functional limitations:  weakness, impaired endurance, impaired self care skills, impaired functional mobility, gait instability, decreased lower extremity function, impaired balance, pain, orthopedic precautions.    General Precautions: Standard, fall, sternal     Orthopedic Precautions: (sternal precautions)     Braces: N/A    Rehab Prognosis: Good; patient would benefit from acute skilled OT services to address these deficits and reach maximum level of function.      History:     Past Medical History:   Diagnosis Date    Acid reflux     Anemia     Aortic aneurysm, abdominal     Arthritis     Bronchitis     Cataract     Celiac artery stenosis     50% by CTA abdomen 7/27/2020    CKD (chronic kidney disease)     45% FUNCTION    Coronary artery disease     Encounter for blood transfusion     Enlarged prostate     GERD (gastroesophageal reflux disease)     Hemorrhoids     Hypercholesteremia     Hypertension     Iron deficiency anemia, unspecified     Leaky heart valve     Lupus anticoagulant disorder     Renal artery stenosis     by CTA 7/27/2020    Skin cancer     Unspecified chronic bronchitis     UTI (urinary tract infection)        Past Surgical History:   Procedure Laterality Date    ACF      BACK SURGERY      RODS IN BACK; 4 SURGIERIES    BONE MARROW BIOPSY      CARDIAC ANGIOGRAM WITH STENT      CATARACT SX Bilateral     CORONARY ANGIOGRAPHY N/A 2/15/2023    Procedure: ANGIOGRAM, CORONARY ARTERY;  Surgeon: Rito Vega MD;  [facility];  Service: Cardiology;  Laterality: N/A;    CORONARY ARTERY BYPASS GRAFT (CABG) N/A  2/24/2023    Procedure: CORONARY ARTERY BYPASS GRAFT (CABG);  Surgeon: Spencer Hagan MD;  Location: [facility];  Service: Cardiothoracic;  Laterality: N/A;  CABG / EVH //   ECHO NOTIFIED    HIP SURGERY Right     THR    KNEE SURGERY Right     ATS    LEFT HEART CATHETERIZATION Left 07/22/2021    Procedure: Left heart cath;  Surgeon: Curtis Loredo MD;  Location: [facility] CATH;  Service: Cardiology;  Laterality: Left;    LEFT HEART CATHETERIZATION Left 09/22/2022    Procedure: Left heart cath;  Surgeon: Giorgi Barker MD;  Location: [facility] CATH;  Service: Cardiology;  Laterality: Left;    SHOULDER SURGERY Bilateral     SPINAL CORD STIMULATOR IMPLANT         Subjective     Orientation: Oriented x4    Chief Complaint: fatigue    Patient/Family Comments/goals: return to doing what I was doing before    Vitals  Vitals at Rest  /68   HR 88   O2 Sat     Pain Pain Rating 1: 5/10  Location - Orientation 1: midline  Location 1: sternal  Pain Addressed 1: Pre-medicate for activity     Vitals With Activity  /68   HR 88   O2 Sat     Pain Pain Rating 1: 5/10  Location - Orientation 1: midline  Location 1: sternal  Pain Addressed 1: Pre-medicate for activity     Respiratory Status: Room air    Patients cultural, spiritual, Yazidi conflicts given the current situation: no       Living Environment   Living Environment  People in Home: spouse  Living Arrangements: house  Number of Stairs, Main Entrance: none  Home Layout: Able to live on 1st floor  Transportation Anticipated: family or friend will provide  Equipment Currently Used at Home: shower chair  Shower Setup: Tub/Shower combo    Prior Level of Function  BADL: Independent    IADL: Supervision or Set-up Assistance    Equipment used at home: shower chair.  DME owned (not currently used): shower chair.      Upon discharge, patient will have assistance from spouse.    Objective:     Patient found HOB elevated with peripheral IV  upon OT entry to room.    Mobility   Patient  completed:  Supine to Sit with maximal assistance  Sit to Supine with total assistance        ADLs     Current Status   Eating 4   Oral Hygiene 4   Shower, Bathe Self 2   Upper Body Dressing 1   Lower Body Dressing 1   Toileting Hygiene 1   Toilet Transfer 2 per PT report   Putting On, Taking Off Footwear 1     Limiting Factors for ADLs: motor, endurance, limited ROM, balance, weakness, and pain  Limited performance d/t endurance deficits. Unable to mobilize beyond EOB 2/2 fatigue and refusal to continue. Would benefit from AD for dressing    Exams     ROM:          -       WFL    Hand Dominance: Right    ROM Hand  Left Hand: WFL  Right Hand: WFL    Strength  Overall Strength:          -       WFL, gentle resist d/t sternal pxns        Sensation  Left:          -       WNL, except reports some tingling in L hand  Right:          -       WNL    Coordination:      -       Intact  - noted tremors of BUE    Cognition:   WFL    Balance    Sitting  Sitting Surface: EOB  Static: One UE Extremity, Fair (-)  Dynamic: One UE Extremity, Poor (+)    Additional Treatments       LifeStyle Change and Education     Patient left HOB elevated with all lines intact and call button in reach.    Education provided: Roles and goals of OT, ADLs, transfer training, bed mobility, body mechanics, assistive device, sequencing, and fall prevention    Multidisciplinary Problems       Occupational Therapy Goals          Problem: Occupational Therapy    Goal Priority Disciplines Outcome Interventions   Occupational Therapy Goal     OT, PT/OT     Description: ADLs: Pt will be SPV with ADLs by d/c.  STG: by reeval  Pt to perform UB dressing with partial A   Pt to perform LB dressing with Mod A using AD   Pt to perform putting on/off footwear task with Mod A using AD  Pt to perform toileting with Mod A using AD as needed  Pt to perform bathing with Mod A    Functional Transfers:  Pt to perform toilet transfers with Touch A using BSC over toilet.    Pt to perform a tub transfer with TTB requiring Mod A      Balance, Strengthening, Endurance, Balance:  Pt to consistently demonstrate adherence to orthopedic precautions during all ADL's as instructed by OT.  Pt to demonstrate fair dynamic standing balance as required to perform ADL's from standing level.  Pt to demonstrate consistent adherence to breathing control and energy conservation techniques as educated by OT.                        Plan     During this hospitalization, patient to be seen 5 x/week, 6 x/week to address the identified rehab impairments via self-care/home management, therapeutic activities, therapeutic exercises and progress toward the following goals:    Plan of Care Expires:  03/31/23    Time Tracking     OT Received On: 03/04/23  Time In 1100     Time Out 1200  Total Time 60 min  Therapy Time: OT Individual: 60  Missed Time: 30 Minutes  Missed Time Reason: Patient unwilling to participate, Patient fatigue. Attempted to see patient again at 13:00. Pt adamantly, but politely, refused due to fatigue    Billable Minutes: Evaluation 30    03/04/2023

## 2023-03-05 LAB
ABO + RH BLD: NORMAL
ABO + RH BLD: NORMAL
BLD PROD TYP BPU: NORMAL
BLD PROD TYP BPU: NORMAL
BLOOD UNIT EXPIRATION DATE: NORMAL
BLOOD UNIT EXPIRATION DATE: NORMAL
BLOOD UNIT TYPE CODE: 6200
BLOOD UNIT TYPE CODE: 6200
CROSSMATCH INTERPRETATION: NORMAL
CROSSMATCH INTERPRETATION: NORMAL
DISPENSE STATUS: NORMAL
DISPENSE STATUS: NORMAL
UNIT NUMBER: NORMAL
UNIT NUMBER: NORMAL

## 2023-03-05 PROCEDURE — 25000003 PHARM REV CODE 250: Performed by: NURSE PRACTITIONER

## 2023-03-05 PROCEDURE — 97530 THERAPEUTIC ACTIVITIES: CPT | Mod: CQ

## 2023-03-05 PROCEDURE — 97110 THERAPEUTIC EXERCISES: CPT | Mod: CQ

## 2023-03-05 PROCEDURE — 63600175 PHARM REV CODE 636 W HCPCS: Performed by: NURSE PRACTITIONER

## 2023-03-05 PROCEDURE — 27000221 HC OXYGEN, UP TO 24 HOURS

## 2023-03-05 PROCEDURE — 11800000 HC REHAB PRIVATE ROOM

## 2023-03-05 PROCEDURE — 94761 N-INVAS EAR/PLS OXIMETRY MLT: CPT

## 2023-03-05 RX ORDER — CARVEDILOL 6.25 MG/1
12.5 TABLET ORAL 2 TIMES DAILY
Status: DISCONTINUED | OUTPATIENT
Start: 2023-03-05 | End: 2023-03-06

## 2023-03-05 RX ADMIN — ROPINIROLE HYDROCHLORIDE 1 MG: 1 TABLET, FILM COATED ORAL at 08:03

## 2023-03-05 RX ADMIN — METOPROLOL TARTRATE 50 MG: 50 TABLET, FILM COATED ORAL at 08:03

## 2023-03-05 RX ADMIN — ATORVASTATIN CALCIUM 40 MG: 40 TABLET, FILM COATED ORAL at 08:03

## 2023-03-05 RX ADMIN — SUCRALFATE 1 G: 1 TABLET ORAL at 10:03

## 2023-03-05 RX ADMIN — TAMSULOSIN HYDROCHLORIDE 0.4 MG: 0.4 CAPSULE ORAL at 08:03

## 2023-03-05 RX ADMIN — ASPIRIN 81 MG: 81 TABLET, COATED ORAL at 08:03

## 2023-03-05 RX ADMIN — FERROUS SULFATE TAB 325 MG (65 MG ELEMENTAL FE) 1 EACH: 325 (65 FE) TAB at 08:03

## 2023-03-05 RX ADMIN — DOCUSATE SODIUM 100 MG: 100 CAPSULE, LIQUID FILLED ORAL at 08:03

## 2023-03-05 RX ADMIN — PANTOPRAZOLE SODIUM 40 MG: 40 TABLET, DELAYED RELEASE ORAL at 08:03

## 2023-03-05 RX ADMIN — FUROSEMIDE 40 MG: 40 TABLET ORAL at 08:03

## 2023-03-05 RX ADMIN — SUCRALFATE 1 G: 1 TABLET ORAL at 08:03

## 2023-03-05 RX ADMIN — GABAPENTIN 100 MG: 100 CAPSULE ORAL at 01:03

## 2023-03-05 RX ADMIN — GABAPENTIN 100 MG: 100 CAPSULE ORAL at 08:03

## 2023-03-05 RX ADMIN — GABAPENTIN 100 MG: 100 CAPSULE ORAL at 05:03

## 2023-03-05 RX ADMIN — CLOPIDOGREL BISULFATE 75 MG: 75 TABLET ORAL at 08:03

## 2023-03-05 RX ADMIN — ENOXAPARIN SODIUM 40 MG: 40 INJECTION SUBCUTANEOUS at 04:03

## 2023-03-05 RX ADMIN — SUCRALFATE 1 G: 1 TABLET ORAL at 05:03

## 2023-03-05 RX ADMIN — HYDRALAZINE HYDROCHLORIDE 10 MG: 20 INJECTION INTRAMUSCULAR; INTRAVENOUS at 02:03

## 2023-03-05 RX ADMIN — QUETIAPINE 50 MG: 25 TABLET ORAL at 08:03

## 2023-03-05 RX ADMIN — MUPIROCIN: 20 OINTMENT TOPICAL at 08:03

## 2023-03-05 RX ADMIN — EZETIMIBE 10 MG: 10 TABLET ORAL at 08:03

## 2023-03-05 RX ADMIN — FLUOXETINE 10 MG: 10 CAPSULE ORAL at 08:03

## 2023-03-05 RX ADMIN — PREDNISONE 2.5 MG: 2.5 TABLET ORAL at 08:03

## 2023-03-05 RX ADMIN — SUCRALFATE 1 G: 1 TABLET ORAL at 04:03

## 2023-03-05 NOTE — PLAN OF CARE
Problem: Rehabilitation (IR[facility]) Plan of Care  Goal: Plan of Care Review  Outcome: Ongoing, Progressing  Goal: Patient-Specific Goal (Individualized)  Outcome: Ongoing, Progressing  Goal: Absence of New-Onset Illness or Injury  Outcome: Ongoing, Progressing  Goal: Optimal Comfort and Wellbeing  Outcome: Ongoing, Progressing  Goal: Readiness for Transition of Care  Outcome: Ongoing, Progressing     Problem: Infection  Goal: Absence of Infection Signs and Symptoms  Outcome: Ongoing, Progressing     Problem: Impaired Wound Healing  Goal: Optimal Wound Healing  Outcome: Ongoing, Progressing     Problem: Fall Injury Risk  Goal: Absence of Fall and Fall-Related Injury  Outcome: Ongoing, Progressing     Problem: Skin Injury Risk Increased  Goal: Skin Health and Integrity  Outcome: Ongoing, Progressing

## 2023-03-05 NOTE — PT/OT/SLP PROGRESS
Physical Therapy Inpatient Rehab Treatment    Patient Name:  Hunter Navarrete   MRN:  [id number]    Recommendations:     Discharge Recommendations:  nursing facility, skilled   Discharge Equipment Recommendations: walker, rolling   Barriers to discharge:       Assessment:     Hunter Navarrete is a 82 y.o. male admitted with a medical diagnosis of S/P CABG x 2.  He presents with the following impairments/functional limitations:     .    Rehab Diagnosis: s/p CABG    Recent Surgery: * No surgery found *      General Precautions: Standard, fall, hearing impaired     Orthopedic Precautions: (sternal precautions)     Braces:      Rehab Prognosis: Good; patient would benefit from acute skilled PT services to address these deficits and reach maximum level of function.      History:     Past Medical History:   Diagnosis Date    Acid reflux     Anemia     Aortic aneurysm, abdominal     Arthritis     Bronchitis     Cataract     Celiac artery stenosis     50% by CTA abdomen 7/27/2020    CKD (chronic kidney disease)     45% FUNCTION    Coronary artery disease     Encounter for blood transfusion     Enlarged prostate     GERD (gastroesophageal reflux disease)     Hemorrhoids     Hypercholesteremia     Hypertension     Iron deficiency anemia, unspecified     Leaky heart valve     Lupus anticoagulant disorder     Renal artery stenosis     by CTA 7/27/2020    Skin cancer     Unspecified chronic bronchitis     UTI (urinary tract infection)        Past Surgical History:   Procedure Laterality Date    ACF      BACK SURGERY      RODS IN BACK; 4 SURGIERIES    BONE MARROW BIOPSY      CARDIAC ANGIOGRAM WITH STENT      CATARACT SX Bilateral     CORONARY ANGIOGRAPHY N/A 2/15/2023    Procedure: ANGIOGRAM, CORONARY ARTERY;  Surgeon: Rito Vega MD;  Location: [facility] CATH;  Service: Cardiology;  Laterality: N/A;    CORONARY ARTERY BYPASS GRAFT (CABG) N/A 2/24/2023    Procedure: CORONARY ARTERY BYPASS GRAFT (CABG);  Surgeon: Spencer Hagan MD;   Location: [facility];  Service: Cardiothoracic;  Laterality: N/A;  CABG / EVH //   ECHO NOTIFIED    HIP SURGERY Right     THR    KNEE SURGERY Right     ATS    LEFT HEART CATHETERIZATION Left 07/22/2021    Procedure: Left heart cath;  Surgeon: Curtis Loredo MD;  Location: [facility] CATH;  Service: Cardiology;  Laterality: Left;    LEFT HEART CATHETERIZATION Left 09/22/2022    Procedure: Left heart cath;  Surgeon: Giorgi Barker MD;  Location: [facility] CATH;  Service: Cardiology;  Laterality: Left;    SHOULDER SURGERY Bilateral     SPINAL CORD STIMULATOR IMPLANT         Subjective     Chief Complaint: Pt reports no pain at this time.    Respiratory Status: Room air    Patients cultural, spiritual, Hoahaoism conflicts given the current situation: no      Objective:     Communicated with NSG prior to session.  Patient found supine with    upon PT entry to room.    Pt is Oriented x3 and Alert, Cooperative, and Motivated.    Vitals   Vitals at Rest  BP    HR    O2 Sat    Pain      Vitals With Activity  BP    HR    O2 Sat    Pain        Functional Mobility:   Bed Mobility:     Rolling Left:  Partial/moderate assistance   Rolling Right: Partial/moderate assistance   Rolling performed post bowel movement, for cleaning and donning new brief.    Supine to Sit: Partial/moderate assistance with trunk  Sit to Supine: Partial/moderate assistance with BLE into bed    Transfers:     Sit to Stand: Partial/moderate assistance  with rolling walker, once standing pt reports BM, return to bed for cleaning     Current   Status  Discharge   Goal   Functional Area: Care Score:    Roll Left and Right 3 Supervision or touching assistance   Sit to Lying 2 Supervision or touching assistance   Lying to Sitting on Side of Bed 3 Supervision or touching assistance   Sit to Stand 3 Supervision or touching assistance   Chair/Bed-to-Chair Transfer   Supervision or touching assistance   Car Transfer   Supervision or touching assistance   Walk 10 Feet    Supervision or touching assistance   Walk 50 Feet with Two Turns   Supervision or touching assistance   Walk 150 Feet   Supervision or touching assistance   Walk 10 Feet Uneven Surface   Supervision or touching assistance   1 Step (Curb)   Supervision or touching assistance   4 Steps   Supervision or touching assistance   12 Steps   Not applicable   Picking Up Object   Supervision or touching assistance   Wheel 50 Feet with Two Turns   Supervision or touching assistance   Wheel 150 Feet   Supervision or touching assistance       Therapeutic Activities and Exercises:  Supine therex 2x10- glute sets, quad sets, ankle pumps, SAQ, ADD ball squeezes    Activity Tolerance: Fair    Patient left HOB elevated with all lines intact, call button in reach, and family members present.    Education provided: transfer training, bed mob, and strengthening exercises    Expected compliance: High compliance    GOALS:   Multidisciplinary Problems       Physical Therapy Goals          Problem: Physical Therapy    Goal Priority Disciplines Outcome Goal Variances Interventions   Physical Therapy Goal     PT, PT/OT Ongoing, Progressing     Description: Pt will improve functional mobility by performing:     Bed Mobility   Roll Right and Left Partial/ Moderate Assistance .  Lying to sitting Partial/ Moderate Assistance .  Sitting to lying Partial/ Moderate Assistance .    Transfers    Pt will be able to perform Stand step chair/bed to chair transfer With RW Partial/ Moderate Assistance .  Pt will be able to perform Sit to stand with RW Partial/ Moderate Assistance .  Pt will be able to perform Car transfer with RW Partial/ Moderate Assistance .    Ambulation    Pt will ambulate 50 Feet with RW Partial/ Moderate Assistance .  Stair negotiation to be assessed      Wheelchair Mobility   Pt will be able to propel 150 feet in steven wheelchair Partial/ Moderate Assistance .      Timeframe: By Re-eval                         Plan:     During this  hospitalization, patient to be seen 5 x/week to address the identified rehab impairments via neuromuscular re-education, wheelchair management/training, therapeutic exercises, therapeutic activities, gait training and progress toward the following goals:    Plan of Care Expires:  03/10/23  PT Next Visit Date:    Plan of Care reviewed with: patient, spouse    Additional Information:         Time Tracking:     Therapy Time  PT Start Time: 1100  PT Stop Time: 1150  PT Total Time (min): 50 min   PT Individual: 50  Missed Time:    Time Missed due to:      Billable Minutes: Therapeutic Activity 30 and Therapeutic Exercise 20      03/05/2023

## 2023-03-05 NOTE — PROGRESS NOTES
[facility] ([facility])  Rehab Progress Note    Patient Name: Hunter Navarrete  MRN: [id number]  Age: 82 y.o. Sex: male  : 1940  Hospital Length of Stay: 2 days  Date of Service: 3/5/2023   Chief Complaint: Multivessel CAD s/p left heart catheterization with InStent restenosis and multivessel coronary disease on 02/15/2023 s/p CABG x2 (lima to LAD, RSVG to OM with right GSV EVH) on 2023    Subjective:     Basic Information  Admit Information: 82-year-old WM presented to [facility] on 02/15 with complaints of substernal chest pain with shortness of breath.  PMH significant for CAD with PCI, CKD, hypertension, mild aortic stenosis, lupus anticoagulant inhibitor syndrome, and hyperlipidemia.  Workup significant for elevated troponin.  Transferred to [facility] for cardiology evaluation.  Initiated heparin drip.  Tolerated left heart catheterization on 02/15 significant for multivessel coronary disease with InStent restenosis.  Hematology recommended transfer to tertiary facility for CABG 2/2 history of lupus anticoagulant.  Tolerated transfer to [facility] on .  Initiated Tridil drip and titrated for chest pain.  Required 2 units PRBC transfusion on .  On  tolerated CABG x2 (lima to LAD, RSVG to OM with right GSV EVH) without perioperative complications.  Postoperatively required IV fluid gentle hydration per Nephrology 2/2 CKD stage IIIB.  Chest tubes removed.  Continued with postoperative DANIELLA on CKD.  Nephrology will continue to follow.  Discontinue Lasix IV on  and initiated home dose of Lasix 40 mg daily.  Initiated Plavix on . Tolerated transfer to [facility] inpatient rehab unit on 3/3 without incident.  Today's Information: No acute events overnight.  Laying comfortably in bed.  Wife present.  Pleasant mood.  Adequate pain control.  Good sleep and appetite.  No bowel or bladder complaints.  Last reported BM on .  Ongoing mild systolic  hypertension.  Vitals otherwise stable with no recent recorded fevers.  No new labs or imaging.    Review of patient's allergies indicates:   Allergen Reactions    Cardura [doxazosin] Hives    Lyrica [pregabalin] Other (See Comments)    Paxil [paroxetine hcl] Other (See Comments)    Restoril [temazepam] Hallucinations    Vioxx [rofecoxib] Swelling    Zithromax [azithromycin] Hives        Current Facility-Administered Medications:     aspirin EC tablet 81 mg, 81 mg, Oral, Daily, Yaneth Douglas, FNP, 81 mg at 03/05/23 0803    atorvastatin tablet 40 mg, 40 mg, Oral, QHS, Yaneth Douglas, FNP, 40 mg at 03/04/23 2042    benzonatate capsule 100 mg, 100 mg, Oral, TID PRN, Yaneth Douglas FNP    bisacodyL suppository 10 mg, 10 mg, Rectal, Daily PRN, Yaneth Douglas, FNP, 10 mg at 03/03/23 1909    clopidogreL tablet 75 mg, 75 mg, Oral, Daily, Yaneth Douglas, FNP, 75 mg at 03/05/23 0803    docusate sodium capsule 100 mg, 100 mg, Oral, BID, Yaneth Douglas, FNP, 100 mg at 03/05/23 0803    enoxaparin injection 40 mg, 40 mg, Subcutaneous, Daily, Yaneth Douglas, FNP, 40 mg at 03/04/23 1613    ezetimibe tablet 10 mg, 10 mg, Oral, Daily, Yaneth Douglas, FNP, 10 mg at 03/05/23 0803    famotidine tablet 20 mg, 20 mg, Oral, Daily PRN, Xander Enriquez MD    ferrous sulfate tablet 1 each, 1 tablet, Oral, BID, Yaneth Douglas, FNP, 1 each at 03/05/23 0803    FLUoxetine capsule 10 mg, 10 mg, Oral, Daily, Yaneth B Misael, FNP, 10 mg at 03/05/23 0803    furosemide tablet 40 mg, 40 mg, Oral, Daily, Yaneth B Misael, FNP, 40 mg at 03/05/23 0803    gabapentin capsule 100 mg, 100 mg, Oral, TID, Yaneth B Misael, FNP, 100 mg at 03/05/23 1354    hydrALAZINE injection 10 mg, 10 mg, Intravenous, Q6H PRN, SHIRLEY Haider, 10 mg at 03/05/23 1456    HYDROcodone-acetaminophen 5-325 mg per tablet 1 tablet, 1 tablet, Oral, Q4H PRN, SHIRLEY Haider    hydrOXYzine pamoate capsule 50 mg, 50 mg, Oral, Nightly PRN, Yaneth Douglas,  "FNP    labetalol 20 mg/4 mL (5 mg/mL) IV syring, 10 mg, Intravenous, Q4H PRN, SHIRLEY Haider    meclizine tablet 25 mg, 25 mg, Oral, TID PRN, Yaneth Douglas, FNP    metoprolol injection 10 mg, 10 mg, Intravenous, Q2H PRN, Yaneth Douglas, FNP    metoprolol tartrate (LOPRESSOR) tablet 50 mg, 50 mg, Oral, BID, Yaneth Douglas, FNP, 50 mg at 03/05/23 0802    mupirocin 2 % ointment, , Nasal, BID, Xander Enriquez MD, Given at 03/05/23 0804    ondansetron disintegrating tablet 4 mg, 4 mg, Oral, Q6H PRN, Yaneth Douglas, FNP    pantoprazole EC tablet 40 mg, 40 mg, Oral, Daily, Yaneth Douglas, FNP, 40 mg at 03/05/23 0803    predniSONE tablet 2.5 mg, 2.5 mg, Oral, Daily, Yaneth Douglas, FNP, 2.5 mg at 03/05/23 0803    QUEtiapine tablet 50 mg, 50 mg, Oral, QHS, Yaneth Douglas, FNP, 50 mg at 03/04/23 2042    rOPINIRole tablet 1 mg, 1 mg, Oral, BID, Yaneth B Misael, FNP, 1 mg at 03/05/23 0802    sucralfate tablet 1 g, 1 g, Oral, QID (AC & HS), Yaneth Douglas, FNP, 1 g at 03/05/23 1004    tamsulosin 24 hr capsule 0.4 mg, 0.4 mg, Oral, QHS, Yaneth Douglas, FNP, 0.4 mg at 03/04/23 2042     Review of Systems   Complete 12-point review of symptoms negative except for what's mentioned in HPI     Objective:     BP (!) 175/74   Pulse 98   Temp 99.7 °F (37.6 °C) (Oral)   Resp 20   Ht 5' 10.98" (1.803 m)   Wt 88.9 kg (195 lb 15.8 oz)   SpO2 (!) 94%   BMI 27.35 kg/m²      Physical Exam  Vitals reviewed.   Eyes:      Pupils: Pupils are equal, round, and reactive to light.   Cardiovascular:      Rate and Rhythm: Normal rate and regular rhythm.      Heart sounds: Normal heart sounds.   Pulmonary:      Effort: Pulmonary effort is normal.      Breath sounds: Normal breath sounds.   Abdominal:      General: Bowel sounds are normal.   Musculoskeletal:         General: Normal range of motion.   Skin:     General: Skin is warm.      Comments: Midline sternal incision open air dry and intact    Neurological:      " General: No focal deficit present.      Mental Status: He is alert and oriented to person, place, and time.      Motor: Weakness present.   Psychiatric:         Mood and Affect: Mood normal.     Lines/Drains/Airways       Peripherally Inserted Central Catheter Line  Duration             PICC Triple Lumen 02/27/23 1045 left basilic 6 days              Drain  Duration                  Urethral Catheter 02/27/23 1150 6 days                Labs  No results found for this or any previous visit (from the past 24 hour(s)).    Radiology  CUS 02/20/23: The right internal carotid artery demonstrated 50-69% stenosis. The left internal carotid artery demonstrated 50-69% stenosis.  Bilateral vertebral arteries were patent with antegrade flow.  Radiology  LHC 02/15/2023: LM 0% stenosis mLAD proximal 70% ISR D1 ostial 50% stenosis D2 ostial 50% stenosis Lcx: previous stent; proximal Lcx  90% ISR; mid Lcx 30% stenosis OM1 proximal 80% stenosis RCA patent stents to ostium. Mid RCA proximal subsection- 60% stenosis; Mid RCA distal subsection 70& stenosis; distal RCA proximal subsection 100% stenosis. Fills left to right  Radiology  TTE 02/16/2023: Global LV SF is borderline normal. LVEF 45-50%. LA is mildly enlarged. Moderate calcification of the aortic valve is noted. Mild AS is present. Mild to moderate AR. Mild to moderate MAC is noted. Mild MR. Trace TR. PASP 40 mmHg. Optison used to enhance endocardial border.     Assessment/Plan:     82 y.o. WM admitted on 3/3/2023     Multivessel CAD  - s/p left heart catheterization with InStent restenosis and multivessel coronary disease on 02/15/2023  - s/p CABG x2 (lima to LAD, RSVG to OM with right GSV EVH) on 02/24/2023  - sternal precautions  - daily weights  - discontinued metoprolol tartrate 50 mg bid on 3/5  - initiate   Coreg 12.5 mg bid (initiated on 3/6)  - continue  Lasix 40 mg daily  Aspirin 81 mg daily  Plavix 75 mg daily   Atorvastatin 40 mg at night  Hydrocodone-acetaminophen  5-325 mg p.r.n. every 4 hours  - not on Ace/Arb 2/2 DANIELLA  - follow-up with cardiology and CV surgery outpatient     ICMO  - EF 45-50% TTE 02/15/2023 improved from 40%  - discontinued metoprolol tartrate 50 mg bid on 3/5  - initiate   Coreg 12.5 mg bid (initiated on 3/6)  - continue  Lasix 40 mg daily  Aspirin 81 mg daily  Plavix 75 mg daily   Atorvastatin 40 mg at night  - not on Ace/Arb 2/2 DANIELLA  - follow-up with cardiology outpatient     Acute on chronic combined systolic/diastolic heart failure  - EF 45-50% TTE 02/15/2023 improved from 40%  - discontinued metoprolol tartrate 50 mg bid on 3/5  - initiate   Coreg 12.5 mg bid (initiated on 3/6)  - continue  Lasix 40 mg daily  Aspirin 81 mg daily  Plavix 75 mg daily   Atorvastatin 40 mg at night  - not on Ace/Arb 2/2 DANIELLA  - follow-up with cardiology outpatient     DANIELLA on CKD stage III  - Renal indices improving  - Avoid NSAIDs (Advil, ibuprofen, naproxen...) and poe-2 inhibitors (Mobic, Celebrex)    - encourage oral hydration  - defer to Nephrology     HTN  - SBP remains elevated  - discontinued metoprolol tartrate 50 mg bid on 3/5  - initiate   Coreg 12.5 mg bid (initiated on 3/6)  - continue  Hydralazine 10 mg every 2 hours as needed for BP > 160/90  Labetalol 10 mg every 2 hours as needed for BP > 160/90     HLD  - FLP at goal!!  - continue  Atorvastatin 40 mg at night  Zetia 10 mg daily      RLS  - stable  - continue  Gabapentin 100 mg t.i.d.  Ropinirole 1 mg b.i.d.     Bilateral RICKEY  - moderate bilateral  - continue  Atorvastatin 40 mg at night  Zetia 10 mg daily   Plavix 75 mg daily   - follow-up with cardiology outpatient     Constipation  - stable  - continue  Colace 100 mg b.i.d.     GERD  - Avoid spicy foods, and nothing to eat or drink within x2 hours of bedtime or laying flat (water is ok)   - Avoid NSAIDs (Advil, ibuprofen, naproxen...) and poe-2 inhibitors (Mobic, Celebrex)    - continue  Protonix 40 mg daily   Carafate 1 g a.c. and HS      Lupus  anticoagulant inhibitor syndrome  - stable  - continue  Prednisone 2.5 mg daily   - follow-up with Hematology outpatient     Major depressive disorder  - in remission  - continue  Fluoxetine 10 mg daily     Insomnia  - stable  - continue  Seroquel 50 mg at bedtime     BPH  - stable  - continue  Flomax 0.4 mg at bedtime      Normocytic anemia  - asymptomatic  - s/p transfusion  x2 units PRBC on 2/24/2023  x2 units PRBC on 2/23/2023  - H/H stable   - no evidence of active bleeds  - low iron levels  - continue  Ferrous sulfate 325 mg b.i.d. (initiated 3/4)  - will closely monitor and transfuse if needed     Acute hypoxic respiratory failure  - on 2 L nasal cannula  - chest x-ray stable  - continue  Lasix 40 mg daily      VTE Prophylaxis:  Lovenox 40 mg daily  COVID-19 testing:  Negative on 03/03  COVID-19 vaccination status:  Vaccinated x4     POA:  Yes (Romy, daughter and Nav, son)  Living will: yes  Contacts:  Giovana Navarrete (spouse) [phone]                           Romy (daughter) [phone]     CODE STATUS: Full  Internal Medicine (attending): Xander Enriquez MD  Physiatry (consulting):  Dilip Hargrove MD     OUTPATIENT PROVIDERS  PCP:  Mariella Bethea MD   Hematology:  Missy Davila MD in [facility]  Cardiology: Curtis Loredo MD at [facility]   CV surgery:  Spencer Hagan MD  Nephrology:  Darinel Bedoya MD     DISPOSITION:  Sleep hygiene, bowel maintenance, and appetite at goal.  Adequate pain control.  Last reported BM on 03/05.  Ongoing mild systolic hypertension.  Oxygen saturations stable on 2 L NC.  Vitals otherwise stable with no recent recorded fevers.  No new labs or imaging.    Discontinue Metoprolol and initiate Coreg 12.5 mg bid.  Continue oral iron replacement.  Encourage compliance with hourly IS use WA.  Wean O2 as tolerated.  Out of bed WA as tolerated.  Encourage nutrition and hydration.  Continue aggressive therapies and nutritional support.  Monitor closely and notify with any acute  changes.  Labs in am.     Total time spent on this encounter including chart review and direct 1-on-1 patient interaction: 52 minutes   Over 50% of this time was spent in counseling and coordination of care

## 2023-03-06 PROBLEM — G20.A1 PARKINSON DISEASE: Status: ACTIVE | Noted: 2023-03-06

## 2023-03-06 LAB
ALBUMIN SERPL-MCNC: 2.3 G/DL (ref 3.4–4.8)
ALBUMIN/GLOB SERPL: 0.6 RATIO (ref 1.1–2)
ALP SERPL-CCNC: 92 UNIT/L (ref 40–150)
ALT SERPL-CCNC: 44 UNIT/L (ref 0–55)
AST SERPL-CCNC: 45 UNIT/L (ref 5–34)
BASOPHILS # BLD AUTO: 0.02 X10(3)/MCL (ref 0–0.2)
BASOPHILS NFR BLD AUTO: 0.2 %
BILIRUBIN DIRECT+TOT PNL SERPL-MCNC: 0.7 MG/DL
BUN SERPL-MCNC: 40.3 MG/DL (ref 8.4–25.7)
CALCIUM SERPL-MCNC: 9.2 MG/DL (ref 8.8–10)
CHLORIDE SERPL-SCNC: 102 MMOL/L (ref 98–107)
CO2 SERPL-SCNC: 26 MMOL/L (ref 23–31)
CREAT SERPL-MCNC: 1.51 MG/DL (ref 0.73–1.18)
EOSINOPHIL # BLD AUTO: 0.01 X10(3)/MCL (ref 0–0.9)
EOSINOPHIL NFR BLD AUTO: 0.1 %
ERYTHROCYTE [DISTWIDTH] IN BLOOD BY AUTOMATED COUNT: 15.4 % (ref 11.5–17)
GFR SERPLBLD CREATININE-BSD FMLA CKD-EPI: 46 MLS/MIN/1.73/M2
GLOBULIN SER-MCNC: 4 GM/DL (ref 2.4–3.5)
GLUCOSE SERPL-MCNC: 105 MG/DL (ref 82–115)
HCT VFR BLD AUTO: 31.6 % (ref 42–52)
HGB BLD-MCNC: 10.2 G/DL (ref 14–18)
IMM GRANULOCYTES # BLD AUTO: 0.2 X10(3)/MCL (ref 0–0.04)
IMM GRANULOCYTES NFR BLD AUTO: 1.5 %
LYMPHOCYTES # BLD AUTO: 1.04 X10(3)/MCL (ref 0.6–4.6)
LYMPHOCYTES NFR BLD AUTO: 7.9 %
MAGNESIUM SERPL-MCNC: 1.6 MG/DL (ref 1.6–2.6)
MCH RBC QN AUTO: 29.4 PG
MCHC RBC AUTO-ENTMCNC: 32.3 G/DL (ref 33–36)
MCV RBC AUTO: 91.1 FL (ref 80–94)
MONOCYTES # BLD AUTO: 1.54 X10(3)/MCL (ref 0.1–1.3)
MONOCYTES NFR BLD AUTO: 11.7 %
NEUTROPHILS # BLD AUTO: 10.33 X10(3)/MCL (ref 2.1–9.2)
NEUTROPHILS NFR BLD AUTO: 78.6 %
NRBC BLD AUTO-RTO: 0 %
PHOSPHATE SERPL-MCNC: 3.2 MG/DL (ref 2.3–4.7)
PLATELET # BLD AUTO: 231 X10(3)/MCL (ref 130–400)
PMV BLD AUTO: 10.8 FL (ref 7.4–10.4)
POTASSIUM SERPL-SCNC: 3.1 MMOL/L (ref 3.5–5.1)
PREALB SERPL-MCNC: 13.4 MG/DL (ref 16–42)
PROT SERPL-MCNC: 6.3 GM/DL (ref 5.8–7.6)
RBC # BLD AUTO: 3.47 X10(6)/MCL (ref 4.7–6.1)
SODIUM SERPL-SCNC: 140 MMOL/L (ref 136–145)
WBC # SPEC AUTO: 13.1 X10(3)/MCL (ref 4.5–11.5)

## 2023-03-06 PROCEDURE — 97110 THERAPEUTIC EXERCISES: CPT

## 2023-03-06 PROCEDURE — 11800000 HC REHAB PRIVATE ROOM

## 2023-03-06 PROCEDURE — 83735 ASSAY OF MAGNESIUM: CPT | Performed by: NURSE PRACTITIONER

## 2023-03-06 PROCEDURE — 25000003 PHARM REV CODE 250: Performed by: NURSE PRACTITIONER

## 2023-03-06 PROCEDURE — 97530 THERAPEUTIC ACTIVITIES: CPT

## 2023-03-06 PROCEDURE — 84134 ASSAY OF PREALBUMIN: CPT | Performed by: NURSE PRACTITIONER

## 2023-03-06 PROCEDURE — 94761 N-INVAS EAR/PLS OXIMETRY MLT: CPT

## 2023-03-06 PROCEDURE — 85025 COMPLETE CBC W/AUTO DIFF WBC: CPT | Performed by: NURSE PRACTITIONER

## 2023-03-06 PROCEDURE — 97116 GAIT TRAINING THERAPY: CPT

## 2023-03-06 PROCEDURE — 63600175 PHARM REV CODE 636 W HCPCS: Performed by: NURSE PRACTITIONER

## 2023-03-06 PROCEDURE — 80053 COMPREHEN METABOLIC PANEL: CPT | Performed by: NURSE PRACTITIONER

## 2023-03-06 PROCEDURE — C1751 CATH, INF, PER/CENT/MIDLINE: HCPCS

## 2023-03-06 PROCEDURE — 97542 WHEELCHAIR MNGMENT TRAINING: CPT

## 2023-03-06 PROCEDURE — 84100 ASSAY OF PHOSPHORUS: CPT | Performed by: NURSE PRACTITIONER

## 2023-03-06 PROCEDURE — 97535 SELF CARE MNGMENT TRAINING: CPT

## 2023-03-06 PROCEDURE — 27000221 HC OXYGEN, UP TO 24 HOURS

## 2023-03-06 PROCEDURE — 25000003 PHARM REV CODE 250: Performed by: INTERNAL MEDICINE

## 2023-03-06 RX ORDER — CARVEDILOL 6.25 MG/1
12.5 TABLET ORAL 2 TIMES DAILY
Status: DISCONTINUED | OUTPATIENT
Start: 2023-03-06 | End: 2023-03-17 | Stop reason: HOSPADM

## 2023-03-06 RX ORDER — POTASSIUM CHLORIDE 20 MEQ/1
40 TABLET, EXTENDED RELEASE ORAL 3 TIMES DAILY
Status: COMPLETED | OUTPATIENT
Start: 2023-03-06 | End: 2023-03-07

## 2023-03-06 RX ORDER — CARVEDILOL 25 MG/1
25 TABLET ORAL 2 TIMES DAILY
Status: DISCONTINUED | OUTPATIENT
Start: 2023-03-06 | End: 2023-03-06

## 2023-03-06 RX ADMIN — FUROSEMIDE 40 MG: 40 TABLET ORAL at 07:03

## 2023-03-06 RX ADMIN — CARVEDILOL 12.5 MG: 6.25 TABLET, FILM COATED ORAL at 08:03

## 2023-03-06 RX ADMIN — CARVEDILOL 12.5 MG: 6.25 TABLET, FILM COATED ORAL at 07:03

## 2023-03-06 RX ADMIN — SUCRALFATE 1 G: 1 TABLET ORAL at 05:03

## 2023-03-06 RX ADMIN — PREDNISONE 2.5 MG: 2.5 TABLET ORAL at 07:03

## 2023-03-06 RX ADMIN — GABAPENTIN 100 MG: 100 CAPSULE ORAL at 08:03

## 2023-03-06 RX ADMIN — CLOPIDOGREL BISULFATE 75 MG: 75 TABLET ORAL at 07:03

## 2023-03-06 RX ADMIN — GABAPENTIN 100 MG: 100 CAPSULE ORAL at 01:03

## 2023-03-06 RX ADMIN — FERROUS SULFATE TAB 325 MG (65 MG ELEMENTAL FE) 1 EACH: 325 (65 FE) TAB at 07:03

## 2023-03-06 RX ADMIN — EZETIMIBE 10 MG: 10 TABLET ORAL at 07:03

## 2023-03-06 RX ADMIN — BENZONATATE 100 MG: 100 CAPSULE ORAL at 11:03

## 2023-03-06 RX ADMIN — FERROUS SULFATE TAB 325 MG (65 MG ELEMENTAL FE) 1 EACH: 325 (65 FE) TAB at 08:03

## 2023-03-06 RX ADMIN — SUCRALFATE 1 G: 1 TABLET ORAL at 04:03

## 2023-03-06 RX ADMIN — QUETIAPINE 50 MG: 25 TABLET ORAL at 08:03

## 2023-03-06 RX ADMIN — POTASSIUM CHLORIDE 40 MEQ: 1500 TABLET, EXTENDED RELEASE ORAL at 01:03

## 2023-03-06 RX ADMIN — POTASSIUM CHLORIDE 40 MEQ: 1500 TABLET, EXTENDED RELEASE ORAL at 08:03

## 2023-03-06 RX ADMIN — DOCUSATE SODIUM 100 MG: 100 CAPSULE, LIQUID FILLED ORAL at 08:03

## 2023-03-06 RX ADMIN — ASPIRIN 81 MG: 81 TABLET, COATED ORAL at 07:03

## 2023-03-06 RX ADMIN — ATORVASTATIN CALCIUM 40 MG: 40 TABLET, FILM COATED ORAL at 08:03

## 2023-03-06 RX ADMIN — ROPINIROLE HYDROCHLORIDE 1 MG: 1 TABLET, FILM COATED ORAL at 08:03

## 2023-03-06 RX ADMIN — ROPINIROLE HYDROCHLORIDE 1 MG: 1 TABLET, FILM COATED ORAL at 07:03

## 2023-03-06 RX ADMIN — FLUOXETINE 10 MG: 10 CAPSULE ORAL at 08:03

## 2023-03-06 RX ADMIN — GABAPENTIN 100 MG: 100 CAPSULE ORAL at 05:03

## 2023-03-06 RX ADMIN — PANTOPRAZOLE SODIUM 40 MG: 40 TABLET, DELAYED RELEASE ORAL at 07:03

## 2023-03-06 RX ADMIN — SUCRALFATE 1 G: 1 TABLET ORAL at 08:03

## 2023-03-06 RX ADMIN — SUCRALFATE 1 G: 1 TABLET ORAL at 12:03

## 2023-03-06 RX ADMIN — MUPIROCIN: 20 OINTMENT TOPICAL at 08:03

## 2023-03-06 RX ADMIN — ENOXAPARIN SODIUM 40 MG: 40 INJECTION SUBCUTANEOUS at 04:03

## 2023-03-06 RX ADMIN — TAMSULOSIN HYDROCHLORIDE 0.4 MG: 0.4 CAPSULE ORAL at 08:03

## 2023-03-06 NOTE — PROGRESS NOTES
"   03/06/23 1300   Rec Therapy Time Calculation   Date of Treatment 03/06/23   Rec Start Time 1300   Rec Stop Time 1330   Rec Total Time (min) 30 min   Time   Treatment time 2 units   Charges   $Therapeutic Activity 2 units   Precautions   General Precautions fall;sternal   Orthopedic Precautions  N/A   Braces N/A   Pain/Comfort   Pain Rating 1 no pain   Vital Signs   Pulse 98   BP (!) 145/68   OTHER   Treatment Diagnosis Severe cornary artery disease, s/p CABG x2, CAD, HTN, HLD, CKD, mild AS, lupus anticoagulant inhibitor syndrome   Rehab identified problem list/impairments weakness;impaired endurance;impaired functional mobility;gait instability;decreased upper extremity function;decreased lower extremity function;decreased safety awareness   Values/Beliefs/Spiritual Care   Spiritual, Cultural Beliefs, Nondenominational Practices, Values that Affect Care no   Prior Living/ADLs   Admit Date 03/03/23   People in Home spouse   Living Arrangements house   Patient responsibilites: ;Leisure/play/hobbies;Retired   Number of Children 3   Occupation Retired:  Electrical/refrigeration work   Previous Hand Domiance Right   Current Hand Dominance Right   Overall Level of Functioning   Activity Tolerance Standby Assist   Dynamic Sitting Balance/Reaching Does not occur   Dynamic Standing Balance/Reaching Standby Assist   Right UE Coodination/Dexterity Mod Indep   Left UE Coordination/Dexterity Mod Indep   Problem Solving/Sequencing Skills Mod Indep   Memory Recall Mod Indep   R/L Neglect/Inattention Does not occur   Attention Span Mod Indep   Social Interaction Independent   Patient Goals   Patient Goal 1 "Get out of bed."   Recreational Therapy Short Term Goals   Short Term Goal 1 Will increase sit to stand to supervision   Short Term Goal 1 Progression Initiated   Short Term Goal 2 Will improve dynamic standing balance/reaching to supervision   Short Term Goal 2 Progression Initiated   Recreational Therapy Long Term Goals "   Long Term Goal 1 Will increase standing tolerance to 5,10 minutes   Long Term Goal 1 Progression Initiated   Long Term Goal 2 Will improve dynamic standing balance/reaching to setup   Long Term Goal 2 Progression Initiated   Plan   Patient to be seen Daily   Planned Duration 2 weeks   Treatments Planned Energy conservation training;Coordination;Safety education   Treatment plan/goals estblished with Patient/Caregiver Yes

## 2023-03-06 NOTE — PT/OT/SLP PROGRESS
Occupational Therapy Inpatient Rehab Treatment    Name: Hunter Navarrete  MRN: [id number]    Assessment:  Hunter Navarrete is a 82 y.o. male admitted with a medical diagnosis of S/P CABG x 2.  He presents with the following impairments/functional limitations:  weakness, impaired endurance, impaired self care skills, impaired functional mobility, decreased upper extremity function, pain, decreased ROM .    General Precautions: Standard, fall, hearing impaired, sternal     Orthopedic Precautions:Full weight bearing     Braces: N/A    Rehab Prognosis: Fair; patient would benefit from acute skilled OT services to address these deficits and reach maximum level of function.      History:     Past Medical History:   Diagnosis Date    Acid reflux     Anemia     Aortic aneurysm, abdominal     Arthritis     Bronchitis     Cataract     Celiac artery stenosis     50% by CTA abdomen 7/27/2020    CKD (chronic kidney disease)     45% FUNCTION    Coronary artery disease     Encounter for blood transfusion     Enlarged prostate     GERD (gastroesophageal reflux disease)     Hemorrhoids     Hypercholesteremia     Hypertension     Iron deficiency anemia, unspecified     Leaky heart valve     Lupus anticoagulant disorder     Renal artery stenosis     by CTA 7/27/2020    Skin cancer     Unspecified chronic bronchitis     UTI (urinary tract infection)        Past Surgical History:   Procedure Laterality Date    ACF      BACK SURGERY      RODS IN BACK; 4 SURGIERIES    BONE MARROW BIOPSY      CARDIAC ANGIOGRAM WITH STENT      CATARACT SX Bilateral     CORONARY ANGIOGRAPHY N/A 2/15/2023    Procedure: ANGIOGRAM, CORONARY ARTERY;  Surgeon: Rito Vega MD;  Location: [facility] CATH;  Service: Cardiology;  Laterality: N/A;    CORONARY ARTERY BYPASS GRAFT (CABG) N/A 2/24/2023    Procedure: CORONARY ARTERY BYPASS GRAFT (CABG);  Surgeon: Spencer Hagan MD;  Location: [facility] OR;  Service: Cardiothoracic;  Laterality: N/A;  CABG / EVH //   ECHO NOTIFIED  "   HIP SURGERY Right     THR    KNEE SURGERY Right     ATS    LEFT HEART CATHETERIZATION Left 07/22/2021    Procedure: Left heart cath;  Surgeon: Curtis Loredo MD;  Location: [facility] CATH;  Service: Cardiology;  Laterality: Left;    LEFT HEART CATHETERIZATION Left 09/22/2022    Procedure: Left heart cath;  Surgeon: Giorgi Barker MD;  Location: [facility] CATH;  Service: Cardiology;  Laterality: Left;    SHOULDER SURGERY Bilateral     SPINAL CORD STIMULATOR IMPLANT         Subjective     Orientation: Identifies self    Chief Complaint: "I can't move; I'm too weak"    Patient/Family Comments/goals: "I hope to get back to normal".    Vitals   Vitals at Rest  /60      O2 Sat 94 % on 2 L    Pain 2/10   Pt. Still brushing teeth upon OT departure; OMKAR Beavers to check vitals when Pt. Finished.   Respiratory Status: Nasal cannula, flow 2 L/min    Patients cultural, spiritual, Worship conflicts given the current situation: no       Objective:     Patient found supine with bed alarm, medellin catheter, oxygen, PICC line  upon OT entry to room.    Mobility   Patient completed:  Supine to Sit with moderate assistance  Sit to Stand Transfer with moderate assistance with rolling walker and "in the tube"   Toilet Transfer Stand Pivot technique with moderate assistance with  rolling walker and bedside commode  Tub Transfer Stand Pivot technique with minimum assistance with rolling walker and TTB    Functional Mobility  Pt. Ambulated ~ 10 steps BSC >TTB c Min A. Pt. + void (Large BM)     ADLs   Current Status   Eating     Oral Hygiene 5   Shower, Bathe Self 2   Upper Body Dressing 2   Lower Body Dressing 2   Toileting Hygiene 1   Toilet Transfer 3   Putting On, Taking Off Footwear 1     Limiting Factors for ADLs: endurance, weakness, and sternal Px      Therapeutic Activities  ADL and functional transfers      LifeStyle Change and Education:             Patient left up in chair with all lines intact, chair alarm on, and " OMKAR Beavers present.     Education provided: Roles and goals of OT, ADLs, transfer training, bed mobility, body mechanics, post-op precautions, and equipment recommendations    Multidisciplinary Problems       Occupational Therapy Goals          Problem: Occupational Therapy    Goal Priority Disciplines Outcome Interventions   Occupational Therapy Goal     OT, PT/OT Ongoing, Progressing    Description: ADLs: Pt will be SPV with ADLs by d/c.  STG: by reeval  Pt to perform UB dressing with partial A   Pt to perform LB dressing with Mod A using AD   Pt to perform putting on/off footwear task with Mod A using AD  Pt to perform toileting with Mod A using AD as needed  Pt to perform bathing with Mod A    Functional Transfers:  Pt to perform toilet transfers with Touch A using BSC over toilet.   Pt to perform a tub transfer with TTB requiring Mod A      Balance, Strengthening, Endurance, Balance:  Pt to consistently demonstrate adherence to orthopedic precautions during all ADL's as instructed by OT.  Pt to demonstrate fair dynamic standing balance as required to perform ADL's from standing level.  Pt to demonstrate consistent adherence to breathing control and energy conservation techniques as educated by OT.                        Time Tracking     OT Received On: 03/06/23  Time In 0830     Time Out 0930  Total Time 60 min  Therapy Time: OT Individual: 60  Missed Time:  0  Missed Time Reason:  60    Billable Minutes: Self Care/Home Management 60    03/06/2023

## 2023-03-06 NOTE — PT/OT/SLP EVAL
Recreational Therapy Evaluation      Date of Treatment: 03/06/23  Start Time: 1300  Stop Time: 1330  Total Time: 30 min  Missed Time:     Assessment      Hunter Navarrete is a 82 y.o. male admitted with a medical diagnosis of S/P CABG x 2.  He presents with the following impairments/functional limitations:  weakness, impaired endurance, impaired functional mobility, gait instability, decreased upper extremity function, decreased lower extremity function, decreased safety awareness.    Rehab Diagnosis:     Recent Surgery: * No surgery found *      General Precautions: Standard, fall, sternal     Orthopedic Precautions:N/A     Braces: N/A    Rehab Prognosis: Good; patient would benefit from acute skilled Recreational Therapy services to address these deficits and reach maximum level of function.      Impairments: Coordination deficits, Endurance deficits, Mobility deficits, and Strength deficits  Rehab Potential: Good  Treatment Recommendations: Continue with Skill TR Service to facilitate functional independence and address impairments/limitations   Treatment Diagnosis: Severe cornary artery disease, s/p CABG x2, CAD, HTN, HLD, CKD, mild AS, lupus anticoagulant inhibitor syndrome  Orientation: Oriented x4  Affect/Behavior: Cooperative and Anxious  Safety/Judgement: intact   Basic Command Following: intact  Spiritual Cultural: no        History     Past Medical History:   Diagnosis Date    Acid reflux     Anemia     Aortic aneurysm, abdominal     Arthritis     Bronchitis     Cataract     Celiac artery stenosis     50% by CTA abdomen 7/27/2020    CKD (chronic kidney disease)     45% FUNCTION    Coronary artery disease     Encounter for blood transfusion     Enlarged prostate     GERD (gastroesophageal reflux disease)     Hemorrhoids     Hypercholesteremia     Hypertension     Iron deficiency anemia, unspecified     Leaky heart valve     Lupus anticoagulant disorder     Renal artery stenosis     by CTA 7/27/2020    Skin  cancer     Unspecified chronic bronchitis     UTI (urinary tract infection)        Past Surgical History:   Procedure Laterality Date    ACF      BACK SURGERY      RODS IN BACK; 4 SURGIERIES    BONE MARROW BIOPSY      CARDIAC ANGIOGRAM WITH STENT      CATARACT SX Bilateral     CORONARY ANGIOGRAPHY N/A 2/15/2023    Procedure: ANGIOGRAM, CORONARY ARTERY;  Surgeon: Rito Vega MD;  Location: [facility] CATH;  Service: Cardiology;  Laterality: N/A;    CORONARY ARTERY BYPASS GRAFT (CABG) N/A 2/24/2023    Procedure: CORONARY ARTERY BYPASS GRAFT (CABG);  Surgeon: Spencer Hagan MD;  Location: [facility];  Service: Cardiothoracic;  Laterality: N/A;  CABG / EVH //   ECHO NOTIFIED    HIP SURGERY Right     THR    KNEE SURGERY Right     ATS    LEFT HEART CATHETERIZATION Left 07/22/2021    Procedure: Left heart cath;  Surgeon: Curtis Loredo MD;  Location: [facility] CATH;  Service: Cardiology;  Laterality: Left;    LEFT HEART CATHETERIZATION Left 09/22/2022    Procedure: Left heart cath;  Surgeon: Giorgi Barker MD;  Location: [facility] CATH;  Service: Cardiology;  Laterality: Left;    SHOULDER SURGERY Bilateral     SPINAL CORD STIMULATOR IMPLANT         Home Environment     Admit Date: 03/03/23  Living Situation  People in Home: spouse  Lives in: house  Patients Responsibilities: , Leisure/play/hobbies, Retired  Number of Children: 3  Occupation:Retired:  Electrical/refrigeration work    Instrumental Activities of Daily Living     Previous Hand Dominance: Right Current Hand Dominance: Right     Other iADL Information: Tremors in LUE       Cognitive Skills Building         Cognitive Observation Activity Assist Position Equipment Response            Comment:      Dynamic Activities      Activity Assist Position Equipment Response   Activity 1 Bean bag toos stand by assistance and contact guard assistance Standing Rolling walker and Bean bags good   Comment: Bed to w/c transfer was mod. Sit to stand was contact guard/supervision.  "Standing tolerance was 3 minutes. UE coordination was setup. As were sequencing skills. Cooperative       Fine Motor Activities      Activity Assist Position Equipment Response           Comment:        Goals     Patient Goals  Patient Goal 1: "Get out of bed."    Short Term Goals    Goal  Goal Status   Will increase sit to stand to supervision Initiated   Will improve dynamic standing balance/reaching to supervision Initiated                 Long Term Goals    Goal Goal Status   Will increase standing tolerance to 5,10 minutes Initiated   Will improve dynamic standing balance/reaching to setup Initiated                     Plan       Patient to be seen: Daily  Duration: 2 weeks  Treatments planned: Energy conservation training, Coordination, Safety education  Treatment plan/goals established with Patient/Caregiver: Yes     "

## 2023-03-06 NOTE — PT/OT/SLP PROGRESS
Occupational Therapy Inpatient Rehab Treatment    Name: Hunter Navarrete  MRN: [id number]    Assessment:  Hunter Navarrete is a 82 y.o. male admitted with a medical diagnosis of S/P CABG x 2.  He presents with the following impairments/functional limitations:  impaired endurance, impaired balance.    General Precautions: Standard, fall, sternal     Orthopedic Precautions:N/A     Braces: N/A    Rehab Prognosis: Good; patient would benefit from acute skilled OT services to address these deficits and reach maximum level of function.      History:     Past Medical History:   Diagnosis Date    Acid reflux     Anemia     Aortic aneurysm, abdominal     Arthritis     Bronchitis     Cataract     Celiac artery stenosis     50% by CTA abdomen 7/27/2020    CKD (chronic kidney disease)     45% FUNCTION    Coronary artery disease     Encounter for blood transfusion     Enlarged prostate     GERD (gastroesophageal reflux disease)     Hemorrhoids     Hypercholesteremia     Hypertension     Iron deficiency anemia, unspecified     Leaky heart valve     Lupus anticoagulant disorder     Renal artery stenosis     by CTA 7/27/2020    Skin cancer     Unspecified chronic bronchitis     UTI (urinary tract infection)        Past Surgical History:   Procedure Laterality Date    ACF      BACK SURGERY      RODS IN BACK; 4 SURGIERIES    BONE MARROW BIOPSY      CARDIAC ANGIOGRAM WITH STENT      CATARACT SX Bilateral     CORONARY ANGIOGRAPHY N/A 2/15/2023    Procedure: ANGIOGRAM, CORONARY ARTERY;  Surgeon: Rito Vega MD;  Location: [facility] CATH;  Service: Cardiology;  Laterality: N/A;    CORONARY ARTERY BYPASS GRAFT (CABG) N/A 2/24/2023    Procedure: CORONARY ARTERY BYPASS GRAFT (CABG);  Surgeon: Spencer Hagan MD;  Location: [facility];  Service: Cardiothoracic;  Laterality: N/A;  CABG / EVH //   ECHO NOTIFIED    HIP SURGERY Right     THR    KNEE SURGERY Right     ATS    LEFT HEART CATHETERIZATION Left 07/22/2021    Procedure: Left heart cath;   "Surgeon: Curtis Loredo MD;  Location: [facility] CATH;  Service: Cardiology;  Laterality: Left;    LEFT HEART CATHETERIZATION Left 09/22/2022    Procedure: Left heart cath;  Surgeon: Giorgi Barker MD;  Location: [facility] CATH;  Service: Cardiology;  Laterality: Left;    SHOULDER SURGERY Bilateral     SPINAL CORD STIMULATOR IMPLANT         Subjective     Orientation: Oriented x4    Chief Complaint: "I'm so tired."    Patient/Family Comments/goals: "To be with my wife."    Vitals   Vitals:    03/06/23 1000 03/06/23 1100   BP: 106/62 (!) 116/56   Pulse: 105 105   SpO2: 95% 96%     Respiratory Status: Nasal cannula, flow 2 L/min at beginning of OT treatment. After ~30 mins of treatment, OT decreased L/min from 2 to 1. Pt continued to maintain O2 sats between 94-96% with activity. OT relayed this to SCARLET Rodriguez.    Patients cultural, spiritual, Bahai conflicts given the current situation: no       Objective:     Patient found up in chair with oxygen  upon OT entry to room.    Mobility   Patient completed:  Sit to Stand Transfer with contact guard assistance with rolling walker  Stand to Sit Transfer with contact guard assistance with rolling walker    Therapeutic Activities  Pt tolerated 5 standing trials to place medium-sized pegs into foam pegboard based on color pattern provided. Two patterns completed, both with 100% accuracy.    Therapeutic Exercise  Pt sat in w/c, unsupported, to propel UE ergometer forward x4 min, then again for another 3 min and 21 seconds. Pt required prolonged rest breaks following each trial.      LifeStyle Change and Education:     Patient left up in chair with all lines intact and SCARLET Rodriguez notified.     Education provided: Roles and goals of OT, ADLs, transfer training, body mechanics, modified goals, sequencing, safety precautions, fall prevention, post-op precautions, equipment recommendations, and home safety    Multidisciplinary Problems       Occupational Therapy Goals          Problem: " Occupational Therapy    Goal Priority Disciplines Outcome Interventions   Occupational Therapy Goal     OT, PT/OT Ongoing, Progressing    Description: ADLs: Pt will be SPV with ADLs by d/c.  STG: by reeval  Pt to perform UB dressing with partial A   Pt to perform LB dressing with Mod A using AD   Pt to perform putting on/off footwear task with Mod A using AD  Pt to perform toileting with Mod A using AD as needed  Pt to perform bathing with Mod A    Functional Transfers:  Pt to perform toilet transfers with Touch A using BSC over toilet.   Pt to perform a tub transfer with TTB requiring Mod A      Balance, Strengthening, Endurance, Balance:  Pt to consistently demonstrate adherence to orthopedic precautions during all ADL's as instructed by OT.  Pt to demonstrate fair dynamic standing balance as required to perform ADL's from standing level.  Pt to demonstrate consistent adherence to breathing control and energy conservation techniques as educated by OT.                        Time Tracking     OT Received On: 03/06/23  Time In 1000     Time Out 1100  Total Time 60 min  Therapy Time: OT Individual: 60  Missed Time:    Missed Time Reason:      Billable Minutes: Therapeutic Activity 30 and Therapeutic Exercise 30    03/06/2023

## 2023-03-06 NOTE — PROGRESS NOTES
[facility] ([facility])  Rehab Progress Note    Patient Name: Hunter Navarrete  MRN: [id number]  Age: 82 y.o. Sex: male  : 1940  Hospital Length of Stay: 3 days  Date of Service: 3/6/2023   Chief Complaint: Multivessel CAD s/p left heart catheterization with InStent restenosis and multivessel coronary disease on 02/15/2023 s/p CABG x2 (lima to LAD, RSVG to OM with right GSV EVH) on 2023    Subjective:     Basic Information  Admit Information: 82-year-old WM presented to [facility] on 02/15 with complaints of substernal chest pain with shortness of breath.  PMH significant for CAD with PCI, CKD, hypertension, mild aortic stenosis, lupus anticoagulant inhibitor syndrome, and hyperlipidemia.  Workup significant for elevated troponin.  Transferred to [facility] for cardiology evaluation.  Initiated heparin drip.  Tolerated left heart catheterization on 02/15 significant for multivessel coronary disease with InStent restenosis.  Hematology recommended transfer to tertiary facility for CABG 2/2 history of lupus anticoagulant.  Tolerated transfer to [facility] on .  Initiated Tridil drip and titrated for chest pain.  Required 2 units PRBC transfusion on .  On  tolerated CABG x2 (lima to LAD, RSVG to OM with right GSV EVH) without perioperative complications.  Postoperatively required IV fluid gentle hydration per Nephrology 2/2 CKD stage IIIB.  Chest tubes removed.  Continued with postoperative DANIELLA on CKD.  Nephrology will continue to follow.  Discontinue Lasix IV on  and initiated home dose of Lasix 40 mg daily.  Initiated Plavix on . Tolerated transfer to [facility] inpatient rehab unit on 3/3 without incident.  Today's Information: No acute events overnight.  Lying comfortably in bed.  Reports good sleep and appetite.  Last BM 3/5.  BP elevated.  WBC 13.1 (from 11.4).  Weight in 8.9-trending down.  H&H trending up.  Potassium 3.1.  Renal indices improved.   Albumin 2.3.  Prealbumin 13.4.  No new imaging today.    Review of patient's allergies indicates:   Allergen Reactions    Cardura [doxazosin] Hives    Lyrica [pregabalin] Other (See Comments)    Paxil [paroxetine hcl] Other (See Comments)    Restoril [temazepam] Hallucinations    Vioxx [rofecoxib] Swelling    Zithromax [azithromycin] Hives        Current Facility-Administered Medications:     aspirin EC tablet 81 mg, 81 mg, Oral, Daily, MAGDA HaiderP, 81 mg at 03/06/23 0759    atorvastatin tablet 40 mg, 40 mg, Oral, QHS, Yaneth Douglas, FNP, 40 mg at 03/05/23 2010    benzonatate capsule 100 mg, 100 mg, Oral, TID PRN, SHIRLEY Haider    bisacodyL suppository 10 mg, 10 mg, Rectal, Daily PRN, Yaneth Douglas FNP, 10 mg at 03/03/23 1909    carvediloL tablet 25 mg, 25 mg, Oral, BID, SHIRLEY Wilcox    clopidogreL tablet 75 mg, 75 mg, Oral, Daily, Yaneth Douglas FNP, 75 mg at 03/06/23 0758    docusate sodium capsule 100 mg, 100 mg, Oral, BID, Yaneth Douglas, FNP, 100 mg at 03/06/23 0800    enoxaparin injection 40 mg, 40 mg, Subcutaneous, Daily, Yaneth Douglas FNP, 40 mg at 03/05/23 1631    ezetimibe tablet 10 mg, 10 mg, Oral, Daily, Yaneth Douglas FNP, 10 mg at 03/06/23 0759    famotidine tablet 20 mg, 20 mg, Oral, Daily PRN, Xander Enriquez MD    ferrous sulfate tablet 1 each, 1 tablet, Oral, BID, SHIRLEY Haider, 1 each at 03/06/23 0758    FLUoxetine capsule 10 mg, 10 mg, Oral, Daily, Yaneth Douglas FNP, 10 mg at 03/06/23 0800    furosemide tablet 40 mg, 40 mg, Oral, Daily, Yaneth Douglas FNP, 40 mg at 03/06/23 0758    gabapentin capsule 100 mg, 100 mg, Oral, TID, SHIRLEY Haider, 100 mg at 03/06/23 0507    hydrALAZINE injection 10 mg, 10 mg, Intravenous, Q6H PRN, SHIRLEY Haider, 10 mg at 03/05/23 1456    HYDROcodone-acetaminophen 5-325 mg per tablet 1 tablet, 1 tablet, Oral, Q4H PRN, SHIRLEY Haider    hydrOXYzine pamoate capsule 50 mg, 50 mg, Oral,  "Nightly PRN, MAGDA HaiderP    labetalol 20 mg/4 mL (5 mg/mL) IV syring, 10 mg, Intravenous, Q4H PRN, Yaneth Douglas FNP    meclizine tablet 25 mg, 25 mg, Oral, TID PRN, Yaneth Douglas, FNP    metoprolol injection 10 mg, 10 mg, Intravenous, Q2H PRN, Yaneth Douglas FNP    mupirocin 2 % ointment, , Nasal, BID, Xander Enriquez MD, Given at 03/05/23 0804    ondansetron disintegrating tablet 4 mg, 4 mg, Oral, Q6H PRN, Yaneth Douglas FNP    pantoprazole EC tablet 40 mg, 40 mg, Oral, Daily, Yaneth Douglas FNP, 40 mg at 03/06/23 0758    potassium chloride SA CR tablet 40 mEq, 40 mEq, Oral, TID, Suman Reese, SHIRLEY    predniSONE tablet 2.5 mg, 2.5 mg, Oral, Daily, Yaneth Douglas FNP, 2.5 mg at 03/06/23 0757    QUEtiapine tablet 50 mg, 50 mg, Oral, QHS, Yaneth Douglas, FNP, 50 mg at 03/05/23 2010    rOPINIRole tablet 1 mg, 1 mg, Oral, BID, Yaneth Douglas, FNP, 1 mg at 03/06/23 0757    sucralfate tablet 1 g, 1 g, Oral, QID (AC & HS), Yaneth Douglas, FNP, 1 g at 03/06/23 0507    tamsulosin 24 hr capsule 0.4 mg, 0.4 mg, Oral, QHS, Yaneth Douglas, FNP, 0.4 mg at 03/05/23 2010     Review of Systems   Complete 12-point review of symptoms negative except for what's mentioned in HPI     Objective:     BP (!) 146/80   Pulse 97   Temp 97.9 °F (36.6 °C) (Oral)   Resp 20   Ht 5' 10.98" (1.803 m)   Wt 88.9 kg (195 lb 15.8 oz)   SpO2 95%   BMI 27.35 kg/m²      Physical Exam  Vitals reviewed.   Eyes:      Pupils: Pupils are equal, round, and reactive to light.   Cardiovascular:      Rate and Rhythm: Normal rate and regular rhythm.      Heart sounds: Normal heart sounds.   Pulmonary:      Effort: Pulmonary effort is normal.      Breath sounds: Normal breath sounds.   Abdominal:      General: Bowel sounds are normal.   Musculoskeletal:         General: Normal range of motion.   Skin:     General: Skin is warm.      Comments: Midline sternal incision open air dry and intact    Neurological:      " General: No focal deficit present.      Mental Status: He is alert and oriented to person, place, and time.      Motor: Weakness present.   Psychiatric:         Mood and Affect: Mood normal.   *MD performed and documented physical examination       Lines/Drains/Airways       Peripherally Inserted Central Catheter Line  Duration             PICC Triple Lumen 02/27/23 1045 left basilic 6 days              Drain  Duration                  Urethral Catheter 02/27/23 1150 6 days                Labs  Recent Results (from the past 24 hour(s))   Comprehensive Metabolic Panel    Collection Time: 03/06/23  6:46 AM   Result Value Ref Range    Sodium Level 140 136 - 145 mmol/L    Potassium Level 3.1 (L) 3.5 - 5.1 mmol/L    Chloride 102 98 - 107 mmol/L    Carbon Dioxide 26 23 - 31 mmol/L    Glucose Level 105 82 - 115 mg/dL    Blood Urea Nitrogen 40.3 (H) 8.4 - 25.7 mg/dL    Creatinine 1.51 (H) 0.73 - 1.18 mg/dL    Calcium Level Total 9.2 8.8 - 10.0 mg/dL    Protein Total 6.3 5.8 - 7.6 gm/dL    Albumin Level 2.3 (L) 3.4 - 4.8 g/dL    Globulin 4.0 (H) 2.4 - 3.5 gm/dL    Albumin/Globulin Ratio 0.6 (L) 1.1 - 2.0 ratio    Bilirubin Total 0.7 <=1.5 mg/dL    Alkaline Phosphatase 92 40 - 150 unit/L    Alanine Aminotransferase 44 0 - 55 unit/L    Aspartate Aminotransferase 45 (H) 5 - 34 unit/L    eGFR 46 mls/min/1.73/m2   Prealbumin    Collection Time: 03/06/23  6:46 AM   Result Value Ref Range    Prealbumin 13.4 (L) 16.0 - 42.0 mg/dL   Magnesium    Collection Time: 03/06/23  6:46 AM   Result Value Ref Range    Magnesium Level 1.60 1.60 - 2.60 mg/dL   Phosphorus    Collection Time: 03/06/23  6:46 AM   Result Value Ref Range    Phosphorus Level 3.2 2.3 - 4.7 mg/dL   CBC with Differential    Collection Time: 03/06/23  6:46 AM   Result Value Ref Range    WBC 13.1 (H) 4.5 - 11.5 x10(3)/mcL    RBC 3.47 (L) 4.70 - 6.10 x10(6)/mcL    Hgb 10.2 (L) 14.0 - 18.0 g/dL    Hct 31.6 (L) 42.0 - 52.0 %    MCV 91.1 80.0 - 94.0 fL    MCH 29.4 pg    MCHC  32.3 (L) 33.0 - 36.0 g/dL    RDW 15.4 11.5 - 17.0 %    Platelet 231 130 - 400 x10(3)/mcL    MPV 10.8 (H) 7.4 - 10.4 fL    Neut % 78.6 %    Lymph % 7.9 %    Mono % 11.7 %    Eos % 0.1 %    Basophil % 0.2 %    Lymph # 1.04 0.6 - 4.6 x10(3)/mcL    Neut # 10.33 (H) 2.1 - 9.2 x10(3)/mcL    Mono # 1.54 (H) 0.1 - 1.3 x10(3)/mcL    Eos # 0.01 0 - 0.9 x10(3)/mcL    Baso # 0.02 0 - 0.2 x10(3)/mcL    IG# 0.20 (H) 0 - 0.04 x10(3)/mcL    IG% 1.5 %    NRBC% 0.0 %     Radiology  CUS 02/20/23: The right internal carotid artery demonstrated 50-69% stenosis. The left internal carotid artery demonstrated 50-69% stenosis.  Bilateral vertebral arteries were patent with antegrade flow.  Radiology  C 02/15/2023: LM 0% stenosis mLAD proximal 70% ISR D1 ostial 50% stenosis D2 ostial 50% stenosis Lcx: previous stent; proximal Lcx  90% ISR; mid Lcx 30% stenosis OM1 proximal 80% stenosis RCA patent stents to ostium. Mid RCA proximal subsection- 60% stenosis; Mid RCA distal subsection 70& stenosis; distal RCA proximal subsection 100% stenosis. Fills left to right  Radiology  TTE 02/16/2023: Global LV SF is borderline normal. LVEF 45-50%. LA is mildly enlarged. Moderate calcification of the aortic valve is noted. Mild AS is present. Mild to moderate AR. Mild to moderate MAC is noted. Mild MR. Trace TR. PASP 40 mmHg. Optison used to enhance endocardial border.     Assessment/Plan:     82 y.o. WM admitted on 3/3/2023     Multivessel CAD  - s/p left heart catheterization with InStent restenosis and multivessel coronary disease on 02/15/2023  - s/p CABG x2 (lima to LAD, RSVG to OM with right GSV EVH) on 02/24/2023  - sternal precautions  - daily weights  - discontinued metoprolol tartrate 50 mg bid on 3/5  - initiate   Coreg 12.5 mg bid (initiated on 3/6)  - continue  Lasix 40 mg daily  Aspirin 81 mg daily  Plavix 75 mg daily   Atorvastatin 40 mg at night  Hydrocodone-acetaminophen 5-325 mg p.r.n. 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  - EF 45-50% TTE 02/15/2023 improved from 40%  - discontinued metoprolol tartrate 50 mg bid on 3/5  - initiate   Coreg 12.5 mg bid (initiated on 3/6)  - continue  Lasix 40 mg daily  Aspirin 81 mg daily  Plavix 75 mg daily   Atorvastatin 40 mg at night  - not on Ace/Arb 2/2 DANIELLA  - follow-up with cardiology outpatient     DANIELLA on CKD stage III  - Renal indices improving  - Avoid NSAIDs (Advil, ibuprofen, naproxen...) and poe-2 inhibitors (Mobic, Celebrex)    - encourage oral hydration  - defer to Nephrology     HTN  - SBP remains elevated  - discontinued metoprolol tartrate 50 mg bid on 3/5  - initiate   Coreg 12.5 mg bid (initiated on 3/6)  - continue  Hydralazine 10 mg every 2 hours as needed for BP > 160/90  Labetalol 10 mg every 2 hours as needed for BP > 160/90     HLD  - FLP at goal!!  - continue  Atorvastatin 40 mg at night  Zetia 10 mg daily      RLS  - stable  - continue  Gabapentin 100 mg t.i.d.  Ropinirole 1 mg b.i.d.     Bilateral RICKEY  - moderate bilateral  - continue  Atorvastatin 40 mg at night  Zetia 10 mg daily   Plavix 75 mg daily   - follow-up with cardiology outpatient     Constipation  - stable  - continue  Colace 100 mg b.i.d.     GERD  - Avoid spicy foods, and nothing to eat or drink within x2 hours of bedtime or laying flat (water is ok)   - Avoid NSAIDs (Advil, ibuprofen, naproxen...) and poe-2 inhibitors (Mobic, Celebrex)    - continue  Protonix 40 mg daily   Carafate 1 g a.c. and HS      Lupus anticoagulant inhibitor syndrome  - stable  - continue  Prednisone  2.5 mg daily   - follow-up with Hematology outpatient     Major depressive disorder  - in remission  - continue  Fluoxetine 10 mg daily     Insomnia  - stable  - continue  Seroquel 50 mg at bedtime     BPH  - stable  - continue  Flomax 0.4 mg at bedtime      Normocytic anemia  - asymptomatic  - s/p transfusion  x2 units PRBC on 2/24/2023  x2 units PRBC on 2/23/2023  - H/H stable   - no evidence of active bleeds  - low iron levels  - continue  Ferrous sulfate 325 mg b.i.d. (initiated 3/4)  - will closely monitor and transfuse if needed     Acute hypoxic respiratory failure  - on 2 L nasal cannula  - chest x-ray stable  - continue  Lasix 40 mg daily      VTE Prophylaxis:  Lovenox 40 mg daily  COVID-19 testing:  Negative on 03/03  COVID-19 vaccination status:  Vaccinated x4     POA:  Yes (Romy, daughter and Nav, son)  Living will: yes  Contacts:  Giovana Navarrete (spouse) [phone]                           Romy (daughter) [phone]     CODE STATUS: Full  Internal Medicine (attending): Xander Enriquez MD  Physiatry (consulting):  Dilip Hargrove MD     OUTPATIENT PROVIDERS  PCP:  Mariella Bethea MD   Hematology:  Missy Davila MD in [facility]  Cardiology: Curtis Loredo MD at [facility]   CV surgery:  Spencer Hagan MD  Nephrology:  Darinel Bedoya MD     DISPOSITION:  Sleep hygiene, bowel maintenance, and appetite at goal.  Last BM 3/5.  BP remains elevated.  Mild leukocytosis.  Weight is trending down.  H&H trending up.  Replace potassium.  Renal indices improved.  Albumin prealbumin trending up.  Coreg 12.5 mg b.i.d. to be initiated today.  Continue aggressive mobilization as tolerated.  All medications reviewed.  Continue current medications for all acute and chronic illnesses reported above.  Staffing to be completed in the morning.  Monitor closely.  Notify of acute changes.  Remove chest tube suture today.     Rajesh Reese NP conducted independent physical examination and assisted with medical  documentation.

## 2023-03-06 NOTE — PT/OT/SLP PROGRESS
Physical Therapy Inpatient Rehab Treatment    Patient Name:  Hunter Navarrete   MRN:  [id number]    Recommendations:     Discharge Recommendations:  nursing facility, skilled   Discharge Equipment Recommendations: walker, rolling   Barriers to discharge:  impaired functional mobility, sternal precautions, pain    Assessment:     Hunter Navarrete is a 82 y.o. male admitted with a medical diagnosis of S/P CABG x 2.  He presents with the following impairments/functional limitations:  weakness, impaired endurance, impaired self care skills, impaired functional mobility, decreased safety awareness, pain, impaired cardiopulmonary response to activity, other (comment) (sternal precautions) .    Rehab Diagnosis: s/p CABG    Recent Surgery: * No surgery found *      General Precautions: Standard, fall, sternal     Orthopedic Precautions: (sternal precautions)     Braces:      Rehab Prognosis: Fair; patient would benefit from acute skilled PT services to address these deficits and reach maximum level of function.      History:     Past Medical History:   Diagnosis Date    Acid reflux     Anemia     Aortic aneurysm, abdominal     Arthritis     Bronchitis     Cataract     Celiac artery stenosis     50% by CTA abdomen 7/27/2020    CKD (chronic kidney disease)     45% FUNCTION    Coronary artery disease     Encounter for blood transfusion     Enlarged prostate     GERD (gastroesophageal reflux disease)     Hemorrhoids     Hypercholesteremia     Hypertension     Iron deficiency anemia, unspecified     Leaky heart valve     Lupus anticoagulant disorder     Renal artery stenosis     by CTA 7/27/2020    Skin cancer     Unspecified chronic bronchitis     UTI (urinary tract infection)        Past Surgical History:   Procedure Laterality Date    ACF      BACK SURGERY      RODS IN BACK; 4 SURGIERIES    BONE MARROW BIOPSY      CARDIAC ANGIOGRAM WITH STENT      CATARACT SX Bilateral     CORONARY ANGIOGRAPHY N/A 2/15/2023    Procedure:  ANGIOGRAM, CORONARY ARTERY;  Surgeon: Rito Vega MD;  Location: [facility] CATH;  Service: Cardiology;  Laterality: N/A;    CORONARY ARTERY BYPASS GRAFT (CABG) N/A 2/24/2023    Procedure: CORONARY ARTERY BYPASS GRAFT (CABG);  Surgeon: Spencer Hagan MD;  Location: [facility];  Service: Cardiothoracic;  Laterality: N/A;  CABG / EVH //   ECHO NOTIFIED    HIP SURGERY Right     THR    KNEE SURGERY Right     ATS    LEFT HEART CATHETERIZATION Left 07/22/2021    Procedure: Left heart cath;  Surgeon: Curtis Loredo MD;  Location: [facility] CATH;  Service: Cardiology;  Laterality: Left;    LEFT HEART CATHETERIZATION Left 09/22/2022    Procedure: Left heart cath;  Surgeon: Giorgi Barker MD;  Location: [facility] CATH;  Service: Cardiology;  Laterality: Left;    SHOULDER SURGERY Bilateral     SPINAL CORD STIMULATOR IMPLANT         Subjective     Chief Complaint: s/p CABG    Respiratory Status: Room air    Patients cultural, spiritual, Judaism conflicts given the current situation: no      Objective:     Communicated with pt prior to session.  Patient found up in chair with    upon PT entry to room.    Pt is Oriented x3 and Alert, Cooperative, and Motivated.    Vitals   1100: 116/56, 105 bpm, 96% O2 sat @ 1L NC    O2 sat measured throughout session: 97-93% observed @ 1L NC.      Functional Mobility:      Current   Status  Discharge   Goal   Functional Area: Care Score:    Roll Left and Right   Supervision or touching assistance   Sit to Lying 4  With increased time using bed rails Supervision or touching assistance   Lying to Sitting on Side of Bed   Supervision or touching assistance   Sit to Stand 4  With RW and rollator; VC for hand placement with poor adherence/following of instructions Supervision or touching assistance   Chair/Bed-to-Chair Transfer 4  With RW and rollator; VC for hand placement with poor adherence/following of instructions Supervision or touching assistance   Car Transfer   Supervision or touching assistance    Walk 10 Feet 4 Supervision or touching assistance   Walk 50 Feet with Two Turns 4  Pt amb 105' + 110' with RW and SBA from PT, and then 60' x2 completions with rollator and SBA from PT. Pt reporting SOB/fatigue being the limiting factor with use of RW, and lower back/L LE pain being the limiting factor with use of rollator. Pt stated that he likes the rollator more d/t having one that he used at home. Supervision or touching assistance   Walk 150 Feet   Supervision or touching assistance   Walk 10 Feet Uneven Surface   Supervision or touching assistance   1 Step (Curb)   Supervision or touching assistance   4 Steps   Supervision or touching assistance   12 Steps   Not applicable   Picking Up Object   Supervision or touching assistance   Wheel 50 Feet with Two Turns 3  Pt manually propelled w/c 60' with B LE, min-mod A needed from PT frequently. Supervision or touching assistance   Wheel 150 Feet   Supervision or touching assistance       Activity Tolerance: Fair    Patient left supine with HOB elevated and call button in reach.    Education provided: roles and goals of PT/PTA, transfer training, bed mob, gait training, safety awareness, body mechanics, assistive device, wheelchair management, fall prevention, and sternal precautions    Expected compliance: Moderate compliance    GOALS:   Multidisciplinary Problems       Physical Therapy Goals          Problem: Physical Therapy    Goal Priority Disciplines Outcome Goal Variances Interventions   Physical Therapy Goal     PT, PT/OT Ongoing, Progressing     Description: Pt will improve functional mobility by performing:     Bed Mobility   Roll Right and Left Partial/ Moderate Assistance .  Lying to sitting Partial/ Moderate Assistance .  Sitting to lying Partial/ Moderate Assistance .    Transfers    Pt will be able to perform Stand step chair/bed to chair transfer With RW Partial/ Moderate Assistance .  Pt will be able to perform Sit to stand with RW Partial/  Moderate Assistance .  Pt will be able to perform Car transfer with RW Partial/ Moderate Assistance .    Ambulation    Pt will ambulate 50 Feet with RW Partial/ Moderate Assistance .  Stair negotiation to be assessed      Wheelchair Mobility   Pt will be able to propel 150 feet in steven wheelchair Partial/ Moderate Assistance .      Timeframe: By Re-eval                         Plan:     During this hospitalization, patient to be seen 5 x/week to address the identified rehab impairments via neuromuscular re-education, wheelchair management/training, therapeutic exercises, therapeutic activities, gait training and progress toward the following goals:    Plan of Care Expires:  03/10/23  PT Next Visit Date: 03/10/23  Plan of Care reviewed with: patient    Additional Information:         Time Tracking:     Therapy Time  PT Start Time: 1100  PT Stop Time: 1200  PT Total Time (min): 60 min   PT Individual: 60  Missed Time:    Time Missed due to:      Billable Minutes: Gait Training 45 min and Train/Wheelchair Management 15 min    03/06/2023

## 2023-03-06 NOTE — PROGRESS NOTES
[facility]    Patient Name:  Hunter Navarrete   MRN:  [id number]  Date: 3/6/2023  Diagnosis: S/P CABG x 2    History:     Past Medical History:   Diagnosis Date    Acid reflux     Anemia     Aortic aneurysm, abdominal     Arthritis     Bronchitis     Cataract     Celiac artery stenosis     50% by CTA abdomen 7/27/2020    CKD (chronic kidney disease)     45% FUNCTION    Coronary artery disease     Encounter for blood transfusion     Enlarged prostate     GERD (gastroesophageal reflux disease)     Hemorrhoids     Hypercholesteremia     Hypertension     Iron deficiency anemia, unspecified     Leaky heart valve     Lupus anticoagulant disorder     Renal artery stenosis     by CTA 7/27/2020    Skin cancer     Unspecified chronic bronchitis     UTI (urinary tract infection)              Precautions:     Allergies as of 03/03/2023 - Reviewed 03/03/2023   Allergen Reaction Noted    Cardura [doxazosin] Hives 03/02/2017    Lyrica [pregabalin] Other (See Comments) 03/02/2017    Paxil [paroxetine hcl] Other (See Comments) 03/02/2017    Restoril [temazepam] Hallucinations 03/02/2017    Vioxx [rofecoxib] Swelling 03/02/2017    Zithromax [azithromycin] Hives 03/02/2017       WOC Assessment Details/Treatment        03/06/23 1200        Incision/Site 02/24/23 1102 Chest midline   Date First Assessed/Time First Assessed: 02/24/23 1102   Location: Chest  Orientation: midline   Dressing Appearance Open to air   Drainage Amount None   Appearance Dry  (brown scabbing)   Periwound Area Intact        Incision/Site 02/24/23 1102 Right Leg   Date First Assessed/Time First Assessed: 02/24/23 1102   Side: Right  Location: Leg   Dressing Appearance Intact   Drainage Amount Scant   Drainage Characteristics/Odor Serosanguineous   Appearance   (small gap in incision line)   Periwound Area Redness  (mild)   Wound Edges   (small gap in incision line)   Dressing Island/border         Incision/Site 03/03/23 1545 Left Leg anterior;lower;proximal horizontal   Date First Assessed/Time First Assessed: 03/03/23 1545   Present Prior to Hospital Arrival?: Yes  Side: Left  Location: Leg  Orientation: anterior;lower;proximal  Incision Type: horizontal  Closure Method: Dermabond   Drainage Amount Scant   Drainage Characteristics/Odor Serosanguineous   Appearance Dermabond  (scabbing)   Periwound Area Redness  (mild)   Wound Edges Approximated        Incision/Site 02/24/23 1000 Left Chest horizontal   Date First Assessed/Time First Assessed: 02/24/23 1000   Present Prior to Hospital Arrival?: Yes  Side: Left  Location: (c) Chest  Incision Type: horizontal  Closure Method: Sutures   Dressing Appearance Open to air   Appearance Sutures intact   Wound Edges Approximated        Incision/Site 02/24/23 1000 Chest lower;distal    Date First Assessed/Time First Assessed: 02/24/23 1000   Present Prior to Hospital Arrival?: Yes  Location: Chest  Orientation: lower;distal  Incision Type: (c)   Closure Method: Sutures   Dressing Appearance Open to air   Appearance Sutures intact   Periwound Area Redness  (mild)   Safety   Safety Precautions emergency equipment at bedside   Safety Management   Safety Promotion/Fall Prevention assistive device/personal item within reach   Patient Rounds bed in low position;clutter free environment maintained;placement of personal items at bedside   Positioning   Head of Bed (HOB) Positioning HOB at 30 degrees     Midline and chest tube sites remain sutured and are without drainage. Bilateral lower leg harvest site incisions have small amount of serosanguinous drainage.   Recommendations made to primary team for daily small Island dressing changes to bilateral lower leg harvest site incisions . Orders placed.   Sacral wound is healed.    03/06/2023

## 2023-03-06 NOTE — PLAN OF CARE
Problem: Rehabilitation (IRF) Plan of Care  Goal: Plan of Care Review  Outcome: Ongoing, Progressing  Flowsheets (Taken 3/6/2023 0602)  Plan of Care Reviewed With: patient     Problem: Infection  Goal: Absence of Infection Signs and Symptoms  Outcome: Ongoing, Progressing     Problem: Fall Injury Risk  Goal: Absence of Fall and Fall-Related Injury  Outcome: Ongoing, Progressing  Intervention: Promote Injury-Free Environment  Flowsheets (Taken 3/6/2023 0602)  Safety Promotion/Fall Prevention:   assistive device/personal item within reach   bed alarm set   side rails raised x 3   instructed to call staff for mobility   Fall Risk reviewed with patient/family   Fall Risk signage in place   nonskid shoes/socks when out of bed   medications reviewed     Problem: Skin Injury Risk Increased  Goal: Skin Health and Integrity  Outcome: Ongoing, Progressing  Intervention: Optimize Skin Protection  Flowsheets (Taken 3/6/2023 0602)  Pressure Reduction Techniques: frequent weight shift encouraged  Head of Bed (HOB) Positioning: HOB at 20-30 degrees

## 2023-03-06 NOTE — PLAN OF CARE
Problem: Occupational Therapy  Goal: Occupational Therapy Goal  Description: ADLs: Pt will be SPV with ADLs by d/c.  STG: by reeval  Pt to perform UB dressing with partial A   Pt to perform LB dressing with Mod A using AD   Pt to perform putting on/off footwear task with Mod A using AD  Pt to perform toileting with Mod A using AD as needed  Pt to perform bathing with Mod A    Functional Transfers:  Pt to perform toilet transfers with Touch A using BSC over toilet.   Pt to perform a tub transfer with TTB requiring Mod A      Balance, Strengthening, Endurance, Balance:  Pt to consistently demonstrate adherence to orthopedic precautions during all ADL's as instructed by OT.  Pt to demonstrate fair dynamic standing balance as required to perform ADL's from standing level.  Pt to demonstrate consistent adherence to breathing control and energy conservation techniques as educated by OT.   Outcome: Ongoing, Progressing

## 2023-03-07 PROCEDURE — 97110 THERAPEUTIC EXERCISES: CPT

## 2023-03-07 PROCEDURE — 25000003 PHARM REV CODE 250: Performed by: NURSE PRACTITIONER

## 2023-03-07 PROCEDURE — 97530 THERAPEUTIC ACTIVITIES: CPT | Mod: CQ

## 2023-03-07 PROCEDURE — 99233 PR SUBSEQUENT HOSPITAL CARE,LEVL III: ICD-10-PCS | Mod: ,,, | Performed by: NURSE PRACTITIONER

## 2023-03-07 PROCEDURE — 11800000 HC REHAB PRIVATE ROOM

## 2023-03-07 PROCEDURE — 99233 SBSQ HOSP IP/OBS HIGH 50: CPT | Mod: ,,, | Performed by: NURSE PRACTITIONER

## 2023-03-07 PROCEDURE — 63600175 PHARM REV CODE 636 W HCPCS: Performed by: NURSE PRACTITIONER

## 2023-03-07 PROCEDURE — 97116 GAIT TRAINING THERAPY: CPT | Mod: CQ

## 2023-03-07 PROCEDURE — 97530 THERAPEUTIC ACTIVITIES: CPT

## 2023-03-07 PROCEDURE — 97535 SELF CARE MNGMENT TRAINING: CPT

## 2023-03-07 PROCEDURE — C1751 CATH, INF, PER/CENT/MIDLINE: HCPCS

## 2023-03-07 RX ADMIN — ROPINIROLE HYDROCHLORIDE 1 MG: 1 TABLET, FILM COATED ORAL at 07:03

## 2023-03-07 RX ADMIN — FLUOXETINE 10 MG: 10 CAPSULE ORAL at 07:03

## 2023-03-07 RX ADMIN — GABAPENTIN 100 MG: 100 CAPSULE ORAL at 05:03

## 2023-03-07 RX ADMIN — FERROUS SULFATE TAB 325 MG (65 MG ELEMENTAL FE) 1 EACH: 325 (65 FE) TAB at 07:03

## 2023-03-07 RX ADMIN — CARVEDILOL 12.5 MG: 6.25 TABLET, FILM COATED ORAL at 08:03

## 2023-03-07 RX ADMIN — EZETIMIBE 10 MG: 10 TABLET ORAL at 07:03

## 2023-03-07 RX ADMIN — DOCUSATE SODIUM 100 MG: 100 CAPSULE, LIQUID FILLED ORAL at 07:03

## 2023-03-07 RX ADMIN — SUCRALFATE 1 G: 1 TABLET ORAL at 12:03

## 2023-03-07 RX ADMIN — PREDNISONE 2.5 MG: 2.5 TABLET ORAL at 07:03

## 2023-03-07 RX ADMIN — CARVEDILOL 12.5 MG: 6.25 TABLET, FILM COATED ORAL at 07:03

## 2023-03-07 RX ADMIN — HYDROCODONE BITARTRATE AND ACETAMINOPHEN 1 TABLET: 5; 325 TABLET ORAL at 11:03

## 2023-03-07 RX ADMIN — TAMSULOSIN HYDROCHLORIDE 0.4 MG: 0.4 CAPSULE ORAL at 08:03

## 2023-03-07 RX ADMIN — FERROUS SULFATE TAB 325 MG (65 MG ELEMENTAL FE) 1 EACH: 325 (65 FE) TAB at 08:03

## 2023-03-07 RX ADMIN — SUCRALFATE 1 G: 1 TABLET ORAL at 08:03

## 2023-03-07 RX ADMIN — PANTOPRAZOLE SODIUM 40 MG: 40 TABLET, DELAYED RELEASE ORAL at 07:03

## 2023-03-07 RX ADMIN — ATORVASTATIN CALCIUM 40 MG: 40 TABLET, FILM COATED ORAL at 08:03

## 2023-03-07 RX ADMIN — ROPINIROLE HYDROCHLORIDE 1 MG: 1 TABLET, FILM COATED ORAL at 08:03

## 2023-03-07 RX ADMIN — QUETIAPINE 50 MG: 25 TABLET ORAL at 08:03

## 2023-03-07 RX ADMIN — FUROSEMIDE 40 MG: 40 TABLET ORAL at 07:03

## 2023-03-07 RX ADMIN — CLOPIDOGREL BISULFATE 75 MG: 75 TABLET ORAL at 07:03

## 2023-03-07 RX ADMIN — GABAPENTIN 100 MG: 100 CAPSULE ORAL at 01:03

## 2023-03-07 RX ADMIN — ENOXAPARIN SODIUM 40 MG: 40 INJECTION SUBCUTANEOUS at 05:03

## 2023-03-07 RX ADMIN — HYDROCODONE BITARTRATE AND ACETAMINOPHEN 1 TABLET: 5; 325 TABLET ORAL at 08:03

## 2023-03-07 RX ADMIN — POTASSIUM CHLORIDE 40 MEQ: 1500 TABLET, EXTENDED RELEASE ORAL at 05:03

## 2023-03-07 RX ADMIN — GABAPENTIN 100 MG: 100 CAPSULE ORAL at 08:03

## 2023-03-07 RX ADMIN — ASPIRIN 81 MG: 81 TABLET, COATED ORAL at 07:03

## 2023-03-07 RX ADMIN — SUCRALFATE 1 G: 1 TABLET ORAL at 04:03

## 2023-03-07 RX ADMIN — DOCUSATE SODIUM 100 MG: 100 CAPSULE, LIQUID FILLED ORAL at 08:03

## 2023-03-07 RX ADMIN — SUCRALFATE 1 G: 1 TABLET ORAL at 05:03

## 2023-03-07 NOTE — PT/OT/SLP PROGRESS
Occupational Therapy Inpatient Rehab Treatment    Name: Hunter Navarrete  MRN: [id number]    Assessment:  Hunter Navarrete is a 82 y.o. male admitted with a medical diagnosis of S/P CABG x 2.  He presents with the following impairments/functional limitations:  weakness, impaired endurance, impaired self care skills, impaired functional mobility .    General Precautions: Standard, fall, sternal     Orthopedic Precautions:Full weight bearing     Braces: N/A    Rehab Prognosis: Good; patient would benefit from acute skilled OT services to address these deficits and reach maximum level of function.      History:     Past Medical History:   Diagnosis Date    Acid reflux     Anemia     Aortic aneurysm, abdominal     Arthritis     Bronchitis     Cataract     Celiac artery stenosis     50% by CTA abdomen 7/27/2020    CKD (chronic kidney disease)     45% FUNCTION    Coronary artery disease     Encounter for blood transfusion     Enlarged prostate     GERD (gastroesophageal reflux disease)     Hemorrhoids     Hypercholesteremia     Hypertension     Iron deficiency anemia, unspecified     Leaky heart valve     Lupus anticoagulant disorder     Renal artery stenosis     by CTA 7/27/2020    Skin cancer     Unspecified chronic bronchitis     UTI (urinary tract infection)        Past Surgical History:   Procedure Laterality Date    ACF      BACK SURGERY      RODS IN BACK; 4 SURGIERIES    BONE MARROW BIOPSY      CARDIAC ANGIOGRAM WITH STENT      CATARACT SX Bilateral     CORONARY ANGIOGRAPHY N/A 2/15/2023    Procedure: ANGIOGRAM, CORONARY ARTERY;  Surgeon: Rito Vega MD;  [facility]: [facility] C[facility];  Service: Cardiology;  Laterality: N/A;    CORONARY ARTERY BYPASS GRAFT (CABG) N/A 2/24/2023    Procedure: CORONARY ARTERY BYPASS GRAFT (CABG);  Surgeon: Spencer Hagan MD;  Location: [facility];  Service: Cardiothoracic;  Laterality: N/A;  CABG / EVH //   ECHO NOTIFIED    HIP SURGERY Right     THR    KNEE SURGERY Right     ATS    LEFT HEART  CATHETERIZATION Left 07/22/2021    Procedure: Left heart cath;  Surgeon: Curtis Loredo MD;  Location: [facility] CATH;  Service: Cardiology;  Laterality: Left;    LEFT HEART CATHETERIZATION Left 09/22/2022    Procedure: Left heart cath;  Surgeon: Giorgi Barker MD;  Location: [facility] CATH;  Service: Cardiology;  Laterality: Left;    SHOULDER SURGERY Bilateral     SPINAL CORD STIMULATOR IMPLANT         Subjective     Orientation: Oriented x4    Chief Complaint: Pt. C/o SOB; requires frequent rest breaks    Patient/Family Comments/goals: Get stronger; make coffee.     Vitals   Vitals at Rest  /66   HR 93   O2 Sat 94 % RA   Pain 2/10     Vitals With Activity  /60   HR 83    O2 Sat 95% RA   Pain 0/10     Respiratory Status: Room air    Patients cultural, spiritual, Voodoo conflicts given the current situation: no       Objective:     Patient found HOB elevated with medellin catheter, PICC line  upon OT entry to room.    Mobility   Patient completed:  Rolling/Turning to Right with stand by assistance  Supine to Sit with contact guard assistance  Sit to Stand Transfer with contact guard assistance with rolling walker  Bed to Chair Transfer using Step Transfer technique with contact guard assistance with rolling walker    Functional Mobility  Pt. Ambulated c CGA/sidestepping in kitchen via RW c rest breaks t/f w/c    ADLs   Current Status   Eating     Oral Hygiene     Shower, Bathe Self     Upper Body Dressing     Lower Body Dressing     Toileting Hygiene     Toilet Transfer     Putting On, Taking Off Footwear 4     Limiting Factors for ADLs: endurance, limited ROM, and weakness     IADLs: Pt. In kitchen retrieved a plate, placed bread in toaster; sidestepped to refrigerator, opened, sidestepped back to toaster, retrieved toast and placed on plate. Pt. SOB and required rest break. V/C's for PLB.     Therapeutic Activities  Pt. Educated/performed doffing socks/shoes and donning them via AE. Pt. Improved to Min A c this  "task. Pt. Educated on energy conservation/PLB to control breathing.     Therapeutic Exercise  Pt. At w/c level attempted B UE AROM x's 5 min but actually able to perform ~ 2.5 min before c/o "burning in rotator cuffs". Pt. C Hx of B shoulder Sx.'s. OT adjusted UBE to lowest resistance however Pt. Unable to continue. Pt. Performed 5 sit <>stands c CGA and RW.             LifeStyle Change and Education:             Patient left up in chair with  Michael PT  present.     Education provided: ADLs, transfer training, bed mobility, assistive device, wheelchair precautions, safety precautions, post-op precautions, and equipment recommendations    Multidisciplinary Problems       Occupational Therapy Goals          Problem: Occupational Therapy    Goal Priority Disciplines Outcome Interventions   Occupational Therapy Goal     OT, PT/OT Ongoing, Progressing    Description: ADLs: Pt will be SPV with ADLs by d/c.  STG: by reeval  Pt to perform UB dressing with partial A   Pt to perform LB dressing with Mod A using AD   Pt to perform putting on/off footwear task with Mod A using AD  Pt to perform toileting with Mod A using AD as needed  Pt to perform bathing with Mod A    Functional Transfers:  Pt to perform toilet transfers with Touch A using BSC over toilet.   Pt to perform a tub transfer with TTB requiring Mod A      Balance, Strengthening, Endurance, Balance:  Pt to consistently demonstrate adherence to orthopedic precautions during all ADL's as instructed by OT.  Pt to demonstrate fair dynamic standing balance as required to perform ADL's from standing level.  Pt to demonstrate consistent adherence to breathing control and energy conservation techniques as educated by OT.                        Time Tracking     OT Received On: 03/07/23  Time In 1000     Time Out 1100  Total Time 60 min  Therapy Time: OT Individual: 60  Missed Time:  0  Missed Time Reason:      Billable Minutes: Self Care/Home Management 45 and Therapeutic " Exercise 15    03/07/2023

## 2023-03-07 NOTE — PROGRESS NOTES
03/07/23 1300   Rec Therapy Time Calculation   Date of Treatment 03/07/23   Rec Start Time 1300   Rec Stop Time 1330   Rec Total Time (min) 30 min   Time   Treatment time 2 units   Charges   $Therapeutic Activity 2 units   Precautions   General Precautions sternal;fall   Orthopedic Precautions  N/A   Braces N/A   Pain/Comfort   Pain Rating 1 no pain   Vital Signs   Pulse 92   BP (!) 144/68   OTHER   Rehab identified problem list/impairments weakness;impaired endurance;impaired functional mobility;gait instability;decreased lower extremity function;decreased safety awareness   Values/Beliefs/Spiritual Care   Spiritual, Cultural Beliefs, Orthodoxy Practices, Values that Affect Care no   Overall Level of Functioning   Activity Tolerance Mod Indep   Dynamic Sitting Balance/Reaching Does not occur   Dynamic Standing Balance/Reaching Standby Assist   Right UE Coodination/Dexterity Independent   Left UE Coordination/Dexterity Independent   Problem Solving/Sequencing Skills Independent   Memory Recall Independent   R/L Neglect/Inattention Does not occur   Attention Span Independent   Social Interaction Independent   Recreational Therapy Short Term Goals   Short Term Goal 1 Progression Met   Short Term Goal 2 Progression Met   Recreational Therapy Long Term Goals   Long Term Goal 1 Progression Progressing   Long Term Goal 2 Progression Progressing   Plan   Patient to be seen Daily   Planned Duration 2 weeks   Treatments Planned Energy conservation training   Treatment plan/goals estblished with Patient/Caregiver Yes

## 2023-03-07 NOTE — PROGRESS NOTES
[facility] ([facility])  Rehab Progress Note    Patient Name: Hunter Navarrete  MRN: [id number]  Age: 82 y.o. Sex: male  : 1940  Hospital Length of Stay: 4 days  Date of Service: 3/7/2023   Chief Complaint: Multivessel CAD s/p left heart catheterization with InStent restenosis and multivessel coronary disease on 02/15/2023 s/p CABG x2 (lima to LAD, RSVG to OM with right GSV EVH) on 2023    Subjective:     Basic Information  Admit Information: 82-year-old WM presented to [facility] on 02/15 with complaints of substernal chest pain with shortness of breath.  PMH significant for CAD with PCI, CKD, hypertension, mild aortic stenosis, lupus anticoagulant inhibitor syndrome, and hyperlipidemia.  Workup significant for elevated troponin.  Transferred to [facility] for cardiology evaluation.  Initiated heparin drip.  Tolerated left heart catheterization on 02/15 significant for multivessel coronary disease with InStent restenosis.  Hematology recommended transfer to tertiary facility for CABG 2/2 history of lupus anticoagulant.  Tolerated transfer to [facility] on .  Initiated Tridil drip and titrated for chest pain.  Required 2 units PRBC transfusion on .  On  tolerated CABG x2 (lima to LAD, RSVG to OM with right GSV EVH) without perioperative complications.  Postoperatively required IV fluid gentle hydration per Nephrology 2/2 CKD stage IIIB.  Chest tubes removed.  Continued with postoperative DANIELLA on CKD.  Nephrology will continue to follow.  Discontinue Lasix IV on  and initiated home dose of Lasix 40 mg daily.  Initiated Plavix on . Tolerated transfer to [facility] inpatient rehab unit on 3/3 without incident.  Today's Information: No acute events overnight.  Sitting up in chair.  Reports good sleep.  Appetite fair.  Last BM 3/6.  Vital signs at goal with no recorded fevers.  Weight 86.3 kg-trending down.  No new imaging today.    Review of patient's  allergies indicates:   Allergen Reactions    Cardura [doxazosin] Hives    Lyrica [pregabalin] Other (See Comments)    Paxil [paroxetine hcl] Other (See Comments)    Restoril [temazepam] Hallucinations    Vioxx [rofecoxib] Swelling    Zithromax [azithromycin] Hives        Current Facility-Administered Medications:     aspirin EC tablet 81 mg, 81 mg, Oral, Daily, Yaneth Douglas, FNP, 81 mg at 03/07/23 0754    atorvastatin tablet 40 mg, 40 mg, Oral, QHS, Yaneth Douglas, FNP, 40 mg at 03/06/23 2023    benzonatate capsule 100 mg, 100 mg, Oral, TID PRN, Yaneth Douglas FNP, 100 mg at 03/06/23 2336    bisacodyL suppository 10 mg, 10 mg, Rectal, Daily PRN, Yaneth Douglas FNP, 10 mg at 03/03/23 1909    carvediloL tablet 12.5 mg, 12.5 mg, Oral, BID, Suman Reese FNP, 12.5 mg at 03/07/23 0754    clopidogreL tablet 75 mg, 75 mg, Oral, Daily, Yaneth Douglas, FNP, 75 mg at 03/07/23 0754    docusate sodium capsule 100 mg, 100 mg, Oral, BID, Yaneth Douglas, FNP, 100 mg at 03/07/23 0754    enoxaparin injection 40 mg, 40 mg, Subcutaneous, Daily, Yaneth Douglas FNP, 40 mg at 03/06/23 1624    ezetimibe tablet 10 mg, 10 mg, Oral, Daily, Yaneth Douglas, FNP, 10 mg at 03/07/23 0755    famotidine tablet 20 mg, 20 mg, Oral, Daily PRN, Xander Enriquez MD    ferrous sulfate tablet 1 each, 1 tablet, Oral, BID, Yaneth Douglas FNP, 1 each at 03/07/23 0754    FLUoxetine capsule 10 mg, 10 mg, Oral, Daily, Yaneth Douglas FNP, 10 mg at 03/07/23 0755    furosemide tablet 40 mg, 40 mg, Oral, Daily, Yaneth Douglas, FNP, 40 mg at 03/07/23 0754    gabapentin capsule 100 mg, 100 mg, Oral, TID, SHIRLEY Haider, 100 mg at 03/07/23 0545    hydrALAZINE injection 10 mg, 10 mg, Intravenous, Q6H PRN, SHIRLEY Haider, 10 mg at 03/05/23 1456    HYDROcodone-acetaminophen 5-325 mg per tablet 1 tablet, 1 tablet, Oral, Q4H PRN, SHIRLEY Haider    hydrOXYzine pamoate capsule 50 mg, 50 mg, Oral, Nightly PRN, Yaneth LANGFORD  "SHIRLEY Douglas    labetalol 20 mg/4 mL (5 mg/mL) IV syring, 10 mg, Intravenous, Q4H PRN, SHIRLEY Haider    meclizine tablet 25 mg, 25 mg, Oral, TID PRN, SHIRLEY Haider    metoprolol injection 10 mg, 10 mg, Intravenous, Q2H PRN, SHIRLEY Haider    mupirocin 2 % ointment, , Nasal, BID, Xander Enriquez MD, Given at 03/06/23 2000    ondansetron disintegrating tablet 4 mg, 4 mg, Oral, Q6H PRN, MAGDA HaiderP    pantoprazole EC tablet 40 mg, 40 mg, Oral, Daily, MAGDA HaiderP, 40 mg at 03/07/23 0754    predniSONE tablet 2.5 mg, 2.5 mg, Oral, Daily, MAGDA HaiderP, 2.5 mg at 03/07/23 0754    QUEtiapine tablet 50 mg, 50 mg, Oral, QHS, Yaneth Douglas FNP, 50 mg at 03/06/23 2023    rOPINIRole tablet 1 mg, 1 mg, Oral, BID, Yaneth Douglas FNP, 1 mg at 03/07/23 0754    sucralfate tablet 1 g, 1 g, Oral, QID (AC & HS), Yaneth Douglas, FNP, 1 g at 03/07/23 0545    tamsulosin 24 hr capsule 0.4 mg, 0.4 mg, Oral, QHS, Yaneth Douglas, MAGDAP, 0.4 mg at 03/06/23 2023     Review of Systems   Complete 12-point review of symptoms negative except for what's mentioned in HPI     Objective:     /72   Pulse 101   Temp 98 °F (36.7 °C) (Oral)   Resp 18   Ht 5' 10.98" (1.803 m)   Wt 86.3 kg (190 lb 4.1 oz)   SpO2 95%   BMI 26.55 kg/m²      Physical Exam  Vitals reviewed.   Eyes:      Pupils: Pupils are equal, round, and reactive to light.   Cardiovascular:      Rate and Rhythm: Normal rate and regular rhythm.      Heart sounds: Murmur heard.   Systolic murmur is present with a grade of 3/6.   Pulmonary:      Effort: Pulmonary effort is normal.      Breath sounds: Normal breath sounds.   Abdominal:      General: Bowel sounds are normal.   Genitourinary:     Comments: Indwelling catheter  Musculoskeletal:         General: Normal range of motion.   Skin:     General: Skin is warm.      Comments: Midline sternal incision open air dry and intact    Neurological:      Mental Status: He is alert " and oriented to person, place, and time.      Motor: Weakness present.      Comments: Resting tremors, shuffling gait   Psychiatric:         Mood and Affect: Mood normal.   *MD performed and documented physical examination       Lines/Drains/Airways       Peripherally Inserted Central Catheter Line  Duration             PICC Triple Lumen 02/27/23 1045 left basilic 7 days              Drain  Duration                  Urethral Catheter 02/27/23 1150 7 days                Labs  No results found for this or any previous visit (from the past 24 hour(s)).    Radiology  CUS 02/20/23: The right internal carotid artery demonstrated 50-69% stenosis. The left internal carotid artery demonstrated 50-69% stenosis.  Bilateral vertebral arteries were patent with antegrade flow.  Radiology  LHC 02/15/2023: LM 0% stenosis mLAD proximal 70% ISR D1 ostial 50% stenosis D2 ostial 50% stenosis Lcx: previous stent; proximal Lcx  90% ISR; mid Lcx 30% stenosis OM1 proximal 80% stenosis RCA patent stents to ostium. Mid RCA proximal subsection- 60% stenosis; Mid RCA distal subsection 70& stenosis; distal RCA proximal subsection 100% stenosis. Fills left to right  Radiology  TTE 02/16/2023: Global LV SF is borderline normal. LVEF 45-50%. LA is mildly enlarged. Moderate calcification of the aortic valve is noted. Mild AS is present. Mild to moderate AR. Mild to moderate MAC is noted. Mild MR. Trace TR. PASP 40 mmHg. Optison used to enhance endocardial border.     Assessment/Plan:     82 y.o. WM admitted on 3/3/2023     Multivessel CAD  - s/p left heart catheterization with InStent restenosis and multivessel coronary disease on 02/15/2023  - s/p CABG x2 (lima to LAD, RSVG to OM with right GSV EVH) on 02/24/2023  - sternal precautions  - daily weights  - discontinued metoprolol tartrate 50 mg bid on 3/5  - continue   Coreg 12.5 mg bid (initiated on 3/6)  Lasix 40 mg daily  Aspirin 81 mg daily  Plavix 75 mg daily   Atorvastatin 40 mg at  night  Hydrocodone-acetaminophen 5-325 mg p.r.n. every 4 hours  - not on Ace/Arb 2/2 DANIELLA  - follow-up with cardiology and CV surgery outpatient     ICMO  - EF 45-50% TTE 02/15/2023 improved from 40%  - discontinued metoprolol tartrate 50 mg bid on 3/5  - continue   Coreg 12.5 mg bid (initiated on 3/6)  Lasix 40 mg daily  Aspirin 81 mg daily  Plavix 75 mg daily   Atorvastatin 40 mg at night  - not on Ace/Arb 2/2 DANIELLA  - follow-up with cardiology outpatient     Acute on chronic combined systolic/diastolic heart failure  - EF 45-50% TTE 02/15/2023 improved from 40%  - discontinued metoprolol tartrate 50 mg bid on 3/5  - continue   Coreg 12.5 mg bid (initiated on 3/6)  Lasix 40 mg daily  Aspirin 81 mg daily  Plavix 75 mg daily   Atorvastatin 40 mg at night  - not on Ace/Arb 2/2 DANIELLA  - follow-up with cardiology outpatient     DANIELLA on CKD stage III  - Renal indices improving  - Avoid NSAIDs (Advil, ibuprofen, naproxen...) and poe-2 inhibitors (Mobic, Celebrex)    - encourage oral hydration  - defer to Nephrology     HTN  - BP at goal!!  - discontinued metoprolol tartrate 50 mg bid on 3/5  - continue   Coreg 12.5 mg bid (initiated on 3/6)  Hydralazine 10 mg every 2 hours as needed for BP > 160/90  Labetalol 10 mg every 2 hours as needed for BP > 160/90     HLD  - FLP at goal!!  - continue  Atorvastatin 40 mg at night  Zetia 10 mg daily      RLS  - stable  - continue  Gabapentin 100 mg t.i.d.  Ropinirole 1 mg b.i.d.     Bilateral RICKEY  - moderate bilateral  - continue  Atorvastatin 40 mg at night  Zetia 10 mg daily   Plavix 75 mg daily   - follow-up with cardiology outpatient     Constipation  - stable  - continue  Colace 100 mg b.i.d.     GERD  - Avoid spicy foods, and nothing to eat or drink within x2 hours of bedtime or laying flat (water is ok)   - Avoid NSAIDs (Advil, ibuprofen, naproxen...) and poe-2 inhibitors (Mobic, Celebrex)    - continue  Protonix 40 mg daily   Carafate 1 g a.c. and HS      Lupus anticoagulant  inhibitor syndrome  - stable  - continue  Prednisone 2.5 mg daily   - follow-up with Hematology outpatient     Major depressive disorder  - in remission  - continue  Fluoxetine 10 mg daily     Insomnia  - stable  - continue  Seroquel 50 mg at bedtime     BPH/urinary retention  - stable  - Urinary catheter replaced on 02/27   - Discontinue indwelling catheter on 03/07  - bladder scan q.6 hours for post void residual greater than 300 mL  - continue  Flomax 0.4 mg at bedtime      Normocytic anemia  - asymptomatic  - s/p transfusion  x2 units PRBC on 2/24/2023  x2 units PRBC on 2/23/2023  - H/H stable   - no evidence of active bleeds  - low iron levels  - continue  Ferrous sulfate 325 mg b.i.d. (initiated 3/4)  - will closely monitor and transfuse if needed     Acute hypoxic respiratory failure  - on 2 L nasal cannula  - chest x-ray stable  - continue  Lasix 40 mg daily    Parkinson's disease  - reportedly diagnosed in 2021  - not currently on medication      VTE Prophylaxis:  Lovenox 40 mg daily  COVID-19 testing:  Negative on 03/03  COVID-19 vaccination status:  Vaccinated x4     POA:  Yes (Romy, daughter and Nav, son)  Living will: yes  Contacts:  Giovana Navarrete (spouse) [phone]                           Romy (daughter) [phone]     CODE STATUS: Full  Internal Medicine (attending): Xander Enriquez MD  Physiatry (consulting):  Dilip Hargrove MD     OUTPATIENT PROVIDERS  PCP:  Mariella Bethea MD   Hematology:  Missy Davila MD in [facility]  Cardiology: Curtis Loredo MD at [facility]   CV surgery:  Spencer Hagan MD  Nephrology:  Darinel Bedoya MD     DISPOSITION:  Sleep hygiene, bowel maintenance, and appetite at goal.  Last BM 3/6.  Tolerating 1 L nasal cannula.  Weight is trending down.  Vital signs at goal with no recorded fevers.  No new labs or imaging today.  Discontinue indwelling catheter.  Bladder scan q.6 hours to evaluate for urinary retention.  Initiate in and out catheterization if greater  than 300 mL.  All medications reviewed.  Continue current medication regimen for acute and chronic illnesses reported above.  Monitor closely.  Notify of acute changes.  Staffing completed today.    Staffing 3/7/2023: Continent of bowel and bladder. CT suture removed on 3/7. RT: Overall mod to supervision.  Appetite is poor.  Breakfast is better.  Liberalize diet with no added salt.  RT: Doing well.  Bed mobility limited. PT: SBA to min assist with bed mobility, transfers and gait.  Dramatic improvement OTW.  Limited by activity intolerance. Needs breaks throughout the day. OT: Limited by weakness.  Performed better than he thinks.  He needs time to improve strength and endurance. Mod assist for bed mobility. Projected discharge 3/17.      Rajesh Reese NP conducted independent physical examination and assisted with medical documentation.    Total time spent on this encounter including chart review and direct MD + NP 1-on-1 patient interaction: 54 minutes   Over 50% of this time was spent in counseling and coordination of care

## 2023-03-07 NOTE — PROGRESS NOTES
[facility] - Rehab Inpatient Services  Wound Care    Patient Name:  Hunter Navarrete   MRN:  [id number]  Date: 3/7/2023  Diagnosis: S/P CABG x 2    History:     Past Medical History:   Diagnosis Date    Acid reflux     Anemia     Aortic aneurysm, abdominal     Arthritis     Bronchitis     Cataract     Celiac artery stenosis     50% by CTA abdomen 7/27/2020    CKD (chronic kidney disease)     45% FUNCTION    Coronary artery disease     Encounter for blood transfusion     Enlarged prostate     GERD (gastroesophageal reflux disease)     Hemorrhoids     Hypercholesteremia     Hypertension     Iron deficiency anemia, unspecified     Leaky heart valve     Lupus anticoagulant disorder     Renal artery stenosis     by CTA 7/27/2020    Skin cancer     Unspecified chronic bronchitis     UTI (urinary tract infection)            Allergies as of 03/03/2023 - Reviewed 03/03/2023   Allergen Reaction Noted    Cardura [doxazosin] Hives 03/02/2017    Lyrica [pregabalin] Other (See Comments) 03/02/2017    Paxil [paroxetine hcl] Other (See Comments) 03/02/2017    Restoril [temazepam] Hallucinations 03/02/2017    Vioxx [rofecoxib] Swelling 03/02/2017    Zithromax [azithromycin] Hives 03/02/2017       WOC Assessment Details/Treatment     Follow up on drainage to bilateral leg surgical harvest sites. No drainage noted. Continue with Island dressings    Recommendations made to primary team for aforementioned dressing daily . Orders placed.     03/07/2023

## 2023-03-07 NOTE — PLAN OF CARE
"Spoke with pt and wife Giovana ([phone]) to discuss updates from team conference and plans for discharge on 3/17/23.  Giovana will attend family training on Wed, 3/15 at 1300.  She describes herself as physically well/fit and able to assist patient.  She confirmed that she is currently staying with their dtr in [place] but that they plan to return immediately to their own home upon pt's release.    Confirmed also with Giovana the rumor that the family is purchasing pt a lift chair.  Cautioned her about lift chairs and patients' tendencies to become overly dependent on these devices for their sit to stand.  She said, "I know.  We're only getting it for him due to his age and his lack of abilities."      Briefly discussed resumption of HH with Wallace GALLO (hx with agency).    Pt confirmed that he wishes for scripts to go to [facility] in [place], [place].  "

## 2023-03-07 NOTE — PT/OT/SLP PROGRESS
Recreational Therapy Treatment    Date of Treatment: 03/07/23  Start Time: 1300  Stop Time: 1330  Total Time: 30 min  Missed Time:     General Precautions: sternal, fall  Ortho Precautions: N/A  Braces: N/A    Vitals   Vitals at Rest  BP    HR    O2 Sat    Pain      Vitals With Activity  BP    HR    O2 Sat    Pain        Treatment     Cognitive Skills Building   Cognitive Observation Activity Assist Position Equipment Response            Comment:          Dynamic Activities   Washer Toss Supervision Standing  Rolling walker, metal washers Good           Comment: Sit to stand was supervision as was dynamic standing balance/reaching. Standing tolerance was 3 minutes at a time. UE coordination was I as were sequencing skills. Motivated and determined          Comment:          Additional Info: Pt progress, plan of care and discharge plans were discussed during staffing at 0900    Bed to w/c transfer increased to contact guard    Goals     Short Term Goals    Goal  Goal Status   Will increase sit to stand to supervision Met   Will improve dynamic standing balance/reaching to supervision Met                 Long Term Goals    Goal Goal Status   Will increase standing tolerance to 5,10 minutes Progressing   Will improve dynamic standing balance/reaching to setup Progressing

## 2023-03-07 NOTE — PT/OT/SLP PROGRESS
Physical Therapy Inpatient Rehab Treatment    Patient Name:  Hunter Navarrete   MRN:  [id number]    Recommendations:     Discharge Recommendations:  nursing facility, skilled   Discharge Equipment Recommendations: walker, rolling   Barriers to discharge: None    Assessment:     Hunter Navarrete is a 82 y.o. male admitted with a medical diagnosis of S/P CABG x 2.  He presents with the following impairments/functional limitations:  weakness, impaired endurance, impaired self care skills, impaired functional mobility, gait instability, impaired balance, decreased lower extremity function, decreased safety awareness, impaired cardiopulmonary response to activity .    Rehab Diagnosis: s/p CABG    Recent Surgery: * No surgery found *      General Precautions: Standard, sternal, fall     Orthopedic Precautions:N/A     Braces: N/A    Rehab Prognosis: Good; patient would benefit from acute skilled PT services to address these deficits and reach maximum level of function.      History:     Past Medical History:   Diagnosis Date    Acid reflux     Anemia     Aortic aneurysm, abdominal     Arthritis     Bronchitis     Cataract     Celiac artery stenosis     50% by CTA abdomen 7/27/2020    CKD (chronic kidney disease)     45% FUNCTION    Coronary artery disease     Encounter for blood transfusion     Enlarged prostate     GERD (gastroesophageal reflux disease)     Hemorrhoids     Hypercholesteremia     Hypertension     Iron deficiency anemia, unspecified     Leaky heart valve     Lupus anticoagulant disorder     Renal artery stenosis     by CTA 7/27/2020    Skin cancer     Unspecified chronic bronchitis     UTI (urinary tract infection)        Past Surgical History:   Procedure Laterality Date    ACF      BACK SURGERY      RODS IN BACK; 4 SURGIERIES    BONE MARROW BIOPSY      CARDIAC ANGIOGRAM WITH STENT      CATARACT SX Bilateral     CORONARY ANGIOGRAPHY N/A 2/15/2023    Procedure: ANGIOGRAM, CORONARY ARTERY;  Surgeon: Rito NGUYEN  MD Gary;  Location: [facility] CATH;  Service: Cardiology;  Laterality: N/A;    CORONARY ARTERY BYPASS GRAFT (CABG) N/A 2/24/2023    Procedure: CORONARY ARTERY BYPASS GRAFT (CABG);  Surgeon: Spencer Hagan MD;  Location: [facility];  Service: Cardiothoracic;  Laterality: N/A;  CABG / EVH //   ECHO NOTIFIED    HIP SURGERY Right     THR    KNEE SURGERY Right     ATS    LEFT HEART CATHETERIZATION Left 07/22/2021    Procedure: Left heart cath;  Surgeon: Curtis Loredo MD;  Location: [facility] CATH;  Service: Cardiology;  Laterality: Left;    LEFT HEART CATHETERIZATION Left 09/22/2022    Procedure: Left heart cath;  Surgeon: Giorgi Barker MD;  Location: [facility] CATH;  Service: Cardiology;  Laterality: Left;    SHOULDER SURGERY Bilateral     SPINAL CORD STIMULATOR IMPLANT         Subjective     Chief Complaint: none    Respiratory Status: Room air    Patients cultural, spiritual, Sikhism conflicts given the current situation: no      Objective:     Communicated with nursing prior to session.  Patient found up in chair with oxygen, Other (comments) (nursing request room air to assess if pt tolerated room air monitor o2 pt in recliner)  upon PT entry to room.    Pt is Oriented x3 and Alert and Cooperative.    Vitals   Vitals at Rest  /57   HR 99   O2 Sat    Pain      Vitals With Activity  /60      O2 Sat 93% on room air with activity    Pain        Functional Mobility:   Bed Mobility:     Supine to Sit: min a with trunk few times with vc for sternal pxs few times with HOB elevated pt stated has electric bed in home environment   Sit to Supine: touching a with vc for sternal pxs and tech few times performed with HOB elevated per home environment   Transfers:     Sit to Stand: Supervision or touching assistance  with rollator few times during tx vc for sternal pxs   Bed to Chair: Supervision or touching assistance  with rollator   using  Step Transfer  Gait: Pt ambulates 80ft, 150ft,100ft seated rest breaks b/t trials   with rollator with Supervision or touching assistance .       Activity Tolerance: Good    Patient left HOB elevated with call button in reach and pt george tx requires multiple rest breaks and requires assist with bed mob with vc to maintain sternal pxs pt on room air  .    Education provided: transfer training, bed mob, gait training, balance training, and assistive device    Expected compliance: High compliance    GOALS:   Multidisciplinary Problems       Physical Therapy Goals          Problem: Physical Therapy    Goal Priority Disciplines Outcome Goal Variances Interventions   Physical Therapy Goal     PT, PT/OT Ongoing, Progressing     Description: Pt will improve functional mobility by performing:     Bed Mobility   Roll Right and Left Partial/ Moderate Assistance .  Lying to sitting Partial/ Moderate Assistance .  Sitting to lying Partial/ Moderate Assistance .    Transfers    Pt will be able to perform Stand step chair/bed to chair transfer With RW Partial/ Moderate Assistance .  Pt will be able to perform Sit to stand with RW Partial/ Moderate Assistance .  Pt will be able to perform Car transfer with RW Partial/ Moderate Assistance .    Ambulation    Pt will ambulate 50 Feet with RW Partial/ Moderate Assistance .  Stair negotiation to be assessed      Wheelchair Mobility   Pt will be able to propel 150 feet in steven wheelchair Partial/ Moderate Assistance .      Timeframe: By Re-eval                         Plan:     During this hospitalization, patient to be seen 5 x/week to address the identified rehab impairments via neuromuscular re-education, wheelchair management/training, therapeutic exercises, therapeutic activities, gait training and progress toward the following goals:    Plan of Care Expires:  03/10/23  PT Next Visit Date: 03/10/23  Plan of Care reviewed with: patient    Additional Information:         Time Tracking:     Therapy Time  PT Received On: 03/07/23  PT Start Time: 0800  PT Stop Time:  0900  PT Total Time (min): 60 min   PT Individual: 60  Missed Time:    Time Missed due to:      Billable Minutes: Gait Training 30min and Therapeutic Activity 30min    03/07/2023

## 2023-03-07 NOTE — PT/OT/SLP PROGRESS
Physical Therapy Inpatient Rehab Treatment    Patient Name:  Hunter Navarrete   MRN:  [id number]    Recommendations:     Discharge Recommendations:  nursing facility, skilled   Discharge Equipment Recommendations: walker, rolling   Barriers to discharge:  impaired functional mobility, pain, sternal precautions    Assessment:     Hunter Navarrete is a 82 y.o. male admitted with a medical diagnosis of S/P CABG x 2.  He presents with the following impairments/functional limitations:  weakness, impaired endurance, impaired self care skills, impaired functional mobility, decreased safety awareness, pain, impaired cardiopulmonary response to activity, other (comment) (sternal precautions) .    Rehab Diagnosis: s/p CABG    Recent Surgery: * No surgery found *      General Precautions: Standard, sternal, fall     Orthopedic Precautions:N/A     Braces: N/A    Rehab Prognosis: Fair; patient would benefit from acute skilled PT services to address these deficits and reach maximum level of function.      History:     Past Medical History:   Diagnosis Date    Acid reflux     Anemia     Aortic aneurysm, abdominal     Arthritis     Bronchitis     Cataract     Celiac artery stenosis     50% by CTA abdomen 7/27/2020    CKD (chronic kidney disease)     45% FUNCTION    Coronary artery disease     Encounter for blood transfusion     Enlarged prostate     GERD (gastroesophageal reflux disease)     Hemorrhoids     Hypercholesteremia     Hypertension     Iron deficiency anemia, unspecified     Leaky heart valve     Lupus anticoagulant disorder     Renal artery stenosis     by CTA 7/27/2020    Skin cancer     Unspecified chronic bronchitis     UTI (urinary tract infection)        Past Surgical History:   Procedure Laterality Date    ACF      BACK SURGERY      RODS IN BACK; 4 SURGIERIES    BONE MARROW BIOPSY      CARDIAC ANGIOGRAM WITH STENT      CATARACT SX Bilateral     CORONARY ANGIOGRAPHY N/A 2/15/2023    Procedure: ANGIOGRAM, CORONARY  ARTERY;  Surgeon: Rito Vega MD;  Location: [facility] CATH;  Service: Cardiology;  Laterality: N/A;    CORONARY ARTERY BYPASS GRAFT (CABG) N/A 2/24/2023    Procedure: CORONARY ARTERY BYPASS GRAFT (CABG);  Surgeon: Spencer Hagan MD;  Location: [facility];  Service: Cardiothoracic;  Laterality: N/A;  CABG / EVH //   ECHO NOTIFIED    HIP SURGERY Right     THR    KNEE SURGERY Right     ATS    LEFT HEART CATHETERIZATION Left 07/22/2021    Procedure: Left heart cath;  Surgeon: Curtis Loredo MD;  Location: [facility] CATH;  Service: Cardiology;  Laterality: Left;    LEFT HEART CATHETERIZATION Left 09/22/2022    Procedure: Left heart cath;  Surgeon: Giorgi Barker MD;  Location: [facility] CATH;  Service: Cardiology;  Laterality: Left;    SHOULDER SURGERY Bilateral     SPINAL CORD STIMULATOR IMPLANT         Subjective     Chief Complaint: lower back pain, decreased endurance    Respiratory Status: Room air    Patients cultural, spiritual, Islam conflicts given the current situation: no      Objective:     Communicated with pt prior to session.  Patient found up in chair with    upon PT entry to room.    Pt is Oriented x3 and Alert and Cooperative.    Vitals   1100: 117/60, 90 bpm, 95% O2 on room air.    8/10 lower back pain with mobility, 5/10 at rest. Ice applied to lower back while at rest and pt reported a decrease in pain afterwards as well.    2/10 chest pain reported throughout session.      Functional Mobility:      Current   Status  Discharge   Goal   Functional Area: Care Score:    Roll Left and Right   Supervision or touching assistance   Sit to Lying 4 Supervision or touching assistance   Lying to Sitting on Side of Bed   Supervision or touching assistance   Sit to Stand 4  VC for hand placement Supervision or touching assistance   Chair/Bed-to-Chair Transfer 4  VC for hand placement Supervision or touching assistance   Car Transfer   Supervision or touching assistance   Walk 10 Feet   Supervision or touching  assistance   Walk 50 Feet with Two Turns   Supervision or touching assistance   Walk 150 Feet   Supervision or touching assistance   Walk 10 Feet Uneven Surface   Supervision or touching assistance   1 Step (Curb)   Supervision or touching assistance   4 Steps   Supervision or touching assistance   12 Steps   Not applicable   Picking Up Object   Supervision or touching assistance   Wheel 50 Feet with Two Turns   Supervision or touching assistance   Wheel 150 Feet   Supervision or touching assistance       Therapeutic Activities and Exercises:  Stretching to B hamstrings and gastrocs, with sciatic nerve flossing.    2x10 seated therex: B hip abduction with yellow theraband, B LAQs with 3 sec hold at the top of each rep, B marches, and B hip adduction isometrics.    Activity Tolerance: Poor    Patient left supine with HOB elevated and call button in reach.    Education provided: roles and goals of PT/PTA, transfer training, bed mob, safety awareness, body mechanics, assistive device, strengthening exercises, and sternal precautions    Expected compliance: Moderate compliance    GOALS:   Multidisciplinary Problems       Physical Therapy Goals          Problem: Physical Therapy    Goal Priority Disciplines Outcome Goal Variances Interventions   Physical Therapy Goal     PT, PT/OT Ongoing, Progressing     Description: Pt will improve functional mobility by performing:     Bed Mobility   Roll Right and Left Partial/ Moderate Assistance .  Lying to sitting Partial/ Moderate Assistance .  Sitting to lying Partial/ Moderate Assistance .    Transfers    Pt will be able to perform Stand step chair/bed to chair transfer With RW Partial/ Moderate Assistance .  Pt will be able to perform Sit to stand with RW Partial/ Moderate Assistance .  Pt will be able to perform Car transfer with RW Partial/ Moderate Assistance .    Ambulation    Pt will ambulate 50 Feet with RW Partial/ Moderate Assistance .  Stair negotiation to be  assessed      Wheelchair Mobility   Pt will be able to propel 150 feet in steven wheelchair Partial/ Moderate Assistance .      Timeframe: By Re-eval                         Plan:     During this hospitalization, patient to be seen 5 x/week to address the identified rehab impairments via neuromuscular re-education, wheelchair management/training, therapeutic exercises, therapeutic activities, gait training and progress toward the following goals:    Plan of Care Expires:  03/10/23  PT Next Visit Date: 03/10/23  Plan of Care reviewed with: patient    Additional Information:     Increased time needed for completion of all tasks d/t pt reporting increased fatigue and pain throughout session.    Time Tracking:     Therapy Time  PT Start Time: 1100  PT Stop Time: 1200  PT Total Time (min): 60 min   PT Individual: 60  Missed Time:    Time Missed due to:      Billable Minutes: Therapeutic Activity 10 min and Therapeutic Exercise 50 min    03/07/2023

## 2023-03-07 NOTE — PLAN OF CARE
Problem: Rehabilitation (IRF) Plan of Care  Goal: Plan of Care Review  Outcome: Ongoing, Progressing  Flowsheets (Taken 3/7/2023 0518)  Plan of Care Reviewed With: patient     Problem: Fall Injury Risk  Goal: Absence of Fall and Fall-Related Injury  Outcome: Ongoing, Progressing  Intervention: Identify and Manage Contributors  Flowsheets (Taken 3/7/2023 0518)  Medication Review/Management: medications reviewed  Intervention: Promote Injury-Free Environment  Flowsheets (Taken 3/7/2023 0518)  Safety Promotion/Fall Prevention:   assistive device/personal item within reach   bed alarm set   medications reviewed   nonskid shoes/socks when out of bed   instructed to call staff for mobility   side rails raised x 2   Fall Risk reviewed with patient/family   Fall Risk signage in place     Problem: Skin Injury Risk Increased  Goal: Skin Health and Integrity  Outcome: Ongoing, Progressing  Intervention: Optimize Skin Protection  Flowsheets (Taken 3/7/2023 0518)  Head of Bed (HOB) Positioning: HOB at 20-30 degrees

## 2023-03-07 NOTE — PROGRESS NOTES
Dos 3/7/23  Patient seen and evaluated in OT today  Continues to participate and make progress toward goals  Working on ADL's and IADL's  Discussed with patient and OT  Reviewed chart and discussed with nursing, Dr Enriquez's primary medicine team, as well as Jaylene Mcgee, my NP  Agree with present POC   Subjective:  HPI: 81 yo WM with PMH of male, followed by Dr. Loredo who presented to [place] [facility] with c/o chest pain. HS troponin 1149; therefore he was transferred to [facility] for cardiology services where he underwent LHC revealing multivessel disease and elevated LVEDP. He was started on diuretics. Plavix was placed on hold and he was transferred to [facility] for CABG evaluation on 2/21/23. Patient is reporting mild chest tightness 3/10 on arrival.  Vitals Stable. Shoulder Pain that morning. On Nitro & Heparin Infusion. EKG with no overt ischemic changes. Denies CP/SOB. Creatinine 1.93 mildly decreased from 2.07 day prior with addition of IVFs. H/H downtrending- 8.2/25.3 from 8.8/27.1 as well. Creatinine  further improved to 1.65. H/H 10.5/31.5 post transfusion 2  units PRBCs. Underwent  CABG x 2 on 2/24 by Dr Hagan, and brought to ICU on MV, requiring pressor support. Mediastinal drain patent. Received 2 units of PRBCs intraop.  CT x 2 patent. Pressors have been weaned off. On cleviprex drip. 2/26 Mediastinal drains have been removed. Creatinine has bumped. Nephrology adding IVF x 1L due to IVVD. O2 weaned to 2L/NC. Patient did not require O2 prior to hospitalization. On 3/3, labs showed elevated BUN/Cr of 77.7/ 1.80 with creatinine trending down. [place] catheter remained. Last BM on 2/26 charted. Patient is AAOx4; confusion at times with pain meds.  Participating with therapy. Functional status includes bed mobility and transfers requiring moderate to maximal assistance. Max assist needed for upper body dressing; total assist toileting due to medellin;  setup/supervision for eating; and minimal assist for grooming.  [place]anza w/RW for 40ft at Min Assist. Patient was evaluated, accepted, and admitted to inpatient rehab to improve functional status. Transferred to [facility] on 3/3 without incident.   3/7: Seen with PT, Esperanza w/Rollator. Doing well this morning. Would like to get arid of medellin catheter. IM NP relays that they plan on removing catheter today for voiding trial. Patient tolerating therapy without O2 via NC. No complaints. VSSAF with mild tachycardia noted. Dietician to liberalize diet and encourage intake.       Review of Systems  Depression/Anxiety: no complaints     FLUoxetine capsule 10 mg qd  Pain: incisional-controlled  gabapentin capsule 100 mg TID  rOPINIRole tablet 1 mg BID     HYDROcodone-acetaminophen 5-325 mg 1 tab q4hr PRN pain  Bowels/Bladder: last BM 3/7      Appetite: fair     Sleep: good      QUEtiapine tablet 50 mg qHS    Physical Exam  General: well-developed, well-nourished, tremor at rest  Respiratory: equal chest rise, no SOB, no audible wheeze  Cardiovascular: regular rate and rhythm, no edema  Gastrointestinal: soft, non-tender, non-distended   Musculoskeletal: full range of motion of all extremities/spine with generalized weakness  Integumentary: no rashes or skin lesions present, midline sternal njohqlhx-PNW-e/d/i, chest tube site x 2 -qlglst-UAN-c/d/I, RLE and LLE harvest incision sites-LONDON-c/d/i, Right groin puncture site-LONDON-c/d/I, sacral/buttocks-reddened  Neurologic: cranial nerves intact, no signs of peripheral neurological deficit, motor/sensory function intact, resting tremor-BUE  *MD performed and documented physical examination         Assessment/Plan  Hospital   CAD (coronary artery disease)   S/P CABG x 2     Non-Hospital   Lupus anticoagulant inhibitor syndrome   Iron deficiency anemia   Elevated homocysteine   Low vitamin B12 level   Anemia   Angina, class III   Elevated partial thromboplastin time (PTT)   Coronary artery disease   Stage 3 chronic kidney disease   Hypercholesteremia    Bleeding   Altered mental status   Hypertension   Head injury   Dehydration   NSTEMI (non-ST elevated myocardial infarction)   Ischemic cardiomyopathy   GERD (gastroesophageal reflux disease)   Enlarged prostate   Aortic aneurysm, abdominal   Skin cancer       Wounds: midline sternal fqhdbwjs-QBF-s/d/i, chest tube site x 2 -vbzyap-VHR-p/d/I, RLE and LLE harvest incision sites-LONDON-c/d/i, Right groin puncture site-LONDON-c/d/I, sacral/buttocks-reddened  S/p CABG x 2 on 2/24 by Dr Hagan  Precautions:sternal   Bracing: cardiac bear for splinting  Swallowing: Liberalized to Regular Diet with no added salt  Function: Tolerating therapy. Continue PT/OT  VTE Prophylaxis:   enoxaparin injection 40 mg SubQ q24hr  Code Status: FULL CODE   Discharge:Lives with his spouse in [place] in a single-story home with no steps to enter the residence. Completed high school. He was in the [employer]. He is retired from electrical/refrigeration work.  Wife manages the patients medications and finances. She also cooks, cleans, does laundry, and grocery shops.  Children (3). Date 3/17 Friday.                 Usha Mcgee NP, conducted additional independent physical examination and assisted with medical documentation.

## 2023-03-07 NOTE — PROGRESS NOTES
Inpatient Nutrition Assessment    Admit Date: 3/3/2023   Total duration of encounter: 4 days     Nutrition Recommendation/Prescription     Liberalized diet to No Added Salt to improve po intake at meals   Boost  Plus (360 kcal, 14 g pro) once daily at lunch   Monitor intake, weight, and labs.     RD following and available as needed.  Thank you.     Communication of Recommendations:  EMR.    Nutrition Assessment     Malnutrition Assessment/Nutrition-Focused Physical Exam    Malnutrition in the context of acute illness or injury  Degree of Malnutrition: unable to complete  Energy Intake: unable to obtain  Interpretation of Weight Loss: unable to obtain  Body Fat:unable to obtain  Area of Body Fat Loss: unable to obtain  Muscle Mass Loss: unable to obtain  Area of Muscle Mass Loss: unable to obtain  Fluid Accumulation: unable to obtain  Edema: unable to obtain   Reduced  Strength: unable to obtain  A minimum of two characteristics is recommended for diagnosis of either severe or non-severe malnutrition.    Chart Review    Reason Seen: physician consult for [facility] Pt.    Malnutrition Screening Tool Results   Have you recently lost weight without trying?: No  Have you been eating poorly because of a decreased appetite?: No   MST Score: 0     Diagnosis:  Multivessel CAD s/p left heart catheterization with InStent restenosis and multivessel coronary disease on 02/15/2023 s/p CABG x2 (lima to LAD, RSVG to OM with right GSV EVH) on 02/24/2023    Relevant Medical History:   Acid reflux      Anemia      Aortic aneurysm, abdominal      Arthritis      Bronchitis      Cataract      Celiac artery stenosis  50% by CTA abdomen 7/27/2020    CKD (chronic kidney disease)  45% FUNCTION    Coronary artery disease      Encounter for blood transfusion      Enlarged prostate      GERD (gastroesophageal reflux disease)      Hemorrhoids      Hypercholesteremia      Hypertension      Iron deficiency anemia, unspecified      Leaky  "heart valve      Lupus anticoagulant disorder      Renal artery stenosis  by CTA 7/27/2020    Skin cancer      Unspecified chronic bronchitis      UTI (urinary tract infection)          Nutrition-Related Medications:   Lasix; Lovenox; Ferrous Sulfate; Prednisone;Sucralfate; Protonix, statin, carvedilol, Kcl     Calorie Containing IV Medications: no significant kcals from medications at this time    Nutrition-Related Labs:  3/7: PAB 13.4(L), K+ 3.1(L), BUN 40.3(H), Crea 1.15(H), AST 45(H), GFR 46   3/4: BUN/Crea 69.3/1.68(H); GFR 41(L); AST 39(L); PreAlb 13.8; H/H: 9.9/30.6(L).    Diet/PN Order: Diet Adult Regular  Oral Supplement Order: none  Tube Feeding Order: none  Appetite/Oral Intake: poor/25-50% of meals  Factors Affecting Nutritional Intake: none identified  Food/Sikhism/Cultural Preferences: Oatmeal with blueberries for breakfast, [place] for dinner   Food Allergies: none reported    Skin Integrity: incision, wound  Wound(s):   Incisions - (chest, rt leg, let leg, chest), Noted sacral wound is healed per Wound Care note     Comments    3/7: OT waiting outside of room for session, unable to complete full interview at this time. Pt reports poor appetite. When asks why, shrugs and says "I don't know".  Tells me usually eats large breakfast (oatmeal and blueberries) at home, and sandwich at evening meal. Often skips lunch. OMKAR Ortega had told me same earlier this am. Staff also reporting pt with poor sleep/fatigue,  likely are contributing to poor appetite. Per EMR, is averaging ~35% po intake at meals x last 3 days. Discussed increased nutrition needs s/p CABG for healing. Pt agreeable to Boost at lunch. Noted renal indices improving. Will also liberalize diet to "No added salt"  pt appreciative. Called kitchen and updated pt preferences, kitchen reports will also send someone to review menu with him this afternoon. Will attempt full interview and physical assessment at f/u.     3/4: Pt with good appetite and " "intake. Consuming 100% of meals per EMR recorded intake. Renal indices abnormal and pt is not receiving HD at this time; therefore; protein restriction is recommended. Agree with current diet - Renal Non-Dialysis Diet. Plans for NFPE at f/u. Will continue to monitor during stay.     Anthropometrics    Height: 5' 10.98" (180.3 cm) Height Method: Stated  Last Weight: 86.3 kg (190 lb 4.1 oz) (03/07/23 0500) Weight Method: Bed Scale  BMI (Calculated): 26.5  BMI Classification: overweight (BMI 25-29.9)  Ideal Body Weight (IBW), Male: 171.88 lb     % Ideal Body Weight, Male (lb): 115.36 %    Usual Weight Provided By: EMR weight history    Wt Readings from Last 5 Encounters:   03/07/23 86.3 kg (190 lb 4.1 oz)   03/03/23 88.7 kg (195 lb 8.8 oz)   02/15/23 90.9 kg (200 lb 8 oz)   02/15/23 86.2 kg (190 lb)   02/15/23 86.2 kg (190 lb)     Weight Change(s) Since Admission:  Admit Weight: 90 kg (198 lb 6.6 oz) (03/03/23 1545)  3/7: 86.3 kg (suspect wt loss from admit wt fluid related. Pt on lasix.)       Estimated Needs    Weight Used For Calorie Calculations: 86.3 kg   Energy Calorie Requirements (kcal): 2074 kcal/day   Energy Need Method: MSJ x 1.3 SF  Weight Used For Protein Calculations: 86 g  Protein Requirements: 1.0 g/kg   Fluid Requirements (mL): 2074 (1 mL/kcal)      Updated 2/2 new wt, improved renal indices     Enteral Nutrition    Patient not receiving enteral nutrition at this time.    Parenteral Nutrition    Patient not receiving parenteral nutrition support at this time.    Evaluation of Received Nutrient Intake    Calories: not meeting estimated needs  Protein: not meeting estimated needs    Patient Education    Not applicable.    Nutrition Diagnosis     PES: Inadequate oral intake related to decreased appetite as evidenced by <50% po intake x last 3 days   new)    Interventions/Goals     Intervention(s): modified composition of meals/snacks and collaboration with other providers  Goal: Meet greater than 75% of " nutritional needs by follow-up. (goal met)    Monitoring & Evaluation     Dietitian will monitor food and beverage intake, weight, electrolyte/renal panel, glucose/endocrine profile, and gastrointestinal profile.  Nutrition Risk/Follow-Up: low (follow-up in 5-7 days)   Please consult if re-assessment needed sooner.

## 2023-03-07 NOTE — CONSULTS
Nephrology Initial Consult Note    Patient Name: Hunter Navarrete  Age: 82 y.o.  : 1940  MRN: [id number]  Admission Date: 3/3/2023    Reason for Consult:      CKD 3b  Xander Enriquez MD    HPI:     Hunter Navarrete is a 82 y.o. male who presented as a transfer from [facility] with coronary artery disease.  Past medical history significant for coronary artery disease status post PCI, CKD 3B, hypertension, aortic stenosis, lupus anticoagulant, and hyperlipidemia.  He initially presented to outside hospital on 02/15/2023 with chest pain.  Left heart catheterization showed multivessel coronary artery disease and he was transferred to [facility] on 2023 for CV surgery.  He ultimately underwent CABG x2 on 2023 without complication.  Postoperatively he did fairly well but did have an acute kidney injury.  Nephrology was consulted for acute kidney injury on CKD 3.  His baseline creatinine is 1.6-2.0.  He was given IV fluids and later IV diuretics with improvement in his renal function back to baseline.  He was transferred to inpatient rehab on 2023.  Nephrology consulted for CKD management.    He does endorse SOB, but is not requiring supplemental O2. No CP, abd pain, N/V, or LE edema.         Current Facility-Administered Medications   Medication Dose Route Frequency Provider Last Rate Last Admin    aspirin EC tablet 81 mg  81 mg Oral Daily Yaneth Douglas, FNP   81 mg at 23 0754    atorvastatin tablet 40 mg  40 mg Oral QHS Yaneth Douglas FNP   40 mg at 23    benzonatate capsule 100 mg  100 mg Oral TID PRN Yaneth Douglas FNP   100 mg at 23 2336    bisacodyL suppository 10 mg  10 mg Rectal Daily PRN Yaneth Douglas FNP   10 mg at 23 1909    carvediloL tablet 12.5 mg  12.5 mg Oral BID Suman Reese FNP   12.5 mg at 23 075    clopidogreL tablet 75 mg  75 mg Oral Daily Yaneth Douglas FNP   75 mg at 23 075     docusate sodium capsule 100 mg  100 mg Oral BID Yaneth Douglas, FNP   100 mg at 03/07/23 0754    enoxaparin injection 40 mg  40 mg Subcutaneous Daily Yaneth Douglas, FNP   40 mg at 03/06/23 1624    ezetimibe tablet 10 mg  10 mg Oral Daily Yaneth Douglas, FNP   10 mg at 03/07/23 0755    famotidine tablet 20 mg  20 mg Oral Daily PRN Xander Enriquez MD        ferrous sulfate tablet 1 each  1 tablet Oral BID Yaneth Douglas FNP   1 each at 03/07/23 0754    FLUoxetine capsule 10 mg  10 mg Oral Daily Yaneth Douglas, FNP   10 mg at 03/07/23 0755    furosemide tablet 40 mg  40 mg Oral Daily aYneth Douglas, FNP   40 mg at 03/07/23 0754    gabapentin capsule 100 mg  100 mg Oral TID Yaneth Douglas, FNP   100 mg at 03/07/23 0545    hydrALAZINE injection 10 mg  10 mg Intravenous Q6H PRN Yaneth Douglas FNP   10 mg at 03/05/23 1456    HYDROcodone-acetaminophen 5-325 mg per tablet 1 tablet  1 tablet Oral Q4H PRN Yaneth Douglas, SHIRLEY        hydrOXYzine pamoate capsule 50 mg  50 mg Oral Nightly PRN Yaneth Douglas, SHIRLEY        labetalol 20 mg/4 mL (5 mg/mL) IV syring  10 mg Intravenous Q4H PRN Yaneth Douglas, SHIRLEY        meclizine tablet 25 mg  25 mg Oral TID PRN SHIRLEY Haider        metoprolol injection 10 mg  10 mg Intravenous Q2H PRN Yaneth Douglas, SHIRLEY        mupirocin 2 % ointment   Nasal BID Xander Enriquez MD   Given at 03/06/23 2000    ondansetron disintegrating tablet 4 mg  4 mg Oral Q6H PRN Yaneth Douglas, SHIRLEY        pantoprazole EC tablet 40 mg  40 mg Oral Daily Yaneth Douglas, FNP   40 mg at 03/07/23 0754    predniSONE tablet 2.5 mg  2.5 mg Oral Daily Yaneth Douglas, FNP   2.5 mg at 03/07/23 0754    QUEtiapine tablet 50 mg  50 mg Oral QHS Yaneth Douglas, FNP   50 mg at 03/06/23 2023    rOPINIRole tablet 1 mg  1 mg Oral BID SHIRLEY Haider   1 mg at 03/07/23 0754    sucralfate tablet 1 g  1 g Oral QID (AC & HS) SHIRLEY Haider   1 g at 03/07/23 0545    tamsulosin 24 hr capsule  0.4 mg  0.4 mg Oral QHS Yaneth Douglas, FNP   0.4 mg at 03/06/23 2023       Mariella Bethea MD    Past Medical History:   Diagnosis Date    Acid reflux     Anemia     Aortic aneurysm, abdominal     Arthritis     Bronchitis     Cataract     Celiac artery stenosis     50% by CTA abdomen 7/27/2020    CKD (chronic kidney disease)     45% FUNCTION    Coronary artery disease     Encounter for blood transfusion     Enlarged prostate     GERD (gastroesophageal reflux disease)     Hemorrhoids     Hypercholesteremia     Hypertension     Iron deficiency anemia, unspecified     Leaky heart valve     Lupus anticoagulant disorder     Renal artery stenosis     by CTA 7/27/2020    Skin cancer     Unspecified chronic bronchitis     UTI (urinary tract infection)       Past Surgical History:   Procedure Laterality Date    ACF      BACK SURGERY      RODS IN BACK; 4 SURGIERIES    BONE MARROW BIOPSY      CARDIAC ANGIOGRAM WITH STENT      CATARACT SX Bilateral     CORONARY ANGIOGRAPHY N/A 2/15/2023    Procedure: ANGIOGRAM, CORONARY ARTERY;  Surgeon: Rito Vega MD;  [facility];  Service: Cardiology;  Laterality: N/A;    CORONARY ARTERY BYPASS GRAFT (CABG) N/A 2/24/2023    Procedure: CORONARY ARTERY BYPASS GRAFT (CABG);  Surgeon: Spencer Hagan MD;  Location: [facility];  Service: Cardiothoracic;  Laterality: N/A;  CABG / EVH //   ECHO NOTIFIED    HIP SURGERY Right     THR    KNEE SURGERY Right     ATS    LEFT HEART CATHETERIZATION Left 07/22/2021    Procedure: Left heart cath;  Surgeon: Curtis Loredo MD;  [facility];  Service: Cardiology;  Laterality: Left;    LEFT HEART CATHETERIZATION Left 09/22/2022    Procedure: Left heart cath;  Surgeon: Giorgi Barker MD;  [facility];  Service: Cardiology;  Laterality: Left;    SHOULDER SURGERY Bilateral     SPINAL CORD STIMULATOR IMPLANT        Family History   Problem Relation Age of Onset    Lung cancer Brother     Throat cancer Brother     Heart disease Father      Cancer Brother     Cancer Brother     Heart disease Brother         PPM    Heart disease Brother     Heart disease Brother      Social History     Tobacco Use    Smoking status: Former     Types: Cigarettes     Quit date:      Years since quittin.1    Smokeless tobacco: Never   Substance Use Topics    Alcohol use: Yes     Comment: RARELY     Medications Prior to Admission   Medication Sig Dispense Refill Last Dose    aspirin (ECOTRIN) 81 MG EC tablet Take 81 mg by mouth once daily.       atorvastatin (LIPITOR) 40 MG tablet Take 40 mg by mouth once daily.       calcium carbonate-vitamin D3 500 mg-10 mcg (400 unit) Tab Take 1 tablet by mouth once daily.       clopidogreL (PLAVIX) 75 mg tablet Take 75 mg by mouth once daily.       ezetimibe (ZETIA) 10 mg tablet Take 10 mg by mouth once daily.       FLUoxetine 10 MG capsule Take 10 mg by mouth once daily.       gabapentin (NEURONTIN) 100 MG capsule 200 MG IN THE MORNING, 100 MG AT LUNCH, 100 MG AT SUPPERTIME       metoprolol succinate (TOPROL-XL) 50 MG 24 hr tablet Take 50 mg by mouth once daily.       metoprolol tartrate (LOPRESSOR) 50 MG tablet Take 1 tablet (50 mg total) by mouth 2 (two) times daily. 60 tablet 11     predniSONE (DELTASONE) 2.5 MG tablet Take 2.5 mg by mouth once daily.       rOPINIRole (REQUIP) 1 MG tablet Take 1 mg by mouth 2 (two) times daily.        tiZANidine 4 mg Cap Take 4 mg by mouth 3 (three) times daily as needed.       traMADoL (ULTRAM) 50 mg tablet Take by mouth every 6 (six) hours as needed. 1/2 TO 1 TABLET 4 TIMES A DAY PRN        Review of patient's allergies indicates:   Allergen Reactions    Cardura [doxazosin] Hives    Lyrica [pregabalin] Other (See Comments)    Paxil [paroxetine hcl] Other (See Comments)    Restoril [temazepam] Hallucinations    Vioxx [rofecoxib] Swelling    Zithromax [azithromycin] Hives            Review of Systems:     Comprehensive 10pt ROS negative except as noted per HPI.      Objective:        VITAL SIGNS: 24 HR MIN & MAX LAST    Temp  Min: 98 °F (36.7 °C)  Max: 98.1 °F (36.7 °C)  98 °F (36.7 °C)        BP  Min: 106/62  Max: 145/71  124/72     Pulse  Min: 98  Max: 111  101     Resp  Min: 18  Max: 20  18    SpO2  Min: 89 %  Max: 96 %  95 %      GEN: Well appearing elderly WM in NAD  HEENT: Conjunctiva anicteric, pupils equal  CV: RRR +S1,S2 without murmur  PULM: CTAB, unlabored  ABD: Soft, NT/ND abdomen with NABS  EXT: No cyanosis or edema  SKIN: Warm and dry  PSYCH: Awake, alert and appropriately conversant.   Vascular access: NO HD access            Component Value Date/Time     03/06/2023 0646     03/04/2023 0533     (L) 02/19/2023 0536     (L) 02/18/2023 0539    K 3.1 (L) 03/06/2023 0646    K 3.6 03/04/2023 0533    K 4.2 02/19/2023 0536    K 4.0 02/18/2023 0539    CHLORIDE 102 03/06/2023 0646    CHLORIDE 105 03/04/2023 0533    CO2 26 03/06/2023 0646    CO2 26 03/04/2023 0533    CO2 30 (H) 02/19/2023 0536    CO2 31 (H) 02/18/2023 0539    BUN 40.3 (H) 03/06/2023 0646    BUN 69.3 (H) 03/04/2023 0533    BUN 30 (H) 02/19/2023 0536    BUN 32 (H) 02/18/2023 0539    CREATININE 1.51 (H) 03/06/2023 0646    CREATININE 1.68 (H) 03/04/2023 0533    CREATININE 1.56 (H) 02/19/2023 0536    CREATININE 1.58 (H) 02/18/2023 0539    CALCIUM 9.2 03/06/2023 0646    CALCIUM 9.6 03/04/2023 0533    CALCIUM 9.1 02/19/2023 0536    CALCIUM 9.0 02/18/2023 0539    PHOS 3.2 03/06/2023 0646            Component Value Date/Time    WBC 13.1 (H) 03/06/2023 0646    WBC 11.4 03/04/2023 0533    WBC 6.20 02/19/2023 0536    WBC 6.20 02/19/2023 0536    HGB 10.2 (L) 03/06/2023 0646    HGB 9.9 (L) 03/04/2023 0533    HGB 10.2 (L) 02/19/2023 0536    HGB 10.2 (L) 02/19/2023 0536    HCT 31.6 (L) 03/06/2023 0646    HCT 30.6 (L) 03/04/2023 0533    HCT 29.7 (L) 02/19/2023 0536    HCT 29.7 (L) 02/19/2023 0536     03/06/2023 0646     03/04/2023 0533     02/19/2023 0536     02/19/2023 0536              X-Ray Chest 1 View   Final Result      Improving aeration in the right lung base with otherwise stable exam.         Electronically signed by: Indiana Epstein   Date:    03/04/2023   Time:    09:01          Assessment / Plan:       Active Hospital Problems    Diagnosis  POA    *S/P CABG x 2 [Z95.1]  Not Applicable    CAD (coronary artery disease) [I25.10]  Yes      Resolved Hospital Problems   No resolved problems to display.       CKD 3B - baseline Cr 1.6-2.0  Coronary artery disease - CABG x2 2/24/23  Physical deconditioning - Getting rehab  Anemia of chronic disease  History of systemic lupus   Hypertension    Plan:  Renal function stable at baseline. Continue PO lasix 40mg qd. Ok to remove medellin catheter. Potassium was replaced yesterday. Repeat labs in AM. Will follow.       Denilson Medina DO  Nephrology  [facility]  Clinic number: [phone]

## 2023-03-08 LAB
ALBUMIN SERPL-MCNC: 2.4 G/DL (ref 3.4–4.8)
BASE EXCESS ARTERIAL: 2 MMOL/L (ref -2–2)
BUN SERPL-MCNC: 35.4 MG/DL (ref 8.4–25.7)
CALCIUM SERPL-MCNC: 8.7 MG/DL (ref 8.8–10)
CHLORIDE SERPL-SCNC: 106 MMOL/L (ref 98–107)
CO2 SERPL-SCNC: 25 MMOL/L (ref 23–31)
CREAT SERPL-MCNC: 1.59 MG/DL (ref 0.73–1.18)
GFR SERPLBLD CREATININE-BSD FMLA CKD-EPI: 43 MLS/MIN/1.73/M2
GLUCOSE SERPL-MCNC: 116 MG/DL (ref 82–115)
HCO3 ARTERIAL: 26.6 MMOL/L (ref 18–23)
HGB BLD-MCNC: ABNORMAL G/DL
PCO2 BLDA: 42.3 MM[HG]
PCO2 BLDA: ABNORMAL MM[HG]
PH SMN: 7.41 [PH]
PHOSPHATE SERPL-MCNC: 3.2 MG/DL (ref 2.3–4.7)
PO2 BLDA: 85 MM[HG]
POC COHB: ABNORMAL
POC FIO2: 28
POC IONIZED CALCIUM: ABNORMAL
POC METHB: ABNORMAL
POC O2HB: ABNORMAL
POTASSIUM BLD-SCNC: ABNORMAL MMOL/L
POTASSIUM SERPL-SCNC: 3.4 MMOL/L (ref 3.5–5.1)
SATURATED O2 ARTERIAL, I-STAT: 96
SODIUM BLD-SCNC: ABNORMAL MMOL/L
SODIUM SERPL-SCNC: 141 MMOL/L (ref 136–145)

## 2023-03-08 PROCEDURE — 97535 SELF CARE MNGMENT TRAINING: CPT

## 2023-03-08 PROCEDURE — 63600175 PHARM REV CODE 636 W HCPCS: Performed by: NURSE PRACTITIONER

## 2023-03-08 PROCEDURE — 97116 GAIT TRAINING THERAPY: CPT

## 2023-03-08 PROCEDURE — 94761 N-INVAS EAR/PLS OXIMETRY MLT: CPT

## 2023-03-08 PROCEDURE — 27000221 HC OXYGEN, UP TO 24 HOURS

## 2023-03-08 PROCEDURE — 25000003 PHARM REV CODE 250: Performed by: STUDENT IN AN ORGANIZED HEALTH CARE EDUCATION/TRAINING PROGRAM

## 2023-03-08 PROCEDURE — 63600175 PHARM REV CODE 636 W HCPCS: Performed by: INTERNAL MEDICINE

## 2023-03-08 PROCEDURE — 51798 US URINE CAPACITY MEASURE: CPT

## 2023-03-08 PROCEDURE — 25000003 PHARM REV CODE 250: Performed by: NURSE PRACTITIONER

## 2023-03-08 PROCEDURE — 97530 THERAPEUTIC ACTIVITIES: CPT

## 2023-03-08 PROCEDURE — 11800000 HC REHAB PRIVATE ROOM

## 2023-03-08 PROCEDURE — 25000242 PHARM REV CODE 250 ALT 637 W/ HCPCS: Performed by: INTERNAL MEDICINE

## 2023-03-08 PROCEDURE — 80069 RENAL FUNCTION PANEL: CPT | Performed by: STUDENT IN AN ORGANIZED HEALTH CARE EDUCATION/TRAINING PROGRAM

## 2023-03-08 PROCEDURE — 99900035 HC TECH TIME PER 15 MIN (STAT)

## 2023-03-08 PROCEDURE — 36600 WITHDRAWAL OF ARTERIAL BLOOD: CPT

## 2023-03-08 PROCEDURE — 97110 THERAPEUTIC EXERCISES: CPT

## 2023-03-08 PROCEDURE — 94640 AIRWAY INHALATION TREATMENT: CPT

## 2023-03-08 PROCEDURE — 99233 SBSQ HOSP IP/OBS HIGH 50: CPT | Mod: ,,, | Performed by: NURSE PRACTITIONER

## 2023-03-08 PROCEDURE — 99233 PR SUBSEQUENT HOSPITAL CARE,LEVL III: ICD-10-PCS | Mod: ,,, | Performed by: NURSE PRACTITIONER

## 2023-03-08 PROCEDURE — 94799 UNLISTED PULMONARY SVC/PX: CPT

## 2023-03-08 RX ORDER — LIDOCAINE 50 MG/G
2 PATCH TOPICAL
Status: DISCONTINUED | OUTPATIENT
Start: 2023-03-09 | End: 2023-03-17 | Stop reason: HOSPADM

## 2023-03-08 RX ORDER — LIDOCAINE 50 MG/G
1 PATCH TOPICAL ONCE
Status: COMPLETED | OUTPATIENT
Start: 2023-03-08 | End: 2023-03-09

## 2023-03-08 RX ORDER — GABAPENTIN 100 MG/1
100 CAPSULE ORAL DAILY
Status: DISCONTINUED | OUTPATIENT
Start: 2023-03-09 | End: 2023-03-10

## 2023-03-08 RX ORDER — IPRATROPIUM BROMIDE 0.5 MG/2.5ML
0.5 SOLUTION RESPIRATORY (INHALATION) EVERY 4 HOURS PRN
Status: DISPENSED | OUTPATIENT
Start: 2023-03-08 | End: 2023-03-09

## 2023-03-08 RX ORDER — POTASSIUM CHLORIDE 20 MEQ/1
40 TABLET, EXTENDED RELEASE ORAL ONCE
Status: DISCONTINUED | OUTPATIENT
Start: 2023-03-08 | End: 2023-03-08

## 2023-03-08 RX ORDER — POTASSIUM CHLORIDE 20 MEQ/1
40 TABLET, EXTENDED RELEASE ORAL ONCE
Status: COMPLETED | OUTPATIENT
Start: 2023-03-08 | End: 2023-03-08

## 2023-03-08 RX ORDER — LEVALBUTEROL INHALATION SOLUTION 1.25 MG/3ML
1.25 SOLUTION RESPIRATORY (INHALATION) EVERY 4 HOURS PRN
Status: DISPENSED | OUTPATIENT
Start: 2023-03-08 | End: 2023-03-09

## 2023-03-08 RX ORDER — TIZANIDINE 4 MG/1
4 TABLET ORAL 3 TIMES DAILY PRN
Status: DISCONTINUED | OUTPATIENT
Start: 2023-03-08 | End: 2023-03-17 | Stop reason: HOSPADM

## 2023-03-08 RX ORDER — POTASSIUM CHLORIDE 20 MEQ/1
20 TABLET, EXTENDED RELEASE ORAL DAILY
Status: DISCONTINUED | OUTPATIENT
Start: 2023-03-08 | End: 2023-03-09

## 2023-03-08 RX ADMIN — SUCRALFATE 1 G: 1 TABLET ORAL at 04:03

## 2023-03-08 RX ADMIN — SUCRALFATE 1 G: 1 TABLET ORAL at 05:03

## 2023-03-08 RX ADMIN — CARVEDILOL 12.5 MG: 6.25 TABLET, FILM COATED ORAL at 08:03

## 2023-03-08 RX ADMIN — CLOPIDOGREL BISULFATE 75 MG: 75 TABLET ORAL at 08:03

## 2023-03-08 RX ADMIN — PREDNISONE 2.5 MG: 2.5 TABLET ORAL at 08:03

## 2023-03-08 RX ADMIN — METHYLPREDNISOLONE SODIUM SUCCINATE 40 MG: 40 INJECTION, POWDER, FOR SOLUTION INTRAMUSCULAR; INTRAVENOUS at 09:03

## 2023-03-08 RX ADMIN — DOCUSATE SODIUM 100 MG: 100 CAPSULE, LIQUID FILLED ORAL at 08:03

## 2023-03-08 RX ADMIN — FLUOXETINE 10 MG: 10 CAPSULE ORAL at 08:03

## 2023-03-08 RX ADMIN — ATORVASTATIN CALCIUM 40 MG: 40 TABLET, FILM COATED ORAL at 08:03

## 2023-03-08 RX ADMIN — LEVALBUTEROL HYDROCHLORIDE 1.25 MG: 1.25 SOLUTION RESPIRATORY (INHALATION) at 09:03

## 2023-03-08 RX ADMIN — GABAPENTIN 100 MG: 100 CAPSULE ORAL at 02:03

## 2023-03-08 RX ADMIN — ASPIRIN 81 MG: 81 TABLET, COATED ORAL at 08:03

## 2023-03-08 RX ADMIN — EZETIMIBE 10 MG: 10 TABLET ORAL at 08:03

## 2023-03-08 RX ADMIN — HYDROCODONE BITARTRATE AND ACETAMINOPHEN 1 TABLET: 5; 325 TABLET ORAL at 11:03

## 2023-03-08 RX ADMIN — FERROUS SULFATE TAB 325 MG (65 MG ELEMENTAL FE) 1 EACH: 325 (65 FE) TAB at 08:03

## 2023-03-08 RX ADMIN — SUCRALFATE 1 G: 1 TABLET ORAL at 11:03

## 2023-03-08 RX ADMIN — TAMSULOSIN HYDROCHLORIDE 0.4 MG: 0.4 CAPSULE ORAL at 08:03

## 2023-03-08 RX ADMIN — GABAPENTIN 100 MG: 100 CAPSULE ORAL at 08:03

## 2023-03-08 RX ADMIN — POTASSIUM CHLORIDE 20 MEQ: 20 TABLET, EXTENDED RELEASE ORAL at 08:03

## 2023-03-08 RX ADMIN — IPRATROPIUM BROMIDE 0.5 MG: 0.5 SOLUTION RESPIRATORY (INHALATION) at 09:03

## 2023-03-08 RX ADMIN — LIDOCAINE 5% 1 PATCH: 700 PATCH TOPICAL at 12:03

## 2023-03-08 RX ADMIN — ENOXAPARIN SODIUM 40 MG: 40 INJECTION SUBCUTANEOUS at 04:03

## 2023-03-08 RX ADMIN — ROPINIROLE HYDROCHLORIDE 1 MG: 1 TABLET, FILM COATED ORAL at 08:03

## 2023-03-08 RX ADMIN — QUETIAPINE 50 MG: 25 TABLET ORAL at 08:03

## 2023-03-08 RX ADMIN — SUCRALFATE 1 G: 1 TABLET ORAL at 08:03

## 2023-03-08 RX ADMIN — GABAPENTIN 100 MG: 100 CAPSULE ORAL at 05:03

## 2023-03-08 RX ADMIN — FUROSEMIDE 40 MG: 40 TABLET ORAL at 08:03

## 2023-03-08 RX ADMIN — POTASSIUM CHLORIDE 40 MEQ: 1500 TABLET, EXTENDED RELEASE ORAL at 08:03

## 2023-03-08 RX ADMIN — PANTOPRAZOLE SODIUM 40 MG: 40 TABLET, DELAYED RELEASE ORAL at 08:03

## 2023-03-08 NOTE — PROGRESS NOTES
Nephrology consult follow up note    HPI:      Hunter Navarreet is a 82 y.o. male  who presented as a transfer from [facility] with coronary artery disease.  Past medical history significant for coronary artery disease status post PCI, CKD 3B, hypertension, aortic stenosis, lupus anticoagulant, and hyperlipidemia.  He initially presented to outside hospital on 02/15/2023 with chest pain.  Left heart catheterization showed multivessel coronary artery disease and he was transferred to [facility] on 02/19/2023 for CV surgery.  He ultimately underwent CABG x2 on 02/24/2023 without complication.  Postoperatively he did fairly well but did have an acute kidney injury.  Nephrology was consulted for acute kidney injury on CKD 3.  His baseline creatinine is 1.6-2.0.  He was given IV fluids and later IV diuretics with improvement in his renal function back to baseline.  He was transferred to inpatient rehab on 03/03/2023.  Nephrology consulted for CKD management.    Interval history:     No acute events overnight. He had some upper back pain last night and is endorsing sore throat this morning. Still significantly deconditioned. No CP, N/V, or LE edema.      Review of Systems:     Comprehensive 10pt ROS negative except as noted per HPI.    Past medical, family, surgical, and social history reviewed and unchanged from initial consult note.     Objective:       VITAL SIGNS: 24 HR MIN & MAX LAST    Temp  Min: 97.8 °F (36.6 °C)  Max: 97.9 °F (36.6 °C)  97.9 °F (36.6 °C)        BP  Min: 104/57  Max: 144/68  133/70     Pulse  Min: 83  Max: 120  96     Resp  Min: 20  Max: 20  20    SpO2  Min: 94 %  Max: 96 %  96 %      GEN: Elderly WM in NAD  HEENT: Conjunctiva anicteric, pupils equal  CV: RRR +S1,S2 without murmur  PULM: CTAB, unlabored  ABD: Soft, NT/ND abdomen with NABS  EXT: No cyanosis or edema  SKIN: Warm and dry  PSYCH: Awake, alert and appropriately conversant.   Vascular access: No HD  access            Component Value Date/Time     03/08/2023 0421     03/06/2023 0646     (L) 02/19/2023 0536     (L) 02/18/2023 0539    K 3.4 (L) 03/08/2023 0421    K 3.1 (L) 03/06/2023 0646    K 4.2 02/19/2023 0536    K 4.0 02/18/2023 0539    CHLORIDE 106 03/08/2023 0421    CHLORIDE 102 03/06/2023 0646    CO2 25 03/08/2023 0421    CO2 26 03/06/2023 0646    CO2 30 (H) 02/19/2023 0536    CO2 31 (H) 02/18/2023 0539    BUN 35.4 (H) 03/08/2023 0421    BUN 40.3 (H) 03/06/2023 0646    BUN 30 (H) 02/19/2023 0536    BUN 32 (H) 02/18/2023 0539    CREATININE 1.59 (H) 03/08/2023 0421    CREATININE 1.51 (H) 03/06/2023 0646    CREATININE 1.56 (H) 02/19/2023 0536    CREATININE 1.58 (H) 02/18/2023 0539    CALCIUM 8.7 (L) 03/08/2023 0421    CALCIUM 9.2 03/06/2023 0646    CALCIUM 9.1 02/19/2023 0536    CALCIUM 9.0 02/18/2023 0539    PHOS 3.2 03/08/2023 0421            Component Value Date/Time    WBC 13.1 (H) 03/06/2023 0646    WBC 11.4 03/04/2023 0533    WBC 6.20 02/19/2023 0536    WBC 6.20 02/19/2023 0536    HGB 10.2 (L) 03/06/2023 0646    HGB 9.9 (L) 03/04/2023 0533    HGB 10.2 (L) 02/19/2023 0536    HGB 10.2 (L) 02/19/2023 0536    HCT 31.6 (L) 03/06/2023 0646    HCT 30.6 (L) 03/04/2023 0533    HCT 29.7 (L) 02/19/2023 0536    HCT 29.7 (L) 02/19/2023 0536     03/06/2023 0646     03/04/2023 0533     02/19/2023 0536     02/19/2023 0536         Imaging reviewed      Assessment / Plan:       Active Hospital Problems    Diagnosis  POA    *S/P CABG x 2 [Z95.1]  Not Applicable    CAD (coronary artery disease) [I25.10]  Yes      Resolved Hospital Problems   No resolved problems to display.       CKD 3B - baseline Cr 1.6-2.0  Coronary artery disease - CABG x2 2/24/23  Physical deconditioning - Getting rehab  Anemia of chronic disease  History of systemic lupus   Hypertension  Hypokalemia     Plan:  Renal function stable at baseline. Continue PO lasix  40 mg qd. Give 60 meq PO Kcl today and  continue with 20 meq daily. Will follow.       Denilson Medina DO  Nephrology  [facility]  Clinic number: [phone]

## 2023-03-08 NOTE — PROGRESS NOTES
Dos 3/8/23  Pt c/o poor sleep and appetite- consider remeron  Patient seen and evaluated in room today.  Therapy and progress discussed with patient  Reviewed present barriers to progress  Reviewed chart  Discussed with Dr Enriquez's pimary medicine team, nursing staff as well as my NP, Jaylene Mcgee  Agree with present POC   Subjective:  HPI: 83 yo WM with PMH of male, followed by Dr. Loredo who presented to [place] ER with c/o chest pain. HS troponin 1149; therefore he was transferred to [facility] for cardiology services where he underwent LHC revealing multivessel disease and elevated LVEDP. He was started on diuretics. Plavix was placed on hold and he was transferred to [facility] for CABG evaluation on 2/21/23. Patient is reporting mild chest tightness 3/10 on arrival.  Vitals Stable. Shoulder Pain that morning. On Nitro & Heparin Infusion. EKG with no overt ischemic changes. Denies CP/SOB. Creatinine 1.93 mildly decreased from 2.07 day prior with addition of IVFs. H/H downtrending- 8.2/25.3 from 8.8/27.1 as well. Creatinine  further improved to 1.65. H/H 10.5/31.5 post transfusion 2  units PRBCs. Underwent  CABG x 2 on 2/24 by Dr Hagan, and brought to ICU on MV, requiring pressor support. Mediastinal drain patent. Received 2 units of PRBCs intraop.  CT x 2 patent. Pressors have been weaned off. On cleviprex drip. 2/26 Mediastinal drains have been removed. Creatinine has bumped. Nephrology adding IVF x 1L due to IVVD. O2 weaned to 2L/NC. Patient did not require O2 prior to hospitalization. On 3/3, labs showed elevated BUN/Cr of 77.7/ 1.80 with creatinine trending down. Medellin catheter remained. Last BM on 2/26 charted. Patient is AAOx4; confusion at times with pain meds.  Participating with therapy. Functional status includes bed mobility and transfers requiring moderate to maximal assistance. Max assist needed for upper body dressing; total assist toileting due to medellin;  setup/supervision for eating; and minimal  assist for grooming. Amb w/RW for 40ft at Min Assist. Patient was evaluated, accepted, and admitted to inpatient rehab to improve functional status. Transferred to [facility] on 3/3 without incident.   3/8: Seen with PT, Amb w/Rollator. Seated in WC for breaks. Noted SpO2 drop to 90-91% after transfer from bed. He does drop with walking, but not as much and quickly recovers. O2 on stand by. Complaints of pain to posterior neck. States that he has C5-6 fused but pain beneath that, midline over transverse process. Agreed to try some Biofreeze. Progressing in therapy. VSSAF with noted tachycardia yesterday. Noted HR jumping from 70s to 50s and back up to 101 and staying following walking. Denies feeling of fluttering or palpitations. Tells me that his palpitations were fixed. Relayed to IM.       Review of Systems  Depression/Anxiety: no complaints     FLUoxetine capsule 10 mg qd  Pain: incisional-controlled  gabapentin capsule 100 mg TID  rOPINIRole tablet 1 mg BID     HYDROcodone-acetaminophen 5-325 mg 1 tab q4hr PRN pain  Bowels/Bladder: last BM 3/7 x 2     Appetite: fair     Sleep: good      QUEtiapine tablet 50 mg qHS    Physical Exam  General: well-developed, well-nourished, tremor at rest  Respiratory: equal chest rise, no SOB, no audible wheeze  Cardiovascular: regular rate and rhythm, no edema  Gastrointestinal: soft, non-tender, non-distended   Musculoskeletal: full range of motion of all extremities/spine with generalized weakness  Integumentary: no rashes or skin lesions present, midline sternal lrxhxdft-PWG-u/d/i, chest tube site x 2 -murite-OEM-f/d/I, RLE and LLE harvest incision sites-LONDON-c/d/i, Right groin puncture site-LONDON-c/d/I, sacral/buttocks-reddened  Neurologic: cranial nerves intact, no signs of peripheral neurological deficit, motor/sensory function intact, resting tremor-BUE  *MD performed and documented physical examination         Labs:    Latest Reference Range & Units 03/08/23 04:21   Sodium 136  - 145 mmol/L 141   Potassium 3.5 - 5.1 mmol/L 3.4 (L)   Chloride 98 - 107 mmol/L 106   CO2 23 - 31 mmol/L 25   BUN 8.4 - 25.7 mg/dL 35.4 (H)   Creatinine 0.73 - 1.18 mg/dL 1.59 (H)   eGFR mls/min/1.73/m2 43   Glucose 82 - 115 mg/dL 116 (H)   Calcium 8.8 - 10.0 mg/dL 8.7 (L)   Phosphorus 2.3 - 4.7 mg/dL 3.2   Albumin 3.4 - 4.8 g/dL 2.4 (L)   (L): Data is abnormally low  (H): Data is abnormally high        Assessment/Plan  Hospital   CAD (coronary artery disease)   S/P CABG x 2     Non-Hospital   Lupus anticoagulant inhibitor syndrome   Iron deficiency anemia   Elevated homocysteine   Low vitamin B12 level   Anemia   Angina, class III   Elevated partial thromboplastin time (PTT)   Coronary artery disease   Stage 3 chronic kidney disease   Hypercholesteremia   Bleeding   Altered mental status   Hypertension   Head injury   Dehydration   NSTEMI (non-ST elevated myocardial infarction)   Ischemic cardiomyopathy   GERD (gastroesophageal reflux disease)   Enlarged prostate   Aortic aneurysm, abdominal   Skin cancer       Wounds: midline sternal pfxjrcxl-FPD-x/d/i, chest tube site x 2 -hfehnf-NJM-p/d/I, RLE and LLE harvest incision sites-LONDON-c/d/i, Right groin puncture site-LONDON-c/d/I, sacral/buttocks-reddened  S/p CABG x 2 on 2/24 by Dr Hagan  Precautions:sternal   Bracing: cardiac bear for splinting  Swallowing: Liberalized to Regular Diet with no added salt  Function: Tolerating therapy. Continue PT/OT  VTE Prophylaxis:   enoxaparin injection 40 mg SubQ q24hr  Code Status: FULL CODE   Discharge:Lives with his spouse in [place] in a single-story home with no steps to enter the residence. Completed high school. He was in the [employer]. He is retired from electrical/refrigeration work.  Wife manages the patients medications and finances. She also cooks, cleans, does laundry, and grocery shops.  Children (3). Date 3/17 Friday.                 Usha Mcgee NP, conducted additional independent physical examination and  assisted with medical documentation.

## 2023-03-08 NOTE — PROGRESS NOTES
[facility] ([facility])  Rehab Progress Note    Patient Name: Hunter Navarrete  MRN: [id number]  Age: 82 y.o. Sex: male  : 1940  Hospital Length of Stay: 5 days  Date of Service: 3/8/2023   Chief Complaint: Multivessel CAD s/p left heart catheterization with InStent restenosis and multivessel coronary disease on 02/15/2023 s/p CABG x2 (lima to LAD, RSVG to OM with right GSV EVH) on 2023    Subjective:     Basic Information  Admit Information: 82-year-old WM presented to [facility] on 02/15 with complaints of substernal chest pain with shortness of breath.  PMH significant for CAD with PCI, CKD, hypertension, mild aortic stenosis, lupus anticoagulant inhibitor syndrome, and hyperlipidemia.  Workup significant for elevated troponin.  Transferred to [facility] for cardiology evaluation.  Initiated heparin drip.  Tolerated left heart catheterization on 02/15 significant for multivessel coronary disease with InStent restenosis.  Hematology recommended transfer to tertiary facility for CABG 2/2 history of lupus anticoagulant.  Tolerated transfer to [facility] on .  Initiated Tridil drip and titrated for chest pain.  Required 2 units PRBC transfusion on .  On  tolerated CABG x2 (lima to LAD, RSVG to OM with right GSV EVH) without perioperative complications.  Postoperatively required IV fluid gentle hydration per Nephrology 2/2 CKD stage IIIB.  Chest tubes removed.  Continued with postoperative DANIELLA on CKD.  Nephrology will continue to follow.  Discontinue Lasix IV on  and initiated home dose of Lasix 40 mg daily.  Initiated Plavix on . Tolerated transfer to [facility] inpatient rehab unit on 3/3 without incident.  Today's Information: No acute events overnight.  Lying comfortably in bed.  Reports good sleep.  Appetite slightly improved.  Last BM 3/7.  Vital signs at goal with no recorded fevers.  Potassium 3.4.  BUN and creatinine 35.4/1.59 (from 40.3/1.51).   No new imaging today.    Review of patient's allergies indicates:   Allergen Reactions    Cardura [doxazosin] Hives    Lyrica [pregabalin] Other (See Comments)    Paxil [paroxetine hcl] Other (See Comments)    Restoril [temazepam] Hallucinations    Vioxx [rofecoxib] Swelling    Zithromax [azithromycin] Hives        Current Facility-Administered Medications:     aspirin EC tablet 81 mg, 81 mg, Oral, Daily, MAGDA HaiderP, 81 mg at 03/07/23 0754    atorvastatin tablet 40 mg, 40 mg, Oral, QHS, Yaneth Douglas, FNP, 40 mg at 03/07/23 2020    benzocaine 10 % mucosal gel, , Mouth/Throat, QID PRN, Denilson Medina DO    benzonatate capsule 100 mg, 100 mg, Oral, TID PRN, Yaneth Douglas, FNP, 100 mg at 03/06/23 2336    bisacodyL suppository 10 mg, 10 mg, Rectal, Daily PRN, MAGDA HaiderP, 10 mg at 03/03/23 1909    carvediloL tablet 12.5 mg, 12.5 mg, Oral, BID, Suman Reese, FNP, 12.5 mg at 03/07/23 2020    clopidogreL tablet 75 mg, 75 mg, Oral, Daily, Yaneth Douglas FNP, 75 mg at 03/07/23 0754    docusate sodium capsule 100 mg, 100 mg, Oral, BID, Yaneth Douglas, FNP, 100 mg at 03/07/23 2020    enoxaparin injection 40 mg, 40 mg, Subcutaneous, Daily, Yaneth Douglas FNP, 40 mg at 03/07/23 1706    ezetimibe tablet 10 mg, 10 mg, Oral, Daily, Yaneth Douglas FNP, 10 mg at 03/07/23 0755    famotidine tablet 20 mg, 20 mg, Oral, Daily PRN, Xander Enriquez MD    ferrous sulfate tablet 1 each, 1 tablet, Oral, BID, MAGDA HaiderP, 1 each at 03/07/23 2020    FLUoxetine capsule 10 mg, 10 mg, Oral, Daily, Yaneth Douglas FNP, 10 mg at 03/07/23 0755    furosemide tablet 40 mg, 40 mg, Oral, Daily, SHIRLEY Haider, 40 mg at 03/07/23 0754    gabapentin capsule 100 mg, 100 mg, Oral, TID, SHIRLEY Haider, 100 mg at 03/08/23 0559    hydrALAZINE injection 10 mg, 10 mg, Intravenous, Q6H PRN, SHIRLEY Haider, 10 mg at 03/05/23 1456    HYDROcodone-acetaminophen 5-325 mg per tablet 1 tablet, 1  "tablet, Oral, Q4H PRN, MAGDA HaiderP, 1 tablet at 03/07/23 2020    hydrOXYzine pamoate capsule 50 mg, 50 mg, Oral, Nightly PRN, SHIRLEY Haider    labetalol 20 mg/4 mL (5 mg/mL) IV syring, 10 mg, Intravenous, Q4H PRN, Yaneth Douglas, FNP    meclizine tablet 25 mg, 25 mg, Oral, TID PRN, MAGDA HaiderP    metoprolol injection 10 mg, 10 mg, Intravenous, Q2H PRN, MAGDA HaiderP    mupirocin 2 % ointment, , Nasal, BID, Xander Enriquez MD, Given at 03/06/23 2000    ondansetron disintegrating tablet 4 mg, 4 mg, Oral, Q6H PRN, MAGDA HaiderP    pantoprazole EC tablet 40 mg, 40 mg, Oral, Daily, MAGDA HaiderP, 40 mg at 03/07/23 0754    potassium chloride SA CR tablet 20 mEq, 20 mEq, Oral, Daily, Denilson Medina, DO    potassium chloride SA CR tablet 40 mEq, 40 mEq, Oral, Once, Denilson Medina, DO    potassium chloride SA CR tablet 40 mEq, 40 mEq, Oral, Once, SHIRLEY Wilcox    predniSONE tablet 2.5 mg, 2.5 mg, Oral, Daily, MAGDA HaiderP, 2.5 mg at 03/07/23 0754    QUEtiapine tablet 50 mg, 50 mg, Oral, QHS, MAGDA HaiderP, 50 mg at 03/07/23 2020    rOPINIRole tablet 1 mg, 1 mg, Oral, BID, Yaneth Douglas FNP, 1 mg at 03/07/23 2020    sucralfate tablet 1 g, 1 g, Oral, QID (AC & HS), MAGDA HaiderP, 1 g at 03/08/23 0559    tamsulosin 24 hr capsule 0.4 mg, 0.4 mg, Oral, QHS, Yaneth Douglas, FNP, 0.4 mg at 03/07/23 2020     Review of Systems   Complete 12-point review of symptoms negative except for what's mentioned in HPI     Objective:     /70   Pulse 96   Temp 97.9 °F (36.6 °C) (Oral)   Resp 20   Ht 5' 10.98" (1.803 m)   Wt 86.3 kg (190 lb 4.1 oz)   SpO2 96%   BMI 26.55 kg/m²      Physical Exam  Vitals reviewed.   Eyes:      Pupils: Pupils are equal, round, and reactive to light.   Cardiovascular:      Rate and Rhythm: Normal rate and regular rhythm.      Heart sounds: Murmur heard.   Systolic murmur is present with a grade of 3/6. "   Pulmonary:      Effort: Pulmonary effort is normal.      Breath sounds: Normal breath sounds.   Abdominal:      General: Bowel sounds are normal.   Musculoskeletal:         General: Normal range of motion.   Skin:     General: Skin is warm.      Comments: Midline sternal incision open air dry and intact    Neurological:      Mental Status: He is alert and oriented to person, place, and time.      Motor: Weakness present.      Comments: Resting tremors, shuffling gait   Psychiatric:         Mood and Affect: Mood normal.   *MD performed and documented physical examination       Lines/Drains/Airways       Peripherally Inserted Central Catheter Line  Duration             PICC Triple Lumen 02/27/23 1045 left basilic 8 days                Labs  Recent Results (from the past 24 hour(s))   Renal Function Panel    Collection Time: 03/08/23  4:21 AM   Result Value Ref Range    Sodium Level 141 136 - 145 mmol/L    Potassium Level 3.4 (L) 3.5 - 5.1 mmol/L    Chloride 106 98 - 107 mmol/L    Carbon Dioxide 25 23 - 31 mmol/L    Glucose Level 116 (H) 82 - 115 mg/dL    Blood Urea Nitrogen 35.4 (H) 8.4 - 25.7 mg/dL    Creatinine 1.59 (H) 0.73 - 1.18 mg/dL    Calcium Level Total 8.7 (L) 8.8 - 10.0 mg/dL    Albumin Level 2.4 (L) 3.4 - 4.8 g/dL    Phosphorus Level 3.2 2.3 - 4.7 mg/dL    eGFR 43 mls/min/1.73/m2       Radiology  [facility] 02/20/23: The right internal carotid artery demonstrated 50-69% stenosis. The left internal carotid artery demonstrated 50-69% stenosis.  Bilateral vertebral arteries were patent with antegrade flow.  Radiology  [facility] 02/15/2023: LM 0% stenosis mLAD proximal 70% ISR D1 ostial 50% stenosis D2 ostial 50% stenosis Lcx: previous stent; proximal Lcx  90% ISR; mid Lcx 30% stenosis OM1 proximal 80% stenosis RCA patent stents to ostium. Mid RCA proximal subsection- 60% stenosis; Mid RCA distal subsection 70& stenosis; distal RCA proximal subsection 100% stenosis. Fills left to right  Radiology  [facility] 02/16/2023: Global  LV SF is borderline normal. LVEF 45-50%. LA is mildly enlarged. Moderate calcification of the aortic valve is noted. Mild AS is present. Mild to moderate AR. Mild to moderate MAC is noted. Mild MR. Trace TR. PASP 40 mmHg. Optison used to enhance endocardial border.     Assessment/Plan:     82 y.o. WM admitted on 3/3/2023     Multivessel CAD  - s/p left heart catheterization with InStent restenosis and multivessel coronary disease on 02/15/2023  - s/p CABG x2 (lima to LAD, RSVG to OM with right GSV EVH) on 02/24/2023  - sternal precautions  - daily weights  - discontinued metoprolol tartrate 50 mg bid on 3/5  - continue   Coreg 12.5 mg bid (initiated on 3/6)  Lasix 40 mg daily  Aspirin 81 mg daily  Plavix 75 mg daily   Atorvastatin 40 mg at night  Hydrocodone-acetaminophen 5-325 mg p.r.n. 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  - discontinued metoprolol tartrate 50 mg bid on 3/5  - continue   Coreg 12.5 mg bid (initiated on 3/6)  Hydralazine 10 mg every 2 hours as needed for BP >  160/90  Labetalol 10 mg every 2 hours as needed for BP > 160/90     HLD  - FLP at goal!!  - continue  Atorvastatin 40 mg at night  Zetia 10 mg daily      RLS  - stable  - continue  Gabapentin 100 mg t.i.d.  Ropinirole 1 mg b.i.d.     Bilateral RICKEY  - moderate bilateral  - continue  Atorvastatin 40 mg at night  Zetia 10 mg daily   Plavix 75 mg daily   - follow-up with cardiology outpatient     Constipation  - stable  - continue  Colace 100 mg b.i.d.     GERD  - Avoid spicy foods, and nothing to eat or drink within x2 hours of bedtime or laying flat (water is ok)   - Avoid NSAIDs (Advil, ibuprofen, naproxen...) and poe-2 inhibitors (Mobic, Celebrex)    - continue  Protonix 40 mg daily   Carafate 1 g a.c. and HS      Lupus anticoagulant inhibitor syndrome  - stable  - continue  Prednisone 2.5 mg daily   - follow-up with Hematology outpatient     Major depressive disorder  - in remission  - continue  Fluoxetine 10 mg daily     Insomnia  - stable  - continue  Seroquel 50 mg at bedtime     BPH/urinary retention  - stable  - Urinary catheter replaced on 02/27   - Discontinue indwelling catheter on 03/07  - good urine output reported on 03/08  - continue  Flomax 0.4 mg at bedtime      Normocytic anemia  - asymptomatic  - s/p transfusion  x2 units PRBC on 2/24/2023  x2 units PRBC on 2/23/2023  - H/H stable   - no evidence of active bleeds  - low iron levels  - continue  Ferrous sulfate 325 mg b.i.d. (initiated 3/4)  - will closely monitor and transfuse if needed     Acute hypoxic respiratory failure  - on 2 L nasal cannula  - chest x-ray stable  - continue  Lasix 40 mg daily    Parkinson's disease  - reportedly diagnosed in 2021  - not currently on medication    Hypokalemia  - potassium 3.4   - initiate   Potassium 60 mEq x1 dose now   K Dur 20 mEq daily (initiated 3/8)      VTE Prophylaxis:  Lovenox 40 mg daily  COVID-19 testing:  Negative on 03/03  COVID-19 vaccination status:  Vaccinated x4     POA:  Yes (Romy  daughter and Nav, son)  Living will: yes  Contacts:  Giovana Navarrete (spouse) [phone]                      Romy (daughter) [phone]     CODE STATUS: Full  Internal Medicine (attending): Xander Enriquez MD  Physiatry (consulting):  Dilip Hargrove MD     OUTPATIENT PROVIDERS  PCP:  Mariella Bethea MD   Hematology:  Missy Davila MD in [facility]  Cardiology: Curtis Loredo MD at [facility]   CV surgery:  Spencer Hagan MD  Nephrology:  Darinel Bedoya MD     DISPOSITION:  Sleep hygiene, bowel maintenance, and appetite at goal.  Last BM 3/7.  Vital signs at goal with no recorded fevers.  Potassium 3.4-replace.  Nephrology following.  Nephrology notes reviewed.  Appreciate recommendations and management care.  Renal indices overall stable.  Continues on Lasix 40 mg oral daily.  All medications reviewed.  Continues to tolerate therapy.  Aggressively mobilize as tolerated.  Monitor closely.  Notify of acute changes.    Staffing 3/7/2023: Continent of bowel and bladder. CT suture removed on 3/7. RT: Overall mod to supervision.  Appetite is poor.  Breakfast is better.  Liberalize diet with no added salt.  RT: Doing well.  Bed mobility limited. PT: SBA to min assist with bed mobility, transfers and gait.  Dramatic improvement OTW.  Limited by activity intolerance. Needs breaks throughout the day. OT: Limited by weakness.  Performed better than he thinks.  He needs time to improve strength and endurance. Mod assist for bed mobility. Projected discharge 3/17.      Rajesh Reese NP conducted independent physical examination and assisted with medical documentation.

## 2023-03-08 NOTE — DISCHARGE SUMMARY
[facility]  Cardiothoracic Surgery  Discharge Summary      Patient Name: Hunter Navarrete  MRN: [id number]  Admission Date: 2/19/2023  Hospital Length of Stay: 12 days  Discharge Date and Time: 3/3/2023  3:15 PM  Attending Physician: No att. providers found   Discharging Provider: GILMAR Thrasher  Primary Care Provider: Mariella Bethea MD    HPI:   No notes on file    Procedure(s) (LRB):  CORONARY ARTERY BYPASS GRAFT (CABG) (N/A)      Indwelling Lines/Drains at time of discharge:   Lines/Drains/Airways     Peripherally Inserted Central Catheter Line  Duration           PICC Triple Lumen 02/27/23 1045 left basilic 9 days              Hospital Course: No notes on file    Goals of Care Treatment Preferences:  Code Status: Full Code      Consults (From admission, onward)        Status Ordering Provider     Inpatient consult to Nephrology  Once        Provider:  Frank Michelle MD    Completed LINDY COLMENARES          Significant Diagnostic Studies: Labs: All labs within the past 24 hours have been reviewed    Pending Diagnostic Studies:     None          No new Assessment & Plan notes have been filed under this hospital service since the last note was generated.  Service: Cardiothoracic Surgery    Final Active Diagnoses:    Diagnosis Date Noted POA    PRINCIPAL PROBLEM:  CAD (coronary artery disease) [I25.10] 03/03/2023 Yes      Problems Resolved During this Admission:    Diagnosis Date Noted Date Resolved POA    CAD (coronary artery disease) [I25.10] 07/22/2021 03/03/2023 Unknown      Discharged Condition: good    Disposition: Rehab Facility    Follow Up:    Patient Instructions:   No discharge procedures on file.  Medications:  Reconciled Home Medications:      Medication List      CHANGE how you take these medications    metoprolol tartrate 50 MG tablet  Commonly known as: LOPRESSOR  Take 1 tablet (50 mg total) by mouth 2 (two) times daily.  What changed: when to take this         CONTINUE taking these medications    aspirin 81 MG EC tablet  Commonly known as: ECOTRIN  Take 81 mg by mouth once daily.     atorvastatin 40 MG tablet  Commonly known as: LIPITOR  Take 40 mg by mouth once daily.     calcium carbonate-vitamin D3 500 mg-10 mcg (400 unit) Tab  Take 1 tablet by mouth once daily.     clopidogreL 75 mg tablet  Commonly known as: PLAVIX  Take 75 mg by mouth once daily.     ezetimibe 10 mg tablet  Commonly known as: ZETIA  Take 10 mg by mouth once daily.     FLUoxetine 10 MG capsule  Take 10 mg by mouth once daily.     gabapentin 100 MG capsule  Commonly known as: NEURONTIN  200 MG IN THE MORNING, 100 MG AT LUNCH, 100 MG AT SUPPERTIME     metoprolol succinate 50 MG 24 hr tablet  Commonly known as: TOPROL-XL  Take 50 mg by mouth once daily.     predniSONE 2.5 MG tablet  Commonly known as: DELTASONE  Take 2.5 mg by mouth once daily.     rOPINIRole 1 MG tablet  Commonly known as: REQUIP  Take 1 mg by mouth 2 (two) times daily.     tiZANidine 4 mg Cap  Take 4 mg by mouth 3 (three) times daily as needed.     traMADoL 50 mg tablet  Commonly known as: ULTRAM  Take by mouth every 6 (six) hours as needed. 1/2 TO 1 TABLET 4 TIMES A DAY PRN        STOP taking these medications    albuterol-ipratropium 2.5 mg-0.5 mg/3 mL nebulizer solution  Commonly known as: DUO-NEB     calcium carbonate 400 mg calcium (1,000 mg) Chew     folic acid 1 MG tablet  Commonly known as: FOLVITE     furosemide 40 MG tablet  Commonly known as: LASIX     hydrALAZINE 25 MG tablet  Commonly known as: APRESOLINE     HYDROcodone-acetaminophen 5-325 mg per tablet  Commonly known as: NORCO     hydrOXYzine HCL 25 MG tablet  Commonly known as: ATARAX     isosorbide mononitrate 30 MG 24 hr tablet  Commonly known as: IMDUR     lisinopriL 10 MG tablet     meclizine 25 mg tablet  Commonly known as: ANTIVERT     nitroGLYCERIN 0.4 MG SL tablet  Commonly known as: NITROSTAT     omeprazole 20 MG capsule  Commonly known as: PRILOSEC      QUEtiapine 50 MG tablet  Commonly known as: SEROQUEL     terazosin 5 MG capsule  Commonly known as: HYTRIN          Hunter Navarrete is a 82 y.o. male patient.   1. Multiple vessel coronary artery disease    2. Carotid stenosis    3. Heart abnormality    4. CAD (coronary artery disease)    5. Atherosclerosis of native coronary artery of native heart without angina pectoris    6. Irregular cardiac rhythm    7. Heart failure as complication of care            Past Medical History:   Diagnosis Date    Acid reflux      Anemia      Aortic aneurysm, abdominal      Arthritis      Bronchitis      Cataract      Celiac artery stenosis       50% by CTA abdomen 7/27/2020    CKD (chronic kidney disease)       45% FUNCTION    Coronary artery disease      Encounter for blood transfusion      Enlarged prostate      GERD (gastroesophageal reflux disease)      Hemorrhoids      Hypercholesteremia      Hypertension      Iron deficiency anemia, unspecified      Leaky heart valve      Lupus anticoagulant disorder      Renal artery stenosis       by CTA 7/27/2020    Skin cancer      Unspecified chronic bronchitis      UTI (urinary tract infection)        No past surgical history pertinent negatives on file.  Scheduled Meds:   aspirin  81 mg Oral Daily    atorvastatin  40 mg Oral Daily    calcium carbonate  500 mg Oral Daily    docusate sodium  100 mg Oral BID    ezetimibe  10 mg Oral Daily    FLUoxetine  10 mg Oral Daily    folic acid  1 mg Oral Daily    furosemide  40 mg Oral Daily    gabapentin  100 mg Oral TID    metoprolol tartrate  50 mg Oral BID    pantoprazole  40 mg Oral Daily    prednisoLONE  2.5 mg Oral Daily    QUEtiapine  50 mg Oral QHS    rOPINIRole  1 mg Oral BID    sodium chloride 0.9%  10 mL Intravenous Q6H    sucralfate  1 g Oral QID (AC & HS)      Continuous Infusions:   DOBUTamine IV infusion (non-titrating) Stopped (02/27/23 1420)      PRN Meds:sodium chloride, sodium chloride,  "sodium chloride, acetaminophen, acetaminophen, albumin human 5%, albuterol-ipratropium, calcium gluconate IVPB, calcium gluconate IVPB, calcium gluconate IVPB, dextrose 10%, dextrose 10%, hydrALAZINE, HYDROcodone-acetaminophen, HYDROcodone-acetaminophen, hydrOXYzine HCL, labetalol, magnesium sulfate IVPB, magnesium sulfate IVPB, meclizine, morphine, ondansetron, oxyCODONE, potassium chloride in water, potassium chloride in water, potassium chloride in water, Flushing PICC Protocol **AND** sodium chloride 0.9% **AND** sodium chloride 0.9%, sodium phosphate IVPB, sodium phosphate IVPB, sodium phosphate IVPB, tiZANidine, zolpidem          Review of patient's allergies indicates:   Allergen Reactions    Cardura [doxazosin] Hives    Lyrica [pregabalin] Other (See Comments)    Paxil [paroxetine hcl] Other (See Comments)    Restoril [temazepam] Hallucinations    Vioxx [rofecoxib] Swelling    Zithromax [azithromycin] Hives      There are no hospital problems to display for this patient.     Blood pressure (!) 151/68, pulse 101, temperature 97.8 °F (36.6 °C), resp. rate (!) 25, height 5' 10" (1.778 m), weight 88.7 kg (195 lb 8.8 oz), SpO2 (!) 94 %.     Subjective:    POD #7  Awake. Alert.   Sitting up in chair  Diuresing per renal     Objective:   AFVSS  Heart: Regular rate, tachycardic  Lungs: nonlabored, diminished at bases  Incision: c/d/i  Cr: 1.8        Assesment/Plan:    S/p CAB  DANIELLA on CKD per renal  Anemia of chronic disease  H/o Lupus  HTN     Plans per renal  Ambulate  IS  Rehab pending     GILMAR Thrasher  3/3/2023             Time spent on the discharge of patient: 20 minutes    GILMAR Thrasher  Cardiothoracic Surgery  [facility] - [facility]  "

## 2023-03-08 NOTE — PT/OT/SLP PROGRESS
Occupational Therapy Inpatient Rehab Treatment    Name: Hunter Navarrete  MRN: [id number]    Assessment:  Hunter Navarrete is a 82 y.o. male admitted with a medical diagnosis of S/P CABG x 2.  He presents with the following impairments/functional limitations:  weakness, impaired endurance, impaired self care skills, impaired functional mobility, impaired balance, pain, decreased upper extremity function, decreased ROM .    General Precautions: Standard, fall     Orthopedic Precautions:Full weight bearing     Braces: N/A    Rehab Prognosis: Fair; patient would benefit from acute skilled OT services to address these deficits and reach maximum level of function.      History:     Past Medical History:   Diagnosis Date    Acid reflux     Anemia     Aortic aneurysm, abdominal     Arthritis     Bronchitis     Cataract     Celiac artery stenosis     50% by CTA abdomen 7/27/2020    CKD (chronic kidney disease)     45% FUNCTION    Coronary artery disease     Encounter for blood transfusion     Enlarged prostate     GERD (gastroesophageal reflux disease)     Hemorrhoids     Hypercholesteremia     Hypertension     Iron deficiency anemia, unspecified     Leaky heart valve     Lupus anticoagulant disorder     Renal artery stenosis     by CTA 7/27/2020    Skin cancer     Unspecified chronic bronchitis     UTI (urinary tract infection)        Past Surgical History:   Procedure Laterality Date    ACF      BACK SURGERY      RODS IN BACK; 4 SURGIERIES    BONE MARROW BIOPSY      CARDIAC ANGIOGRAM WITH STENT      CATARACT SX Bilateral     CORONARY ANGIOGRAPHY N/A 2/15/2023    Procedure: ANGIOGRAM, CORONARY ARTERY;  Surgeon: Rito Vega MD;  Location: [facility] CATH;  Service: Cardiology;  Laterality: N/A;    CORONARY ARTERY BYPASS GRAFT (CABG) N/A 2/24/2023    Procedure: CORONARY ARTERY BYPASS GRAFT (CABG);  Surgeon: Spencer Hagan MD;  Location: [facility] OR;  Service: Cardiothoracic;  Laterality: N/A;  CABG / EVH //   ECHO NOTIFIED    HIP  "SURGERY Right     THR    KNEE SURGERY Right     ATS    LEFT HEART CATHETERIZATION Left 07/22/2021    Procedure: Left heart cath;  Surgeon: Curtis Loredo MD;  Location: [facility] CATH;  Service: Cardiology;  Laterality: Left;    LEFT HEART CATHETERIZATION Left 09/22/2022    Procedure: Left heart cath;  Surgeon: Giorgi Barker MD;  Location: [facility] CATH;  Service: Cardiology;  Laterality: Left;    SHOULDER SURGERY Bilateral     SPINAL CORD STIMULATOR IMPLANT         Subjective     Orientation: Oriented x4    Chief Complaint: Pt. C/o pain to upper back "between shoulder blades". Pt. Unable to tolerate >1 min static standing. Pt. C ice pack continued to c/ pain. Pt.'s nose began bleeding. [employer] alerted.     Patient/Family Comments/goals: "I just want to sit in my easy chair and watch TV"     Vitals   Vitals at Rest  /64   HR 66   O2 Sat    Pain 5/10     Vitals With Activity  /71   HR 93   O2 Sat 95   Pain 8/10     Respiratory Status: Room air    Patients cultural, spiritual, Quaker conflicts given the current situation: no       Objective:     Patient found up in chair with  (Cardiac Bear)  upon OT entry to room.    Mobility   Patient completed:  Sit to Stand Transfer with minimum assistance with 4 wheeled walker  Stand to Sit Transfer with contact guard assistance with cardiac bear v/c's for staing "in the tube".     Functional Mobility  Pt. Ambulated ~ 12 ' to countertop where he pretended to check his generac charge. Pt. Tolerated standing ~ 1 min.     ADLs   Current Status   Eating     Oral Hygiene     Shower, Bathe Self     Upper Body Dressing     Lower Body Dressing     Toileting Hygiene     Toilet Transfer     Putting On, Taking Off Footwear       Limiting Factors for ADLs: endurance, limited ROM, weakness, and pain     Therapeutic Activities  Pt. Tolerated standing 3 x's 1 min interval c longer rest breaks required after each bout. Pt. In standing performed R UE AROM/FM for tabletop game. Pt. " Ultimately performed 3 rounds of Domgilbert c good memory for keeping score. Pt. C/o back pain throughout Tx. Pt. Was T/F' ed to supine at end of Tx.         LifeStyle Change and Education:             Patient left supine with call button in reach and [employer]  present and providing pain medicines.     Education provided: transfer training, post-op precautions, and PLB (pursed lip breathing).     Multidisciplinary Problems       Occupational Therapy Goals          Problem: Occupational Therapy    Goal Priority Disciplines Outcome Interventions   Occupational Therapy Goal     OT, PT/OT Ongoing, Progressing    Description: ADLs: Pt will be SPV with ADLs by d/c.  STG: by reeval  Pt to perform UB dressing with partial A   Pt to perform LB dressing with Mod A using AD   Pt to perform putting on/off footwear task with Mod A using AD  Pt to perform toileting with Mod A using AD as needed  Pt to perform bathing with Mod A    Functional Transfers:  Pt to perform toilet transfers with Touch A using BSC over toilet.   Pt to perform a tub transfer with TTB requiring Mod A      Balance, Strengthening, Endurance, Balance:  Pt to consistently demonstrate adherence to orthopedic precautions during all ADL's as instructed by OT.  Pt to demonstrate fair dynamic standing balance as required to perform ADL's from standing level.  Pt to demonstrate consistent adherence to breathing control and energy conservation techniques as educated by OT.                        Time Tracking     OT Received On: 03/08/23  Time In 1000     Time Out 1100  Total Time 60 min  Therapy Time: OT Individual: 60  Missed Time:  0  Missed Time Reason:      Billable Minutes: Self Care/Home Management 10 and Therapeutic Activity 50    03/08/2023

## 2023-03-08 NOTE — PLAN OF CARE
"Received call from son, Luis, who lives north of [place].  He had spoken with pt's wife, Giovana, about pt's discharge plan, and Luis was requesting an update on pt's functional status and the rationale for the discharge date assignment.  Explained and relayed progress in therapy.  Provided detailed tasks in which pt is participating.  He is worried because pt's wife "isn't in great shape," so the children wish to be assured that she will not have to do too much for pt physically once he returns home.      Luis reports that he is currently having his cousin(s) install a walk-in shower stall complete with grab bars and HHS for pt to use when he returns home.  It will be completed by projected discharge date.    "

## 2023-03-08 NOTE — PT/OT/SLP PROGRESS
Physical Therapy Inpatient Rehab Treatment    Patient Name:  Hunter Navarrete   MRN:  [id number]    Recommendations:     Discharge Recommendations:  nursing facility, skilled   Discharge Equipment Recommendations: walker, rolling   Barriers to discharge:  impaired functional mobility, sternal precautions, pain    Assessment:     Hunter Navarrete is a 82 y.o. male admitted with a medical diagnosis of S/P CABG x 2.  He presents with the following impairments/functional limitations:  weakness, impaired endurance, impaired self care skills, impaired functional mobility, decreased safety awareness, pain, impaired cardiopulmonary response to activity, other (comment) (sternal precautions).    Rehab Diagnosis: s/p CABG    Recent Surgery: * No surgery found *      General Precautions: Standard, sternal, fall     Orthopedic Precautions:N/A     Braces: N/A    Rehab Prognosis: Fair; patient would benefit from acute skilled PT services to address these deficits and reach maximum level of function.      History:     Past Medical History:   Diagnosis Date    Acid reflux     Anemia     Aortic aneurysm, abdominal     Arthritis     Bronchitis     Cataract     Celiac artery stenosis     50% by CTA abdomen 7/27/2020    CKD (chronic kidney disease)     45% FUNCTION    Coronary artery disease     Encounter for blood transfusion     Enlarged prostate     GERD (gastroesophageal reflux disease)     Hemorrhoids     Hypercholesteremia     Hypertension     Iron deficiency anemia, unspecified     Leaky heart valve     Lupus anticoagulant disorder     Renal artery stenosis     by CTA 7/27/2020    Skin cancer     Unspecified chronic bronchitis     UTI (urinary tract infection)        Past Surgical History:   Procedure Laterality Date    ACF      BACK SURGERY      RODS IN BACK; 4 SURGIERIES    BONE MARROW BIOPSY      CARDIAC ANGIOGRAM WITH STENT      CATARACT SX Bilateral     CORONARY ANGIOGRAPHY N/A 2/15/2023    Procedure: ANGIOGRAM, CORONARY  ARTERY;  Surgeon: Rito Vega MD;  Location: [facility];  Service: Cardiology;  Laterality: N/A;    CORONARY ARTERY BYPASS GRAFT (CABG) N/A 2/24/2023    Procedure: CORONARY ARTERY BYPASS GRAFT (CABG);  Surgeon: Spencer Hagan MD;  [facility]: [facility];  Service: Cardiothoracic;  Laterality: N/A;  CABG / EVH //   ECHO NOTIFIED    HIP SURGERY Right     THR    KNEE SURGERY Right     ATS    LEFT HEART CATHETERIZATION Left 07/22/2021    Procedure: Left heart cath;  Surgeon: Curtis Loredo MD;  Location: [facility] CATH;  Service: Cardiology;  Laterality: Left;    LEFT HEART CATHETERIZATION Left 09/22/2022    Procedure: Left heart cath;  Surgeon: Giorgi Barker MD;  Location: [facility] CATH;  Service: Cardiology;  Laterality: Left;    SHOULDER SURGERY Bilateral     SPINAL CORD STIMULATOR IMPLANT         Subjective     Chief Complaint: lower back pain, decreased endurance    Respiratory Status: Room air    Patients cultural, spiritual, Confucianist conflicts given the current situation: no      Objective:     Communicated with pt prior to session.  Patient found HOB elevated with    upon PT entry to room in AM session and found HOB elevated upon PT entry to room in PM session.    Pt is Oriented x3 and Alert, Cooperative, and Motivated.    Vitals   5/10 lower back pain at rest, 7/10 with mobility    Pt on room air throughout session, down to ~90% SpO2 with activity and up to ~95% SpO2 after ~1 min of rest.      Functional Mobility:      Current   Status  Discharge   Goal   Functional Area: Care Score:    Roll Left and Right   Supervision or touching assistance   Sit to Lying 4  X2 completions, increased time needed with VC for sternal precautions Supervision or touching assistance   Lying to Sitting on Side of Bed 3  X3 completions; min A for assist with trunk Supervision or touching assistance   Sit to Stand 4  Min A for first completion, but then CGA-SBA for all completions afterwards. Pt utilizing rollator AD, VC needed for hand  placement. Supervision or touching assistance   Chair/Bed-to-Chair Transfer 4  With rollator and VC for hand placement Supervision or touching assistance   Car Transfer   Supervision or touching assistance   Walk 10 Feet 4 Supervision or touching assistance   Walk 50 Feet with Two Turns 4  Pt amb 65' + 80' + 80' in AM and then 115' x2 completions in PM. Performed with rollator AD, distance limited by fatigue and SOB. SBA provided throughout gait distance. Supervision or touching assistance   Walk 150 Feet   Supervision or touching assistance   Walk 10 Feet Uneven Surface   Supervision or touching assistance   1 Step (Curb)   Supervision or touching assistance   4 Steps   Supervision or touching assistance   12 Steps   Not applicable   Picking Up Object   Supervision or touching assistance   Wheel 50 Feet with Two Turns   Supervision or touching assistance   Wheel 150 Feet   Supervision or touching assistance       Therapeutic Activities and Exercises:  Supine stretch to B hamstrings and gastroc + sciatic nerve flossing    2x10 B SLR + 2x10 B bridges; low height on bridge raises.    Recumbent bike: 10 min on workout level 2.0      Activity Tolerance: Poor    Patient left  up in chair  with chair alarm on and OT Sindi present at end of AM session and supine in bed with HOB elevated and call bell in reach at end of PM session.     Education provided: roles and goals of PT/PTA, transfer training, bed mob, gait training, safety awareness, body mechanics, assistive device, strengthening exercises, and sternal precautions    Expected compliance: Moderate compliance    GOALS:   Multidisciplinary Problems       Physical Therapy Goals          Problem: Physical Therapy    Goal Priority Disciplines Outcome Goal Variances Interventions   Physical Therapy Goal     PT, PT/OT Ongoing, Progressing     Description: Pt will improve functional mobility by performing:     Bed Mobility   Roll Right and Left Partial/ Moderate Assistance  .  Lying to sitting Partial/ Moderate Assistance .  Sitting to lying Partial/ Moderate Assistance .    Transfers    Pt will be able to perform Stand step chair/bed to chair transfer With RW Partial/ Moderate Assistance .  Pt will be able to perform Sit to stand with RW Partial/ Moderate Assistance .  Pt will be able to perform Car transfer with RW Partial/ Moderate Assistance .    Ambulation    Pt will ambulate 50 Feet with RW Partial/ Moderate Assistance .  Stair negotiation to be assessed      Wheelchair Mobility   Pt will be able to propel 150 feet in steven wheelchair Partial/ Moderate Assistance .      Timeframe: By Re-eval                         Plan:     During this hospitalization, patient to be seen 5 x/week to address the identified rehab impairments via neuromuscular re-education, wheelchair management/training, therapeutic exercises, therapeutic activities, gait training and progress toward the following goals:    Plan of Care Expires:  03/10/23  PT Next Visit Date: 03/10/23  Plan of Care reviewed with: patient    Additional Information:     Pt reporting increased fatigue this AM, stating that he slept poorly last night and was struggling with a dry cough. Increased time needed for completion of all tasks d/t fatigue and SOB; increased SOB with some wheezing noted vs yesterday. Rest breaks provided throughout both sessions.    Time Tracking:     Therapy Time  PT Start Time:  (0830; 1300)  PT Stop Time:  (1000; 1330)   PT Individual: 120  Missed Time:    Time Missed due to:      Billable Minutes: Gait Training 40 min, Therapeutic Activity 20 min, and Therapeutic Exercise 60 min    03/08/2023

## 2023-03-08 NOTE — PLAN OF CARE
Problem: Rehabilitation (IRF) Plan of Care  Goal: Plan of Care Review  Outcome: Ongoing, Progressing  Goal: Patient-Specific Goal (Individualized)  Outcome: Ongoing, Progressing  Goal: Absence of New-Onset Illness or Injury  Outcome: Ongoing, Progressing  Goal: Optimal Comfort and Wellbeing  Outcome: Ongoing, Progressing  Goal: Readiness for Transition of Care  Outcome: Ongoing, Progressing     Problem: Infection  Goal: Absence of Infection Signs and Symptoms  Outcome: Ongoing, Progressing     Problem: Impaired Wound Healing  Goal: Optimal Wound Healing  Outcome: Ongoing, Progressing     Problem: Skin Injury Risk Increased  Goal: Skin Health and Integrity  Outcome: Ongoing, Progressing     Problem: Fall Injury Risk  Goal: Absence of Fall and Fall-Related Injury  Outcome: Ongoing, Progressing

## 2023-03-09 LAB
ANION GAP SERPL CALC-SCNC: 13 MEQ/L
BASOPHILS # BLD AUTO: 0.01 X10(3)/MCL (ref 0–0.2)
BASOPHILS NFR BLD AUTO: 0.1 %
BNP BLD-MCNC: 775.2 PG/ML
BUN SERPL-MCNC: 40.9 MG/DL (ref 8.4–25.7)
CALCIUM SERPL-MCNC: 9 MG/DL (ref 8.8–10)
CHLORIDE SERPL-SCNC: 106 MMOL/L (ref 98–107)
CO2 SERPL-SCNC: 22 MMOL/L (ref 23–31)
CREAT SERPL-MCNC: 1.91 MG/DL (ref 0.73–1.18)
CREAT/UREA NIT SERPL: 21
EOSINOPHIL # BLD AUTO: 0 X10(3)/MCL (ref 0–0.9)
EOSINOPHIL NFR BLD AUTO: 0 %
ERYTHROCYTE [DISTWIDTH] IN BLOOD BY AUTOMATED COUNT: 15.5 % (ref 11.5–17)
FLUAV AG UPPER RESP QL IA.RAPID: NOT DETECTED
FLUBV AG UPPER RESP QL IA.RAPID: NOT DETECTED
GFR SERPLBLD CREATININE-BSD FMLA CKD-EPI: 35 MLS/MIN/1.73/M2
GLUCOSE SERPL-MCNC: 100 MG/DL (ref 82–115)
HCO3 UR-SCNC: 26.6 MMOL/L (ref 24–28)
HCT VFR BLD AUTO: 32.6 % (ref 42–52)
HGB BLD-MCNC: 10.3 G/DL (ref 14–18)
IMM GRANULOCYTES # BLD AUTO: 0.17 X10(3)/MCL (ref 0–0.04)
IMM GRANULOCYTES NFR BLD AUTO: 1.4 %
LYMPHOCYTES # BLD AUTO: 0.67 X10(3)/MCL (ref 0.6–4.6)
LYMPHOCYTES NFR BLD AUTO: 5.6 %
MCH RBC QN AUTO: 29.5 PG
MCHC RBC AUTO-ENTMCNC: 31.6 G/DL (ref 33–36)
MCV RBC AUTO: 93.4 FL (ref 80–94)
MONOCYTES # BLD AUTO: 1.12 X10(3)/MCL (ref 0.1–1.3)
MONOCYTES NFR BLD AUTO: 9.3 %
NEUTROPHILS # BLD AUTO: 10.08 X10(3)/MCL (ref 2.1–9.2)
NEUTROPHILS NFR BLD AUTO: 83.6 %
NRBC BLD AUTO-RTO: 0 %
PCO2 BLDA: 42.3 MMHG (ref 35–45)
PH SMN: 7.41 [PH] (ref 7.35–7.45)
PLATELET # BLD AUTO: 259 X10(3)/MCL (ref 130–400)
PMV BLD AUTO: 11 FL (ref 7.4–10.4)
PO2 BLDA: 85 MMHG (ref 80–100)
POC BE: 2 MMOL/L
POC SATURATED O2: 96 % (ref 95–100)
POC TCO2: 28 MMOL/L (ref 23–27)
POTASSIUM SERPL-SCNC: 5 MMOL/L (ref 3.5–5.1)
RBC # BLD AUTO: 3.49 X10(6)/MCL (ref 4.7–6.1)
SAMPLE: ABNORMAL
SARS-COV-2 RNA RESP QL NAA+PROBE: NOT DETECTED
SODIUM SERPL-SCNC: 141 MMOL/L (ref 136–145)
WBC # SPEC AUTO: 12.1 X10(3)/MCL (ref 4.5–11.5)

## 2023-03-09 PROCEDURE — 11800000 HC REHAB PRIVATE ROOM

## 2023-03-09 PROCEDURE — 97535 SELF CARE MNGMENT TRAINING: CPT

## 2023-03-09 PROCEDURE — 97110 THERAPEUTIC EXERCISES: CPT

## 2023-03-09 PROCEDURE — 25000003 PHARM REV CODE 250: Performed by: NURSE PRACTITIONER

## 2023-03-09 PROCEDURE — 94640 AIRWAY INHALATION TREATMENT: CPT

## 2023-03-09 PROCEDURE — 63600175 PHARM REV CODE 636 W HCPCS: Performed by: NURSE PRACTITIONER

## 2023-03-09 PROCEDURE — 83880 ASSAY OF NATRIURETIC PEPTIDE: CPT | Performed by: NURSE PRACTITIONER

## 2023-03-09 PROCEDURE — 97116 GAIT TRAINING THERAPY: CPT

## 2023-03-09 PROCEDURE — 99233 SBSQ HOSP IP/OBS HIGH 50: CPT | Mod: ,,, | Performed by: NURSE PRACTITIONER

## 2023-03-09 PROCEDURE — 25000003 PHARM REV CODE 250: Performed by: STUDENT IN AN ORGANIZED HEALTH CARE EDUCATION/TRAINING PROGRAM

## 2023-03-09 PROCEDURE — 25000242 PHARM REV CODE 250 ALT 637 W/ HCPCS: Performed by: NURSE PRACTITIONER

## 2023-03-09 PROCEDURE — 99233 PR SUBSEQUENT HOSPITAL CARE,LEVL III: ICD-10-PCS | Mod: ,,, | Performed by: NURSE PRACTITIONER

## 2023-03-09 PROCEDURE — 25000242 PHARM REV CODE 250 ALT 637 W/ HCPCS: Performed by: INTERNAL MEDICINE

## 2023-03-09 PROCEDURE — 0240U COVID/FLU A&B PCR: CPT | Performed by: NURSE PRACTITIONER

## 2023-03-09 PROCEDURE — 97530 THERAPEUTIC ACTIVITIES: CPT

## 2023-03-09 PROCEDURE — 80048 BASIC METABOLIC PNL TOTAL CA: CPT | Performed by: INTERNAL MEDICINE

## 2023-03-09 PROCEDURE — 27000221 HC OXYGEN, UP TO 24 HOURS

## 2023-03-09 PROCEDURE — 94761 N-INVAS EAR/PLS OXIMETRY MLT: CPT

## 2023-03-09 PROCEDURE — 94799 UNLISTED PULMONARY SVC/PX: CPT

## 2023-03-09 PROCEDURE — 85025 COMPLETE CBC W/AUTO DIFF WBC: CPT | Performed by: NURSE PRACTITIONER

## 2023-03-09 RX ORDER — LEVALBUTEROL INHALATION SOLUTION 1.25 MG/3ML
1.25 SOLUTION RESPIRATORY (INHALATION) EVERY 8 HOURS
Status: DISCONTINUED | OUTPATIENT
Start: 2023-03-09 | End: 2023-03-17 | Stop reason: HOSPADM

## 2023-03-09 RX ORDER — FUROSEMIDE 10 MG/ML
40 INJECTION INTRAMUSCULAR; INTRAVENOUS ONCE
Status: COMPLETED | OUTPATIENT
Start: 2023-03-09 | End: 2023-03-09

## 2023-03-09 RX ORDER — POTASSIUM CHLORIDE 20 MEQ/1
40 TABLET, EXTENDED RELEASE ORAL ONCE
Status: COMPLETED | OUTPATIENT
Start: 2023-03-09 | End: 2023-03-09

## 2023-03-09 RX ADMIN — ENOXAPARIN SODIUM 40 MG: 40 INJECTION SUBCUTANEOUS at 05:03

## 2023-03-09 RX ADMIN — FLUOXETINE 10 MG: 10 CAPSULE ORAL at 08:03

## 2023-03-09 RX ADMIN — CARVEDILOL 12.5 MG: 6.25 TABLET, FILM COATED ORAL at 08:03

## 2023-03-09 RX ADMIN — LEVALBUTEROL HYDROCHLORIDE 1.25 MG: 1.25 SOLUTION RESPIRATORY (INHALATION) at 03:03

## 2023-03-09 RX ADMIN — DOCUSATE SODIUM 100 MG: 100 CAPSULE, LIQUID FILLED ORAL at 08:03

## 2023-03-09 RX ADMIN — GABAPENTIN 100 MG: 100 CAPSULE ORAL at 08:03

## 2023-03-09 RX ADMIN — GABAPENTIN 100 MG: 100 CAPSULE ORAL at 05:03

## 2023-03-09 RX ADMIN — FUROSEMIDE 40 MG: 10 INJECTION INTRAMUSCULAR; INTRAVENOUS at 06:03

## 2023-03-09 RX ADMIN — ASPIRIN 81 MG: 81 TABLET, COATED ORAL at 08:03

## 2023-03-09 RX ADMIN — QUETIAPINE 50 MG: 25 TABLET ORAL at 08:03

## 2023-03-09 RX ADMIN — EZETIMIBE 10 MG: 10 TABLET ORAL at 08:03

## 2023-03-09 RX ADMIN — SUCRALFATE 1 G: 1 TABLET ORAL at 12:03

## 2023-03-09 RX ADMIN — POTASSIUM CHLORIDE 20 MEQ: 20 TABLET, EXTENDED RELEASE ORAL at 08:03

## 2023-03-09 RX ADMIN — CLOPIDOGREL BISULFATE 75 MG: 75 TABLET ORAL at 08:03

## 2023-03-09 RX ADMIN — SUCRALFATE 1 G: 1 TABLET ORAL at 05:03

## 2023-03-09 RX ADMIN — LEVALBUTEROL HYDROCHLORIDE 1.25 MG: 1.25 SOLUTION RESPIRATORY (INHALATION) at 11:03

## 2023-03-09 RX ADMIN — IPRATROPIUM BROMIDE 0.5 MG: 0.5 SOLUTION RESPIRATORY (INHALATION) at 03:03

## 2023-03-09 RX ADMIN — FERROUS SULFATE TAB 325 MG (65 MG ELEMENTAL FE) 1 EACH: 325 (65 FE) TAB at 08:03

## 2023-03-09 RX ADMIN — PANTOPRAZOLE SODIUM 40 MG: 40 TABLET, DELAYED RELEASE ORAL at 08:03

## 2023-03-09 RX ADMIN — ROPINIROLE HYDROCHLORIDE 1 MG: 1 TABLET, FILM COATED ORAL at 08:03

## 2023-03-09 RX ADMIN — POTASSIUM CHLORIDE 40 MEQ: 20 TABLET, EXTENDED RELEASE ORAL at 08:03

## 2023-03-09 RX ADMIN — HYDROXYZINE PAMOATE 50 MG: 50 CAPSULE ORAL at 09:03

## 2023-03-09 RX ADMIN — LIDOCAINE 2 PATCH: 50 PATCH CUTANEOUS at 05:03

## 2023-03-09 RX ADMIN — SUCRALFATE 1 G: 1 TABLET ORAL at 08:03

## 2023-03-09 RX ADMIN — ATORVASTATIN CALCIUM 40 MG: 40 TABLET, FILM COATED ORAL at 08:03

## 2023-03-09 RX ADMIN — TIZANIDINE 4 MG: 4 TABLET ORAL at 09:03

## 2023-03-09 RX ADMIN — HYDROCODONE BITARTRATE AND ACETAMINOPHEN 1 TABLET: 5; 325 TABLET ORAL at 08:03

## 2023-03-09 RX ADMIN — PREDNISONE 2.5 MG: 2.5 TABLET ORAL at 08:03

## 2023-03-09 RX ADMIN — TAMSULOSIN HYDROCHLORIDE 0.4 MG: 0.4 CAPSULE ORAL at 08:03

## 2023-03-09 NOTE — PROGRESS NOTES
Subjective:  HPI: 83 yo WM with PMH of male, followed by Dr. Loredo who presented to [place] ER with c/o chest pain. HS troponin 1149; therefore he was transferred to [facility] for cardiology services where he underwent LHC revealing multivessel disease and elevated LVEDP. He was started on diuretics. Plavix was placed on hold and he was transferred to [facility] for CABG evaluation on 2/21/23. Patient is reporting mild chest tightness 3/10 on arrival.  Vitals Stable. Shoulder Pain that morning. On Nitro & Heparin Infusion. EKG with no overt ischemic changes. Denies CP/SOB. Creatinine 1.93 mildly decreased from 2.07 day prior with addition of IVFs. H/H downtrending- 8.2/25.3 from 8.8/27.1 as well. Creatinine  further improved to 1.65. H/H 10.5/31.5 post transfusion 2  units PRBCs. Underwent  CABG x 2 on 2/24 by Dr Hagan, and brought to ICU on MV, requiring pressor support. Mediastinal drain patent. Received 2 units of PRBCs intraop.  CT x 2 patent. Pressors have been weaned off. On cleviprex drip. 2/26 Mediastinal drains have been removed. Creatinine has bumped. Nephrology adding IVF x 1L due to IVVD. O2 weaned to 2L/NC. Patient did not require O2 prior to hospitalization. On 3/3, labs showed elevated BUN/Cr of 77.7/ 1.80 with creatinine trending down. [place] catheter remained. Last BM on 2/26 charted. Patient is AAOx4; confusion at times with pain meds.  Participating with therapy. Functional status includes bed mobility and transfers requiring moderate to maximal assistance. Max assist needed for upper body dressing; total assist toileting due to medellin;  setup/supervision for eating; and minimal assist for grooming. Amb w/RW for 40ft at Min Assist. Patient was evaluated, accepted, and admitted to inpatient rehab to improve functional status. Transferred to [facility] on 3/3 without incident.   3/9: Seen with PT, seated on rollator after walking 142ft. Denies SOB. States that breathing treatment last night helped.  SpO2 staying above 94% with activity. Reportedly had a drop in bed last night. ABGs and CXR ordered. Covid and Flu negative this morning. Progressing in therapy without current complaint. VSSAF.       Review of Systems  Depression/Anxiety: no complaints     FLUoxetine capsule 10 mg qd  Pain: incisional-controlled  gabapentin capsule 100 mg TID  rOPINIRole tablet 1 mg BID     HYDROcodone-acetaminophen 5-325 mg 1 tab q4hr PRN pain  Bowels/Bladder: last BM 3/9    Appetite: fair-improving. Ate all breakfast     Sleep: good      QUEtiapine tablet 50 mg qHS    Physical Exam  General: well-developed, well-nourished, tremor at rest  Respiratory: equal chest rise, no SOB, no audible wheeze  Cardiovascular: regular rate and rhythm, no edema  Gastrointestinal: soft, non-tender, non-distended   Musculoskeletal: full range of motion of all extremities/spine with generalized weakness  Integumentary: no rashes or skin lesions present, midline sternal anqmprgt-QZW-x/d/i, chest tube site x 2 -wrgsqk-HBU-r/d/I, RLE and LLE harvest incision sites-LONDON-c/d/i, Right groin puncture site-LONDON-c/d/I, sacral/buttocks-reddened  Neurologic: cranial nerves intact, no signs of peripheral neurological deficit, motor/sensory function intact, resting tremor-BUE          Labs:    Latest Reference Range & Units 03/08/23 21:40 03/08/23 21:46   Sample   ARTERIAL   POC PH 7.35 - 7.45  7.407 7.407   POC PCO2 35 - 45 mmHg 42.3 42.3   POC PO2 80 - 100 mmHg 85 85   POC HCO3 24 - 28 mmol/L  26.6   HCO3 ART 18.0 - 23.0 MMOL/L 26.6 !    POC TCO2 23 - 27 mmol/L  28 (H)   POC SATURATED O2 95 - 100 %  96   O2 Sat, Arterial  96    POC BE -2 to 2 mmol/L  2   Base Excess, Arterial -2.0 - 2.0 mmol/L 2.0    POC FIO2  28    !: Data is abnormal  (H): Data is abnormally high   Latest Reference Range & Units 03/09/23 08:24   Influenza A, Molecular Not Detected  Not Detected   Influenza B, Molecular Not Detected  Not Detected   SARS-CoV2 (COVID-19) Qualitative PCR Not  Detected, Negative, Invalid  Not Detected       Diagnostics:  XR CHEST 1 VIEW  FINDINGS:  Examination reveals cardiomediastinal silhouette and pleuroparenchymal changes to be essentially unchanged as compared with the previous examination when accounting for difference in technique  Impression:  No significant change  Date:                                            03/09/2023  Time:                                           08:36        Assessment/Plan  Hospital   CAD (coronary artery disease)   S/P CABG x 2     Non-Hospital   Lupus anticoagulant inhibitor syndrome   Iron deficiency anemia   Elevated homocysteine   Low vitamin B12 level   Anemia   Angina, class III   Elevated partial thromboplastin time (PTT)   Coronary artery disease   Stage 3 chronic kidney disease   Hypercholesteremia   Bleeding   Altered mental status   Hypertension   Head injury   Dehydration   NSTEMI (non-ST elevated myocardial infarction)   Ischemic cardiomyopathy   GERD (gastroesophageal reflux disease)   Enlarged prostate   Aortic aneurysm, abdominal   Skin cancer       Wounds: midline sternal dolqbndi-ITO-k/d/i, chest tube site x 2 -fybijc-MHM-h/d/I, RLE and LLE harvest incision sites-LONDON-c/d/i, Right groin puncture site-LONDON-c/d/I, sacral/buttocks-reddened  S/p CABG x 2 on 2/24 by Dr Hagan  Precautions:sternal   Bracing: cardiac bear for splinting  Swallowing: Liberalized to Regular Diet with no added salt  Function: Tolerating therapy. Continue PT/OT  VTE Prophylaxis:   enoxaparin injection 40 mg SubQ q24hr  Code Status: FULL CODE   Discharge:Lives with his spouse in [place] in a single-story home with no steps to enter the residence. Completed high school. He was in the [employer]. He is retired from electrical/refrigeration work.  Wife manages the patients medications and finances. She also cooks, cleans, does laundry, and grocery shops.  Children (3). Date 3/17 Friday.

## 2023-03-09 NOTE — PLAN OF CARE
Problem: Occupational Therapy  Goal: Occupational Therapy Goal  Description: ADLs: Pt will be SPV with ADLs by d/c.   STG: by reeval  Pt to perform UB dressing with partial A MET  Pt to perform LB dressing with Mod A using AD MET  Pt to perform putting on/off footwear task with Mod A using AD MET  Pt to perform toileting with Mod A using AD as needed MET  Pt to perform bathing with Mod A MET    Functional Transfers:  Pt to perform toilet transfers with Touch A using BSC over toilet. MET  Pt to perform a tub transfer with TTB requiring Mod A  MET    Balance, Strengthening, Endurance, Balance:  Pt to consistently demonstrate adherence to orthopedic precautions during all ADL's as instructed by OT.  Pt to demonstrate fair dynamic standing balance as required to perform ADL's from standing level.  Pt to demonstrate consistent adherence to breathing control and energy conservation techniques as educated by OT.   Outcome: Ongoing, Progressing

## 2023-03-09 NOTE — PROGRESS NOTES
[facility] ([facility])  Rehab Progress Note    Patient Name: Hunter Navarrete  MRN: [id number]  Age: 82 y.o. Sex: male  : 1940  Hospital Length of Stay: 6 days  Date of Service: 3/9/2023   Chief Complaint: Multivessel CAD s/p left heart catheterization with InStent restenosis and multivessel coronary disease on 02/15/2023 s/p CABG x2 (lima to LAD, RSVG to OM with right GSV EVH) on 2023    Subjective:     Basic Information  Admit Information: 82-year-old WM presented to [facility] on 02/15 with complaints of substernal chest pain with shortness of breath.  PMH significant for CAD with PCI, CKD, hypertension, mild aortic stenosis, lupus anticoagulant inhibitor syndrome, and hyperlipidemia.  Workup significant for elevated troponin.  Transferred to [facility] for cardiology evaluation.  Initiated heparin drip.  Tolerated left heart catheterization on 02/15 significant for multivessel coronary disease with InStent restenosis.  Hematology recommended transfer to tertiary facility for CABG 2/2 history of lupus anticoagulant.  Tolerated transfer to [facility] on .  Initiated Tridil drip and titrated for chest pain.  Required 2 units PRBC transfusion on .  On  tolerated CABG x2 (lima to LAD, RSVG to OM with right GSV EVH) without perioperative complications.  Postoperatively required IV fluid gentle hydration per Nephrology 2/2 CKD stage IIIB.  Chest tubes removed.  Continued with postoperative DANIELLA on CKD.  Nephrology will continue to follow.  Discontinue Lasix IV on  and initiated home dose of Lasix 40 mg daily.  Initiated Plavix on . Tolerated transfer to [facility] inpatient rehab unit on 3/3 without incident.  Today's Information:  Reported increased shortness of breath overnight cough.  Acute onset.  Received Solu-Medrol overnight.  Again short of breath this morning.  CXR with small right pleural effusion.  Received Lasix 40 mg IVP x1 dose.  Reports he  feels much better this morning.  No acute complaints.  Appetite and bowel maintenance at goal.  Vital signs at goal with no recorded fever.  Leukocytosis-trending down.  H&H stable.  Renal indices trending up.  Potassium 5.0.  Bicarb 22.  CXR with no significant changes.    Review of patient's allergies indicates:   Allergen Reactions    Cardura [doxazosin] Hives    Lyrica [pregabalin] Other (See Comments)    Paxil [paroxetine hcl] Other (See Comments)    Restoril [temazepam] Hallucinations    Vioxx [rofecoxib] Swelling    Zithromax [azithromycin] Hives        Current Facility-Administered Medications:     aspirin EC tablet 81 mg, 81 mg, Oral, Daily, MAGDA HaiderP, 81 mg at 03/08/23 0820    atorvastatin tablet 40 mg, 40 mg, Oral, QHS, Yaneth Douglsa, FNP, 40 mg at 03/08/23 2041    benzocaine 10 % mucosal gel, , Mouth/Throat, QID PRN, Denilson Medina DO    benzonatate capsule 100 mg, 100 mg, Oral, TID PRN, Yaneth Douglas, FNP, 100 mg at 03/06/23 2336    bisacodyL suppository 10 mg, 10 mg, Rectal, Daily PRN, MAGDA HaiderP, 10 mg at 03/03/23 1909    carvediloL tablet 12.5 mg, 12.5 mg, Oral, BID, Suman Reese, FNP, 12.5 mg at 03/08/23 2041    clopidogreL tablet 75 mg, 75 mg, Oral, Daily, MAGDA HaiderP, 75 mg at 03/08/23 0821    docusate sodium capsule 100 mg, 100 mg, Oral, BID, Yaneth Douglas FNP, 100 mg at 03/08/23 2041    enoxaparin injection 40 mg, 40 mg, Subcutaneous, Daily, MAGDA HaiderP, 40 mg at 03/08/23 1650    ezetimibe tablet 10 mg, 10 mg, Oral, Daily, Yaneth Douglas FNP, 10 mg at 03/08/23 0821    famotidine tablet 20 mg, 20 mg, Oral, Daily PRN, Xander Enriquez MD    ferrous sulfate tablet 1 each, 1 tablet, Oral, BID, SHIRLEY Haider, 1 each at 03/08/23 2041    FLUoxetine capsule 10 mg, 10 mg, Oral, Daily, SHIRLEY Haider, 10 mg at 03/08/23 0819    furosemide tablet 40 mg, 40 mg, Oral, Daily, SHIRLEY Haider, 40 mg at 03/08/23 0821    gabapentin  capsule 100 mg, 100 mg, Oral, TID, SHIRLEY Haider, 100 mg at 03/09/23 0540    gabapentin capsule 100 mg, 100 mg, Oral, Daily, SHIRLEY Barber    hydrALAZINE injection 10 mg, 10 mg, Intravenous, Q6H PRN, SHIRLEY Haider, 10 mg at 03/05/23 1456    HYDROcodone-acetaminophen 5-325 mg per tablet 1 tablet, 1 tablet, Oral, Q4H PRN, SHIRLEY Haider, 1 tablet at 03/08/23 1105    hydrOXYzine pamoate capsule 50 mg, 50 mg, Oral, Nightly PRN, SHIRLEY Haider    ipratropium 0.02 % nebulizer solution 0.5 mg, 0.5 mg, Nebulization, Q4H PRN, Xander Enriquez MD, 0.5 mg at 03/09/23 0359    labetalol 20 mg/4 mL (5 mg/mL) IV syring, 10 mg, Intravenous, Q4H PRN, SHIRLEY Haider    levalbuterol nebulizer solution 1.25 mg, 1.25 mg, Nebulization, Q4H PRN, Xander Enriquez MD, 1.25 mg at 03/09/23 0358    LIDOcaine 5 % patch 2 patch, 2 patch, Transdermal, Q24H, SHIRLEY Barber, 2 patch at 03/09/23 0541    meclizine tablet 25 mg, 25 mg, Oral, TID PRN, SHIRLEY Haider    metoprolol injection 10 mg, 10 mg, Intravenous, Q2H PRN, SHIRLEY Haider    ondansetron disintegrating tablet 4 mg, 4 mg, Oral, Q6H PRN, SHIRLEY Haider    pantoprazole EC tablet 40 mg, 40 mg, Oral, Daily, SHIRLEY Haider, 40 mg at 03/08/23 0824    potassium chloride SA CR tablet 20 mEq, 20 mEq, Oral, Daily, Denilson Medina DO, 20 mEq at 03/08/23 0822    potassium chloride SA CR tablet 40 mEq, 40 mEq, Oral, Once, SHIRLEY Wilcox    predniSONE tablet 2.5 mg, 2.5 mg, Oral, Daily, MAGDA HaiderP, 2.5 mg at 03/08/23 0823    QUEtiapine tablet 50 mg, 50 mg, Oral, QHS, MAGDA HaiderP, 50 mg at 03/08/23 2041    rOPINIRole tablet 1 mg, 1 mg, Oral, BID, MAGDA HaiderP, 1 mg at 03/08/23 2041    sucralfate tablet 1 g, 1 g, Oral, QID (AC & HS), Yaneth Douglas, FNP, 1 g at 03/09/23 0540    tamsulosin 24 hr capsule 0.4 mg, 0.4 mg, Oral, QHS, MAGDA HaiderP, 0.4 mg at 03/08/23 2041     "tiZANidine tablet 4 mg, 4 mg, Oral, TID PRN, Usha Mcgee, SHIRLEY     Review of Systems   Complete 12-point review of symptoms negative except for what's mentioned in HPI     Objective:     /68   Pulse 90   Temp 98.2 °F (36.8 °C) (Oral)   Resp 18   Ht 5' 10.98" (1.803 m)   Wt 86.3 kg (190 lb 4.1 oz)   SpO2 (!) 94%   BMI 26.55 kg/m²      Physical Exam  Vitals reviewed.   Eyes:      Pupils: Pupils are equal, round, and reactive to light.   Cardiovascular:      Rate and Rhythm: Normal rate and regular rhythm.      Heart sounds: Murmur heard.   Systolic murmur is present with a grade of 3/6.   Pulmonary:      Effort: Pulmonary effort is normal.      Breath sounds: Normal breath sounds.   Abdominal:      General: Bowel sounds are normal.   Musculoskeletal:         General: Normal range of motion.   Skin:     General: Skin is warm.      Comments: Midline sternal incision open air dry and intact    Neurological:      Mental Status: He is alert and oriented to person, place, and time.      Motor: Weakness present.      Comments: Resting tremors, shuffling gait   Psychiatric:         Mood and Affect: Mood normal.   *MD performed and documented physical examination       Lines/Drains/Airways       Peripherally Inserted Central Catheter Line  Duration             PICC Triple Lumen 02/27/23 1045 left basilic 9 days                Labs  Recent Results (from the past 24 hour(s))   POCT ARTERIAL BLOOD GAS Blood Gas    Collection Time: 03/08/23  9:40 PM   Result Value Ref Range    POC PH 7.407     POC PCO2 42.3     POC PO2 85     POC Sodium      POC Potassium      POC Ionized Calcium      POC HEMOGLOBIN      POC O2Hb      POC COHb      POC MetHb      POC PCO2      Base Excess, Arterial 2.0 -2.0 - 2.0 mmol/L    O2 Sat, Art 96     HCO3, Arterial 26.6 (A) 18.0 - 23.0 MMOL/L    POC FIO2 28    ISTAT PROCEDURE    Collection Time: 03/08/23  9:46 PM   Result Value Ref Range    POC PH 7.407 7.35 - 7.45    POC PCO2 42.3 35 - 45 " mmHg    POC PO2 85 80 - 100 mmHg    POC HCO3 26.6 24 - 28 mmol/L    POC BE 2 -2 to 2 mmol/L    POC SATURATED O2 96 95 - 100 %    POC TCO2 28 (H) 23 - 27 mmol/L    Sample ARTERIAL    COVID/FLU A&B PCR    Collection Time: 03/09/23  8:24 AM   Result Value Ref Range    Influenza A PCR Not Detected Not Detected    Influenza B PCR Not Detected Not Detected    SARS-CoV-2 PCR Not Detected Not Detected, Negative, Invalid   CBC with Differential    Collection Time: 03/09/23 12:16 PM   Result Value Ref Range    WBC 12.1 (H) 4.5 - 11.5 x10(3)/mcL    RBC 3.49 (L) 4.70 - 6.10 x10(6)/mcL    Hgb 10.3 (L) 14.0 - 18.0 g/dL    Hct 32.6 (L) 42.0 - 52.0 %    MCV 93.4 80.0 - 94.0 fL    MCH 29.5 pg    MCHC 31.6 (L) 33.0 - 36.0 g/dL    RDW 15.5 11.5 - 17.0 %    Platelet 259 130 - 400 x10(3)/mcL    MPV 11.0 (H) 7.4 - 10.4 fL    Neut % 83.6 %    Lymph % 5.6 %    Mono % 9.3 %    Eos % 0.0 %    Basophil % 0.1 %    Lymph # 0.67 0.6 - 4.6 x10(3)/mcL    Neut # 10.08 (H) 2.1 - 9.2 x10(3)/mcL    Mono # 1.12 0.1 - 1.3 x10(3)/mcL    Eos # 0.00 0 - 0.9 x10(3)/mcL    Baso # 0.01 0 - 0.2 x10(3)/mcL    IG# 0.17 (H) 0 - 0.04 x10(3)/mcL    IG% 1.4 %    NRBC% 0.0 %   BNP    Collection Time: 03/09/23 12:16 PM   Result Value Ref Range    Natriuretic Peptide 775.2 (H) <=100.0 pg/mL   Basic Metabolic Panel    Collection Time: 03/09/23 12:16 PM   Result Value Ref Range    Sodium Level 141 136 - 145 mmol/L    Potassium Level 5.0 3.5 - 5.1 mmol/L    Chloride 106 98 - 107 mmol/L    Carbon Dioxide 22 (L) 23 - 31 mmol/L    Glucose Level 100 82 - 115 mg/dL    Blood Urea Nitrogen 40.9 (H) 8.4 - 25.7 mg/dL    Creatinine 1.91 (H) 0.73 - 1.18 mg/dL    BUN/Creatinine Ratio 21     Calcium Level Total 9.0 8.8 - 10.0 mg/dL    Anion Gap 13.0 mEq/L    eGFR 35 mls/min/1.73/m2     Radiology  CXR one view on 03/09/2023 at 8:36 a.m., IMPRESSION:  No significant changes  Radiology  CUS 02/20/23: The right internal carotid artery demonstrated 50-69% stenosis. The left internal  carotid artery demonstrated 50-69% stenosis.  Bilateral vertebral arteries were patent with antegrade flow.  Radiology  [facility] 02/15/2023: LM 0% stenosis mLAD proximal 70% ISR D1 ostial 50% stenosis D2 ostial 50% stenosis Lcx: previous stent; proximal Lcx  90% ISR; mid Lcx 30% stenosis OM1 proximal 80% stenosis RCA patent stents to ostium. Mid RCA proximal subsection- 60% stenosis; Mid RCA distal subsection 70& stenosis; distal RCA proximal subsection 100% stenosis. Fills left to right  Radiology  TTE 02/16/2023: Global LV SF is borderline normal. LVEF 45-50%. LA is mildly enlarged. Moderate calcification of the aortic valve is noted. Mild AS is present. Mild to moderate AR. Mild to moderate MAC is noted. Mild MR. Trace TR. PASP 40 mmHg. Optison used to enhance endocardial border.     Assessment/Plan:     82 y.o. WM admitted on 3/3/2023     Multivessel CAD  - s/p left heart catheterization with InStent restenosis and multivessel coronary disease on 02/15/2023  - s/p CABG x2 (lima to LAD, RSVG to OM with right GSV EVH) on 02/24/2023  - sternal precautions  - daily weights  - discontinued metoprolol tartrate 50 mg bid on 3/5  - continue   Coreg 12.5 mg bid (initiated on 3/6)  Lasix 40 mg daily  Aspirin 81 mg daily  Plavix 75 mg daily   Atorvastatin 40 mg at night  Hydrocodone-acetaminophen 5-325 mg p.r.n. every 4 hours  - not on Ace/Arb 2/2 DANIELLA  - follow-up with cardiology and CV surgery outpatient     ICMO  - EF 45-50% TTE 02/15/2023 improved from 40%  - discontinued metoprolol tartrate 50 mg bid on 3/5  - continue   Coreg 12.5 mg bid (initiated on 3/6)  Lasix 40 mg daily  Aspirin 81 mg daily  Plavix 75 mg daily   Atorvastatin 40 mg at night  - not on Ace/Arb 2/2 DANIELLA  - follow-up with cardiology outpatient     Acute on chronic combined systolic/diastolic heart failure  - EF 45-50% TTE 02/15/2023 improved from 40%  - discontinued metoprolol tartrate 50 mg bid on 3/5  - initiate   Lasix 40 mg IVP x 1 early on 3/9 due to  dyspnea  - continue   Coreg 12.5 mg bid (initiated on 3/6)  Lasix 40 mg daily  Aspirin 81 mg daily  Plavix 75 mg daily   Atorvastatin 40 mg at night  - not on Ace/Arb 2/2 DANIELLA  - follow-up with cardiology outpatient     DANIELLA on CKD stage IIIb  - Renal indices trending up  - Avoid NSAIDs (Advil, ibuprofen, naproxen...) and poe-2 inhibitors (Mobic, Celebrex)    - encourage oral hydration  - defer to Nephrology     HTN  - BP at goal!!  - discontinued metoprolol tartrate 50 mg bid on 3/5  - continue   Coreg 12.5 mg bid (initiated on 3/6)  Hydralazine 10 mg every 2 hours as needed for BP > 160/90  Labetalol 10 mg every 2 hours as needed for BP > 160/90     HLD  - FLP at goal!!  - continue  Atorvastatin 40 mg at night  Zetia 10 mg daily      RLS  - stable  - continue  Gabapentin 100 mg t.i.d.  Ropinirole 1 mg b.i.d.     Bilateral RICKEY  - moderate bilateral  - continue  Atorvastatin 40 mg at night  Zetia 10 mg daily   Plavix 75 mg daily   - follow-up with cardiology outpatient     Constipation  - stable  - continue  Colace 100 mg b.i.d.     GERD  - Avoid spicy foods, and nothing to eat or drink within x2 hours of bedtime or laying flat (water is ok)   - Avoid NSAIDs (Advil, ibuprofen, naproxen...) and poe-2 inhibitors (Mobic, Celebrex)    - continue  Protonix 40 mg daily   Carafate 1 g a.c. and HS      Lupus anticoagulant inhibitor syndrome  - stable  - continue  Prednisone 2.5 mg daily   - follow-up with Hematology outpatient     Major depressive disorder  - in remission  - continue  Fluoxetine 10 mg daily     Insomnia  - stable  - continue  Seroquel 50 mg at bedtime     BPH/urinary retention  - stable  - Urinary catheter replaced on 02/27   - Discontinue indwelling catheter on 03/07  - good urine output reported on 03/08  - continue  Flomax 0.4 mg at bedtime      Normocytic anemia  - asymptomatic  - s/p transfusion  x2 units PRBC on 2/24/2023  x2 units PRBC on 2/23/2023  - H/H stable   - no evidence of active bleeds  - low  iron levels  - continue  Ferrous sulfate 325 mg b.i.d. (initiated 3/4)  - will closely monitor and transfuse if needed     Acute hypoxic respiratory failure  - on 2 L nasal cannula  - chest x-ray stable  - continue  Lasix 40 mg daily    Parkinson's disease  - reportedly diagnosed in 2021  - not currently on medication      VTE Prophylaxis:  Lovenox 40 mg daily  COVID-19 testing:  Negative on 03/03  COVID-19 vaccination status:  Vaccinated x4     POA:  Yes (Romy, daughter and Nav, son)  Living will: yes  Contacts:  Giovana Navarrete (spouse) [phone]                      Romy (daughter) [phone]     CODE STATUS: Full  Internal Medicine (attending): Xander Enriquez MD  Physiatry (consulting):  Dilip Hargrove MD     OUTPATIENT PROVIDERS  PCP:  Mariella Bethea MD   Hematology:  Missy Davila MD in [facility]  Cardiology: Curtis Loredo MD at [facility]   CV surgery:  Spencer Hagan MD  Nephrology:  Darinel Bedoya MD     DISPOSITION:  Sleep hygiene, bowel maintenance, and appetite at goal.  Last BM 3/7.  Vital signs at goal with no recorded fevers.  H&H stable.  CMP unremarkable.  Leukocytosis trending down.  Renal indices trending up slightly.  Potassium 5.0.  Discontinue potassium.  Shortness of breath improved s/p Lasix 40 mg IVP this morning.  COVID-19 testing negative.  ABGs unremarkable.  Continue aggressive mobilization as tolerated.  Monitor closely.  Notify of acute changes.    Staffing 3/7/2023: Continent of bowel and bladder. CT suture removed on 3/7. RT: Overall mod to supervision.  Appetite is poor.  Breakfast is better.  Liberalize diet with no added salt.  RT: Doing well.  Bed mobility limited. PT: SBA to min assist with bed mobility, transfers and gait.  Dramatic improvement OTW.  Limited by activity intolerance. Needs breaks throughout the day. OT: Limited by weakness.  Performed better than he thinks.  He needs time to improve strength and endurance. Mod assist for bed mobility. Projected  discharge 3/17.      Rajesh Reese NP conducted independent physical examination and assisted with medical documentation.

## 2023-03-09 NOTE — PT/OT/SLP PROGRESS
Occupational Therapy Inpatient Rehab Treatment    Name: Hunter Navarrete  MRN: [id number]    Assessment:  Hunter Navarrete is a 82 y.o. male admitted with a medical diagnosis of S/P CABG x 2.  He presents with the following impairments/functional limitations:  weakness, impaired endurance, impaired self care skills, impaired functional mobility, impaired balance, pain, decreased upper extremity function, decreased ROM .    General Precautions: Standard, fall, sternal     Orthopedic Precautions:Full weight bearing     Braces: N/A    Rehab Prognosis: Good; patient would benefit from acute skilled OT services to address these deficits and reach maximum level of function.      History:     Past Medical History:   Diagnosis Date    Acid reflux     Anemia     Aortic aneurysm, abdominal     Arthritis     Bronchitis     Cataract     Celiac artery stenosis     50% by CTA abdomen 7/27/2020    CKD (chronic kidney disease)     45% FUNCTION    Coronary artery disease     Encounter for blood transfusion     Enlarged prostate     GERD (gastroesophageal reflux disease)     Hemorrhoids     Hypercholesteremia     Hypertension     Iron deficiency anemia, unspecified     Leaky heart valve     Lupus anticoagulant disorder     Renal artery stenosis     by CTA 7/27/2020    Skin cancer     Unspecified chronic bronchitis     UTI (urinary tract infection)        Past Surgical History:   Procedure Laterality Date    ACF      BACK SURGERY      RODS IN BACK; 4 SURGIERIES    BONE MARROW BIOPSY      CARDIAC ANGIOGRAM WITH STENT      CATARACT SX Bilateral     CORONARY ANGIOGRAPHY N/A 2/15/2023    Procedure: ANGIOGRAM, CORONARY ARTERY;  Surgeon: Rito Vega MD;  Location: [facility] CATH;  Service: Cardiology;  Laterality: N/A;    CORONARY ARTERY BYPASS GRAFT (CABG) N/A 2/24/2023    Procedure: CORONARY ARTERY BYPASS GRAFT (CABG);  Surgeon: Spencer Hagan MD;  Location: [facility] OR;  Service: Cardiothoracic;  Laterality: N/A;  CABG / EVH //   ECHO NOTIFIED     HIP SURGERY Right     THR    KNEE SURGERY Right     ATS    LEFT HEART CATHETERIZATION Left 07/22/2021    Procedure: Left heart cath;  Surgeon: Curtis Loredo MD;  Location: [facility] CATH;  Service: Cardiology;  Laterality: Left;    LEFT HEART CATHETERIZATION Left 09/22/2022    Procedure: Left heart cath;  Surgeon: Giorgi Barker MD;  Location: [facility] CATH;  Service: Cardiology;  Laterality: Left;    SHOULDER SURGERY Bilateral     SPINAL CORD STIMULATOR IMPLANT         Subjective     Orientation: Oriented x4    Chief Complaint: No Complaints    Patient/Family Comments/goals: Pt.'s son would like Pt. To require as little assistance by Pt.'s wife as possible. Pt. Performed ADL c very little assist.     Vitals   Vitals at Rest  /58   HR 92   O2 Sat 96   Pain 1/10     Vitals With Activity  /61   HR 87   O2 Sat 96   Pain 0/10     Respiratory Status: Room air    Patients cultural, spiritual, Hindu conflicts given the current situation: no       Objective:     Patient found up in chair with PICC line  upon OT entry to room.    Mobility   Patient completed:  Sit to Stand Transfer with supervision with 4 wheeled walker  Toilet Transfer Step Transfer technique with supervision with  4 wheeled walker and bedside commode  Tub Transfer Stand Pivot technique with stand by assistance with 4 wheeled walker and TTB    Functional Mobility  At 13:06, Pt. Ambulated room 403 <>OT Gym for there Ex via rollator c SVN.     ADLs   Current Status   Eating 6   Oral Hygiene 5   Shower, Bathe Self 4   Upper Body Dressing 4   Lower Body Dressing 3   Toileting Hygiene 3   Toilet Transfer 4   Putting On, Taking Off Footwear 4     Limiting Factors for ADLs:  sternal Px; poor trunk flexion   2* Hx Back Sx    Therapeutic Activities  Pt. Educated/performed LB DSG c AE much more successfully than previous attempts.    Therapeutic Exercise  Pt. C 1 # FW performed 3 x's 10-15 reps biceps curls, chest press, B shoulder Abd/duction. Pt.  Required AAROM to fully extend R elbow. Rest breaks provided.       LifeStyle Change and Education:     LifeStyle Changes and Education Discussed: Staying active  Patient agrees to LifeStyle Change: Yes    Patient left supine with call button in reach.     Education provided: ADLs, transfer training, bed mobility, assistive device, and post-op precautions    Multidisciplinary Problems       Occupational Therapy Goals          Problem: Occupational Therapy    Goal Priority Disciplines Outcome Interventions   Occupational Therapy Goal     OT, PT/OT Ongoing, Progressing    Description: ADLs: Pt will be SPV with ADLs by d/c.   STG: by reeval  Pt to perform UB dressing with partial A MET  Pt to perform LB dressing with Mod A using AD MET  Pt to perform putting on/off footwear task with Mod A using AD MET  Pt to perform toileting with Mod A using AD as needed MET  Pt to perform bathing with Mod A MET    Functional Transfers:  Pt to perform toilet transfers with Touch A using BSC over toilet. MET  Pt to perform a tub transfer with TTB requiring Mod A  MET    Balance, Strengthening, Endurance, Balance:  Pt to consistently demonstrate adherence to orthopedic precautions during all ADL's as instructed by OT.  Pt to demonstrate fair dynamic standing balance as required to perform ADL's from standing level.  Pt to demonstrate consistent adherence to breathing control and energy conservation techniques as educated by OT.                        Time Tracking     OT Received On: 03/09/23  Time In  (1030 & 1306)     Time Out  (2505 & 1336)  Total Time    Therapy Time: OT Individual: 105  Missed Time:  0  Missed Time Reason:      Billable Minutes: Self Care/Home Management 75, Therapeutic Activity 10, and Therapeutic Exercise 20    03/09/2023

## 2023-03-09 NOTE — PLAN OF CARE
Problem: Rehabilitation (IRF) Plan of Care  Goal: Plan of Care Review  Outcome: Ongoing, Progressing  Flowsheets (Taken 3/9/2023 0427)  Plan of Care Reviewed With: patient  Goal: Optimal Comfort and Wellbeing  Outcome: Ongoing, Progressing  Intervention: Provide Person-Centered Care  Flowsheets (Taken 3/9/2023 0427)  Trust Relationship/Rapport:   care explained   emotional support provided   empathic listening provided   questions answered   thoughts/feelings acknowledged     Problem: Fall Injury Risk  Goal: Absence of Fall and Fall-Related Injury  Outcome: Ongoing, Progressing  Intervention: Identify and Manage Contributors  Flowsheets (Taken 3/9/2023 0427)  Medication Review/Management: medications reviewed  Intervention: Promote Injury-Free Environment  Flowsheets (Taken 3/9/2023 0427)  Safety Promotion/Fall Prevention:   assistive device/personal item within reach   bed alarm set   Fall Risk reviewed with patient/family   Fall Risk signage in place   medications reviewed   nonskid shoes/socks when out of bed   instructed to call staff for mobility   side rails raised x 2   commode/urinal/bedpan at bedside     Problem: Skin Injury Risk Increased  Goal: Skin Health and Integrity  Outcome: Ongoing, Progressing  Intervention: Optimize Skin Protection  Flowsheets (Taken 3/9/2023 0427)  Pressure Reduction Techniques: frequent weight shift encouraged  Head of Bed (HOB) Positioning: HOB at 30 degrees

## 2023-03-10 LAB
ALBUMIN SERPL-MCNC: 2.6 G/DL (ref 3.4–4.8)
BUN SERPL-MCNC: 46.2 MG/DL (ref 8.4–25.7)
CALCIUM SERPL-MCNC: 9 MG/DL (ref 8.8–10)
CHLORIDE SERPL-SCNC: 108 MMOL/L (ref 98–107)
CO2 SERPL-SCNC: 25 MMOL/L (ref 23–31)
CREAT SERPL-MCNC: 1.82 MG/DL (ref 0.73–1.18)
GFR SERPLBLD CREATININE-BSD FMLA CKD-EPI: 37 MLS/MIN/1.73/M2
GLUCOSE SERPL-MCNC: 106 MG/DL (ref 82–115)
MAGNESIUM SERPL-MCNC: 1.6 MG/DL (ref 1.6–2.6)
PHOSPHATE SERPL-MCNC: 3.3 MG/DL (ref 2.3–4.7)
POTASSIUM SERPL-SCNC: 4.3 MMOL/L (ref 3.5–5.1)
SODIUM SERPL-SCNC: 140 MMOL/L (ref 136–145)

## 2023-03-10 PROCEDURE — 25000003 PHARM REV CODE 250: Performed by: NURSE PRACTITIONER

## 2023-03-10 PROCEDURE — 83735 ASSAY OF MAGNESIUM: CPT | Performed by: NURSE PRACTITIONER

## 2023-03-10 PROCEDURE — 11800000 HC REHAB PRIVATE ROOM

## 2023-03-10 PROCEDURE — 99233 PR SUBSEQUENT HOSPITAL CARE,LEVL III: ICD-10-PCS | Mod: ,,, | Performed by: NURSE PRACTITIONER

## 2023-03-10 PROCEDURE — 97530 THERAPEUTIC ACTIVITIES: CPT

## 2023-03-10 PROCEDURE — 97168 OT RE-EVAL EST PLAN CARE: CPT

## 2023-03-10 PROCEDURE — 97116 GAIT TRAINING THERAPY: CPT

## 2023-03-10 PROCEDURE — 25000242 PHARM REV CODE 250 ALT 637 W/ HCPCS: Performed by: NURSE PRACTITIONER

## 2023-03-10 PROCEDURE — 94761 N-INVAS EAR/PLS OXIMETRY MLT: CPT

## 2023-03-10 PROCEDURE — 94640 AIRWAY INHALATION TREATMENT: CPT

## 2023-03-10 PROCEDURE — 97535 SELF CARE MNGMENT TRAINING: CPT

## 2023-03-10 PROCEDURE — 27000221 HC OXYGEN, UP TO 24 HOURS

## 2023-03-10 PROCEDURE — 99233 SBSQ HOSP IP/OBS HIGH 50: CPT | Mod: ,,, | Performed by: NURSE PRACTITIONER

## 2023-03-10 PROCEDURE — 80069 RENAL FUNCTION PANEL: CPT | Performed by: STUDENT IN AN ORGANIZED HEALTH CARE EDUCATION/TRAINING PROGRAM

## 2023-03-10 PROCEDURE — 97164 PT RE-EVAL EST PLAN CARE: CPT

## 2023-03-10 PROCEDURE — 63600175 PHARM REV CODE 636 W HCPCS: Performed by: NURSE PRACTITIONER

## 2023-03-10 PROCEDURE — 92610 EVALUATE SWALLOWING FUNCTION: CPT

## 2023-03-10 RX ORDER — GABAPENTIN 100 MG/1
200 CAPSULE ORAL NIGHTLY
Status: DISCONTINUED | OUTPATIENT
Start: 2023-03-10 | End: 2023-03-17 | Stop reason: HOSPADM

## 2023-03-10 RX ADMIN — ATORVASTATIN CALCIUM 40 MG: 40 TABLET, FILM COATED ORAL at 08:03

## 2023-03-10 RX ADMIN — GABAPENTIN 100 MG: 100 CAPSULE ORAL at 05:03

## 2023-03-10 RX ADMIN — GABAPENTIN 100 MG: 100 CAPSULE ORAL at 08:03

## 2023-03-10 RX ADMIN — LIDOCAINE 2 PATCH: 50 PATCH CUTANEOUS at 05:03

## 2023-03-10 RX ADMIN — HYDROCODONE BITARTRATE AND ACETAMINOPHEN 1 TABLET: 5; 325 TABLET ORAL at 07:03

## 2023-03-10 RX ADMIN — TIZANIDINE 4 MG: 4 TABLET ORAL at 07:03

## 2023-03-10 RX ADMIN — CLOPIDOGREL BISULFATE 75 MG: 75 TABLET ORAL at 07:03

## 2023-03-10 RX ADMIN — LEVALBUTEROL HYDROCHLORIDE 1.25 MG: 1.25 SOLUTION RESPIRATORY (INHALATION) at 07:03

## 2023-03-10 RX ADMIN — FLUOXETINE 10 MG: 10 CAPSULE ORAL at 07:03

## 2023-03-10 RX ADMIN — PANTOPRAZOLE SODIUM 40 MG: 40 TABLET, DELAYED RELEASE ORAL at 07:03

## 2023-03-10 RX ADMIN — DOCUSATE SODIUM 100 MG: 100 CAPSULE, LIQUID FILLED ORAL at 07:03

## 2023-03-10 RX ADMIN — TAMSULOSIN HYDROCHLORIDE 0.4 MG: 0.4 CAPSULE ORAL at 08:03

## 2023-03-10 RX ADMIN — ASPIRIN 81 MG: 81 TABLET, COATED ORAL at 07:03

## 2023-03-10 RX ADMIN — CARVEDILOL 12.5 MG: 6.25 TABLET, FILM COATED ORAL at 07:03

## 2023-03-10 RX ADMIN — CARVEDILOL 12.5 MG: 6.25 TABLET, FILM COATED ORAL at 08:03

## 2023-03-10 RX ADMIN — LEVALBUTEROL HYDROCHLORIDE 1.25 MG: 1.25 SOLUTION RESPIRATORY (INHALATION) at 03:03

## 2023-03-10 RX ADMIN — PREDNISONE 2.5 MG: 2.5 TABLET ORAL at 07:03

## 2023-03-10 RX ADMIN — ENOXAPARIN SODIUM 40 MG: 40 INJECTION SUBCUTANEOUS at 04:03

## 2023-03-10 RX ADMIN — LEVALBUTEROL HYDROCHLORIDE 1.25 MG: 1.25 SOLUTION RESPIRATORY (INHALATION) at 11:03

## 2023-03-10 RX ADMIN — GABAPENTIN 100 MG: 100 CAPSULE ORAL at 01:03

## 2023-03-10 RX ADMIN — EZETIMIBE 10 MG: 10 TABLET ORAL at 07:03

## 2023-03-10 RX ADMIN — DOCUSATE SODIUM 100 MG: 100 CAPSULE, LIQUID FILLED ORAL at 08:03

## 2023-03-10 RX ADMIN — FERROUS SULFATE TAB 325 MG (65 MG ELEMENTAL FE) 1 EACH: 325 (65 FE) TAB at 08:03

## 2023-03-10 RX ADMIN — SUCRALFATE 1 G: 1 TABLET ORAL at 04:03

## 2023-03-10 RX ADMIN — QUETIAPINE 50 MG: 25 TABLET ORAL at 08:03

## 2023-03-10 RX ADMIN — ROPINIROLE HYDROCHLORIDE 1 MG: 1 TABLET, FILM COATED ORAL at 07:03

## 2023-03-10 RX ADMIN — SUCRALFATE 1 G: 1 TABLET ORAL at 01:03

## 2023-03-10 RX ADMIN — GABAPENTIN 100 MG: 100 CAPSULE ORAL at 07:03

## 2023-03-10 RX ADMIN — FUROSEMIDE 40 MG: 40 TABLET ORAL at 07:03

## 2023-03-10 RX ADMIN — SUCRALFATE 1 G: 1 TABLET ORAL at 08:03

## 2023-03-10 RX ADMIN — FERROUS SULFATE TAB 325 MG (65 MG ELEMENTAL FE) 1 EACH: 325 (65 FE) TAB at 07:03

## 2023-03-10 RX ADMIN — SUCRALFATE 1 G: 1 TABLET ORAL at 05:03

## 2023-03-10 RX ADMIN — ROPINIROLE HYDROCHLORIDE 1 MG: 1 TABLET, FILM COATED ORAL at 08:03

## 2023-03-10 RX ADMIN — HYDROCODONE BITARTRATE AND ACETAMINOPHEN 1 TABLET: 5; 325 TABLET ORAL at 05:03

## 2023-03-10 RX ADMIN — GABAPENTIN 200 MG: 100 CAPSULE ORAL at 08:03

## 2023-03-10 NOTE — PT/OT/SLP RE-EVAL
Physical Therapy Rehab Re-Evaluation    Patient Name:  Hunter Navarrete   MRN:  [id number]    Recommendations:     Discharge Recommendations:  nursing facility, skilled   Discharge Equipment Recommendations: walker, rolling   Barriers to discharge:  impaired functional mobility, sternal precautions    Assessment:     Hunter Navarrete is a 82 y.o. male admitted with a medical diagnosis of S/P CABG x 2.  He presents with the following impairments/functional limitations:  weakness, impaired endurance, impaired self care skills, impaired functional mobility, decreased safety awareness, pain, impaired cardiopulmonary response to activity, other (comment) (sternal precautions) .    Rehab Diagnosis: s/p CABG    Recent Surgery: * No surgery found *      General Precautions: Standard, fall, sternal     Orthopedic Precautions: Full weight bearing     Braces: N/A    Rehab Prognosis: Good; patient would benefit from acute skilled PT services to address these deficits and reach maximum level of function.      History:     Past Medical History:   Diagnosis Date    Acid reflux     Anemia     Aortic aneurysm, abdominal     Arthritis     Bronchitis     Cataract     Celiac artery stenosis     50% by CTA abdomen 7/27/2020    CKD (chronic kidney disease)     45% FUNCTION    Coronary artery disease     Encounter for blood transfusion     Enlarged prostate     GERD (gastroesophageal reflux disease)     Hemorrhoids     Hypercholesteremia     Hypertension     Iron deficiency anemia, unspecified     Leaky heart valve     Lupus anticoagulant disorder     Renal artery stenosis     by CTA 7/27/2020    Skin cancer     Unspecified chronic bronchitis     UTI (urinary tract infection)        Past Surgical History:   Procedure Laterality Date    ACF      BACK SURGERY      RODS IN BACK; 4 SURGIERIES    BONE MARROW BIOPSY      CARDIAC ANGIOGRAM WITH STENT      CATARACT SX Bilateral     CORONARY ANGIOGRAPHY N/A 2/15/2023    Procedure: ANGIOGRAM, CORONARY  ARTERY;  Surgeon: Rito Vega MD;  Location: [facility] CATH;  Service: Cardiology;  Laterality: N/A;    CORONARY ARTERY BYPASS GRAFT (CABG) N/A 2/24/2023    Procedure: CORONARY ARTERY BYPASS GRAFT (CABG);  Surgeon: Spencer Hagan MD;  Location: [facility];  Service: Cardiothoracic;  Laterality: N/A;  CABG / EVH //   ECHO NOTIFIED    HIP SURGERY Right     THR    KNEE SURGERY Right     ATS    LEFT HEART CATHETERIZATION Left 07/22/2021    Procedure: Left heart cath;  Surgeon: Curtis Loredo MD;  Location: [facility] CATH;  Service: Cardiology;  Laterality: Left;    LEFT HEART CATHETERIZATION Left 09/22/2022    Procedure: Left heart cath;  Surgeon: Giorgi Barker MD;  Location: [facility] CATH;  Service: Cardiology;  Laterality: Left;    SHOULDER SURGERY Bilateral     SPINAL CORD STIMULATOR IMPLANT         Subjective     Chief Complaint: R shoulder and B lower back pain    Patients cultural, spiritual, Buddhism conflicts given the current situation:         Living Environment  People in Home: spouse  Living Arrangements: house  Number of Stairs, Main Entrance: none  Home Layout: Able to live on 1st floor  Transportation Anticipated: family or friend will provide  Equipment Currently Used at Home: rollator (only on occasion)      Objective:     Communicated with pt prior to session.  Patient found HOB elevated with    upon PT entry to room.    Vitals   Pain Pain Rating 1: 5/10  Location 1: other (see comments) (R shoulder and back)  Pain Addressed 1: Pre-medicate for activity, Reposition, Distraction, Cessation of Activity, Other (see comments) (pt reported heat helping his shoulder at home; moist heat pack applied to pts shoulder at completion of session)  Pain Rating Post-Intervention 1: 4/10     Respiratory Status: Room air    Exams  Cognitive Exam:  Patient is oriented to Person, Place, Time, and Situation    GGs   Admit Current   Status Goal   Functional Area: Care Score: Care Score: Care Score:   Roll Left and Right 2 4  VC  "for arm placement to decrease R shoulder pain for R rolling Supervision or touching assistance   Sit to Lying 2 6 Supervision or touching assistance   Lying to Sitting on Side of Bed 2 3  Min A to pts R side d/t pain in R shoulder; SBA to pts L side Supervision or touching assistance   Sit to Stand 2 4  VC for hand placement Supervision or touching assistance   Chair/Bed-to-Chair Transfer 2 4   Supervision or touching assistance   Car Transfer 88 4 Supervision or touching assistance   Walk 10 Feet 1 6 Supervision or touching assistance   Walk 50 Feet with Two Turns 1 6 Supervision or touching assistance   Walk 150 Feet 88 4  Pt amb 315' mod I to SBA with rollator. Pt reporting some SOB/lightheadedness at completion, but recovered quickly; PT unable to get O2 sat reading. Supervision or touching assistance   Walk 10 Feet Uneven Surface 88 4  Up/down ramp 15' with rollator and SBA Supervision or touching assistance   1 Step (Curb) 88 4  Up/down 4" curb with rollator and SBA Supervision or touching assistance   4 Steps 88 4 Supervision or touching assistance   12 Steps 88 4  Up/down 12 stairs with B rails and SBA Not applicable   Picking Up Object 88 6  With rollator and reacher Supervision or touching assistance   Wheel 50 Feet with Two Turns 88  Supervision or touching assistance   Wheel 150 Feet 88  Supervision or touching assistance     Therapeutic Activities and Exercises:  Education/practice on bed mobility    Toilet t/f: SBA with rollator + SBA for toileting.    Activity Tolerance: Good    Patient left up in chair with call button in reach and chair alarm on.    Education Provided: roles and goals of PT/PTA, transfer training, bed mob, gait training, stair training, balance training, safety awareness, body mechanics, assistive device, fall prevention, and sternal precautions    Expected compliance: High compliance    GOALS:   Multidisciplinary Problems       Physical Therapy Goals          Problem: Physical " Therapy    Goal Priority Disciplines Outcome Goal Variances Interventions   Physical Therapy Goal     PT, PT/OT Ongoing, Progressing     Description: Pt will improve functional mobility by performing:     Bed Mobility   MET - Roll Right and Left Partial/ Moderate Assistance .  MET - Lying to sitting Partial/ Moderate Assistance .  MET - Sitting to lying Partial/ Moderate Assistance .  Supine to sit transfer with supervision/touching assist.    Transfers    MET - Pt will be able to perform Stand step chair/bed to chair transfer With RW Partial/ Moderate Assistance .  MET - Pt will be able to perform Sit to stand with RW Partial/ Moderate Assistance .  MET - Pt will be able to perform Car transfer with RW Partial/ Moderate Assistance .  Sit to stand transfer independently using Rollator   Bed to chair transfer independently using Rollator   Car transfer independently using Rollator     Ambulation    MET - Pt will ambulate 50 Feet with RW Partial/ Moderate Assistance  Ambulate 350 feet independently using Rollator   Ascend/descend a 4 inch curb independently using Rollator   Ascend/descend 12 stairs independently using bilateral handrails  Ambulate 15 feet on uneven surfaces/ramps independently using Rollator     Wheelchair Mobility   D/C - Pt will be able to propel 150 feet in steven wheelchair Partial/ Moderate Assistance                        Plan:     During this hospitalization, patient to be seen 5 x/week to address the identified rehab impairments via neuromuscular re-education, wheelchair management/training, therapeutic exercises, therapeutic activities, gait training and progress toward the following goals:    Plan of Care Expires:  03/10/23  PT Next Visit Date: 03/16/23  Plan of Care reviewed with: patient      Additional Infomation:           Time Tracking:     Therapy Time   PT Start Time: 1000  PT Stop Time: 1130  PT Total Time (min): 90 min  PT Individual: 90  Missed Time:    Time Missed due to:       Billable Minutes: Re-eval 20 min, Gait Training 30 min, and Therapeutic Activity 40 min    03/10/2023

## 2023-03-10 NOTE — PT/OT/SLP PROGRESS
Occupational Therapy Inpatient Rehab Treatment    Name: Hunter Navarrete  MRN: [id number]    Assessment:  Hunter Navarrete is a 82 y.o. male admitted with a medical diagnosis of S/P CABG x 2.  He presents with the following impairments/functional limitations:  impaired endurance, impaired functional mobility, decreased upper extremity function, pain, decreased ROM .    General Precautions: Standard, fall, sternal     Orthopedic Precautions:Full weight bearing     Braces: N/A    Rehab Prognosis: Good; patient would benefit from acute skilled OT services to address these deficits and reach maximum level of function.      History:     Past Medical History:   Diagnosis Date    Acid reflux     Anemia     Aortic aneurysm, abdominal     Arthritis     Bronchitis     Cataract     Celiac artery stenosis     50% by CTA abdomen 7/27/2020    CKD (chronic kidney disease)     45% FUNCTION    Coronary artery disease     Encounter for blood transfusion     Enlarged prostate     GERD (gastroesophageal reflux disease)     Hemorrhoids     Hypercholesteremia     Hypertension     Iron deficiency anemia, unspecified     Leaky heart valve     Lupus anticoagulant disorder     Renal artery stenosis     by CTA 7/27/2020    Skin cancer     Unspecified chronic bronchitis     UTI (urinary tract infection)        Past Surgical History:   Procedure Laterality Date    ACF      BACK SURGERY      RODS IN BACK; 4 SURGIERIES    BONE MARROW BIOPSY      CARDIAC ANGIOGRAM WITH STENT      CATARACT SX Bilateral     CORONARY ANGIOGRAPHY N/A 2/15/2023    Procedure: ANGIOGRAM, CORONARY ARTERY;  Surgeon: Rito Vega MD;  Location: [facility] CATH;  Service: Cardiology;  Laterality: N/A;    CORONARY ARTERY BYPASS GRAFT (CABG) N/A 2/24/2023    Procedure: CORONARY ARTERY BYPASS GRAFT (CABG);  Surgeon: Spencer Hagan MD;  Location: [facility] OR;  Service: Cardiothoracic;  Laterality: N/A;  CABG / EVH //   ECHO NOTIFIED    HIP SURGERY Right     THR    KNEE SURGERY Right      "ATS    LEFT HEART CATHETERIZATION Left 07/22/2021    Procedure: Left heart cath;  Surgeon: Curtis Loredo MD;  Location: [facility] CATH;  Service: Cardiology;  Laterality: Left;    LEFT HEART CATHETERIZATION Left 09/22/2022    Procedure: Left heart cath;  Surgeon: Giorgi Barker MD;  Location: [facility] CATH;  Service: Cardiology;  Laterality: Left;    SHOULDER SURGERY Bilateral     SPINAL CORD STIMULATOR IMPLANT         Subjective     Orientation: Oriented x4    Chief Complaint: Pt. C/o p[ain in back, neck, R shoulder     Patient/Family Comments/goals: "To be as Independent as I can be"     Vitals   Vitals at Rest  /62      O2 Sat 94   Pain 5/10     Vitals With Activity  /58   HR 93   O2 Sat 94   Pain 5/10     Respiratory Status: Room air    Patients cultural, spiritual, Muslim conflicts given the current situation: no       Objective:     Patient found up in chair with PICC line  upon OT entry to room.    Mobility   Patient completed:  Sit to Stand Transfer with contact guard assistance with 4 wheeled walker  Stand to Sit Transfer with contact guard assistance with 4 wheeled walker  Toilet Transfer Step Transfer technique with contact guard assistance with  4 wheeled walker and bedside commode    Functional Mobility  Pt. Ambulated functional distance     ADLs   Current Status   Eating     Oral Hygiene 5   Shower, Bathe Self     Upper Body Dressing     Lower Body Dressing     Toileting Hygiene 3   Toilet Transfer 4   Putting On, Taking Off Footwear       Limiting Factors for ADLs: endurance, limited ROM, weakness, and pain         Therapeutic Activities  Pt. In standing via RW tossed 8 bean bags to floor then retrieved c reacher and placed them on tabletop. Pt. Tolerated ~ 6 min standing    Additional Treatments: Pt. Performed oral care/grooming standing at the sink via RW (rollator places Pt. Too far from the basin for his reach/back pain) x's 7 min     LifeStyle Change and Education:         "     Patient left up in chair with  Michael PT  present.     Education provided: ADLs, transfer training, assistive device, fall prevention, and post-op precautions    Multidisciplinary Problems       Occupational Therapy Goals          Problem: Occupational Therapy    Goal Priority Disciplines Outcome Interventions   Occupational Therapy Goal     OT, PT/OT Ongoing, Progressing    Description: ADLs: Pt will be SPV with ADLs by d/c.   STG: by reeval  Pt to perform UB dressing with partial A MET  Pt to perform LB dressing with Mod A using AD MET  Pt to perform putting on/off footwear task with Mod A using AD MET  Pt to perform toileting with Mod A using AD as needed MET  Pt to perform bathing with Mod A MET    Functional Transfers:  Pt to perform toilet transfers with Touch A using BSC over toilet. MET  Pt to perform a tub transfer with TTB requiring Mod A  MET    Balance, Strengthening, Endurance, Balance:  Pt to consistently demonstrate adherence to orthopedic precautions during all ADL's as instructed by OT.  Pt to demonstrate fair dynamic standing balance as required to perform ADL's from standing level.  Pt to demonstrate consistent adherence to breathing control and energy conservation techniques as educated by OT.                        Time Tracking     OT Received On: 03/10/23  Time In  (0730 & 08:30)     Time Out  (0745 & 09:00)  Total Time    Therapy Time: OT Individual: 45  Missed Time:  0  Missed Time Reason:      Billable Minutes: Self Care/Home Management 30 and Therapeutic Activity 15    03/10/2023

## 2023-03-10 NOTE — PROGRESS NOTES
[facility] ([facility])  Rehab Progress Note    Patient Name: Hunter Navarrete  MRN: [id number]  Age: 82 y.o. Sex: male  : 1940  Hospital Length of Stay: 7 days  Date of Service: 3/10/2023   Chief Complaint: Multivessel CAD s/p left heart catheterization with InStent restenosis and multivessel coronary disease on 02/15/2023 s/p CABG x2 (lima to LAD, RSVG to OM with right GSV EVH) on 2023    Subjective:     Basic Information  Admit Information: 82-year-old WM presented to [facility] on 02/15 with complaints of substernal chest pain with shortness of breath.  PMH significant for CAD with PCI, CKD, hypertension, mild aortic stenosis, lupus anticoagulant inhibitor syndrome, and hyperlipidemia.  Workup significant for elevated troponin.  Transferred to [facility] for cardiology evaluation.  Initiated heparin drip.  Tolerated left heart catheterization on 02/15 significant for multivessel coronary disease with InStent restenosis.  Hematology recommended transfer to tertiary facility for CABG 2/2 history of lupus anticoagulant.  Tolerated transfer to [facility] on .  Initiated Tridil drip and titrated for chest pain.  Required 2 units PRBC transfusion on .  On  tolerated CABG x2 (lima to LAD, RSVG to OM with right GSV EVH) without perioperative complications.  Postoperatively required IV fluid gentle hydration per Nephrology 2/2 CKD stage IIIB.  Chest tubes removed.  Continued with postoperative DANIELLA on CKD.  Nephrology will continue to follow.  Discontinue Lasix IV on  and initiated home dose of Lasix 40 mg daily.  Initiated Plavix on . Tolerated transfer to [facility] inpatient rehab unit on 3/3 without incident.  Today's Information:  No acute events overnight.  Sitting up in recliner with 2 L O2.  Sleep fair.  Appetite improving.  Last BM 3/9.  Vital signs overall at goal.  BP mildly elevated this morning.  Systolic BP usually around 120.  Renal indices  slightly improved.  Metabolic acidosis resolved.  Potassium 4.3.  No new imaging today.    Review of patient's allergies indicates:   Allergen Reactions    Cardura [doxazosin] Hives    Lyrica [pregabalin] Other (See Comments)    Paxil [paroxetine hcl] Other (See Comments)    Restoril [temazepam] Hallucinations    Vioxx [rofecoxib] Swelling    Zithromax [azithromycin] Hives        Current Facility-Administered Medications:     aspirin EC tablet 81 mg, 81 mg, Oral, Daily, Yaneth Douglas, FNP, 81 mg at 03/09/23 0805    atorvastatin tablet 40 mg, 40 mg, Oral, QHS, Yaneth Douglas, FNP, 40 mg at 03/09/23 2013    benzocaine 10 % mucosal gel, , Mouth/Throat, QID PRN, Denilson Medina DO    benzonatate capsule 100 mg, 100 mg, Oral, TID PRN, Yaneth Douglas, FNP, 100 mg at 03/06/23 2336    bisacodyL suppository 10 mg, 10 mg, Rectal, Daily PRN, Yaneth Douglas FNP, 10 mg at 03/03/23 1909    carvediloL tablet 12.5 mg, 12.5 mg, Oral, BID, Suman Reese, FNP, 12.5 mg at 03/09/23 2013    clopidogreL tablet 75 mg, 75 mg, Oral, Daily, Yaneth Douglas FNP, 75 mg at 03/09/23 0805    docusate sodium capsule 100 mg, 100 mg, Oral, BID, Yaneth Douglas FNP, 100 mg at 03/09/23 2014    enoxaparin injection 40 mg, 40 mg, Subcutaneous, Daily, Yaneth Douglas FNP, 40 mg at 03/09/23 1732    ezetimibe tablet 10 mg, 10 mg, Oral, Daily, Yaneth Douglas FNP, 10 mg at 03/09/23 0804    famotidine tablet 20 mg, 20 mg, Oral, Daily PRN, Xander Enriquez MD    ferrous sulfate tablet 1 each, 1 tablet, Oral, BID, Yaneth Douglas FNP, 1 each at 03/09/23 2014    FLUoxetine capsule 10 mg, 10 mg, Oral, Daily, Yaneth Douglas FNP, 10 mg at 03/09/23 0805    furosemide tablet 40 mg, 40 mg, Oral, Daily, SHIRLEY Haider, 40 mg at 03/08/23 0821    gabapentin capsule 100 mg, 100 mg, Oral, TID, SHIRLEY Haider, 100 mg at 03/10/23 0547    gabapentin capsule 100 mg, 100 mg, Oral, Daily, SHIRLEY Barber, 100 mg at 03/09/23 0807     "hydrALAZINE injection 10 mg, 10 mg, Intravenous, Q6H PRN, Yaneth Douglas, FNP, 10 mg at 03/05/23 1456    HYDROcodone-acetaminophen 5-325 mg per tablet 1 tablet, 1 tablet, Oral, Q4H PRN, Yaneth Douglas, FNP, 1 tablet at 03/10/23 0549    hydrOXYzine pamoate capsule 50 mg, 50 mg, Oral, Nightly PRN, Yaneth Douglas, FNP, 50 mg at 03/09/23 2115    labetalol 20 mg/4 mL (5 mg/mL) IV syring, 10 mg, Intravenous, Q4H PRN, Yaneth Douglas, FNP    levalbuterol nebulizer solution 1.25 mg, 1.25 mg, Nebulization, Q8H, Yaneth Douglas, FNP, 1.25 mg at 03/10/23 0741    LIDOcaine 5 % patch 2 patch, 2 patch, Transdermal, Q24H, Usha Mcgee, MAGDAP, 2 patch at 03/10/23 0547    meclizine tablet 25 mg, 25 mg, Oral, TID PRN, Yaneth Douglas, FNP    metoprolol injection 10 mg, 10 mg, Intravenous, Q2H PRN, Yaneth Douglas, FNP    ondansetron disintegrating tablet 4 mg, 4 mg, Oral, Q6H PRN, Yaneth Douglas, FNP    pantoprazole EC tablet 40 mg, 40 mg, Oral, Daily, Yaneth Douglas, FNP, 40 mg at 03/09/23 0805    predniSONE tablet 2.5 mg, 2.5 mg, Oral, Daily, Yaneth Douglas, FNP, 2.5 mg at 03/09/23 0805    QUEtiapine tablet 50 mg, 50 mg, Oral, QHS, Yaneth Douglas, FNP, 50 mg at 03/09/23 2014    rOPINIRole tablet 1 mg, 1 mg, Oral, BID, Yaneth Douglas, FNP, 1 mg at 03/09/23 2014    sucralfate tablet 1 g, 1 g, Oral, QID (AC & HS), Yaneth Douglas, FNP, 1 g at 03/10/23 0547    tamsulosin 24 hr capsule 0.4 mg, 0.4 mg, Oral, QHS, Yaneth Douglas, FNP, 0.4 mg at 03/09/23 2015    tiZANidine tablet 4 mg, 4 mg, Oral, TID PRN, Usha Mcgee, FNP, 4 mg at 03/09/23 2114     Review of Systems   Complete 12-point review of symptoms negative except for what's mentioned in HPI     Objective:     BP (!) 156/73   Pulse 109   Temp 98.2 °F (36.8 °C) (Oral)   Resp 16   Ht 5' 10.98" (1.803 m)   Wt 86.3 kg (190 lb 4.1 oz)   SpO2 (!) 94%   BMI 26.55 kg/m²      Physical Exam  Vitals reviewed.   Eyes:      Pupils: Pupils are equal, round, " and reactive to light.   Cardiovascular:      Rate and Rhythm: Normal rate and regular rhythm.      Heart sounds: Murmur heard.   Systolic murmur is present with a grade of 3/6.   Pulmonary:      Effort: Pulmonary effort is normal.      Breath sounds: Normal breath sounds.   Abdominal:      General: Bowel sounds are normal.   Musculoskeletal:         General: Normal range of motion.   Skin:     General: Skin is warm.      Comments: Midline sternal incision open air dry and intact    Neurological:      Mental Status: He is alert and oriented to person, place, and time.      Motor: Weakness present.      Comments: Resting tremors, shuffling gait   Psychiatric:         Mood and Affect: Mood normal.   *MD performed and documented physical examination       Lines/Drains/Airways       Peripherally Inserted Central Catheter Line  Duration             PICC Triple Lumen 02/27/23 1045 left basilic 10 days                Labs  Recent Results (from the past 24 hour(s))   COVID/FLU A&B PCR    Collection Time: 03/09/23  8:24 AM   Result Value Ref Range    Influenza A PCR Not Detected Not Detected    Influenza B PCR Not Detected Not Detected    SARS-CoV-2 PCR Not Detected Not Detected, Negative, Invalid   CBC with Differential    Collection Time: 03/09/23 12:16 PM   Result Value Ref Range    WBC 12.1 (H) 4.5 - 11.5 x10(3)/mcL    RBC 3.49 (L) 4.70 - 6.10 x10(6)/mcL    Hgb 10.3 (L) 14.0 - 18.0 g/dL    Hct 32.6 (L) 42.0 - 52.0 %    MCV 93.4 80.0 - 94.0 fL    MCH 29.5 pg    MCHC 31.6 (L) 33.0 - 36.0 g/dL    RDW 15.5 11.5 - 17.0 %    Platelet 259 130 - 400 x10(3)/mcL    MPV 11.0 (H) 7.4 - 10.4 fL    Neut % 83.6 %    Lymph % 5.6 %    Mono % 9.3 %    Eos % 0.0 %    Basophil % 0.1 %    Lymph # 0.67 0.6 - 4.6 x10(3)/mcL    Neut # 10.08 (H) 2.1 - 9.2 x10(3)/mcL    Mono # 1.12 0.1 - 1.3 x10(3)/mcL    Eos # 0.00 0 - 0.9 x10(3)/mcL    Baso # 0.01 0 - 0.2 x10(3)/mcL    IG# 0.17 (H) 0 - 0.04 x10(3)/mcL    IG% 1.4 %    NRBC% 0.0 %   BNP     Collection Time: 03/09/23 12:16 PM   Result Value Ref Range    Natriuretic Peptide 775.2 (H) <=100.0 pg/mL   Basic Metabolic Panel    Collection Time: 03/09/23 12:16 PM   Result Value Ref Range    Sodium Level 141 136 - 145 mmol/L    Potassium Level 5.0 3.5 - 5.1 mmol/L    Chloride 106 98 - 107 mmol/L    Carbon Dioxide 22 (L) 23 - 31 mmol/L    Glucose Level 100 82 - 115 mg/dL    Blood Urea Nitrogen 40.9 (H) 8.4 - 25.7 mg/dL    Creatinine 1.91 (H) 0.73 - 1.18 mg/dL    BUN/Creatinine Ratio 21     Calcium Level Total 9.0 8.8 - 10.0 mg/dL    Anion Gap 13.0 mEq/L    eGFR 35 mls/min/1.73/m2   Renal Function Panel    Collection Time: 03/10/23  6:04 AM   Result Value Ref Range    Sodium Level 140 136 - 145 mmol/L    Potassium Level 4.3 3.5 - 5.1 mmol/L    Chloride 108 (H) 98 - 107 mmol/L    Carbon Dioxide 25 23 - 31 mmol/L    Glucose Level 106 82 - 115 mg/dL    Blood Urea Nitrogen 46.2 (H) 8.4 - 25.7 mg/dL    Creatinine 1.82 (H) 0.73 - 1.18 mg/dL    Calcium Level Total 9.0 8.8 - 10.0 mg/dL    Albumin Level 2.6 (L) 3.4 - 4.8 g/dL    Phosphorus Level 3.3 2.3 - 4.7 mg/dL    eGFR 37 mls/min/1.73/m2   Magnesium    Collection Time: 03/10/23  6:04 AM   Result Value Ref Range    Magnesium Level 1.60 1.60 - 2.60 mg/dL     Radiology  CXR one view on 03/09/2023 at 8:36 a.m., IMPRESSION:  No significant changes  Radiology  [facility] 02/20/23: The right internal carotid artery demonstrated 50-69% stenosis. The left internal carotid artery demonstrated 50-69% stenosis.  Bilateral vertebral arteries were patent with antegrade flow.  Radiology  [facility] 02/15/2023: LM 0% stenosis mLAD proximal 70% ISR D1 ostial 50% stenosis D2 ostial 50% stenosis Lcx: previous stent; proximal Lcx  90% ISR; mid Lcx 30% stenosis OM1 proximal 80% stenosis RCA patent stents to ostium. Mid RCA proximal subsection- 60% stenosis; Mid RCA distal subsection 70& stenosis; distal RCA proximal subsection 100% stenosis. Fills left to right  Radiology  [facility] 02/16/2023: Global LV SF  is borderline normal. LVEF 45-50%. LA is mildly enlarged. Moderate calcification of the aortic valve is noted. Mild AS is present. Mild to moderate AR. Mild to moderate MAC is noted. Mild MR. Trace TR. PASP 40 mmHg. Optison used to enhance endocardial border.     Assessment/Plan:     82 y.o. WM admitted on 3/3/2023     Multivessel CAD  - s/p left heart catheterization with InStent restenosis and multivessel coronary disease on 02/15/2023  - s/p CABG x2 (lima to LAD, RSVG to OM with right GSV EVH) on 02/24/2023  - sternal precautions  - daily weights  - discontinued metoprolol tartrate 50 mg bid on 3/5  - continue   Coreg 12.5 mg bid (initiated on 3/6)  Lasix 40 mg daily  Aspirin 81 mg daily  Plavix 75 mg daily   Atorvastatin 40 mg at night  Hydrocodone-acetaminophen 5-325 mg p.r.n. 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  - discontinued metoprolol tartrate 50 mg bid on 3/5  - continue   Coreg 12.5 mg bid (initiated on  3/6)  Hydralazine 10 mg every 2 hours as needed for BP > 160/90  Labetalol 10 mg every 2 hours as needed for BP > 160/90     HLD  - FLP at goal!!  - continue  Atorvastatin 40 mg at night  Zetia 10 mg daily      RLS  - stable  - continue  Gabapentin 100 mg t.i.d.  Ropinirole 1 mg b.i.d.     Bilateral RICKEY  - moderate bilateral  - continue  Atorvastatin 40 mg at night  Zetia 10 mg daily   Plavix 75 mg daily   - follow-up with cardiology outpatient     Constipation  - stable  - continue  Colace 100 mg b.i.d.     GERD  - Avoid spicy foods, and nothing to eat or drink within x2 hours of bedtime or laying flat (water is ok)   - Avoid NSAIDs (Advil, ibuprofen, naproxen...) and poe-2 inhibitors (Mobic, Celebrex)    - continue  Protonix 40 mg daily   Carafate 1 g QID     Lupus anticoagulant inhibitor syndrome  - stable  - continue  Prednisone 2.5 mg daily   - follow-up with Hematology outpatient     Major depressive disorder  - in remission  - continue  Fluoxetine 10 mg daily     Insomnia  - stable  - continue  Seroquel 50 mg at bedtime     BPH/urinary retention  - stable  - Urinary catheter replaced on 02/27   - Discontinue indwelling catheter on 03/07  - good urine output reported on 03/08  - continue  Flomax 0.4 mg at bedtime      Normocytic anemia  - asymptomatic  - s/p transfusion  x2 units PRBC on 2/24/2023  x2 units PRBC on 2/23/2023  - H/H stable   - no evidence of active bleeds  - low iron levels  - continue  Ferrous sulfate 325 mg b.i.d. (initiated 3/4)  - will closely monitor and transfuse if needed     Acute hypoxic respiratory failure  - on 2 L nasal cannula  - chest x-ray stable  - continue  Lasix 40 mg daily    Parkinson's disease  - reportedly diagnosed in 2021  - not currently on medication    Back pain  - current  - initiate   Gabapentin 200 mg at bedtime (initiated 3/10)  - continue   Hydrocodone-acetaminophen 5-325 mg    Gabapentin 100 mg t.i.d.      VTE Prophylaxis:  Lovenox 40 mg daily  COVID-19 testing:   Negative on 03/03  COVID-19 vaccination status:  Vaccinated x4     POA:  Yes (Romy, daughter and Nav, son)  Living will: yes  Contacts:  Giovana Navarrete (spouse) [phone]                      Romy (daughter) [phone]     CODE STATUS: Full  Internal Medicine (attending): Xander Enriquez MD  Physiatry (consulting):  Dilip Hargrove MD     OUTPATIENT PROVIDERS  PCP:  Mariella Bethea MD   Hematology:  Missy Davila MD in [facility]  Cardiology: Curtis Loredo MD at [facility]   CV surgery:  Spencer Hagan MD  Nephrology:  Darinel Bedoya MD     DISPOSITION:  Sleep hygiene fair.  Appetite improving.  Last BM 3/9.  Vital signs at goal with no recorded fevers.  BP mildly elevated, but overall at goal.  No recorded fevers.  Renal indices improved.  Metabolic acidosis resolved.  Potassium within normal limits.  No new imaging today.  Initiate gabapentin 200 mg at night for back pain and sleep hygiene.  Continue aggressive mobilization as tolerated.  Monitor closely.  Notify of acute changes.    Staffing 3/7/2023: Continent of bowel and bladder. CT suture removed on 3/7. RT: Overall mod to supervision.  Appetite is poor.  Breakfast is better.  Liberalize diet with no added salt.  RT: Doing well.  Bed mobility limited. PT: SBA to min assist with bed mobility, transfers and gait.  Dramatic improvement OTW.  Limited by activity intolerance. Needs breaks throughout the day. OT: Limited by weakness.  Performed better than he thinks.  He needs time to improve strength and endurance. Mod assist for bed mobility. Projected discharge 3/17.      Rajesh Reese NP conducted independent physical examination and assisted with medical documentation.

## 2023-03-10 NOTE — PLAN OF CARE
Problem: Rehabilitation (IRF) Plan of Care  Goal: Plan of Care Review  Outcome: Ongoing, Progressing  Goal: Patient-Specific Goal (Individualized)  Outcome: Ongoing, Progressing  Goal: Absence of New-Onset Illness or Injury  Outcome: Ongoing, Progressing  Goal: Optimal Comfort and Wellbeing  Outcome: Ongoing, Progressing     Problem: Infection  Goal: Absence of Infection Signs and Symptoms  Outcome: Ongoing, Progressing     Problem: Impaired Wound Healing  Goal: Optimal Wound Healing  Outcome: Ongoing, Progressing     Problem: Fall Injury Risk  Goal: Absence of Fall and Fall-Related Injury  Outcome: Ongoing, Progressing     Problem: Skin Injury Risk Increased  Goal: Skin Health and Integrity  Outcome: Ongoing, Progressing

## 2023-03-10 NOTE — PROGRESS NOTES
Dos 3/10/23  Seen and evaluated during RT today  Working on visual-spatial activities, following commands, dynamic and static balance and increasing endurance.  Progressing and tolerating  Case discussed with all therapeutic, nursing and medical team members  Agree with present POC   Subjective:  HPI: 83 yo WM with PMH of male, followed by Dr. Loredo who presented to [place] ER with c/o chest pain. HS troponin 1149; therefore he was transferred to [facility] for cardiology services where he underwent LHC revealing multivessel disease and elevated LVEDP. He was started on diuretics. Plavix was placed on hold and he was transferred to [facility] for CABG evaluation on 2/21/23. Patient is reporting mild chest tightness 3/10 on arrival.  Vitals Stable. Shoulder Pain that morning. On Nitro & Heparin Infusion. EKG with no overt ischemic changes. Denies CP/SOB. Creatinine 1.93 mildly decreased from 2.07 day prior with addition of IVFs. H/H downtrending- 8.2/25.3 from 8.8/27.1 as well. Creatinine  further improved to 1.65. H/H 10.5/31.5 post transfusion 2  units PRBCs. Underwent  CABG x 2 on 2/24 by Dr Hagan, and brought to ICU on MV, requiring pressor support. Mediastinal drain patent. Received 2 units of PRBCs intraop.  CT x 2 patent. Pressors have been weaned off. On cleviprex drip. 2/26 Mediastinal drains have been removed. Creatinine has bumped. Nephrology adding IVF x 1L due to IVVD. O2 weaned to 2L/NC. Patient did not require O2 prior to hospitalization. On 3/3, labs showed elevated BUN/Cr of 77.7/ 1.80 with creatinine trending down. [place] catheter remained. Last BM on 2/26 charted. Patient is AAOx4; confusion at times with pain meds.  Participating with therapy. Functional status includes bed mobility and transfers requiring moderate to maximal assistance. Max assist needed for upper body dressing; total assist toileting due to medellin;  setup/supervision for eating; and minimal assist for grooming. Amb w/RW for  40ft at Min Assist. Patient was evaluated, accepted, and admitted to inpatient rehab to improve functional status. Transferred to [facility] on 3/3 without incident.   3/10: Seen with OT, standing w/RW and using reacher to  bean bags from floor and transfer to table. Reports having increased back and shoulder pain this morning. Patches in place. Applied biofreeze to shoulders. Patient states that Biofreeze works well for him. Doing great in therapy. VSSAF with noted SBP elevation prior to morning medications. IM following.       Review of Systems  Depression/Anxiety: no complaints     FLUoxetine capsule 10 mg qd  Pain: incisional-controlled  gabapentin capsule 100 mg TID  rOPINIRole tablet 1 mg BID     HYDROcodone-acetaminophen 5-325 mg 1 tab q4hr PRN pain  Bowels/Bladder: last BM 3/9    Appetite: fair-improving.  Sleep: good      QUEtiapine tablet 50 mg qHS    Physical Exam  General: well-developed, well-nourished, tremor at rest  Respiratory: equal chest rise, no SOB, no audible wheeze  Cardiovascular: regular rate and rhythm, no edema  Gastrointestinal: soft, non-tender, non-distended   Musculoskeletal: full range of motion of all extremities/spine with generalized weakness  Integumentary: no rashes or skin lesions present, midline sternal gvdpldzo-VET-g/d/i, chest tube site x 2 -omgnck-MQR-u/d/I, RLE and LLE harvest incision sites-LONDON-c/d/i, Right groin puncture site-LONDON-c/d/I, sacral/buttocks-reddened  Neurologic: cranial nerves intact, no signs of peripheral neurological deficit, motor/sensory function intact, resting tremor-BUE  *MD performed and documented physical examination          Labs:    Latest Reference Range & Units 03/10/23 06:04   Sodium 136 - 145 mmol/L 140   Potassium 3.5 - 5.1 mmol/L 4.3   Chloride 98 - 107 mmol/L 108 (H)   CO2 23 - 31 mmol/L 25   BUN 8.4 - 25.7 mg/dL 46.2 (H)   Creatinine 0.73 - 1.18 mg/dL 1.82 (H)   eGFR mls/min/1.73/m2 37   Glucose 82 - 115 mg/dL 106   Calcium 8.8 - 10.0  mg/dL 9.0   Phosphorus 2.3 - 4.7 mg/dL 3.3   Magnesium 1.60 - 2.60 mg/dL 1.60   Albumin 3.4 - 4.8 g/dL 2.6 (L)   (H): Data is abnormally high  (L): Data is abnormally low   Latest Reference Range & Units 03/09/23 12:16   BNP <=100.0 pg/mL 775.2 (H)   (H): Data is abnormally high          Assessment/Plan  Hospital   CAD (coronary artery disease)   S/P CABG x 2     Non-Hospital   Lupus anticoagulant inhibitor syndrome   Iron deficiency anemia   Elevated homocysteine   Low vitamin B12 level   Anemia   Angina, class III   Elevated partial thromboplastin time (PTT)   Coronary artery disease   Stage 3 chronic kidney disease   Hypercholesteremia   Bleeding   Altered mental status   Hypertension   Head injury   Dehydration   NSTEMI (non-ST elevated myocardial infarction)   Ischemic cardiomyopathy   GERD (gastroesophageal reflux disease)   Enlarged prostate   Aortic aneurysm, abdominal   Skin cancer       Wounds: midline sternal cxmusgeq-NHM-o/d/i, chest tube site x 2 -qzsysg-BXA-v/d/I, RLE and LLE harvest incision sites-LONDON-c/d/i, Right groin puncture site-LONDON-c/d/I, sacral/buttocks-reddened  S/p CABG x 2 on 2/24 by Dr Hagan  Precautions:sternal   Bracing: cardiac bear for splinting  Swallowing: Liberalized to Regular Diet with no added salt  Function: Tolerating therapy. Continue PT/OT  VTE Prophylaxis:   enoxaparin injection 40 mg SubQ q24hr  Code Status: FULL CODE   Discharge:Lives with his spouse in [place] in a single-story home with no steps to enter the residence. Completed high school. He was in the [employer]. He is retired from electrical/refrigeration work.  Wife manages the patients medications and finances. She also cooks, cleans, does laundry, and grocery shops.  Children (3). Date 3/17 Friday.                           Usha Mcgee NP, conducted additional independent physical examination and assisted with medical documentation.

## 2023-03-10 NOTE — PT/OT/SLP RE-EVAL
Occupational Therapy Inpatient Rehab Re-Evaluation    Name: Hunter Navarrete  MRN: [id number]    Recommendations:     Discharge Recommendations:  home with home health   Discharge Equipment Recommendations:     Barriers to discharge:  Decreased/fluctuating self care    Assessment:  Hunter Navarrete is a 82 y.o. male admitted with a medical diagnosis of S/P CABG x 2.  He presents with the following impairments/functional limitations:  weakness, impaired endurance (Pt. choking on medication; alerted Jennifer, SLP) .    General Precautions: Standard, fall, sternal     Orthopedic Precautions:Full weight bearing     Braces: N/A    Rehab Prognosis: Good; patient would benefit from acute skilled OT services to address these deficits and reach maximum level of function.      History:     Past Medical History:   Diagnosis Date    Acid reflux     Anemia     Aortic aneurysm, abdominal     Arthritis     Bronchitis     Cataract     Celiac artery stenosis     50% by CTA abdomen 7/27/2020    CKD (chronic kidney disease)     45% FUNCTION    Coronary artery disease     Encounter for blood transfusion     Enlarged prostate     GERD (gastroesophageal reflux disease)     Hemorrhoids     Hypercholesteremia     Hypertension     Iron deficiency anemia, unspecified     Leaky heart valve     Lupus anticoagulant disorder     Renal artery stenosis     by CTA 7/27/2020    Skin cancer     Unspecified chronic bronchitis     UTI (urinary tract infection)        Past Surgical History:   Procedure Laterality Date    ACF      BACK SURGERY      RODS IN BACK; 4 SURGIERIES    BONE MARROW BIOPSY      CARDIAC ANGIOGRAM WITH STENT      CATARACT SX Bilateral     CORONARY ANGIOGRAPHY N/A 2/15/2023    Procedure: ANGIOGRAM, CORONARY ARTERY;  Surgeon: Rito Veag MD;  Location: [facility] CATH;  Service: Cardiology;  Laterality: N/A;    CORONARY ARTERY BYPASS GRAFT (CABG) N/A 2/24/2023    Procedure: CORONARY ARTERY BYPASS GRAFT (CABG);  Surgeon: Spencer Hagan MD;   "Location: [facility] OR;  Service: Cardiothoracic;  Laterality: N/A;  CABG / EVH //   ECHO NOTIFIED    HIP SURGERY Right     THR    KNEE SURGERY Right     ATS    LEFT HEART CATHETERIZATION Left 07/22/2021    Procedure: Left heart cath;  Surgeon: Curtis Loredo MD;  Location: [facility] CATH;  Service: Cardiology;  Laterality: Left;    LEFT HEART CATHETERIZATION Left 09/22/2022    Procedure: Left heart cath;  Surgeon: Giorgi Barker MD;  Location: [facility] CATH;  Service: Cardiology;  Laterality: Left;    SHOULDER SURGERY Bilateral     SPINAL CORD STIMULATOR IMPLANT         Subjective     Orientation: Oriented x4    Chief Complaint: Pt. Was choking on medication upon OT arrival. Pt. Required time to "catch his breath" and was returned to O2 per OMKAR Terry as he was "SOB c activity" earlier.     Patient/Family Comments/goals: "To be as independent as I can"    Vitals  Vitals at Rest  /66   HR 93   O2 Sat 96   Pain      Vitals With Activity  /62   HR 92   O2 Sat 98 %   Pain      Respiratory Status: Nasal cannula, flow 2 L/min    Patients cultural, spiritual, Rastafarian conflicts given the current situation:         Living Environment   Living Environment  People in Home: spouse  Living Arrangements: house  Number of Stairs, Main Entrance: none  Home Layout: Able to live on 1st floor  Transportation Anticipated: family or friend will provide  Equipment Currently Used at Home: shower chair  Shower Setup: Tub/Shower combo and Walk-in shower    Prior Level of Function  BADL: Independent    IADL: Independent    Equipment used at home: shower chair.  DME owned (not currently used): rolling walker and shower chair.      Upon discharge, patient will have assistance from his wife .    Objective:     Patient found up in chair with oxygen  upon OT entry to room.    Mobility   Patient completed:  Sit to Stand Transfer with contact guard assistance with 4 wheeled walker  Stand to Sit Transfer with contact guard assistance with 4 " wheeled walker  Toilet Transfer Step Transfer technique with contact guard assistance with  4 wheeled walker  Tub Transfer Stand Pivot technique with contact guard assistance with rolling walker, grab bars, and TTB    Functional Mobility   Pt. Ambulates well c Rollator but is more functional c ADL c RW (he has one at home)     ADLs     Current Status   Eating 6   Oral Hygiene 5   Shower, Bathe Self 4   Upper Body Dressing 3   Lower Body Dressing 3   Toileting Hygiene 3   Toilet Transfer 4   Putting On, Taking Off Footwear 4     Limiting Factors for ADLs: endurance, limited ROM, and pain      Exams     ROM: Limited B Shoulder AROM 2* Hx of shoulder Sx     Hand Dominance: Right        Strength  Overall Strength: poor (based on 1 # free weight there ex; MMT deferred 2* pain and sternal Px        Sensation  Left:          -       WNL  Right:          -       WNL    Coordination:   Pt. C mild tremors noted B UE's    Visual/Perceptual  Intact and wear reading glasses    Cognition:   WFL, except Pt. Is Hard of Hearing (his wife has his hearing aids)     Balance    Sitting  Sitting Surface: TTB  Static: No UE Support, Good (+)  Dynamic: No UE extremity support, Fair (+)    Posture/Deviations  Slight forward flex    Additional Treatments   Pt. C + void (BM )    LifeStyle Change and Education     Patient left HOB elevated with call button in reach and Jennifer, SLP present.    Education provided: ADLs, transfer training, body mechanics, assistive device, and energy conservation    Multidisciplinary Problems       Occupational Therapy Goals          Problem: Occupational Therapy    Goal Priority Disciplines Outcome Interventions   Occupational Therapy Goal     OT, PT/OT Ongoing, Progressing    Description: ADLs: Pt will be SPV with ADLs by d/c.   STG: by reeval  Pt to perform UB dressing with partial A MET  Pt to perform LB dressing with Mod A using AD MET  Pt to perform putting on/off footwear task with Mod A using AD MET  Pt  to perform toileting with Mod A using AD as needed MET  Pt to perform bathing with Mod A MET    Functional Transfers:  Pt to perform toilet transfers with Touch A using BSC over toilet. MET  Pt to perform a tub transfer with TTB requiring Mod A  MET    Balance, Strengthening, Endurance, Balance:  Pt to consistently demonstrate adherence to orthopedic precautions during all ADL's as instructed by OT.  Pt to demonstrate fair dynamic standing balance as required to perform ADL's from standing level.  Pt to demonstrate consistent adherence to breathing control and energy conservation techniques as educated by OT.                        Plan     During this hospitalization, patient to be seen 5 x/week, 6 x/week to address the identified rehab impairments via self-care/home management, therapeutic activities, therapeutic exercises and progress toward the following goals:    Plan of Care Expires:  03/31/23    Time Tracking     OT Received On: 03/10/23  Time In 1300     Time Out 1400  Total Time 60 min  Therapy Time: OT Individual: 60  Missed Time:    Missed Time Reason:      Billable Minutes: Re-eval 30 and Self Care/Home Management 30    03/10/2023

## 2023-03-10 NOTE — PT/OT/SLP EVAL
Speech Language Pathology Department  Clinical Swallow Evaluation    Patient Name:  Hunter Navarrete   MRN:  [id number]    Recommendations:     General recommendations:  Modified Barium Swallow Study and Speech/Language and Cognitive Evaluation  Diet recommendations:  Regular Diet - IDDSI Level 7, Liquid Diet Level: Thin liquids - IDDSI Level 0   Swallow strategies/precautions: small bites/sips, slow rate, NO straws, and medications per patient preference  Precautions: Standard,      History:     Past Medical History:   Diagnosis Date    Acid reflux     Anemia     Aortic aneurysm, abdominal     Arthritis     Bronchitis     Cataract     Celiac artery stenosis     50% by CTA abdomen 7/27/2020    CKD (chronic kidney disease)     45% FUNCTION    Coronary artery disease     Encounter for blood transfusion     Enlarged prostate     GERD (gastroesophageal reflux disease)     Hemorrhoids     Hypercholesteremia     Hypertension     Iron deficiency anemia, unspecified     Leaky heart valve     Lupus anticoagulant disorder     Renal artery stenosis     by CTA 7/27/2020    Skin cancer     Unspecified chronic bronchitis     UTI (urinary tract infection)      Past Surgical History:   Procedure Laterality Date    ACF      BACK SURGERY      RODS IN BACK; 4 SURGIERIES    BONE MARROW BIOPSY      CARDIAC ANGIOGRAM WITH STENT      CATARACT SX Bilateral     CORONARY ANGIOGRAPHY N/A 2/15/2023    Procedure: ANGIOGRAM, CORONARY ARTERY;  Surgeon: Rito Vega MD;  Location: [facility] CATH;  Service: Cardiology;  Laterality: N/A;    CORONARY ARTERY BYPASS GRAFT (CABG) N/A 2/24/2023    Procedure: CORONARY ARTERY BYPASS GRAFT (CABG);  Surgeon: Spencer Hagan MD;  Location: [facility];  Service: Cardiothoracic;  Laterality: N/A;  CABG / EVH //   ECHO NOTIFIED    HIP SURGERY Right     THR    KNEE SURGERY Right     ATS    LEFT HEART CATHETERIZATION Left 07/22/2021    Procedure: Left heart cath;  Surgeon: Curtis Loredo MD;  Location: [facility] CATH;   Service: Cardiology;  Laterality: Left;    LEFT HEART CATHETERIZATION Left 2022    Procedure: Left heart cath;  Surgeon: Giorgi Barker MD;  Location: [facility] CATH;  Service: Cardiology;  Laterality: Left;    SHOULDER SURGERY Bilateral     SPINAL CORD STIMULATOR IMPLANT       Home Diet: Regular and thin liquids  Current Method of Nutrition: PO diet      Patient complaint: coughing with medications and liquids via straw    Pt reports he was diagnosed with Parkinson's Disease in .    Subjective     Patient awake, alert, and cooperative.    Patient goals: to get better and go home     Spiritual/Cultural/Faith Beliefs/Practices that affect care: no    Pain/Comfort: Pain Rating 1: 0/10    Respiratory Status: 2L nasal cannula    Objective:     Oral Musculature Evaluation  Dentition: upper dentures, lower dentures  Secretion Management: adequate  Mucosal Quality: adequate  Mandibular Strength and Mobility: WFL  Oral Labial Strength and Mobility: WFL  Lingual Strength and Mobility: WFL    Consistency Fed By Oral Symptoms Pharyngeal Symptoms   Thin liquid by cup Self None None   Thin liquid by straw Self None Wet vocal quality after swallow  Throat clear after swallow   Puree SLP None None   Chewable solid SLP None None     Assessment:     Pt with s/sx of aspiration with thin liquids via straw warranting comprehensive swallow evaluation to assess current swallow function. Speech, language, and cognition evaluation is also warranted due to patient's history of Parkinson's Disease.     Goals:   Multidisciplinary Problems       SLP Goals          Problem: SLP    Goal Priority Disciplines Outcome   SLP Goal     SLP    Description: LT. Pt will tolerate least restrictive diet with no overt s/sx of aspiration.     ST. Pt will complete MBS for comprehensive swallow evaluation.   2. Pt will tolerate 4 oz of thin liquids via straw with no overt s/sx of aspiration.                      Patient Education:     Patient  provided with verbal education regarding dysphagia and SLP POC.  Understanding was verbalized, however additional teaching warranted.    Plan:     SLP Follow-Up:  Yes   Plan of Care reviewed with:  patient      Time Tracking:     SLP Treatment Date:   03/10/23  Speech Start Time:  1400  Speech Stop Time:  1415     Speech Total Time (min):  15 min    Billable minutes:  Swallow and Oral Function Evaluation, 15 minutes      03/10/2023

## 2023-03-10 NOTE — PROGRESS NOTES
Nephrology consult follow up note    HPI:      Hunter Navarrete is a 82 y.o. male  who presented as a transfer from [facility] with coronary artery disease.  Past medical history significant for coronary artery disease status post PCI, CKD 3B, hypertension, aortic stenosis, lupus anticoagulant, and hyperlipidemia.  He initially presented to outside hospital on 02/15/2023 with chest pain.  Left heart catheterization showed multivessel coronary artery disease and he was transferred to [facility] on 02/19/2023 for CV surgery.  He ultimately underwent CABG x2 on 02/24/2023 without complication.  Postoperatively he did fairly well but did have an acute kidney injury.  Nephrology was consulted for acute kidney injury on CKD 3.  His baseline creatinine is 1.6-2.0.  He was given IV fluids and later IV diuretics with improvement in his renal function back to baseline.  He was transferred to inpatient rehab on 03/03/2023.  Nephrology consulted for CKD management.    Interval history:     No acute events overnight. He is endorsing chest musculoskeletal pain after rehab yesterday. Still significantly deconditioned. No SOB, N/V, or LE edema.      Review of Systems:     Comprehensive 10pt ROS negative except as noted per HPI.    Past medical, family, surgical, and social history reviewed and unchanged from initial consult note.     Objective:       VITAL SIGNS: 24 HR MIN & MAX LAST    Temp  Min: 97.7 °F (36.5 °C)  Max: 98.2 °F (36.8 °C)  98.2 °F (36.8 °C)        BP  Min: 102/58  Max: 156/73  (!) 156/73     Pulse  Min: 87  Max: 103  103     Resp  Min: 16  Max: 18  18    SpO2  Min: 93 %  Max: 100 %  (!) 94 %      GEN: Elderly WM in NAD  HEENT: Conjunctiva anicteric, pupils equal  CV: RRR +S1,S2 without murmur  PULM: CTAB, unlabored  ABD: Soft, NT/ND abdomen with NABS  EXT: No cyanosis or edema  SKIN: Warm and dry  PSYCH: Awake, alert and appropriately conversant.   Vascular access: No HD  access            Component Value Date/Time     03/10/2023 0604     03/09/2023 1216     (L) 02/19/2023 0536     (L) 02/18/2023 0539    K 4.3 03/10/2023 0604    K 5.0 03/09/2023 1216    K 4.2 02/19/2023 0536    K 4.0 02/18/2023 0539    CHLORIDE 108 (H) 03/10/2023 0604    CHLORIDE 106 03/09/2023 1216    CO2 25 03/10/2023 0604    CO2 22 (L) 03/09/2023 1216    CO2 30 (H) 02/19/2023 0536    CO2 31 (H) 02/18/2023 0539    BUN 46.2 (H) 03/10/2023 0604    BUN 40.9 (H) 03/09/2023 1216    BUN 30 (H) 02/19/2023 0536    BUN 32 (H) 02/18/2023 0539    CREATININE 1.82 (H) 03/10/2023 0604    CREATININE 1.91 (H) 03/09/2023 1216    CREATININE 1.56 (H) 02/19/2023 0536    CREATININE 1.58 (H) 02/18/2023 0539    CALCIUM 9.0 03/10/2023 0604    CALCIUM 9.0 03/09/2023 1216    CALCIUM 9.1 02/19/2023 0536    CALCIUM 9.0 02/18/2023 0539    PHOS 3.3 03/10/2023 0604            Component Value Date/Time    WBC 12.1 (H) 03/09/2023 1216    WBC 13.1 (H) 03/06/2023 0646    WBC 6.20 02/19/2023 0536    WBC 6.20 02/19/2023 0536    HGB 10.3 (L) 03/09/2023 1216    HGB 10.2 (L) 03/06/2023 0646    HGB 10.2 (L) 02/19/2023 0536    HGB 10.2 (L) 02/19/2023 0536    HCT 32.6 (L) 03/09/2023 1216    HCT 31.6 (L) 03/06/2023 0646    HCT 29.7 (L) 02/19/2023 0536    HCT 29.7 (L) 02/19/2023 0536     03/09/2023 1216     03/06/2023 0646     02/19/2023 0536     02/19/2023 0536         Imaging reviewed      Assessment / Plan:       Active Hospital Problems    Diagnosis  POA    *S/P CABG x 2 [Z95.1]  Not Applicable    CAD (coronary artery disease) [I25.10]  Yes      Resolved Hospital Problems   No resolved problems to display.       CKD 3B - baseline Cr 1.6-2.0  Coronary artery disease - CABG x2 2/24/23  Physical deconditioning - Getting rehab  Anemia of chronic disease  History of systemic lupus   Hypertension  Hypokalemia     Plan:  Renal function stable at baseline. Continue PO lasix  40 mg qd. No further recommendations  from a nephrology standpoint. Follow up with me in clinic in 1 month.    Will sign off at this time. Please call if we can be of further assistance.       Denilson Medina DO  Nephrology  [facility]  Clinic number: [phone]

## 2023-03-11 PROCEDURE — 63600175 PHARM REV CODE 636 W HCPCS: Performed by: NURSE PRACTITIONER

## 2023-03-11 PROCEDURE — 99233 PR SUBSEQUENT HOSPITAL CARE,LEVL III: ICD-10-PCS | Mod: ,,, | Performed by: NURSE PRACTITIONER

## 2023-03-11 PROCEDURE — 97530 THERAPEUTIC ACTIVITIES: CPT | Mod: CQ

## 2023-03-11 PROCEDURE — 25000003 PHARM REV CODE 250: Performed by: NURSE PRACTITIONER

## 2023-03-11 PROCEDURE — 94761 N-INVAS EAR/PLS OXIMETRY MLT: CPT

## 2023-03-11 PROCEDURE — 92523 SPEECH SOUND LANG COMPREHEN: CPT

## 2023-03-11 PROCEDURE — 27000221 HC OXYGEN, UP TO 24 HOURS

## 2023-03-11 PROCEDURE — 97110 THERAPEUTIC EXERCISES: CPT | Mod: CQ

## 2023-03-11 PROCEDURE — 94640 AIRWAY INHALATION TREATMENT: CPT

## 2023-03-11 PROCEDURE — 97116 GAIT TRAINING THERAPY: CPT | Mod: CQ

## 2023-03-11 PROCEDURE — 25000242 PHARM REV CODE 250 ALT 637 W/ HCPCS: Performed by: NURSE PRACTITIONER

## 2023-03-11 PROCEDURE — 99233 SBSQ HOSP IP/OBS HIGH 50: CPT | Mod: ,,, | Performed by: NURSE PRACTITIONER

## 2023-03-11 PROCEDURE — 11800000 HC REHAB PRIVATE ROOM

## 2023-03-11 RX ADMIN — TAMSULOSIN HYDROCHLORIDE 0.4 MG: 0.4 CAPSULE ORAL at 08:03

## 2023-03-11 RX ADMIN — FERROUS SULFATE TAB 325 MG (65 MG ELEMENTAL FE) 1 EACH: 325 (65 FE) TAB at 09:03

## 2023-03-11 RX ADMIN — CARVEDILOL 12.5 MG: 6.25 TABLET, FILM COATED ORAL at 08:03

## 2023-03-11 RX ADMIN — LIDOCAINE 2 PATCH: 50 PATCH CUTANEOUS at 06:03

## 2023-03-11 RX ADMIN — ATORVASTATIN CALCIUM 40 MG: 40 TABLET, FILM COATED ORAL at 08:03

## 2023-03-11 RX ADMIN — FUROSEMIDE 40 MG: 40 TABLET ORAL at 09:03

## 2023-03-11 RX ADMIN — LEVALBUTEROL HYDROCHLORIDE 1.25 MG: 1.25 SOLUTION RESPIRATORY (INHALATION) at 03:03

## 2023-03-11 RX ADMIN — CLOPIDOGREL BISULFATE 75 MG: 75 TABLET ORAL at 09:03

## 2023-03-11 RX ADMIN — ROPINIROLE HYDROCHLORIDE 1 MG: 1 TABLET, FILM COATED ORAL at 09:03

## 2023-03-11 RX ADMIN — GABAPENTIN 100 MG: 100 CAPSULE ORAL at 08:03

## 2023-03-11 RX ADMIN — SUCRALFATE 1 G: 1 TABLET ORAL at 04:03

## 2023-03-11 RX ADMIN — LEVALBUTEROL HYDROCHLORIDE 1.25 MG: 1.25 SOLUTION RESPIRATORY (INHALATION) at 08:03

## 2023-03-11 RX ADMIN — FLUOXETINE 10 MG: 10 CAPSULE ORAL at 09:03

## 2023-03-11 RX ADMIN — LEVALBUTEROL HYDROCHLORIDE 1.25 MG: 1.25 SOLUTION RESPIRATORY (INHALATION) at 11:03

## 2023-03-11 RX ADMIN — FERROUS SULFATE TAB 325 MG (65 MG ELEMENTAL FE) 1 EACH: 325 (65 FE) TAB at 08:03

## 2023-03-11 RX ADMIN — PREDNISONE 2.5 MG: 2.5 TABLET ORAL at 09:03

## 2023-03-11 RX ADMIN — GABAPENTIN 100 MG: 100 CAPSULE ORAL at 02:03

## 2023-03-11 RX ADMIN — HYDROCODONE BITARTRATE AND ACETAMINOPHEN 1 TABLET: 5; 325 TABLET ORAL at 06:03

## 2023-03-11 RX ADMIN — DOCUSATE SODIUM 100 MG: 100 CAPSULE, LIQUID FILLED ORAL at 08:03

## 2023-03-11 RX ADMIN — ENOXAPARIN SODIUM 40 MG: 40 INJECTION SUBCUTANEOUS at 04:03

## 2023-03-11 RX ADMIN — GABAPENTIN 100 MG: 100 CAPSULE ORAL at 06:03

## 2023-03-11 RX ADMIN — HYDROXYZINE PAMOATE 50 MG: 50 CAPSULE ORAL at 08:03

## 2023-03-11 RX ADMIN — QUETIAPINE 50 MG: 25 TABLET ORAL at 08:03

## 2023-03-11 RX ADMIN — PANTOPRAZOLE SODIUM 40 MG: 40 TABLET, DELAYED RELEASE ORAL at 09:03

## 2023-03-11 RX ADMIN — SUCRALFATE 1 G: 1 TABLET ORAL at 12:03

## 2023-03-11 RX ADMIN — TIZANIDINE 4 MG: 4 TABLET ORAL at 06:03

## 2023-03-11 RX ADMIN — EZETIMIBE 10 MG: 10 TABLET ORAL at 09:03

## 2023-03-11 RX ADMIN — ASPIRIN 81 MG: 81 TABLET, COATED ORAL at 09:03

## 2023-03-11 RX ADMIN — SUCRALFATE 1 G: 1 TABLET ORAL at 08:03

## 2023-03-11 RX ADMIN — SUCRALFATE 1 G: 1 TABLET ORAL at 06:03

## 2023-03-11 RX ADMIN — GABAPENTIN 200 MG: 100 CAPSULE ORAL at 08:03

## 2023-03-11 RX ADMIN — CARVEDILOL 12.5 MG: 6.25 TABLET, FILM COATED ORAL at 09:03

## 2023-03-11 RX ADMIN — HYDROCODONE BITARTRATE AND ACETAMINOPHEN 1 TABLET: 5; 325 TABLET ORAL at 09:03

## 2023-03-11 RX ADMIN — DOCUSATE SODIUM 100 MG: 100 CAPSULE, LIQUID FILLED ORAL at 09:03

## 2023-03-11 RX ADMIN — ROPINIROLE HYDROCHLORIDE 1 MG: 1 TABLET, FILM COATED ORAL at 08:03

## 2023-03-11 NOTE — PT/OT/SLP PROGRESS
SLP attempted speech/language evaluation this date. Patient refused eval due to c/o severe pain. Nurse present in patient's room for administration of pain meds. SLP to re-attempt evaluation this date.

## 2023-03-11 NOTE — PT/OT/SLP PROGRESS
"SLP re-attempted speech/language evaluation. Patient refused due to continuous pain and difficulty participating. Patient requested to be evaluated on Monday "once pain was under control". Primary SLP notified with verbal understanding expressed.   "

## 2023-03-11 NOTE — PT/OT/SLP PROGRESS
Physical Therapy Inpatient Rehab Treatment    Patient Name:  Hunter Navarrete   MRN:  [id number]    Recommendations:     Discharge Recommendations:  nursing facility, skilled   Discharge Equipment Recommendations: walker, rolling   Barriers to discharge:  impaired functional mobility and sternal precautions    Assessment:     Hunter Navarrete is a 82 y.o. male admitted with a medical diagnosis of S/P CABG x 2.  He presents with the following impairments/functional limitations: weakness, impaired endurance, impaired self care skills, impaired functional mobility, decreased safety awareness, pain, impaired cardiopulmonary response to activity, other (comment) (sternal precautions) . .    Rehab Diagnosis: s/p CABG    Recent Surgery: * No surgery found *      General Precautions: Standard, sternal, fall     Orthopedic Precautions:N/A     Braces: N/A    Rehab Prognosis: Good; patient would benefit from acute skilled PT services to address these deficits and reach maximum level of function.      History:     Past Medical History:   Diagnosis Date    Acid reflux     Anemia     Aortic aneurysm, abdominal     Arthritis     Bronchitis     Cataract     Celiac artery stenosis     50% by CTA abdomen 7/27/2020    CKD (chronic kidney disease)     45% FUNCTION    Coronary artery disease     Encounter for blood transfusion     Enlarged prostate     GERD (gastroesophageal reflux disease)     Hemorrhoids     Hypercholesteremia     Hypertension     Iron deficiency anemia, unspecified     Leaky heart valve     Lupus anticoagulant disorder     Renal artery stenosis     by CTA 7/27/2020    Skin cancer     Unspecified chronic bronchitis     UTI (urinary tract infection)        Past Surgical History:   Procedure Laterality Date    ACF      BACK SURGERY      RODS IN BACK; 4 SURGIERIES    BONE MARROW BIOPSY      CARDIAC ANGIOGRAM WITH STENT      CATARACT SX Bilateral     CORONARY ANGIOGRAPHY N/A 2/15/2023    Procedure: ANGIOGRAM, CORONARY ARTERY;   Surgeon: Rito Vega MD;  [facility];  Service: Cardiology;  Laterality: N/A;    CORONARY ARTERY BYPASS GRAFT (CABG) N/A 2/24/2023    Procedure: CORONARY ARTERY BYPASS GRAFT (CABG);  Surgeon: Spencer Hagan MD;  Location: [facility];  Service: Cardiothoracic;  Laterality: N/A;  CABG / EVH //   ECHO NOTIFIED    HIP SURGERY Right     THR    KNEE SURGERY Right     ATS    LEFT HEART CATHETERIZATION Left 07/22/2021    Procedure: Left heart cath;  Surgeon: Curtis Loredo MD;  [facility];  Service: Cardiology;  Laterality: Left;    LEFT HEART CATHETERIZATION Left 09/22/2022    Procedure: Left heart cath;  Surgeon: Giorgi Barker MD;  [facility];  Service: Cardiology;  Laterality: Left;    SHOULDER SURGERY Bilateral     SPINAL CORD STIMULATOR IMPLANT         Subjective     Chief Complaint: R shoulder 5/10 pain    Respiratory Status: Nasal cannula, flow 1 L/min    Patients cultural, spiritual, Rastafari conflicts given the current situation: no      Objective:     Communicated with NSG prior to session.  Patient found  on commode   upon PT entry to room.    Pt is Oriented x3 and Alert, Cooperative, and Motivated.    Vitals   Vitals at Rest  BP    HR    O2 Sat 97%   Pain      Vitals With Activity  BP    HR    O2 Sat 96%   Pain        Functional Mobility:   Transfers:     Sit to Stand: Supervision or touching assistance  with rollator  Toilet Transfer: Supervision or touching assistance  with  rollator  using  Step Transfer  Gait: Pt ambulates 200ft +100ft +150ft  with rollator with Supervision or touching assistance .   Impairments contributing to gait deviations include impaired postural control and decreased strength and enduracne.     Current   Status   Discharge   Goal   Functional Area: Care Score:     Roll Left and Right    Supervision or touching assistance   Sit to Lying    Supervision or touching assistance   Lying to Sitting on Side of Bed    Supervision or touching assistance   Sit  to Stand 4  Supervision or touching assistance   Chair/Bed-to-Chair Transfer 4  Supervision or touching assistance   Car Transfer    Supervision or touching assistance   Walk 10 Feet 4  Supervision or touching assistance   Walk 50 Feet with Two Turns 4  Supervision or touching assistance   Walk 150 Feet 4 Pt demo slow step through gait, forward head posture, decreased flo step length no LOB. 02% monitored pt droped to 89% able to recovery to 96% within 20secs Supervision or touching assistance   Walk 10 Feet Uneven Surface    Supervision or touching assistance   1 Step (Curb)    Supervision or touching assistance   4 Steps    Supervision or touching assistance   12 Steps    Not applicable   Picking Up Object    Supervision or touching assistance   Wheel 50 Feet with Two Turns    Supervision or touching assistance   Wheel 150 Feet    Supervision or touching assistance       Therapeutic Activities and Exercises:  Stationary bike 5/1/2 minutes  X3 sit to stands for toilet ing hygiene +BM  Dynamic standing to cori brief min assist, wash hands     Activity Tolerance: Good    Patient left up in chair with all lines intact, call button in reach, and NSG present to give breathing tx    Education provided: roles and goals of PT/PTA, transfer training, and gait training    Expected compliance: Moderate compliance    GOALS:   Multidisciplinary Problems       Physical Therapy Goals          Problem: Physical Therapy    Goal Priority Disciplines Outcome Goal Variances Interventions   Physical Therapy Goal     PT, PT/OT Ongoing, Progressing     Description: Pt will improve functional mobility by performing:     Bed Mobility   MET - Roll Right and Left Partial/ Moderate Assistance .  MET - Lying to sitting Partial/ Moderate Assistance .  MET - Sitting to lying Partial/ Moderate Assistance .  Supine to sit transfer with supervision/touching assist.    Transfers    MET - Pt will be able to perform Stand step chair/bed to chair  transfer With RW Partial/ Moderate Assistance .  MET - Pt will be able to perform Sit to stand with RW Partial/ Moderate Assistance .  MET - Pt will be able to perform Car transfer with RW Partial/ Moderate Assistance .  Sit to stand transfer independently using Rollator   Bed to chair transfer independently using Rollator   Car transfer independently using Rollator     Ambulation    MET - Pt will ambulate 50 Feet with RW Partial/ Moderate Assistance  Ambulate 350 feet independently using Rollator   Ascend/descend a 4 inch curb independently using Rollator   Ascend/descend 12 stairs independently using bilateral handrails  Ambulate 15 feet on uneven surfaces/ramps independently using Rollator     Wheelchair Mobility   D/C - Pt will be able to propel 150 feet in steven wheelchair Partial/ Moderate Assistance                        Plan:     During this hospitalization, patient to be seen 5 x/week to address the identified rehab impairments via neuromuscular re-education, wheelchair management/training, therapeutic exercises, therapeutic activities, gait training and progress toward the following goals:    Plan of Care Expires:  03/10/23  PT Next Visit Date: 03/16/23  Plan of Care reviewed with: patient    Additional Information:         Time Tracking:     Therapy Time  PT Received On: 03/11/23  PT Start Time: 0730  PT Stop Time: 0830  PT Total Time (min): 60 min   PT Individual: 60  Missed Time:    Time Missed due to:      Billable Minutes: Gait Training 30, Therapeutic Activity 15, and Therapeutic Exercise 15    03/11/2023

## 2023-03-11 NOTE — PROGRESS NOTES
Subjective:  HPI: 81 yo WM with PMH of male, followed by Dr. Loredo who presented to [place] ER with c/o chest pain. HS troponin 1149; therefore he was transferred to [facility] for cardiology services where he underwent LHC revealing multivessel disease and elevated LVEDP. He was started on diuretics. Plavix was placed on hold and he was transferred to [facility] for CABG evaluation on 2/21/23. Patient is reporting mild chest tightness 3/10 on arrival.  Vitals Stable. Shoulder Pain that morning. On Nitro & Heparin Infusion. EKG with no overt ischemic changes. Denies CP/SOB. Creatinine 1.93 mildly decreased from 2.07 day prior with addition of IVFs. H/H downtrending- 8.2/25.3 from 8.8/27.1 as well. Creatinine  further improved to 1.65. H/H 10.5/31.5 post transfusion 2  units PRBCs. Underwent  CABG x 2 on 2/24 by Dr Hagan, and brought to ICU on MV, requiring pressor support. Mediastinal drain patent. Received 2 units of PRBCs intraop.  CT x 2 patent. Pressors have been weaned off. On cleviprex drip. 2/26 Mediastinal drains have been removed. Creatinine has bumped. Nephrology adding IVF x 1L due to IVVD. O2 weaned to 2L/NC. Patient did not require O2 prior to hospitalization. On 3/3, labs showed elevated BUN/Cr of 77.7/ 1.80 with creatinine trending down. Medellin catheter remained. Last BM on 2/26 charted. Patient is AAOx4; confusion at times with pain meds.  Participating with therapy. Functional status includes bed mobility and transfers requiring moderate to maximal assistance. Max assist needed for upper body dressing; total assist toileting due to medellin;  setup/supervision for eating; and minimal assist for grooming. Amb w/RW for 40ft at Min Assist. Patient was evaluated, accepted, and admitted to inpatient rehab to improve functional status. Transferred to [facility] on 3/3 without incident.   3/11: Seen in patient room, seated in bed. Reports good sleep after pain medication. States that back is feeling ok this  morning. No current complaints. VSSAF.       Review of Systems  Depression/Anxiety: no complaints     FLUoxetine capsule 10 mg qd  Pain: incisional-controlled  gabapentin capsule 100 mg TID  rOPINIRole tablet 1 mg BID     HYDROcodone-acetaminophen 5-325 mg 1 tab q4hr PRN pain  Bowels/Bladder: last BM 3/9    Appetite: fair-improving.  Sleep: good      QUEtiapine tablet 50 mg qHS    Physical Exam  General: well-developed, well-nourished, tremor at rest  Respiratory: equal chest rise, no SOB, no audible wheeze  Cardiovascular: regular rate and rhythm, no edema  Gastrointestinal: soft, non-tender, non-distended   Musculoskeletal: full range of motion of all extremities/spine with generalized weakness  Integumentary: no rashes or skin lesions present, midline sternal opbkqfmw-PQG-s/d/i, chest tube site x 2 -mbpglh-KCD-k/d/I, RLE and LLE harvest incision sites-LONDON-c/d/i, Right groin puncture site-LONDON-c/d/I, sacral/buttocks-reddened  Neurologic: cranial nerves intact, no signs of peripheral neurological deficit, motor/sensory function intact, resting tremor-BUE          Diagnostics:  XR LUMBAR SPINE 2 OR 3 VIEWS  FINDINGS:  There is lumbar levoscoliotic curvature.  There is also compensatory dextroscoliosis centered about thoracolumbar junction.  Lumbar vertebrae stature is preserved.  There are posterior lumbar fusions throughout with transpedicular screws and batsheva constructs.  There also disc space operative changes at L2-L3, L3-L4 and L4-L5.  Multilevel facet arthropathy.  Left chest implanted device electrode extends to the thoracic thecal sac  Impression:  Lumbar operative and degenerative changes without significant interval difference.  Date:                                            03/10/2023  Time:                                           15:44        Assessment/Plan  Hospital   CAD (coronary artery disease)   S/P CABG x 2     Non-Hospital   Lupus anticoagulant inhibitor syndrome   Iron deficiency anemia    Elevated homocysteine   Low vitamin B12 level   Anemia   Angina, class III   Elevated partial thromboplastin time (PTT)   Coronary artery disease   Stage 3 chronic kidney disease   Hypercholesteremia   Bleeding   Altered mental status   Hypertension   Head injury   Dehydration   NSTEMI (non-ST elevated myocardial infarction)   Ischemic cardiomyopathy   GERD (gastroesophageal reflux disease)   Enlarged prostate   Aortic aneurysm, abdominal   Skin cancer       Wounds: midline sternal ctljjcie-HRW-l/d/i, chest tube site x 2 -xspijy-XFF-c/d/I, RLE and LLE harvest incision sites-LONDON-c/d/i, Right groin puncture site-LONDON-c/d/I, sacral/buttocks-reddened  S/p CABG x 2 on 2/24 by Dr Hagan  Precautions:sternal   Bracing: cardiac bear for splinting  Swallowing: Liberalized to Regular Diet with no added salt  Function: Tolerating therapy. Continue PT/OT  VTE Prophylaxis:   enoxaparin injection 40 mg SubQ q24hr  Code Status: FULL CODE   Discharge:Lives with his spouse in [place] in a single-story home with no steps to enter the residence. Completed high school. He was in the [employer]. He is retired from electrical/refrigeration work.  Wife manages the patients medications and finances. She also cooks, cleans, does laundry, and grocery shops.  Children (3). Date 3/17 Friday.

## 2023-03-12 PROCEDURE — 25000003 PHARM REV CODE 250: Performed by: NURSE PRACTITIONER

## 2023-03-12 PROCEDURE — 94640 AIRWAY INHALATION TREATMENT: CPT

## 2023-03-12 PROCEDURE — 63600175 PHARM REV CODE 636 W HCPCS: Performed by: NURSE PRACTITIONER

## 2023-03-12 PROCEDURE — 94761 N-INVAS EAR/PLS OXIMETRY MLT: CPT

## 2023-03-12 PROCEDURE — 99233 PR SUBSEQUENT HOSPITAL CARE,LEVL III: ICD-10-PCS | Mod: ,,, | Performed by: NURSE PRACTITIONER

## 2023-03-12 PROCEDURE — 25000242 PHARM REV CODE 250 ALT 637 W/ HCPCS: Performed by: NURSE PRACTITIONER

## 2023-03-12 PROCEDURE — 99233 SBSQ HOSP IP/OBS HIGH 50: CPT | Mod: ,,, | Performed by: NURSE PRACTITIONER

## 2023-03-12 PROCEDURE — 11800000 HC REHAB PRIVATE ROOM

## 2023-03-12 PROCEDURE — 27000221 HC OXYGEN, UP TO 24 HOURS

## 2023-03-12 RX ADMIN — TAMSULOSIN HYDROCHLORIDE 0.4 MG: 0.4 CAPSULE ORAL at 08:03

## 2023-03-12 RX ADMIN — SUCRALFATE 1 G: 1 TABLET ORAL at 05:03

## 2023-03-12 RX ADMIN — GABAPENTIN 100 MG: 100 CAPSULE ORAL at 06:03

## 2023-03-12 RX ADMIN — CARVEDILOL 12.5 MG: 6.25 TABLET, FILM COATED ORAL at 08:03

## 2023-03-12 RX ADMIN — FERROUS SULFATE TAB 325 MG (65 MG ELEMENTAL FE) 1 EACH: 325 (65 FE) TAB at 07:03

## 2023-03-12 RX ADMIN — GABAPENTIN 100 MG: 100 CAPSULE ORAL at 02:03

## 2023-03-12 RX ADMIN — FUROSEMIDE 40 MG: 40 TABLET ORAL at 07:03

## 2023-03-12 RX ADMIN — HYDROXYZINE PAMOATE 50 MG: 50 CAPSULE ORAL at 08:03

## 2023-03-12 RX ADMIN — ASPIRIN 81 MG: 81 TABLET, COATED ORAL at 07:03

## 2023-03-12 RX ADMIN — SUCRALFATE 1 G: 1 TABLET ORAL at 11:03

## 2023-03-12 RX ADMIN — QUETIAPINE 50 MG: 25 TABLET ORAL at 08:03

## 2023-03-12 RX ADMIN — ATORVASTATIN CALCIUM 40 MG: 40 TABLET, FILM COATED ORAL at 08:03

## 2023-03-12 RX ADMIN — LEVALBUTEROL HYDROCHLORIDE 1.25 MG: 1.25 SOLUTION RESPIRATORY (INHALATION) at 08:03

## 2023-03-12 RX ADMIN — ENOXAPARIN SODIUM 40 MG: 40 INJECTION SUBCUTANEOUS at 05:03

## 2023-03-12 RX ADMIN — LIDOCAINE 2 PATCH: 50 PATCH CUTANEOUS at 06:03

## 2023-03-12 RX ADMIN — DOCUSATE SODIUM 100 MG: 100 CAPSULE, LIQUID FILLED ORAL at 07:03

## 2023-03-12 RX ADMIN — DOCUSATE SODIUM 100 MG: 100 CAPSULE, LIQUID FILLED ORAL at 08:03

## 2023-03-12 RX ADMIN — ROPINIROLE HYDROCHLORIDE 1 MG: 1 TABLET, FILM COATED ORAL at 08:03

## 2023-03-12 RX ADMIN — HYDROCODONE BITARTRATE AND ACETAMINOPHEN 1 TABLET: 5; 325 TABLET ORAL at 07:03

## 2023-03-12 RX ADMIN — EZETIMIBE 10 MG: 10 TABLET ORAL at 07:03

## 2023-03-12 RX ADMIN — FLUOXETINE 10 MG: 10 CAPSULE ORAL at 07:03

## 2023-03-12 RX ADMIN — SUCRALFATE 1 G: 1 TABLET ORAL at 08:03

## 2023-03-12 RX ADMIN — HYDROCODONE BITARTRATE AND ACETAMINOPHEN 1 TABLET: 5; 325 TABLET ORAL at 08:03

## 2023-03-12 RX ADMIN — GABAPENTIN 200 MG: 100 CAPSULE ORAL at 08:03

## 2023-03-12 RX ADMIN — PANTOPRAZOLE SODIUM 40 MG: 40 TABLET, DELAYED RELEASE ORAL at 07:03

## 2023-03-12 RX ADMIN — CLOPIDOGREL BISULFATE 75 MG: 75 TABLET ORAL at 07:03

## 2023-03-12 RX ADMIN — SUCRALFATE 1 G: 1 TABLET ORAL at 06:03

## 2023-03-12 RX ADMIN — PREDNISONE 2.5 MG: 2.5 TABLET ORAL at 07:03

## 2023-03-12 RX ADMIN — LEVALBUTEROL HYDROCHLORIDE 1.25 MG: 1.25 SOLUTION RESPIRATORY (INHALATION) at 04:03

## 2023-03-12 RX ADMIN — CARVEDILOL 12.5 MG: 6.25 TABLET, FILM COATED ORAL at 07:03

## 2023-03-12 RX ADMIN — FERROUS SULFATE TAB 325 MG (65 MG ELEMENTAL FE) 1 EACH: 325 (65 FE) TAB at 08:03

## 2023-03-12 RX ADMIN — GABAPENTIN 100 MG: 100 CAPSULE ORAL at 08:03

## 2023-03-12 RX ADMIN — ROPINIROLE HYDROCHLORIDE 1 MG: 1 TABLET, FILM COATED ORAL at 07:03

## 2023-03-12 NOTE — PROGRESS NOTES
Subjective:  HPI: 81 yo WM with PMH of male, followed by Dr. Loredo who presented to [place] ER with c/o chest pain. HS troponin 1149; therefore he was transferred to [facility] for cardiology services where he underwent LHC revealing multivessel disease and elevated LVEDP. He was started on diuretics. Plavix was placed on hold and he was transferred to [facility] for CABG evaluation on 2/21/23. Patient is reporting mild chest tightness 3/10 on arrival.  Vitals Stable. Shoulder Pain that morning. On Nitro & Heparin Infusion. EKG with no overt ischemic changes. Denies CP/SOB. Creatinine 1.93 mildly decreased from 2.07 day prior with addition of IVFs. H/H downtrending- 8.2/25.3 from 8.8/27.1 as well. Creatinine  further improved to 1.65. H/H 10.5/31.5 post transfusion 2  units PRBCs. Underwent  CABG x 2 on 2/24 by Dr Hagan, and brought to ICU on MV, requiring pressor support. Mediastinal drain patent. Received 2 units of PRBCs intraop.  CT x 2 patent. Pressors have been weaned off. On cleviprex drip. 2/26 Mediastinal drains have been removed. Creatinine has bumped. Nephrology adding IVF x 1L due to IVVD. O2 weaned to 2L/NC. Patient did not require O2 prior to hospitalization. On 3/3, labs showed elevated BUN/Cr of 77.7/ 1.80 with creatinine trending down. Medellin catheter remained. Last BM on 2/26 charted. Patient is AAOx4; confusion at times with pain meds.  Participating with therapy. Functional status includes bed mobility and transfers requiring moderate to maximal assistance. Max assist needed for upper body dressing; total assist toileting due to medellin;  setup/supervision for eating; and minimal assist for grooming. Amb w/RW for 40ft at Min Assist. Patient was evaluated, accepted, and admitted to inpatient rehab to improve functional status. Transferred to [facility] on 3/3 without incident.   3/12: Seen in patient room, seated in WC with family at bedside. O2 via NC in place. Patient admits to oxygen fluctuations  requiring supplemental oxygen. No complaints. VSSAF with documented SpO2 93-96% this morning. Nursing notes drop to 91% on RA with recovery after 1L O2 added.       Review of Systems  Depression/Anxiety: no complaints     FLUoxetine capsule 10 mg qd  Pain: incisional-controlled  gabapentin capsule 100 mg TID  rOPINIRole tablet 1 mg BID     HYDROcodone-acetaminophen 5-325 mg 1 tab q4hr PRN pain  Bowels/Bladder: last BM 3/9    Appetite: fair-improving.  Sleep: good      QUEtiapine tablet 50 mg qHS    Physical Exam  General: well-developed, well-nourished, tremor at rest  Respiratory: equal chest rise, no SOB, no audible wheeze  Cardiovascular: regular rate and rhythm, no edema  Gastrointestinal: soft, non-tender, non-distended   Musculoskeletal: full range of motion of all extremities/spine with generalized weakness  Integumentary: no rashes or skin lesions present, midline sternal nwtgtqpb-XNM-j/d/i, chest tube site x 2 -qqkqpt-JOL-e/d/I, RLE and LLE harvest incision sites-LONDON-c/d/i, Right groin puncture site-LONDON-c/d/I, sacral/buttocks-reddened  Neurologic: cranial nerves intact, no signs of peripheral neurological deficit, motor/sensory function intact, resting tremor-BUE          Assessment/Plan  Hospital   CAD (coronary artery disease)   S/P CABG x 2     Non-Hospital   Lupus anticoagulant inhibitor syndrome   Iron deficiency anemia   Elevated homocysteine   Low vitamin B12 level   Anemia   Angina, class III   Elevated partial thromboplastin time (PTT)   Coronary artery disease   Stage 3 chronic kidney disease   Hypercholesteremia   Bleeding   Altered mental status   Hypertension   Head injury   Dehydration   NSTEMI (non-ST elevated myocardial infarction)   Ischemic cardiomyopathy   GERD (gastroesophageal reflux disease)   Enlarged prostate   Aortic aneurysm, abdominal   Skin cancer       Wounds: midline sternal bxtygvcu-NXQ-j/d/i, chest tube site x 2 -sfhidz-IOD-s/d/I, RLE and LLE harvest incision sites-LONDON-c/d/i,  Right groin puncture site-LONDON-c/d/I, sacral/buttocks-reddened  S/p CABG x 2 on 2/24 by Dr Hagan  Precautions:sternal   Bracing: cardiac bear for splinting  Swallowing: Liberalized to Regular Diet with no added salt  Function: Tolerating therapy. Continue PT/OT  VTE Prophylaxis:   enoxaparin injection 40 mg SubQ q24hr  Code Status: FULL CODE   Discharge:Lives with his spouse in [place] in a single-story home with no steps to enter the residence. Completed high school. He was in the [employer]. He is retired from electrical/refrigeration work.  Wife manages the patients medications and finances. She also cooks, cleans, does laundry, and grocery shops.  Children (3). Date 3/17 Friday.

## 2023-03-13 LAB
ALBUMIN SERPL-MCNC: 2.7 G/DL (ref 3.4–4.8)
ALBUMIN/GLOB SERPL: 0.6 RATIO (ref 1.1–2)
ALP SERPL-CCNC: 93 UNIT/L (ref 40–150)
ALT SERPL-CCNC: 51 UNIT/L (ref 0–55)
AST SERPL-CCNC: 28 UNIT/L (ref 5–34)
BASOPHILS # BLD AUTO: 0.02 X10(3)/MCL (ref 0–0.2)
BASOPHILS NFR BLD AUTO: 0.2 %
BILIRUBIN DIRECT+TOT PNL SERPL-MCNC: 0.6 MG/DL
BUN SERPL-MCNC: 35.5 MG/DL (ref 8.4–25.7)
CALCIUM SERPL-MCNC: 9.3 MG/DL (ref 8.8–10)
CHLORIDE SERPL-SCNC: 104 MMOL/L (ref 98–107)
CO2 SERPL-SCNC: 27 MMOL/L (ref 23–31)
CREAT SERPL-MCNC: 1.63 MG/DL (ref 0.73–1.18)
EOSINOPHIL # BLD AUTO: 0 X10(3)/MCL (ref 0–0.9)
EOSINOPHIL NFR BLD AUTO: 0 %
ERYTHROCYTE [DISTWIDTH] IN BLOOD BY AUTOMATED COUNT: 15.3 % (ref 11.5–17)
FIO2: 100
FIO2: 37
FIO2: 55
FIO2: 70
GFR SERPLBLD CREATININE-BSD FMLA CKD-EPI: 42 MLS/MIN/1.73/M2
GLOBULIN SER-MCNC: 4.7 GM/DL (ref 2.4–3.5)
GLUCOSE SERPL-MCNC: 108 MG/DL (ref 70–110)
GLUCOSE SERPL-MCNC: 110 MG/DL (ref 82–115)
GLUCOSE SERPL-MCNC: 117 MG/DL (ref 70–110)
GLUCOSE SERPL-MCNC: 118 MG/DL (ref 70–110)
GLUCOSE SERPL-MCNC: 119 MG/DL (ref 70–110)
GLUCOSE SERPL-MCNC: 138 MG/DL (ref 70–110)
GLUCOSE SERPL-MCNC: 96 MG/DL (ref 70–110)
HCO3 UR-SCNC: 23 MMOL/L (ref 24–28)
HCO3 UR-SCNC: 24.3 MMOL/L (ref 24–28)
HCO3 UR-SCNC: 24.4 MMOL/L (ref 24–28)
HCO3 UR-SCNC: 24.6 MMOL/L (ref 24–28)
HCO3 UR-SCNC: 25 MMOL/L (ref 24–28)
HCO3 UR-SCNC: 26.5 MMOL/L (ref 24–28)
HCT VFR BLD AUTO: 33.7 % (ref 42–52)
HCT VFR BLD CALC: 20 %PCV (ref 36–54)
HCT VFR BLD CALC: 26 %PCV (ref 36–54)
HCT VFR BLD CALC: 26 %PCV (ref 36–54)
HCT VFR BLD CALC: 31 %PCV (ref 36–54)
HCT VFR BLD CALC: 33 %PCV (ref 36–54)
HCT VFR BLD CALC: 33 %PCV (ref 36–54)
HGB BLD-MCNC: 10.4 G/DL (ref 14–18)
HGB BLD-MCNC: 11 G/DL
HGB BLD-MCNC: 7 G/DL
HGB BLD-MCNC: 9 G/DL
HGB BLD-MCNC: 9 G/DL
IMM GRANULOCYTES # BLD AUTO: 0.15 X10(3)/MCL (ref 0–0.04)
IMM GRANULOCYTES NFR BLD AUTO: 1.7 %
LYMPHOCYTES # BLD AUTO: 0.94 X10(3)/MCL (ref 0.6–4.6)
LYMPHOCYTES NFR BLD AUTO: 10.8 %
MAGNESIUM SERPL-MCNC: 1.7 MG/DL (ref 1.6–2.6)
MCH RBC QN AUTO: 29.2 PG
MCHC RBC AUTO-ENTMCNC: 30.9 G/DL (ref 33–36)
MCV RBC AUTO: 94.7 FL (ref 80–94)
MONOCYTES # BLD AUTO: 1.24 X10(3)/MCL (ref 0.1–1.3)
MONOCYTES NFR BLD AUTO: 14.2 %
NEUTROPHILS # BLD AUTO: 6.36 X10(3)/MCL (ref 2.1–9.2)
NEUTROPHILS NFR BLD AUTO: 73.1 %
NRBC BLD AUTO-RTO: 0 %
PCO2 BLDA: 36.4 MMHG (ref 35–45)
PCO2 BLDA: 39.4 MMHG (ref 35–45)
PCO2 BLDA: 42.5 MMHG (ref 35–45)
PCO2 BLDA: 43.3 MMHG (ref 35–45)
PCO2 BLDA: 43.3 MMHG (ref 35–45)
PCO2 BLDA: 48.5 MMHG (ref 35–45)
PH SMN: 7.28 [PH] (ref 7.35–7.45)
PH SMN: 7.36 [PH] (ref 7.35–7.45)
PH SMN: 7.36 [PH] (ref 7.35–7.45)
PH SMN: 7.38 [PH] (ref 7.35–7.45)
PH SMN: 7.43 [PH] (ref 7.35–7.45)
PH SMN: 7.43 [PH] (ref 7.35–7.45)
PHOSPHATE SERPL-MCNC: 3.6 MG/DL (ref 2.3–4.7)
PLATELET # BLD AUTO: 233 X10(3)/MCL (ref 130–400)
PMV BLD AUTO: 11 FL (ref 7.4–10.4)
PO2 BLDA: 181 MMHG (ref 80–100)
PO2 BLDA: 287 MMHG (ref 40–60)
PO2 BLDA: 342 MMHG (ref 80–100)
PO2 BLDA: 42 MMHG (ref 40–60)
PO2 BLDA: 48 MMHG (ref 40–60)
PO2 BLDA: 51 MMHG (ref 40–60)
POC BE: -1 MMOL/L
POC BE: -1 MMOL/L
POC BE: -4 MMOL/L
POC BE: 0 MMOL/L
POC BE: 0 MMOL/L
POC BE: 2 MMOL/L
POC IONIZED CALCIUM: 1.02 MMOL/L (ref 1.06–1.42)
POC IONIZED CALCIUM: 1.07 MMOL/L (ref 1.06–1.42)
POC IONIZED CALCIUM: 1.07 MMOL/L (ref 1.06–1.42)
POC IONIZED CALCIUM: 1.15 MMOL/L (ref 1.06–1.42)
POC IONIZED CALCIUM: 1.24 MMOL/L (ref 1.06–1.42)
POC IONIZED CALCIUM: 1.4 MMOL/L (ref 1.06–1.42)
POC PCO2 TEMP: 34.9 MMHG
POC PCO2 TEMP: 37.8 MMHG
POC PCO2 TEMP: 43.3 MMHG
POC PCO2 TEMP: 44.8 MMHG
POC PCO2 TEMP: 48.5 MMHG
POC PH TEMP: 7.28
POC PH TEMP: 7.34
POC PH TEMP: 7.36
POC PH TEMP: 7.45
POC PH TEMP: 7.45
POC PO2 TEMP: 282 MMHG
POC PO2 TEMP: 337 MMHG
POC PO2 TEMP: 42 MMHG
POC PO2 TEMP: 48 MMHG
POC PO2 TEMP: 54 MMHG
POC SATURATED O2: 100 % (ref 95–100)
POC SATURATED O2: 71 % (ref 95–100)
POC SATURATED O2: 82 % (ref 95–100)
POC SATURATED O2: 84 % (ref 95–100)
POC TCO2: 24 MMOL/L (ref 24–29)
POC TCO2: 25 MMOL/L (ref 24–29)
POC TCO2: 26 MMOL/L (ref 23–27)
POC TCO2: 26 MMOL/L (ref 24–29)
POC TCO2: 26 MMOL/L (ref 24–29)
POC TCO2: 28 MMOL/L (ref 23–27)
POC TEMPERATURE: ABNORMAL
POTASSIUM BLD-SCNC: 4.3 MMOL/L (ref 3.5–5.1)
POTASSIUM BLD-SCNC: 4.4 MMOL/L (ref 3.5–5.1)
POTASSIUM BLD-SCNC: 5.2 MMOL/L (ref 3.5–5.1)
POTASSIUM BLD-SCNC: 5.4 MMOL/L (ref 3.5–5.1)
POTASSIUM BLD-SCNC: 6 MMOL/L (ref 3.5–5.1)
POTASSIUM BLD-SCNC: 6.1 MMOL/L (ref 3.5–5.1)
POTASSIUM SERPL-SCNC: 4.1 MMOL/L (ref 3.5–5.1)
PREALB SERPL-MCNC: 17.6 MG/DL (ref 16–42)
PROT SERPL-MCNC: 7.4 GM/DL (ref 5.8–7.6)
RBC # BLD AUTO: 3.56 X10(6)/MCL (ref 4.7–6.1)
SAMPLE: ABNORMAL
SODIUM BLD-SCNC: 130 MMOL/L (ref 136–145)
SODIUM BLD-SCNC: 134 MMOL/L (ref 136–145)
SODIUM BLD-SCNC: 136 MMOL/L (ref 136–145)
SODIUM BLD-SCNC: 136 MMOL/L (ref 136–145)
SODIUM BLD-SCNC: 137 MMOL/L (ref 136–145)
SODIUM BLD-SCNC: 139 MMOL/L (ref 136–145)
SODIUM SERPL-SCNC: 140 MMOL/L (ref 136–145)
WBC # SPEC AUTO: 8.7 X10(3)/MCL (ref 4.5–11.5)

## 2023-03-13 PROCEDURE — 97530 THERAPEUTIC ACTIVITIES: CPT

## 2023-03-13 PROCEDURE — 63600175 PHARM REV CODE 636 W HCPCS: Performed by: NURSE PRACTITIONER

## 2023-03-13 PROCEDURE — 97116 GAIT TRAINING THERAPY: CPT

## 2023-03-13 PROCEDURE — 25000242 PHARM REV CODE 250 ALT 637 W/ HCPCS: Performed by: NURSE PRACTITIONER

## 2023-03-13 PROCEDURE — 27000221 HC OXYGEN, UP TO 24 HOURS

## 2023-03-13 PROCEDURE — 25000003 PHARM REV CODE 250: Performed by: NURSE PRACTITIONER

## 2023-03-13 PROCEDURE — 94640 AIRWAY INHALATION TREATMENT: CPT

## 2023-03-13 PROCEDURE — 94761 N-INVAS EAR/PLS OXIMETRY MLT: CPT

## 2023-03-13 PROCEDURE — 84100 ASSAY OF PHOSPHORUS: CPT | Performed by: NURSE PRACTITIONER

## 2023-03-13 PROCEDURE — 97535 SELF CARE MNGMENT TRAINING: CPT

## 2023-03-13 PROCEDURE — 99233 SBSQ HOSP IP/OBS HIGH 50: CPT | Mod: ,,, | Performed by: NURSE PRACTITIONER

## 2023-03-13 PROCEDURE — 84134 ASSAY OF PREALBUMIN: CPT | Performed by: NURSE PRACTITIONER

## 2023-03-13 PROCEDURE — 80053 COMPREHEN METABOLIC PANEL: CPT | Performed by: NURSE PRACTITIONER

## 2023-03-13 PROCEDURE — 92611 MOTION FLUOROSCOPY/SWALLOW: CPT

## 2023-03-13 PROCEDURE — 83735 ASSAY OF MAGNESIUM: CPT | Performed by: NURSE PRACTITIONER

## 2023-03-13 PROCEDURE — 85025 COMPLETE CBC W/AUTO DIFF WBC: CPT | Performed by: NURSE PRACTITIONER

## 2023-03-13 PROCEDURE — 11800000 HC REHAB PRIVATE ROOM

## 2023-03-13 PROCEDURE — 99233 PR SUBSEQUENT HOSPITAL CARE,LEVL III: ICD-10-PCS | Mod: ,,, | Performed by: NURSE PRACTITIONER

## 2023-03-13 PROCEDURE — 97110 THERAPEUTIC EXERCISES: CPT

## 2023-03-13 RX ORDER — BISACODYL 10 MG
10 SUPPOSITORY, RECTAL RECTAL ONCE
Status: DISCONTINUED | OUTPATIENT
Start: 2023-03-13 | End: 2023-03-17 | Stop reason: HOSPADM

## 2023-03-13 RX ADMIN — CARVEDILOL 12.5 MG: 6.25 TABLET, FILM COATED ORAL at 08:03

## 2023-03-13 RX ADMIN — HYDROCODONE BITARTRATE AND ACETAMINOPHEN 1 TABLET: 5; 325 TABLET ORAL at 05:03

## 2023-03-13 RX ADMIN — ENOXAPARIN SODIUM 40 MG: 40 INJECTION SUBCUTANEOUS at 05:03

## 2023-03-13 RX ADMIN — EZETIMIBE 10 MG: 10 TABLET ORAL at 08:03

## 2023-03-13 RX ADMIN — TAMSULOSIN HYDROCHLORIDE 0.4 MG: 0.4 CAPSULE ORAL at 08:03

## 2023-03-13 RX ADMIN — PREDNISONE 2.5 MG: 2.5 TABLET ORAL at 08:03

## 2023-03-13 RX ADMIN — ROPINIROLE HYDROCHLORIDE 1 MG: 1 TABLET, FILM COATED ORAL at 08:03

## 2023-03-13 RX ADMIN — LEVALBUTEROL HYDROCHLORIDE 1.25 MG: 1.25 SOLUTION RESPIRATORY (INHALATION) at 04:03

## 2023-03-13 RX ADMIN — HYDROCODONE BITARTRATE AND ACETAMINOPHEN 1 TABLET: 5; 325 TABLET ORAL at 12:03

## 2023-03-13 RX ADMIN — ASPIRIN 81 MG: 81 TABLET, COATED ORAL at 08:03

## 2023-03-13 RX ADMIN — GABAPENTIN 100 MG: 100 CAPSULE ORAL at 08:03

## 2023-03-13 RX ADMIN — FERROUS SULFATE TAB 325 MG (65 MG ELEMENTAL FE) 1 EACH: 325 (65 FE) TAB at 08:03

## 2023-03-13 RX ADMIN — GABAPENTIN 100 MG: 100 CAPSULE ORAL at 05:03

## 2023-03-13 RX ADMIN — TIZANIDINE 4 MG: 4 TABLET ORAL at 08:03

## 2023-03-13 RX ADMIN — HYDROCODONE BITARTRATE AND ACETAMINOPHEN 1 TABLET: 5; 325 TABLET ORAL at 08:03

## 2023-03-13 RX ADMIN — SUCRALFATE 1 G: 1 TABLET ORAL at 08:03

## 2023-03-13 RX ADMIN — DOCUSATE SODIUM 100 MG: 100 CAPSULE, LIQUID FILLED ORAL at 08:03

## 2023-03-13 RX ADMIN — ATORVASTATIN CALCIUM 40 MG: 40 TABLET, FILM COATED ORAL at 08:03

## 2023-03-13 RX ADMIN — FLUOXETINE 10 MG: 10 CAPSULE ORAL at 08:03

## 2023-03-13 RX ADMIN — CLOPIDOGREL BISULFATE 75 MG: 75 TABLET ORAL at 08:03

## 2023-03-13 RX ADMIN — TIZANIDINE 4 MG: 4 TABLET ORAL at 10:03

## 2023-03-13 RX ADMIN — LEVALBUTEROL HYDROCHLORIDE 1.25 MG: 1.25 SOLUTION RESPIRATORY (INHALATION) at 12:03

## 2023-03-13 RX ADMIN — LEVALBUTEROL HYDROCHLORIDE 1.25 MG: 1.25 SOLUTION RESPIRATORY (INHALATION) at 11:03

## 2023-03-13 RX ADMIN — PANTOPRAZOLE SODIUM 40 MG: 40 TABLET, DELAYED RELEASE ORAL at 08:03

## 2023-03-13 RX ADMIN — FUROSEMIDE 40 MG: 40 TABLET ORAL at 08:03

## 2023-03-13 RX ADMIN — LIDOCAINE 2 PATCH: 50 PATCH CUTANEOUS at 05:03

## 2023-03-13 RX ADMIN — LEVALBUTEROL HYDROCHLORIDE 1.25 MG: 1.25 SOLUTION RESPIRATORY (INHALATION) at 07:03

## 2023-03-13 RX ADMIN — QUETIAPINE 50 MG: 25 TABLET ORAL at 08:03

## 2023-03-13 RX ADMIN — SUCRALFATE 1 G: 1 TABLET ORAL at 05:03

## 2023-03-13 RX ADMIN — SUCRALFATE 1 G: 1 TABLET ORAL at 10:03

## 2023-03-13 RX ADMIN — GABAPENTIN 100 MG: 100 CAPSULE ORAL at 02:03

## 2023-03-13 RX ADMIN — GABAPENTIN 200 MG: 100 CAPSULE ORAL at 08:03

## 2023-03-13 NOTE — PT/OT/SLP PROGRESS
Occupational Therapy Inpatient Rehab Treatment    Name: Hunter Navarrete  MRN: [id number]    Assessment:  Hunter Navarrete is a 82 y.o. male admitted with a medical diagnosis of S/P CABG x 2.  He presents with the following impairments/functional limitations:  weakness, impaired endurance, impaired self care skills, impaired functional mobility, decreased upper extremity function, pain, decreased ROM, impaired fine motor .    General Precautions: Standard, fall, sternal     Orthopedic Precautions:Full weight bearing     Braces: N/A    Rehab Prognosis: Good; patient would benefit from acute skilled OT services to address these deficits and reach maximum level of function.      History:     Past Medical History:   Diagnosis Date    Acid reflux     Anemia     Aortic aneurysm, abdominal     Arthritis     Bronchitis     Cataract     Celiac artery stenosis     50% by CTA abdomen 7/27/2020    CKD (chronic kidney disease)     45% FUNCTION    Coronary artery disease     Encounter for blood transfusion     Enlarged prostate     GERD (gastroesophageal reflux disease)     Hemorrhoids     Hypercholesteremia     Hypertension     Iron deficiency anemia, unspecified     Leaky heart valve     Lupus anticoagulant disorder     Renal artery stenosis     by CTA 7/27/2020    Skin cancer     Unspecified chronic bronchitis     UTI (urinary tract infection)        Past Surgical History:   Procedure Laterality Date    ACF      BACK SURGERY      RODS IN BACK; 4 SURGIERIES    BONE MARROW BIOPSY      CARDIAC ANGIOGRAM WITH STENT      CATARACT SX Bilateral     CORONARY ANGIOGRAPHY N/A 2/15/2023    Procedure: ANGIOGRAM, CORONARY ARTERY;  Surgeon: Rito Vega MD;  Location: [facility] CATH;  Service: Cardiology;  Laterality: N/A;    CORONARY ARTERY BYPASS GRAFT (CABG) N/A 2/24/2023    Procedure: CORONARY ARTERY BYPASS GRAFT (CABG);  Surgeon: Spencer Hagan MD;  Location: [facility] OR;  Service: Cardiothoracic;  Laterality: N/A;  CABG / EVH //   ECHO  "NOTIFIED    HIP SURGERY Right     THR    KNEE SURGERY Right     ATS    LEFT HEART CATHETERIZATION Left 07/22/2021    Procedure: Left heart cath;  Surgeon: Curtis Loredo MD;  Location: [facility] CATH;  Service: Cardiology;  Laterality: Left;    LEFT HEART CATHETERIZATION Left 09/22/2022    Procedure: Left heart cath;  Surgeon: Giorgi Barker MD;  Location: [facility] CATH;  Service: Cardiology;  Laterality: Left;    SHOULDER SURGERY Bilateral     SPINAL CORD STIMULATOR IMPLANT         Subjective     Orientation: Oriented x4    Chief Complaint: "My R arm won't move without hurting"     Patient/Family Comments/goals: " I like to work in my garden" Pt. States he misses working with plants/seeds in pots for vegetables/flowers.    Vitals   Vitals at Rest  /67   HR 97   O2 Sat 97   on 1 L    Pain 7/10     Vitals With Activity  /62   HR 95  on 1 L    O2 Sat 95   Pain 7/10     Respiratory Status: Nasal cannula, flow 1 L/min    Patients cultural, spiritual, Restorationist conflicts given the current situation: no       Objective:     Patient found up in chair with  (wet briefs)  upon OT entry to room.    Mobility   Patient completed:  Sit to Stand Transfer with minimum assistance with 4 wheeled walker  Stand to Sit Transfer with minimum assistance with 4 wheeled walker Pt. Performed sit <>stand to change wet briefs/pants         ADLs   Current Status   Eating     Oral Hygiene     Shower, Bathe Self     Upper Body Dressing     Lower Body Dressing     Toileting Hygiene     Toilet Transfer     Putting On, Taking Off Footwear       Limiting Factors for ADLs: motor, endurance, limited ROM, weakness, and "stiffness"           Therapeutic Activities  Pt. C pipe tree/pattern able to build two figures c B hands working together accurately on tabletop    Therapeutic Exercise  Pt. C B UE on tabletop c towel performed towel glides to extend B shoulders/ AAROM. Pt. Then given yellow (light resistance) Theraband and able to perform Biceps " curls/chest press and shoulder abd/duction 10 reps on first set and 15 reps on second set. Pt. C increased AROM /less pain. OT held end of T band over Pt.'s head and Pt. Pulled down s gravity x's 20 reps against OT's resistance.       Additional Treatments: Pt. Educated to alert staff to wet briefs to preserve skin integrity/make attempt to use urinal if possible.     LifeStyle Change and Education:             Patient left up in chair with chair alarm on and ERNESTO Rodriguez present present.     Education provided: ADLs, transfer training, and body mechanics    Multidisciplinary Problems       Occupational Therapy Goals          Problem: Occupational Therapy    Goal Priority Disciplines Outcome Interventions   Occupational Therapy Goal     OT, PT/OT Ongoing, Progressing    Description: ADLs: Pt will be SPV with ADLs by d/c.   STG: by reeval  Pt to perform UB dressing with partial A MET  Pt to perform LB dressing with Mod A using AD MET  Pt to perform putting on/off footwear task with Mod A using AD MET  Pt to perform toileting with Mod A using AD as needed MET  Pt to perform bathing with Mod A MET    Functional Transfers:  Pt to perform toilet transfers with Touch A using BSC over toilet. MET  Pt to perform a tub transfer with TTB requiring Mod A  MET    Balance, Strengthening, Endurance, Balance:  Pt to consistently demonstrate adherence to orthopedic precautions during all ADL's as instructed by OT.  Pt to demonstrate fair dynamic standing balance as required to perform ADL's from standing level.  Pt to demonstrate consistent adherence to breathing control and energy conservation techniques as educated by OT.                        Time Tracking     OT Received On:    Time In       Time Out    Total Time    Therapy Time:    Missed Time:    Missed Time Reason:      Billable Minutes: Self Care/Home Management 15, Therapeutic Activity 20, and Therapeutic Exercise 25    03/14/2023

## 2023-03-13 NOTE — PT/OT/SLP PROGRESS
Recreational Therapy Treatment    Date of Treatment: 03/13/23  Start Time: 0830  Stop Time: 0900  Total Time: 30 min  Missed Time:    General Precautions: fall, sternal  Ortho Precautions: Full weight bearing  Braces: N/A    Vitals   Vitals at Rest  /70      O2 Sat    Pain 7/10     Vitals With Activity  /67   HR 97   O2 Sat    Pain 7/10       Treatment     Cognitive Skills Building   Cognitive Observation Activity Assist Position Equipment Response            Comment:          Dynamic Activities   Activity Assist Position Equipment Response   Activity 1 Bean bag toos supervision and minimum assistance Standing Rolling walker and Bean bags good   Comment: Sit to stand was min/supervision as was dynamic standing balance/reaching. Standing tolerance was 1 minute at a time.  UE coordination was supervision. Memory and problem solving skills were setup. Focused on incisional pain at his chest area       Fine Motor Activities   Activity Assist Position Equipment Response           Comment:          Additional Info: Recliner to w/c transfer was mod     Goals     Short Term Goals    Goal  Goal Status   Will increase sit to stand to supervision Progressing   Will improve dynamic standing balance/reaching to supervision Progressing                 Long Term Goals    Goal Goal Status   Will increase standing tolerance to 5,10 minutes Progressing   Will improve dynamic standing balance/reaching to setup Progressing

## 2023-03-13 NOTE — PROGRESS NOTES
[facility] - Rehab Inpatient Services  Wound Care    Patient Name:  Hunter Navarrete   MRN:  [id number]  Date: 3/13/2023  Diagnosis: S/P CABG x 2    History:     Past Medical History:   Diagnosis Date    Acid reflux     Anemia     Aortic aneurysm, abdominal     Arthritis     Bronchitis     Cataract     Celiac artery stenosis     50% by CTA abdomen 7/27/2020    CKD (chronic kidney disease)     45% FUNCTION    Coronary artery disease     Encounter for blood transfusion     Enlarged prostate     GERD (gastroesophageal reflux disease)     Hemorrhoids     Hypercholesteremia     Hypertension     Iron deficiency anemia, unspecified     Leaky heart valve     Lupus anticoagulant disorder     Renal artery stenosis     by CTA 7/27/2020    Skin cancer     Unspecified chronic bronchitis     UTI (urinary tract infection)              Precautions:     Allergies as of 03/03/2023 - Reviewed 03/03/2023   Allergen Reaction Noted    Cardura [doxazosin] Hives 03/02/2017    Lyrica [pregabalin] Other (See Comments) 03/02/2017    Paxil [paroxetine hcl] Other (See Comments) 03/02/2017    Restoril [temazepam] Hallucinations 03/02/2017    Vioxx [rofecoxib] Swelling 03/02/2017    Zithromax [azithromycin] Hives 03/02/2017       WOC Assessment Details/Treatment      03/13/23 1206   Pain/Comfort/Sleep   POSS (Pasero Opioid-Induced Sed Scale) 1 - Awake and alert   Pain Reassessment   Pain Rating Prior to Med Admin 7        Incision/Site 02/24/23 1102 Chest midline   Date First Assessed/Time First Assessed: 02/24/23 1102   Location: Chest  Orientation: midline   Dressing Appearance Open to air   Appearance   (light scabbing)   Wound Edges Approximated        Incision/Site 02/24/23 1102 Right Leg   Date First Assessed/Time First Assessed: 02/24/23 1102   Side: Right  Location: Leg   Dressing Appearance Open to air   Drainage Amount None   Appearance Dry  (scabbing)        Incision/Site 03/03/23 1545 Left Leg anterior;lower;proximal  horizontal   Date First Assessed/Time First Assessed: 03/03/23 1545   Present Prior to Hospital Arrival?: Yes  Side: Left  Location: Leg  Orientation: anterior;lower;proximal  Incision Type: horizontal  Closure Method: Dermabond   Dressing Appearance Open to air   Drainage Amount None   Appearance   (scabbing)       Recommendations made to primary to leave open to air. Orders changed.    03/13/2023

## 2023-03-13 NOTE — PROCEDURES
Speech Language Pathology Department  Modified Barium Swallow Study    Patient Name:  Hunter Navarrete   MRN:  [id number]    Recommendations:     General recommendations:  dysphagia therapy and Speech/Language and Cognitive Evaluation  Repeat MBS study: as clinically indicated  Diet recommendations:  Regular Diet - IDDSI Level 7, Liquid Diet Level: Thin liquids - IDDSI Level 0   Swallow strategies/precautions: small bites/sips, slow rate, additional swallow per bite/sip, NO straws, and medications per patient preference  General precautions: Standard,      History/Reason for Referral:     Past Medical History:   Diagnosis Date    Acid reflux     Anemia     Aortic aneurysm, abdominal     Arthritis     Bronchitis     Cataract     Celiac artery stenosis     50% by CTA abdomen 7/27/2020    CKD (chronic kidney disease)     45% FUNCTION    Coronary artery disease     Encounter for blood transfusion     Enlarged prostate     GERD (gastroesophageal reflux disease)     Hemorrhoids     Hypercholesteremia     Hypertension     Iron deficiency anemia, unspecified     Leaky heart valve     Lupus anticoagulant disorder     Renal artery stenosis     by CTA 7/27/2020    Skin cancer     Unspecified chronic bronchitis     UTI (urinary tract infection)      Past Surgical History:   Procedure Laterality Date    ACF      BACK SURGERY      RODS IN BACK; 4 SURGIERIES    BONE MARROW BIOPSY      CARDIAC ANGIOGRAM WITH STENT      CATARACT SX Bilateral     CORONARY ANGIOGRAPHY N/A 2/15/2023    Procedure: ANGIOGRAM, CORONARY ARTERY;  Surgeon: Rito Vega MD;  Location: [facility] CATH;  Service: Cardiology;  Laterality: N/A;    CORONARY ARTERY BYPASS GRAFT (CABG) N/A 2/24/2023    Procedure: CORONARY ARTERY BYPASS GRAFT (CABG);  Surgeon: Spencer Hagan MD;  Location: [facility] OR;  Service: Cardiothoracic;  Laterality: N/A;  CABG / EVH //   ECHO NOTIFIED    HIP SURGERY Right     THR    KNEE SURGERY Right     ATS    LEFT HEART CATHETERIZATION Left  07/22/2021    Procedure: Left heart cath;  Surgeon: Curtis Loredo MD;  Location: [facility] CATH;  Service: Cardiology;  Laterality: Left;    LEFT HEART CATHETERIZATION Left 09/22/2022    Procedure: Left heart cath;  Surgeon: Giorgi Barker MD;  Location: [facility] CATH;  Service: Cardiology;  Laterality: Left;    SHOULDER SURGERY Bilateral     SPINAL CORD STIMULATOR IMPLANT       A Modified Barium Swallow Study was completed to assess the efficiency of his/her swallow function, rule out aspiration and make recommendations regarding safe dietary consistencies, effective compensatory strategies, and safe eating environment.     Home Diet: Regular and thin liquids  Current Method of Nutrition: PO diet      Patient complaint: coughing with thin liquids and medications; hx of Parkinson's    Subjective:     Patient awake, alert, and cooperative.    Spiritual/Cultural/Holiness Beliefs/Practices that affect care: no  Pain/Comfort: Pain Rating 1: 0/10  Respiratory Status: 1 L via nasal cannula    Fluoroscopic Results:     Oral Musculature Evaluation:  Dentition: upper dentures, lower dentures  Secretion Management: adequate  Mucosal Quality: adequate  Mandibular Strength and Mobility: WFL  Oral Labial Strength and Mobility: WFL  Lingual Strength and Mobility: WFL    Visualization  Patient was seen in the lateral view    Oral Phase:   Adequate lip closure  Adequate mastication  Loss of bolus control with thin liquids  Posterior lingual residue    Pharyngeal Phase:   Swallow delay with spill to the pyriform sinus  Reduced base of tongue retraction  Reduced hyolaryngeal excursion  Adequate airway protection  Moderate base of tongue residue with puree  Moderate-severe vallecular residue with thin liquids via straw  Moderate pyriform sinus residue with thin liquids via straw    Consistency Laryngeal Penetration Aspiration   Mildly thick liquid by cup None None   Thin liquid by cup None None   Thin liquid by straw None None   Puree None  None   Chewable solid None None     Cervical Esophageal Phase:   UES appeared to accommodate all bolus types without stasis or retrograde movement visualized    Assessment:     Patient presents with oropharyngeal stage dysphagia characterized by reduced bolus control, swallow delay with prespill to the pyriform sinus, reduced tongue base retraction, and reduced hyolaryngeal excursion resulting in moderate pharyngeal residue was visualized and reduced with a double swallow/liquid swallow. Though no aspiration visualized during this study, pt remains at risk for aspiration. Skilled SLP intervention is warranted at this time.      Goals:   Multidisciplinary Problems       SLP Goals          Problem: SLP    Goal Priority Disciplines Outcome   SLP Goal     SLP    Description: LT. Pt will tolerate least restrictive diet with no overt s/sx of aspiration.     ST. Pt will complete MBS for comprehensive swallow evaluation.   2. Pt will tolerate 4 oz of thin liquids via straw with no overt s/sx of aspiration.                      Patient Education:     Patient provided with verbal education regarding need for modified diet and SLP POC.  Understanding was verbalized, however additional teaching warranted.    Plan:     SLP Follow-Up:  Yes    Patient to be seen:  5 x/week   Plan of Care expires:  23  Plan of Care reviewed with:  patient     Time Tracking:     SLP Treatment Date:   23  Speech Start Time:  1000  Speech Stop Time:  1030     Speech Total Time (min):  30 min    Billable minutes:   Motion Fluoroscopic Evaluation, Video Recording, 30 minutes      2023

## 2023-03-13 NOTE — PT/OT/SLP PROGRESS
Physical Therapy Inpatient Rehab Treatment    Patient Name:  Hunter Navarrete   MRN:  [id number]    Recommendations:     Discharge Recommendations:  nursing facility, skilled   Discharge Equipment Recommendations: walker, rolling   Barriers to discharge:  impaired functional mobility; sternal precautions    Assessment:     Hunter Navarrete is a 82 y.o. male admitted with a medical diagnosis of S/P CABG x 2.  He presents with the following impairments/functional limitations:  weakness, impaired endurance, impaired self care skills, impaired functional mobility, decreased safety awareness, pain, impaired cardiopulmonary response to activity, other (comment) (sternal precautions) .    Rehab Diagnosis: s/p CABG    Recent Surgery: * No surgery found *      General Precautions: Standard, sternal, fall     Orthopedic Precautions:N/A     Braces: N/A    Rehab Prognosis: Good; patient would benefit from acute skilled PT services to address these deficits and reach maximum level of function.      History:     Past Medical History:   Diagnosis Date    Acid reflux     Anemia     Aortic aneurysm, abdominal     Arthritis     Bronchitis     Cataract     Celiac artery stenosis     50% by CTA abdomen 7/27/2020    CKD (chronic kidney disease)     45% FUNCTION    Coronary artery disease     Encounter for blood transfusion     Enlarged prostate     GERD (gastroesophageal reflux disease)     Hemorrhoids     Hypercholesteremia     Hypertension     Iron deficiency anemia, unspecified     Leaky heart valve     Lupus anticoagulant disorder     Renal artery stenosis     by CTA 7/27/2020    Skin cancer     Unspecified chronic bronchitis     UTI (urinary tract infection)        Past Surgical History:   Procedure Laterality Date    ACF      BACK SURGERY      RODS IN BACK; 4 SURGIERIES    BONE MARROW BIOPSY      CARDIAC ANGIOGRAM WITH STENT      CATARACT SX Bilateral     CORONARY ANGIOGRAPHY N/A 2/15/2023    Procedure: ANGIOGRAM, CORONARY ARTERY;   Surgeon: Rito Vega MD;  Location: [facility] CATH;  Service: Cardiology;  Laterality: N/A;    CORONARY ARTERY BYPASS GRAFT (CABG) N/A 2/24/2023    Procedure: CORONARY ARTERY BYPASS GRAFT (CABG);  Surgeon: Spencer Hagan MD;  Location: [facility];  Service: Cardiothoracic;  Laterality: N/A;  CABG / EVH //   ECHO NOTIFIED    HIP SURGERY Right     THR    KNEE SURGERY Right     ATS    LEFT HEART CATHETERIZATION Left 07/22/2021    Procedure: Left heart cath;  Surgeon: Curtis Loredo MD;  Location: [facility] CATH;  Service: Cardiology;  Laterality: Left;    LEFT HEART CATHETERIZATION Left 09/22/2022    Procedure: Left heart cath;  Surgeon: Giorgi Barker MD;  Location: [facility] CATH;  Service: Cardiology;  Laterality: Left;    SHOULDER SURGERY Bilateral     SPINAL CORD STIMULATOR IMPLANT         Subjective     Chief Complaint: R shoulder pain and lower back pain    Respiratory Status: Room air; O2 saturation stayed consistent at ~94% with activity and at rest throughout session.    Patients cultural, spiritual, Tenriism conflicts given the current situation: no      Objective:     Communicated with pt prior to session.  Patient found supine with    upon PT entry to room.    Pt is Oriented x3 and Alert, Cooperative, and Motivated.    Vitals   5/10 pain in lower back and R shoulder with mobility, 4/10 at rest. Hot pack applied to R shoulder at end of session per pt request.      Functional Mobility:      Current   Status  Discharge   Goal   Functional Area: Care Score:    Roll Left and Right   Supervision or touching assistance   Sit to Lying 6 Supervision or touching assistance   Lying to Sitting on Side of Bed 3  Min A d/t lower back and R shoulder pain Supervision or touching assistance   Sit to Stand 4  VC for hand placement Supervision or touching assistance   Chair/Bed-to-Chair Transfer 4 Supervision or touching assistance   Car Transfer   Supervision or touching assistance   Walk 10 Feet 6 Supervision or touching assistance    Walk 50 Feet with Two Turns 6 Supervision or touching assistance   Walk 150 Feet 6  Pt amb 260' + 340' + 150' with rollator, mod I. Distances limited by LE fatigue and SOB. Supervision or touching assistance   Walk 10 Feet Uneven Surface   Supervision or touching assistance   1 Step (Curb)   Supervision or touching assistance   4 Steps   Supervision or touching assistance   12 Steps   Not applicable   Picking Up Object   Supervision or touching assistance   Wheel 50 Feet with Two Turns   Supervision or touching assistance   Wheel 150 Feet   Supervision or touching assistance       Therapeutic Activities and Exercises:  Standing therex, 3x10 each: B marches, calf raises, hip abduction, and mini squats. 3 lbs donned on B LE during last 2 sets. Seated rest breaks throughout.    B hamstring/gastroc stretch + B sciatic nerve flossing.      Activity Tolerance: Good    Patient left supine with call button in reach and RN notified.    Education provided: roles and goals of PT/PTA, transfer training, bed mob, gait training, safety awareness, body mechanics, assistive device, strengthening exercises, fall prevention, and sternal precautions    Expected compliance: Moderate compliance    GOALS:   Multidisciplinary Problems       Physical Therapy Goals          Problem: Physical Therapy    Goal Priority Disciplines Outcome Goal Variances Interventions   Physical Therapy Goal     PT, PT/OT Ongoing, Progressing     Description: Pt will improve functional mobility by performing:     Bed Mobility   MET - Roll Right and Left Partial/ Moderate Assistance .  MET - Lying to sitting Partial/ Moderate Assistance .  MET - Sitting to lying Partial/ Moderate Assistance .  Supine to sit transfer with supervision/touching assist.    Transfers    MET - Pt will be able to perform Stand step chair/bed to chair transfer With RW Partial/ Moderate Assistance .  MET - Pt will be able to perform Sit to stand with RW Partial/ Moderate Assistance  .  MET - Pt will be able to perform Car transfer with RW Partial/ Moderate Assistance .  Sit to stand transfer independently using Rollator   Bed to chair transfer independently using Rollator   Car transfer independently using Rollator     Ambulation    MET - Pt will ambulate 50 Feet with RW Partial/ Moderate Assistance  Ambulate 350 feet independently using Rollator   Ascend/descend a 4 inch curb independently using Rollator   Ascend/descend 12 stairs independently using bilateral handrails  Ambulate 15 feet on uneven surfaces/ramps independently using Rollator     Wheelchair Mobility   D/C - Pt will be able to propel 150 feet in steven wheelchair Partial/ Moderate Assistance                        Plan:     During this hospitalization, patient to be seen 5 x/week to address the identified rehab impairments via neuromuscular re-education, wheelchair management/training, therapeutic exercises, therapeutic activities, gait training and progress toward the following goals:    Plan of Care Expires:  03/10/23  PT Next Visit Date: 03/16/23  Plan of Care reviewed with: patient    Additional Information:         Time Tracking:     Therapy Time  PT Start Time: 1345  PT Stop Time: 1515  PT Total Time (min): 90 min   PT Individual: 90  Missed Time: 60 Minutes  Time Missed due to: Patient fatigue, Pain, Patient unwilling to participate    Billable Minutes: Gait Training 30 min, Therapeutic Activity 15 min, and Therapeutic Exercise 45 min    03/13/2023

## 2023-03-13 NOTE — PROGRESS NOTES
[facility] ([facility])  Rehab Progress Note    Patient Name: Hunter Navarrete  MRN: [id number]  Age: 82 y.o. Sex: male  : 1940  Hospital Length of Stay: 10 days  Date of Service: 3/13/2023   Chief Complaint: Multivessel CAD s/p left heart catheterization with InStent restenosis and multivessel coronary disease on 02/15/2023 s/p CABG x2 (lima to LAD, RSVG to OM with right GSV EVH) on 2023    Subjective:     Basic Information  Admit Information: 82-year-old WM presented to [facility] on 02/15 with complaints of substernal chest pain with shortness of breath.  PMH significant for CAD with PCI, CKD, hypertension, mild aortic stenosis, lupus anticoagulant inhibitor syndrome, and hyperlipidemia.  Workup significant for elevated troponin.  Transferred to [facility] for cardiology evaluation.  Initiated heparin drip.  Tolerated left heart catheterization on 02/15 significant for multivessel coronary disease with InStent restenosis.  Hematology recommended transfer to tertiary facility for CABG 2/2 history of lupus anticoagulant.  Tolerated transfer to [facility] on .  Initiated Tridil drip and titrated for chest pain.  Required 2 units PRBC transfusion on .  On  tolerated CABG x2 (lima to LAD, RSVG to OM with right GSV EVH) without perioperative complications.  Postoperatively required IV fluid gentle hydration per Nephrology 2/2 CKD stage IIIB.  Chest tubes removed.  Continued with postoperative DANIELLA on CKD.  Nephrology will continue to follow.  Discontinue Lasix IV on  and initiated home dose of Lasix 40 mg daily.  Initiated Plavix on . Tolerated transfer to [facility] inpatient rehab unit on 3/3 without incident.  Today's Information:  No acute events overnight.  Laying in bed comfortably.  Continues on 2 L nasal cannula.  Reports good sleep and appetite.  Does report right-sided shoulder pain, improves with muscle relaxer.  Last BM 3/9.  Vital signs at  goal with no recent recorded fevers.  Labs reviewed, H&H stable, renal indices trending down with improvement, albumin and pre-albumin trending up, otherwise unremarkable.  No imaging today.    Review of patient's allergies indicates:   Allergen Reactions    Cardura [doxazosin] Hives    Lyrica [pregabalin] Other (See Comments)    Paxil [paroxetine hcl] Other (See Comments)    Restoril [temazepam] Hallucinations    Vioxx [rofecoxib] Swelling    Zithromax [azithromycin] Hives        Current Facility-Administered Medications:     aspirin EC tablet 81 mg, 81 mg, Oral, Daily, Yaneth Douglas, FNP, 81 mg at 03/13/23 0808    atorvastatin tablet 40 mg, 40 mg, Oral, QHS, MAGDA HaiderP, 40 mg at 03/12/23 2017    benzocaine 10 % mucosal gel, , Mouth/Throat, QID PRN, Denilson Medina DO    benzonatate capsule 100 mg, 100 mg, Oral, TID PRN, Yaneth Douglas FNP, 100 mg at 03/06/23 2336    bisacodyL suppository 10 mg, 10 mg, Rectal, Daily PRN, Yaneth Douglas FNP, 10 mg at 03/03/23 1909    bisacodyL suppository 10 mg, 10 mg, Rectal, Once, SHIRLEY Haider    carvediloL tablet 12.5 mg, 12.5 mg, Oral, BID, Suman Reese, FNP, 12.5 mg at 03/13/23 0808    clopidogreL tablet 75 mg, 75 mg, Oral, Daily, MAGDA HaiderP, 75 mg at 03/13/23 0808    docusate sodium capsule 100 mg, 100 mg, Oral, BID, Yaneth Douglas FNP, 100 mg at 03/13/23 0808    enoxaparin injection 40 mg, 40 mg, Subcutaneous, Daily, MAGDA HaiderP, 40 mg at 03/12/23 1709    ezetimibe tablet 10 mg, 10 mg, Oral, Daily, Yaneth Douglas FNP, 10 mg at 03/13/23 0808    famotidine tablet 20 mg, 20 mg, Oral, Daily PRN, Xander Enriquez MD    ferrous sulfate tablet 1 each, 1 tablet, Oral, BID, SHIRLEY Haider, 1 each at 03/13/23 0808    FLUoxetine capsule 10 mg, 10 mg, Oral, Daily, SHIRLEY Haider, 10 mg at 03/13/23 0808    furosemide tablet 40 mg, 40 mg, Oral, Daily, SHIRLEY Haider, 40 mg at 03/13/23 0808    gabapentin  capsule 100 mg, 100 mg, Oral, TID, Yaneth Douglas FNP, 100 mg at 03/13/23 0520    gabapentin capsule 200 mg, 200 mg, Oral, QHS, Suman LOPEZ Mary Ann, FNP, 200 mg at 03/12/23 2018    hydrALAZINE injection 10 mg, 10 mg, Intravenous, Q6H PRN, Yaneth Douglas, FNP, 10 mg at 03/05/23 1456    HYDROcodone-acetaminophen 5-325 mg per tablet 1 tablet, 1 tablet, Oral, Q4H PRN, Yaneth Douglas, FNP, 1 tablet at 03/13/23 0808    hydrOXYzine pamoate capsule 50 mg, 50 mg, Oral, Nightly PRN, Yaneth Douglas, FNP, 50 mg at 03/12/23 2019    labetalol 20 mg/4 mL (5 mg/mL) IV syring, 10 mg, Intravenous, Q4H PRN, Yaneth Douglas, FNP    levalbuterol nebulizer solution 1.25 mg, 1.25 mg, Nebulization, Q8H, Yaneth Douglas, FNP, 1.25 mg at 03/13/23 0701    LIDOcaine 5 % patch 2 patch, 2 patch, Transdermal, Q24H, Usha Mcgee, MAGDAP, 2 patch at 03/13/23 0520    meclizine tablet 25 mg, 25 mg, Oral, TID PRN, Yaneth Douglas, FNP    metoprolol injection 10 mg, 10 mg, Intravenous, Q2H PRN, Yaneth Douglas, FNP    ondansetron disintegrating tablet 4 mg, 4 mg, Oral, Q6H PRN, Yaneth Douglas, FNP    pantoprazole EC tablet 40 mg, 40 mg, Oral, Daily, Yaneth Douglas, FNP, 40 mg at 03/13/23 0808    predniSONE tablet 2.5 mg, 2.5 mg, Oral, Daily, Yaneth Douglas, FNP, 2.5 mg at 03/13/23 0808    QUEtiapine tablet 50 mg, 50 mg, Oral, QHS, Yaneth Douglas, FNP, 50 mg at 03/12/23 2019    rOPINIRole tablet 1 mg, 1 mg, Oral, BID, Yaneth Douglas, FNP, 1 mg at 03/13/23 0807    sucralfate tablet 1 g, 1 g, Oral, QID (AC & HS), Yaneth Douglas, MAGDAP, 1 g at 03/13/23 1043    tamsulosin 24 hr capsule 0.4 mg, 0.4 mg, Oral, QHS, MAGDA HaiderP, 0.4 mg at 03/12/23 2020    tiZANidine tablet 4 mg, 4 mg, Oral, TID PRN, Usha Mcgee, FNP, 4 mg at 03/13/23 1043     Review of Systems   Complete 12-point review of symptoms negative except for what's mentioned in HPI     Objective:     /62   Pulse 95   Temp 98.4 °F (36.9 °C) (Oral)   Resp  "20   Ht 5' 10.98" (1.803 m)   Wt 87 kg (191 lb 12.8 oz)   SpO2 95%   BMI 26.76 kg/m²      Physical Exam  Vitals reviewed.   Eyes:      Pupils: Pupils are equal, round, and reactive to light.   Cardiovascular:      Rate and Rhythm: Normal rate and regular rhythm.      Heart sounds: Murmur heard.   Systolic murmur is present with a grade of 3/6.   Pulmonary:      Effort: Pulmonary effort is normal.      Breath sounds: Normal breath sounds.   Abdominal:      General: Bowel sounds are normal.   Musculoskeletal:         General: Normal range of motion.   Skin:     General: Skin is warm.      Comments: Midline sternal incision open air dry and intact    Neurological:      Mental Status: He is alert and oriented to person, place, and time.      Motor: Weakness present.      Comments: Resting tremors, shuffling gait   Psychiatric:         Mood and Affect: Mood normal.   *MD performed and documented physical examination       Lines/Drains/Airways       None               Labs  Recent Results (from the past 24 hour(s))   Comprehensive Metabolic Panel    Collection Time: 03/13/23  5:22 AM   Result Value Ref Range    Sodium Level 140 136 - 145 mmol/L    Potassium Level 4.1 3.5 - 5.1 mmol/L    Chloride 104 98 - 107 mmol/L    Carbon Dioxide 27 23 - 31 mmol/L    Glucose Level 110 82 - 115 mg/dL    Blood Urea Nitrogen 35.5 (H) 8.4 - 25.7 mg/dL    Creatinine 1.63 (H) 0.73 - 1.18 mg/dL    Calcium Level Total 9.3 8.8 - 10.0 mg/dL    Protein Total 7.4 5.8 - 7.6 gm/dL    Albumin Level 2.7 (L) 3.4 - 4.8 g/dL    Globulin 4.7 (H) 2.4 - 3.5 gm/dL    Albumin/Globulin Ratio 0.6 (L) 1.1 - 2.0 ratio    Bilirubin Total 0.6 <=1.5 mg/dL    Alkaline Phosphatase 93 40 - 150 unit/L    Alanine Aminotransferase 51 0 - 55 unit/L    Aspartate Aminotransferase 28 5 - 34 unit/L    eGFR 42 mls/min/1.73/m2   Prealbumin    Collection Time: 03/13/23  5:22 AM   Result Value Ref Range    Prealbumin 17.6 16.0 - 42.0 mg/dL   Magnesium    Collection Time: " 03/13/23  5:22 AM   Result Value Ref Range    Magnesium Level 1.70 1.60 - 2.60 mg/dL   Phosphorus    Collection Time: 03/13/23  5:22 AM   Result Value Ref Range    Phosphorus Level 3.6 2.3 - 4.7 mg/dL   CBC with Differential    Collection Time: 03/13/23  5:22 AM   Result Value Ref Range    WBC 8.7 4.5 - 11.5 x10(3)/mcL    RBC 3.56 (L) 4.70 - 6.10 x10(6)/mcL    Hgb 10.4 (L) 14.0 - 18.0 g/dL    Hct 33.7 (L) 42.0 - 52.0 %    MCV 94.7 (H) 80.0 - 94.0 fL    MCH 29.2 pg    MCHC 30.9 (L) 33.0 - 36.0 g/dL    RDW 15.3 11.5 - 17.0 %    Platelet 233 130 - 400 x10(3)/mcL    MPV 11.0 (H) 7.4 - 10.4 fL    Neut % 73.1 %    Lymph % 10.8 %    Mono % 14.2 %    Eos % 0.0 %    Basophil % 0.2 %    Lymph # 0.94 0.6 - 4.6 x10(3)/mcL    Neut # 6.36 2.1 - 9.2 x10(3)/mcL    Mono # 1.24 0.1 - 1.3 x10(3)/mcL    Eos # 0.00 0 - 0.9 x10(3)/mcL    Baso # 0.02 0 - 0.2 x10(3)/mcL    IG# 0.15 (H) 0 - 0.04 x10(3)/mcL    IG% 1.7 %    NRBC% 0.0 %     Radiology  CXR one view on 03/09/2023 at 8:36 a.m., IMPRESSION:  No significant changes  Radiology  CUS 02/20/23: The right internal carotid artery demonstrated 50-69% stenosis. The left internal carotid artery demonstrated 50-69% stenosis.  Bilateral vertebral arteries were patent with antegrade flow.  Radiology  [facility] 02/15/2023: LM 0% stenosis mLAD proximal 70% ISR D1 ostial 50% stenosis D2 ostial 50% stenosis Lcx: previous stent; proximal Lcx  90% ISR; mid Lcx 30% stenosis OM1 proximal 80% stenosis RCA patent stents to ostium. Mid RCA proximal subsection- 60% stenosis; Mid RCA distal subsection 70& stenosis; distal RCA proximal subsection 100% stenosis. Fills left to right  Radiology  TTE 02/16/2023: Global LV SF is borderline normal. LVEF 45-50%. LA is mildly enlarged. Moderate calcification of the aortic valve is noted. Mild AS is present. Mild to moderate AR. Mild to moderate MAC is noted. Mild MR. Trace TR. PASP 40 mmHg. Optison used to enhance endocardial border.     Assessment/Plan:     82 y.o. WM  admitted on 3/3/2023     Multivessel CAD  - s/p left heart catheterization with InStent restenosis and multivessel coronary disease on 02/15/2023  - s/p CABG x2 (lima to LAD, RSVG to OM with right GSV EVH) on 02/24/2023  - sternal precautions  - daily weights  - discontinued metoprolol tartrate 50 mg bid on 3/5  - continue   Coreg 12.5 mg bid (initiated on 3/6)  Lasix 40 mg daily  Aspirin 81 mg daily  Plavix 75 mg daily   Atorvastatin 40 mg at night  Hydrocodone-acetaminophen 5-325 mg p.r.n. 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  - discontinued metoprolol tartrate 50 mg bid on 3/5  - continue   Coreg 12.5 mg bid (initiated on 3/6)  Hydralazine 10 mg every 2 hours as needed for BP > 160/90  Labetalol 10 mg every 2 hours as needed for BP > 160/90     HLD  - FLP at goal!!  - continue  Atorvastatin 40 mg at night  Zetia 10 mg daily      RLS  - stable  - continue  Gabapentin 100 mg t.i.d.  Ropinirole 1 mg b.i.d.     Bilateral  RICKEY  - moderate bilateral  - continue  Atorvastatin 40 mg at night  Zetia 10 mg daily   Plavix 75 mg daily   - follow-up with cardiology outpatient     Constipation  - stable  - continue  Colace 100 mg b.i.d.     GERD  - Avoid spicy foods, and nothing to eat or drink within x2 hours of bedtime or laying flat (water is ok)   - Avoid NSAIDs (Advil, ibuprofen, naproxen...) and poe-2 inhibitors (Mobic, Celebrex)    - continue  Protonix 40 mg daily   Carafate 1 g QID     Lupus anticoagulant inhibitor syndrome  - stable  - continue  Prednisone 2.5 mg daily   - follow-up with Hematology outpatient     Major depressive disorder  - in remission  - continue  Fluoxetine 10 mg daily     Insomnia  - stable  - continue  Seroquel 50 mg at bedtime     BPH/urinary retention  - stable  - Urinary catheter replaced on 02/27   - Discontinue indwelling catheter on 03/07  - good urine output reported on 03/08  - continue  Flomax 0.4 mg at bedtime      Normocytic anemia  - asymptomatic  - s/p transfusion  x2 units PRBC on 2/24/2023  x2 units PRBC on 2/23/2023  - H/H stable   - no evidence of active bleeds  - low iron levels  - continue  Ferrous sulfate 325 mg b.i.d. (initiated 3/4)  - will closely monitor and transfuse if needed     Acute hypoxic respiratory failure  - on 2 L nasal cannula  - chest x-ray stable  - continue  Lasix 40 mg daily    Parkinson's disease  - reportedly diagnosed in 2021  - not currently on medication    Back pain  - improved  - continue   Gabapentin 200 mg at bedtime (initiated 3/10)   Hydrocodone-acetaminophen 5-325 mg    Gabapentin 100 mg t.i.d.      VTE Prophylaxis:  Lovenox 40 mg daily  COVID-19 testing:  Negative on 03/03  COVID-19 vaccination status:  Vaccinated x4     POA:  Yes (Romy, daughter and Nav, son)  Living will: yes  Contacts:  Giovana Navarrete (spouse) [phone]                      Romy (daughter) [phone]     CODE STATUS: Full  Internal Medicine (attending): Xander Enriquez,  MD  Physiatry (consulting):  Dilip Hargrove MD     OUTPATIENT PROVIDERS  PCP:  Mariella Bethea MD   Hematology:  Missy Davila MD in [facility]  Cardiology: Curtis Loredo MD at [facility]   CV surgery:  Spencer Hagan MD  Nephrology:  Darinel Bedoya MD     DISPOSITION:  Vital signs at goal.  Labs reviewed.  H&H stable.  Renal indices trending down with improvement.  Albumin and pre-albumin trending up.  Sleep hygiene and appetite at goal.  Last BM 3/9.  Initiate Dulcolax suppository once now, if no BM by 1400 give soapsuds enema.  Continue to deescalate O2 requirements as tolerated.  Encourage IS every hour while awake.  Aggressively mobilize as tolerated.  Monitor closely.  Notify of any acute changes.    Staffing 3/7/2023: Continent of bowel and bladder. CT suture removed on 3/7. RT: Overall mod to supervision.  Appetite is poor.  Breakfast is better.  Liberalize diet with no added salt.  RT: Doing well.  Bed mobility limited. PT: SBA to min assist with bed mobility, transfers and gait.  Dramatic improvement OTW.  Limited by activity intolerance. Needs breaks throughout the day. OT: Limited by weakness.  Performed better than he thinks.  He needs time to improve strength and endurance. Mod assist for bed mobility. Projected discharge 3/17.      Yaneth Douglas NP conducted independent physical examination and assisted with medical documentation.    Total time spent on this encounter including chart review and direct MD + NP 1-on-1 patient interaction: 50 minutes   Over 50% of this time was spent in counseling and coordination of care

## 2023-03-13 NOTE — PROGRESS NOTES
03/13/23 0830   Rec Therapy Time Calculation   Date of Treatment 03/13/23   Rec Start Time 0830   Rec Stop Time 0900   Rec Total Time (min) 30 min   Time   Treatment time 2 units   Charges   $Therapeutic Activity 2 units   Precautions   General Precautions fall;sternal   Pain/Comfort   Pain Rating 1 7/10   Location - Side 1 Bilateral   Location - Orientation 1 upper   Location 1   (incisional pain at chest)   Pain Addressed 1 Reposition   Vital Signs   Pulse (!) 111   /70   OTHER   Rehab identified problem list/impairments weakness;impaired endurance;impaired functional mobility;gait instability;decreased coordination;decreased upper extremity function;decreased lower extremity function   Values/Beliefs/Spiritual Care   Spiritual, Cultural Beliefs, Quaker Practices, Values that Affect Care no   Overall Level of Functioning   Activity Tolerance Mod Indep   Dynamic Sitting Balance/Reaching Standby Assist   Dynamic Standing Balance/Reaching Does not occur   Right UE Coodination/Dexterity Standby Assist   Left UE Coordination/Dexterity Standby Assist   Problem Solving/Sequencing Skills Mod Indep   Memory Recall Mod Indep   R/L Neglect/Inattention Does not occur   Attention Span Mod Indep   Social Interaction Independent   Recreational Therapy Short Term Goals   Short Term Goal 1 Progression Progressing   Short Term Goal 2 Progression Progressing   Recreational Therapy Long Term Goals   Long Term Goal 1 Progression Progressing   Long Term Goal 2 Progression Progressing   Plan   Patient to be seen Daily   Planned Duration 1 week   Treatments Planned Energy conservation training   Treatment plan/goals estblished with Patient/Caregiver Yes

## 2023-03-13 NOTE — PT/OT/SLP PROGRESS
Onset:  Today  Location/Description:  Patient states she has not felt baby move this morning. Mom reports she has been experiencing abdominal cramps and diarrhea.   Vaginal Drainage/Bleeding:  None  Fetus Active?  Decreased movement   Precipitating Factors:  As above   Pain Scale (1-10), 10 highest: 5/10  Associated Symptoms: as above  LMP: Patient's last menstrual period was 05/15/2019 (exact date).  EDC:  2020  Gestational Age:  39w4d  Blood Type: A NEGATIVE  OB History:    OB History    Para Term  AB Living   2 0 0 0 1 0   SAB TAB Ectopic Molar Multiple Live Births   1 0 0 0 0 0        Vaginal/C Section:  In chart  Group B Strep (pos or neg):  Neg  History of previous Labor & Delivery:  In chart  Recent Care:  20    PLAN:  Go to L&D    Patient/Caller agrees to follow recommendations. Report given to [facility] L&D MARINE Justin.     Reason for Disposition  • [1] Pregnant > 24 weeks AND [2] baby moving less today by kick count  (e.g., kick count < 5 in 1 hour or < 10 in 2 hours)    Protocols used: PREGNANCY - DECREASED FETAL MOVEMENT-A-       Physical Therapy      Patient Name:  Hunter Navarrete   MRN:  [id number]    Pt scheduled for PT [id number]. Not seen d/t Patient fatigue, Pain, Patient unwilling to participate. Pt reporting significant pain in R shoulder, MD Hargrove, MANAN Mariee, and LOVE Tobias aware.    Amount of therapy minutes missed:  60 Minutes.    Will follow-up this PM.    3/13/2023

## 2023-03-13 NOTE — PROGRESS NOTES
Dos 3/13/23  C/o LBP and (R) shoulder pain- previous surgeries with both- treatment on back continuing- will get xray of shoulder  Reviewed LS xray with patient- nothing acute  Patient seen and evaluated in room today.  Therapy and progress discussed with patient  Reviewed present barriers to progress  Reviewed chart  [facility], nursing staff as well as my NP, Jaylene Mcgee  Agree with present POC       Subjective:  HPI: 83 yo WM with PMH of male, followed by Dr. Loredo who presented to [facility] ER with c/o chest pain. HS troponin 1149; therefore he was transferred to [facility] for cardiology services where he underwent LHC revealing multivessel disease and elevated LVEDP. He was started on diuretics. Plavix was placed on hold and he was transferred to [facility] for CABG evaluation on 2/21/23. Patient is reporting mild chest tightness 3/10 on arrival.  Vitals Stable. Shoulder Pain that morning. On Nitro & Heparin Infusion. EKG with no overt ischemic changes. Denies CP/SOB. Creatinine 1.93 mildly decreased from 2.07 day prior with addition of IVFs. H/H downtrending- 8.2/25.3 from 8.8/27.1 as well. Creatinine  further improved to 1.65. H/H 10.5/31.5 post transfusion 2  units PRBCs. Underwent  CABG x 2 on 2/24 by Dr Hagan, and brought to ICU on MV, requiring pressor support. Mediastinal drain patent. Received 2 units of PRBCs intraop.  CT x 2 patent. Pressors have been weaned off. On cleviprex drip. 2/26 Mediastinal drains have been removed. Creatinine has bumped. Nephrology adding IVF x 1L due to IVVD. O2 weaned to 2L/NC. Patient did not require O2 prior to hospitalization. On 3/3, labs showed elevated BUN/Cr of 77.7/ 1.80 with creatinine trending down. Little catheter remained. Last BM on 2/26 charted. Patient is AAOx4; confusion at times with pain meds.  Participating with therapy. Functional status includes bed mobility and transfers requiring moderate to maximal assistance. Max  assist needed for upper body dressing; total assist toileting due to medellin;  setup/supervision for eating; and minimal assist for grooming. Amb w/RW for 40ft at Min Assist. Patient was evaluated, accepted, and admitted to inpatient rehab to improve functional status. Transferred to [facility] on 3/3 without incident.   3/13: Seen with OT, seated in WC. Feeling stiff after not having therapy over the weekend. 1L O2 via NC in place. SpO2 remaining at 94-95%. VC to properly place NC as one tube outside of nare. Tolerating therapy with complaints of upper back and shoulder pain. Biofreeze applied and patient appreciative. VSSAF.        Review of Systems  Depression/Anxiety: no complaints     FLUoxetine capsule 10 mg qd  Pain: incisional-controlled  gabapentin capsule 100 mg TID  rOPINIRole tablet 1 mg BID     HYDROcodone-acetaminophen 5-325 mg 1 tab q4hr PRN pain  Bowels/Bladder: last BM 3/9. Need intervention    Appetite: fair-improving.  Sleep: good      QUEtiapine tablet 50 mg qHS    Physical Exam  General: well-developed, well-nourished, tremor at rest  Respiratory: equal chest rise, no SOB, no audible wheeze  Cardiovascular: regular rate and rhythm, no edema  Gastrointestinal: soft, non-tender, non-distended   Musculoskeletal: full range of motion of all extremities/spine with generalized weakness  Integumentary: no rashes or skin lesions present, midline sternal zcejjfxc-IKK-s/d/i, chest tube site x 2 -duokla-LOO-m/d/I, RLE and LLE harvest incision sites-LONDON-c/d/i, Right groin puncture site-LONDON-c/d/I, sacral/buttocks-reddened  Neurologic: cranial nerves intact, no signs of peripheral neurological deficit, motor/sensory function intact, resting tremor-BUE  *MD performed and documented physical examination          Labs:   Latest Reference Range & Units 03/13/23 05:22   WBC 4.5 - 11.5 x10(3)/mcL 8.7   RBC 4.70 - 6.10 x10(6)/mcL 3.56 (L)   Hemoglobin 14.0 - 18.0 g/dL 10.4 (L)   Hematocrit 42.0 - 52.0 % 33.7 (L)   MCV 80.0  - 94.0 fL 94.7 (H)   MCH pg 29.2   MCHC 33.0 - 36.0 g/dL 30.9 (L)   RDW 11.5 - 17.0 % 15.3   Platelets 130 - 400 x10(3)/mcL 233   MPV 7.4 - 10.4 fL 11.0 (H)   Neut % % 73.1   LYMPH % % 10.8   Mono % % 14.2   Eosinophil % % 0.0   Basophil % % 0.2   Immature Granulocytes % 1.7   Neut # 2.1 - 9.2 x10(3)/mcL 6.36   Lymph # 0.6 - 4.6 x10(3)/mcL 0.94   Mono # 0.1 - 1.3 x10(3)/mcL 1.24   Eos # 0 - 0.9 x10(3)/mcL 0.00   Baso # 0 - 0.2 x10(3)/mcL 0.02   Immature Grans (Abs) 0 - 0.04 x10(3)/mcL 0.15 (H)   nRBC % 0.0   Sodium 136 - 145 mmol/L 140   Potassium 3.5 - 5.1 mmol/L 4.1   Chloride 98 - 107 mmol/L 104   CO2 23 - 31 mmol/L 27   BUN 8.4 - 25.7 mg/dL 35.5 (H)   Creatinine 0.73 - 1.18 mg/dL 1.63 (H)   eGFR mls/min/1.73/m2 42   Glucose 82 - 115 mg/dL 110   Calcium 8.8 - 10.0 mg/dL 9.3   Phosphorus 2.3 - 4.7 mg/dL 3.6   Magnesium 1.60 - 2.60 mg/dL 1.70   Alkaline Phosphatase 40 - 150 unit/L 93   PROTEIN TOTAL 5.8 - 7.6 gm/dL 7.4   Albumin 3.4 - 4.8 g/dL 2.7 (L)   Albumin/Globulin Ratio 1.1 - 2.0 ratio 0.6 (L)   Prealbumin 16.0 - 42.0 mg/dL 17.6   BILIRUBIN TOTAL <=1.5 mg/dL 0.6   AST 5 - 34 unit/L 28   ALT 0 - 55 unit/L 51   Globulin, Total 2.4 - 3.5 gm/dL 4.7 (H)   (L): Data is abnormally low  (H): Data is abnormally high          Assessment/Plan  Hospital   CAD (coronary artery disease)   S/P CABG x 2     Non-Hospital   Lupus anticoagulant inhibitor syndrome   Iron deficiency anemia   Elevated homocysteine   Low vitamin B12 level   Anemia   Angina, class III   Elevated partial thromboplastin time (PTT)   Coronary artery disease   Stage 3 chronic kidney disease   Hypercholesteremia   Bleeding   Altered mental status   Hypertension   Head injury   Dehydration   NSTEMI (non-ST elevated myocardial infarction)   Ischemic cardiomyopathy   GERD (gastroesophageal reflux disease)   Enlarged prostate   Aortic aneurysm, abdominal   Skin cancer       Wounds: midline sternal owcasfob-DPR-m/d/i, chest tube site x 2 -ppxixd-ZCF-s/d/I,  RLE and LLE harvest incision sites-LONDON-c/d/i, Right groin puncture site-LONDON-c/d/I, sacral/buttocks-reddened  S/p CABG x 2 on 2/24 by Dr Hagan  Precautions:sternal   Bracing: cardiac bear for splinting  Swallowing: Liberalized to Regular Diet with no added salt  Function: Tolerating therapy. Continue PT/OT  VTE Prophylaxis:   enoxaparin injection 40 mg SubQ q24hr  Code Status: FULL CODE   Discharge:Lives with his spouse in [place] in a single-story home with no steps to enter the residence. Completed high school. He was in the [employer]. He is retired from electrical/refrigeration work.  Wife manages the patients medications and finances. She also cooks, cleans, does laundry, and grocery shops.  Children (3). Date 3/17 Friday.                       Usha Mcgee NP, conducted additional independent physical examination and assisted with medical documentation.

## 2023-03-13 NOTE — PLAN OF CARE
Problem: SLP  Goal: SLP Goal  Description:    LT. Pt will tolerate least restrictive diet with no overt s/sx of aspiration.     ST. Pt will complete MBS for comprehensive swallow evaluation.--GOAL MET 3/13/23  2. Pt will tolerate 4 oz of thin liquids via straw with no overt s/sx of aspiration.   3. Pt will tolerate tongue base retraction and laryngeal strengthening exercises with minimal cues.  Outcome: Ongoing, Progressing

## 2023-03-14 LAB
APPEARANCE UR: ABNORMAL
BACTERIA #/AREA URNS AUTO: ABNORMAL /HPF
BILIRUB UR QL STRIP.AUTO: NEGATIVE MG/DL
COLOR UR AUTO: YELLOW
GLUCOSE UR QL STRIP.AUTO: NEGATIVE MG/DL
KETONES UR QL STRIP.AUTO: NEGATIVE MG/DL
LEUKOCYTE ESTERASE UR QL STRIP.AUTO: ABNORMAL UNIT/L
NITRITE UR QL STRIP.AUTO: POSITIVE
PH UR STRIP.AUTO: 5.5 [PH]
PROT UR QL STRIP.AUTO: 30 MG/DL
RBC #/AREA URNS AUTO: ABNORMAL /HPF
RBC UR QL AUTO: ABNORMAL UNIT/L
SP GR UR STRIP.AUTO: 1.01
SQUAMOUS #/AREA URNS AUTO: ABNORMAL /HPF
UROBILINOGEN UR STRIP-ACNC: 0.2 MG/DL
WBC #/AREA URNS AUTO: ABNORMAL /HPF

## 2023-03-14 PROCEDURE — 27000221 HC OXYGEN, UP TO 24 HOURS

## 2023-03-14 PROCEDURE — 51798 US URINE CAPACITY MEASURE: CPT

## 2023-03-14 PROCEDURE — 94761 N-INVAS EAR/PLS OXIMETRY MLT: CPT

## 2023-03-14 PROCEDURE — 25000242 PHARM REV CODE 250 ALT 637 W/ HCPCS: Performed by: NURSE PRACTITIONER

## 2023-03-14 PROCEDURE — 99233 PR SUBSEQUENT HOSPITAL CARE,LEVL III: ICD-10-PCS | Mod: ,,, | Performed by: NURSE PRACTITIONER

## 2023-03-14 PROCEDURE — 25000003 PHARM REV CODE 250: Performed by: NURSE PRACTITIONER

## 2023-03-14 PROCEDURE — 81001 URINALYSIS AUTO W/SCOPE: CPT | Performed by: NURSE PRACTITIONER

## 2023-03-14 PROCEDURE — 99233 SBSQ HOSP IP/OBS HIGH 50: CPT | Mod: ,,, | Performed by: NURSE PRACTITIONER

## 2023-03-14 PROCEDURE — 63600175 PHARM REV CODE 636 W HCPCS: Performed by: NURSE PRACTITIONER

## 2023-03-14 PROCEDURE — 87077 CULTURE AEROBIC IDENTIFY: CPT | Performed by: NURSE PRACTITIONER

## 2023-03-14 PROCEDURE — 97530 THERAPEUTIC ACTIVITIES: CPT

## 2023-03-14 PROCEDURE — 97535 SELF CARE MNGMENT TRAINING: CPT

## 2023-03-14 PROCEDURE — 11800000 HC REHAB PRIVATE ROOM

## 2023-03-14 PROCEDURE — 94640 AIRWAY INHALATION TREATMENT: CPT

## 2023-03-14 RX ORDER — DULOXETIN HYDROCHLORIDE 30 MG/1
30 CAPSULE, DELAYED RELEASE ORAL DAILY
Status: DISCONTINUED | OUTPATIENT
Start: 2023-03-15 | End: 2023-03-17 | Stop reason: HOSPADM

## 2023-03-14 RX ORDER — BISACODYL 10 MG
10 SUPPOSITORY, RECTAL RECTAL ONCE
Status: COMPLETED | OUTPATIENT
Start: 2023-03-14 | End: 2023-03-14

## 2023-03-14 RX ORDER — MONTELUKAST SODIUM 5 MG/1
10 TABLET, CHEWABLE ORAL NIGHTLY
Status: DISCONTINUED | OUTPATIENT
Start: 2023-03-14 | End: 2023-03-14

## 2023-03-14 RX ORDER — FLUTICASONE PROPIONATE 50 MCG
2 SPRAY, SUSPENSION (ML) NASAL 2 TIMES DAILY
Status: DISCONTINUED | OUTPATIENT
Start: 2023-03-14 | End: 2023-03-17 | Stop reason: HOSPADM

## 2023-03-14 RX ORDER — PREDNISONE 5 MG/1
5 TABLET ORAL ONCE
Status: COMPLETED | OUTPATIENT
Start: 2023-03-14 | End: 2023-03-14

## 2023-03-14 RX ORDER — FINASTERIDE 5 MG/1
5 TABLET, FILM COATED ORAL DAILY
Status: DISCONTINUED | OUTPATIENT
Start: 2023-03-14 | End: 2023-03-17 | Stop reason: HOSPADM

## 2023-03-14 RX ORDER — CETIRIZINE HYDROCHLORIDE 10 MG/1
10 TABLET ORAL NIGHTLY
Status: DISCONTINUED | OUTPATIENT
Start: 2023-03-14 | End: 2023-03-17 | Stop reason: HOSPADM

## 2023-03-14 RX ADMIN — LEVALBUTEROL HYDROCHLORIDE 1.25 MG: 1.25 SOLUTION RESPIRATORY (INHALATION) at 07:03

## 2023-03-14 RX ADMIN — TAMSULOSIN HYDROCHLORIDE 0.4 MG: 0.4 CAPSULE ORAL at 08:03

## 2023-03-14 RX ADMIN — LEVALBUTEROL HYDROCHLORIDE 1.25 MG: 1.25 SOLUTION RESPIRATORY (INHALATION) at 03:03

## 2023-03-14 RX ADMIN — HYDROCODONE BITARTRATE AND ACETAMINOPHEN 1 TABLET: 5; 325 TABLET ORAL at 05:03

## 2023-03-14 RX ADMIN — HYDROCODONE BITARTRATE AND ACETAMINOPHEN 1 TABLET: 5; 325 TABLET ORAL at 09:03

## 2023-03-14 RX ADMIN — BISACODYL 10 MG: 10 SUPPOSITORY RECTAL at 09:03

## 2023-03-14 RX ADMIN — ROPINIROLE HYDROCHLORIDE 1 MG: 1 TABLET, FILM COATED ORAL at 08:03

## 2023-03-14 RX ADMIN — FINASTERIDE 5 MG: 5 TABLET, FILM COATED ORAL at 12:03

## 2023-03-14 RX ADMIN — FUROSEMIDE 40 MG: 40 TABLET ORAL at 07:03

## 2023-03-14 RX ADMIN — TIZANIDINE 4 MG: 4 TABLET ORAL at 01:03

## 2023-03-14 RX ADMIN — LEVALBUTEROL HYDROCHLORIDE 1.25 MG: 1.25 SOLUTION RESPIRATORY (INHALATION) at 11:03

## 2023-03-14 RX ADMIN — PANTOPRAZOLE SODIUM 40 MG: 40 TABLET, DELAYED RELEASE ORAL at 07:03

## 2023-03-14 RX ADMIN — HYDROCODONE BITARTRATE AND ACETAMINOPHEN 1 TABLET: 5; 325 TABLET ORAL at 01:03

## 2023-03-14 RX ADMIN — FLUTICASONE PROPIONATE 100 MCG: 50 SPRAY, METERED NASAL at 12:03

## 2023-03-14 RX ADMIN — GABAPENTIN 100 MG: 100 CAPSULE ORAL at 01:03

## 2023-03-14 RX ADMIN — GABAPENTIN 100 MG: 100 CAPSULE ORAL at 08:03

## 2023-03-14 RX ADMIN — FERROUS SULFATE TAB 325 MG (65 MG ELEMENTAL FE) 1 EACH: 325 (65 FE) TAB at 08:03

## 2023-03-14 RX ADMIN — CARVEDILOL 12.5 MG: 6.25 TABLET, FILM COATED ORAL at 07:03

## 2023-03-14 RX ADMIN — SUCRALFATE 1 G: 1 TABLET ORAL at 05:03

## 2023-03-14 RX ADMIN — CARVEDILOL 12.5 MG: 6.25 TABLET, FILM COATED ORAL at 08:03

## 2023-03-14 RX ADMIN — GABAPENTIN 200 MG: 100 CAPSULE ORAL at 08:03

## 2023-03-14 RX ADMIN — PREDNISONE 2.5 MG: 2.5 TABLET ORAL at 07:03

## 2023-03-14 RX ADMIN — TIZANIDINE 4 MG: 4 TABLET ORAL at 05:03

## 2023-03-14 RX ADMIN — DOCUSATE SODIUM 100 MG: 100 CAPSULE, LIQUID FILLED ORAL at 07:03

## 2023-03-14 RX ADMIN — SUCRALFATE 1 G: 1 TABLET ORAL at 10:03

## 2023-03-14 RX ADMIN — LIDOCAINE 2 PATCH: 50 PATCH CUTANEOUS at 05:03

## 2023-03-14 RX ADMIN — FLUOXETINE 10 MG: 10 CAPSULE ORAL at 07:03

## 2023-03-14 RX ADMIN — SUCRALFATE 1 G: 1 TABLET ORAL at 08:03

## 2023-03-14 RX ADMIN — GABAPENTIN 100 MG: 100 CAPSULE ORAL at 05:03

## 2023-03-14 RX ADMIN — CETIRIZINE HYDROCHLORIDE 10 MG: 10 TABLET, FILM COATED ORAL at 08:03

## 2023-03-14 RX ADMIN — CLOPIDOGREL BISULFATE 75 MG: 75 TABLET ORAL at 07:03

## 2023-03-14 RX ADMIN — FERROUS SULFATE TAB 325 MG (65 MG ELEMENTAL FE) 1 EACH: 325 (65 FE) TAB at 07:03

## 2023-03-14 RX ADMIN — DOCUSATE SODIUM 100 MG: 100 CAPSULE, LIQUID FILLED ORAL at 08:03

## 2023-03-14 RX ADMIN — ASPIRIN 81 MG: 81 TABLET, COATED ORAL at 07:03

## 2023-03-14 RX ADMIN — ATORVASTATIN CALCIUM 40 MG: 40 TABLET, FILM COATED ORAL at 08:03

## 2023-03-14 RX ADMIN — TIZANIDINE 4 MG: 4 TABLET ORAL at 08:03

## 2023-03-14 RX ADMIN — EZETIMIBE 10 MG: 10 TABLET ORAL at 07:03

## 2023-03-14 RX ADMIN — FLUTICASONE PROPIONATE 100 MCG: 50 SPRAY, METERED NASAL at 08:03

## 2023-03-14 RX ADMIN — PREDNISONE 5 MG: 5 TABLET ORAL at 10:03

## 2023-03-14 RX ADMIN — ENOXAPARIN SODIUM 40 MG: 40 INJECTION SUBCUTANEOUS at 05:03

## 2023-03-14 RX ADMIN — QUETIAPINE 50 MG: 25 TABLET ORAL at 08:03

## 2023-03-14 RX ADMIN — HYDROCODONE BITARTRATE AND ACETAMINOPHEN 1 TABLET: 5; 325 TABLET ORAL at 10:03

## 2023-03-14 RX ADMIN — ROPINIROLE HYDROCHLORIDE 1 MG: 1 TABLET, FILM COATED ORAL at 07:03

## 2023-03-14 NOTE — PT/OT/SLP PROGRESS
Occupational Therapy Inpatient Rehab Treatment    Name: Hunter Navarrete  MRN: [id number]    Assessment:  Hunter Navarrete is a 82 y.o. male admitted with a medical diagnosis of S/P CABG x 2.  He presents with the following impairments/functional limitations:  weakness, impaired endurance, impaired self care skills, impaired functional mobility, decreased ROM, pain .    General Precautions: Standard, fall, sternal     Orthopedic Precautions:Full weight bearing     Braces: N/A    Rehab Prognosis: Good; patient would benefit from acute skilled OT services to address these deficits and reach maximum level of function.      History:     Past Medical History:   Diagnosis Date    Acid reflux     Anemia     Aortic aneurysm, abdominal     Arthritis     Bronchitis     Cataract     Celiac artery stenosis     50% by CTA abdomen 7/27/2020    CKD (chronic kidney disease)     45% FUNCTION    Coronary artery disease     Encounter for blood transfusion     Enlarged prostate     GERD (gastroesophageal reflux disease)     Hemorrhoids     Hypercholesteremia     Hypertension     Iron deficiency anemia, unspecified     Leaky heart valve     Lupus anticoagulant disorder     Renal artery stenosis     by CTA 7/27/2020    Skin cancer     Unspecified chronic bronchitis     UTI (urinary tract infection)        Past Surgical History:   Procedure Laterality Date    ACF      BACK SURGERY      RODS IN BACK; 4 SURGIERIES    BONE MARROW BIOPSY      CARDIAC ANGIOGRAM WITH STENT      CATARACT SX Bilateral     CORONARY ANGIOGRAPHY N/A 2/15/2023    Procedure: ANGIOGRAM, CORONARY ARTERY;  Surgeon: Rito Vega MD;  Location: [facility] CATH;  Service: Cardiology;  Laterality: N/A;    CORONARY ARTERY BYPASS GRAFT (CABG) N/A 2/24/2023    Procedure: CORONARY ARTERY BYPASS GRAFT (CABG);  Surgeon: Spencer Hagan MD;  Location: [facility] OR;  Service: Cardiothoracic;  Laterality: N/A;  CABG / EVH //   ECHO NOTIFIED    HIP SURGERY Right     THR    KNEE SURGERY Right      "ATS    LEFT HEART CATHETERIZATION Left 07/22/2021    Procedure: Left heart cath;  Surgeon: Curtis Loredo MD;  Location: [facility] CATH;  Service: Cardiology;  Laterality: Left;    LEFT HEART CATHETERIZATION Left 09/22/2022    Procedure: Left heart cath;  Surgeon: Giorgi Barker MD;  Location: [facility] CATH;  Service: Cardiology;  Laterality: Left;    SHOULDER SURGERY Bilateral     SPINAL CORD STIMULATOR IMPLANT         Subjective     Orientation: Oriented x4    Chief Complaint: R shoulder pain; decreased mobility    Patient/Family Comments/goals: "I want to be as Independent as I can"    Vitals   Vitals at Rest  /62      O2 Sat 94   Pain 5/10     Vitals With Activity  /66      O2 Sat 92 on RA x's 1 hr.   Pain 5/10     Respiratory Status: Room air    Patients cultural, spiritual, Orthodoxy conflicts given the current situation: no       Objective:     Patient found HOB elevated with oxygen  upon OT entry to room.    Mobility   Patient completed:  Rolling/Turning to Right with stand by assistance  Supine to Sit with contact guard assistance  Sit to Stand Transfer with moderate assistance with rolling walker  Toilet Transfer Step Transfer technique with contact guard assistance with  4 wheeled walker  Tub Transfer Stand Pivot technique with contact guard assistance with 4 wheeled walker and TTB    Functional Mobility  Pt. Ambulated functional distances in room    ADLs   Current Status   Eating     Oral Hygiene     Shower, Bathe Self 5   Upper Body Dressing 4   Lower Body Dressing 4   Toileting Hygiene 3   Toilet Transfer 4   Putting On, Taking Off Footwear 5     Limiting Factors for ADLs: motor, endurance, limited ROM, weakness, and pain         LifeStyle Change and Education:             Patient left up in chair with chair alarm on and ALBINO Siu present.     Education provided: ADLs, transfer training, body mechanics, assistive device, post-op precautions, and equipment " recommendations    Multidisciplinary Problems       Occupational Therapy Goals          Problem: Occupational Therapy    Goal Priority Disciplines Outcome Interventions   Occupational Therapy Goal     OT, PT/OT Ongoing, Progressing    Description: ADLs: Pt will be SPV with ADLs by d/c.   STG: by reeval  Pt to perform UB dressing with partial A MET  Pt to perform LB dressing with Mod A using AD MET  Pt to perform putting on/off footwear task with Mod A using AD MET  Pt to perform toileting with Mod A using AD as needed MET  Pt to perform bathing with Mod A MET    Functional Transfers:  Pt to perform toilet transfers with Touch A using BSC over toilet. MET  Pt to perform a tub transfer with TTB requiring Mod A  MET    Balance, Strengthening, Endurance, Balance:  Pt to consistently demonstrate adherence to orthopedic precautions during all ADL's as instructed by OT.  Pt to demonstrate fair dynamic standing balance as required to perform ADL's from standing level.  Pt to demonstrate consistent adherence to breathing control and energy conservation techniques as educated by OT.                      Pt. C sinus drainage noted   Time Tracking     OT Received On: 03/14/23  Time In 0730     Time Out 0840  Total Time 70 min  Therapy Time: OT Individual: 70  Missed Time:  0  Missed Time Reason:      Billable Minutes: Self Care/Home Management 70    03/14/2023

## 2023-03-14 NOTE — PROGRESS NOTES
[facility] ([facility])  Rehab Progress Note    Patient Name: Hunter Navarrete  MRN: [id number]  Age: 82 y.o. Sex: male  : 1940  Hospital Length of Stay: 11 days  Date of Service: 3/14/2023   Chief Complaint: Multivessel CAD s/p left heart catheterization with InStent restenosis and multivessel coronary disease on 02/15/2023 s/p CABG x2 (lima to LAD, RSVG to OM with right GSV EVH) on 2023    Subjective:     Basic Information  Admit Information: 82-year-old WM presented to [facility] on 02/15 with complaints of substernal chest pain with shortness of breath.  PMH significant for CAD with PCI, CKD, hypertension, mild aortic stenosis, lupus anticoagulant inhibitor syndrome, and hyperlipidemia.  Workup significant for elevated troponin.  Transferred to [facility] for cardiology evaluation.  Initiated heparin drip.  Tolerated left heart catheterization on 02/15 significant for multivessel coronary disease with InStent restenosis.  Hematology recommended transfer to tertiary facility for CABG 2/2 history of lupus anticoagulant.  Tolerated transfer to [facility] on .  Initiated Tridil drip and titrated for chest pain.  Required 2 units PRBC transfusion on .  On  tolerated CABG x2 (lima to LAD, RSVG to OM with right GSV EVH) without perioperative complications.  Postoperatively required IV fluid gentle hydration per Nephrology 2/2 CKD stage IIIB.  Chest tubes removed.  Continued with postoperative DANIELLA on CKD.  Nephrology will continue to follow.  Discontinue Lasix IV on  and initiated home dose of Lasix 40 mg daily.  Initiated Plavix on . Tolerated transfer to [facility] inpatient rehab unit on 3/3 without incident.  Today's Information:  No acute events overnight.  Laying in bed comfortably.  Reports good sleep and appetite.  Continues on 2 L nasal cannula.  Does report continued right shoulder pain at times.  Right shoulder x-ray significant for chronic  rotator cuff injury, no acute osseous abnormality.  Last BM 3/9.  Vital signs at goal with no recent recorded fevers.  No labs or imaging today.    Review of patient's allergies indicates:   Allergen Reactions    Cardura [doxazosin] Hives    Lyrica [pregabalin] Other (See Comments)    Paxil [paroxetine hcl] Other (See Comments)    Restoril [temazepam] Hallucinations    Vioxx [rofecoxib] Swelling    Zithromax [azithromycin] Hives        Current Facility-Administered Medications:     aspirin EC tablet 81 mg, 81 mg, Oral, Daily, Yaneth Douglas, FNP, 81 mg at 03/14/23 0732    atorvastatin tablet 40 mg, 40 mg, Oral, QHS, Yaneth Douglas, FNP, 40 mg at 03/13/23 2015    benzocaine 10 % mucosal gel, , Mouth/Throat, QID PRN, Denilson Medina,     benzonatate capsule 100 mg, 100 mg, Oral, TID PRN, Yaneth Douglas, FNP, 100 mg at 03/06/23 2336    bisacodyL suppository 10 mg, 10 mg, Rectal, Daily PRN, Yaneth Douglas, FNP, 10 mg at 03/03/23 1909    bisacodyL suppository 10 mg, 10 mg, Rectal, Once, Yaneth Douglas, FNP    carvediloL tablet 12.5 mg, 12.5 mg, Oral, BID, Suman Reese, FNP, 12.5 mg at 03/14/23 0732    cetirizine tablet 10 mg, 10 mg, Oral, QHS, Usha Mcgee, FNP    clopidogreL tablet 75 mg, 75 mg, Oral, Daily, Yaneth Douglas, FNP, 75 mg at 03/14/23 0731    docusate sodium capsule 100 mg, 100 mg, Oral, BID, Yaneth Douglas, FNP, 100 mg at 03/14/23 0732    enoxaparin injection 40 mg, 40 mg, Subcutaneous, Daily, Yaneth Douglas, FNP, 40 mg at 03/13/23 1721    ezetimibe tablet 10 mg, 10 mg, Oral, Daily, Yaneth Douglas, FNP, 10 mg at 03/14/23 0731    famotidine tablet 20 mg, 20 mg, Oral, Daily PRN, Xander Enriquez MD    ferrous sulfate tablet 1 each, 1 tablet, Oral, BID, SHIRLEY Haider, 1 each at 03/14/23 0731    FLUoxetine capsule 10 mg, 10 mg, Oral, Daily, SHIRLEY Haider, 10 mg at 03/14/23 0732    furosemide tablet 40 mg, 40 mg, Oral, Daily, SHIRLEY Haider, 40 mg at 03/14/23  0732    gabapentin capsule 100 mg, 100 mg, Oral, TID, Yaneth Douglas, FNP, 100 mg at 03/14/23 0531    gabapentin capsule 200 mg, 200 mg, Oral, QHS, Suman LOPEZ Mary Ann, FNP, 200 mg at 03/13/23 2016    hydrALAZINE injection 10 mg, 10 mg, Intravenous, Q6H PRN, Yaneth Douglas, FNP, 10 mg at 03/05/23 1456    HYDROcodone-acetaminophen 5-325 mg per tablet 1 tablet, 1 tablet, Oral, Q4H PRN, Yaneth Douglas, FNP, 1 tablet at 03/14/23 0910    hydrOXYzine pamoate capsule 50 mg, 50 mg, Oral, Nightly PRN, Yaneth Douglas, FNP, 50 mg at 03/12/23 2019    labetalol 20 mg/4 mL (5 mg/mL) IV syring, 10 mg, Intravenous, Q4H PRN, Yaneth Douglas, FNP    levalbuterol nebulizer solution 1.25 mg, 1.25 mg, Nebulization, Q8H, Yaneth Douglas, FNP, 1.25 mg at 03/14/23 0712    LIDOcaine 5 % patch 2 patch, 2 patch, Transdermal, Q24H, SHIRLEY Barber, 2 patch at 03/14/23 0532    meclizine tablet 25 mg, 25 mg, Oral, TID PRN, Yaneth Douglas, FNP    metoprolol injection 10 mg, 10 mg, Intravenous, Q2H PRN, Yaneth Douglas, FNP    ondansetron disintegrating tablet 4 mg, 4 mg, Oral, Q6H PRN, Yaneth Douglas, FNP    pantoprazole EC tablet 40 mg, 40 mg, Oral, Daily, Yaneth Douglas FNP, 40 mg at 03/14/23 0733    predniSONE tablet 2.5 mg, 2.5 mg, Oral, Daily, Yaneth Douglas FNP, 2.5 mg at 03/14/23 0732    QUEtiapine tablet 50 mg, 50 mg, Oral, QHS, Yaneth Douglas, FNP, 50 mg at 03/13/23 2015    rOPINIRole tablet 1 mg, 1 mg, Oral, BID, MAGDA HaiderP, 1 mg at 03/14/23 0733    sucralfate tablet 1 g, 1 g, Oral, QID (AC & HS), MAGDA HaiderP, 1 g at 03/14/23 0531    tamsulosin 24 hr capsule 0.4 mg, 0.4 mg, Oral, QHS, MAGDA HaiderP, 0.4 mg at 03/13/23 2015    tiZANidine tablet 4 mg, 4 mg, Oral, TID PRN, Usha Mcgee, FNP, 4 mg at 03/14/23 0531     Review of Systems   Complete 12-point review of symptoms negative except for what's mentioned in HPI     Objective:     /62   Pulse 105   Temp 97.6 °F (36.4  "°C) (Oral)   Resp 20   Ht 5' 10.98" (1.803 m)   Wt 83.7 kg (184 lb 8.4 oz)   SpO2 (!) 93%   BMI 25.75 kg/m²      Physical Exam  Vitals reviewed.   Eyes:      Pupils: Pupils are equal, round, and reactive to light.   Cardiovascular:      Rate and Rhythm: Normal rate and regular rhythm.      Heart sounds: Murmur heard.   Systolic murmur is present with a grade of 3/6.   Pulmonary:      Effort: Pulmonary effort is normal.      Breath sounds: Normal breath sounds.   Abdominal:      General: Bowel sounds are normal.   Musculoskeletal:         General: Normal range of motion.   Skin:     General: Skin is warm.      Comments: Midline sternal incision open air dry and intact    Neurological:      Mental Status: He is alert and oriented to person, place, and time.      Motor: Weakness present.      Comments: Resting tremors, shuffling gait   Psychiatric:         Mood and Affect: Mood normal.   *MD performed and documented physical examination       Lines/Drains/Airways       None               Labs  No results found for this or any previous visit (from the past 24 hour(s)).    Radiology  CXR one view on 03/09/2023 at 8:36 a.m., IMPRESSION:  No significant changes  Radiology  CUS 02/20/23: The right internal carotid artery demonstrated 50-69% stenosis. The left internal carotid artery demonstrated 50-69% stenosis.  Bilateral vertebral arteries were patent with antegrade flow.  Radiology  C 02/15/2023: LM 0% stenosis mLAD proximal 70% ISR D1 ostial 50% stenosis D2 ostial 50% stenosis Lcx: previous stent; proximal Lcx  90% ISR; mid Lcx 30% stenosis OM1 proximal 80% stenosis RCA patent stents to ostium. Mid RCA proximal subsection- 60% stenosis; Mid RCA distal subsection 70& stenosis; distal RCA proximal subsection 100% stenosis. Fills left to right  Radiology  TTE 02/16/2023: Global LV SF is borderline normal. LVEF 45-50%. LA is mildly enlarged. Moderate calcification of the aortic valve is noted. Mild AS is present. " Mild to moderate AR. Mild to moderate MAC is noted. Mild MR. Trace TR. PASP 40 mmHg. Optison used to enhance endocardial border.     Assessment/Plan:     82 y.o. WM admitted on 3/3/2023     Multivessel CAD  - s/p left heart catheterization with InStent restenosis and multivessel coronary disease on 02/15/2023  - s/p CABG x2 (lima to LAD, RSVG to OM with right GSV EVH) on 02/24/2023  - sternal precautions  - daily weights  - discontinued metoprolol tartrate 50 mg bid on 3/5  - continue   Coreg 12.5 mg bid (initiated on 3/6)  Lasix 40 mg daily  Aspirin 81 mg daily  Plavix 75 mg daily   Atorvastatin 40 mg at night  Hydrocodone-acetaminophen 5-325 mg p.r.n. 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  - discontinued metoprolol tartrate 50 mg bid on 3/5  - continue   Coreg 12.5 mg bid (initiated on 3/6)  Hydralazine 10 mg every 2 hours as needed for BP > 160/90  Labetalol 10 mg every 2 hours as needed for BP > 160/90     HLD  - FLP  at goal!!  - continue  Atorvastatin 40 mg at night  Zetia 10 mg daily      RLS  - stable  - continue  Gabapentin 100 mg t.i.d.  Ropinirole 1 mg b.i.d.     Bilateral RICKEY  - moderate bilateral  - continue  Atorvastatin 40 mg at night  Zetia 10 mg daily   Plavix 75 mg daily   - follow-up with cardiology outpatient     Constipation  - stable  - continue  Colace 100 mg b.i.d.     GERD  - Avoid spicy foods, and nothing to eat or drink within x2 hours of bedtime or laying flat (water is ok)   - Avoid NSAIDs (Advil, ibuprofen, naproxen...) and poe-2 inhibitors (Mobic, Celebrex)    - continue  Protonix 40 mg daily   Carafate 1 g QID     Lupus anticoagulant inhibitor syndrome  - stable  - continue  Prednisone 2.5 mg daily   - follow-up with Hematology outpatient     Major depressive disorder  - in remission  - discontinue Fluoxetine 10 mg daily on 03/14  - continue  Cymbalta 30 mg daily (initiated 3/14)     Insomnia  - stable  - continue  Seroquel 50 mg at bedtime     BPH/urinary retention  - stable  - Urinary catheter replaced on 02/27   - Discontinue indwelling catheter on 03/07  - good urine output reported on 03/08  - continue   Proscar 5 mg daily (initiated 3/14)  Flomax 0.4 mg at bedtime      Normocytic anemia  - asymptomatic  - s/p transfusion  x2 units PRBC on 2/24/2023  x2 units PRBC on 2/23/2023  - H/H stable   - no evidence of active bleeds  - low iron levels  - continue  Ferrous sulfate 325 mg b.i.d. (initiated 3/4)  - will closely monitor and transfuse if needed     Acute hypoxic respiratory failure  - on 2 L nasal cannula  - chest x-ray stable  - continue  Lasix 40 mg daily    Parkinson's disease  - reportedly diagnosed in 2021  - not currently on medication    Back pain  - improved  - continue   Gabapentin 200 mg at bedtime (initiated 3/10)   Hydrocodone-acetaminophen 5-325 mg    Gabapentin 100 mg t.i.d.    Allergic rhinitis  - current  - continue   Zyrtec 10 mg daily (initiated 3/14)   Flonase 1 spray b.i.d.  (initiated 3/14)      VTE Prophylaxis:  Lovenox 40 mg daily  COVID-19 testing:  Negative on 03/03  COVID-19 vaccination status:  Vaccinated x4     POA:  Yes (Romy, daughter and Nav, son)  Living will: yes  Contacts:  Giovana Navarrete (spouse) [phone]                      Romy (daughter) [phone]     CODE STATUS: Full  Internal Medicine (attending): Xander Enriquez MD  Physiatry (consulting):  Dilip Hargrove MD     OUTPATIENT PROVIDERS  PCP:  Mariella Bethea MD   Hematology:  Missy Davila MD in [facility]  Cardiology: Curtis Loredo MD at [facility]   CV surgery:  Spencer Hagan MD  Nephrology:  Darinel Bedoya MD     DISPOSITION:  Vital signs at goal.  No labs today.  Continues with right shoulder pain.  Right shoulder x-ray with no acute osseous abnormality, chronic rotator cuff injury.  Physiatry increased prednisone.  Discussed progress with physiatry and reviewed all notes.  Initiated Cymbalta 30 mg daily and discontinued Prozac for continued depression symptoms.  Sleep hygiene and appetite at goal.  Last BM 3/9.  Initiate Dulcolax suppository once now, if no BM by 1400 give soapsuds enema.  Initiate Flonase b.i.d. and Zyrtec daily 2/2 allergic rhinitis symptoms.   Reports new incontinence of bladder.  Initiate bladder scan every 4 hours times 24 hours.  Initiate Proscar 5 mg daily.  Obtain UA.  Continue to deescalate O2 requirements as tolerated.  Encourage IS every hour while awake.  Aggressively mobilize as tolerated.  Monitor closely.  Notify of any acute changes. Staffing below.    Staffing 3/14/2023: Incontinent of bladder and bowel. RT: Overall min to supervision.  Appetite is good.  PT: SBA to min assist with bed mobility. Stand by to contact with transfers. 300 feet with RW, independent with walking. Limited by activity intolerance. Needs breaks throughout the day. OT: Limited by weakness.  Overall min assist. ST: MBS passed, no straws with regular diet and thin liquids. Projected discharge  3/17.      Yaneth Douglas NP conducted independent physical examination and assisted with medical documentation.    Total time spent on this encounter including chart review and direct MD + NP 1-on-1 patient interaction: 51 minutes   Over 50% of this time was spent in counseling and coordination of care

## 2023-03-14 NOTE — PT/OT/SLP PROGRESS
Recreational Therapy Treatment    Date of Treatment: 03/14/23  Start Time: 0830  Stop Time: 0900  Total Time: 30 min  Missed Time:    General Precautions: fall, sternal  Ortho Precautions: N/A  Braces: N/A    Vitals   Vitals at Rest  /66      O2 Sat    Pain 5/10     Vitals With Activity  /67      O2 Sat    Pain 5/10       Treatment     Cognitive Skills Building   Cognitive Observation Activity Assist Position Equipment Response            Comment:          Dynamic Activities   Activity Assist Position Equipment Response   Activity 1 Washer toss contact guard assistance Sitting Metal washers poor   Comment: Pt declined to stand due to increased pain in R shoulder.  Dynamic sitting balance/reaching was contact guard. Sitting tolerance was less than 1 minute.  LUE coordination was setup. Problem solving skills were setup.  Flat and focused on pain       Fine Motor Activities   Activity Assist Position Equipment Response           Comment:          Additional Info: Pt progress, plan of care and discharge plans were discussed during staffing at 0930    W/c to bed transfer was mod    Goals     Short Term Goals    Goal  Goal Status   Will increase sit to stand to supervision Progressing   Will improve dynamic standing balance/reaching to supervision Progressing                 Long Term Goals    Goal Goal Status   Will increase standing tolerance to 5,10 minutes Progressing   Will improve dynamic standing balance/reaching to setup Progressing

## 2023-03-14 NOTE — PT/OT/SLP PROGRESS
Physical Therapy      Patient Name:  Hunter Navarrete   MRN:  [id number]    Patient not seen today secondary to Pain, Patient unwilling to participate.     Amount of therapy minutes missed:  60 Minutes.    PT attempted to see pt this PM [id number]. Upon arrival, pt supine in bed with all needs in reach. Pt refused to participate at this time despite max encouragement. BP taken at 166/64 HR 98. PT to attempt at next available time as scheduling allows.     3/14/2023

## 2023-03-14 NOTE — PT/OT/SLP PROGRESS
Speech Language Pathology       Patient Name: Hunter Navarrete  MRN: [id number]    Patient not seen today secondary to pain. Pt refusing to participate in therapy and requests SLP re-attempt tomorrow as he is hurting too bad today.     Amount of therapy minutes missed:  30 Minutes.    3/14/2023

## 2023-03-14 NOTE — PROGRESS NOTES
Dos 3/14/23  Patient seen and evaluated in room today.  Therapy and progress discussed with patient  Reviewed present barriers to progress  Reviewed chart  Discussed with Dr Enriquez's pimary medicine team, nursing staff as well as my NP, Jaylene Mcgee  Agree with present POC   Subjective:  HPI: 83 yo WM with PMH of male, followed by Dr. Loredo who presented to [place] ER with c/o chest pain. HS troponin 1149; therefore he was transferred to [facility] for cardiology services where he underwent LHC revealing multivessel disease and elevated LVEDP. He was started on diuretics. Plavix was placed on hold and he was transferred to [facility] for CABG evaluation on 2/21/23. Patient is reporting mild chest tightness 3/10 on arrival.  Vitals Stable. Shoulder Pain that morning. On Nitro & Heparin Infusion. EKG with no overt ischemic changes. Denies CP/SOB. Creatinine 1.93 mildly decreased from 2.07 day prior with addition of IVFs. H/H downtrending- 8.2/25.3 from 8.8/27.1 as well. Creatinine  further improved to 1.65. H/H 10.5/31.5 post transfusion 2  units PRBCs. Underwent  CABG x 2 on 2/24 by Dr Hagan, and brought to ICU on MV, requiring pressor support. Mediastinal drain patent. Received 2 units of PRBCs intraop.  CT x 2 patent. Pressors have been weaned off. On cleviprex drip. 2/26 Mediastinal drains have been removed. Creatinine has bumped. Nephrology adding IVF x 1L due to IVVD. O2 weaned to 2L/NC. Patient did not require O2 prior to hospitalization. On 3/3, labs showed elevated BUN/Cr of 77.7/ 1.80 with creatinine trending down. [place] catheter remained. Last BM on 2/26 charted. Patient is AAOx4; confusion at times with pain meds.  Participating with therapy. Functional status includes bed mobility and transfers requiring moderate to maximal assistance. Max assist needed for upper body dressing; total assist toileting due to medellin;  setup/supervision for eating; and minimal assist for grooming. Amb w/RW for 40ft at Min  "Assist. Patient was evaluated, accepted, and admitted to inpatient rehab to improve functional status. Transferred to [facility] on 3/3 without incident.   3/14: Seen with OT/RT in patient room, seated in WC. Room Air for 1 hour and SpO2 staying above 92%. Went to RT and SpO2 dropped to 91%. 1L O2 via NC returned. Unable to participate with PT 2/2 pain over scapula ("shoulder pain"). Pain medication given about 45 minutes prior. Shoulder XR results below. Obtain Cervical XR. Lidoderm patches applied. Biofreeze to adjacent areas. Increase steroids. Therapy notes Urinary Incontinence yesterday. UA ordered. OT also notes runny nose. Patient states that he has taken Sudafed, Benadryl, and Zyrtec at home for sinus issues before. Initiate Zyrtec qHS. Also concerns of depression. Change Prozac to Cymbalta. Monitor. VSSAF with mild SBP elevation prior to morning medications. IM following.       Review of Systems  Depression/Anxiety: no complaints     FLUoxetine capsule 10 mg qd  Pain: right shoulder/scapula/upper back-neck, lower back  gabapentin capsule 100 mg TID  rOPINIRole tablet 1 mg BID     HYDROcodone-acetaminophen 5-325 mg 1 tab q4hr PRN pain  LIDOcaine 5 % patch 2 patches, q24hr, 0600, neck/back  predniSONE tablet 7.5 mg qd  Bowels/Bladder: last BM 3/14 following suppository.   Appetite: good  Sleep: good      QUEtiapine tablet 50 mg qHS    Physical Exam  General: well-developed, well-nourished, tremor at rest  Respiratory: equal chest rise,  SOB w/o O2, no audible wheeze, 1L O2 via NC  Cardiovascular: regular rate and rhythm, no edema  Gastrointestinal: soft, non-tender, non-distended   Musculoskeletal: full range of motion of all extremities/spine with generalized weakness  Integumentary: no rashes or skin lesions present, midline sternal [id number]-XTW-s/d/i, chest tube site x 2 -krqgby-XVO-a/d/I, RLE and LLE harvest incision sites-LONDON-c/d/i, Right groin puncture site-LONDON-c/d/I, sacral/buttocks-reddened  Neurologic: " cranial nerves intact, no signs of peripheral neurological deficit, motor/sensory function intact, resting tremor-BUE  *MD performed and documented physical examination          Labs:   Latest Reference Range & Units 03/14/23 10:43   Color, UA Yellow, Light-Yellow, Dark Yellow, Virgen, Straw  Yellow   Appearance, UA Clear  Cloudy !   Specific Gravity,UA  1.015   pH, UA 5.0 - 8.5  5.5   Protein, UA Negative mg/dL 30 !   Glucose, UA Negative, Normal mg/dL Negative   Ketones, UA Negative mg/dL Negative   Occult Blood UA Negative unit/L Moderate !   NITRITE UA Negative  Positive !   Bilirubin, UA Negative mg/dL Negative   Urobilinogen, UA 0.2, 1.0, Normal mg/dL 0.2   Leukocytes, UA Negative unit/L Large !   RBC, UA None Seen, 0-2, 3-5, 0-5 /HPF 0-2   WBC, UA None Seen, 0-2, 3-5, 0-5 /HPF 51-99 !   Bacteria, UA None Seen, Rare, Occasional /HPF Few !   Squam Epithel, UA None Seen, Rare, Occasional, Occ /HPF None Seen   !: Data is abnormal        Diagnostics:  XR SHOULDER COMPLETE 2 OR MORE VIEWS RIGHT  FINDINGS  Regional arthritic changes are noted, with complete loss of the subacromial joint space and high riding configuration of the humeral head suggestive of chronic rotator cuff injury.  Orthopedic anchor screws are incidentally appreciated at the humeral greater tuberosity.  No convincing acutely displaced fracture or dislocation is identified. There are no findings indicative of a joint effusion. No aggressive osseous lesion or periosteal reaction is appreciated.  The included soft tissues are without acute abnormality.  IMPRESSION  1. Advanced degenerative changes with findings suggestive of chronic rotator cuff injury.  2. No evidence of acute osseous abnormality.  Date:                                            03/13/2023  Time:                                           20:45          Assessment/Plan  Hospital   CAD (coronary artery disease)   S/P CABG x 2     Non-Hospital   Lupus anticoagulant inhibitor syndrome    Iron deficiency anemia   Elevated homocysteine   Low vitamin B12 level   Anemia   Angina, class III   Elevated partial thromboplastin time (PTT)   Coronary artery disease   Stage 3 chronic kidney disease   Hypercholesteremia   Bleeding   Altered mental status   Hypertension   Head injury   Dehydration   NSTEMI (non-ST elevated myocardial infarction)   Ischemic cardiomyopathy   GERD (gastroesophageal reflux disease)   Enlarged prostate   Aortic aneurysm, abdominal   Skin cancer       Wounds: midline sternal mrbjodxa-JZI-e/d/i, chest tube site x 2 -xjxkal-YTC-u/d/I, RLE and LLE harvest incision sites-LONDON-c/d/i, Right groin puncture site-LONDON-c/d/I, sacral/buttocks-reddened  S/p CABG x 2 on 2/24 by Dr Hagan  Precautions:sternal   Bracing: cardiac bear for splinting  Swallowing: Liberalized to Regular Diet with no added salt. No straws ([facility] 3/13)  Function: Tolerating therapy. Continue PT/OT  VTE Prophylaxis:   enoxaparin injection 40 mg SubQ q24hr  Code Status: FULL CODE   Discharge:Lives with his spouse in [place] in a single-story home with no steps to enter the residence. Completed high school. He was in the [employer]. He is retired from electrical/refrigeration work.  Wife manages the patients medications and finances. She also cooks, cleans, does laundry, and grocery shops.  Children (3). Date 3/17 Friday.                       Usha Mcgee NP, conducted additional independent physical examination and assisted with medical documentation.

## 2023-03-14 NOTE — PLAN OF CARE
Sent order/referral for HH PT/OT/ST/skilled nursing to [facility] (requested by patient/wife) via [facility] and also sent to [facility] UR dept at email [email or web address] as directed by the [facility]'s Ruthann Galarza, as they manage pt's [facility] care/services.  Will await response from the [facility].  Received acceptance from [facility] on [facility]port.

## 2023-03-14 NOTE — PROGRESS NOTES
03/14/23 0830   Rec Therapy Time Calculation   Date of Treatment 03/14/23   Rec Start Time 0830   Rec Stop Time 0900   Rec Total Time (min) 30 min   Time   Treatment time 2 units   Charges   $Therapeutic Activity 2 units   Precautions   General Precautions fall;sternal   Orthopedic Precautions  N/A   Braces N/A   OTHER   Rehab identified problem list/impairments weakness;impaired endurance;impaired functional mobility;gait instability;decreased upper extremity function;decreased lower extremity function;decreased safety awareness;pain   Values/Beliefs/Spiritual Care   Spiritual, Cultural Beliefs, Lutheran Practices, Values that Affect Care no   Overall Level of Functioning   Activity Tolerance Min A   Dynamic Sitting Balance/Reaching Standby Assist   Dynamic Standing Balance/Reaching Does not occur   Right UE Coodination/Dexterity Does not occur   Left UE Coordination/Dexterity Mod Indep   Problem Solving/Sequencing Skills Standby Assist   Memory Recall Mod Indep   R/L Neglect/Inattention Does not occur   Attention Span Mod Indep   Social Interaction Mod Indep   Recreational Therapy Short Term Goals   Short Term Goal 1 Progression Progressing   Short Term Goal 2 Progression Progressing   Recreational Therapy Long Term Goals   Long Term Goal 1 Progression Progressing   Long Term Goal 2 Progression Progressing   Plan   Patient to be seen Daily   Planned Duration Other (Comment)  (3 days)   Treatments Planned Energy conservation training   Treatment plan/goals estblished with Patient/Caregiver Yes

## 2023-03-14 NOTE — PT/OT/SLP PROGRESS
Physical Therapy      Patient Name:  Hunter Navarrete   MRN:  [id number]    Pt scheduled for PT [id number], not seen today secondary to Pain, Patient unwilling to participate.     Amount of therapy minutes missed:  90 Minutes.    Will allow pt to rest with ice on R shoulder/upper back and follow-up shortly. RN and NP notified.    3/14/2023

## 2023-03-14 NOTE — PLAN OF CARE
Problem: Rehabilitation (IRF) Plan of Care  Goal: Plan of Care Review  Outcome: Ongoing, Progressing  Flowsheets (Taken 3/14/2023 0457)  Plan of Care Reviewed With: patient  Goal: Absence of New-Onset Illness or Injury  Outcome: Ongoing, Progressing  Intervention: Prevent Fall and Fall Injury  Flowsheets (Taken 3/14/2023 0457)  Safety Promotion/Fall Prevention:   assistive device/personal item within reach   bed alarm set   commode/urinal/bedpan at bedside   Fall Risk reviewed with patient/family   medications reviewed   nonskid shoes/socks when out of bed   instructed to call staff for mobility   side rails raised x 2  Intervention: Prevent Skin Injury  Flowsheets (Taken 3/14/2023 0457)  Body Position: position changed independently  Intervention: Prevent VTE (Venous Thromboembolism)  Flowsheets (Taken 3/14/2023 0457)  VTE Prevention/Management:   bleeding risk assessed   bleeding precations maintained   ambulation promoted   dorsiflexion/plantar flexion performed   remove, assess skin, and reapply compression stockings     Problem: Fall Injury Risk  Goal: Absence of Fall and Fall-Related Injury  Outcome: Ongoing, Progressing  Intervention: Identify and Manage Contributors  Flowsheets (Taken 3/14/2023 0457)  Medication Review/Management: medications reviewed  Intervention: Promote Injury-Free Environment  Flowsheets (Taken 3/14/2023 0457)  Safety Promotion/Fall Prevention:   assistive device/personal item within reach   bed alarm set   commode/urinal/bedpan at bedside   Fall Risk reviewed with patient/family   medications reviewed   nonskid shoes/socks when out of bed   instructed to call staff for mobility   side rails raised x 2     Problem: Skin Injury Risk Increased  Goal: Skin Health and Integrity  Outcome: Ongoing, Progressing  Intervention: Optimize Skin Protection  Flowsheets (Taken 3/14/2023 0457)  Pressure Reduction Techniques: frequent weight shift encouraged  Head of Bed (HOB) Positioning: HOB at  20-30 degrees

## 2023-03-14 NOTE — PROGRESS NOTES
Inpatient Nutrition Assessment    Admit Date: 3/3/2023   Total duration of encounter: 11 days     Nutrition Recommendation/Prescription     Liberalized diet to No Added Salt to improve po intake at meals   Boost  Plus (360 kcal, 14 g pro) once daily at lunch   Monitor intake, weight, and labs.     RD following and available as needed.  Thank you.     Communication of Recommendations:  EMR.    Nutrition Assessment     Malnutrition Assessment/Nutrition-Focused Physical Exam    Malnutrition in the context of acute illness or injury  Degree of Malnutrition: unable to complete  Energy Intake: unable to obtain  Interpretation of Weight Loss: unable to obtain  Body Fat:unable to obtain  Area of Body Fat Loss: unable to obtain  Muscle Mass Loss: unable to obtain  Area of Muscle Mass Loss: unable to obtain  Fluid Accumulation: unable to obtain  Edema: unable to obtain   Reduced  Strength: unable to obtain  A minimum of two characteristics is recommended for diagnosis of either severe or non-severe malnutrition.    Chart Review    Reason Seen: physician consult for [facility] Pt.    Malnutrition Screening Tool Results   Have you recently lost weight without trying?: No  Have you been eating poorly because of a decreased appetite?: No   MST Score: 0     Diagnosis:  Multivessel CAD s/p left heart catheterization with InStent restenosis and multivessel coronary disease on 02/15/2023 s/p CABG x2 (lima to LAD, RSVG to OM with right GSV EVH) on 02/24/2023    Relevant Medical History:   Acid reflux      Anemia      Aortic aneurysm, abdominal      Arthritis      Bronchitis      Cataract      Celiac artery stenosis  50% by CTA abdomen 7/27/2020    CKD (chronic kidney disease)  45% FUNCTION    Coronary artery disease      Encounter for blood transfusion      Enlarged prostate      GERD (gastroesophageal reflux disease)      Hemorrhoids      Hypercholesteremia      Hypertension      Iron deficiency anemia, unspecified      Leaky  "heart valve      Lupus anticoagulant disorder      Renal artery stenosis  by CTA 7/27/2020    Skin cancer      Unspecified chronic bronchitis      UTI (urinary tract infection)          Nutrition-Related Medications:   Lasix (D/C); Lovenox; Ferrous Sulfate; Prednisone;Sucralfate; Protonix, statin, carvedilol, Kcl     Calorie Containing IV Medications: no significant kcals from medications at this time    Nutrition-Related Labs:  3/14: BUN 35.5(H), Crea 1.63(H), Alb 2.7(L), GFR 42, PAB 17.6(L), H/H 10.4/33.7(H)    3/7: PAB 13.4(L), K+ 3.1(L), BUN 40.3(H), Crea 1.15(H), AST 45(H), GFR 46   3/4: BUN/Crea 69.3/1.68(H); GFR 41(L); AST 39(L); PreAlb 13.8; H/H: 9.9/30.6(L).    Diet/PN Order: Diet Adult Regular No Straws  Oral Supplement Order: none  Tube Feeding Order: none  Appetite/Oral Intake: good/% of meals  Factors Affecting Nutritional Intake: none identified  Food/Sikh/Cultural Preferences: Oatmeal with blueberries for breakfast, [place]t for dinner   Food Allergies: none reported    Skin Integrity: incision  Wound(s):   Incisions - (chest, rt leg, let leg, chest), Noted sacral wound is healed per Wound Care note     Comments    3/14/23: Pt reports good appetite. Appetite much improved. Per EMR, is averaging ~75% po intake at meals x last 3 days. Observed ~50% lunch consumed, pt reports did not like pork chop because "too tough". Also observed pt drinking Boost, tells me has been drinking as receives. No new nutrition related issues reported/noted at this time.     3/07/23: OT waiting outside of room for session, unable to complete full interview at this time. Pt reports poor appetite. When asks why, shrugs and says "I don't know".  Tells me usually eats large breakfast (oatmeal and blueberries) at home, and sandwich at evening meal. Often skips lunch. OMKAR Ortega had told me same earlier this am. Staff also reporting pt with poor sleep/fatigue,  likely are contributing to poor appetite. Per EMR, is " "averaging ~35% po intake at meals x last 3 days. Discussed increased nutrition needs s/p CABG for healing. Pt agreeable to Boost at lunch. Noted renal indices improving. Will also liberalize diet to "No added salt"  pt appreciative. Called kitchen and updated pt preferences, kitchen reports will also send someone to review menu with him this afternoon. Will attempt full interview and physical assessment at f/u.     3/04/23: Pt with good appetite and intake. Consuming 100% of meals per EMR recorded intake. Renal indices abnormal and pt is not receiving HD at this time; therefore; protein restriction is recommended. Agree with current diet - Renal Non-Dialysis Diet. Plans for NFPE at f/u. Will continue to monitor during stay.     Anthropometrics    Height: 5' 10.98" (180.3 cm) Height Method: Stated  Last Weight: 83.7 kg (184 lb 8.4 oz) (03/14/23 0531) Weight Method: Bed Scale  BMI (Calculated): 25.7  BMI Classification: overweight (BMI 25-29.9)  Ideal Body Weight (IBW), Male: 171.88 lb     % Ideal Body Weight, Male (lb): 115.36 %    Usual Weight Provided By: EMR weight history    Wt Readings from Last 5 Encounters:   03/14/23 83.7 kg (184 lb 8.4 oz)   03/03/23 88.7 kg (195 lb 8.8 oz)   02/15/23 90.9 kg (200 lb 8 oz)   02/15/23 86.2 kg (190 lb)   02/15/23 86.2 kg (190 lb)     Weight Change(s) Since Admission:  Admit Weight: 90 kg (198 lb 6.6 oz) (03/03/23 1545)  3/7: 86.3 kg (suspect wt loss from admit wt fluid related. Pt on lasix.)   3/14: 83.7 kg ( may be r/t to fluid or inaccuracy in measurement. Will monitor closely.)       Estimated Needs    Weight Used For Calorie Calculations: 86.3 kg   Energy Calorie Requirements (kcal): 2074 kcal/day   Energy Need Method: MSJ x 1.3 SF  Weight Used For Protein Calculations: 86 g  Protein Requirements: 1.0 g/kg   Fluid Requirements (mL): 2074 (1 mL/kcal)      Updated 2/2 new wt, improved renal indices.     Enteral Nutrition    Patient not receiving enteral nutrition at this " time.    Parenteral Nutrition    Patient not receiving parenteral nutrition support at this time.    Evaluation of Received Nutrient Intake    Calories: not meeting estimated needs  Protein: not meeting estimated needs    Patient Education    Not applicable.    Nutrition Diagnosis     PES: Inadequate oral intake related to decreased appetite as evidenced by <50% po intake x last 3 days  resolved)    Interventions/Goals     Intervention(s): modified composition of meals/snacks and collaboration with other providers  Goal: Meet greater than 75% of nutritional needs by follow-up. (goal met)    Monitoring & Evaluation     Dietitian will monitor food and beverage intake, weight, electrolyte/renal panel, glucose/endocrine profile, and gastrointestinal profile.  Nutrition Risk/Follow-Up: low (follow-up in 5-7 days)   Please consult if re-assessment needed sooner.

## 2023-03-14 NOTE — PT/OT/SLP PROGRESS
Physical Therapy      Patient Name:  Hunter Navarrete   MRN:  [id number]    Reattempted to see pt for PT [id number], pt continued to refuse d/t severe pain in R shoulder/upper back.    Amount of therapy minutes missed:  60 Minutes.    Will follow-up as pt is appropriate.    3/14/2023

## 2023-03-15 PROCEDURE — 99233 SBSQ HOSP IP/OBS HIGH 50: CPT | Mod: ,,, | Performed by: NURSE PRACTITIONER

## 2023-03-15 PROCEDURE — 27000221 HC OXYGEN, UP TO 24 HOURS

## 2023-03-15 PROCEDURE — 63600175 PHARM REV CODE 636 W HCPCS: Performed by: NURSE PRACTITIONER

## 2023-03-15 PROCEDURE — 25000003 PHARM REV CODE 250: Performed by: INTERNAL MEDICINE

## 2023-03-15 PROCEDURE — 92523 SPEECH SOUND LANG COMPREHEN: CPT

## 2023-03-15 PROCEDURE — 99233 PR SUBSEQUENT HOSPITAL CARE,LEVL III: ICD-10-PCS | Mod: ,,, | Performed by: NURSE PRACTITIONER

## 2023-03-15 PROCEDURE — 25000003 PHARM REV CODE 250: Performed by: NURSE PRACTITIONER

## 2023-03-15 PROCEDURE — 97530 THERAPEUTIC ACTIVITIES: CPT

## 2023-03-15 PROCEDURE — 63600175 PHARM REV CODE 636 W HCPCS: Performed by: INTERNAL MEDICINE

## 2023-03-15 PROCEDURE — 97535 SELF CARE MNGMENT TRAINING: CPT

## 2023-03-15 PROCEDURE — 97110 THERAPEUTIC EXERCISES: CPT

## 2023-03-15 PROCEDURE — 11800000 HC REHAB PRIVATE ROOM

## 2023-03-15 PROCEDURE — 25000242 PHARM REV CODE 250 ALT 637 W/ HCPCS: Performed by: NURSE PRACTITIONER

## 2023-03-15 PROCEDURE — 97116 GAIT TRAINING THERAPY: CPT

## 2023-03-15 PROCEDURE — 94761 N-INVAS EAR/PLS OXIMETRY MLT: CPT

## 2023-03-15 PROCEDURE — 94640 AIRWAY INHALATION TREATMENT: CPT

## 2023-03-15 RX ADMIN — DULOXETINE 30 MG: 30 CAPSULE, DELAYED RELEASE ORAL at 08:03

## 2023-03-15 RX ADMIN — LEVALBUTEROL HYDROCHLORIDE 1.25 MG: 1.25 SOLUTION RESPIRATORY (INHALATION) at 07:03

## 2023-03-15 RX ADMIN — LEVALBUTEROL HYDROCHLORIDE 1.25 MG: 1.25 SOLUTION RESPIRATORY (INHALATION) at 11:03

## 2023-03-15 RX ADMIN — SUCRALFATE 1 G: 1 TABLET ORAL at 06:03

## 2023-03-15 RX ADMIN — TAMSULOSIN HYDROCHLORIDE 0.4 MG: 0.4 CAPSULE ORAL at 08:03

## 2023-03-15 RX ADMIN — FUROSEMIDE 40 MG: 40 TABLET ORAL at 08:03

## 2023-03-15 RX ADMIN — FLUTICASONE PROPIONATE 100 MCG: 50 SPRAY, METERED NASAL at 08:03

## 2023-03-15 RX ADMIN — SUCRALFATE 1 G: 1 TABLET ORAL at 08:03

## 2023-03-15 RX ADMIN — HYDROCODONE BITARTRATE AND ACETAMINOPHEN 1 TABLET: 5; 325 TABLET ORAL at 08:03

## 2023-03-15 RX ADMIN — ENOXAPARIN SODIUM 40 MG: 40 INJECTION SUBCUTANEOUS at 04:03

## 2023-03-15 RX ADMIN — DOCUSATE SODIUM 100 MG: 100 CAPSULE, LIQUID FILLED ORAL at 08:03

## 2023-03-15 RX ADMIN — CLOPIDOGREL BISULFATE 75 MG: 75 TABLET ORAL at 08:03

## 2023-03-15 RX ADMIN — GABAPENTIN 100 MG: 100 CAPSULE ORAL at 08:03

## 2023-03-15 RX ADMIN — FERROUS SULFATE TAB 325 MG (65 MG ELEMENTAL FE) 1 EACH: 325 (65 FE) TAB at 08:03

## 2023-03-15 RX ADMIN — LEVALBUTEROL HYDROCHLORIDE 1.25 MG: 1.25 SOLUTION RESPIRATORY (INHALATION) at 03:03

## 2023-03-15 RX ADMIN — QUETIAPINE 50 MG: 25 TABLET ORAL at 08:03

## 2023-03-15 RX ADMIN — PREDNISONE 7.5 MG: 2.5 TABLET ORAL at 08:03

## 2023-03-15 RX ADMIN — ASPIRIN 81 MG: 81 TABLET, COATED ORAL at 08:03

## 2023-03-15 RX ADMIN — CEFTRIAXONE SODIUM 1 G: 1 INJECTION, POWDER, FOR SOLUTION INTRAMUSCULAR; INTRAVENOUS at 10:03

## 2023-03-15 RX ADMIN — CARVEDILOL 12.5 MG: 6.25 TABLET, FILM COATED ORAL at 08:03

## 2023-03-15 RX ADMIN — SUCRALFATE 1 G: 1 TABLET ORAL at 04:03

## 2023-03-15 RX ADMIN — CETIRIZINE HYDROCHLORIDE 10 MG: 10 TABLET, FILM COATED ORAL at 08:03

## 2023-03-15 RX ADMIN — ROPINIROLE HYDROCHLORIDE 1 MG: 1 TABLET, FILM COATED ORAL at 08:03

## 2023-03-15 RX ADMIN — HYDROCODONE BITARTRATE AND ACETAMINOPHEN 1 TABLET: 5; 325 TABLET ORAL at 12:03

## 2023-03-15 RX ADMIN — GABAPENTIN 200 MG: 100 CAPSULE ORAL at 08:03

## 2023-03-15 RX ADMIN — ATORVASTATIN CALCIUM 40 MG: 40 TABLET, FILM COATED ORAL at 08:03

## 2023-03-15 RX ADMIN — SUCRALFATE 1 G: 1 TABLET ORAL at 11:03

## 2023-03-15 RX ADMIN — GABAPENTIN 100 MG: 100 CAPSULE ORAL at 05:03

## 2023-03-15 RX ADMIN — HYDROXYZINE PAMOATE 50 MG: 50 CAPSULE ORAL at 08:03

## 2023-03-15 RX ADMIN — PANTOPRAZOLE SODIUM 40 MG: 40 TABLET, DELAYED RELEASE ORAL at 08:03

## 2023-03-15 RX ADMIN — EZETIMIBE 10 MG: 10 TABLET ORAL at 08:03

## 2023-03-15 RX ADMIN — GABAPENTIN 100 MG: 100 CAPSULE ORAL at 12:03

## 2023-03-15 RX ADMIN — FINASTERIDE 5 MG: 5 TABLET, FILM COATED ORAL at 08:03

## 2023-03-15 RX ADMIN — LIDOCAINE 2 PATCH: 50 PATCH CUTANEOUS at 05:03

## 2023-03-15 NOTE — PT/OT/SLP PROGRESS
Physical Therapy Inpatient Rehab Treatment    Patient Name:  Hunter Navarrete   MRN:  [id number]    Recommendations:     Discharge Recommendations:  nursing facility, skilled   Discharge Equipment Recommendations: walker, rolling   Barriers to discharge:  impaired functional mobility    Assessment:     Hunter Navarrete is a 82 y.o. male admitted with a medical diagnosis of S/P CABG x 2.  He presents with the following impairments/functional limitations:  weakness, impaired endurance, impaired self care skills, impaired functional mobility, decreased safety awareness, pain, impaired cardiopulmonary response to activity, other (comment) (sternal precautions) .    Rehab Diagnosis: s/p CABG    Recent Surgery: * No surgery found *      General Precautions: Standard, sternal, fall     Orthopedic Precautions:N/A     Braces: N/A    Rehab Prognosis: Good; patient would benefit from acute skilled PT services to address these deficits and reach maximum level of function.      History:     Past Medical History:   Diagnosis Date    Acid reflux     Anemia     Aortic aneurysm, abdominal     Arthritis     Bronchitis     Cataract     Celiac artery stenosis     50% by CTA abdomen 7/27/2020    CKD (chronic kidney disease)     45% FUNCTION    Coronary artery disease     Encounter for blood transfusion     Enlarged prostate     GERD (gastroesophageal reflux disease)     Hemorrhoids     Hypercholesteremia     Hypertension     Iron deficiency anemia, unspecified     Leaky heart valve     Lupus anticoagulant disorder     Renal artery stenosis     by CTA 7/27/2020    Skin cancer     Unspecified chronic bronchitis     UTI (urinary tract infection)        Past Surgical History:   Procedure Laterality Date    ACF      BACK SURGERY      RODS IN BACK; 4 SURGIERIES    BONE MARROW BIOPSY      CARDIAC ANGIOGRAM WITH STENT      CATARACT SX Bilateral     CORONARY ANGIOGRAPHY N/A 2/15/2023    Procedure: ANGIOGRAM, CORONARY ARTERY;  Surgeon: Rito NGUYEN  MD Gary;  Location: [facility] CATH;  Service: Cardiology;  Laterality: N/A;    CORONARY ARTERY BYPASS GRAFT (CABG) N/A 2/24/2023    Procedure: CORONARY ARTERY BYPASS GRAFT (CABG);  Surgeon: Spencer Hagan MD;  Location: [facility];  Service: Cardiothoracic;  Laterality: N/A;  CABG / EVH //   ECHO NOTIFIED    HIP SURGERY Right     THR    KNEE SURGERY Right     ATS    LEFT HEART CATHETERIZATION Left 07/22/2021    Procedure: Left heart cath;  Surgeon: Curtis Loredo MD;  Location: [facility] CATH;  Service: Cardiology;  Laterality: Left;    LEFT HEART CATHETERIZATION Left 09/22/2022    Procedure: Left heart cath;  Surgeon: Giorgi Barker MD;  Location: [facility] CATH;  Service: Cardiology;  Laterality: Left;    SHOULDER SURGERY Bilateral     SPINAL CORD STIMULATOR IMPLANT         Subjective     Chief Complaint: R shoulder pain and B lower back pain    Respiratory Status: Nasal cannula, flow 2 L/min    Patients cultural, spiritual, Advent conflicts given the current situation: no      Objective:     Communicated with pt prior to session.  Patient found up in chair with    upon PT entry to room at start of both sessions.    Pt is Oriented x3 and Alert, Cooperative, and Motivated.    Vitals   SpO2 98% at rest on 2L NC  SpO2 96% after ambulation 200' on 2L NC    SpO2 93% at rest on room air  SpO2 89% after ambulation 175' on room air.      Functional Mobility:      Current   Status  Discharge   Goal   Functional Area: Care Score:    Roll Left and Right   Supervision or touching assistance   Sit to Lying 6 Supervision or touching assistance   Lying to Sitting on Side of Bed   Supervision or touching assistance   Sit to Stand 6  With rollator Supervision or touching assistance   Chair/Bed-to-Chair Transfer 6  With rollator Supervision or touching assistance   Car Transfer 6  With rollator Supervision or touching assistance   Walk 10 Feet 6 Supervision or touching assistance   Walk 50 Feet with Two Turns 6 Supervision or touching  assistance   Walk 150 Feet 6  Pt amb 200' x2 completions with 2L NC and then 175' on room air. All completed with mod I using rollator. Supervision or touching assistance   Walk 10 Feet Uneven Surface 4  Pt amb up/down ramp 15' with SBA using rollator. Supervision or touching assistance   1 Step (Curb) 6 Supervision or touching assistance   4 Steps 6 Supervision or touching assistance   12 Steps 6  Pt amb up/down 12 stairs with B rails mod I. Not applicable   Picking Up Object 6  Performed with rollator and reacher, mod I. Supervision or touching assistance   Wheel 50 Feet with Two Turns   Supervision or touching assistance   Wheel 150 Feet   Supervision or touching assistance       Therapeutic Activities and Exercises:  Standing HEP provided to pt. PT reviewed all exercises with pt and pt verbalized understanding of all.    Family training completed. All questions/concerns addressed as appropriate. Pt and pts wife verbalized understanding of all and reported feeling ready for d/c to home on Friday.    Activity Tolerance: Fair - limited by increased R shoulder pain    Patient left up in chair with call button in reach at end of AM session and pt left supine in bed with call button in reach and pts wife present at end of PM session.    Education provided: roles and goals of PT/PTA, transfer training, bed mob, gait training, stair training, safety awareness, body mechanics, assistive device, strengthening exercises, fall prevention, and sternal precautions    Expected compliance: High compliance    GOALS:   Multidisciplinary Problems       Physical Therapy Goals          Problem: Physical Therapy    Goal Priority Disciplines Outcome Goal Variances Interventions   Physical Therapy Goal     PT, PT/OT Ongoing, Progressing     Description: Pt will improve functional mobility by performing:     Bed Mobility   MET - Roll Right and Left Partial/ Moderate Assistance .  MET - Lying to sitting Partial/ Moderate Assistance  .  MET - Sitting to lying Partial/ Moderate Assistance .  Supine to sit transfer with supervision/touching assist.    Transfers    MET - Pt will be able to perform Stand step chair/bed to chair transfer With RW Partial/ Moderate Assistance .  MET - Pt will be able to perform Sit to stand with RW Partial/ Moderate Assistance .  MET - Pt will be able to perform Car transfer with RW Partial/ Moderate Assistance .  Sit to stand transfer independently using Rollator   Bed to chair transfer independently using Rollator   Car transfer independently using Rollator     Ambulation    MET - Pt will ambulate 50 Feet with RW Partial/ Moderate Assistance  Ambulate 350 feet independently using Rollator   Ascend/descend a 4 inch curb independently using Rollator   Ascend/descend 12 stairs independently using bilateral handrails  Ambulate 15 feet on uneven surfaces/ramps independently using Rollator     Wheelchair Mobility   D/C - Pt will be able to propel 150 feet in steven wheelchair Partial/ Moderate Assistance                        Plan:     During this hospitalization, patient to be seen 5 x/week to address the identified rehab impairments via neuromuscular re-education, wheelchair management/training, therapeutic exercises, therapeutic activities, gait training and progress toward the following goals:    Plan of Care Expires:  03/10/23  PT Next Visit Date: 03/16/23  Plan of Care reviewed with: patient    Additional Information:     Pt seen [id number] and then [phone] for family training.    Time Tracking:     Therapy Time  PT Start Time:  (1100; 1330)  PT Stop Time:  (1200; 1400)   PT Individual: 90  Missed Time:    Time Missed due to:      Billable Minutes: Gait Training 50 min, Therapeutic Exercise 10 min, and Self Care/Home Training 30 min    03/15/2023

## 2023-03-15 NOTE — PT/OT/SLP EVAL
Speech Language Pathology Department  Cognitive-Communication Evaluation    Patient Name:  Hunter Navarrete   MRN:  [id number]    Recommendations:     General recommendations:  dysphagia therapy  Communication strategies:  provide increased time to answer  Discharge recommendations:  Discharge Facility/Level of Care Needs: home health speech therapy   Barriers to safe discharge: acuity of illness    History:       Past Medical History:   Diagnosis Date    Acid reflux     Anemia     Aortic aneurysm, abdominal     Arthritis     Bronchitis     Cataract     Celiac artery stenosis     50% by CTA abdomen 7/27/2020    CKD (chronic kidney disease)     45% FUNCTION    Coronary artery disease     Encounter for blood transfusion     Enlarged prostate     GERD (gastroesophageal reflux disease)     Hemorrhoids     Hypercholesteremia     Hypertension     Iron deficiency anemia, unspecified     Leaky heart valve     Lupus anticoagulant disorder     Renal artery stenosis     by CTA 7/27/2020    Skin cancer     Unspecified chronic bronchitis     UTI (urinary tract infection)      Past Surgical History:   Procedure Laterality Date    ACF      BACK SURGERY      RODS IN BACK; 4 SURGIERIES    BONE MARROW BIOPSY      CARDIAC ANGIOGRAM WITH STENT      CATARACT SX Bilateral     CORONARY ANGIOGRAPHY N/A 2/15/2023    Procedure: ANGIOGRAM, CORONARY ARTERY;  Surgeon: Rito Vega MD;  Location: [facility] CATH;  Service: Cardiology;  Laterality: N/A;    CORONARY ARTERY BYPASS GRAFT (CABG) N/A 2/24/2023    Procedure: CORONARY ARTERY BYPASS GRAFT (CABG);  Surgeon: Spencer Hagan MD;  Location: [facility] OR;  Service: Cardiothoracic;  Laterality: N/A;  CABG / EVH //   ECHO NOTIFIED    HIP SURGERY Right     THR    KNEE SURGERY Right     ATS    LEFT HEART CATHETERIZATION Left 07/22/2021    Procedure: Left heart cath;  Surgeon: Curtis Loredo MD;  Location: [facility] CATH;  Service: Cardiology;  Laterality: Left;    LEFT HEART CATHETERIZATION Left 09/22/2022  "   Procedure: Left heart cath;  Surgeon: Giorgi Barker MD;  Location: [facility] CATH;  Service: Cardiology;  Laterality: Left;    SHOULDER SURGERY Bilateral     SPINAL CORD STIMULATOR IMPLANT         Previous level of Function  Education:high school  Occupation: retired  Lives: with spouse  Handed: Right  Glasses: no  Hearing Aids: yes, at home    Subjective     Patient awake, alert, and oriented x4 .    Patient goals: "to get better"    Spiritual/Cultural/Nondenominational Beliefs/Practices that affect care: no  Pain/Comfort:  0  Respiratory Status: 2 Liter on nasal cannula, O2 between 95-98 during session    Objective:     SPEECH PRODUCTION  Phoneme Production: adequate   Voice Quality: hoarse, pt reports hx of smoking  Voice Production: adequate   Speech Rate: wnl  Loudness: wnl  Speech Intelligibility  Known Context: Greater that 90%  Unknown Context: Greater that 90%    AUDITORY COMPREHENSION  Identification:  Body parts: wnl  Objects: wnl  Following Directions:  1-Step: wnl  2-Step: wnl  Yes/No Questions:  Biographical: 100%  Environmental: 100%  Simple: 100%  Complex: 100%    VERBAL EXPRESSION  Automatic Speech:  Functional needs: 100%  Singin%  Days of the week: 100%  Repetition:  Phonemes: 100%  Single syllable words: 100%  Phrase Completion: 100%  Confrontation Naming  Body Parts: 100%  Objects: 100%  Wh- Questions:  Object name: 100%  Object function: 100%    COGNITION  Orientation:  Person: 100%  Place: 100%  Time: 100%  Situation: 100%  Memory:  Immediate: 100%  Delayed: 100%  Short Term: 100%  Problem Solving  Functional simple: 100%  Functional complex: 100%  Money Management: 100%  Organization:  Convergent thinkin%  Divergent thinkin%  Sequencin%  Executive Function:  Attention to detail: wnl  Awareness: wnl  Initiation: wnl  Planning: wnl  Reasoning: wnl    Assessment:     Pt presents with functional speech and language skills, cognitive linguistic skills note to be at baseline. No skilled " SLP intervention is warranted at this time.     Goals:   Multidisciplinary Problems       SLP Goals          Problem: SLP    Goal Priority Disciplines Outcome   SLP Goal     SLP Ongoing, Progressing   Description: LT. Pt will tolerate least restrictive diet with no overt s/sx of aspiration.     ST. Pt will complete MBS for comprehensive swallow evaluation.--GOAL MET 3/13/23  2. Pt will tolerate 4 oz of thin liquids via straw with no overt s/sx of aspiration.   3. Pt will tolerate tongue base retraction and laryngeal strengthening exercises with minimal cues.                     Patient Education:     Patient provided with verbal education regarding POC.  Understanding was verbalized.    Plan:     SLP Follow-Up:  Yes   Patient to be seen:  5 x/week   Plan of Care expires:  23  Plan of Care reviewed with:  patient      Time Tracking:     SLP Treatment Date:   03/15/23  Speech Start Time:  1030  Speech Stop Time:  1100     Speech Total Time (min):  30 min    Billable minutes:  Evaluation of Speech Sound Production with Comprehension and Expression, 30      03/15/2023

## 2023-03-15 NOTE — PT/OT/SLP PROGRESS
Occupational Therapy Inpatient Rehab Treatment    Name: Hunter Navarrete  MRN: [id number]    Assessment:  Hunter Navarrete is a 82 y.o. male admitted with a medical diagnosis of S/P CABG x 2.  He presents with the following impairments/functional limitations:  impaired endurance, impaired self care skills, impaired functional mobility, decreased upper extremity function, pain, decreased ROM . Ar 13:00, Pt. And his wife participated in FT.    General Precautions: Standard, fall, sternal     Orthopedic Precautions:Full weight bearing     Braces: N/A    Rehab Prognosis: Good; patient would benefit from acute skilled OT services to address these deficits and reach maximum level of function.      History:     Past Medical History:   Diagnosis Date    Acid reflux     Anemia     Aortic aneurysm, abdominal     Arthritis     Bronchitis     Cataract     Celiac artery stenosis     50% by CTA abdomen 7/27/2020    CKD (chronic kidney disease)     45% FUNCTION    Coronary artery disease     Encounter for blood transfusion     Enlarged prostate     GERD (gastroesophageal reflux disease)     Hemorrhoids     Hypercholesteremia     Hypertension     Iron deficiency anemia, unspecified     Leaky heart valve     Lupus anticoagulant disorder     Renal artery stenosis     by CTA 7/27/2020    Skin cancer     Unspecified chronic bronchitis     UTI (urinary tract infection)        Past Surgical History:   Procedure Laterality Date    ACF      BACK SURGERY      RODS IN BACK; 4 SURGIERIES    BONE MARROW BIOPSY      CARDIAC ANGIOGRAM WITH STENT      CATARACT SX Bilateral     CORONARY ANGIOGRAPHY N/A 2/15/2023    Procedure: ANGIOGRAM, CORONARY ARTERY;  Surgeon: Rito Vega MD;  Location: [facility] CATH;  Service: Cardiology;  Laterality: N/A;    CORONARY ARTERY BYPASS GRAFT (CABG) N/A 2/24/2023    Procedure: CORONARY ARTERY BYPASS GRAFT (CABG);  Surgeon: Spencer Hagan MD;  Location: [facility] OR;  Service: Cardiothoracic;  Laterality: N/A;  CABG / EVH  "//   ECHO NOTIFIED    HIP SURGERY Right     THR    KNEE SURGERY Right     ATS    LEFT HEART CATHETERIZATION Left 07/22/2021    Procedure: Left heart cath;  Surgeon: Curtis Loredo MD;  Location: [facility] CATH;  Service: Cardiology;  Laterality: Left;    LEFT HEART CATHETERIZATION Left 09/22/2022    Procedure: Left heart cath;  Surgeon: Giorgi Barker MD;  Location: [facility] CATH;  Service: Cardiology;  Laterality: Left;    SHOULDER SURGERY Bilateral     SPINAL CORD STIMULATOR IMPLANT         Subjective     Orientation: Oriented x4    Chief Complaint: "My shoulder, my back hurt"     Patient/Family Comments/goals: Pt.'s wife states, "I've been helping him with dressing and bathing for a while now."     Vitals   Vitals at Rest  /56   HR 98   O2 Sat 97 % on 2 L   Pain 7/10   OT removed O2 x's 20 min as Pt. Performed seated there ex and Ax c B Ue's. After 20 min on RA, O2 sats were 91 %. After 30 min., O2 sats at 84%. O2 returned at 2 L and 97% restored.   Respiratory Status: Nasal cannula, flow 2 L/min    Patients cultural, spiritual, Catholic conflicts given the current situation: no       Objective:     Patient found up in chair with oxygen  upon OT entry to room.    Mobility   Patient completed:  Sit to Stand Transfer with minimum assistance with 4 wheeled walker  Stand to Sit Transfer with contact guard assistance with 4 wheeled walker  Toilet Transfer Step Transfer technique with contact guard assistance with  rolling walker    Functional Mobility  Pt. Ambulated ~ 10 " functional mobility c wife during FT    ADLs   Current Status   Eating     Oral Hygiene     Shower, Bathe Self     Upper Body Dressing     Lower Body Dressing     Toileting Hygiene     Toilet Transfer 4   Putting On, Taking Off Footwear 5     Limiting Factors for ADLs: endurance, limited ROM, pain, and [place]      IADLs: Pt. At w/c level performed B UE AROM/FM c scissor skills, cutting binding on peat pots and bag of potting soil. Pt. Scooped soil and " "filled pots/planted seeds and covered them. Pt. Was unable to separate 8/10 peat pots. Pt. Enjoyed this Ax as a desired occupation. Pt. Did not c/o pain as R UE AROM through frontal plane to fill cups c soil.         Therapeutic Exercise  Pt. C B UE extended on tabletop elevated to shoulder height performed AROM c towel assist for gliding through anterior transverse plane as well as B shoulder abd/duction x's 20-25 reps each direction. Pt. Unable to get to end ROM and stated he "could feel muscles pulling on rotator cuff on R" Pt. Attempted biceps curls c yellow (light resistance) T band but c/o "too much pain".       Additional Treatments: At 13:00 FT c wife, Pt. Performed LB DSG/donning socks c AE as well as functional mobility to bathroom and transfer [place] [facility] [place]. Pt. C/o pain and stated he "would have no trouble stepping into new shower and getting on SC". Pt.'s wife c a picture stated she was sure she could assist Pt.     LifeStyle Change and Education:             Patient left up in chair with  wife and Michael PT present.     Education provided: transfer training, body mechanics, assistive device, sequencing, post-op precautions, and home safety    Multidisciplinary Problems       Occupational Therapy Goals          Problem: Occupational Therapy    Goal Priority Disciplines Outcome Interventions   Occupational Therapy Goal     OT, PT/OT Ongoing, Progressing    Description: ADLs: Pt will be SPV with ADLs by d/c.   STG: by reeval  Pt to perform UB dressing with partial A MET  Pt to perform LB dressing with Mod A using AD MET  Pt to perform putting on/off footwear task with Mod A using AD MET  Pt to perform toileting with Mod A using AD as needed MET  Pt to perform bathing with Mod A MET    Functional Transfers:  Pt to perform toilet transfers with Touch A using BSC over toilet. MET  Pt to perform a tub transfer with TTB requiring Mod A  MET    Balance, Strengthening, Endurance, Balance:  Pt to consistently " demonstrate adherence to orthopedic precautions during all ADL's as instructed by OT.  Pt to demonstrate fair dynamic standing balance as required to perform ADL's from standing level.  Pt to demonstrate consistent adherence to breathing control and energy conservation techniques as educated by OT.                        Time Tracking     OT Received On: 03/15/23  Time In  (0900 & 1300)     Time Out  (10:00 & 13:30)  Total Time    Therapy Time: OT Individual: 90  Missed Time:  0  Missed Time Reason:      Billable Minutes: Self Care/Home Management 30, Therapeutic Activity 45, and Therapeutic Exercise 15    03/15/2023

## 2023-03-15 NOTE — PLAN OF CARE
Received response from [facility]  via email. She sent a template form that needed to be completed in order for [facility] to authorize [facility] services via pt's PCP.  I completed that form and emailed it to her.  Then, received an auto response email saying that she is out today.  Was forwarded to Nima Lara LCSW ([phone], ext [phone]).  He asked that I email it to him in her absence at [email or web address], which I am doing now for him to f/u.

## 2023-03-15 NOTE — PROGRESS NOTES
[facility] ([facility])  Rehab Progress Note    Patient Name: Hunter Navarrete  MRN: [id number]  Age: 82 y.o. Sex: male  : 1940  Hospital Length of Stay: 12 days  Date of Service: 3/15/2023   Chief Complaint: Multivessel CAD s/p left heart catheterization with InStent restenosis and multivessel coronary disease on 02/15/2023 s/p CABG x2 (lima to LAD, RSVG to OM with right GSV EVH) on 2023    Subjective:     Basic Information  Admit Information: 82-year-old WM presented to [facility] on 02/15 with complaints of substernal chest pain with shortness of breath.  PMH significant for CAD with PCI, CKD, hypertension, mild aortic stenosis, lupus anticoagulant inhibitor syndrome, and hyperlipidemia.  Workup significant for elevated troponin.  Transferred to [facility] for cardiology evaluation.  Initiated heparin drip.  Tolerated left heart catheterization on 02/15 significant for multivessel coronary disease with InStent restenosis.  Hematology recommended transfer to tertiary facility for CABG 2/2 history of lupus anticoagulant.  Tolerated transfer to [facility] on .  Initiated Tridil drip and titrated for chest pain.  Required 2 units PRBC transfusion on .  On  tolerated CABG x2 (lima to LAD, RSVG to OM with right GSV EVH) without perioperative complications.  Postoperatively required IV fluid gentle hydration per Nephrology 2/2 CKD stage IIIB.  Chest tubes removed.  Continued with postoperative DANIELLA on CKD.  Nephrology will continue to follow.  Discontinue Lasix IV on  and initiated home dose of Lasix 40 mg daily.  Initiated Plavix on . Tolerated transfer to [facility] inpatient rehab unit on 3/3 without incident.  Today's Information:  No acute events overnight.  Sitting in chair comfortably.  Reports good sleep and appetite.  Last BM 3/14.  Vital signs at goal with no recent recorded fevers.  UA significant for nitrates and bacteria.  Cervical spine x-ray  significant for mild anterolisthesis C4/C5, previous fusion.  No labs today.    Review of patient's allergies indicates:   Allergen Reactions    Cardura [doxazosin] Hives    Lyrica [pregabalin] Other (See Comments)    Paxil [paroxetine hcl] Other (See Comments)    Restoril [temazepam] Hallucinations    Vioxx [rofecoxib] Swelling    Zithromax [azithromycin] Hives        Current Facility-Administered Medications:     aspirin EC tablet 81 mg, 81 mg, Oral, Daily, Yaneth Douglas, MAGDAP, 81 mg at 03/15/23 0802    atorvastatin tablet 40 mg, 40 mg, Oral, QHS, Yaneth Douglas, FNP, 40 mg at 03/14/23 2027    benzocaine 10 % mucosal gel, , Mouth/Throat, QID PRN, Denilson Medina, DO    benzonatate capsule 100 mg, 100 mg, Oral, TID PRN, Yaneth Douglas, FNP, 100 mg at 03/06/23 2336    bisacodyL suppository 10 mg, 10 mg, Rectal, Daily PRN, Yaneth Douglas FNP, 10 mg at 03/03/23 1909    bisacodyL suppository 10 mg, 10 mg, Rectal, Once, SHIRLEY Haider    carvediloL tablet 12.5 mg, 12.5 mg, Oral, BID, Suman LOPEZ Mary Ann, FNP, 12.5 mg at 03/15/23 0801    cefTRIAXone (ROCEPHIN) 1 g in dextrose 5 % in water (D5W) 5 % 50 mL IVPB (MB+), 1 g, Intravenous, Q24H, SHIRLEY Haider    cetirizine tablet 10 mg, 10 mg, Oral, QHS, Usha Mcgee, FNP, 10 mg at 03/14/23 2027    clopidogreL tablet 75 mg, 75 mg, Oral, Daily, Yaneth Douglas FNP, 75 mg at 03/15/23 0802    docusate sodium capsule 100 mg, 100 mg, Oral, BID, Yaneth Douglas FNP, 100 mg at 03/15/23 0801    DULoxetine DR capsule 30 mg, 30 mg, Oral, Daily, Usha Mcgee, FNP, 30 mg at 03/15/23 0801    enoxaparin injection 40 mg, 40 mg, Subcutaneous, Daily, SHIRLEY Haider, 40 mg at 03/14/23 1715    ezetimibe tablet 10 mg, 10 mg, Oral, Daily, SHIRLEY Haider, 10 mg at 03/15/23 0801    famotidine tablet 20 mg, 20 mg, Oral, Daily PRN, Xander Enriquez MD    ferrous sulfate tablet 1 each, 1 tablet, Oral, BID, SHIRLEY Haider, 1 each at 03/15/23 0801     finasteride tablet 5 mg, 5 mg, Oral, Daily, MAGDA HaiderP, 5 mg at 03/15/23 0802    fluticasone propionate 50 mcg/actuation nasal spray 100 mcg, 2 spray, Each Nostril, BID, Yaneth Douglas FNP, 100 mcg at 03/15/23 0802    furosemide tablet 40 mg, 40 mg, Oral, Daily, Yaneth Douglas FNP, 40 mg at 03/15/23 0801    gabapentin capsule 100 mg, 100 mg, Oral, TID, Yaneth Douglas, FNP, 100 mg at 03/15/23 0519    gabapentin capsule 200 mg, 200 mg, Oral, QHS, Suman Reese, FNP, 200 mg at 03/14/23 2026    hydrALAZINE injection 10 mg, 10 mg, Intravenous, Q6H PRN, Yaneth Douglas FNP, 10 mg at 03/05/23 1456    HYDROcodone-acetaminophen 5-325 mg per tablet 1 tablet, 1 tablet, Oral, Q4H PRN, MAGDA HaiderP, 1 tablet at 03/15/23 0801    hydrOXYzine pamoate capsule 50 mg, 50 mg, Oral, Nightly PRN, MAGDA HaiderP, 50 mg at 03/12/23 2019    labetalol 20 mg/4 mL (5 mg/mL) IV syring, 10 mg, Intravenous, Q4H PRN, Yaneth Douglas, FNP    levalbuterol nebulizer solution 1.25 mg, 1.25 mg, Nebulization, Q8H, SHIRLEY Haider, 1.25 mg at 03/15/23 0701    LIDOcaine 5 % patch 2 patch, 2 patch, Transdermal, Q24H, Usha Mcgee, FNP, 2 patch at 03/15/23 0519    meclizine tablet 25 mg, 25 mg, Oral, TID PRN, SHIRLEY Haider    metoprolol injection 10 mg, 10 mg, Intravenous, Q2H PRN, Yaneth Douglas, FNP    ondansetron disintegrating tablet 4 mg, 4 mg, Oral, Q6H PRN, MAGDA HaiderP    pantoprazole EC tablet 40 mg, 40 mg, Oral, Daily, SHIRLEY Haider, 40 mg at 03/15/23 0801    predniSONE tablet 7.5 mg, 7.5 mg, Oral, Daily, Usha Mcgee, FNP, 7.5 mg at 03/15/23 0801    QUEtiapine tablet 50 mg, 50 mg, Oral, QHS, MAGDA HaiderP, 50 mg at 03/14/23 2027    rOPINIRole tablet 1 mg, 1 mg, Oral, BID, MAGDA HaiderP, 1 mg at 03/15/23 0802    sucralfate tablet 1 g, 1 g, Oral, QID (AC & HS), MAGDA HaiderP, 1 g at 03/15/23 0623    tamsulosin 24 hr capsule 0.4 mg, 0.4 mg,  "Oral, QHS, Yaneth Douglas, FNP, 0.4 mg at 03/14/23 2027    tiZANidine tablet 4 mg, 4 mg, Oral, TID PRN, Usha Mcgee, FNP, 4 mg at 03/14/23 2027     Review of Systems   Complete 12-point review of symptoms negative except for what's mentioned in HPI     Objective:     BP (!) 148/73   Pulse 82   Temp 97.6 °F (36.4 °C) (Oral)   Resp 20   Ht 5' 10.98" (1.803 m)   Wt 83.7 kg (184 lb 8.4 oz)   SpO2 95%   BMI 25.75 kg/m²      Physical Exam  Vitals reviewed.   Eyes:      Pupils: Pupils are equal, round, and reactive to light.   Cardiovascular:      Rate and Rhythm: Normal rate and regular rhythm.      Heart sounds: Murmur heard.   Systolic murmur is present with a grade of 3/6.   Pulmonary:      Effort: Pulmonary effort is normal.      Breath sounds: Normal breath sounds.   Abdominal:      General: Bowel sounds are normal.   Musculoskeletal:         General: Normal range of motion.   Skin:     General: Skin is warm.      Comments: Midline sternal incision open air dry and intact    Neurological:      Mental Status: He is alert and oriented to person, place, and time.      Motor: Weakness present.      Comments: Resting tremors, shuffling gait   Psychiatric:         Mood and Affect: Mood normal.   *MD performed and documented physical examination       Lines/Drains/Airways       None               Labs  Recent Results (from the past 24 hour(s))   Urinalysis, Reflex to Urine Culture    Collection Time: 03/14/23 10:43 AM    Specimen: Urine   Result Value Ref Range    Color, UA Yellow Yellow, Light-Yellow, Dark Yellow, Virgen, Straw    Appearance, UA Cloudy (A) Clear    Specific Gravity, UA 1.015     pH, UA 5.5 5.0 - 8.5    Protein, UA 30 (A) Negative mg/dL    Glucose, UA Negative Negative, Normal mg/dL    Ketones, UA Negative Negative mg/dL    Blood, UA Moderate (A) Negative unit/L    Bilirubin, UA Negative Negative mg/dL    Urobilinogen, UA 0.2 0.2, 1.0, Normal mg/dL    Nitrites, UA Positive (A) Negative    " Leukocyte Esterase, UA Large (A) Negative unit/L   Urinalysis, Microscopic    Collection Time: 03/14/23 10:43 AM   Result Value Ref Range    Bacteria, UA Few (A) None Seen, Rare, Occasional /HPF    RBC, UA 0-2 None Seen, 0-2, 3-5, 0-5 /HPF    WBC, UA 51-99 (A) None Seen, 0-2, 3-5, 0-5 /HPF    Squamous Epithelial Cells, UA None Seen None Seen, Rare, Occasional, Occ /HPF   Urine culture    Collection Time: 03/14/23 10:43 AM    Specimen: Urine   Result Value Ref Range    Urine Culture 50,000-75,000 colonies/ml Gram-negative Rods (A)        Radiology  CXR one view on 03/09/2023 at 8:36 a.m., IMPRESSION:  No significant changes  Radiology  CUS 02/20/23: The right internal carotid artery demonstrated 50-69% stenosis. The left internal carotid artery demonstrated 50-69% stenosis.  Bilateral vertebral arteries were patent with antegrade flow.  Radiology  C 02/15/2023: LM 0% stenosis mLAD proximal 70% ISR D1 ostial 50% stenosis D2 ostial 50% stenosis Lcx: previous stent; proximal Lcx  90% ISR; mid Lcx 30% stenosis OM1 proximal 80% stenosis RCA patent stents to ostium. Mid RCA proximal subsection- 60% stenosis; Mid RCA distal subsection 70& stenosis; distal RCA proximal subsection 100% stenosis. Fills left to right  Radiology  TTE 02/16/2023: Global LV SF is borderline normal. LVEF 45-50%. LA is mildly enlarged. Moderate calcification of the aortic valve is noted. Mild AS is present. Mild to moderate AR. Mild to moderate MAC is noted. Mild MR. Trace TR. PASP 40 mmHg. Optison used to enhance endocardial border.     Assessment/Plan:     82 y.o. WM admitted on 3/3/2023     Multivessel CAD  - s/p left heart catheterization with InStent restenosis and multivessel coronary disease on 02/15/2023  - s/p CABG x2 (lima to LAD, RSVG to OM with right GSV EVH) on 02/24/2023  - sternal precautions  - daily weights  - discontinued metoprolol tartrate 50 mg bid on 3/5  - continue   Coreg 12.5 mg bid (initiated on 3/6)  Lasix 40 mg  daily  Aspirin 81 mg daily  Plavix 75 mg daily   Atorvastatin 40 mg at night  Hydrocodone-acetaminophen 5-325 mg p.r.n. 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  - discontinued metoprolol tartrate 50 mg bid on 3/5  - continue   Coreg 12.5 mg bid (initiated on 3/6)  Hydralazine 10 mg every 2 hours as needed for BP > 160/90  Labetalol 10 mg every 2 hours as needed for BP > 160/90     HLD  - FLP at goal!!  - continue  Atorvastatin 40 mg at night  Zetia 10 mg daily      RLS  - stable  - continue  Gabapentin 100 mg t.i.d.  Ropinirole 1 mg b.i.d.     Bilateral RICKEY  - moderate bilateral  - continue  Atorvastatin 40 mg at night  Zetia 10 mg daily   Plavix 75 mg daily   - follow-up with cardiology outpatient     Constipation  - stable  - continue  Colace 100 mg b.i.d.     GERD  - Avoid spicy foods, and nothing to eat or drink within x2 hours of bedtime or laying flat (water is ok)   - Avoid NSAIDs (Advil, ibuprofen, naproxen...) and  poe-2 inhibitors (Mobic, Celebrex)    - continue  Protonix 40 mg daily   Carafate 1 g QID     Lupus anticoagulant inhibitor syndrome  - stable  - continue  Prednisone 2.5 mg daily   - follow-up with Hematology outpatient     Major depressive disorder  - in remission  - discontinue Fluoxetine 10 mg daily on 03/14  - continue  Cymbalta 30 mg daily (initiated 3/14)     Insomnia  - stable  - continue  Seroquel 50 mg at bedtime     BPH/urinary retention  - stable  - Urinary catheter replaced on 02/27   - Discontinue indwelling catheter on 03/07  - good urine output reported on 03/08  - continue   Proscar 5 mg daily (initiated 3/14)  Flomax 0.4 mg at bedtime      Normocytic anemia  - asymptomatic  - s/p transfusion  x2 units PRBC on 2/24/2023  x2 units PRBC on 2/23/2023  - H/H stable   - no evidence of active bleeds  - low iron levels  - continue  Ferrous sulfate 325 mg b.i.d. (initiated 3/4)  - will closely monitor and transfuse if needed     Acute hypoxic respiratory failure  - on 2 L nasal cannula  - chest x-ray stable  - continue  Lasix 40 mg daily    Parkinson's disease  - reportedly diagnosed in 2021  - not currently on medication    Back pain  - improved  - continue   Gabapentin 200 mg at bedtime (initiated 3/10)   Hydrocodone-acetaminophen 5-325 mg    Gabapentin 100 mg t.i.d.    Allergic rhinitis  - current  - continue   Zyrtec 10 mg daily (initiated 3/14)   Flonase 1 spray b.i.d. (initiated 3/14)    Asymptomatic bacteriuria  - UA significant for bacteria and nitrates  - initiate   Rocephin 1 g daily 3/15-present (stop date 3/18)      VTE Prophylaxis:  Lovenox 40 mg daily  COVID-19 testing:  Negative on 03/03  COVID-19 vaccination status:  Vaccinated x4     POA:  Yes (Romy, daughter and Nav, son)  Living will: yes  Contacts:  Giovana Navarrete (spouse) [phone]                      Romy (daughter) [phone]     CODE STATUS: Full  Internal Medicine (attending): Xander Enriquez MD  Physiatry  (consulting):  Dilip Hargrove MD     OUTPATIENT PROVIDERS  PCP:  Mariella Bethea MD   Hematology:  Missy Davila MD in [facility]  Cardiology: Curtis Loredo MD at [facility]   CV surgery:  Spencer Hagan MD  Nephrology:  Darinel Bedoya MD     DISPOSITION:  Vital signs at goal.  No labs today.  UA significant for bacteria and nitrates.  Initiate Rocephin 1 g daily x3 days.  Bowel management, sleep hygiene, and appetite at goal.  X-ray cervical spine significant for previous fusion, mild anterolisthesis of C4 on C5.  Continue to deescalate O2 requirements as tolerated.  Encourage IS every hour while awake.  Aggressively mobilize as tolerated.  Monitor closely.  Notify of any acute changes. Staffing below.    Staffing 3/14/2023: Incontinent of bladder and bowel. RT: Overall min to supervision.  Appetite is good.  PT: SBA to min assist with bed mobility. Stand by to contact with transfers. 300 feet with RW, independent with walking. Limited by activity intolerance. Needs breaks throughout the day. OT: Limited by weakness.  Overall min assist. ST: MBS passed, no straws with regular diet and thin liquids. Projected discharge 3/17.      Yaneth Douglas NP conducted independent physical examination and assisted with medical documentation.    Total time spent on this encounter including chart review and direct MD + NP 1-on-1 patient interaction: 50 minutes   Over 50% of this time was spent in counseling and coordination of care

## 2023-03-15 NOTE — PROGRESS NOTES
Subjective:  HPI: 83 yo WM with PMH of male, followed by Dr. Loredo who presented to [facility] ER with c/o chest pain. HS troponin 1149; therefore he was transferred to [facility] for cardiology services where he underwent LHC revealing multivessel disease and elevated LVEDP. He was started on diuretics. Plavix was placed on hold and he was transferred to [facility] for CABG evaluation on 2/21/23. Patient is reporting mild chest tightness 3/10 on arrival.  Vitals Stable. Shoulder Pain that morning. On Nitro & Heparin Infusion. EKG with no overt ischemic changes. Denies CP/SOB. Creatinine 1.93 mildly decreased from 2.07 day prior with addition of IVFs. H/H downtrending- 8.2/25.3 from 8.8/27.1 as well. Creatinine  further improved to 1.65. H/H 10.5/31.5 post transfusion 2  units PRBCs. Underwent  CABG x 2 on 2/24 by Dr Hagan, and brought to ICU on MV, requiring pressor support. Mediastinal drain patent. Received 2 units of PRBCs intraop.  CT x 2 patent. Pressors have been weaned off. On cleviprex drip. 2/26 Mediastinal drains have been removed. Creatinine has bumped. Nephrology adding IVF x 1L due to IVVD. O2 weaned to 2L/NC. Patient did not require O2 prior to hospitalization. On 3/3, labs showed elevated BUN/Cr of 77.7/ 1.80 with creatinine trending down. [place] catheter remained. Last BM on 2/26 charted. Patient is AAOx4; confusion at times with pain meds.  Participating with therapy. Functional status includes bed mobility and transfers requiring moderate to maximal assistance. Max assist needed for upper body dressing; total assist toileting due to medellin;  setup/supervision for eating; and minimal assist for grooming. Amb w/RW for 40ft at Min Assist. Patient was evaluated, accepted, and admitted to inpatient rehab to improve functional status. Transferred to [facility] on 3/3 without incident.   3/15: Seen with OT, seated in WC and  pea pots to plant carrot and radish seeds. OT reports SpO2 drop to 91% after  20 minutes on room air. Checked on finger probe and said SpO2 84%. Placed back on Oxygen 2L via NC, and maintaining SpO2 >92%. Tolerating therapy without current complaints. VSSAF with above mentioned. IM following.      Review of Systems  Depression/Anxiety: no complaints     FLUoxetine capsule 10 mg qd  Pain: right shoulder/scapula/upper back-neck, lower back  gabapentin capsule 100 mg TID  rOPINIRole tablet 1 mg BID     HYDROcodone-acetaminophen 5-325 mg 1 tab q4hr PRN pain  LIDOcaine 5 % patch 2 patches, q24hr, 0600, neck/back  predniSONE tablet 7.5 mg qd  Bowels/Bladder: last BM 3/14 x 2 following suppository.   Appetite: good  Sleep: good      QUEtiapine tablet 50 mg qHS    Physical Exam  General: well-developed, well-nourished, tremor at rest  Respiratory: equal chest rise,  SOB w/o O2, no audible wheeze, 1L O2 via NC  Cardiovascular: regular rate and rhythm, no edema  Gastrointestinal: soft, non-tender, non-distended   Musculoskeletal: full range of motion of all extremities/spine with generalized weakness  Integumentary: no rashes or skin lesions present, midline sternal origjadu-UHL-l/d/i, chest tube site x 2 -gyiaqh-ZCO-i/d/I, RLE and LLE harvest incision sites-LONDON-c/d/i, Right groin puncture site-LONDON-c/d/I, sacral/buttocks-reddened  Neurologic: cranial nerves intact, no signs of peripheral neurological deficit, motor/sensory function intact, resting tremor-BUE          Labs:   Latest Reference Range & Units 03/14/23 10:43   Color, UA Yellow, Light-Yellow, Dark Yellow, Virgen, Straw  Yellow   Appearance, UA Clear  Cloudy !   Specific Gravity,UA  1.015   pH, UA 5.0 - 8.5  5.5   Protein, UA Negative mg/dL 30 !   Glucose, UA Negative, Normal mg/dL Negative   Ketones, UA Negative mg/dL Negative   Occult Blood UA Negative unit/L Moderate !   NITRITE UA Negative  Positive !   Bilirubin, UA Negative mg/dL Negative   Urobilinogen, UA 0.2, 1.0, Normal mg/dL 0.2   Leukocytes, UA Negative unit/L Large !   RBC, UA  None Seen, 0-2, 3-5, 0-5 /HPF 0-2   WBC, UA None Seen, 0-2, 3-5, 0-5 /HPF 51-99 !   Bacteria, UA None Seen, Rare, Occasional /HPF Few !   Squam Epithel, UA None Seen, Rare, Occasional, Occ /HPF None Seen   !: Data is abnormal    Urine Culture, Routine 50,000-75,000 colonies/ml Gram-negative Rods Abnormal             Resulting Agency: [facility]           Specimen Collected: 03/14/23 10:43 Last Resulted: 03/15/23 07:54             Diagnostics:  XR SHOULDER COMPLETE 2 OR MORE VIEWS RIGHT  FINDINGS  Regional arthritic changes are noted, with complete loss of the subacromial joint space and high riding configuration of the humeral head suggestive of chronic rotator cuff injury.  Orthopedic anchor screws are incidentally appreciated at the humeral greater tuberosity.  No convincing acutely displaced fracture or dislocation is identified. There are no findings indicative of a joint effusion. No aggressive osseous lesion or periosteal reaction is appreciated.  The included soft tissues are without acute abnormality.  IMPRESSION  1. Advanced degenerative changes with findings suggestive of chronic rotator cuff injury.  2. No evidence of acute osseous abnormality.  Date:                                            03/13/2023  Time:                                           20:45    XR CERVICAL SPINE 2 OR 3 VIEWS  FINDINGS:  There is a previous fusion at C5-6 C6-7 and C7-T1 are not well visualized however definite abnormality is not seen there is a mild anterolisthesis of C4 on C5.  The odontoid is intact  Impression:  Limited visibility of the lower segments.  Previous fusion.  Mild anterolisthesis of C4 on C5.  Date:                                            03/14/2023  Time:                                           15:46    Assessment/Plan  Hospital   CAD (coronary artery disease)   S/P CABG x 2     Non-Hospital   Lupus anticoagulant inhibitor syndrome   Iron deficiency anemia   Elevated homocysteine   Low vitamin B12 level    Anemia   Angina, class III   Elevated partial thromboplastin time (PTT)   Coronary artery disease   Stage 3 chronic kidney disease   Hypercholesteremia   Bleeding   Altered mental status   Hypertension   Head injury   Dehydration   NSTEMI (non-ST elevated myocardial infarction)   Ischemic cardiomyopathy   GERD (gastroesophageal reflux disease)   Enlarged prostate   Aortic aneurysm, abdominal   Skin cancer       Wounds: midline sternal vmtnqhiw-QUN-q/d/i, chest tube site x 2 -ugevst-EUZ-u/d/I, RLE and LLE harvest incision sites-LONDON-c/d/i, Right groin puncture site-LONDON-c/d/I, sacral/buttocks-reddened  S/p CABG x 2 on 2/24 by Dr Hagan  Precautions:sternal   Bracing: cardiac bear for splinting  Swallowing: Liberalized to Regular Diet with no added salt. No straws ([facility] [place]  Function: Tolerating therapy. Continue PT/OT  VTE Prophylaxis:   enoxaparin injection 40 mg SubQ q24hr  Code Status: FULL CODE   Discharge:Lives with his spouse in [place] in a single-story home with no steps to enter the residence. Completed high school. He was in the [employer]. He is retired from electrical/refrigeration work.  Wife manages the patients medications and finances. She also cooks, cleans, does laundry, and grocery shops.  Children (3). Date 3/17 Friday.

## 2023-03-16 PROCEDURE — 97110 THERAPEUTIC EXERCISES: CPT

## 2023-03-16 PROCEDURE — 94761 N-INVAS EAR/PLS OXIMETRY MLT: CPT

## 2023-03-16 PROCEDURE — 92526 ORAL FUNCTION THERAPY: CPT

## 2023-03-16 PROCEDURE — 97116 GAIT TRAINING THERAPY: CPT | Mod: CQ

## 2023-03-16 PROCEDURE — 99233 PR SUBSEQUENT HOSPITAL CARE,LEVL III: ICD-10-PCS | Mod: ,,, | Performed by: NURSE PRACTITIONER

## 2023-03-16 PROCEDURE — 99233 SBSQ HOSP IP/OBS HIGH 50: CPT | Mod: ,,, | Performed by: NURSE PRACTITIONER

## 2023-03-16 PROCEDURE — 63600175 PHARM REV CODE 636 W HCPCS: Performed by: NURSE PRACTITIONER

## 2023-03-16 PROCEDURE — 97535 SELF CARE MNGMENT TRAINING: CPT

## 2023-03-16 PROCEDURE — 94640 AIRWAY INHALATION TREATMENT: CPT

## 2023-03-16 PROCEDURE — 25000003 PHARM REV CODE 250: Performed by: NURSE PRACTITIONER

## 2023-03-16 PROCEDURE — 25000242 PHARM REV CODE 250 ALT 637 W/ HCPCS: Performed by: NURSE PRACTITIONER

## 2023-03-16 PROCEDURE — 97530 THERAPEUTIC ACTIVITIES: CPT | Mod: CQ

## 2023-03-16 PROCEDURE — 97530 THERAPEUTIC ACTIVITIES: CPT

## 2023-03-16 PROCEDURE — 11800000 HC REHAB PRIVATE ROOM

## 2023-03-16 RX ORDER — FUROSEMIDE 10 MG/ML
20 INJECTION INTRAMUSCULAR; INTRAVENOUS ONCE
Status: COMPLETED | OUTPATIENT
Start: 2023-03-16 | End: 2023-03-16

## 2023-03-16 RX ADMIN — ROPINIROLE HYDROCHLORIDE 1 MG: 1 TABLET, FILM COATED ORAL at 08:03

## 2023-03-16 RX ADMIN — FERROUS SULFATE TAB 325 MG (65 MG ELEMENTAL FE) 1 EACH: 325 (65 FE) TAB at 08:03

## 2023-03-16 RX ADMIN — GABAPENTIN 100 MG: 100 CAPSULE ORAL at 10:03

## 2023-03-16 RX ADMIN — ATORVASTATIN CALCIUM 40 MG: 40 TABLET, FILM COATED ORAL at 08:03

## 2023-03-16 RX ADMIN — DULOXETINE 30 MG: 30 CAPSULE, DELAYED RELEASE ORAL at 08:03

## 2023-03-16 RX ADMIN — SUCRALFATE 1 G: 1 TABLET ORAL at 05:03

## 2023-03-16 RX ADMIN — FLUTICASONE PROPIONATE 100 MCG: 50 SPRAY, METERED NASAL at 08:03

## 2023-03-16 RX ADMIN — DOCUSATE SODIUM 100 MG: 100 CAPSULE, LIQUID FILLED ORAL at 08:03

## 2023-03-16 RX ADMIN — FUROSEMIDE 40 MG: 40 TABLET ORAL at 08:03

## 2023-03-16 RX ADMIN — SUCRALFATE 1 G: 1 TABLET ORAL at 09:03

## 2023-03-16 RX ADMIN — QUETIAPINE 50 MG: 25 TABLET ORAL at 08:03

## 2023-03-16 RX ADMIN — CLOPIDOGREL BISULFATE 75 MG: 75 TABLET ORAL at 08:03

## 2023-03-16 RX ADMIN — TIZANIDINE 4 MG: 4 TABLET ORAL at 01:03

## 2023-03-16 RX ADMIN — LIDOCAINE 2 PATCH: 50 PATCH CUTANEOUS at 05:03

## 2023-03-16 RX ADMIN — SUCRALFATE 1 G: 1 TABLET ORAL at 08:03

## 2023-03-16 RX ADMIN — HYDROCODONE BITARTRATE AND ACETAMINOPHEN 1 TABLET: 5; 325 TABLET ORAL at 04:03

## 2023-03-16 RX ADMIN — TAMSULOSIN HYDROCHLORIDE 0.4 MG: 0.4 CAPSULE ORAL at 08:03

## 2023-03-16 RX ADMIN — ASPIRIN 81 MG: 81 TABLET, COATED ORAL at 08:03

## 2023-03-16 RX ADMIN — EZETIMIBE 10 MG: 10 TABLET ORAL at 08:03

## 2023-03-16 RX ADMIN — CARVEDILOL 12.5 MG: 6.25 TABLET, FILM COATED ORAL at 08:03

## 2023-03-16 RX ADMIN — SUCRALFATE 1 G: 1 TABLET ORAL at 04:03

## 2023-03-16 RX ADMIN — GABAPENTIN 100 MG: 100 CAPSULE ORAL at 05:03

## 2023-03-16 RX ADMIN — PANTOPRAZOLE SODIUM 40 MG: 40 TABLET, DELAYED RELEASE ORAL at 08:03

## 2023-03-16 RX ADMIN — HYDROCODONE BITARTRATE AND ACETAMINOPHEN 1 TABLET: 5; 325 TABLET ORAL at 08:03

## 2023-03-16 RX ADMIN — CEFTRIAXONE SODIUM 1 G: 1 INJECTION, POWDER, FOR SOLUTION INTRAMUSCULAR; INTRAVENOUS at 09:03

## 2023-03-16 RX ADMIN — LEVALBUTEROL HYDROCHLORIDE 1.25 MG: 1.25 SOLUTION RESPIRATORY (INHALATION) at 07:03

## 2023-03-16 RX ADMIN — ENOXAPARIN SODIUM 40 MG: 40 INJECTION SUBCUTANEOUS at 04:03

## 2023-03-16 RX ADMIN — GABAPENTIN 100 MG: 100 CAPSULE ORAL at 12:03

## 2023-03-16 RX ADMIN — LEVALBUTEROL HYDROCHLORIDE 1.25 MG: 1.25 SOLUTION RESPIRATORY (INHALATION) at 03:03

## 2023-03-16 RX ADMIN — FUROSEMIDE 20 MG: 10 INJECTION, SOLUTION INTRAMUSCULAR; INTRAVENOUS at 08:03

## 2023-03-16 RX ADMIN — FINASTERIDE 5 MG: 5 TABLET, FILM COATED ORAL at 08:03

## 2023-03-16 RX ADMIN — PREDNISONE 7.5 MG: 2.5 TABLET ORAL at 08:03

## 2023-03-16 RX ADMIN — HYDROCODONE BITARTRATE AND ACETAMINOPHEN 1 TABLET: 5; 325 TABLET ORAL at 12:03

## 2023-03-16 RX ADMIN — GABAPENTIN 200 MG: 100 CAPSULE ORAL at 08:03

## 2023-03-16 RX ADMIN — CETIRIZINE HYDROCHLORIDE 10 MG: 10 TABLET, FILM COATED ORAL at 08:03

## 2023-03-16 NOTE — PT/OT/SLP PROGRESS
Occupational Therapy Inpatient Rehab Treatment    Name: Hunter Navarrete  MRN: [id number]    Assessment:  Hunter Navarrete is a 82 y.o. male admitted with a medical diagnosis of S/P CABG x 2.  He presents with the following impairments/functional limitations:  impaired endurance /pain.    General Precautions: Standard, fall, sternal     Orthopedic Precautions:Full weight bearing     Braces: N/A    Rehab Prognosis: Good; patient would benefit from acute skilled OT services to address these deficits and reach maximum level of function.      History:     Past Medical History:   Diagnosis Date    Acid reflux     Anemia     Aortic aneurysm, abdominal     Arthritis     Bronchitis     Cataract     Celiac artery stenosis     50% by CTA abdomen 7/27/2020    CKD (chronic kidney disease)     45% FUNCTION    Coronary artery disease     Encounter for blood transfusion     Enlarged prostate     GERD (gastroesophageal reflux disease)     Hemorrhoids     Hypercholesteremia     Hypertension     Iron deficiency anemia, unspecified     Leaky heart valve     Lupus anticoagulant disorder     Renal artery stenosis     by CTA 7/27/2020    Skin cancer     Unspecified chronic bronchitis     UTI (urinary tract infection)        Past Surgical History:   Procedure Laterality Date    ACF      BACK SURGERY      RODS IN BACK; 4 SURGIERIES    BONE MARROW BIOPSY      CARDIAC ANGIOGRAM WITH STENT      CATARACT SX Bilateral     CORONARY ANGIOGRAPHY N/A 2/15/2023    Procedure: ANGIOGRAM, CORONARY ARTERY;  Surgeon: Rito Vega MD;  Location: [facility] CATH;  Service: Cardiology;  Laterality: N/A;    CORONARY ARTERY BYPASS GRAFT (CABG) N/A 2/24/2023    Procedure: CORONARY ARTERY BYPASS GRAFT (CABG);  Surgeon: Spencer Hagan MD;  Location: [facility];  Service: Cardiothoracic;  Laterality: N/A;  CABG / EVH //   ECHO NOTIFIED    HIP SURGERY Right     THR    KNEE SURGERY Right     ATS    LEFT HEART CATHETERIZATION Left 07/22/2021    Procedure: Left heart cath;   "Surgeon: Curtis Loredo MD;  Location: [facility] CATH;  Service: Cardiology;  Laterality: Left;    LEFT HEART CATHETERIZATION Left 09/22/2022    Procedure: Left heart cath;  Surgeon: Giorgi Barker MD;  Location: [facility] CATH;  Service: Cardiology;  Laterality: Left;    SHOULDER SURGERY Bilateral     SPINAL CORD STIMULATOR IMPLANT         Subjective     Orientation: Oriented x4    Chief Complaint: "My R shoulder is hurting "    Patient/Family Comments/goals: I'm glad to be going home"     Vitals   Vitals at Rest  /65   HR 83   O2 Sat 93 RA    Pain 5/10     Vitals With Activity  /70   HR 94   O2 Sat 93 RA   Pain 5/10     Respiratory Status: Room air  Pt.'s O2 sats monitored intermittantly throughout Tx after standing/AROM of B UE's. O2 dropped to 87% but c V/C's to perform PLB increased sats to 93%.     Patients cultural, spiritual, Samaritan conflicts given the current situation: no       Objective:     Patient found supine with bed alarm  upon OT entry to room.    Mobility   Patient completed:  Sit to Stand Transfer with stand by assistance with rolling walker  Stand to Sit Transfer with stand by assistance with rolling walker    Functional Mobility  Pt. Performed dynamic sitting at EOB and donned shoes c LHSH c v/c's.     ADLs   Current Status   Eating     Oral Hygiene     Shower, Bathe Self 5   Upper Body Dressing 4   Lower Body Dressing 5   Toileting Hygiene 4   Toilet Transfer 5   Putting On, Taking Off Footwear 4     Limiting Factors for ADLs: pain         Therapeutic Exercise  Pt. In standing performed R shoulder abd/duction to grasp and toss balls through frontal plane c minimal momentum. Pt. Tolerated several reps and also AROM of L UE shoulder flexion/extension c weighted balls. Pt. Tolerated 6 min static/lateral shift standing then 5 min. O2 sats monitored c noted decrease after standing but recovery once returned to w/c.       LifeStyle Change and Education:           Patient left up in chair with " call button in reach and chair alarm on.     Education provided: ADLs, transfer training, assistive device, sequencing, fall prevention, and post-op precautions    Multidisciplinary Problems       Occupational Therapy Goals          Problem: Occupational Therapy    Goal Priority Disciplines Outcome Interventions   Occupational Therapy Goal     OT, PT/OT Ongoing, Progressing    Description: ADLs: Pt will be SPV with ADLs by d/c.   STG: by reeval  Pt to perform UB dressing with partial A MET  Pt to perform LB dressing with Mod A using AD MET  Pt to perform putting on/off footwear task with Mod A using AD MET  Pt to perform toileting with Mod A using AD as needed MET  Pt to perform bathing with Mod A MET    Functional Transfers:  Pt to perform toilet transfers with Touch A using BSC over toilet. MET  Pt to perform a tub transfer with TTB requiring Mod A  MET    Balance, Strengthening, Endurance, Balance:  Pt to consistently demonstrate adherence to orthopedic precautions during all ADL's as instructed by OT. MET  Pt to demonstrate fair dynamic standing balance as required to perform ADL's from standing level. MET  Pt to demonstrate consistent adherence to breathing control and energy conservation techniques as educated by OT. MET                       Time Tracking     OT Received On: 03/16/23  Time In 1100     Time Out 1130  Total Time 30 min  Therapy Time: OT Individual: 30  Missed Time:  0  Missed Time Reason:      Billable Minutes: Self Care/Home Management 10 and Therapeutic Exercise 20    03/16/2023

## 2023-03-16 NOTE — PLAN OF CARE
PHQ-9 completed (score=7 mild).  Decreased from time of admission.    Of note is that pt's oxygen sat has occasionally dropped to mid-high 80s (yesterday) but better today.  Discussed with MANAN Mena, who feels pt needs to remain off of oxygen today and be monitored frequently by staff.  He does not feel home oxygen will be necessary.  Will continue to monitor for this need.

## 2023-03-16 NOTE — PT/OT/SLP PROGRESS
Speech Language Pathology Department  Dysphagia Therapy Progress Note    Patient Name:  Hunter Navarrete   MRN:  [id number]  Admitting Diagnosis: Multivessel CAD s/p left heart catheterization     Recommendations:     General recommendations:  dysphagia therapy  Diet recommendations:  Regular Diet - IDDSI Level 7, Liquid Diet Level: Thin liquids - IDDSI Level 0 , No straws  Aspiration precautions: small bites/sips and slow rate    Discharge recommendations:  home health speech therapy   Barriers to safe discharge:  severity of impairment    Subjective     Patient awake, alert, and oriented x4 .  Pain/Comfort:  0  Spiritual/Cultural/Yazdanism Beliefs/Practices that affect care: no      Objective:     Oral Musculature Evaluation:  Dentition: upper dentures, lower dentures  Secretion Management: adequate  Mucosal Quality: adequate  Mandibular Strength and Mobility: WFL  Oral Labial Strength and Mobility: WFL  Lingual Strength and Mobility: WFL    Therapeutic Activities:  Pt completed base of tongue and laryngeal x100 with minimal cues.  Throat x4 observed with trials of thin liquid via straw. SLP cueing pt for small sip trials.     Assessment:     Pt continues to present with dysphagia requiring diet modification the reduce the risk of aspiration.     Goals:   Multidisciplinary Problems       SLP Goals          Problem: SLP    Goal Priority Disciplines Outcome   SLP Goal     SLP Ongoing, Progressing   Description: LT. Pt will tolerate least restrictive diet with no overt s/sx of aspiration.     ST. Pt will complete MBS for comprehensive swallow evaluation.--GOAL MET 3/13/23  2. Pt will tolerate 4 oz of thin liquids via straw with no overt s/sx of aspiration.   3. Pt will tolerate tongue base retraction and laryngeal strengthening exercises with minimal cues.                     Patient Education:     Patient provided with verbal education regarding POC.  Understanding was verbalized.    Plan:     Will continue  to follow and tx as appropriate.    SLP Follow-Up:  Yes   Patient to be seen:  5 x/week   Plan of Care expires:  03/20/23  Plan of Care reviewed with:  patient       Time Tracking:     SLP Treatment Date:   03/16/23  Speech Start Time:  0730  Speech Stop Time:  0800     Speech Total Time (min):  30 min    Billable minutes:  Treatment of Swallow Dysfunction, 30        03/16/2023

## 2023-03-16 NOTE — PROGRESS NOTES
Subjective:  HPI: 81 yo WM with PMH of male, followed by Dr. Loredo who presented to [place] ER with c/o chest pain. HS troponin 1149; therefore he was transferred to [facility] for cardiology services where he underwent LHC revealing multivessel disease and elevated LVEDP. He was started on diuretics. Plavix was placed on hold and he was transferred to [facility] for CABG evaluation on 2/21/23. Patient is reporting mild chest tightness 3/10 on arrival.  Vitals Stable. Shoulder Pain that morning. On Nitro & Heparin Infusion. EKG with no overt ischemic changes. Denies CP/SOB. Creatinine 1.93 mildly decreased from 2.07 day prior with addition of IVFs. H/H downtrending- 8.2/25.3 from 8.8/27.1 as well. Creatinine  further improved to 1.65. H/H 10.5/31.5 post transfusion 2  units PRBCs. Underwent  CABG x 2 on 2/24 by Dr Hagan, and brought to ICU on MV, requiring pressor support. Mediastinal drain patent. Received 2 units of PRBCs intraop.  CT x 2 patent. Pressors have been weaned off. On cleviprex drip. 2/26 Mediastinal drains have been removed. Creatinine has bumped. Nephrology adding IVF x 1L due to IVVD. O2 weaned to 2L/NC. Patient did not require O2 prior to hospitalization. On 3/3, labs showed elevated BUN/Cr of 77.7/ 1.80 with creatinine trending down. [place] catheter remained. Last BM on 2/26 charted. Patient is AAOx4; confusion at times with pain meds.  Participating with therapy. Functional status includes bed mobility and transfers requiring moderate to maximal assistance. Max assist needed for upper body dressing; total assist toileting due to medellin;  setup/supervision for eating; and minimal assist for grooming. Amb w/RW for 40ft at Min Assist. Patient was evaluated, accepted, and admitted to inpatient rehab to improve functional status. Transferred to [facility] on 3/3 without incident.   3/16: Seen in patient room, seated in WC with nursing administering morning medication. Patient coughing after choking on  his water. ST working with patient for dysphagia. MBS cleared for thin liquids. Trials of therapy on Room Air to validate need for home Oxygen. VSSAF with elevated SBP and SpO2 drop to 87% noted this morning. IM following.     Review of Systems  Depression/Anxiety: no complaints     FLUoxetine capsule 10 mg qd  Pain: right shoulder/scapula/upper back-neck, lower back  gabapentin capsule 100 mg TID  rOPINIRole tablet 1 mg BID     HYDROcodone-acetaminophen 5-325 mg 1 tab q4hr PRN pain  LIDOcaine 5 % patch 2 patches, q24hr, 0600, neck/back  predniSONE tablet 7.5 mg qd  Bowels/Bladder: last BM 3/14 x 2 following suppository.   Appetite: good  Sleep: good      QUEtiapine tablet 50 mg qHS    Physical Exam  General: well-developed, well-nourished, tremor at rest  Respiratory: equal chest rise,  SOB w/o O2, no audible wheeze, 1L O2 via NC  Cardiovascular: regular rate and rhythm, no edema  Gastrointestinal: soft, non-tender, non-distended   Musculoskeletal: full range of motion of all extremities/spine with generalized weakness  Integumentary: no rashes or skin lesions present, midline sternal zplctoyt-TBF-t/d/i, chest tube site x 2 -gtqrkw-BRE-t/d/I, RLE and LLE harvest incision sites-LONDON-c/d/i, Right groin puncture site-LONDON-c/d/I, sacral/buttocks-reddened  Neurologic: cranial nerves intact, no signs of peripheral neurological deficit, motor/sensory function intact, resting tremor-BUE          Labs:   Latest Reference Range & Units 03/14/23 10:43   Color, UA Yellow, Light-Yellow, Dark Yellow, Virgen, Straw  Yellow   Appearance, UA Clear  Cloudy !   Specific Gravity,UA  1.015   pH, UA 5.0 - 8.5  5.5   Protein, UA Negative mg/dL 30 !   Glucose, UA Negative, Normal mg/dL Negative   Ketones, UA Negative mg/dL Negative   Occult Blood UA Negative unit/L Moderate !   NITRITE UA Negative  Positive !   Bilirubin, UA Negative mg/dL Negative   Urobilinogen, UA 0.2, 1.0, Normal mg/dL 0.2   Leukocytes, UA Negative unit/L Large !   RBC,  UA None Seen, 0-2, 3-5, 0-5 /HPF 0-2   WBC, UA None Seen, 0-2, 3-5, 0-5 /HPF 51-99 !   Bacteria, UA None Seen, Rare, Occasional /HPF Few !   Squam Epithel, UA None Seen, Rare, Occasional, Occ /HPF None Seen   !: Data is abnormal    Urine Culture, Routine 50,000-75,000 colonies/ml Klebsiella pneumoniae ssp pneumoniae Abnormal             Resulting Agency: [facility]     Susceptibility     Klebsiella pneumoniae ssp pneumoniae     Not Specified (Preliminary)     Amox/K Clav <=2  Sensitive     Ampicillin >=32  Resistant     Cefepime <=0.12  Sensitive     Ceftriaxone <=0.25  Sensitive     Cefuroxime 4  Sensitive     Ciprofloxacin <=0.25  Sensitive     Gentamicin <=1  Sensitive     Levofloxacin <=0.12  Sensitive     Meropenem <=0.25  Sensitive     Nitrofurantoin 64  Intermediate     Piperacillin/Tazobactam 8  Sensitive     Tetracycline <=1  Sensitive     Tobramycin <=1  Sensitive     Trimethoprim/Sulfamethoxazole <=20  Sensitive               Linear View         Specimen Collected: 03/14/23 10:43 Last Resulted: 03/16/23 07:22                           Assessment/Plan  Hospital   CAD (coronary artery disease)   S/P CABG x 2     Non-Hospital   Lupus anticoagulant inhibitor syndrome   Iron deficiency anemia   Elevated homocysteine   Low vitamin B12 level   Anemia   Angina, class III   Elevated partial thromboplastin time (PTT)   Coronary artery disease   Stage 3 chronic kidney disease   Hypercholesteremia   Bleeding   Altered mental status   Hypertension   Head injury   Dehydration   NSTEMI (non-ST elevated myocardial infarction)   Ischemic cardiomyopathy   GERD (gastroesophageal reflux disease)   Enlarged prostate   Aortic aneurysm, abdominal   Skin cancer       Wounds: midline sternal xohztwme-ZSF-g/d/i, chest tube site x 2 -jrpnev-PLQ-x/d/I, RLE and LLE harvest incision sites-LONDON-c/d/i, Right groin puncture site-LONDON-c/d/I, sacral/buttocks-reddened  S/p CABG x 2 on 2/24 by Dr Hagan  Precautions:sternal   Bracing: cardiac  bear for splinting  Swallowing: Liberalized to Regular Diet with no added salt. [place] [place]  Function: Tolerating therapy. Continue PT/OT  VTE Prophylaxis:   enoxaparin injection 40 mg SubQ q24hr  Code Status: FULL CODE   Discharge:Lives with his spouse in [place] in a single-story home with no steps to enter the residence. Completed high school. He was in the [employer]. He is retired from electrical/refrigeration work.  Wife manages the patients medications and finances. She also cooks, cleans, does laundry, and grocery shops.  Children (3). Date 3/17 Friday.

## 2023-03-16 NOTE — PLAN OF CARE
Problem: Occupational Therapy  Goal: Occupational Therapy Goal  Description: ADLs: Pt will be SPV with ADLs by d/c.   STG: by reeval  Pt to perform UB dressing with partial A MET  Pt to perform LB dressing with Mod A using AD MET  Pt to perform putting on/off footwear task with Mod A using AD MET  Pt to perform toileting with Mod A using AD as needed MET  Pt to perform bathing with Mod A MET    Functional Transfers:  Pt to perform toilet transfers with Touch A using BSC over toilet. MET  Pt to perform a tub transfer with TTB requiring Mod A  MET    Balance, Strengthening, Endurance, Balance:  Pt to consistently demonstrate adherence to orthopedic precautions during all ADL's as instructed by OT. MET  Pt to demonstrate fair dynamic standing balance as required to perform ADL's from standing level. MET  Pt to demonstrate consistent adherence to breathing control and energy conservation techniques as educated by OT. MET  Outcome: Ongoing, Progressing

## 2023-03-16 NOTE — PROGRESS NOTES
03/16/23 1100   Rec Therapy Time Calculation   Date of Treatment 03/16/23   Rec Start Time 1100   Rec Stop Time 1130   Rec Total Time (min) 30 min   Time   Treatment time 2 units   Charges   $Therapeutic Exercise 1 unit   $Therapeutic Activity 1unit   $ Self Care/Home Management Training 1 unit   Precautions   General Precautions fall;sternal   Orthopedic Precautions  Full weight bearing   Pain/Comfort   Pain Rating 1 5/10   Location - Side 1 Right   Location 1 shoulder   Pain Addressed 1 Reposition   Vital Signs   Pulse 83   SpO2 (!) 93 %   Pulse Oximetry Type Intermittent   Device (Oxygen Therapy) room air   /65   OTHER   Rehab identified problem list/impairments impaired endurance   Values/Beliefs/Spiritual Care   Spiritual, Cultural Beliefs, Bahai Practices, Values that Affect Care no   Overall Level of Functioning   Activity Tolerance Standby Assist   Dynamic Sitting Balance/Reaching Does not occur   Dynamic Standing Balance/Reaching Standby Assist   Right UE Coodination/Dexterity Independent   Left UE Coordination/Dexterity Independent   Problem Solving/Sequencing Skills Independent   Memory Recall Independent   R/L Neglect/Inattention Does not occur   Attention Span Independent   Social Interaction Independent   Recreational Therapy Short Term Goals   Short Term Goal 1 Progression Met   Short Term Goal 2 Progression Met   Recreational Therapy Long Term Goals   Long Term Goal 1 Progression Met   Long Term Goal 2 Progression Met   Plan   Patient to be seen Daily   Planned Duration Other (Comment)  (1 day)   Treatments Planned Energy conservation training   Treatment plan/goals estblished with Patient/Caregiver Yes

## 2023-03-16 NOTE — PT/OT/SLP PROGRESS
Occupational Therapy Inpatient Rehab Treatment    Name: Hunter Navarrete  MRN: [id number]    Assessment:  Hunter Navarrete is a 82 y.o. male admitted with a medical diagnosis of S/P CABG x 2.  He presents with the following impairments/functional limitations:  pain, impaired fine motor, decreased upper extremity function, impaired endurance, impaired self care skills .    General Precautions: Standard, fall, sternal     Orthopedic Precautions:Full weight bearing     Braces: N/A    Rehab Prognosis: Good; patient would benefit from acute skilled OT services to address these deficits and reach maximum level of function.      History:     Past Medical History:   Diagnosis Date    Acid reflux     Anemia     Aortic aneurysm, abdominal     Arthritis     Bronchitis     Cataract     Celiac artery stenosis     50% by CTA abdomen 7/27/2020    CKD (chronic kidney disease)     45% FUNCTION    Coronary artery disease     Encounter for blood transfusion     Enlarged prostate     GERD (gastroesophageal reflux disease)     Hemorrhoids     Hypercholesteremia     Hypertension     Iron deficiency anemia, unspecified     Leaky heart valve     Lupus anticoagulant disorder     Renal artery stenosis     by CTA 7/27/2020    Skin cancer     Unspecified chronic bronchitis     UTI (urinary tract infection)        Past Surgical History:   Procedure Laterality Date    ACF      BACK SURGERY      RODS IN BACK; 4 SURGIERIES    BONE MARROW BIOPSY      CARDIAC ANGIOGRAM WITH STENT      CATARACT SX Bilateral     CORONARY ANGIOGRAPHY N/A 2/15/2023    Procedure: ANGIOGRAM, CORONARY ARTERY;  Surgeon: Rito Vega MD;  [facility];  Service: Cardiology;  Laterality: N/A;    CORONARY ARTERY BYPASS GRAFT (CABG) N/A 2/24/2023    Procedure: CORONARY ARTERY BYPASS GRAFT (CABG);  Surgeon: Spencer Hagan MD;  Location: [facility] OR;  Service: Cardiothoracic;  Laterality: N/A;  CABG / EVH //   ECHO NOTIFIED    HIP SURGERY Right     THR    KNEE SURGERY Right   "   ATS    LEFT HEART CATHETERIZATION Left 07/22/2021    Procedure: Left heart cath;  Surgeon: Curtis Loredo MD;  Location: [facility] CATH;  Service: Cardiology;  Laterality: Left;    LEFT HEART CATHETERIZATION Left 09/22/2022    Procedure: Left heart cath;  Surgeon: Giorgi Barker MD;  Location: [facility] CATH;  Service: Cardiology;  Laterality: Left;    SHOULDER SURGERY Bilateral     SPINAL CORD STIMULATOR IMPLANT         Subjective     Orientation: Oriented x4    Chief Complaint: "I am in excruciating pain"     Patient/Family Comments/goals: Pt. Improved LB DSG and donning socks/shoes via AE. Pt. "Wants to go home"     Vitals   Vitals at Rest  /71   HR 98   O2 Sat 93 RA   Pain 7/10     Vitals With Activity  /62   HR 92   O2 Sat 91 RA   Pain 10/10     Respiratory Status: Room air    Patients cultural, spiritual, Roman Catholic conflicts given the current situation: no       Objective:     Patient found up in chair  upon OT entry to room.    Mobility   Patient completed:  Sit to Stand Transfer with contact guard assistance with rolling walker  Stand to Sit Transfer with contact guard assistance with rolling walker  Toilet Transfer Step Transfer technique with stand by assistance with  rolling walker and bedside commode  Tub Transfer Stand Pivot technique with stand by assistance with rolling walker and TTB    Functional Mobility  Pt. Ambulated in room functional distances via RW and SBA. Pt.'s wife stated that Pt. Only uses rollator when out in the community. Pt. Unable to reach counter with rollator.     ADLs   Current Status   Eating     Oral Hygiene     Shower, Bathe Self 5   Upper Body Dressing 4   Lower Body Dressing 5   Toileting Hygiene 4   Toilet Transfer 5   Putting On, Taking Off Footwear 4     Limiting Factors for ADLs: motor, endurance, limited ROM, weakness, and pain           LifeStyle Change and Education:      Pt. C rest breaks but persisted c AE to perform dressing tasks. Though it took longer to don " briefs, pants, socks and shoes; Pt. Was successful and quite pleased with progress.         Patient left supine with call button in reach and bed alarm on.     Education provided: ADLs, transfer training, assistive device, and post-op precautions    Multidisciplinary Problems       Occupational Therapy Goals          Problem: Occupational Therapy    Goal Priority Disciplines Outcome Interventions   Occupational Therapy Goal     OT, PT/OT Ongoing, Progressing    Description: ADLs: Pt will be SPV with ADLs by d/c.   STG: by reeval  Pt to perform UB dressing with partial A MET  Pt to perform LB dressing with Mod A using AD MET  Pt to perform putting on/off footwear task with Mod A using AD MET  Pt to perform toileting with Mod A using AD as needed MET  Pt to perform bathing with Mod A MET    Functional Transfers:  Pt to perform toilet transfers with Touch A using BSC over toilet. MET  Pt to perform a tub transfer with TTB requiring Mod A  MET    Balance, Strengthening, Endurance, Balance:  Pt to consistently demonstrate adherence to orthopedic precautions during all ADL's as instructed by OT. MET  Pt to demonstrate fair dynamic standing balance as required to perform ADL's from standing level. MET  Pt to demonstrate consistent adherence to breathing control and energy conservation techniques as educated by OT. MET                       Time Tracking     OT Received On: 03/16/23  Time In 0800     Time Out 0930  Total Time 90 min  Therapy Time: OT Individual: 90  Missed Time:  0  Missed Time Reason:      Billable Minutes: Self Care/Home Management 90    03/16/2023

## 2023-03-16 NOTE — PT/OT/SLP DISCHARGE
Recreational Therapy Discharge      Date of Treatment: 03/16/23  Start Time: 1100  Stop Time: 1130  Total Time: 30 min  Missed Time:    Assessment      Hunter Navarrete is a 82 y.o. male admitted with a medical diagnosis of S/P CABG x 2.  He presents with the following impairments/functional limitations:  weakness, impaired endurance, impaired functional mobility, gait instability, decreased upper extremity function, decreased lower extremity function, decreased safety awareness, pain .    Rehab Diagnosis:     Recent Surgery: * No surgery found *      General Precautions: Standard, fall, sternal     Orthopedic Precautions:Full weight bearing     Braces: N/A    Rehab Prognosis: Good; patient would benefit from acute skilled Recreational Therapy services to address these deficits and reach maximum level of function.      Impairments: Endurance deficits, Mobility deficits, and Strength deficits  Rehab Potential: Good  Treatment Recommendations: Complete discharge plan  Treatment Diagnosis: Severe cornary artery disease, s/p CABG x2, CAD, HTN, HLD, CKD, mild AS, lupus anticoagulant inhibitor syndrome  Orientation: Oriented x4  Affect/Behavior: Appropriate and Cooperative  Safety/Judgement: intact   Basic Command Following: intact  Spiritual Cultural: no        History     Past Medical History:   Diagnosis Date    Acid reflux     Anemia     Aortic aneurysm, abdominal     Arthritis     Bronchitis     Cataract     Celiac artery stenosis     50% by CTA abdomen 7/27/2020    CKD (chronic kidney disease)     45% FUNCTION    Coronary artery disease     Encounter for blood transfusion     Enlarged prostate     GERD (gastroesophageal reflux disease)     Hemorrhoids     Hypercholesteremia     Hypertension     Iron deficiency anemia, unspecified     Leaky heart valve     Lupus anticoagulant disorder     Renal artery stenosis     by CTA 7/27/2020    Skin cancer     Unspecified chronic bronchitis     UTI (urinary tract infection)         Past Surgical History:   Procedure Laterality Date    ACF      BACK SURGERY      RODS IN BACK; 4 SURGIERIES    BONE MARROW BIOPSY      CARDIAC ANGIOGRAM WITH STENT      CATARACT SX Bilateral     CORONARY ANGIOGRAPHY N/A 2/15/2023    Procedure: ANGIOGRAM, CORONARY ARTERY;  Surgeon: Rito Vega MD;  Location: [facility] CATH;  Service: Cardiology;  Laterality: N/A;    CORONARY ARTERY BYPASS GRAFT (CABG) N/A 2/24/2023    Procedure: CORONARY ARTERY BYPASS GRAFT (CABG);  Surgeon: Spencer Hagan MD;  Location: [facility] OR;  Service: Cardiothoracic;  Laterality: N/A;  CABG / EVH //   ECHO NOTIFIED    HIP SURGERY Right     THR    KNEE SURGERY Right     ATS    LEFT HEART CATHETERIZATION Left 07/22/2021    Procedure: Left heart cath;  Surgeon: Curtis Loredo MD;  Location: [facility] CATH;  Service: Cardiology;  Laterality: Left;    LEFT HEART CATHETERIZATION Left 09/22/2022    Procedure: Left heart cath;  Surgeon: Giorgi Barker MD;  Location: [facility] CATH;  Service: Cardiology;  Laterality: Left;    SHOULDER SURGERY Bilateral     SPINAL CORD STIMULATOR IMPLANT         Home Environment     Admit Date: 03/03/23  Living Situation  People in Home: spouse  Lives in: house  Patients Responsibilities: , Leisure/play/hobbies, Retired  Number of Children: 3  Occupation:Retired:  Electrical/refrigeration work    Instrumental Activities of Daily Living     Previous Hand Dominance: Right Current Hand Dominance: Right     Other iADL Information:        Cognitive Skills Building         Cognitive Observation Activity Assist Position Equipment Response            Comment:      Dynamic Activities      Activity Assist Position Equipment Response   Activity 1 Bocce ball supervision Standing Rolling walker and Bocce balls good   Comment: Sit to stand was supervision as was dynamic standing balance/reaching. Standing tolerance was 4 minutes. UE coordination was setup as was sequencing skills and problem solving skills       Fine Motor  "Activities      Activity Assist Position Equipment Response           Comment:        Goals     Patient Goals  Patient Goal 1: "Get out of bed."    Short Term Goals    Goal  Goal Status   Will increase sit to stand to supervision Met   Will improve dynamic standing balance/reaching to supervision Met                 Long Term Goals    Goal Goal Status   Will increase standing tolerance to 5,10 minutes Met   Will improve dynamic standing balance/reaching to setup Met                     Plan       Patient to be seen: Daily  Duration: Other (Comment) (1 day)  Treatments planned: Energy conservation training  Treatment plan/goals established with Patient/Caregiver: Yes     "

## 2023-03-16 NOTE — PROGRESS NOTES
[facility] ([facility])  Rehab Progress Note    Patient Name: Hunter Navarrete  MRN: [id number]  Age: 82 y.o. Sex: male  : 1940  Hospital Length of Stay: 13 days  Date of Service: 3/16/2023   Chief Complaint: Multivessel CAD s/p left heart catheterization with InStent restenosis and multivessel coronary disease on 02/15/2023 s/p CABG x2 (lima to LAD, RSVG to OM with right GSV EVH) on 2023    Subjective:     Basic Information  Admit Information: 82-year-old WM presented to [facility] on 02/15 with complaints of substernal chest pain with shortness of breath.  PMH significant for CAD with PCI, CKD, hypertension, mild aortic stenosis, lupus anticoagulant inhibitor syndrome, and hyperlipidemia.  Workup significant for elevated troponin.  Transferred to [facility] for cardiology evaluation.  Initiated heparin drip.  Tolerated left heart catheterization on 02/15 significant for multivessel coronary disease with InStent restenosis.  Hematology recommended transfer to tertiary facility for CABG 2/2 history of lupus anticoagulant.  Tolerated transfer to [facility] on .  Initiated Tridil drip and titrated for chest pain.  Required 2 units PRBC transfusion on .  On  tolerated CABG x2 (lima to LAD, RSVG to OM with right GSV EVH) without perioperative complications.  Postoperatively required IV fluid gentle hydration per Nephrology 2/2 CKD stage IIIB.  Chest tubes removed.  Continued with postoperative DANIELLA on CKD.  Nephrology will continue to follow.  Discontinue Lasix IV on  and initiated home dose of Lasix 40 mg daily.  Initiated Plavix on . Tolerated transfer to [facility] inpatient rehab unit on 3/3 without incident.  Today's Information:  No acute events overnight.  Sitting up in bed.  Reports good sleep and appetite.  Last BM 3/14.  Vital signs at goal with no recorded fever.  Urine cultures significant for GNR.  Currently on Rocephin.  Still requiring 2 L  nasal cannula.  Reportedly desats with activity.  No new labs today.  Weight 83 kg standing weight-trending down    Review of patient's allergies indicates:   Allergen Reactions    Cardura [doxazosin] Hives    Lyrica [pregabalin] Other (See Comments)    Paxil [paroxetine hcl] Other (See Comments)    Restoril [temazepam] Hallucinations    Vioxx [rofecoxib] Swelling    Zithromax [azithromycin] Hives        Current Facility-Administered Medications:     aspirin EC tablet 81 mg, 81 mg, Oral, Daily, Yaenth Douglas, FNP, 81 mg at 03/15/23 0802    atorvastatin tablet 40 mg, 40 mg, Oral, QHS, Yaneth Douglas, FNP, 40 mg at 03/15/23 2013    benzocaine 10 % mucosal gel, , Mouth/Throat, QID PRN, Denilson Medina, DO    benzonatate capsule 100 mg, 100 mg, Oral, TID PRN, Yaneth Douglas, FNP, 100 mg at 03/06/23 2336    bisacodyL suppository 10 mg, 10 mg, Rectal, Daily PRN, Yaneth Douglas, FNP, 10 mg at 03/03/23 1909    bisacodyL suppository 10 mg, 10 mg, Rectal, Once, Yaneth Douglas FNP    carvediloL tablet 12.5 mg, 12.5 mg, Oral, BID, Suman LOPEZ Mary Ann, FNP, 12.5 mg at 03/15/23 2013    cefTRIAXone (ROCEPHIN) 1 g in LIDOcaine HCL 10 mg/ml (1%) 4 mL IM only syringe, 1 g, Intramuscular, Q24H, Suman Reese, FNP    cetirizine tablet 10 mg, 10 mg, Oral, QHS, Usha Smithte, FNP, 10 mg at 03/15/23 2012    clopidogreL tablet 75 mg, 75 mg, Oral, Daily, Yaneth Douglas, FNP, 75 mg at 03/15/23 0802    docusate sodium capsule 100 mg, 100 mg, Oral, BID, Yaneth Douglas, FNP, 100 mg at 03/15/23 2012    DULoxetine DR capsule 30 mg, 30 mg, Oral, Daily, Usha Smithte, FNP, 30 mg at 03/15/23 0801    enoxaparin injection 40 mg, 40 mg, Subcutaneous, Daily, SHIRLEY Haider, 40 mg at 03/15/23 1613    ezetimibe tablet 10 mg, 10 mg, Oral, Daily, SHIRLEY Haider, 10 mg at 03/15/23 0801    famotidine tablet 20 mg, 20 mg, Oral, Daily PRN, Xander Enriquez MD    ferrous sulfate tablet 1 each, 1 tablet, Oral, BID, Yaneth LANGFORD  SHIRLEY Douglas, 1 each at 03/15/23 2012    finasteride tablet 5 mg, 5 mg, Oral, Daily, SHIRLEY Haider, 5 mg at 03/15/23 0802    fluticasone propionate 50 mcg/actuation nasal spray 100 mcg, 2 spray, Each Nostril, BID, SHIRLEY Haider, 100 mcg at 03/15/23 2013    furosemide tablet 40 mg, 40 mg, Oral, Daily, MAGDA HaiderP, 40 mg at 03/15/23 0801    gabapentin capsule 100 mg, 100 mg, Oral, TID, MAGDA HaiderP, 100 mg at 03/15/23 2013    gabapentin capsule 200 mg, 200 mg, Oral, QHS, MAGDA WilcoxP, 200 mg at 03/15/23 2012    hydrALAZINE injection 10 mg, 10 mg, Intravenous, Q6H PRN, MAGDA HaiderP, 10 mg at 03/05/23 1456    HYDROcodone-acetaminophen 5-325 mg per tablet 1 tablet, 1 tablet, Oral, Q4H PRN, MAGDA HaiderP, 1 tablet at 03/15/23 2018    hydrOXYzine pamoate capsule 50 mg, 50 mg, Oral, Nightly PRN, SHIRLEY Haider, 50 mg at 03/15/23 2018    labetalol 20 mg/4 mL (5 mg/mL) IV syring, 10 mg, Intravenous, Q4H PRN, MAGDA HaiderP    levalbuterol nebulizer solution 1.25 mg, 1.25 mg, Nebulization, Q8H, SHIRLEY Haider, 1.25 mg at 03/15/23 1673    LIDOcaine 5 % patch 2 patch, 2 patch, Transdermal, Q24H, SHIRLEY Barber, 2 patch at 03/15/23 0519    meclizine tablet 25 mg, 25 mg, Oral, TID PRN, SHIRLEY Haider    metoprolol injection 10 mg, 10 mg, Intravenous, Q2H PRN, Yaneth Douglas, FNP    ondansetron disintegrating tablet 4 mg, 4 mg, Oral, Q6H PRN, MAGDA HaiderP    pantoprazole EC tablet 40 mg, 40 mg, Oral, Daily, SHIRLEY Haider, 40 mg at 03/15/23 0801    predniSONE tablet 7.5 mg, 7.5 mg, Oral, Daily, Usha Mcgee, MAGDAP, 7.5 mg at 03/15/23 0801    QUEtiapine tablet 50 mg, 50 mg, Oral, QHS, SHRILEY Haider, 50 mg at 03/15/23 2012    rOPINIRole tablet 1 mg, 1 mg, Oral, BID, SHIRLEY Haider, 1 mg at 03/15/23 2012    sucralfate tablet 1 g, 1 g, Oral, QID (AC & HS), SHIRLEY Haider, 1 g at 03/15/23 2013     "tamsulosin 24 hr capsule 0.4 mg, 0.4 mg, Oral, QHS, Yanethleni Douglas, FNP, 0.4 mg at 03/15/23 2012    tiZANidine tablet 4 mg, 4 mg, Oral, TID PRN, Usha LOPEZ Arely, FNP, 4 mg at 03/14/23 2027     Review of Systems   Complete 12-point review of symptoms negative except for what's mentioned in HPI     Objective:     /63   Pulse 93   Temp 98.2 °F (36.8 °C)   Resp 18   Ht 5' 10.98" (1.803 m)   Wt 83.7 kg (184 lb 8.4 oz)   SpO2 95%   BMI 25.75 kg/m²      Physical Exam  Vitals reviewed.   Eyes:      Pupils: Pupils are equal, round, and reactive to light.   Cardiovascular:      Rate and Rhythm: Normal rate and regular rhythm.      Heart sounds: Murmur heard.   Systolic murmur is present with a grade of 3/6.   Pulmonary:      Effort: Pulmonary effort is normal.      Breath sounds: Normal breath sounds.      Comments: 2 L nasal cannula  Abdominal:      General: Bowel sounds are normal.   Musculoskeletal:         General: Normal range of motion.   Skin:     General: Skin is warm.      Comments: Midline sternal incision open air dry and intact    Neurological:      Mental Status: He is alert and oriented to person, place, and time.      Motor: Weakness present.      Comments: Resting tremors, shuffling gait   Psychiatric:         Mood and Affect: Mood normal.   *MD performed and documented physical examination       Lines/Drains/Airways       None               Labs  No results found for this or any previous visit (from the past 24 hour(s)).      Radiology  CXR one view on 03/09/2023 at 8:36 a.m., IMPRESSION:  No significant changes  Radiology  CUS 02/20/23: The right internal carotid artery demonstrated 50-69% stenosis. The left internal carotid artery demonstrated 50-69% stenosis.  Bilateral vertebral arteries were patent with antegrade flow.  Radiology  [facility] 02/15/2023: LM 0% stenosis mLAD proximal 70% ISR D1 ostial 50% stenosis D2 ostial 50% stenosis Lcx: previous stent; proximal Lcx  90% ISR; mid Lcx 30% " stenosis OM1 proximal 80% stenosis RCA patent stents to ostium. Mid RCA proximal subsection- 60% stenosis; Mid RCA distal subsection 70& stenosis; distal RCA proximal subsection 100% stenosis. Fills left to right  Radiology  TTE 02/16/2023: Global LV SF is borderline normal. LVEF 45-50%. LA is mildly enlarged. Moderate calcification of the aortic valve is noted. Mild AS is present. Mild to moderate AR. Mild to moderate MAC is noted. Mild MR. Trace TR. PASP 40 mmHg. Optison used to enhance endocardial border.     Assessment/Plan:     82 y.o. WM admitted on 3/3/2023     Multivessel CAD  - s/p left heart catheterization with InStent restenosis and multivessel coronary disease on 02/15/2023  - s/p CABG x2 (lima to LAD, RSVG to OM with right GSV EVH) on 02/24/2023  - sternal precautions  - daily weights  - discontinued metoprolol tartrate 50 mg bid on 3/5  - continue   Coreg 12.5 mg bid (initiated on 3/6)  Lasix 40 mg daily  Aspirin 81 mg daily  Plavix 75 mg daily   Atorvastatin 40 mg at night  Hydrocodone-acetaminophen 5-325 mg p.r.n. 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  - discontinued metoprolol tartrate 50 mg bid on 3/5  - continue   Coreg 12.5 mg bid (initiated on 3/6)  Hydralazine 10 mg every 2 hours as needed for BP > 160/90  Labetalol 10 mg every 2 hours as needed for BP > 160/90     HLD  - FLP at goal!!  - continue  Atorvastatin 40 mg at night  Zetia 10 mg daily      RLS  - stable  - continue  Gabapentin 100 mg t.i.d.  Ropinirole 1 mg b.i.d.     Bilateral RICKEY  - moderate bilateral  - continue  Atorvastatin 40 mg at night  Zetia 10 mg daily   Plavix 75 mg daily   - follow-up with cardiology outpatient     Constipation  - stable  - continue  Colace 100 mg b.i.d.     GERD  - Avoid spicy foods, and nothing to eat or drink within x2 hours of bedtime or laying flat (water is ok)   - Avoid NSAIDs (Advil, ibuprofen, naproxen...) and poe-2 inhibitors (Mobic, Celebrex)    - continue  Protonix 40 mg daily   Carafate 1 g QID     Lupus anticoagulant inhibitor syndrome  - stable  - continue  Prednisone 2.5 mg daily   - follow-up with Hematology outpatient     Major depressive disorder  - in remission  - discontinue Fluoxetine 10 mg daily on 03/14  - continue  Cymbalta 30 mg daily (initiated 3/14)     Insomnia  - stable  - continue  Seroquel 50 mg at bedtime     BPH  - stable  - Urinary catheter replaced on 02/27   - Discontinue indwelling catheter on 03/07  - good urine output reported on 03/08  - continue   Proscar 5 mg daily (initiated 3/14)  Flomax 0.4 mg at bedtime      Normocytic anemia  - asymptomatic  - s/p transfusion  x2 units PRBC on 2/24/2023  x2 units PRBC on 2/23/2023  - H/H stable   - no evidence of active bleeds  - low iron levels  - continue  Ferrous sulfate 325 mg b.i.d. (initiated 3/4)  - will closely monitor and transfuse if needed     Acute hypoxic respiratory failure  - on 2 L nasal cannula  - chest x-ray stable  - continue  Lasix  40 mg daily    Parkinson's disease  - reportedly diagnosed in 2021  - not currently on medication    Back pain  - improved  - continue   Gabapentin 200 mg at bedtime (initiated 3/10)   Hydrocodone-acetaminophen 5-325 mg    Gabapentin 100 mg t.i.d.    Allergic rhinitis  - current  - continue   Zyrtec 10 mg daily (initiated 3/14)   Flonase 1 spray b.i.d. (initiated 3/14)    Asymptomatic bacteriuria  - urine culture significant for GNR  - continue   Rocephin 1 g daily 3/15-present (stop date 3/18)      VTE Prophylaxis:  Lovenox 40 mg daily  COVID-19 testing:  Negative on 03/03  COVID-19 vaccination status:  Vaccinated x4     POA:  Yes (Romy, daughter and Nav, son)  Living will: yes  Contacts:  Giovana Navarrete (spouse) [phone]                      Romy (daughter) [phone]     CODE STATUS: Full  Internal Medicine (attending): Xander Enriquez MD  Physiatry (consulting):  Dilip Hargrove MD     OUTPATIENT PROVIDERS  PCP:  Mariella Bethea MD   Hematology:  Missy Davila MD in [facility]  Cardiology: Curtis Loredo MD at [facility]   CV surgery:  Spencer Hagan MD  Nephrology:  Darinel Bedoya MD     DISPOSITION:  Sleep hygiene, bowel maintenance, and appetite at goal.  Last BM 3/14.  Vital signs at goal with no recorded fever.  No new labs or imaging today.  Urine culture significant for [id number] CFU GNR.  Currently on Rocephin with completion date on 03/18.  Continue weaning nasal cannula 2 L.  Weight is trending down.  Initiate Lasix 20 mg IVP x1 dose now.  Continue aggressive IS while awake.  Monitor closely.  Notify of acute changes.    Staffing 3/14/2023: Incontinent of bladder and bowel. RT: Overall min to supervision.  Appetite is good.  PT: SBA to min assist with bed mobility. Stand by to contact with transfers. 300 feet with RW, independent with walking. Limited by activity intolerance. Needs breaks throughout the day. OT: Limited by weakness.  Overall min assist. ST: MBS passed, no straws with regular diet  and thin liquids. Projected discharge 3/17.      Rajesh Reese NP conducted independent physical examination and assisted with medical documentation.

## 2023-03-16 NOTE — PT/OT/SLP PROGRESS
Physical Therapy Inpatient Rehab Treatment    Patient Name:  Hunter Navarrete   MRN:  [id number]    Recommendations:     Discharge Recommendations:  nursing facility, skilled   Discharge Equipment Recommendations: walker, rolling   Barriers to discharge: Decreased caregiver support    Assessment:     Hunter Navarrete is a 82 y.o. male admitted with a medical diagnosis of S/P CABG x 2.  He presents with the following impairments/functional limitations:  weakness, impaired endurance, impaired self care skills, impaired functional mobility, impaired balance, decreased lower extremity function, pain, decreased ROM, impaired coordination .    Rehab Diagnosis: s/p CABG    Recent Surgery: * No surgery found *      General Precautions: Standard, fall, sternal     Orthopedic Precautions:N/A     Braces: N/A    Rehab Prognosis: Fair; patient would benefit from acute skilled PT services to address these deficits and reach maximum level of function.      History:     Past Medical History:   Diagnosis Date    Acid reflux     Anemia     Aortic aneurysm, abdominal     Arthritis     Bronchitis     Cataract     Celiac artery stenosis     50% by CTA abdomen 7/27/2020    CKD (chronic kidney disease)     45% FUNCTION    Coronary artery disease     Encounter for blood transfusion     Enlarged prostate     GERD (gastroesophageal reflux disease)     Hemorrhoids     Hypercholesteremia     Hypertension     Iron deficiency anemia, unspecified     Leaky heart valve     Lupus anticoagulant disorder     Renal artery stenosis     by CTA 7/27/2020    Skin cancer     Unspecified chronic bronchitis     UTI (urinary tract infection)        Past Surgical History:   Procedure Laterality Date    ACF      BACK SURGERY      RODS IN BACK; 4 SURGIERIES    BONE MARROW BIOPSY      CARDIAC ANGIOGRAM WITH STENT      CATARACT SX Bilateral     CORONARY ANGIOGRAPHY N/A 2/15/2023    Procedure: ANGIOGRAM, CORONARY ARTERY;  Surgeon: Rito Vega MD;  Location: [facility]  CATH;  Service: Cardiology;  Laterality: N/A;    CORONARY ARTERY BYPASS GRAFT (CABG) N/A 2/24/2023    Procedure: CORONARY ARTERY BYPASS GRAFT (CABG);  Surgeon: Spencer Hagan MD;  Location: [facility];  Service: Cardiothoracic;  Laterality: N/A;  CABG / EVH //   ECHO NOTIFIED    HIP SURGERY Right     THR    KNEE SURGERY Right     ATS    LEFT HEART CATHETERIZATION Left 07/22/2021    Procedure: Left heart cath;  Surgeon: Curtis Loredo MD;  [facility];  Service: Cardiology;  Laterality: Left;    LEFT HEART CATHETERIZATION Left 09/22/2022    Procedure: Left heart cath;  Surgeon: Giorgi Barker MD;  [facility];  Service: Cardiology;  Laterality: Left;    SHOULDER SURGERY Bilateral     SPINAL CORD STIMULATOR IMPLANT         Subjective     Chief Complaint: back pain     Respiratory Status: Room air    Patients cultural, spiritual, Methodist conflicts given the current situation: no      Objective:     Communicated with nursing  prior to session.  Patient found HOB elevated with Other (comments) (pt in bed upon arrival to room)  upon PT entry to room.    Pt is Oriented x3 and Alert, Cooperative, and Agitated.    Vitals   Vitals at Rest  /65   HR 88   O2 Sat    Pain      Vitals With Activity  BP    HR    O2 Sat    Pain      O2 sats   with pt in supine on room air no activity 94% upon arrival to room   02 sats with ambulation on room air 89%   Pt returned to EOB within 30sec o2 sats 94% on room air   Functional Mobility:   Bed Mobility:     Supine to Sit: independent with increased time   Sit to Supine: independent with increased time   Transfers:     Sit to Stand: Set-up or clean-up assistance  with rolling walker  Gait: Pt ambulates 165ft  with RW with Independent.  Increased time slow avani 2 trials      Current   Status  Discharge   Goal   Functional Area: Care Score:    Roll Left and Right 6 Supervision or touching assistance   Sit to Lying 6 Supervision or touching assistance   Lying to  Sitting on Side of Bed 6 Supervision or touching assistance   Sit to Stand   Supervision or touching assistance   Chair/Bed-to-Chair Transfer   Supervision or touching assistance   Car Transfer   Supervision or touching assistance   Walk 10 Feet   Supervision or touching assistance   Walk 50 Feet with Two Turns   Supervision or touching assistance   Walk 150 Feet   Supervision or touching assistance   Walk 10 Feet Uneven Surface   Supervision or touching assistance   1 Step (Curb)   Supervision or touching assistance   4 Steps   Supervision or touching assistance   12 Steps   Not applicable   Picking Up Object   Supervision or touching assistance   Wheel 50 Feet with Two Turns   Supervision or touching assistance   Wheel 150 Feet   Supervision or touching assistance       Therapeutic Activities and Exercises:  Pt performed supine HEP 2 sets 15reps with 1 1/2 # BLE rest breaks required secondary to back pain     Activity Tolerance: Good    Patient left HOB elevated with call button in reach, bed alarm on, and pt george tx well pt required increased time for decrease assist with sternal pxs maintained requiring few vc for some activities pt request returned to bed after tx secondary to back pain  .    Education provided: transfer training, bed mob, gait training, assistive device, strengthening exercises, and sternal pxs     Expected compliance: High compliance    GOALS:   Multidisciplinary Problems       Physical Therapy Goals          Problem: Physical Therapy    Goal Priority Disciplines Outcome Goal Variances Interventions   Physical Therapy Goal     PT, PT/OT Ongoing, Progressing     Description: Pt will improve functional mobility by performing:     Bed Mobility   MET - Roll Right and Left Partial/ Moderate Assistance .  MET - Lying to sitting Partial/ Moderate Assistance .  MET - Sitting to lying Partial/ Moderate Assistance .  Supine to sit transfer with supervision/touching assist.    Transfers    MET - Pt will  be able to perform Stand step chair/bed to chair transfer With RW Partial/ Moderate Assistance .  MET - Pt will be able to perform Sit to stand with RW Partial/ Moderate Assistance .  MET - Pt will be able to perform Car transfer with RW Partial/ Moderate Assistance .  Sit to stand transfer independently using Rollator   Bed to chair transfer independently using Rollator   Car transfer independently using Rollator     Ambulation    MET - Pt will ambulate 50 Feet with RW Partial/ Moderate Assistance  Ambulate 350 feet independently using Rollator   Ascend/descend a 4 inch curb independently using Rollator   Ascend/descend 12 stairs independently using bilateral handrails  Ambulate 15 feet on uneven surfaces/ramps independently using Rollator     Wheelchair Mobility   D/C - Pt will be able to propel 150 feet in steven wheelchair Partial/ Moderate Assistance                        Plan:     During this hospitalization, patient to be seen 5 x/week to address the identified rehab impairments via neuromuscular re-education, wheelchair management/training, therapeutic exercises, therapeutic activities, gait training and progress toward the following goals:    Plan of Care Expires:  03/10/23  PT Next Visit Date: 03/16/23  Plan of Care reviewed with: patient    Additional Information:         Time Tracking:     Therapy Time  PT Received On: 03/16/23  PT Start Time: 1300  PT Stop Time: 1400  PT Total Time (min): 60 min   PT Individual: 60  Missed Time:    Time Missed due to:      Billable Minutes: Gait Training 30min and Therapeutic Exercise 30min    03/16/2023

## 2023-03-16 NOTE — PLAN OF CARE
Problem: Rehabilitation (IRF) Plan of Care  Goal: Plan of Care Review  Outcome: Ongoing, Progressing  Flowsheets (Taken 3/15/2023 2203)  Plan of Care Reviewed With: patient  Goal: Patient-Specific Goal (Individualized)  Outcome: Ongoing, Progressing  Flowsheets (Taken 3/15/2023 2203)  Anxieties, Fears or Concerns: Return of strength  Individualized Care Needs: shortness of breath requiring use of oxygen  Goal: Absence of New-Onset Illness or Injury  Outcome: Ongoing, Progressing  Goal: Optimal Comfort and Wellbeing  Outcome: Ongoing, Progressing  Goal: Readiness for Transition of Care  Outcome: Ongoing, Progressing     Problem: Rehabilitation (IRF) Plan of Care  Goal: Absence of New-Onset Illness or Injury  Outcome: Ongoing, Progressing     Problem: Rehabilitation (IRF) Plan of Care  Goal: Optimal Comfort and Wellbeing  Outcome: Ongoing, Progressing     Problem: Rehabilitation (IRF) Plan of Care  Goal: Readiness for Transition of Care  Outcome: Ongoing, Progressing     Problem: Infection  Goal: Absence of Infection Signs and Symptoms  Outcome: Ongoing, Progressing     Problem: Impaired Wound Healing  Goal: Optimal Wound Healing  Outcome: Ongoing, Progressing     Problem: Fall Injury Risk  Goal: Absence of Fall and Fall-Related Injury  Outcome: Ongoing, Progressing  Intervention: Identify and Manage Contributors  Flowsheets (Taken 3/15/2023 2203)  Self-Care Promotion: independence encouraged  Medication Review/Management: medications reviewed     Problem: Skin Injury Risk Increased  Goal: Skin Health and Integrity  Outcome: Ongoing, Progressing

## 2023-03-17 VITALS
BODY MASS INDEX: 25.83 KG/M2 | RESPIRATION RATE: 18 BRPM | WEIGHT: 184.5 LBS | HEART RATE: 95 BPM | DIASTOLIC BLOOD PRESSURE: 74 MMHG | OXYGEN SATURATION: 93 % | SYSTOLIC BLOOD PRESSURE: 160 MMHG | TEMPERATURE: 98 F | HEIGHT: 71 IN

## 2023-03-17 PROCEDURE — 94761 N-INVAS EAR/PLS OXIMETRY MLT: CPT

## 2023-03-17 PROCEDURE — 25000003 PHARM REV CODE 250: Performed by: NURSE PRACTITIONER

## 2023-03-17 PROCEDURE — 25000242 PHARM REV CODE 250 ALT 637 W/ HCPCS: Performed by: NURSE PRACTITIONER

## 2023-03-17 PROCEDURE — 63600175 PHARM REV CODE 636 W HCPCS: Performed by: NURSE PRACTITIONER

## 2023-03-17 PROCEDURE — 94640 AIRWAY INHALATION TREATMENT: CPT

## 2023-03-17 PROCEDURE — 97535 SELF CARE MNGMENT TRAINING: CPT

## 2023-03-17 RX ORDER — QUETIAPINE FUMARATE 50 MG/1
50 TABLET, FILM COATED ORAL NIGHTLY
Qty: 30 TABLET | Refills: 0 | Status: ON HOLD | OUTPATIENT
Start: 2023-03-17 | End: 2023-06-27 | Stop reason: HOSPADM

## 2023-03-17 RX ORDER — GABAPENTIN 100 MG/1
100 CAPSULE ORAL 3 TIMES DAILY
Qty: 90 CAPSULE | Refills: 0 | Status: ON HOLD | OUTPATIENT
Start: 2023-03-17 | End: 2023-06-27 | Stop reason: HOSPADM

## 2023-03-17 RX ORDER — CLOPIDOGREL BISULFATE 75 MG/1
75 TABLET ORAL DAILY
Qty: 30 TABLET | Refills: 2 | Status: SHIPPED | OUTPATIENT
Start: 2023-03-17 | End: 2023-10-16

## 2023-03-17 RX ORDER — ASPIRIN 81 MG/1
81 TABLET ORAL DAILY
Qty: 90 TABLET | Refills: 0 | Status: ON HOLD | OUTPATIENT
Start: 2023-03-17 | End: 2023-07-20

## 2023-03-17 RX ORDER — PREDNISONE 2.5 MG/1
7.5 TABLET ORAL DAILY
Qty: 90 TABLET | Refills: 0 | Status: SHIPPED | OUTPATIENT
Start: 2023-03-17 | End: 2023-04-16

## 2023-03-17 RX ORDER — CARVEDILOL 12.5 MG/1
12.5 TABLET ORAL 2 TIMES DAILY
Qty: 60 TABLET | Refills: 2 | Status: SHIPPED | OUTPATIENT
Start: 2023-03-17 | End: 2023-10-16

## 2023-03-17 RX ORDER — FUROSEMIDE 40 MG/1
40 TABLET ORAL DAILY
Qty: 90 TABLET | Refills: 0 | Status: ON HOLD | OUTPATIENT
Start: 2023-03-17 | End: 2023-07-24 | Stop reason: HOSPADM

## 2023-03-17 RX ORDER — TAMSULOSIN HYDROCHLORIDE 0.4 MG/1
0.4 CAPSULE ORAL NIGHTLY
Qty: 30 CAPSULE | Refills: 0 | Status: ON HOLD | OUTPATIENT
Start: 2023-03-17 | End: 2023-07-20

## 2023-03-17 RX ORDER — FLUTICASONE PROPIONATE 50 MCG
2 SPRAY, SUSPENSION (ML) NASAL 2 TIMES DAILY
Qty: 16 G | Refills: 2 | Status: SHIPPED | OUTPATIENT
Start: 2023-03-17 | End: 2023-06-16

## 2023-03-17 RX ORDER — ATORVASTATIN CALCIUM 40 MG/1
40 TABLET, FILM COATED ORAL DAILY
Qty: 90 TABLET | Refills: 0 | Status: SHIPPED | OUTPATIENT
Start: 2023-03-17 | End: 2023-09-11

## 2023-03-17 RX ORDER — EZETIMIBE 10 MG/1
10 TABLET ORAL DAILY
Qty: 90 TABLET | Refills: 0 | Status: SHIPPED | OUTPATIENT
Start: 2023-03-17 | End: 2023-09-11

## 2023-03-17 RX ORDER — DULOXETIN HYDROCHLORIDE 30 MG/1
30 CAPSULE, DELAYED RELEASE ORAL DAILY
Qty: 30 CAPSULE | Refills: 2 | Status: SHIPPED | OUTPATIENT
Start: 2023-03-17 | End: 2023-12-28

## 2023-03-17 RX ORDER — ROPINIROLE 1 MG/1
1 TABLET, FILM COATED ORAL 2 TIMES DAILY
Qty: 60 TABLET | Refills: 0 | Status: SHIPPED | OUTPATIENT
Start: 2023-03-17 | End: 2023-12-28

## 2023-03-17 RX ORDER — HYDROCODONE BITARTRATE AND ACETAMINOPHEN 5; 325 MG/1; MG/1
1 TABLET ORAL EVERY 6 HOURS PRN
Qty: 12 TABLET | Refills: 0 | Status: SHIPPED | OUTPATIENT
Start: 2023-03-17 | End: 2023-03-20

## 2023-03-17 RX ORDER — DOCUSATE SODIUM 100 MG/1
100 CAPSULE, LIQUID FILLED ORAL 2 TIMES DAILY
Qty: 20 CAPSULE | Refills: 0 | Status: SHIPPED | OUTPATIENT
Start: 2023-03-17 | End: 2023-03-27

## 2023-03-17 RX ORDER — FINASTERIDE 5 MG/1
5 TABLET, FILM COATED ORAL DAILY
Qty: 30 TABLET | Refills: 2 | Status: ON HOLD | OUTPATIENT
Start: 2023-03-17 | End: 2023-07-20

## 2023-03-17 RX ADMIN — CLOPIDOGREL BISULFATE 75 MG: 75 TABLET ORAL at 08:03

## 2023-03-17 RX ADMIN — LEVALBUTEROL HYDROCHLORIDE 1.25 MG: 1.25 SOLUTION RESPIRATORY (INHALATION) at 08:03

## 2023-03-17 RX ADMIN — ROPINIROLE HYDROCHLORIDE 1 MG: 1 TABLET, FILM COATED ORAL at 08:03

## 2023-03-17 RX ADMIN — CARVEDILOL 12.5 MG: 6.25 TABLET, FILM COATED ORAL at 06:03

## 2023-03-17 RX ADMIN — FINASTERIDE 5 MG: 5 TABLET, FILM COATED ORAL at 08:03

## 2023-03-17 RX ADMIN — TIZANIDINE 4 MG: 4 TABLET ORAL at 08:03

## 2023-03-17 RX ADMIN — ASPIRIN 81 MG: 81 TABLET, COATED ORAL at 08:03

## 2023-03-17 RX ADMIN — PREDNISONE 7.5 MG: 2.5 TABLET ORAL at 08:03

## 2023-03-17 RX ADMIN — DOCUSATE SODIUM 100 MG: 100 CAPSULE, LIQUID FILLED ORAL at 08:03

## 2023-03-17 RX ADMIN — LEVALBUTEROL HYDROCHLORIDE 1.25 MG: 1.25 SOLUTION RESPIRATORY (INHALATION) at 12:03

## 2023-03-17 RX ADMIN — EZETIMIBE 10 MG: 10 TABLET ORAL at 08:03

## 2023-03-17 RX ADMIN — DULOXETINE 30 MG: 30 CAPSULE, DELAYED RELEASE ORAL at 08:03

## 2023-03-17 RX ADMIN — SUCRALFATE 1 G: 1 TABLET ORAL at 05:03

## 2023-03-17 RX ADMIN — PANTOPRAZOLE SODIUM 40 MG: 40 TABLET, DELAYED RELEASE ORAL at 08:03

## 2023-03-17 RX ADMIN — GABAPENTIN 100 MG: 100 CAPSULE ORAL at 05:03

## 2023-03-17 RX ADMIN — FUROSEMIDE 40 MG: 40 TABLET ORAL at 08:03

## 2023-03-17 RX ADMIN — FERROUS SULFATE TAB 325 MG (65 MG ELEMENTAL FE) 1 EACH: 325 (65 FE) TAB at 08:03

## 2023-03-17 NOTE — PT/OT/SLP PROGRESS
Physical Therapy Inpatient Rehab Treatment    Patient Name:  Hunter Navarrete   MRN:  [id number]    Recommendations:     Discharge Recommendations:  nursing facility, skilled   Discharge Equipment Recommendations: walker, rolling   Barriers to discharge: None    Assessment:     Hunter Navarrete is a 82 y.o. male admitted with a medical diagnosis of S/P CABG x 2.  He presents with the following impairments/functional limitations:  weakness, impaired endurance, impaired self care skills, impaired functional mobility, decreased safety awareness, pain, impaired cardiopulmonary response to activity, other (comment) (sternal precautions) .    Rehab Diagnosis: s/p CABG    Recent Surgery: * No surgery found *      General Precautions: Standard, fall, sternal     Orthopedic Precautions:N/A     Braces: N/A    Rehab Prognosis: Good; patient would benefit from acute skilled PT services to address these deficits and reach maximum level of function.      History:     Past Medical History:   Diagnosis Date    Acid reflux     Anemia     Aortic aneurysm, abdominal     Arthritis     Bronchitis     Cataract     Celiac artery stenosis     50% by CTA abdomen 7/27/2020    CKD (chronic kidney disease)     45% FUNCTION    Coronary artery disease     Encounter for blood transfusion     Enlarged prostate     GERD (gastroesophageal reflux disease)     Hemorrhoids     Hypercholesteremia     Hypertension     Iron deficiency anemia, unspecified     Leaky heart valve     Lupus anticoagulant disorder     Renal artery stenosis     by CTA 7/27/2020    Skin cancer     Unspecified chronic bronchitis     UTI (urinary tract infection)        Past Surgical History:   Procedure Laterality Date    ACF      BACK SURGERY      RODS IN BACK; 4 SURGIERIES    BONE MARROW BIOPSY      CARDIAC ANGIOGRAM WITH STENT      CATARACT SX Bilateral     CORONARY ANGIOGRAPHY N/A 2/15/2023    Procedure: ANGIOGRAM, CORONARY ARTERY;  Surgeon: Rito Vega MD;  Location: [facility]  CATH;  Service: Cardiology;  Laterality: N/A;    CORONARY ARTERY BYPASS GRAFT (CABG) N/A 2/24/2023    Procedure: CORONARY ARTERY BYPASS GRAFT (CABG);  Surgeon: Spencer Hagan MD;  Location: [facility];  Service: Cardiothoracic;  Laterality: N/A;  CABG / EVH //   ECHO NOTIFIED    HIP SURGERY Right     THR    KNEE SURGERY Right     ATS    LEFT HEART CATHETERIZATION Left 07/22/2021    Procedure: Left heart cath;  Surgeon: Curtis Loredo MD;  Location: [facility] CATH;  Service: Cardiology;  Laterality: Left;    LEFT HEART CATHETERIZATION Left 09/22/2022    Procedure: Left heart cath;  Surgeon: Giorgi Barker MD;  Location: [facility] CATH;  Service: Cardiology;  Laterality: Left;    SHOULDER SURGERY Bilateral     SPINAL CORD STIMULATOR IMPLANT         Subjective     Chief Complaint: Lower back and R shoulder pain    Respiratory Status: Room air    Patients cultural, spiritual, Mu-ism conflicts given the current situation: no      Objective:     Communicated with pt prior to session.  Patient found up in chair with    upon PT entry to room.    Pt is Oriented x3 and Alert, Cooperative, and Motivated.    Vitals   7/10 R shoulder pain  5/10 lower back pain      Functional Mobility:      Current   Status  Discharge   Goal   Functional Area: Care Score:    Roll Left and Right   Supervision or touching assistance   Sit to Lying   Supervision or touching assistance   Lying to Sitting on Side of Bed   Supervision or touching assistance   Sit to Stand 6  With rollator Supervision or touching assistance   Chair/Bed-to-Chair Transfer 6  With rollator Supervision or touching assistance   Car Transfer   Supervision or touching assistance   Walk 10 Feet   Supervision or touching assistance   Walk 50 Feet with Two Turns   Supervision or touching assistance   Walk 150 Feet   Supervision or touching assistance   Walk 10 Feet Uneven Surface   Supervision or touching assistance   1 Step (Curb)   Supervision or touching assistance   4 Steps    Supervision or touching assistance   12 Steps   Not applicable   Picking Up Object   Supervision or touching assistance   Wheel 50 Feet with Two Turns   Supervision or touching assistance   Wheel 150 Feet   Supervision or touching assistance       Therapeutic Activities and Exercises:  Toilet t/f: setup/clean up assist    Activity Tolerance: Fair, limited by pain    Patient left up in chair with call button in reach.    Education provided: roles and goals of PT/PTA, transfer training, safety awareness, body mechanics, assistive device, fall prevention, and sternal precautions and HEP    Expected compliance: High compliance    GOALS:   Multidisciplinary Problems       Physical Therapy Goals          Problem: Physical Therapy    Goal Priority Disciplines Outcome Goal Variances Interventions   Physical Therapy Goal     PT, PT/OT Ongoing, Progressing     Description: Pt will improve functional mobility by performing:     Bed Mobility   MET - Roll Right and Left Partial/ Moderate Assistance .  MET - Lying to sitting Partial/ Moderate Assistance .  MET - Sitting to lying Partial/ Moderate Assistance .  Supine to sit transfer with supervision/touching assist.    Transfers    MET - Pt will be able to perform Stand step chair/bed to chair transfer With RW Partial/ Moderate Assistance .  MET - Pt will be able to perform Sit to stand with RW Partial/ Moderate Assistance .  MET - Pt will be able to perform Car transfer with RW Partial/ Moderate Assistance .  Sit to stand transfer independently using Rollator   Bed to chair transfer independently using Rollator   Car transfer independently using Rollator     Ambulation    MET - Pt will ambulate 50 Feet with RW Partial/ Moderate Assistance  Ambulate 350 feet independently using Rollator   Ascend/descend a 4 inch curb independently using Rollator   Ascend/descend 12 stairs independently using bilateral handrails  Ambulate 15 feet on uneven surfaces/ramps independently using  Rollator     Wheelchair Mobility   D/C - Pt will be able to propel 150 feet in steven wheelchair Partial/ Moderate Assistance                        Plan:     During this hospitalization, patient to be seen 5 x/week to address the identified rehab impairments via neuromuscular re-education, wheelchair management/training, therapeutic exercises, therapeutic activities, gait training and progress toward the following goals:    Plan of Care Expires:  03/10/23  PT Next Visit Date: 03/16/23  Plan of Care reviewed with: patient, spouse    Additional Information:     Last visit completed, all questions/concerns addressed as appropriate. Pt and pts wife verbalized feeling ready for d/c to home today.    Time Tracking:     Therapy Time  PT Start Time: 0830  PT Stop Time: 0845  PT Total Time (min): 15 min   PT Individual: 15  Missed Time:    Time Missed due to:      Billable Minutes: Self Care/Home Training 15 min    03/17/2023

## 2023-03-17 NOTE — PT/OT/SLP DISCHARGE
Occupational Therapy Discharge Summary    Hunter Navarrete  MRN: [id number]   Principal Problem: S/P CABG x 2      Patient Discharged from acute Occupational Therapy on 03/17/2023.  Please refer to prior OT note dated 03/16/2023 for functional status.    Assessment:      Patient appropriate for care in another setting.    Objective:     GOALS:   Multidisciplinary Problems       Occupational Therapy Goals          Problem: Occupational Therapy    Goal Priority Disciplines Outcome Interventions   Occupational Therapy Goal     OT, PT/OT Ongoing, Progressing    Description: ADLs: Pt will be SPV with ADLs by d/c.   STG: by reeval  Pt to perform UB dressing with partial A MET  Pt to perform LB dressing with Mod A using AD MET  Pt to perform putting on/off footwear task with Mod A using AD MET  Pt to perform toileting with Mod A using AD as needed MET  Pt to perform bathing with Mod A MET    Functional Transfers:  Pt to perform toilet transfers with Touch A using BSC over toilet. MET  Pt to perform a tub transfer with TTB requiring Mod A  MET    Balance, Strengthening, Endurance, Balance:  Pt to consistently demonstrate adherence to orthopedic precautions during all ADL's as instructed by OT. MET  Pt to demonstrate fair dynamic standing balance as required to perform ADL's from standing level. MET  Pt to demonstrate consistent adherence to breathing control and energy conservation techniques as educated by OT. MET                       Care Scores:   Admission Assessment Current Status Discharge  Goal   Functional Area: Care Score:  Care Score:    Eating 4 5 Independent   Oral Hygiene 4 5 Independent   Toileting Hygiene 1 4 Independent   Shower/Bathe Self 2 5 Set-up/clean-up   Upper Body Dressing 1 4 Set-up/clean-up   Lower Body Dressing 1 5 Set-up/clean-up   Putting On/Taking Off Footwear 1 4 Independent   Toilet Transfer 2 5 Supervision or touching assistance     Reasons for Discontinuation of Therapy Services   Transfer to  alternate level of care. and Satisfactory goal achievement.      Plan:     Patient Discharged to: Home with Home Health Service and family care.       3/17/2023

## 2023-03-17 NOTE — PT/OT/SLP DISCHARGE
Physical Therapy Discharge Summary    Name: Hunter Navarrete  MRN: [id number]   Principal Problem: S/P CABG x 2     Patient Discharged from acute Physical Therapy on 03/17/23.     Assessment:     Patient appropriate for care in another setting.    Objective:     GOALS:   Multidisciplinary Problems       Physical Therapy Goals          Problem: Physical Therapy    Goal Priority Disciplines Outcome Goal Variances Interventions   Physical Therapy Goal     PT, PT/OT Adequate for Care Transition     Description: Pt will improve functional mobility by performing:     Bed Mobility   MET - Roll Right and Left Partial/ Moderate Assistance .  MET - Lying to sitting Partial/ Moderate Assistance .  MET - Sitting to lying Partial/ Moderate Assistance .  Supine to sit transfer with supervision/touching assist.    Transfers    MET - Pt will be able to perform Stand step chair/bed to chair transfer With RW Partial/ Moderate Assistance .  MET - Pt will be able to perform Sit to stand with RW Partial/ Moderate Assistance .  MET - Pt will be able to perform Car transfer with RW Partial/ Moderate Assistance .  MET - Sit to stand transfer independently using Rollator   MET - Bed to chair transfer independently using Rollator   MET - Car transfer independently using Rollator     Ambulation    MET - Pt will ambulate 50 Feet with RW Partial/ Moderate Assistance  MET - Ambulate 350 feet independently using Rollator   MET - Ascend/descend a 4 inch curb independently using Rollator   MET - Ascend/descend 12 stairs independently using bilateral handrails  NOT MET - Ambulate 15 feet on uneven surfaces/ramps independently using Rollator     Wheelchair Mobility   D/C - Pt will be able to propel 150 feet in steven wheelchair Partial/ Moderate Assistance                        Care Scores:   Admission Assessment Current   Status  Discharge   Goal   Functional Area: Care Score:  Care Score:    Roll Left and Right 2 6 Supervision or touching assistance    Sit to Lying 2 6 Supervision or touching assistance   Lying to Sitting on Side of Bed 2 6 Supervision or touching assistance   Sit to Stand 2 6 Supervision or touching assistance   Chair/Bed-to-Chair Transfer 2 6 Supervision or touching assistance   Car Transfer 88 6 Supervision or touching assistance   Walk 10 Feet 1 6 Supervision or touching assistance   Walk 50 Feet with Two Turns 1 6 Supervision or touching assistance   Walk 150 Feet 88 6 Supervision or touching assistance   Walk 10 Feet Uneven Surface 88 4 Supervision or touching assistance   1 Step (Curb) 88 6 Supervision or touching assistance   4 Steps 88 6 Supervision or touching assistance   12 Steps 88 6 Not applicable   Picking Up Object 88 6 Supervision or touching assistance   Wheel 50 Feet with Two Turns 88 3 Supervision or touching assistance   Wheel 150 Feet 88 88 Supervision or touching assistance       Reasons for Discontinuation of Therapy Services  Transfer to alternate level of care. and Satisfactory goal achievement.      Plan:     Patient Discharged to: Home with Home Health Service.    3/17/2023

## 2023-03-17 NOTE — DISCHARGE SUMMARY
[facility] ([facility])  Rehab Discharge Summary    Patient Name: Hunter Navarrete  MRN: [id number]  Age: 82 y.o. Sex: male  : 1940  Hospital Length of Stay: 14 days   Date of Service: 3/17/2023      Discharge Information   Date of Admission: 3/3/2023  Date of Discharge: 3/17/2023  Admit Diagnosis: Multivessel CAD s/p CABG x2          ICMO          Acute on chronic combined systolic/diastolic heart failure         DANIELLA on CKD stage IIIb         HTN         HLD         RLS          Bilateral coronary artery stenosis         Constipation         GERD          Lupus anticoagulant inhibitor syndrome          Major depressive disorder         Insomnia         BPH         Normocytic anemia  Discharge Diagnosis: Multivessel CAD s/p CABG x2 (stable)            ICMO (stable)            Acute on chronic combined systolic/diastolic heart failure (stable)            CKD stage IIIb (stable)            HTN (stable)            HLD (stable)            RLS (stable)            Bilateral coronary artery stenosis (stable)            Constipation (stable)            GERD (stable)            Lupus anticoagulant inhibitor syndrome (stable)            Major depressive disorder (stable)            Insomnia (stable)            BPH (stable)            Normocytic anemia (stable)            Parkinson's disease (stable) (stable)            Back pain (stable)           Allergic rhinitis (stable)            Asymptomatic bacteriuria (resolved)  COVID-19 testing:  Negative on   COVID-19 vaccination status:  Vaccinated x4    Internal Medicine (attending): Xander Enriquez MD  Physiatry (consulting):  Dilip Hargrove MD     OUTPATIENT PROVIDERS  PCP:  Mariella Bethea MD   Hematology:  Missy Davila MD in [facility]  Cardiology: Curtis Loredo MD at [facility]   CV surgery:  Spencer Hagan MD  Nephrology:  Darinel Bedoya MD    Hospital Course   82-year-old WM presented to [facility] on 02/15 with complaints of substernal  chest pain with shortness of breath.  PMH significant for CAD with PCI, CKD, hypertension, mild aortic stenosis, lupus anticoagulant inhibitor syndrome, and hyperlipidemia.  Workup significant for elevated troponin.  Transferred to [facility] for cardiology evaluation.  Initiated heparin drip.  Tolerated left heart catheterization on 02/15 significant for multivessel coronary disease with InStent restenosis.  Hematology recommended transfer to tertiary facility for CABG 2/2 history of lupus anticoagulant.  Tolerated transfer to [facility] on 02/19.  Initiated Tridil drip and titrated for chest pain.  Required 2 units PRBC transfusion on 02/23.  On 02/24 tolerated CABG x2 (lima to LAD, RSVG to OM with right GSV EVH) without perioperative complications.  Postoperatively required IV fluid gentle hydration per Nephrology 2/2 CKD stage IIIB.  Chest tubes removed.  Continued with postoperative DANIELLA on CKD.  Nephrology will continue to follow.  Discontinue Lasix IV on 03/03 and initiated home dose of Lasix 40 mg daily.  Initiated Plavix on 03/03. Tolerated transfer to [facility] inpatient rehab unit on 3/3 without incident.  During inpatient rehab course Plavix resumed 75 mg daily per cardiology and CV surgery recommendations.  Ferrous sulfate 325 mg b.i.d. initiated on 03/04.  Metoprolol discontinued on 03/05 and Coreg 12.5 mg b.i.d. initiated with improved BP control.  Indwelling catheter discontinued on 03/07 with good urine output.  Nephrology continued to follow.  Continue tolerating Lasix 40 mg daily.  Cymbalta 30 mg daily initiated on 03/14.  Prozac discontinued.  Flonase and Zyrtec initiated due to allergic rhinitis symptoms.  Proscar 5 mg daily initiated on 03/14.  UA significant for bacteria and nitrates.  Rocephin 1 g daily.  Urine culture significant for 50-10457 CFU Klebsiella-asymptomatic bacteriuria.  Completed Rocephin on 03/17.  Tolerated room air on 03/16.  Weights continue to trend downward.  Ambulating  "greater than 300 ft independent with rolling walker.  Limited by activity tolerance.  Bed mobility and transfers supervision or touch assistance.  Upper body dressing partial assist.  Lower body dressing mod assist.  Toileting and bathing mod assist.  We will still require assistance in therapy upon discharge home.  Vital signs stable.  Lab work stable.  Med reconciliation completed.  Discharge orders initiated.  Stable transfer home with home health and family care.  To follow-up with scheduled appointments documented below.    Chief Complaint: Multivessel CAD s/p left heart catheterization with InStent restenosis and multivessel coronary disease on 02/15/2023 s/p CABG x2 (lima to LAD, RSVG to OM with right GSV EVH) on 02/24/2023    /65   Pulse 93   Temp 98 °F (36.7 °C) (Oral)   Resp 16   Ht 5' 10.98" (1.803 m)   Wt 83 kg (182 lb 15.7 oz)   SpO2 (!) 93%   BMI 25.53 kg/m²      Physical Exam  Vitals reviewed.   Eyes:      Pupils: Pupils are equal, round, and reactive to light.   Cardiovascular:      Rate and Rhythm: Normal rate and regular rhythm.      Heart sounds: Murmur heard.   Systolic murmur is present with a grade of 3/6.   Pulmonary:      Effort: Pulmonary effort is normal.      Breath sounds: Normal breath sounds.      Comments:  Room air  Abdominal:      General: Bowel sounds are normal.   Musculoskeletal:         General: Normal range of motion.   Skin:     General: Skin is warm.      Comments: Midline sternal incision open air dry and intact    Neurological:      Mental Status: He is alert and oriented to person, place, and time.      Motor: Weakness present.      Comments: Resting tremors, shuffling gait   Psychiatric:         Mood and Affect: Mood normal.   *MD performed and documented physical examination            Labs  WBC 4.5 - 11.5 x10(3)/mcL 8.7    RBC 4.70 - 6.10 x10(6)/mcL 3.56 Low     Hgb 14.0 - 18.0 g/dL 10.4 Low     Hct 42.0 - 52.0 % 33.7 Low     MCV 80.0 - 94.0 fL 94.7 High   "   MCH pg 29.2    MCHC 33.0 - 36.0 g/dL 30.9 Low     RDW 11.5 - 17.0 % 15.3    Platelet 130 - 400 x10(3)/mcL 233    MPV 7.4 - 10.4 fL 11.0 High     Neut % % 73.1    Lymph % % 10.8    Mono % % 14.2    Eos % % 0.0    Basophil % % 0.2    Lymph # 0.6 - 4.6 x10(3)/mcL 0.94    Neut # 2.1 - 9.2 x10(3)/mcL 6.36    Mono # 0.1 - 1.3 x10(3)/mcL 1.24    Eos # 0 - 0.9 x10(3)/mcL 0.00    Baso # 0 - 0.2 x10(3)/mcL 0.02    IG# 0 - 0.04 x10(3)/mcL 0.15 High     IG% % 1.7    NRBC% % 0.0     Sodium Level 136 - 145 mmol/L 140    Potassium Level 3.5 - 5.1 mmol/L 4.1    Chloride 98 - 107 mmol/L 104    Carbon Dioxide 23 - 31 mmol/L 27    Glucose Level 82 - 115 mg/dL 110    Blood Urea Nitrogen 8.4 - 25.7 mg/dL 35.5 High     Creatinine 0.73 - 1.18 mg/dL 1.63 High     Calcium Level Total 8.8 - 10.0 mg/dL 9.3    Protein Total 5.8 - 7.6 gm/dL 7.4    Albumin Level 3.4 - 4.8 g/dL 2.7 Low     Globulin 2.4 - 3.5 gm/dL 4.7 High     Albumin/Globulin Ratio 1.1 - 2.0 ratio 0.6 Low     Bilirubin Total <=1.5 mg/dL 0.6    Alkaline Phosphatase 40 - 150 unit/L 93    Alanine Aminotransferase 0 - 55 unit/L 51    Aspartate Aminotransferase 5 - 34 unit/L 28    eGFR mls/min/1.73/m2 42      Radiology  CXR one view on 03/09/2023 at 8:36 a.m., IMPRESSION:  No significant changes  Radiology  CUS 02/20/23: The right internal carotid artery demonstrated 50-69% stenosis. The left internal carotid artery demonstrated 50-69% stenosis.  Bilateral vertebral arteries were patent with antegrade flow.  Radiology  [facility] 02/15/2023: LM 0% stenosis mLAD proximal 70% ISR D1 ostial 50% stenosis D2 ostial 50% stenosis Lcx: previous stent; proximal Lcx  90% ISR; mid Lcx 30% stenosis OM1 proximal 80% stenosis RCA patent stents to ostium. Mid RCA proximal subsection- 60% stenosis; Mid RCA distal subsection 70& stenosis; distal RCA proximal subsection 100% stenosis. Fills left to right  Radiology  TTE 02/16/2023: Global LV SF is borderline normal. LVEF 45-50%. LA is mildly enlarged.  Moderate calcification of the aortic valve is noted. Mild AS is present. Mild to moderate AR. Mild to moderate MAC is noted. Mild MR. Trace TR. PASP 40 mmHg. Optison used to enhance endocardial border.     Discharge Summary Plan   Discharge Status:  Improved    Location: Discharge home with home health    Medications: See discharge medicine reconciliation    Activity:  As tolerated    Diet:  Cardiac    Instructions:  Take all medications as prescribed.      Attend appointments as scheduled.      Return to ED if symptoms worsen, or if t > 100.4.    Education:  CABG.  HFrEF.  HTN    Follow-up:  Mariella Bethea MD on 03/24/2023 at 10:00 a.m.      Missy Davila MD on 04/05/2023 at 10:30 a.m.      Curtis Loredo MD on 03/31/2023 at 1:20 p.m.      Spencer Hagan MD on 04/05/2023 at 8:00 a.m.    Discussed plan of care, and patient communicated understanding. Agreed to comply with recommendations.    Rajesh Reese NP conducted independent examination and assisted with medical documentation.     Discharge Time: 46 minutes

## 2023-03-17 NOTE — PLAN OF CARE
Discharging today as planned.    Alerted [facility] via [facility].  They will provide HH PT/OT/RN services.  Will send AVS later this a.m.    Oxygen will not be needed for home after all.    Received call from kelle Joseph ([phone]) to inquire about status before pt leaves rehab.  Answered all questions and gave clinical update.      Family training previously completed with wife Giovana.

## 2023-03-18 LAB
BACTERIA UR CULT: ABNORMAL
BACTERIA UR CULT: ABNORMAL

## 2023-03-20 NOTE — DISCHARGE SUMMARY
[facility]  Cardiothoracic Surgery  Discharge Summary      Patient Name: Hunter Navarrete  MRN: [id number]  Admission Date: 2/19/2023  Hospital Length of Stay: 12 days  Discharge Date and Time: 3/3/2023  3:15 PM  Attending Physician: No att. providers found   Discharging Provider: GILMAR Thrasher  Primary Care Provider: Mariella Bethea MD    HPI:   No notes on file    Procedure(s) (LRB):  CORONARY ARTERY BYPASS GRAFT (CABG) (N/A)      Indwelling Lines/Drains at time of discharge:   Lines/Drains/Airways     None               Hospital Course: No notes on file    Goals of Care Treatment Preferences:  Code Status: Full Code      Consults (From admission, onward)        Status Ordering Provider     Inpatient consult to Nephrology  Once        Provider:  Frank Michelle MD    Completed LINDY COLMENARES          Significant Diagnostic Studies: Labs: All labs within the past 24 hours have been reviewed    Pending Diagnostic Studies:     None          No new Assessment & Plan notes have been filed under this hospital service since the last note was generated.  Service: Cardiothoracic Surgery    Final Active Diagnoses:    Diagnosis Date Noted POA    PRINCIPAL PROBLEM:  CAD (coronary artery disease) [I25.10] 03/03/2023 Yes      Problems Resolved During this Admission:    Diagnosis Date Noted Date Resolved POA    CAD (coronary artery disease) [I25.10] 07/22/2021 03/03/2023 Unknown      Discharged Condition: good    Disposition: Rehab Facility    Follow Up:    Patient Instructions:   No discharge procedures on file.  Medications:  Reconciled Home Medications:      Medication List      CONTINUE taking these medications    calcium carbonate-vitamin D3 500 mg-10 mcg (400 unit) Tab  Take 1 tablet by mouth once daily.        STOP taking these medications    albuterol-ipratropium 2.5 mg-0.5 mg/3 mL nebulizer solution  Commonly known as: DUO-NEB     calcium carbonate 400 mg calcium (1,000 mg)  Chew     folic acid 1 MG tablet  Commonly known as: FOLVITE     furosemide 40 MG tablet  Commonly known as: LASIX     hydrALAZINE 25 MG tablet  Commonly known as: APRESOLINE     HYDROcodone-acetaminophen 5-325 mg per tablet  Commonly known as: NORCO     hydrOXYzine HCL 25 MG tablet  Commonly known as: ATARAX     isosorbide mononitrate 30 MG 24 hr tablet  Commonly known as: IMDUR     lisinopriL 10 MG tablet     meclizine 25 mg tablet  Commonly known as: ANTIVERT     metoprolol tartrate 50 MG tablet  Commonly known as: LOPRESSOR     nitroGLYCERIN 0.4 MG SL tablet  Commonly known as: NITROSTAT     omeprazole 20 MG capsule  Commonly known as: PRILOSEC     QUEtiapine 50 MG tablet  Commonly known as: SEROQUEL     terazosin 5 MG capsule  Commonly known as: HYTRIN          Subjective:    POD #7  Awake. Alert.   Sitting up in chair  Diuresing per renal     Objective:   AFVSS  Heart: Regular rate, tachycardic  Lungs: nonlabored, diminished at bases  Incision: c/d/i  Cr: 1.8        Assesment/Plan:    S/p CAB  DANIELLA on CKD per renal  Anemia of chronic disease  H/o Lupus  HTN     Plans per renal  Ambulate  IS  Rehab pending     GILMAR Thrasher  3/3/2023      Time spent on the discharge of patient: 20 minutes    GILMAR Thrasher  Cardiothoracic Surgery  [facility]

## 2023-04-05 ENCOUNTER — OFFICE VISIT (OUTPATIENT)
Dept: CARDIAC SURGERY | Facility: CLINIC | Age: 83
End: 2023-04-05
Payer: MEDICARE

## 2023-04-05 VITALS
HEIGHT: 70 IN | SYSTOLIC BLOOD PRESSURE: 139 MMHG | BODY MASS INDEX: 26.48 KG/M2 | HEART RATE: 76 BPM | WEIGHT: 185 LBS | DIASTOLIC BLOOD PRESSURE: 63 MMHG

## 2023-04-05 DIAGNOSIS — I65.29 OCCLUSION AND STENOSIS OF UNSPECIFIED CAROTID ARTERY: Primary | ICD-10-CM

## 2023-04-05 PROCEDURE — 99024 POSTOP FOLLOW-UP VISIT: CPT | Mod: POP,,, | Performed by: THORACIC SURGERY (CARDIOTHORACIC VASCULAR SURGERY)

## 2023-04-05 PROCEDURE — 99024 PR POST-OP FOLLOW-UP VISIT: ICD-10-PCS | Mod: POP,,, | Performed by: THORACIC SURGERY (CARDIOTHORACIC VASCULAR SURGERY)

## 2023-04-05 NOTE — PROGRESS NOTES
"Hunter Navarrete is a 82 y.o. male patient.   No diagnosis found.  Past Medical History:   Diagnosis Date    Acid reflux     Anemia     Aortic aneurysm, abdominal     Arthritis     Bronchitis     Cataract     Celiac artery stenosis     50% by CTA abdomen 7/27/2020    CKD (chronic kidney disease)     45% FUNCTION    Coronary artery disease     Encounter for blood transfusion     Enlarged prostate     GERD (gastroesophageal reflux disease)     Hemorrhoids     Hypercholesteremia     Hypertension     Iron deficiency anemia, unspecified     Leaky heart valve     Lupus anticoagulant disorder     Renal artery stenosis     by CTA 7/27/2020    Skin cancer     Unspecified chronic bronchitis     UTI (urinary tract infection)      No past surgical history pertinent negatives on file.  Scheduled Meds:  Continuous Infusions:  PRN Meds:    Review of patient's allergies indicates:   Allergen Reactions    Cardura [doxazosin] Hives    Lyrica [pregabalin] Other (See Comments)    Paxil [paroxetine hcl] Other (See Comments)    Restoril [temazepam] Hallucinations    Vioxx [rofecoxib] Swelling    Zithromax [azithromycin] Hives     There are no hospital problems to display for this patient.    Blood pressure 139/63, pulse 76, height 5' 10" (1.778 m), weight 83.9 kg (185 lb).    Subjective:  The patient returns to the clinic today.  He has been doing well except he had a couple of falls with vertigo.      Objective:  His chest wounds have healed well his sternum is stable.  Bruising on his right flank.      Assessment & Plan:  The patient is doing very well from a cardiac perspective.  However he is having the vertigo and he will be seeing his ENT doctor in [place].  He does also have bilateral carotid artery disease and I think a CTA of the carotid is indicated.  We will follow on the results      Spencer Hagan MD  4/5/2023    "

## 2023-04-11 ENCOUNTER — TELEPHONE (OUTPATIENT)
Dept: CARDIAC SURGERY | Facility: CLINIC | Age: 83
End: 2023-04-11
Payer: OTHER GOVERNMENT

## 2023-04-11 NOTE — TELEPHONE ENCOUNTER
Called [facility] and left message with  to return my call so this test can be scheduled.  Notified family member.    ----- Message from Debbie Serna sent at 4/11/2023  2:18 PM CDT -----  Carla called saying that there was supposed to have something to check dads carotid in [place] and they still have not heard anything    [phone]

## 2023-04-12 DIAGNOSIS — I65.29 OCCLUSION AND STENOSIS OF UNSPECIFIED CAROTID ARTERY: Primary | ICD-10-CM

## 2023-04-13 ENCOUNTER — LAB VISIT (OUTPATIENT)
Dept: LAB | Facility: HOSPITAL | Age: 83
End: 2023-04-13
Attending: INTERNAL MEDICINE
Payer: MEDICARE

## 2023-04-13 DIAGNOSIS — I65.29 OCCLUSION AND STENOSIS OF UNSPECIFIED CAROTID ARTERY: ICD-10-CM

## 2023-04-13 LAB
ANION GAP SERPL CALC-SCNC: 4 MMOL/L (ref 8–16)
BUN SERPL-MCNC: 32 MG/DL (ref 8–23)
CALCIUM SERPL-MCNC: 9.1 MG/DL (ref 8.7–10.5)
CHLORIDE SERPL-SCNC: 104 MMOL/L (ref 95–110)
CO2 SERPL-SCNC: 29 MMOL/L (ref 23–29)
CREAT SERPL-MCNC: 1.7 MG/DL (ref 0.5–1.4)
EST. GFR  (NO RACE VARIABLE): 40 ML/MIN/1.73 M^2
GLUCOSE SERPL-MCNC: 129 MG/DL (ref 70–110)
POTASSIUM SERPL-SCNC: 3.5 MMOL/L (ref 3.5–5.1)
SODIUM SERPL-SCNC: 137 MMOL/L (ref 136–145)

## 2023-04-13 PROCEDURE — 80048 BASIC METABOLIC PNL TOTAL CA: CPT | Performed by: INTERNAL MEDICINE

## 2023-04-13 PROCEDURE — 36415 COLL VENOUS BLD VENIPUNCTURE: CPT | Performed by: INTERNAL MEDICINE

## 2023-04-14 ENCOUNTER — HOSPITAL ENCOUNTER (OUTPATIENT)
Dept: RADIOLOGY | Facility: HOSPITAL | Age: 83
Discharge: HOME | End: 2023-04-14
Attending: THORACIC SURGERY (CARDIOTHORACIC VASCULAR SURGERY)
Payer: MEDICARE

## 2023-04-14 DIAGNOSIS — I65.29 OCCLUSION AND STENOSIS OF UNSPECIFIED CAROTID ARTERY: ICD-10-CM

## 2023-04-14 PROCEDURE — 70498 CT ANGIOGRAPHY NECK: CPT | Mod: TC

## 2023-04-14 PROCEDURE — 25500020 PHARM REV CODE 255: Performed by: THORACIC SURGERY (CARDIOTHORACIC VASCULAR SURGERY)

## 2023-04-14 RX ADMIN — IOHEXOL 75 ML: 350 INJECTION, SOLUTION INTRAVENOUS at 09:04

## 2023-04-17 ENCOUNTER — LAB VISIT (OUTPATIENT)
Dept: LAB | Facility: HOSPITAL | Age: 83
End: 2023-04-17
Attending: INTERNAL MEDICINE
Payer: OTHER GOVERNMENT

## 2023-04-17 DIAGNOSIS — E78.5 HYPERLIPEMIA: ICD-10-CM

## 2023-04-17 DIAGNOSIS — Z48.812 AFTERCARE FOLLOWING SURGERY OF THE CIRCULATORY SYSTEM, NEC: ICD-10-CM

## 2023-04-17 DIAGNOSIS — I25.10 CORONARY ATHEROSCLEROSIS OF NATIVE CORONARY ARTERY: Primary | ICD-10-CM

## 2023-04-17 LAB
ALBUMIN SERPL BCP-MCNC: 3 G/DL (ref 3.5–5.2)
ALP SERPL-CCNC: 77 U/L (ref 55–135)
ALT SERPL W/O P-5'-P-CCNC: 30 U/L (ref 10–44)
ANION GAP SERPL CALC-SCNC: 5 MMOL/L (ref 8–16)
AST SERPL-CCNC: 22 U/L (ref 10–40)
BASOPHILS # BLD AUTO: 0.01 K/UL (ref 0–0.2)
BASOPHILS NFR BLD: 0.1 % (ref 0–1.9)
BILIRUB SERPL-MCNC: 0.6 MG/DL (ref 0.1–1)
BUN SERPL-MCNC: 29 MG/DL (ref 8–23)
CALCIUM SERPL-MCNC: 9.2 MG/DL (ref 8.7–10.5)
CHLORIDE SERPL-SCNC: 102 MMOL/L (ref 95–110)
CO2 SERPL-SCNC: 31 MMOL/L (ref 23–29)
CREAT SERPL-MCNC: 1.9 MG/DL (ref 0.5–1.4)
DIFFERENTIAL METHOD: ABNORMAL
EOSINOPHIL # BLD AUTO: 0 K/UL (ref 0–0.5)
EOSINOPHIL NFR BLD: 0.1 % (ref 0–8)
ERYTHROCYTE [DISTWIDTH] IN BLOOD BY AUTOMATED COUNT: 16 % (ref 11.5–14.5)
EST. GFR  (NO RACE VARIABLE): 35 ML/MIN/1.73 M^2
GLUCOSE SERPL-MCNC: 126 MG/DL (ref 70–110)
HCT VFR BLD AUTO: 31.6 % (ref 40–54)
HGB BLD-MCNC: 10.1 G/DL (ref 14–18)
IMM GRANULOCYTES # BLD AUTO: 0.09 K/UL (ref 0–0.04)
IMM GRANULOCYTES NFR BLD AUTO: 1 % (ref 0–0.5)
LYMPHOCYTES # BLD AUTO: 1.1 K/UL (ref 1–4.8)
LYMPHOCYTES NFR BLD: 12.5 % (ref 18–48)
MCH RBC QN AUTO: 30 PG (ref 27–31)
MCHC RBC AUTO-ENTMCNC: 32 G/DL (ref 32–36)
MCV RBC AUTO: 94 FL (ref 82–98)
MONOCYTES # BLD AUTO: 1.1 K/UL (ref 0.3–1)
MONOCYTES NFR BLD: 12.7 % (ref 4–15)
NEUTROPHILS # BLD AUTO: 6.6 K/UL (ref 1.8–7.7)
NEUTROPHILS NFR BLD: 73.6 % (ref 38–73)
NRBC BLD-RTO: 0 /100 WBC
PLATELET # BLD AUTO: 131 K/UL (ref 150–450)
PMV BLD AUTO: 11.3 FL (ref 9.2–12.9)
POTASSIUM SERPL-SCNC: 4.1 MMOL/L (ref 3.5–5.1)
PROT SERPL-MCNC: 7.1 G/DL (ref 6–8.4)
RBC # BLD AUTO: 3.37 M/UL (ref 4.6–6.2)
SODIUM SERPL-SCNC: 138 MMOL/L (ref 136–145)
WBC # BLD AUTO: 9.01 K/UL (ref 3.9–12.7)

## 2023-04-17 PROCEDURE — 85025 COMPLETE CBC W/AUTO DIFF WBC: CPT | Performed by: INTERNAL MEDICINE

## 2023-04-17 PROCEDURE — 80053 COMPREHEN METABOLIC PANEL: CPT | Performed by: INTERNAL MEDICINE

## 2023-04-17 PROCEDURE — 36415 COLL VENOUS BLD VENIPUNCTURE: CPT | Performed by: INTERNAL MEDICINE

## 2023-04-19 DIAGNOSIS — R42 DIZZINESS: ICD-10-CM

## 2023-04-19 DIAGNOSIS — I65.29 OCCLUSION AND STENOSIS OF UNSPECIFIED CAROTID ARTERY: Primary | ICD-10-CM

## 2023-04-19 DIAGNOSIS — I65.23 SYMPTOMATIC STENOSIS OF BOTH CAROTID ARTERIES: ICD-10-CM

## 2023-05-22 PROBLEM — I21.4 NSTEMI (NON-ST ELEVATED MYOCARDIAL INFARCTION): Status: RESOLVED | Noted: 2023-02-15 | Resolved: 2023-05-22

## 2023-06-12 PROBLEM — R31.29 MICROSCOPIC HEMATURIA: Status: ACTIVE | Noted: 2023-06-12

## 2023-06-19 ENCOUNTER — HOSPITAL ENCOUNTER (EMERGENCY)
Facility: HOSPITAL | Age: 83
Discharge: HOME | End: 2023-06-19
Attending: STUDENT IN AN ORGANIZED HEALTH CARE EDUCATION/TRAINING PROGRAM
Payer: MEDICARE

## 2023-06-19 VITALS
DIASTOLIC BLOOD PRESSURE: 74 MMHG | BODY MASS INDEX: 25.77 KG/M2 | SYSTOLIC BLOOD PRESSURE: 161 MMHG | HEIGHT: 70 IN | WEIGHT: 180 LBS | TEMPERATURE: 98 F | HEART RATE: 95 BPM | RESPIRATION RATE: 18 BRPM | OXYGEN SATURATION: 95 %

## 2023-06-19 DIAGNOSIS — S01.311A LACERATION OF RIGHT EAR LOBE, INITIAL ENCOUNTER: Primary | ICD-10-CM

## 2023-06-19 PROCEDURE — 99282 EMERGENCY DEPT VISIT SF MDM: CPT

## 2023-06-19 RX ORDER — HYDROCODONE BITARTRATE AND ACETAMINOPHEN 7.5; 325 MG/1; MG/1
TABLET ORAL
COMMUNITY
Start: 2023-06-08

## 2023-06-19 RX ORDER — METOPROLOL SUCCINATE 25 MG/1
TABLET, EXTENDED RELEASE ORAL
Status: ON HOLD | COMMUNITY
Start: 2023-06-07 | End: 2023-07-20

## 2023-06-19 NOTE — ED PROVIDER NOTES
History  Chief Complaint   Patient presents with    Ear Laceration     Pt presents to the ER w/ complaints of a minor lac top his R ear, states he is unable to stop bleeding due to long term use of plavix. Pt presents w/ minor bleeding.     83-year-old male presents for evaluation of an earlobe laceration.  Patient was cutting his hair with a clipper when he inadvertently nicked top of his right earlobe.  Patient is on Plavix due to history of coronary artery disease.      Past Medical History:   Diagnosis Date    Acid reflux     Anemia     Aortic aneurysm, abdominal     Arthritis     Bronchitis     Carotid stenosis 05/13/2022    60-79% Right ICA 60-70% Left ICA    Cataract     Celiac artery stenosis     50% by CTA abdomen 7/27/2020    CKD (chronic kidney disease)     45% FUNCTION    Coronary artery disease     Encounter for blood transfusion     Enlarged prostate     GERD (gastroesophageal reflux disease)     Hemorrhoids     Hypercholesteremia     Hypertension     Iron deficiency anemia, unspecified     Leaky heart valve     Lupus anticoagulant disorder     Parkinson's disease     Renal artery stenosis     by CTA 7/27/2020    Restless leg syndrome     Skin cancer     Unspecified chronic bronchitis     UTI (urinary tract infection)        Past Surgical History:   Procedure Laterality Date    ACF      Anterior Cervical Fusion    BACK SURGERY      RODS IN BACK; 4 SURGIERIES    BONE MARROW BIOPSY      CARDIAC ANGIOGRAM WITH STENT      CATARACT SX Bilateral     CORONARY ANGIOGRAPHY N/A 02/15/2023    Procedure: ANGIOGRAM, CORONARY ARTERY;  Surgeon: Rito Vega MD;  [facility]: [facility] CATH;  Service: Cardiology;  Laterality: N/A;    CORONARY ARTERY BYPASS GRAFT (CABG) N/A 02/24/2023    Procedure: CORONARY ARTERY BYPASS GRAFT (CABG);  Surgeon: Spencer Hagan MD;  Location: [facility] OR;  Service: Cardiothoracic;  Laterality: N/A;  CABG / EVH //   ECHO NOTIFIED    HIP SURGERY Right     THR    KNEE SURGERY Right     ATS     "LEFT HEART CATHETERIZATION Left 2021    Procedure: Left heart cath;  Surgeon: Curtis Loredo MD;  Location: [facility] CATH;  Service: Cardiology;  Laterality: Left;    LEFT HEART CATHETERIZATION Left 2022    Procedure: Left heart cath;  Surgeon: Giorgi Barker MD;  Location: [facility] CATH;  Service: Cardiology;  Laterality: Left;    SHOULDER SURGERY Bilateral     SPINAL CORD STIMULATOR IMPLANT      steroid injections Right 2023    Knee       Family History   Problem Relation Age of Onset    Heart disease Father     Heart disease Sister     Lung cancer Brother     Throat cancer Brother     Cancer Brother     Cancer Brother     Heart disease Brother         PPM    Heart disease Brother     Heart disease Brother        Social History     Tobacco Use    Smoking status: Former     Types: Cigarettes     Quit date:      Years since quittin.4    Smokeless tobacco: Never   Substance Use Topics    Alcohol use: Yes     Comment: RARELY    Drug use: No       ROS  Review of Systems   Skin:  Positive for wound.     Physical Exam  BP (!) 161/74   Pulse 95   Temp 98 °F (36.7 °C)   Resp 18   Ht 5' 10" (1.778 m)   Wt 81.6 kg (180 lb)   SpO2 95%   BMI 25.83 kg/m²   Physical Exam    Constitutional: He appears well-developed and well-nourished. He is cooperative.   HENT:   Head: Normocephalic and atraumatic.   Ears:    Eyes: Conjunctivae, EOM and lids are normal. Pupils are equal, round, and reactive to light.   Neck: Phonation normal.   Normal range of motion.  Cardiovascular:  Normal rate, regular rhythm and intact distal pulses.           Pulmonary/Chest: Breath sounds normal. No stridor.   Abdominal: Abdomen is soft. There is no abdominal tenderness.   Musculoskeletal:         General: Normal range of motion.      Cervical back: Normal range of motion.     Neurological: He is alert and oriented to person, place, and time.   Skin: Skin is warm and dry.   Psychiatric: He has a normal mood and affect. His speech " is normal and behavior is normal.             Labs Reviewed - No data to display                      Procedures             Medical Decision Making  83-year-old male presents for evaluation of a skin tear to the right ear.  The earlobe was cauterized with silver nitrate.  Hemostasis achieved.  Patient discharged advised continue wound care in the outpatient setting                    Clinical Impression  The encounter diagnosis was Laceration of right ear lobe, initial encounter.       Rk Medina MD  06/19/23 0119

## 2023-06-24 PROBLEM — E63.9 INADEQUATE DIETARY ENERGY INTAKE: Status: ACTIVE | Noted: 2023-06-24

## 2023-06-24 PROBLEM — N10 ACUTE PYELONEPHRITIS: Status: ACTIVE | Noted: 2023-06-24

## 2023-06-24 PROBLEM — R50.9 FEVER: Status: ACTIVE | Noted: 2023-06-24

## 2023-06-27 PROBLEM — R41.82 ALTERED MENTAL STATUS: Status: RESOLVED | Noted: 2021-10-26 | Resolved: 2023-06-27

## 2023-06-27 PROBLEM — R50.9 FEVER: Status: RESOLVED | Noted: 2023-06-24 | Resolved: 2023-06-27

## 2023-06-27 PROBLEM — E63.9 INADEQUATE DIETARY ENERGY INTAKE: Status: RESOLVED | Noted: 2023-06-24 | Resolved: 2023-06-27

## 2023-06-27 PROBLEM — N10 ACUTE PYELONEPHRITIS: Status: RESOLVED | Noted: 2023-06-24 | Resolved: 2023-06-27

## 2023-07-20 ENCOUNTER — HOSPITAL ENCOUNTER (INPATIENT)
Facility: HOSPITAL | Age: 83
LOS: 4 days | Discharge: HOME HEALTH | DRG: 690 | End: 2023-07-24
Attending: EMERGENCY MEDICINE | Admitting: INTERNAL MEDICINE
Payer: OTHER GOVERNMENT

## 2023-07-20 DIAGNOSIS — N30.01 ACUTE CYSTITIS WITH HEMATURIA: ICD-10-CM

## 2023-07-20 DIAGNOSIS — R11.2 NAUSEA AND VOMITING, UNSPECIFIED VOMITING TYPE: ICD-10-CM

## 2023-07-20 DIAGNOSIS — R50.9 FEVER: ICD-10-CM

## 2023-07-20 DIAGNOSIS — N12 PYELONEPHRITIS: Primary | ICD-10-CM

## 2023-07-20 DIAGNOSIS — I10 HYPERTENSION, UNSPECIFIED TYPE: ICD-10-CM

## 2023-07-20 DIAGNOSIS — N28.9 RENAL INSUFFICIENCY: ICD-10-CM

## 2023-07-20 LAB
ALBUMIN SERPL BCP-MCNC: 3 G/DL (ref 3.5–5.2)
ALP SERPL-CCNC: 55 U/L (ref 55–135)
ALT SERPL W/O P-5'-P-CCNC: 19 U/L (ref 10–44)
ANION GAP SERPL CALC-SCNC: 5 MMOL/L (ref 8–16)
AST SERPL-CCNC: 12 U/L (ref 10–40)
BACTERIA #/AREA URNS HPF: NEGATIVE /HPF
BASOPHILS # BLD AUTO: 0.03 K/UL (ref 0–0.2)
BASOPHILS NFR BLD: 0.1 % (ref 0–1.9)
BILIRUB SERPL-MCNC: 1.1 MG/DL (ref 0.1–1)
BILIRUB UR QL STRIP: NEGATIVE
BUN SERPL-MCNC: 34 MG/DL (ref 8–23)
CALCIUM SERPL-MCNC: 8.6 MG/DL (ref 8.7–10.5)
CHLORIDE SERPL-SCNC: 104 MMOL/L (ref 95–110)
CLARITY UR: ABNORMAL
CO2 SERPL-SCNC: 26 MMOL/L (ref 23–29)
COLOR UR: YELLOW
CREAT SERPL-MCNC: 1.9 MG/DL (ref 0.5–1.4)
CTP QC/QA: YES
CTP QC/QA: YES
DIFFERENTIAL METHOD: ABNORMAL
EOSINOPHIL # BLD AUTO: 0 K/UL (ref 0–0.5)
EOSINOPHIL NFR BLD: 0 % (ref 0–8)
ERYTHROCYTE [DISTWIDTH] IN BLOOD BY AUTOMATED COUNT: 16.2 % (ref 11.5–14.5)
EST. GFR  (NO RACE VARIABLE): 34.8 ML/MIN/1.73 M^2
GLUCOSE SERPL-MCNC: 132 MG/DL (ref 70–110)
GLUCOSE UR QL STRIP: NEGATIVE
HCT VFR BLD AUTO: 32.2 % (ref 40–54)
HGB BLD-MCNC: 10.5 G/DL (ref 14–18)
HGB UR QL STRIP: ABNORMAL
HYALINE CASTS #/AREA URNS LPF: 0 /LPF
IMM GRANULOCYTES # BLD AUTO: 0.99 K/UL (ref 0–0.04)
IMM GRANULOCYTES NFR BLD AUTO: 4.5 % (ref 0–0.5)
KETONES UR QL STRIP: NEGATIVE
LACTATE SERPL-SCNC: 1.3 MMOL/L (ref 0.5–2.2)
LEUKOCYTE ESTERASE UR QL STRIP: ABNORMAL
LIPASE SERPL-CCNC: 65 U/L (ref 23–300)
LYMPHOCYTES # BLD AUTO: 1.3 K/UL (ref 1–4.8)
LYMPHOCYTES NFR BLD: 6 % (ref 18–48)
MCH RBC QN AUTO: 30.7 PG (ref 27–31)
MCHC RBC AUTO-ENTMCNC: 32.6 G/DL (ref 32–36)
MCV RBC AUTO: 94 FL (ref 82–98)
MICROSCOPIC COMMENT: ABNORMAL
MONOCYTES # BLD AUTO: 3.4 K/UL (ref 0.3–1)
MONOCYTES NFR BLD: 15.7 % (ref 4–15)
NEUTROPHILS # BLD AUTO: 16.1 K/UL (ref 1.8–7.7)
NEUTROPHILS NFR BLD: 73.7 % (ref 38–73)
NITRITE UR QL STRIP: POSITIVE
NRBC BLD-RTO: 0 /100 WBC
NT-PROBNP SERPL-MCNC: ABNORMAL PG/ML (ref 5–1800)
PH UR STRIP: 6 [PH] (ref 5–8)
PLATELET # BLD AUTO: 128 K/UL (ref 150–450)
PMV BLD AUTO: 11 FL (ref 9.2–12.9)
POC MOLECULAR INFLUENZA A AGN: NEGATIVE
POC MOLECULAR INFLUENZA B AGN: NEGATIVE
POTASSIUM SERPL-SCNC: 3.7 MMOL/L (ref 3.5–5.1)
PROT SERPL-MCNC: 7.4 G/DL (ref 6–8.4)
PROT UR QL STRIP: ABNORMAL
RBC # BLD AUTO: 3.42 M/UL (ref 4.6–6.2)
RBC #/AREA URNS HPF: 25 /HPF (ref 0–4)
SARS-COV-2 RDRP RESP QL NAA+PROBE: NEGATIVE
SODIUM SERPL-SCNC: 135 MMOL/L (ref 136–145)
SP GR UR STRIP: 1.01 (ref 1–1.03)
SQUAMOUS #/AREA URNS HPF: 0 /HPF
URN SPEC COLLECT METH UR: ABNORMAL
UROBILINOGEN UR STRIP-ACNC: NEGATIVE EU/DL
WBC # BLD AUTO: 21.85 K/UL (ref 3.9–12.7)
WBC #/AREA URNS HPF: >100 /HPF (ref 0–5)

## 2023-07-20 PROCEDURE — 25000003 PHARM REV CODE 250: Performed by: STUDENT IN AN ORGANIZED HEALTH CARE EDUCATION/TRAINING PROGRAM

## 2023-07-20 PROCEDURE — 87077 CULTURE AEROBIC IDENTIFY: CPT | Performed by: CLINICAL NURSE SPECIALIST

## 2023-07-20 PROCEDURE — 85025 COMPLETE CBC W/AUTO DIFF WBC: CPT | Performed by: CLINICAL NURSE SPECIALIST

## 2023-07-20 PROCEDURE — 83605 ASSAY OF LACTIC ACID: CPT | Performed by: CLINICAL NURSE SPECIALIST

## 2023-07-20 PROCEDURE — 11000001 HC ACUTE MED/SURG PRIVATE ROOM

## 2023-07-20 PROCEDURE — 87502 INFLUENZA DNA AMP PROBE: CPT

## 2023-07-20 PROCEDURE — 96365 THER/PROPH/DIAG IV INF INIT: CPT

## 2023-07-20 PROCEDURE — 87086 URINE CULTURE/COLONY COUNT: CPT | Performed by: CLINICAL NURSE SPECIALIST

## 2023-07-20 PROCEDURE — 87040 BLOOD CULTURE FOR BACTERIA: CPT | Mod: 59 | Performed by: STUDENT IN AN ORGANIZED HEALTH CARE EDUCATION/TRAINING PROGRAM

## 2023-07-20 PROCEDURE — 87088 URINE BACTERIA CULTURE: CPT | Performed by: CLINICAL NURSE SPECIALIST

## 2023-07-20 PROCEDURE — 63600175 PHARM REV CODE 636 W HCPCS: Performed by: STUDENT IN AN ORGANIZED HEALTH CARE EDUCATION/TRAINING PROGRAM

## 2023-07-20 PROCEDURE — 21400001 HC TELEMETRY ROOM

## 2023-07-20 PROCEDURE — 36415 COLL VENOUS BLD VENIPUNCTURE: CPT | Performed by: STUDENT IN AN ORGANIZED HEALTH CARE EDUCATION/TRAINING PROGRAM

## 2023-07-20 PROCEDURE — 81000 URINALYSIS NONAUTO W/SCOPE: CPT | Performed by: CLINICAL NURSE SPECIALIST

## 2023-07-20 PROCEDURE — 80053 COMPREHEN METABOLIC PANEL: CPT | Performed by: CLINICAL NURSE SPECIALIST

## 2023-07-20 PROCEDURE — 87186 SC STD MICRODIL/AGAR DIL: CPT | Performed by: CLINICAL NURSE SPECIALIST

## 2023-07-20 PROCEDURE — 96367 TX/PROPH/DG ADDL SEQ IV INF: CPT

## 2023-07-20 PROCEDURE — 99285 EMERGENCY DEPT VISIT HI MDM: CPT | Mod: 25

## 2023-07-20 PROCEDURE — 25000003 PHARM REV CODE 250: Performed by: CLINICAL NURSE SPECIALIST

## 2023-07-20 PROCEDURE — 83690 ASSAY OF LIPASE: CPT | Performed by: CLINICAL NURSE SPECIALIST

## 2023-07-20 PROCEDURE — 36415 COLL VENOUS BLD VENIPUNCTURE: CPT | Performed by: CLINICAL NURSE SPECIALIST

## 2023-07-20 PROCEDURE — 63600175 PHARM REV CODE 636 W HCPCS: Performed by: CLINICAL NURSE SPECIALIST

## 2023-07-20 PROCEDURE — 87635 SARS-COV-2 COVID-19 AMP PRB: CPT | Performed by: CLINICAL NURSE SPECIALIST

## 2023-07-20 PROCEDURE — 96361 HYDRATE IV INFUSION ADD-ON: CPT

## 2023-07-20 PROCEDURE — 87040 BLOOD CULTURE FOR BACTERIA: CPT | Performed by: CLINICAL NURSE SPECIALIST

## 2023-07-20 PROCEDURE — 83880 ASSAY OF NATRIURETIC PEPTIDE: CPT | Performed by: CLINICAL NURSE SPECIALIST

## 2023-07-20 RX ORDER — ONDANSETRON 2 MG/ML
4 INJECTION INTRAMUSCULAR; INTRAVENOUS EVERY 8 HOURS PRN
Status: DISCONTINUED | OUTPATIENT
Start: 2023-07-20 | End: 2023-07-24 | Stop reason: HOSPADM

## 2023-07-20 RX ORDER — TAMSULOSIN HYDROCHLORIDE 0.4 MG/1
0.4 CAPSULE ORAL NIGHTLY
Status: DISCONTINUED | OUTPATIENT
Start: 2023-07-20 | End: 2023-07-24 | Stop reason: HOSPADM

## 2023-07-20 RX ORDER — ACETAMINOPHEN 325 MG/1
650 TABLET ORAL EVERY 8 HOURS PRN
Status: DISCONTINUED | OUTPATIENT
Start: 2023-07-20 | End: 2023-07-24 | Stop reason: HOSPADM

## 2023-07-20 RX ORDER — TALC
6 POWDER (GRAM) TOPICAL NIGHTLY PRN
Status: DISCONTINUED | OUTPATIENT
Start: 2023-07-20 | End: 2023-07-24 | Stop reason: HOSPADM

## 2023-07-20 RX ORDER — CLOPIDOGREL BISULFATE 75 MG/1
75 TABLET ORAL DAILY
Status: DISCONTINUED | OUTPATIENT
Start: 2023-07-21 | End: 2023-07-24 | Stop reason: HOSPADM

## 2023-07-20 RX ORDER — ACETAMINOPHEN 500 MG
1000 TABLET ORAL
Status: COMPLETED | OUTPATIENT
Start: 2023-07-20 | End: 2023-07-20

## 2023-07-20 RX ORDER — MORPHINE SULFATE 2 MG/ML
2 INJECTION, SOLUTION INTRAMUSCULAR; INTRAVENOUS EVERY 4 HOURS PRN
Status: DISCONTINUED | OUTPATIENT
Start: 2023-07-20 | End: 2023-07-21

## 2023-07-20 RX ORDER — SODIUM CHLORIDE 9 MG/ML
1000 INJECTION, SOLUTION INTRAVENOUS CONTINUOUS
Status: ACTIVE | OUTPATIENT
Start: 2023-07-20 | End: 2023-07-21

## 2023-07-20 RX ORDER — CIPROFLOXACIN 2 MG/ML
400 INJECTION, SOLUTION INTRAVENOUS
Status: DISCONTINUED | OUTPATIENT
Start: 2023-07-20 | End: 2023-07-24 | Stop reason: HOSPADM

## 2023-07-20 RX ORDER — ACETAMINOPHEN 500 MG
500 TABLET ORAL EVERY 6 HOURS PRN
Status: DISCONTINUED | OUTPATIENT
Start: 2023-07-20 | End: 2023-07-24 | Stop reason: HOSPADM

## 2023-07-20 RX ORDER — SODIUM CHLORIDE 0.9 % (FLUSH) 0.9 %
10 SYRINGE (ML) INJECTION
Status: DISCONTINUED | OUTPATIENT
Start: 2023-07-20 | End: 2023-07-24 | Stop reason: HOSPADM

## 2023-07-20 RX ADMIN — SODIUM CHLORIDE 1000 ML: 9 INJECTION, SOLUTION INTRAVENOUS at 10:07

## 2023-07-20 RX ADMIN — ACETAMINOPHEN 1000 MG: 500 TABLET ORAL at 05:07

## 2023-07-20 RX ADMIN — CIPROFLOXACIN 400 MG: 2 INJECTION, SOLUTION INTRAVENOUS at 07:07

## 2023-07-20 RX ADMIN — SODIUM CHLORIDE, POTASSIUM CHLORIDE, SODIUM LACTATE AND CALCIUM CHLORIDE 1000 ML: 600; 310; 30; 20 INJECTION, SOLUTION INTRAVENOUS at 07:07

## 2023-07-20 RX ADMIN — PROMETHAZINE HYDROCHLORIDE 25 MG: 25 INJECTION INTRAMUSCULAR; INTRAVENOUS at 05:07

## 2023-07-20 RX ADMIN — ONDANSETRON 4 MG: 2 INJECTION INTRAMUSCULAR; INTRAVENOUS at 10:07

## 2023-07-20 RX ADMIN — SODIUM CHLORIDE 1000 ML: 9 INJECTION, SOLUTION INTRAVENOUS at 05:07

## 2023-07-20 NOTE — LETTER
This communication is flagged as high priority.      July 24, 2023    Dr. Natalie Hector,                [address] [address]  Phone: [phone]  Fax: [phone]   Patient: Hunter Navarrete   MR Number: [id number]   YOB: 1940   Date of Visit: 7/20/2023       Dear Dr. Pinon:    I am referring my patient, Hunter Navarrete, to you for evaluation of hospital stay 7/20/23-7/24/23.    He  has a past medical history of Acid reflux, Anemia, Aortic aneurysm, abdominal, Arthritis, Bronchitis, Carotid stenosis (05/13/2022), Cataract, Celiac artery stenosis, CKD (chronic kidney disease), Coronary artery disease, Encounter for blood transfusion, Enlarged prostate, GERD (gastroesophageal reflux disease), Hemorrhoids, Hypercholesteremia, Hypertension, Iron deficiency anemia, unspecified, Leaky heart valve, Lupus anticoagulant disorder, Mood disorder, Parkinson's disease, Renal artery stenosis, Restless leg syndrome, Skin cancer, Unspecified chronic bronchitis, and UTI (urinary tract infection). His  has a past surgical history that includes Knee surgery (Right); Shoulder surgery (Bilateral); Hip surgery (Right); CATARACT SX (Bilateral); CARDIAC ANGIOGRAM WITH STENT; Back surgery; Left heart catheterization (Left, 07/22/2021); Spinal cord stimulator implant; ACF; Left heart catheterization (Left, 09/22/2022); Bone marrow biopsy; Coronary angiography (N/A, 02/15/2023); Coronary Artery Bypass Graft (CABG) (N/A, 02/24/2023); steroid injections (Right, 06/13/2023); Cystoscopy w/ retrogrades (Bilateral, 6/20/2023); Cystoscopy w/ retrogrades (Right, 6/22/2023); Ureteroscopy (Right, 6/22/2023); Biopsy of ureter (Right, 6/22/2023); and stent, ureteral (Left, 6/22/2023). He  reports that he quit smoking about 21 years ago. His smoking use included cigarettes. He has never used smokeless tobacco. He reports current alcohol use. He reports that he does not use drugs.    He has a current medication  list which includes the following prescription(s): atorvastatin, calcium carbonate-vitamin d3, carvedilol, clopidogrel, docusate sodium, duloxetine, ezetimibe, fluticasone propionate, hydrocodone-acetaminophen, ropinirole, ciprofloxacin hcl, [START ON 7/25/2023] furosemide, prednisone, and tamsulosin, and the following Facility-Administered Medications: acetaminophen, acetaminophen, atorvastatin, calcium-vitamin d3, carvedilol, ciprofloxacin (cipro)400mg/200ml d5w ivpb, clopidogrel, docusate sodium, duloxetine, ezetimibe, fluticasone propionate, furosemide, hydrocodone-acetaminophen, losartan, melatonin, ondansetron, ondansetron, prednisone, ropinirole, sodium chloride 0.9%, and tamsulosin. He is allergic to cardura [doxazosin], lyrica [pregabalin], paxil [paroxetine hcl], restoril [temazepam], vioxx [rofecoxib], zithromax [azithromycin], alpha 1 blocker- quinazolines, and benzodiazepines.    I appreciate your assistance in his care and look forward to your findings and recommendations.    Sincerely,                           Ambrosio Knott RN Case Manager  Ph. [phone]  Fx. [phone]  Email. [email or web address]

## 2023-07-20 NOTE — ED PROVIDER NOTES
Encounter Date: 7/20/2023       History     Chief Complaint   Patient presents with    Vomiting     Vomiting since yesterday with dizziness. Denies diarrhea, fever, or any other symptoms     83-year-old male presents to the emergency room with vomiting since yesterday with dizziness, weakness.  Symptoms.  Temperature in triage was 101.1, no medication taken prior to arrival.      Review of patient's allergies indicates:   Allergen Reactions    Cardura [doxazosin] Hives    Lyrica [pregabalin] Other (See Comments)    Paxil [paroxetine hcl] Other (See Comments)    Restoril [temazepam] Hallucinations    Vioxx [rofecoxib] Swelling    Zithromax [azithromycin] Hives    Alpha 1 blocker- quinazolines     Benzodiazepines      Past Medical History:   Diagnosis Date    Acid reflux     Anemia     Aortic aneurysm, abdominal     Arthritis     Bronchitis     Carotid stenosis 05/13/2022    60-79% Right ICA 60-70% Left ICA    Cataract     Celiac artery stenosis     50% by CTA abdomen 7/27/2020    CKD (chronic kidney disease)     45% FUNCTION    Coronary artery disease     Encounter for blood transfusion     Enlarged prostate     GERD (gastroesophageal reflux disease)     Hemorrhoids     Hypercholesteremia     Hypertension     Iron deficiency anemia, unspecified     Leaky heart valve     Lupus anticoagulant disorder     Mood disorder     Parkinson's disease     Renal artery stenosis     by CTA 7/27/2020    Restless leg syndrome     Skin cancer     Unspecified chronic bronchitis     UTI (urinary tract infection)      Past Surgical History:   Procedure Laterality Date    ACF      Anterior Cervical Fusion    BACK SURGERY      RODS IN BACK; 4 SURGIERIES    BIOPSY OF URETER Right 6/22/2023    Procedure: RESECTION/BIOPSY, URETER;  Surgeon: Aaron Pinon MD;  Location: [facility];  Service: Urology;  Laterality: Right;    BONE MARROW BIOPSY      CARDIAC ANGIOGRAM WITH STENT      CATARACT SX Bilateral     CORONARY ANGIOGRAPHY N/A 02/15/2023     Procedure: ANGIOGRAM, CORONARY ARTERY;  Surgeon: Rito Vega MD;  Location: [facility] CATH;  Service: Cardiology;  Laterality: N/A;    CORONARY ARTERY BYPASS GRAFT (CABG) N/A 02/24/2023    Procedure: CORONARY ARTERY BYPASS GRAFT (CABG);  Surgeon: Spencer Hagan MD;  Location: [facility] OR;  Service: Cardiothoracic;  Laterality: N/A;  CABG / EVH //   ECHO NOTIFIED    CYSTOSCOPY W/ RETROGRADES Bilateral 6/20/2023    Procedure: CYSTOSCOPY WITH RETROGRADE PYELOGRAM;  Surgeon: Aaron Pinon MD;  Location: [facility] OR;  Service: Urology;  Laterality: Bilateral;  Pt has a Spinal Cord Stimulator  9am per Revee, To follow RM4    CYSTOSCOPY W/ RETROGRADES Right 6/22/2023    Procedure: CYSTOSCOPY, WITH RETROGRADE PYELOGRAM;  Surgeon: Aaron Pinon MD;  Location: [facility] OR;  Service: Urology;  Laterality: Right;    HIP SURGERY Right     THR    KNEE SURGERY Right     ATS    LEFT HEART CATHETERIZATION Left 07/22/2021    Procedure: Left heart cath;  Surgeon: Curtis Loredo MD;  Location: [facility] CATH;  Service: Cardiology;  Laterality: Left;    LEFT HEART CATHETERIZATION Left 09/22/2022    Procedure: Left heart cath;  Surgeon: Giorgi Barker MD;  Location: [facility] CATH;  Service: Cardiology;  Laterality: Left;    SHOULDER SURGERY Bilateral     SPINAL CORD STIMULATOR IMPLANT      STENT, URETERAL Left 6/22/2023    Procedure: STENT, URETERAL;  Surgeon: Aaron Pinon MD;  Location: [facility] OR;  Service: Urology;  Laterality: Left;    steroid injections Right 06/13/2023    Knee    URETEROSCOPY Right 6/22/2023    Procedure: URETEROSCOPY;  Surgeon: Aaron Pinon MD;  Location: [facility] OR;  Service: Urology;  Laterality: Right;     Family History   Problem Relation Age of Onset    Heart disease Father     Heart disease Sister     Lung cancer Brother     Throat cancer Brother     Cancer Brother     Cancer Brother     Heart disease Brother         PPM    Heart disease Brother     Heart disease Brother      Social History     Tobacco Use    Smoking  status: Former     Types: Cigarettes     Quit date:      Years since quittin.5    Smokeless tobacco: Never   Substance Use Topics    Alcohol use: Yes     Comment: RARELY    Drug use: No     Review of Systems   Constitutional:  Positive for activity change, appetite change, fatigue and fever.   HENT:  Negative for sore throat.    Respiratory:  Negative for shortness of breath.    Cardiovascular:  Negative for chest pain.   Gastrointestinal:  Positive for abdominal pain. Negative for nausea.   Genitourinary:  Negative for dysuria.   Musculoskeletal:  Negative for back pain.   Skin:  Negative for rash.   Neurological:  Positive for dizziness and weakness.   Hematological:  Does not bruise/bleed easily.   All other systems reviewed and are negative.    Physical Exam     Initial Vitals [23 1655]   BP Pulse Resp Temp SpO2   (!) 121/57 104 18 (!) 101.1 °F (38.4 °C) 95 %      MAP       --         Physical Exam    Nursing note and vitals reviewed.  Constitutional: He appears well-developed and well-nourished.   HENT:   Head: Normocephalic and atraumatic.   Eyes: Pupils are equal, round, and reactive to light.   Cardiovascular:  Normal rate and regular rhythm.           Pulmonary/Chest: Breath sounds normal.   Abdominal: Abdomen is soft. Bowel sounds are normal.   Musculoskeletal:         General: Normal range of motion.     Neurological: He is alert and oriented to person, place, and time.   Psychiatric: He has a normal mood and affect.       ED Course   Critical Care    Date/Time: 2023 7:54 PM  Performed by: Salomón Lemus MD  Authorized by: Salomón Lemus MD   Direct patient critical care time: 15 minutes  Additional history critical care time: 10 minutes  Ordering / reviewing critical care time: 10 minutes  Other critical care time: 10 minutes  Total critical care time (exclusive of procedural time) : 45 minutes      Labs Reviewed   NT-PRO NATRIURETIC PEPTIDE - Abnormal; Notable for the following  components:       Result Value    NT-proBNP 31394 (*)     All other components within normal limits   CBC W/ AUTO DIFFERENTIAL - Abnormal; Notable for the following components:    WBC 21.85 (*)     RBC 3.42 (*)     Hemoglobin 10.5 (*)     Hematocrit 32.2 (*)     RDW 16.2 (*)     Platelets 128 (*)     Immature Granulocytes 4.5 (*)     Gran # (ANC) 16.1 (*)     Immature Grans (Abs) 0.99 (*)     Mono # 3.4 (*)     Gran % 73.7 (*)     Lymph % 6.0 (*)     Mono % 15.7 (*)     All other components within normal limits   COMPREHENSIVE METABOLIC PANEL - Abnormal; Notable for the following components:    Sodium 135 (*)     Glucose 132 (*)     BUN 34 (*)     Creatinine 1.9 (*)     Calcium 8.6 (*)     Albumin 3.0 (*)     Total Bilirubin 1.1 (*)     eGFR 34.8 (*)     Anion Gap 5 (*)     All other components within normal limits   URINALYSIS, REFLEX TO URINE CULTURE - Abnormal; Notable for the following components:    Appearance, UA Cloudy (*)     Protein, UA 2+ (*)     Occult Blood UA 2+ (*)     Nitrite, UA Positive (*)     Leukocytes, UA 3+ (*)     All other components within normal limits    Narrative:     Preferred Collection Type->Urine, Clean Catch  Specimen Source->Urine   URINALYSIS MICROSCOPIC - Abnormal; Notable for the following components:    RBC, UA 25 (*)     WBC, UA >100 (*)     Hyaline Casts, UA 0.0 (*)     All other components within normal limits    Narrative:     Preferred Collection Type->Urine, Clean Catch  Specimen Source->Urine   CULTURE, BLOOD   CULTURE, URINE   CULTURE, BLOOD   LIPASE   LACTIC ACID, PLASMA   SARS-COV-2 RDRP GENE    Narrative:     This test utilizes isothermal nucleic acid amplification technology to detect the SARS-CoV-2 RdRp nucleic acid segment. The analytical sensitivity (limit of detection) is 500 copies/swab.     A POSITIVE result is indicative of the presence of SARS-CoV-2 RNA; clinical correlation with patient history and other diagnostic information is necessary to determine  patient infection status.    A NEGATIVE result means that SARS-CoV-2 nucleic acids are not present above the limit of detection. A NEGATIVE result should be treated as presumptive. It does not rule out the possibility of COVID-19 and should not be the sole basis for treatment decisions. If COVID-19 is strongly suspected based on clinical and exposure history, re-testing using an alternate molecular assay should be considered.     This test is only for use under the Food and Drug Administration s Emergency Use Authorization (EUA).     Commercial kits are provided by [employer]. Performance characteristics of the EUA have been independently verified by [facility] Department of Pathology and Laboratory Medicine.   _________________________________________________________________   The authorized Fact Sheet for Healthcare Providers and the authorized Fact Sheet for Patients of the ID NOW COVID-19 are available on the FDA website:    https://www.fda.gov/media/125351/download      https://www.fda.gov/media/974461/download      POCT INFLUENZA A/B MOLECULAR          Imaging Results              CT Abdomen Pelvis  Without Contrast (In process)                      X-Ray Chest AP Portable (Final result)  Result time 07/20/23 17:10:30      Final result by Shawn Mcclelland MD (07/20/23 17:10:30)                   Impression:      No evidence of acute disease.      Electronically signed by: Shawn Mcclelland MD  Date:    07/20/2023  Time:    17:10               Narrative:    EXAMINATION:  XR CHEST AP PORTABLE    CLINICAL HISTORY:  Fever, unspecified    COMPARISON:  Portable chest 06/23/2023.    FINDINGS:  Frontal chest radiograph demonstrates fine linear opacities in both lungs.  No consolidation.  No pleural fluid.  Cardiac silhouette is normal in size.  Prior cardiac surgery.  Neurostimulator device with leads in the lower thoracic spine.  Multilevel thoracolumbar fusion.                                        Medications   lactated ringers bolus 1,000 mL (1,000 mLs Intravenous New Bag 7/20/23 1931)   ciprofloxacin (CIPRO)400mg/200ml D5W IVPB 400 mg (400 mg Intravenous New Bag 7/20/23 1931)   sodium chloride 0.9% bolus 1,000 mL 1,000 mL (1,000 mLs Intravenous New Bag 7/20/23 1721)   promethazine (PHENERGAN) 25 mg in dextrose 5 % (D5W) 50 mL IVPB (0 mg Intravenous Stopped 7/20/23 1737)   acetaminophen tablet 1,000 mg (1,000 mg Oral Given 7/20/23 1720)     Medical Decision Making:   Differential Diagnosis:   Sepsis, COVID, flu, dehydration  Clinical Tests:   Lab Tests: Ordered and Reviewed  Radiological Study: Ordered and Reviewed          Attending Attestation:             Attending ED Notes:   I personally provided a substantive portion of the patient care.  I had a face-to-face evaluation of the patient independently of the advanced practitioner and personally supervised the care of this patient.  I was directly involved with collection of the patient's history, physical exam and the medical decision making.  I personally reviewed the orders and interpreted the results.  I developed and agree with the care plan and management decisions for this patient in conjunction with the advanced practitioner.  I agree with the documentation in this record by the advanced practitioner.  My pertinent history, PE and MDM documented below.      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  Patient found to be febrile tachycardic.  Dry mucous membrane.  Labs and imaging shows right-sided hydronephrosis with some fat stranding.  Bladder wall thickening.  Labs consistent with UTI.  Possible pyelonephritis.  Patient was discharged recently from outside facility with urine culture showing Pseudomonas UTI.  Will continue ciprofloxacin for now until cultures comes back.  Lactic normal.  Will continue  hydrating.  Patient will for IV antibiotics and further hydration.  BNP and elevated but chest x-ray does not show any pulmonary edema.  Nonspecific.  Possibly be related to kidney issues                 Clinical Impression:   Final diagnoses:  [R50.9] Fever  [R11.2] Nausea and vomiting, unspecified vomiting type  [N30.01] Acute cystitis with hematuria  [N28.9] Renal insufficiency  [N12] Pyelonephritis (Primary)        ED Disposition Condition    Admit Fair                Salomón Lemus MD  07/20/23 1957

## 2023-07-20 NOTE — LETTER
This communication is flagged as high priority.      July 24, 2023    Dr. Natalie Hector                [address] [address]  Phone: [phone]  Fax: [phone]   Patient: Hunter Navarrete   MR Number: [id number]   YOB: 1940   Date of Visit: 7/20/2023       Dear Dr. LI Bethea:    I am referring my patient, Hunter Navarrete, to you for evaluation of hospital stay 7/20/23-7/24/23.    He  has a past medical history of Acid reflux, Anemia, Aortic aneurysm, abdominal, Arthritis, Bronchitis, Carotid stenosis (05/13/2022), Cataract, Celiac artery stenosis, CKD (chronic kidney disease), Coronary artery disease, Encounter for blood transfusion, Enlarged prostate, GERD (gastroesophageal reflux disease), Hemorrhoids, Hypercholesteremia, Hypertension, Iron deficiency anemia, unspecified, Leaky heart valve, Lupus anticoagulant disorder, Mood disorder, Parkinson's disease, Renal artery stenosis, Restless leg syndrome, Skin cancer, Unspecified chronic bronchitis, and UTI (urinary tract infection). His  has a past surgical history that includes Knee surgery (Right); Shoulder surgery (Bilateral); Hip surgery (Right); CATARACT SX (Bilateral); CARDIAC ANGIOGRAM WITH STENT; Back surgery; Left heart catheterization (Left, 07/22/2021); Spinal cord stimulator implant; ACF; Left heart catheterization (Left, 09/22/2022); Bone marrow biopsy; Coronary angiography (N/A, 02/15/2023); Coronary Artery Bypass Graft (CABG) (N/A, 02/24/2023); steroid injections (Right, 06/13/2023); Cystoscopy w/ retrogrades (Bilateral, 6/20/2023); Cystoscopy w/ retrogrades (Right, 6/22/2023); Ureteroscopy (Right, 6/22/2023); Biopsy of ureter (Right, 6/22/2023); and stent, ureteral (Left, 6/22/2023). He  reports that he quit smoking about 21 years ago. His smoking use included cigarettes. He has never used smokeless tobacco. He reports current alcohol use. He reports that he does not use drugs.    He has a current medication  list which includes the following prescription(s): atorvastatin, calcium carbonate-vitamin d3, carvedilol, clopidogrel, docusate sodium, duloxetine, ezetimibe, fluticasone propionate, hydrocodone-acetaminophen, ropinirole, ciprofloxacin hcl, [START ON 7/25/2023] furosemide, prednisone, and tamsulosin, and the following Facility-Administered Medications: acetaminophen, acetaminophen, atorvastatin, calcium-vitamin d3, carvedilol, ciprofloxacin (cipro)400mg/200ml d5w ivpb, clopidogrel, docusate sodium, duloxetine, ezetimibe, fluticasone propionate, furosemide, hydrocodone-acetaminophen, losartan, melatonin, ondansetron, ondansetron, prednisone, ropinirole, sodium chloride 0.9%, and tamsulosin. He is allergic to cardura [doxazosin], lyrica [pregabalin], paxil [paroxetine hcl], restoril [temazepam], vioxx [rofecoxib], zithromax [azithromycin], alpha 1 blocker- quinazolines, and benzodiazepines.    I appreciate your assistance in his care and look forward to your findings and recommendations.    Sincerely,                           Ambrosio Knott RN Case Manager  Ph. [phone]  Fx. [phone]  Email. [email or web address]

## 2023-07-21 PROBLEM — Z79.899 ON DEEP VEIN THROMBOSIS (DVT) PROPHYLAXIS: Status: ACTIVE | Noted: 2023-07-21

## 2023-07-21 PROBLEM — N17.9 AKI (ACUTE KIDNEY INJURY): Status: ACTIVE | Noted: 2023-07-21

## 2023-07-21 PROBLEM — R53.1 WEAKNESS: Status: ACTIVE | Noted: 2023-07-21

## 2023-07-21 LAB
ALBUMIN SERPL BCP-MCNC: 2.5 G/DL (ref 3.5–5.2)
ALP SERPL-CCNC: 51 U/L (ref 55–135)
ALT SERPL W/O P-5'-P-CCNC: 15 U/L (ref 10–44)
ANION GAP SERPL CALC-SCNC: 7 MMOL/L (ref 8–16)
AST SERPL-CCNC: 10 U/L (ref 10–40)
BASOPHILS # BLD AUTO: ABNORMAL K/UL (ref 0–0.2)
BASOPHILS NFR BLD: 0 % (ref 0–1.9)
BILIRUB SERPL-MCNC: 1.2 MG/DL (ref 0.1–1)
BUN SERPL-MCNC: 35 MG/DL (ref 8–23)
CALCIUM SERPL-MCNC: 7.7 MG/DL (ref 8.7–10.5)
CHLORIDE SERPL-SCNC: 109 MMOL/L (ref 95–110)
CO2 SERPL-SCNC: 24 MMOL/L (ref 23–29)
CREAT SERPL-MCNC: 1.8 MG/DL (ref 0.5–1.4)
DIFFERENTIAL METHOD: ABNORMAL
EOSINOPHIL # BLD AUTO: ABNORMAL K/UL (ref 0–0.5)
EOSINOPHIL NFR BLD: 0 % (ref 0–8)
ERYTHROCYTE [DISTWIDTH] IN BLOOD BY AUTOMATED COUNT: 16.4 % (ref 11.5–14.5)
EST. GFR  (NO RACE VARIABLE): 37.1 ML/MIN/1.73 M^2
GLUCOSE SERPL-MCNC: 105 MG/DL (ref 70–110)
HCT VFR BLD AUTO: 30.2 % (ref 40–54)
HGB BLD-MCNC: 9.7 G/DL (ref 14–18)
IMM GRANULOCYTES # BLD AUTO: ABNORMAL K/UL (ref 0–0.04)
IMM GRANULOCYTES NFR BLD AUTO: ABNORMAL % (ref 0–0.5)
LYMPHOCYTES # BLD AUTO: ABNORMAL K/UL (ref 1–4.8)
LYMPHOCYTES NFR BLD: 8 % (ref 18–48)
MCH RBC QN AUTO: 30.9 PG (ref 27–31)
MCHC RBC AUTO-ENTMCNC: 32.1 G/DL (ref 32–36)
MCV RBC AUTO: 96 FL (ref 82–98)
MONOCYTES # BLD AUTO: ABNORMAL K/UL (ref 0.3–1)
MONOCYTES NFR BLD: 12 % (ref 4–15)
NEUTROPHILS NFR BLD: 73 % (ref 38–73)
NEUTS BAND NFR BLD MANUAL: 7 %
NRBC BLD-RTO: 0 /100 WBC
PLATELET # BLD AUTO: 99 K/UL (ref 150–450)
PMV BLD AUTO: 12.1 FL (ref 9.2–12.9)
POTASSIUM SERPL-SCNC: 3.6 MMOL/L (ref 3.5–5.1)
PROT SERPL-MCNC: 6.5 G/DL (ref 6–8.4)
RBC # BLD AUTO: 3.14 M/UL (ref 4.6–6.2)
SODIUM SERPL-SCNC: 140 MMOL/L (ref 136–145)
WBC # BLD AUTO: 21 K/UL (ref 3.9–12.7)

## 2023-07-21 PROCEDURE — 63600175 PHARM REV CODE 636 W HCPCS

## 2023-07-21 PROCEDURE — 97166 OT EVAL MOD COMPLEX 45 MIN: CPT

## 2023-07-21 PROCEDURE — 80053 COMPREHEN METABOLIC PANEL: CPT | Performed by: STUDENT IN AN ORGANIZED HEALTH CARE EDUCATION/TRAINING PROGRAM

## 2023-07-21 PROCEDURE — 36415 COLL VENOUS BLD VENIPUNCTURE: CPT | Performed by: STUDENT IN AN ORGANIZED HEALTH CARE EDUCATION/TRAINING PROGRAM

## 2023-07-21 PROCEDURE — 25000003 PHARM REV CODE 250

## 2023-07-21 PROCEDURE — 85007 BL SMEAR W/DIFF WBC COUNT: CPT | Performed by: STUDENT IN AN ORGANIZED HEALTH CARE EDUCATION/TRAINING PROGRAM

## 2023-07-21 PROCEDURE — 21400001 HC TELEMETRY ROOM

## 2023-07-21 PROCEDURE — 85027 COMPLETE CBC AUTOMATED: CPT | Performed by: STUDENT IN AN ORGANIZED HEALTH CARE EDUCATION/TRAINING PROGRAM

## 2023-07-21 PROCEDURE — 97162 PT EVAL MOD COMPLEX 30 MIN: CPT

## 2023-07-21 PROCEDURE — 25000242 PHARM REV CODE 250 ALT 637 W/ HCPCS

## 2023-07-21 PROCEDURE — 25000003 PHARM REV CODE 250: Performed by: STUDENT IN AN ORGANIZED HEALTH CARE EDUCATION/TRAINING PROGRAM

## 2023-07-21 PROCEDURE — 63600175 PHARM REV CODE 636 W HCPCS: Performed by: STUDENT IN AN ORGANIZED HEALTH CARE EDUCATION/TRAINING PROGRAM

## 2023-07-21 RX ORDER — FLUTICASONE PROPIONATE 50 MCG
1 SPRAY, SUSPENSION (ML) NASAL 2 TIMES DAILY
Status: DISCONTINUED | OUTPATIENT
Start: 2023-07-21 | End: 2023-07-24 | Stop reason: HOSPADM

## 2023-07-21 RX ORDER — ROPINIROLE 1 MG/1
1 TABLET, FILM COATED ORAL 2 TIMES DAILY
Status: DISCONTINUED | OUTPATIENT
Start: 2023-07-21 | End: 2023-07-24 | Stop reason: HOSPADM

## 2023-07-21 RX ORDER — HYDROCODONE BITARTRATE AND ACETAMINOPHEN 7.5; 325 MG/1; MG/1
1 TABLET ORAL EVERY 8 HOURS PRN
Status: DISCONTINUED | OUTPATIENT
Start: 2023-07-21 | End: 2023-07-24 | Stop reason: HOSPADM

## 2023-07-21 RX ORDER — ATORVASTATIN CALCIUM 40 MG/1
40 TABLET, FILM COATED ORAL DAILY
Status: DISCONTINUED | OUTPATIENT
Start: 2023-07-21 | End: 2023-07-24 | Stop reason: HOSPADM

## 2023-07-21 RX ORDER — PREDNISONE 5 MG/1
5 TABLET ORAL DAILY
Status: DISCONTINUED | OUTPATIENT
Start: 2023-07-21 | End: 2023-07-24 | Stop reason: HOSPADM

## 2023-07-21 RX ORDER — DOCUSATE SODIUM 100 MG/1
100 CAPSULE, LIQUID FILLED ORAL 2 TIMES DAILY
Status: DISCONTINUED | OUTPATIENT
Start: 2023-07-21 | End: 2023-07-24 | Stop reason: HOSPADM

## 2023-07-21 RX ORDER — FERROUS SULFATE, DRIED 160(50) MG
1 TABLET, EXTENDED RELEASE ORAL DAILY
Status: DISCONTINUED | OUTPATIENT
Start: 2023-07-21 | End: 2023-07-24 | Stop reason: HOSPADM

## 2023-07-21 RX ORDER — EZETIMIBE 10 MG/1
10 TABLET ORAL DAILY
Status: DISCONTINUED | OUTPATIENT
Start: 2023-07-21 | End: 2023-07-24 | Stop reason: HOSPADM

## 2023-07-21 RX ORDER — CARVEDILOL 12.5 MG/1
12.5 TABLET ORAL 2 TIMES DAILY
Status: DISCONTINUED | OUTPATIENT
Start: 2023-07-21 | End: 2023-07-24 | Stop reason: HOSPADM

## 2023-07-21 RX ORDER — FUROSEMIDE 20 MG/1
20 TABLET ORAL DAILY
Status: DISCONTINUED | OUTPATIENT
Start: 2023-07-21 | End: 2023-07-24 | Stop reason: HOSPADM

## 2023-07-21 RX ORDER — DULOXETIN HYDROCHLORIDE 30 MG/1
30 CAPSULE, DELAYED RELEASE ORAL DAILY
Status: DISCONTINUED | OUTPATIENT
Start: 2023-07-21 | End: 2023-07-24 | Stop reason: HOSPADM

## 2023-07-21 RX ADMIN — CARVEDILOL 12.5 MG: 12.5 TABLET, FILM COATED ORAL at 09:07

## 2023-07-21 RX ADMIN — PREDNISONE 5 MG: 5 TABLET ORAL at 09:07

## 2023-07-21 RX ADMIN — CARVEDILOL 12.5 MG: 12.5 TABLET, FILM COATED ORAL at 08:07

## 2023-07-21 RX ADMIN — FLUTICASONE PROPIONATE 50 MCG: 50 SPRAY, METERED NASAL at 09:07

## 2023-07-21 RX ADMIN — ATORVASTATIN CALCIUM 40 MG: 40 TABLET, FILM COATED ORAL at 09:07

## 2023-07-21 RX ADMIN — ROPINIROLE HYDROCHLORIDE 1 MG: 1 TABLET, FILM COATED ORAL at 08:07

## 2023-07-21 RX ADMIN — CIPROFLOXACIN 400 MG: 2 INJECTION, SOLUTION INTRAVENOUS at 07:07

## 2023-07-21 RX ADMIN — FUROSEMIDE 20 MG: 20 TABLET ORAL at 09:07

## 2023-07-21 RX ADMIN — DULOXETINE 30 MG: 30 CAPSULE, DELAYED RELEASE ORAL at 09:07

## 2023-07-21 RX ADMIN — Medication 1 TABLET: at 09:07

## 2023-07-21 RX ADMIN — ROPINIROLE HYDROCHLORIDE 1 MG: 1 TABLET, FILM COATED ORAL at 09:07

## 2023-07-21 RX ADMIN — TAMSULOSIN HYDROCHLORIDE 0.4 MG: 0.4 CAPSULE ORAL at 08:07

## 2023-07-21 RX ADMIN — FLUTICASONE PROPIONATE 50 MCG: 50 SPRAY, METERED NASAL at 08:07

## 2023-07-21 RX ADMIN — DOCUSATE SODIUM 100 MG: 100 CAPSULE, LIQUID FILLED ORAL at 09:07

## 2023-07-21 RX ADMIN — DOCUSATE SODIUM 100 MG: 100 CAPSULE, LIQUID FILLED ORAL at 08:07

## 2023-07-21 RX ADMIN — HYDROCODONE BITARTRATE AND ACETAMINOPHEN 1 TABLET: 7.5; 325 TABLET ORAL at 11:07

## 2023-07-21 RX ADMIN — EZETIMIBE 10 MG: 10 TABLET ORAL at 09:07

## 2023-07-21 RX ADMIN — CLOPIDOGREL BISULFATE 75 MG: 75 TABLET ORAL at 08:07

## 2023-07-21 NOTE — HPI
ED HPI:  83-year-old male presents to the emergency room with vomiting since yesterday with dizziness, weakness.  Symptoms.  Temperature in triage was 101.1, no medication taken prior to arrival.    IM HPI:  Agree with above.  Patient reports fever, decreased appetite, nausea, vomiting, abdominal discomfort for the last few days.  Patient states he had urinary stent placed in mid to late June of this year.  Patient reports hospitalization for approximately 5 days at the end of June are sepsis secondary to pyelonephritis.  Patient states he received IV antibiotics while admitted and was discharged home with 5 day course of Cipro.  Patient currently receiving IV Cipro.  Patient's blood and urine cultures pending.  Patient's white blood cell count slightly improved this morning.  BUN and creatinine remain elevated.  Vital signs stable.  Patient afebrile since approximately 1800 hours yesterday.  Will continue current treatment pending blood in urine final sensitivity report.

## 2023-07-21 NOTE — ED NOTES
RN called medsurg to give report on patient. RN was instructed that med surg would call back to receive report.   Cardiac catherization with possible intervention

## 2023-07-21 NOTE — PT/OT/SLP EVAL
Physical Therapy Evaluation    Patient Name:  Hunter Navarrete   MRN:  [id number]      Recommendations:     Discharge Recommendations: other (see comments) (Post-acute care facilities)   Discharge Equipment Recommendations: none (Patient has his own DME)   Barriers to discharge: Decreased caregiver support and lives alone by himself      Assessment:     Hunter Navarrete is a 83 y.o. male admitted with a medical diagnosis of Pyelonephritis, acute.  He presents with the following impairments/functional limitations: weakness, impaired endurance, impaired functional mobility, gait instability, impaired balance, decreased upper extremity function, decreased lower extremity function, decreased ROM, impaired cardiopulmonary response to activity. Patient performed bed mobility with contact guard assist and minimum assistance during supine <> sit EOB transfer from PT. Patient ambulated 125 feet with rolling walker, contact guard assist from PT, marked forward trunk posture and adequate foot clearance on each LE. Patient and his daughter reports that he uses a rollator more often at home since he can take a siting rest break whenever he can. Patient's daughter vocalized that patient walks faster with a rollator. PT requested patient to bring rollator to the hospital so that PT can perform gait analysis to ensure patients safety when performing ambulation activities. Patient will benefit from skilled PT services to address his current deficits and in preparation for his future discharge.    Rehab Prognosis: Good; patient would benefit from acute skilled PT services to address these deficits and reach maximum level of function.    Recent Surgery: * No surgery found *        Plan:     During this hospitalization, patient to be seen 5 x/week to address the identified rehab impairments via gait training, therapeutic activities, therapeutic exercises, neuromuscular re-education and progress toward the following goals:    Plan of Care  Expires:  07/28/23      Subjective     Chief Complaint: My back is hurting  Patient/Family Comments/goals: I want to go home  Pain/Comfort:  Pain Rating 1: 4/10  Location - Side 1: Bilateral  Location - Orientation 1: posterior  Location 1: back  Pain Addressed 1: Reposition  Pain Rating Post-Intervention 1: 4/10    Patients cultural, spiritual, Episcopal conflicts given the current situation: no    Living Environment:  - Patient lives in a single story home by himself without any steps with home healthcare services that comes once a week (nurse, PT and OT services). Patient also has a niece that visits him often.    Prior to admission, patients level of function was independent in performing all his ADLs.  Equipment used at home: walker, rolling, rollator, bedside commode, nebulizer, blood pressure machine, cane, quad, cane, straight, wheelchair.  DME owned (not currently used): none.  Upon discharge, patient will have assistance from his niece.      Objective:     Communicated with nurse prior to session.  Patient found HOB elevated with peripheral IV, pulse ox (continuous)  upon PT entry to room.    General Precautions: Standard, fall  Orthopedic Precautions:N/A   Braces: N/A  Respiratory Status: Room air    Exams:  Cognitive Exam:  Patient is oriented to Person, Place, Time, and Situation  Postural Exam:  Patient presented with the following abnormalities:    -       Rounded shoulders  -       Forward head  -       Anterior pelvic tilt  -       Abnormal trunk flexion  -       Kyphosis  RLE ROM: WFL  RLE Strength: WFL  LLE ROM: WFL  LLE Strength: WFL    Functional Mobility:  Bed Mobility:     Rolling Left:  contact guard assistance  Rolling Right: contact guard assistance  Scooting: contact guard assistance  Bridging: contact guard assistance  Supine to Sit: minimum assistance  Sit to Supine: contact guard assistance  Transfers:     Sit to Stand:  contact guard assistance with rolling walker  Gait: Patient  ambulated 125 feet with rolling walker and contact guard assist from PT with adequate foot clearance on each LE    AM-PAC 6 CLICK MOBILITY  Total Score:18     Treatment & Education:  - PT provided patient education about exercise importance and benefits and patient increase falls risk status  - Patient ambulated 125 feet with rolling walker and contact guard assist with increased forward trunk posture    Patient left HOB elevated with all lines intact, call button in reach, and daughter present.    GOALS:   Multidisciplinary Problems       Physical Therapy Goals          Problem: Physical Therapy    Goal Priority Disciplines Outcome Goal Variances Interventions   Physical Therapy Goal     PT, PT/OT Ongoing, Progressing     Description: Goals to be met by: 2023     Patient will increase functional independence with mobility by performin. Supine to sit with Modified [place]  2. Sit to supine with Modified [place]  3. Rolling to Left and Right with Modified [place].  4. Sit to stand transfer with Modified [place]  5. Bed to chair transfer with Modified independence using Rolling Walker  6. Gait x 200 feet with Modified [place] using Rolling Walker.                          History:     Past Medical History:   Diagnosis Date    Acid reflux     Anemia     Aortic aneurysm, abdominal     Arthritis     Bronchitis     Carotid stenosis 2022    60-79% Right ICA 60-70% Left ICA    Cataract     Celiac artery stenosis     50% by CTA abdomen 2020    CKD (chronic kidney disease)     45% FUNCTION    Coronary artery disease     Encounter for blood transfusion     Enlarged prostate     GERD (gastroesophageal reflux disease)     Hemorrhoids     Hypercholesteremia     Hypertension     Iron deficiency anemia, unspecified     Leaky heart valve     Lupus anticoagulant disorder     Mood disorder     Parkinson's disease     Renal artery stenosis     by CTA 2020    Restless leg syndrome      Skin cancer     Unspecified chronic bronchitis     UTI (urinary tract infection)      Past Surgical History:   Procedure Laterality Date    ACF      Anterior Cervical Fusion    BACK SURGERY      RODS IN BACK; 4 SURGIERIES    BIOPSY OF URETER Right 6/22/2023    Procedure: RESECTION/BIOPSY, URETER;  Surgeon: Aaron Pinon MD;  Location: [facility] OR;  Service: Urology;  Laterality: Right;    BONE MARROW BIOPSY      CARDIAC ANGIOGRAM WITH STENT      CATARACT SX Bilateral     CORONARY ANGIOGRAPHY N/A 02/15/2023    Procedure: ANGIOGRAM, CORONARY ARTERY;  Surgeon: Rito Vega MD;  Location: [facility] CATH;  Service: Cardiology;  Laterality: N/A;    CORONARY ARTERY BYPASS GRAFT (CABG) N/A 02/24/2023    Procedure: CORONARY ARTERY BYPASS GRAFT (CABG);  Surgeon: Spencer Hagan MD;  Location: [facility] OR;  Service: Cardiothoracic;  Laterality: N/A;  CABG / EVH //   ECHO NOTIFIED    CYSTOSCOPY W/ RETROGRADES Bilateral 6/20/2023    Procedure: CYSTOSCOPY WITH RETROGRADE PYELOGRAM;  Surgeon: Aaron Pinon MD;  Location: [facility] OR;  Service: Urology;  Laterality: Bilateral;  Pt has a Spinal Cord Stimulator  9am per [place], To follow RM4    CYSTOSCOPY W/ RETROGRADES Right 6/22/2023    Procedure: CYSTOSCOPY, WITH RETROGRADE PYELOGRAM;  Surgeon: Aaron Pinon MD;  Location: [facility] OR;  Service: Urology;  Laterality: Right;    HIP SURGERY Right     THR    KNEE SURGERY Right     ATS    LEFT HEART CATHETERIZATION Left 07/22/2021    Procedure: Left heart cath;  Surgeon: Curtis Loredo MD;  Location: [facility] CATH;  Service: Cardiology;  Laterality: Left;    LEFT HEART CATHETERIZATION Left 09/22/2022    Procedure: Left heart cath;  Surgeon: Giorgi Barker MD;  Location: [facility] CATH;  Service: Cardiology;  Laterality: Left;    SHOULDER SURGERY Bilateral     SPINAL CORD STIMULATOR IMPLANT      STENT, URETERAL Left 6/22/2023    Procedure: STENT, URETERAL;  Surgeon: Aaron Pinon MD;  Location: [facility] OR;  Service: Urology;  Laterality: Left;     steroid injections Right 06/13/2023    Knee    URETEROSCOPY Right 6/22/2023    Procedure: URETEROSCOPY;  Surgeon: Aaron Pinon MD;  Location: [facility];  Service: Urology;  Laterality: Right;       Time Tracking:     PT Received On: 07/21/23  PT Start Time: 1234     PT Stop Time: 1300  PT Total Time (min): 26 min     Billable Minutes: Evaluation 26 minutes      Marry Gardiner, PT, DPT   07/21/2023

## 2023-07-21 NOTE — ASSESSMENT & PLAN NOTE
Patient with acute kidney injury/acute renal failure likely due to infection DANIELLA is currently stable. Baseline creatinine unknown - Labs reviewed- Renal function/electrolytes with Estimated Creatinine Clearance: 33.7 mL/min (A) (based on SCr of 1.8 mg/dL (H)). according to latest data. Monitor urine output and serial BMP and adjust therapy as needed. Avoid nephrotoxins and renally dose meds for GFR listed above.

## 2023-07-21 NOTE — PLAN OF CARE
Problem: Physical Therapy  Goal: Physical Therapy Goal  Description: Goals to be met by: 2023     Patient will increase functional independence with mobility by performin. Supine to sit with Modified [place]  2. Sit to supine with Modified [place]  3. Rolling to Left and Right with Modified [place].  4. Sit to stand transfer with Modified [place]  5. Bed to chair transfer with Modified independence using Rolling Walker  6. Gait x 200 feet with Modified [place] using Rolling Walker.     Outcome: POC Established    Marry Gardiner PT, DPT   2023

## 2023-07-21 NOTE — PLAN OF CARE
POC reviewed with pt.       Problem: Adult Inpatient Plan of Care  Goal: Plan of Care Review  Outcome: Ongoing, Progressing  Goal: Patient-Specific Goal (Individualized)  Outcome: Ongoing, Progressing  Goal: Absence of Hospital-Acquired Illness or Injury  Outcome: Ongoing, Progressing  Goal: Optimal Comfort and Wellbeing  Outcome: Ongoing, Progressing  Goal: Readiness for Transition of Care  Outcome: Ongoing, Progressing     Problem: Impaired Wound Healing  Goal: Optimal Wound Healing  Outcome: Ongoing, Progressing

## 2023-07-21 NOTE — PLAN OF CARE
[facility]  Initial Discharge Assessment       Primary Care Provider: Mariella Bethea MD    Admission Diagnosis: Pyelonephritis [N12]  Renal insufficiency [N28.9]  Fever [R50.9]  Acute cystitis with hematuria [N30.01]  Nausea and vomiting, unspecified vomiting type [R11.2]    Admission Date: 7/20/2023  Expected Discharge Date:     Transition of Care Barriers: None    Payor: MEDICARE / Plan: MEDICARE PART A & B / Product Type: Government /     Extended Emergency Contact Information  Primary Emergency Contact: Romy Calloway  Mobile Phone: [phone]  Relation: Daughter   needed? No  Secondary Emergency Contact: Janeth Navarrete  Mobile Phone: [phone]  Relation: Relative   needed? No    Discharge Plan A: Home with family, Home Health  Discharge Plan B: Rehab      [facility] [place], [place] - [address].  [address]  [facility] [address]  Phone: [phone] Fax: [phone]      Initial Assessment (most recent)       Adult Discharge Assessment - 07/21/23 0823          Discharge Assessment    Assessment Type Discharge Planning Assessment     Confirmed/corrected address, phone number and insurance Yes     Confirmed Demographics Correct on Facesheet     Source of Information patient;family     When was your last doctors appointment? --   Unable to verify    Reason For Admission Pyelonephritis, acute     People in Home alone   The patient lives alone, but he has a niece who checks on him daily. He also has a daughter who is able to assist him as well.    Do you expect to return to your current living situation? --   TBD    Do you have help at home or someone to help you manage your care at home? Yes     Who are your caregiver(s) and their phone number(s)? Janeth (niece) [phone] and Romy (daughter) [phone]     Prior to hospitilization cognitive status: Alert/Oriented     Current cognitive status: Alert/Oriented     Walking or  Climbing Stairs ambulation difficulty, requires equipment   The patient is usually independent, but when he is weak, he requires assistance with his ADLs.    Mobility Management rollator, quad cane, and straight cane,   The patient also has a wheelchair (as needed)    Dressing/Bathing bathing difficulty, requires equipment   The patient family stated that the patient is usually able to bath and dress himself.    Dressing/Bathing Management shower chair and grab bars     Do you have any problems with: Needs other help;Errands/Grocery     Specify other help The patient's family is able to assist him with his care.     Home Accessibility wheelchair accessible;stairs to enter home;stairs within home     Number of Stairs, Within Home, Primary none     Stair Railings, Within Home, Primary none     Home Layout Able to live on 1st floor     Equipment Currently Used at Home bedside commode;raised toilet;nebulizer;blood pressure machine;dressing device;rollator;cane, quad;cane, straight;wheelchair;grab bar     Do you currently have service(s) that help you manage your care at home? Yes     Name and Contact number of agency [phone]     Is the pt/caregiver preference to resume services with current agency --   TBD    Do you take prescription medications? Yes     Do you have prescription coverage? Yes     Coverage [employer]     How do you get to doctors appointments? car, drives self;family or friend will provide     Are you on dialysis? No     Do you take coumadin? No     Discharge Plan A Home with family;Home Health     Discharge Plan B Rehab     DME Needed Upon Discharge  other (see comments)   TBD    Discharge Plan discussed with: Adult children;POA   The patient's niece was also present at his bedside    Name(s) and Number(s) Romy (Daughter)   [phone]     Transition of Care Barriers None                 Initial discharge assessment is completed. Spoke to the patient,  his daughter, and niece. The patient lives alone, but he has family who checks on him. The patient's niece, Janeth, stated that she checks on the patient daily. The patient's family expressed that he is normally independent when his is not weak. He does have DME for his care. The patient currently has home health care with [facility].     I spoke to the patient about possible inpatient rehab if he would be a candidate, and he was interested. Therapy is scheduled to work with this patient. CM/SW will remain available. Contact information was left in the patient's room. I also provided the patient and his family with an [facility] case management brochure for additional information if needed.

## 2023-07-21 NOTE — PLAN OF CARE
Problem: Occupational Therapy  Goal: Occupational Therapy Goal  Description: Goals to be met by: 07/28/23     Patient will increase functional independence with ADLs by performing:    Feeding with Modified [place].  UE Dressing with Modified [place].  LE Dressing with Modified [place].  Grooming while seated at sink with Modified [place].  Toileting from toilet with Modified [place] for hygiene and clothing management.   Bathing from  shower chair/bench with Modified [place].  Step transfer with Modified [place]  Toilet transfer to toilet with Modified [place].    Outcome: Ongoing, Progressing

## 2023-07-21 NOTE — SUBJECTIVE & OBJECTIVE
Past Medical History:   Diagnosis Date    Acid reflux     Anemia     Aortic aneurysm, abdominal     Arthritis     Bronchitis     Carotid stenosis 05/13/2022    60-79% Right ICA 60-70% Left ICA    Cataract     Celiac artery stenosis     50% by CTA abdomen 7/27/2020    CKD (chronic kidney disease)     45% FUNCTION    Coronary artery disease     Encounter for blood transfusion     Enlarged prostate     GERD (gastroesophageal reflux disease)     Hemorrhoids     Hypercholesteremia     Hypertension     Iron deficiency anemia, unspecified     Leaky heart valve     Lupus anticoagulant disorder     Mood disorder     Parkinson's disease     Renal artery stenosis     by CTA 7/27/2020    Restless leg syndrome     Skin cancer     Unspecified chronic bronchitis     UTI (urinary tract infection)        Past Surgical History:   Procedure Laterality Date    ACF      Anterior Cervical Fusion    BACK SURGERY      RODS IN BACK; 4 SURGIERIES    BIOPSY OF URETER Right 6/22/2023    Procedure: RESECTION/BIOPSY, URETER;  Surgeon: Aaron Pinon MD;  [facility];  Service: Urology;  Laterality: Right;    BONE MARROW BIOPSY      CARDIAC ANGIOGRAM WITH STENT      CATARACT SX Bilateral     CORONARY ANGIOGRAPHY N/A 02/15/2023    Procedure: ANGIOGRAM, CORONARY ARTERY;  Surgeon: Rito Vega MD;  [facility];  Service: Cardiology;  Laterality: N/A;    CORONARY ARTERY BYPASS GRAFT (CABG) N/A 02/24/2023    Procedure: CORONARY ARTERY BYPASS GRAFT (CABG);  Surgeon: Spencer Hagan MD;  Location: [facility] OR;  Service: Cardiothoracic;  Laterality: N/A;  CABG / EVH //   ECHO NOTIFIED    CYSTOSCOPY W/ RETROGRADES Bilateral 6/20/2023    Procedure: CYSTOSCOPY WITH RETROGRADE PYELOGRAM;  Surgeon: Aaron Pinon MD;  [facility];  Service: Urology;  Laterality: Bilateral;  Pt has a Spinal Cord Stimulator  9am per [place], To follow RM4    CYSTOSCOPY W/ RETROGRADES Right 6/22/2023    Procedure: CYSTOSCOPY, WITH RETROGRADE PYELOGRAM;   Surgeon: Aaron Pinon MD;  Location: [facility] OR;  Service: Urology;  Laterality: Right;    HIP SURGERY Right     THR    KNEE SURGERY Right     ATS    LEFT HEART CATHETERIZATION Left 07/22/2021    Procedure: Left heart cath;  Surgeon: Curtis Loredo MD;  Location: [facility] CATH;  Service: Cardiology;  Laterality: Left;    LEFT HEART CATHETERIZATION Left 09/22/2022    Procedure: Left heart cath;  Surgeon: Giorgi Barker MD;  Location: [facility] CATH;  Service: Cardiology;  Laterality: Left;    SHOULDER SURGERY Bilateral     SPINAL CORD STIMULATOR IMPLANT      STENT, URETERAL Left 6/22/2023    Procedure: STENT, URETERAL;  Surgeon: Aaron Pinon MD;  Location: [facility] OR;  Service: Urology;  Laterality: Left;    steroid injections Right 06/13/2023    Knee    URETEROSCOPY Right 6/22/2023    Procedure: URETEROSCOPY;  Surgeon: Aaron Pinon MD;  Location: [facility] OR;  Service: Urology;  Laterality: Right;       Review of patient's allergies indicates:   Allergen Reactions    Cardura [doxazosin] Hives    Lyrica [pregabalin] Other (See Comments)    Paxil [paroxetine hcl] Other (See Comments)    Restoril [temazepam] Hallucinations    Vioxx [rofecoxib] Swelling    Zithromax [azithromycin] Hives    Alpha 1 blocker- quinazolines     Benzodiazepines        No current facility-administered medications on file prior to encounter.     Current Outpatient Medications on File Prior to Encounter   Medication Sig    atorvastatin (LIPITOR) 40 MG tablet Take 1 tablet (40 mg total) by mouth once daily.    calcium carbonate-vitamin D3 500 mg-10 mcg (400 unit) Tab Take 1 tablet by mouth once daily.    carvediloL (COREG) 12.5 MG tablet Take 1 tablet (12.5 mg total) by mouth 2 (two) times daily.    clopidogreL (PLAVIX) 75 mg tablet Take 1 tablet (75 mg total) by mouth once daily.    docusate sodium (COLACE) 100 MG capsule Take 100 mg by mouth 2 (two) times daily.    DULoxetine (CYMBALTA) 30 MG capsule Take 1 capsule (30 mg total) by mouth once daily.     ezetimibe (ZETIA) 10 mg tablet Take 1 tablet (10 mg total) by mouth once daily.    fluticasone propionate (FLONASE) 50 mcg/actuation nasal spray 1 spray by Each Nostril route 2 (two) times a day.    HYDROcodone-acetaminophen (NORCO) 7.5-325 mg per tablet     rOPINIRole (REQUIP) 1 MG tablet Take 1 tablet (1 mg total) by mouth 2 (two) times daily.    furosemide (LASIX) 40 MG tablet Take 1 tablet (40 mg total) by mouth once daily. (Patient taking differently: Take 20 mg by mouth once daily.)    predniSONE (DELTASONE) 5 MG tablet Take 5 mg by mouth once daily.     Family History       Problem Relation (Age of Onset)    Cancer Brother, Brother    Heart disease Father, Sister, Brother, Brother, Brother    Lung cancer Brother    Throat cancer Brother          Tobacco Use    Smoking status: Former     Types: Cigarettes     Quit date:      Years since quittin.5    Smokeless tobacco: Never   Substance and Sexual Activity    Alcohol use: Yes     Comment: RARELY    Drug use: No    Sexual activity: Not on file     Review of Systems   Constitutional:  Positive for activity change, appetite change, chills, fatigue and fever. Negative for diaphoresis and unexpected weight change.   HENT:  Negative for congestion, ear pain, facial swelling, postnasal drip, rhinorrhea, sinus pressure, sinus pain, sore throat and trouble swallowing.    Eyes:  Negative for photophobia, pain, redness and visual disturbance.   Respiratory:  Negative for cough, chest tightness, shortness of breath and wheezing.    Cardiovascular:  Negative for chest pain, palpitations and leg swelling.   Gastrointestinal:  Positive for abdominal pain, nausea and vomiting. Negative for anal bleeding, blood in stool, constipation and diarrhea.   Endocrine: Negative for polydipsia, polyphagia and polyuria.   Genitourinary:  Negative for decreased urine volume, difficulty urinating, dysuria, flank pain, frequency, hematuria and urgency.   Musculoskeletal:   Negative for arthralgias and myalgias.   Skin:  Negative for pallor, rash and wound.   Allergic/Immunologic: Negative.    Neurological:  Positive for dizziness, weakness and light-headedness. Negative for tremors, seizures, syncope, facial asymmetry, speech difficulty and headaches.   Hematological: Negative.    Psychiatric/Behavioral:  Negative for agitation, behavioral problems, confusion, hallucinations, sleep disturbance and suicidal ideas. The patient is not nervous/anxious.    Objective:     Vital Signs (Most Recent):  Temp: 98.6 °F (37 °C) (07/21/23 0722)  Pulse: 109 (07/21/23 0807)  Resp: 19 (07/21/23 0722)  BP: 135/64 (07/21/23 0722)  SpO2: (!) 93 % (07/21/23 0722) Vital Signs (24h Range):  Temp:  [98 °F (36.7 °C)-101.5 °F (38.6 °C)] 98.6 °F (37 °C)  Pulse:  [] 109  Resp:  [18-20] 19  SpO2:  [92 %-100 %] 93 %  BP: (120-155)/(56-67) 135/64     Weight: 83.9 kg (185 lb)  Body mass index is 25.8 kg/m².     Physical Exam  Vitals and nursing note reviewed.   Constitutional:       General: He is not in acute distress.     Appearance: Normal appearance. He is ill-appearing.   HENT:      Head: Normocephalic and atraumatic.      Nose: Nose normal. No congestion or rhinorrhea.      Mouth/Throat:      Mouth: Mucous membranes are moist.      Pharynx: Oropharynx is clear. No oropharyngeal exudate or posterior oropharyngeal erythema.   Eyes:      General: No scleral icterus.     Extraocular Movements: Extraocular movements intact.      Conjunctiva/sclera: Conjunctivae normal.      Pupils: Pupils are equal, round, and reactive to light.   Neck:      Vascular: No carotid bruit.   Cardiovascular:      Rate and Rhythm: Regular rhythm. Tachycardia present.      Pulses: Normal pulses.      Heart sounds: Normal heart sounds. No murmur heard.  Pulmonary:      Effort: Pulmonary effort is normal. No respiratory distress.      Breath sounds: Normal breath sounds. No wheezing, rhonchi or rales.   Abdominal:      General: Bowel  sounds are normal. There is no distension.      Palpations: Abdomen is soft.      Tenderness: There is no abdominal tenderness. There is no guarding or rebound.   Musculoskeletal:         General: Normal range of motion.      Cervical back: Normal range of motion and neck supple.      Right lower leg: No edema.      Left lower leg: No edema.   Lymphadenopathy:      Cervical: No cervical adenopathy.   Skin:     General: Skin is warm and dry.      Capillary Refill: Capillary refill takes less than 2 seconds.      Coloration: Skin is pale.   Neurological:      General: No focal deficit present.      Mental Status: He is alert and oriented to person, place, and time.      Cranial Nerves: No cranial nerve deficit.      Motor: Weakness present.      Gait: Gait abnormal.   Psychiatric:         Mood and Affect: Mood normal.         Behavior: Behavior normal.         Thought Content: Thought content normal.         Judgment: Judgment normal.            CRANIAL NERVES     CN III, IV, VI   Pupils are equal, round, and reactive to light.     Significant Labs: All pertinent labs within the past 24 hours have been reviewed.  Recent Lab Results  (Last 5 results in the past 24 hours)        07/21/23  0456   07/20/23  1820   07/20/23  1736   07/20/23  1718   07/20/23  1717        POC Molecular Influenza A Ag       Negative         POC Molecular Influenza B Ag       Negative         Albumin 2.5               Alkaline Phosphatase 51               ALT 15               Anion Gap 7               Appearance, UA   Cloudy             AST 10               Bacteria, UA   Negative             Bands 7.0               Baso # CANCELED  Comment: Result canceled by the ancillary.               Basophil % 0.0               Bilirubin (UA)   Negative             BILIRUBIN TOTAL 1.2  Comment: For infants and newborns, interpretation of results should be based  on gestational age, weight and in agreement with clinical  observations.    Premature Infant  recommended reference ranges:  Up to 24 hours.............<8.0 mg/dL  Up to 48 hours............<12.0 mg/dL  3-5 days..................<15.0 mg/dL  6-29 days.................<15.0 mg/dL    For patients on Eltrombopag therapy, use of Dimension [place] TBIL is   not   recommended.                 Blood Culture, Routine     No Growth to date  [P]           BUN 35               Calcium 7.7               Chloride 109               CO2 24               Color, UA   Yellow             Creatinine 1.8               Differential Method Manual               eGFR 37.1               Eos # CANCELED  Comment: Result canceled by the ancillary.               Eosinophil % 0.0               Glucose 105               Glucose, UA   Negative             Gran % 73.0               Hematocrit 30.2               Hemoglobin 9.7               Hyaline Casts, UA   0.0             Immature Grans (Abs) CANCELED  Comment: Mild elevation in immature granulocytes is non specific and   can be seen in a variety of conditions including stress response,   acute inflammation, trauma and pregnancy. Correlation with other   laboratory and clinical findings is essential.    Result canceled by the ancillary.                 Immature Granulocytes CANCELED  Comment: Result canceled by the ancillary.               Ketones, UA   Negative             Lactate, [place]     1.3           Leukocytes, UA   3+             Lymph # CANCELED  Comment: Result canceled by the ancillary.               Lymph % 8.0               MCH 30.9               MCHC 32.1               MCV 96               Microscopic Comment   SEE COMMENT  Comment: Other formed elements not mentioned in the report are not   present in the microscopic examination.                Mono # CANCELED  Comment: Result canceled by the ancillary.               Mono % 12.0               MPV 12.1               NITRITE UA   Positive             nRBC 0               Occult Blood UA   2+             pH, UA   6.0              Platelets 99               Potassium 3.6               PROTEIN TOTAL 6.5               Protein, UA   2+  Comment: Recommend a 24 hour urine protein or a urine   protein/creatinine ratio if globulin induced proteinuria is  clinically suspected.                Acceptable       Yes   Yes       RBC 3.14               RBC, UA   25             RDW 16.4               SARS-CoV-2 RNA, Amplification, Qual         Negative       Sodium 140               Specific [place], UA   1.010             Specimen UA   Urine, Clean Catch             Squam Epithel, UA   0             UROBILINOGEN UA   Negative             WBC, UA   >100             WBC 21.00                                       [P] - Preliminary Result               Significant Imaging: I have reviewed all pertinent imaging results/findings within the past 24 hours.

## 2023-07-21 NOTE — H&P
[facility] Medicine  History & Physical    Patient Name: Hunter Navarrete  MRN: [id number]  Patient Class: IP- Inpatient  Admission Date: 7/20/2023  Attending Physician: Natalie Hector MD   Primary Care Provider: Mariella Bethea MD         Patient information was obtained from patient, relative(s), past medical records and ER records.     Subjective:     Principal Problem:Pyelonephritis, acute    Chief Complaint:   Chief Complaint   Patient presents with    Vomiting     Vomiting since yesterday with dizziness. Denies diarrhea, fever, or any other symptoms        HPI: ED HPI:  83-year-old male presents to the emergency room with vomiting since yesterday with dizziness, weakness.  Symptoms.  Temperature in triage was 101.1, no medication taken prior to arrival.    IM HPI:  Agree with above.  Patient reports fever, decreased appetite, nausea, vomiting, abdominal discomfort for the last few days.  Patient states he had urinary stent placed in mid to late June of this year.  Patient reports hospitalization for approximately 5 days at the end of June are sepsis secondary to pyelonephritis.  Patient states he received IV antibiotics while admitted and was discharged home with 5 day course of Cipro.  Patient currently receiving IV Cipro.  Patient's blood and urine cultures pending.  Patient's white blood cell count slightly improved this morning.  BUN and creatinine remain elevated.  Vital signs stable.  Patient afebrile since approximately 1800 hours yesterday.  Will continue current treatment pending blood in urine final sensitivity report.      Past Medical History:   Diagnosis Date    Acid reflux     Anemia     Aortic aneurysm, abdominal     Arthritis     Bronchitis     Carotid stenosis 05/13/2022    60-79% Right ICA 60-70% Left ICA    Cataract     Celiac artery stenosis     50% by CTA abdomen 7/27/2020    CKD (chronic kidney disease)     45% FUNCTION    Coronary artery disease     Encounter for  blood transfusion     Enlarged prostate     GERD (gastroesophageal reflux disease)     Hemorrhoids     Hypercholesteremia     Hypertension     Iron deficiency anemia, unspecified     Leaky heart valve     Lupus anticoagulant disorder     Mood disorder     Parkinson's disease     Renal artery stenosis     by CTA 7/27/2020    Restless leg syndrome     Skin cancer     Unspecified chronic bronchitis     UTI (urinary tract infection)        Past Surgical History:   Procedure Laterality Date    ACF      Anterior Cervical Fusion    BACK SURGERY      RODS IN BACK; 4 SURGIERIES    BIOPSY OF URETER Right 6/22/2023    Procedure: RESECTION/BIOPSY, URETER;  Surgeon: Aaron Pinon MD;  Location: [facility] OR;  Service: Urology;  Laterality: Right;    BONE MARROW BIOPSY      CARDIAC ANGIOGRAM WITH STENT      CATARACT SX Bilateral     CORONARY ANGIOGRAPHY N/A 02/15/2023    Procedure: ANGIOGRAM, CORONARY ARTERY;  Surgeon: Rito Vega MD;  Location: [facility] CATH;  Service: Cardiology;  Laterality: N/A;    CORONARY ARTERY BYPASS GRAFT (CABG) N/A 02/24/2023    Procedure: CORONARY ARTERY BYPASS GRAFT (CABG);  Surgeon: Spencer Hagan MD;  Location: [facility] OR;  Service: Cardiothoracic;  Laterality: N/A;  CABG / EVH //   ECHO NOTIFIED    CYSTOSCOPY W/ RETROGRADES Bilateral 6/20/2023    Procedure: CYSTOSCOPY WITH RETROGRADE PYELOGRAM;  Surgeon: Aaron Pinon MD;  Location: [facility] OR;  Service: Urology;  Laterality: Bilateral;  Pt has a Spinal Cord Stimulator  9am per [place], To follow RM4    CYSTOSCOPY W/ RETROGRADES Right 6/22/2023    Procedure: CYSTOSCOPY, WITH RETROGRADE PYELOGRAM;  Surgeon: Aaron Pinon MD;  Location: [facility] OR;  Service: Urology;  Laterality: Right;    HIP SURGERY Right     THR    KNEE SURGERY Right     ATS    LEFT HEART CATHETERIZATION Left 07/22/2021    Procedure: Left heart cath;  Surgeon: Curtis Loredo MD;  Location: [facility] CATH;  Service: Cardiology;  Laterality: Left;    LEFT HEART CATHETERIZATION Left  09/22/2022    Procedure: Left heart cath;  Surgeon: Giorgi Barker MD;  [facility];  Service: Cardiology;  Laterality: Left;    SHOULDER SURGERY Bilateral     SPINAL CORD STIMULATOR IMPLANT      STENT, URETERAL Left 6/22/2023    Procedure: STENT, URETERAL;  Surgeon: Aaron Pinon MD;  [facility];  Service: Urology;  Laterality: Left;    steroid injections Right 06/13/2023    Knee    URETEROSCOPY Right 6/22/2023    Procedure: URETEROSCOPY;  Surgeon: Aaron Pinon MD;  [facility];  Service: Urology;  Laterality: Right;       Review of patient's allergies indicates:   Allergen Reactions    Cardura [doxazosin] Hives    Lyrica [pregabalin] Other (See Comments)    Paxil [paroxetine hcl] Other (See Comments)    Restoril [temazepam] Hallucinations    Vioxx [rofecoxib] Swelling    Zithromax [azithromycin] Hives    Alpha 1 blocker- quinazolines     Benzodiazepines        No current facility-administered medications on file prior to encounter.     Current Outpatient Medications on File Prior to Encounter   Medication Sig    atorvastatin (LIPITOR) 40 MG tablet Take 1 tablet (40 mg total) by mouth once daily.    calcium carbonate-vitamin D3 500 mg-10 mcg (400 unit) Tab Take 1 tablet by mouth once daily.    carvediloL (COREG) 12.5 MG tablet Take 1 tablet (12.5 mg total) by mouth 2 (two) times daily.    clopidogreL (PLAVIX) 75 mg tablet Take 1 tablet (75 mg total) by mouth once daily.    docusate sodium (COLACE) 100 MG capsule Take 100 mg by mouth 2 (two) times daily.    DULoxetine (CYMBALTA) 30 MG capsule Take 1 capsule (30 mg total) by mouth once daily.    ezetimibe (ZETIA) 10 mg tablet Take 1 tablet (10 mg total) by mouth once daily.    fluticasone propionate (FLONASE) 50 mcg/actuation nasal spray 1 spray by Each Nostril route 2 (two) times a day.    HYDROcodone-acetaminophen (NORCO) 7.5-325 mg per tablet     rOPINIRole (REQUIP) 1 MG tablet Take 1 tablet (1 mg total) by mouth 2 (two) times daily.     furosemide (LASIX) 40 MG tablet Take 1 tablet (40 mg total) by mouth once daily. (Patient taking differently: Take 20 mg by mouth once daily.)    predniSONE (DELTASONE) 5 MG tablet Take 5 mg by mouth once daily.     Family History       Problem Relation (Age of Onset)    Cancer Brother, Brother    Heart disease Father, Sister, Brother, Brother, Brother    Lung cancer Brother    Throat cancer Brother          Tobacco Use    Smoking status: Former     Types: Cigarettes     Quit date:      Years since quittin.5    Smokeless tobacco: Never   Substance and Sexual Activity    Alcohol use: Yes     Comment: RARELY    Drug use: No    Sexual activity: Not on file     Review of Systems   Constitutional:  Positive for activity change, appetite change, chills, fatigue and fever. Negative for diaphoresis and unexpected weight change.   HENT:  Negative for congestion, ear pain, facial swelling, postnasal drip, rhinorrhea, sinus pressure, sinus pain, sore throat and trouble swallowing.    Eyes:  Negative for photophobia, pain, redness and visual disturbance.   Respiratory:  Negative for cough, chest tightness, shortness of breath and wheezing.    Cardiovascular:  Negative for chest pain, palpitations and leg swelling.   Gastrointestinal:  Positive for abdominal pain, nausea and vomiting. Negative for anal bleeding, blood in stool, constipation and diarrhea.   Endocrine: Negative for polydipsia, polyphagia and polyuria.   Genitourinary:  Negative for decreased urine volume, difficulty urinating, dysuria, flank pain, frequency, hematuria and urgency.   Musculoskeletal:  Negative for arthralgias and myalgias.   Skin:  Negative for pallor, rash and wound.   Allergic/Immunologic: Negative.    Neurological:  Positive for dizziness, weakness and light-headedness. Negative for tremors, seizures, syncope, facial asymmetry, speech difficulty and headaches.   Hematological: Negative.    Psychiatric/Behavioral:  Negative for  agitation, behavioral problems, confusion, hallucinations, sleep disturbance and suicidal ideas. The patient is not nervous/anxious.    Objective:     Vital Signs (Most Recent):  Temp: 98.6 °F (37 °C) (07/21/23 0722)  Pulse: 109 (07/21/23 0807)  Resp: 19 (07/21/23 0722)  BP: 135/64 (07/21/23 0722)  SpO2: (!) 93 % (07/21/23 0722) Vital Signs (24h Range):  Temp:  [98 °F (36.7 °C)-101.5 °F (38.6 °C)] 98.6 °F (37 °C)  Pulse:  [] 109  Resp:  [18-20] 19  SpO2:  [92 %-100 %] 93 %  BP: (120-155)/(56-67) 135/64     Weight: 83.9 kg (185 lb)  Body mass index is 25.8 kg/m².     Physical Exam  Vitals and nursing note reviewed.   Constitutional:       General: He is not in acute distress.     Appearance: Normal appearance. He is ill-appearing.   HENT:      Head: Normocephalic and atraumatic.      Nose: Nose normal. No congestion or rhinorrhea.      Mouth/Throat:      Mouth: Mucous membranes are moist.      Pharynx: Oropharynx is clear. No oropharyngeal exudate or posterior oropharyngeal erythema.   Eyes:      General: No scleral icterus.     Extraocular Movements: Extraocular movements intact.      Conjunctiva/sclera: Conjunctivae normal.      Pupils: Pupils are equal, round, and reactive to light.   Neck:      Vascular: No carotid bruit.   Cardiovascular:      Rate and Rhythm: Regular rhythm. Tachycardia present.      Pulses: Normal pulses.      Heart sounds: Normal heart sounds. No murmur heard.  Pulmonary:      Effort: Pulmonary effort is normal. No respiratory distress.      Breath sounds: Normal breath sounds. No wheezing, rhonchi or rales.   Abdominal:      General: Bowel sounds are normal. There is no distension.      Palpations: Abdomen is soft.      Tenderness: There is no abdominal tenderness. There is no guarding or rebound.   Musculoskeletal:         General: Normal range of motion.      Cervical back: Normal range of motion and neck supple.      Right lower leg: No edema.      Left lower leg: No edema.    Lymphadenopathy:      Cervical: No cervical adenopathy.   Skin:     General: Skin is warm and dry.      Capillary Refill: Capillary refill takes less than 2 seconds.      Coloration: Skin is pale.   Neurological:      General: No focal deficit present.      Mental Status: He is alert and oriented to person, place, and time.      Cranial Nerves: No cranial nerve deficit.      Motor: Weakness present.      Gait: Gait abnormal.   Psychiatric:         Mood and Affect: Mood normal.         Behavior: Behavior normal.         Thought Content: Thought content normal.         Judgment: Judgment normal.            CRANIAL NERVES     CN III, IV, VI   Pupils are equal, round, and reactive to light.     Significant Labs: All pertinent labs within the past 24 hours have been reviewed.  Recent Lab Results  (Last 5 results in the past 24 hours)        07/21/23  0456   07/20/23  1820   07/20/23  1736   07/20/23  1718   07/20/23  1717        POC Molecular Influenza A Ag       Negative         POC Molecular Influenza B Ag       Negative         Albumin 2.5               Alkaline Phosphatase 51               ALT 15               Anion Gap 7               Appearance, UA   Cloudy             AST 10               Bacteria, UA   Negative             Bands 7.0               Baso # CANCELED  Comment: Result canceled by the ancillary.               Basophil % 0.0               Bilirubin (UA)   Negative             BILIRUBIN TOTAL 1.2  Comment: For infants and newborns, interpretation of results should be based  on gestational age, weight and in agreement with clinical  observations.    Premature Infant recommended reference ranges:  Up to 24 hours.............<8.0 mg/dL  Up to 48 hours............<12.0 mg/dL  3-5 days..................<15.0 mg/dL  6-29 days.................<15.0 mg/dL    For patients on Eltrombopag therapy, use of Dimension [place] TBIL is   not   recommended.                 Blood Culture, Routine     No Growth to  date  [P]           BUN 35               Calcium 7.7               Chloride 109               CO2 24               Color, UA   Yellow             Creatinine 1.8               Differential Method Manual               eGFR 37.1               Eos # CANCELED  Comment: Result canceled by the ancillary.               Eosinophil % 0.0               Glucose 105               Glucose, UA   Negative             Gran % 73.0               Hematocrit 30.2               Hemoglobin 9.7               Hyaline Casts, UA   0.0             Immature Grans (Abs) CANCELED  Comment: Mild elevation in immature granulocytes is non specific and   can be seen in a variety of conditions including stress response,   acute inflammation, trauma and pregnancy. Correlation with other   laboratory and clinical findings is essential.    Result canceled by the ancillary.                 Immature Granulocytes CANCELED  Comment: Result canceled by the ancillary.               Ketones, UA   Negative             Lactate, [place]     1.3           Leukocytes, UA   3+             Lymph # CANCELED  Comment: Result canceled by the ancillary.               Lymph % 8.0               MCH 30.9               MCHC 32.1               MCV 96               Microscopic Comment   SEE COMMENT  Comment: Other formed elements not mentioned in the report are not   present in the microscopic examination.                Mono # CANCELED  Comment: Result canceled by the ancillary.               Mono % 12.0               MPV 12.1               NITRITE UA   Positive             nRBC 0               Occult Blood UA   2+             pH, UA   6.0             Platelets 99               Potassium 3.6               PROTEIN TOTAL 6.5               Protein, UA   2+  Comment: Recommend a 24 hour urine protein or a urine   protein/creatinine ratio if globulin induced proteinuria is  clinically suspected.                Acceptable       Yes   Yes       RBC 3.14               RBC,  UA   25             RDW 16.4               SARS-CoV-2 RNA, Amplification, Qual         Negative       Sodium 140               Specific [place], UA   1.010             Specimen UA   Urine, Clean Catch             Squam Epithel, UA   0             UROBILINOGEN UA   Negative             WBC, UA   >100             WBC 21.00                                       [P] - Preliminary Result               Significant Imaging: I have reviewed all pertinent imaging results/findings within the past 24 hours.  Assessment/Plan:     * Pyelonephritis, acute  Continue current IV antibiotics and IV fluids.  Urine and blood cultures pending.      Weakness  PT/OT ordered.      DANIELLA (acute kidney injury)  Patient with acute kidney injury/acute renal failure likely due to infection DANIELLA is currently stable. Baseline creatinine unknown - Labs reviewed- Renal function/electrolytes with Estimated Creatinine Clearance: 33.7 mL/min (A) (based on SCr of 1.8 mg/dL (H)). according to latest data. Monitor urine output and serial BMP and adjust therapy as needed. Avoid nephrotoxins and renally dose meds for GFR listed above.    On deep vein thrombosis (DVT) prophylaxis  SCDs ordered.  Holding further anticoagulation at this time secondary to decreased platelet count.      Fever  Patient is without fever this morning.  P.r.n. antipyretics ordered.      Hypertension  Continue home medications.  Monitor blood pressures and adjust medications as needed.      Hypercholesteremia  Continue statin.      Coronary artery disease  Continue home medications.        VTE Risk Mitigation (From admission, onward)           Ordered     IP VTE HIGH RISK PATIENT  Once         07/20/23 2145     Place sequential compression device  Until discontinued         07/20/23 2145                               Best [place] NP  Department of Hospital Medicine  [facility]

## 2023-07-21 NOTE — ASSESSMENT & PLAN NOTE
SCDs ordered.  Holding further anticoagulation at this time secondary to decreased platelet count.

## 2023-07-21 NOTE — PT/OT/SLP EVAL
Occupational Therapy   Evaluation    Name: Hunter Navarrete  MRN: [id number]  Admitting Diagnosis: Pyelonephritis, acute  Recent Surgery: * No surgery found *      Recommendations:     Discharge Recommendations: other (see comments) (Post Acute Rehabilitation)  Discharge Equipment Recommendations:  none  Barriers to discharge:  Other (Comment) (Medical status)    Assessment:     Hunter Navarrete is a 83 y.o. male with a medical diagnosis of Pyelonephritis, acute.  He presents with functional deficits impacting independence with ADL's incluidng functional mobility. Performance deficits affecting function: weakness, impaired endurance, impaired self care skills, impaired functional mobility, impaired balance, decreased coordination, decreased upper extremity function, decreased lower extremity function, pain, abnormal tone, decreased ROM, impaired fine motor, impaired cardiopulmonary response to activity, impaired joint extensibility, impaired muscle length.      Rehab Prognosis: Good; patient would benefit from acute skilled OT services to address these deficits and reach maximum level of function.       Plan:     Patient to be seen 5 x/week to address the above listed problems via self-care/home management, therapeutic activities, therapeutic exercises, neuromuscular re-education  Plan of Care Expires: 07/28/23  Plan of Care Reviewed with: patient    Subjective     Chief Complaint: fatigue  Patient/Family Comments/goals: Pt would like to return home.     Occupational Profile:  Living Environment: Pt lives alone in a [facility] without steps, but has a daily caregiver who assist for approx 4 hours per day.  Previous level of function: Modified Independent  Roles and Routines: ADL's and IADL's  Equipment Used at Home: walker, rolling, rollator, wheelchair, bedside commode, blood pressure machine, cane, quad, cane, straight, nebulizer  Assistance upon Discharge: Pt has a caregiver daily for approx 4 hours to perform  IADL's.    Pain/Comfort:  Pain Rating 1: 3/10  Location - Side 1: Bilateral  Location - Orientation 1: lower  Location 1: back  Pain Addressed 1: Reposition, Nurse notified  Pain Rating Post-Intervention 1: 3/10    Patients cultural, spiritual, Moravian conflicts given the current situation: no    Objective:     Communicated with: nurse prior to session.  Patient found HOB elevated with peripheral IV, telemetry, pulse ox (continuous) upon OT entry to room.    General Precautions: Standard, fall, hearing impaired  Orthopedic Precautions: N/A  Braces: N/A  Respiratory Status: Room air    Occupational Performance:    Bed Mobility:    Patient completed Rolling/Turning to Left with  contact guard assistance  Patient completed Rolling/Turning to Right with contact guard assistance  Patient completed Scooting/Bridging with contact guard assistance  Patient completed Supine to Sit with contact guard assistance  Patient completed Sit to Supine with contact guard assistance    Functional Mobility/Transfers:  Patient completed Sit <> Stand Transfer with contact guard assistance  with  rolling walker   Patient completed Bed <> Chair Transfer using Step Transfer technique with contact guard assistance with rolling walker  Patient completed Toilet Transfer Step Transfer technique with contact guard assistance with  grab bars  Functional Mobility: Pt ambulated 155' between surfaces requiring CGA utilizing RW.     Activities of Daily Living:  Feeding:  setup assistance    Grooming: supervision    Bathing: contact guard assistance    Upper Body Dressing: supervision    Lower Body Dressing: minimum assistance    Toileting: contact guard assistance      Cognitive/Visual Perceptual:  Cognitive/Psychosocial Skills:  -       Oriented to: Person, Place, Time, and Situation   -       Follows Commands/attention:Follows multistep  commands  -       Communication: clear/fluent  -       Memory: No Deficits noted  -       Safety  awareness/insight to disability: impaired   -       Mood/Affect/Coping skills/emotional control: Cooperative    Physical Exam:  Postural examination/scapula alignment: -       Rounded shoulders  -       Forward head  -       Anterior pelvic tilt  -       Kyphosis  Sensation: -       Intact  light/touch bilateral UE's  Dominant hand: -       Right  Upper Extremity Range of Motion:  -       Right Upper Extremity: WFL  -       Left Upper Extremity: WFL  Upper Extremity Strength: -       Right Upper Extremity: 3- to 3+/5 shoulder, 4- to 4/5 elbow, forearm, and wrist  -       Left Upper Extremity: 3- to 3+/5 shoulder, 4- to 4/5 elbow, forearm, and wrist  Fine Motor Coordination: -       Impaired  Left hand, manipulation of objects   and Right hand, manipulation of objects    Gross motor coordination: Impaired bilateral UE hand-eye coordination    AMPAC 6 Click ADL:  AMPAC Total Score: 19    Treatment & Education:  Pt was provided education / instruction regarding role of OT and established OT POC.    Patient left HOB elevated with all lines intact, call button in reach, and nurse notified    GOALS:   Goals to be met by: 07/28/23     Patient will increase functional independence with ADLs by performing:    Feeding with Modified [place].  UE Dressing with Modified [place].  LE Dressing with Modified [place].  Grooming while seated at sink with Modified [place].  Toileting from toilet with Modified [place] for hygiene and clothing management.   Bathing from  shower chair/bench with Modified [place].  Step transfer with Modified [place]  Toilet transfer to toilet with Modified [place]acer.      History:     Past Medical History:   Diagnosis Date    Acid reflux     Anemia     Aortic aneurysm, abdominal     Arthritis     Bronchitis     Carotid stenosis 05/13/2022    60-79% Right ICA 60-70% Left ICA    Cataract     Celiac artery stenosis     50% by CTA abdomen 7/27/2020    CKD (chronic kidney  disease)     45% FUNCTION    Coronary artery disease     Encounter for blood transfusion     Enlarged prostate     GERD (gastroesophageal reflux disease)     Hemorrhoids     Hypercholesteremia     Hypertension     Iron deficiency anemia, unspecified     Leaky heart valve     Lupus anticoagulant disorder     Mood disorder     Parkinson's disease     Renal artery stenosis     by CTA 7/27/2020    Restless leg syndrome     Skin cancer     Unspecified chronic bronchitis     UTI (urinary tract infection)          Past Surgical History:   Procedure Laterality Date    ACF      Anterior Cervical Fusion    BACK SURGERY      RODS IN BACK; 4 SURGIERIES    BIOPSY OF URETER Right 6/22/2023    Procedure: RESECTION/BIOPSY, URETER;  Surgeon: Aaron Pinon MD;  Location: [facility] OR;  Service: Urology;  Laterality: Right;    BONE MARROW BIOPSY      CARDIAC ANGIOGRAM WITH STENT      CATARACT SX Bilateral     CORONARY ANGIOGRAPHY N/A 02/15/2023    Procedure: ANGIOGRAM, CORONARY ARTERY;  Surgeon: Rito Vega MD;  Location: [facility] CATH;  Service: Cardiology;  Laterality: N/A;    CORONARY ARTERY BYPASS GRAFT (CABG) N/A 02/24/2023    Procedure: CORONARY ARTERY BYPASS GRAFT (CABG);  Surgeon: Spencer Hagan MD;  Location: [facility] OR;  Service: Cardiothoracic;  Laterality: N/A;  CABG / EVH //   ECHO NOTIFIED    CYSTOSCOPY W/ RETROGRADES Bilateral 6/20/2023    Procedure: CYSTOSCOPY WITH RETROGRADE PYELOGRAM;  Surgeon: Aaron Pinon MD;  Location: [facility] OR;  Service: Urology;  Laterality: Bilateral;  Pt has a Spinal Cord Stimulator  9am per Revee, To follow RM4    CYSTOSCOPY W/ RETROGRADES Right 6/22/2023    Procedure: CYSTOSCOPY, WITH RETROGRADE PYELOGRAM;  Surgeon: Aaron Pinon MD;  Location: [facility] OR;  Service: Urology;  Laterality: Right;    HIP SURGERY Right     THR    KNEE SURGERY Right     ATS    LEFT HEART CATHETERIZATION Left 07/22/2021    Procedure: Left heart cath;  Surgeon: Curtis Loredo MD;  Location: [facility] CATH;  Service:  Cardiology;  Laterality: Left;    LEFT HEART CATHETERIZATION Left 09/22/2022    Procedure: Left heart cath;  Surgeon: Giorgi Barker MD;  Location: [facility] CATH;  Service: Cardiology;  Laterality: Left;    SHOULDER SURGERY Bilateral     SPINAL CORD STIMULATOR IMPLANT      STENT, URETERAL Left 6/22/2023    Procedure: STENT, URETERAL;  Surgeon: Aaron Pinon MD;  Location: [facility] OR;  Service: Urology;  Laterality: Left;    steroid injections Right 06/13/2023    Knee    URETEROSCOPY Right 6/22/2023    Procedure: URETEROSCOPY;  Surgeon: Aaron Pinon MD;  Location: [facility] OR;  Service: Urology;  Laterality: Right;       Time Tracking:     OT Date of Treatment: 07/21/23  OT Start Time: 1620  OT Stop Time: 1651  OT Total Time (min): 31 min    Billable Minutes:Evaluation 31 7/21/2023

## 2023-07-22 LAB
ALBUMIN SERPL BCP-MCNC: 2.2 G/DL (ref 3.5–5.2)
ALP SERPL-CCNC: 48 U/L (ref 55–135)
ALT SERPL W/O P-5'-P-CCNC: 19 U/L (ref 10–44)
ANION GAP SERPL CALC-SCNC: 5 MMOL/L (ref 8–16)
AST SERPL-CCNC: 17 U/L (ref 10–40)
BASOPHILS # BLD AUTO: 0 K/UL (ref 0–0.2)
BASOPHILS NFR BLD: 0 % (ref 0–1.9)
BILIRUB SERPL-MCNC: 0.6 MG/DL (ref 0.1–1)
BUN SERPL-MCNC: 36 MG/DL (ref 8–23)
CALCIUM SERPL-MCNC: 8.1 MG/DL (ref 8.7–10.5)
CHLORIDE SERPL-SCNC: 111 MMOL/L (ref 95–110)
CO2 SERPL-SCNC: 25 MMOL/L (ref 23–29)
CREAT SERPL-MCNC: 1.8 MG/DL (ref 0.5–1.4)
DIFFERENTIAL METHOD: ABNORMAL
EOSINOPHIL # BLD AUTO: 0 K/UL (ref 0–0.5)
EOSINOPHIL NFR BLD: 0.1 % (ref 0–8)
ERYTHROCYTE [DISTWIDTH] IN BLOOD BY AUTOMATED COUNT: 16.1 % (ref 11.5–14.5)
EST. GFR  (NO RACE VARIABLE): 37.1 ML/MIN/1.73 M^2
GLUCOSE SERPL-MCNC: 134 MG/DL (ref 70–110)
HCT VFR BLD AUTO: 26.3 % (ref 40–54)
HGB BLD-MCNC: 8.4 G/DL (ref 14–18)
IMM GRANULOCYTES # BLD AUTO: 0.13 K/UL (ref 0–0.04)
IMM GRANULOCYTES NFR BLD AUTO: 1.5 % (ref 0–0.5)
LYMPHOCYTES # BLD AUTO: 0.5 K/UL (ref 1–4.8)
LYMPHOCYTES NFR BLD: 6.3 % (ref 18–48)
MAGNESIUM SERPL-MCNC: 1.6 MG/DL (ref 1.6–2.6)
MCH RBC QN AUTO: 31 PG (ref 27–31)
MCHC RBC AUTO-ENTMCNC: 31.9 G/DL (ref 32–36)
MCV RBC AUTO: 97 FL (ref 82–98)
MONOCYTES # BLD AUTO: 1.1 K/UL (ref 0.3–1)
MONOCYTES NFR BLD: 12.9 % (ref 4–15)
NEUTROPHILS # BLD AUTO: 6.8 K/UL (ref 1.8–7.7)
NEUTROPHILS NFR BLD: 79.2 % (ref 38–73)
NRBC BLD-RTO: 0 /100 WBC
PLATELET # BLD AUTO: 82 K/UL (ref 150–450)
PMV BLD AUTO: 11.5 FL (ref 9.2–12.9)
POTASSIUM SERPL-SCNC: 3.4 MMOL/L (ref 3.5–5.1)
PROT SERPL-MCNC: 6.2 G/DL (ref 6–8.4)
RBC # BLD AUTO: 2.71 M/UL (ref 4.6–6.2)
SODIUM SERPL-SCNC: 141 MMOL/L (ref 136–145)
WBC # BLD AUTO: 8.56 K/UL (ref 3.9–12.7)

## 2023-07-22 PROCEDURE — 85025 COMPLETE CBC W/AUTO DIFF WBC: CPT | Performed by: INTERNAL MEDICINE

## 2023-07-22 PROCEDURE — 21400001 HC TELEMETRY ROOM

## 2023-07-22 PROCEDURE — 25000003 PHARM REV CODE 250: Performed by: STUDENT IN AN ORGANIZED HEALTH CARE EDUCATION/TRAINING PROGRAM

## 2023-07-22 PROCEDURE — 83735 ASSAY OF MAGNESIUM: CPT | Performed by: INTERNAL MEDICINE

## 2023-07-22 PROCEDURE — 63600175 PHARM REV CODE 636 W HCPCS: Performed by: STUDENT IN AN ORGANIZED HEALTH CARE EDUCATION/TRAINING PROGRAM

## 2023-07-22 PROCEDURE — 36415 COLL VENOUS BLD VENIPUNCTURE: CPT | Performed by: INTERNAL MEDICINE

## 2023-07-22 PROCEDURE — 80053 COMPREHEN METABOLIC PANEL: CPT | Performed by: INTERNAL MEDICINE

## 2023-07-22 PROCEDURE — 25000003 PHARM REV CODE 250

## 2023-07-22 PROCEDURE — 63600175 PHARM REV CODE 636 W HCPCS

## 2023-07-22 RX ADMIN — CIPROFLOXACIN 400 MG: 2 INJECTION, SOLUTION INTRAVENOUS at 06:07

## 2023-07-22 RX ADMIN — HYDROCODONE BITARTRATE AND ACETAMINOPHEN 1 TABLET: 7.5; 325 TABLET ORAL at 08:07

## 2023-07-22 RX ADMIN — PREDNISONE 5 MG: 5 TABLET ORAL at 08:07

## 2023-07-22 RX ADMIN — TAMSULOSIN HYDROCHLORIDE 0.4 MG: 0.4 CAPSULE ORAL at 08:07

## 2023-07-22 RX ADMIN — FLUTICASONE PROPIONATE 50 MCG: 50 SPRAY, METERED NASAL at 08:07

## 2023-07-22 RX ADMIN — ROPINIROLE HYDROCHLORIDE 1 MG: 1 TABLET, FILM COATED ORAL at 08:07

## 2023-07-22 RX ADMIN — Medication 1 TABLET: at 08:07

## 2023-07-22 RX ADMIN — CARVEDILOL 12.5 MG: 12.5 TABLET, FILM COATED ORAL at 08:07

## 2023-07-22 RX ADMIN — DOCUSATE SODIUM 100 MG: 100 CAPSULE, LIQUID FILLED ORAL at 08:07

## 2023-07-22 RX ADMIN — CIPROFLOXACIN 400 MG: 2 INJECTION, SOLUTION INTRAVENOUS at 07:07

## 2023-07-22 RX ADMIN — CLOPIDOGREL BISULFATE 75 MG: 75 TABLET ORAL at 08:07

## 2023-07-22 RX ADMIN — FUROSEMIDE 20 MG: 20 TABLET ORAL at 08:07

## 2023-07-22 RX ADMIN — ATORVASTATIN CALCIUM 40 MG: 40 TABLET, FILM COATED ORAL at 08:07

## 2023-07-22 RX ADMIN — Medication 6 MG: at 08:07

## 2023-07-22 RX ADMIN — EZETIMIBE 10 MG: 10 TABLET ORAL at 08:07

## 2023-07-22 RX ADMIN — FLUTICASONE PROPIONATE 50 MCG: 50 SPRAY, METERED NASAL at 09:07

## 2023-07-22 RX ADMIN — DULOXETINE 30 MG: 30 CAPSULE, DELAYED RELEASE ORAL at 08:07

## 2023-07-22 NOTE — ASSESSMENT & PLAN NOTE
Continue current IV antibiotics and IV fluids.  Urine and blood cultures pending.  7/22 urine culture Gram-negative rods, sensitivities pending, afebrile overnight, lab pending this morning

## 2023-07-22 NOTE — HOSPITAL COURSE
7/22 AA, weekend crosscover, patient admitted with pyelonephritis, on IV antibiotic, urine culture with Gram-negative batsheva, sensitivities pending, lab pending, continue to monitor, deescalate when able  7/23 AA, weekend crosscover, patient admitted with pyelonephritis antibiotics on board, urine culture pending, no acute events overnight, lab stable, renal function stable potassium repleted yesterday, stable morning blood pressures elevated, adjust med if trend continues    7/24 DL Discharge Note:  Patient doing well this morning.  He is awake, alert, oriented.  Patient with slight increased agitation as he is anxious to go home.  Patient noted to have elevated blood pressure this morning.  Meds adjusted.  White count continues to trend down.  Patient remains afebrile.  Final urine culture and sensitivity shows positive sensitivity to Cipro.  We will discharge patient home on 10 day course of Cipro.  Patient and family advised to notify Dr. Pinon of recent hospitalization and follow-up as recommended.  Patient advised to follow up with PCP in 1 week.  Patient advised to monitor blood pressures upon discharge and report continued elevation to PCP.

## 2023-07-22 NOTE — PROGRESS NOTES
[facility] Medicine  Progress Note    Patient Name: Hunter Navarrete  MRN: [id number]  Patient Class: IP- Inpatient   Admission Date: 7/20/2023  Length of Stay: 2 days  Attending Physician: Natalie Hector MD  Primary Care Provider: Mariella Bethea MD        Subjective:     Principal Problem:Pyelonephritis, acute        HPI:  ED HPI:  83-year-old male presents to the emergency room with vomiting since yesterday with dizziness, weakness.  Symptoms.  Temperature in triage was 101.1, no medication taken prior to arrival.    IM HPI:  Agree with above.  Patient reports fever, decreased appetite, nausea, vomiting, abdominal discomfort for the last few days.  Patient states he had urinary stent placed in mid to late June of this year.  Patient reports hospitalization for approximately 5 days at the end of June are sepsis secondary to pyelonephritis.  Patient states he received IV antibiotics while admitted and was discharged home with 5 day course of Cipro.  Patient currently receiving IV Cipro.  Patient's blood and urine cultures pending.  Patient's white blood cell count slightly improved this morning.  BUN and creatinine remain elevated.  Vital signs stable.  Patient afebrile since approximately 1800 hours yesterday.  Will continue current treatment pending blood in urine final sensitivity report.      Overview/Hospital Course:  7/22 AA, weekend crosscover, patient admitted with pyelonephritis, on IV antibiotic, urine culture with Gram-negative batsheva, sensitivities pending, lab pending, continue to monitor, deescalate when able      Interval History: Patient seen and examined.    Review of Systems   Constitutional:  Positive for activity change, appetite change, chills, fatigue and fever. Negative for diaphoresis and unexpected weight change.   HENT:  Negative for congestion, ear pain, facial swelling, postnasal drip, rhinorrhea, sinus pressure, sinus pain, sore throat and trouble swallowing.     Eyes:  Negative for photophobia, pain, redness and visual disturbance.   Respiratory:  Negative for cough, chest tightness, shortness of breath and wheezing.    Cardiovascular:  Negative for chest pain, palpitations and leg swelling.   Gastrointestinal:  Positive for abdominal pain, nausea and vomiting. Negative for anal bleeding, blood in stool, constipation and diarrhea.   Endocrine: Negative for polydipsia, polyphagia and polyuria.   Genitourinary:  Negative for decreased urine volume, difficulty urinating, dysuria, flank pain, frequency, hematuria and urgency.   Musculoskeletal:  Negative for arthralgias and myalgias.   Skin:  Negative for pallor, rash and wound.   Allergic/Immunologic: Negative.    Neurological:  Positive for dizziness, weakness and light-headedness. Negative for tremors, seizures, syncope, facial asymmetry, speech difficulty and headaches.   Hematological: Negative.    Psychiatric/Behavioral:  Negative for agitation, behavioral problems, confusion, hallucinations, sleep disturbance and suicidal ideas. The patient is not nervous/anxious.    Objective:     Vital Signs (Most Recent):  Temp: 98.1 °F (36.7 °C) (07/22/23 0722)  Pulse: 90 (07/22/23 0756)  Resp: 18 (07/22/23 0833)  BP: (!) 148/69 (07/22/23 0833)  SpO2: (!) 94 % (07/22/23 0722) Vital Signs (24h Range):  Temp:  [97.9 °F (36.6 °C)-98.9 °F (37.2 °C)] 98.1 °F (36.7 °C)  Pulse:  [86-94] 90  Resp:  [18-20] 18  SpO2:  [93 %-100 %] 94 %  BP: (111-148)/(59-96) 148/69     Weight: 83.9 kg (185 lb)  Body mass index is 25.8 kg/m².    Intake/Output Summary (Last 24 hours) at 7/22/2023 0912  Last data filed at 7/22/2023 0535  Gross per 24 hour   Intake 400 ml   Output --   Net 400 ml         Physical Exam  Vitals and nursing note reviewed.   Constitutional:       General: He is not in acute distress.     Appearance: Normal appearance. He is ill-appearing.   HENT:      Head: Normocephalic and atraumatic.      Nose: Nose normal. No congestion or  rhinorrhea.      Mouth/Throat:      Mouth: Mucous membranes are moist.      Pharynx: Oropharynx is clear. No oropharyngeal exudate or posterior oropharyngeal erythema.   Eyes:      General: No scleral icterus.     Extraocular Movements: Extraocular movements intact.      Conjunctiva/sclera: Conjunctivae normal.      Pupils: Pupils are equal, round, and reactive to light.   Neck:      Vascular: No carotid bruit.   Cardiovascular:      Rate and Rhythm: Regular rhythm. Tachycardia present.      Pulses: Normal pulses.      Heart sounds: Normal heart sounds. No murmur heard.  Pulmonary:      Effort: Pulmonary effort is normal. No respiratory distress.      Breath sounds: Normal breath sounds. No wheezing, rhonchi or rales.   Abdominal:      General: Bowel sounds are normal. There is no distension.      Palpations: Abdomen is soft.      Tenderness: There is no abdominal tenderness. There is no guarding or rebound.   Musculoskeletal:         General: Normal range of motion.      Cervical back: Normal range of motion and neck supple.      Right lower leg: No edema.      Left lower leg: No edema.   Lymphadenopathy:      Cervical: No cervical adenopathy.   Skin:     General: Skin is warm and dry.      Capillary Refill: Capillary refill takes less than 2 seconds.      Coloration: Skin is pale.   Neurological:      General: No focal deficit present.      Mental Status: He is alert and oriented to person, place, and time.      Cranial Nerves: No cranial nerve deficit.      Motor: Weakness present.      Gait: Gait abnormal.   Psychiatric:         Mood and Affect: Mood normal.         Behavior: Behavior normal.         Thought Content: Thought content normal.         Judgment: Judgment normal.           Significant Labs: All pertinent labs within the past 24 hours have been reviewed.  Recent Lab Results       None            Significant Imaging: I have reviewed all pertinent imaging results/findings within the past 24  hours.      Assessment/Plan:      * Pyelonephritis, acute  Continue current IV antibiotics and IV fluids.  Urine and blood cultures pending.  7/22 urine culture Gram-negative rods, sensitivities pending, afebrile overnight, lab pending this morning    Weakness  PT/OT ordered.      DANIELLA (acute kidney injury)  Patient with acute kidney injury/acute renal failure likely due to infection DANIELLA is currently stable. Baseline creatinine unknown - Labs reviewed- Renal function/electrolytes with Estimated Creatinine Clearance: 33.7 mL/min (A) (based on SCr of 1.8 mg/dL (H)). according to latest data. Monitor urine output and serial BMP and adjust therapy as needed. Avoid nephrotoxins and renally dose meds for GFR listed above.    On deep vein thrombosis (DVT) prophylaxis  SCDs ordered.  Holding further anticoagulation at this time secondary to decreased platelet count.      Fever  Patient is without fever this morning.  P.r.n. antipyretics ordered.  Resolved    Hypertension  Continue home medications.  Monitor blood pressures and adjust medications as needed.      Hypercholesteremia  Continue statin.      Coronary artery disease  Continue home medications.        VTE Risk Mitigation (From admission, onward)         Ordered     IP VTE HIGH RISK PATIENT  Once         07/20/23 2145     Place sequential compression device  Until discontinued         07/20/23 2145                Discharge Planning   BMABI:      Code Status: Full Code   Is the patient medically ready for discharge?:     Reason for patient still in hospital (select all that apply): Treatment and Pending disposition  Discharge Plan A: Home with family, Home Health                  Leon Merida MD  Department of Hospital Medicine   [facility]

## 2023-07-22 NOTE — SUBJECTIVE & OBJECTIVE
Interval History: Patient seen and examined.    Review of Systems   Constitutional:  Positive for activity change, appetite change, chills, fatigue and fever. Negative for diaphoresis and unexpected weight change.   HENT:  Negative for congestion, ear pain, facial swelling, postnasal drip, rhinorrhea, sinus pressure, sinus pain, sore throat and trouble swallowing.    Eyes:  Negative for photophobia, pain, redness and visual disturbance.   Respiratory:  Negative for cough, chest tightness, shortness of breath and wheezing.    Cardiovascular:  Negative for chest pain, palpitations and leg swelling.   Gastrointestinal:  Positive for abdominal pain, nausea and vomiting. Negative for anal bleeding, blood in stool, constipation and diarrhea.   Endocrine: Negative for polydipsia, polyphagia and polyuria.   Genitourinary:  Negative for decreased urine volume, difficulty urinating, dysuria, flank pain, frequency, hematuria and urgency.   Musculoskeletal:  Negative for arthralgias and myalgias.   Skin:  Negative for pallor, rash and wound.   Allergic/Immunologic: Negative.    Neurological:  Positive for dizziness, weakness and light-headedness. Negative for tremors, seizures, syncope, facial asymmetry, speech difficulty and headaches.   Hematological: Negative.    Psychiatric/Behavioral:  Negative for agitation, behavioral problems, confusion, hallucinations, sleep disturbance and suicidal ideas. The patient is not nervous/anxious.    Objective:     Vital Signs (Most Recent):  Temp: 98.1 °F (36.7 °C) (07/22/23 0722)  Pulse: 90 (07/22/23 0756)  Resp: 18 (07/22/23 0833)  BP: (!) 148/69 (07/22/23 0833)  SpO2: (!) 94 % (07/22/23 0722) Vital Signs (24h Range):  Temp:  [97.9 °F (36.6 °C)-98.9 °F (37.2 °C)] 98.1 °F (36.7 °C)  Pulse:  [86-94] 90  Resp:  [18-20] 18  SpO2:  [93 %-100 %] 94 %  BP: (111-148)/(59-96) 148/69     Weight: 83.9 kg (185 lb)  Body mass index is 25.8 kg/m².    Intake/Output Summary (Last 24 hours) at 7/22/2023  0946  Last data filed at 7/22/2023 0535  Gross per 24 hour   Intake 400 ml   Output --   Net 400 ml         Physical Exam  Vitals and nursing note reviewed.   Constitutional:       General: He is not in acute distress.     Appearance: Normal appearance. He is ill-appearing.   HENT:      Head: Normocephalic and atraumatic.      Nose: Nose normal. No congestion or rhinorrhea.      Mouth/Throat:      Mouth: Mucous membranes are moist.      Pharynx: Oropharynx is clear. No oropharyngeal exudate or posterior oropharyngeal erythema.   Eyes:      General: No scleral icterus.     Extraocular Movements: Extraocular movements intact.      Conjunctiva/sclera: Conjunctivae normal.      Pupils: Pupils are equal, round, and reactive to light.   Neck:      Vascular: No carotid bruit.   Cardiovascular:      Rate and Rhythm: Regular rhythm. Tachycardia present.      Pulses: Normal pulses.      Heart sounds: Normal heart sounds. No murmur heard.  Pulmonary:      Effort: Pulmonary effort is normal. No respiratory distress.      Breath sounds: Normal breath sounds. No wheezing, rhonchi or rales.   Abdominal:      General: Bowel sounds are normal. There is no distension.      Palpations: Abdomen is soft.      Tenderness: There is no abdominal tenderness. There is no guarding or rebound.   Musculoskeletal:         General: Normal range of motion.      Cervical back: Normal range of motion and neck supple.      Right lower leg: No edema.      Left lower leg: No edema.   Lymphadenopathy:      Cervical: No cervical adenopathy.   Skin:     General: Skin is warm and dry.      Capillary Refill: Capillary refill takes less than 2 seconds.      Coloration: Skin is pale.   Neurological:      General: No focal deficit present.      Mental Status: He is alert and oriented to person, place, and time.      Cranial Nerves: No cranial nerve deficit.      Motor: Weakness present.      Gait: Gait abnormal.   Psychiatric:         Mood and Affect: Mood  normal.         Behavior: Behavior normal.         Thought Content: Thought content normal.         Judgment: Judgment normal.           Significant Labs: All pertinent labs within the past 24 hours have been reviewed.  Recent Lab Results       None            Significant Imaging: I have reviewed all pertinent imaging results/findings within the past 24 hours.

## 2023-07-23 PROBLEM — R50.9 FEVER: Status: RESOLVED | Noted: 2023-06-24 | Resolved: 2023-07-23

## 2023-07-23 LAB
ALBUMIN SERPL BCP-MCNC: 2.3 G/DL (ref 3.5–5.2)
ALP SERPL-CCNC: 50 U/L (ref 55–135)
ALT SERPL W/O P-5'-P-CCNC: 27 U/L (ref 10–44)
ANION GAP SERPL CALC-SCNC: 6 MMOL/L (ref 8–16)
AST SERPL-CCNC: 24 U/L (ref 10–40)
BASOPHILS # BLD AUTO: 0.01 K/UL (ref 0–0.2)
BASOPHILS NFR BLD: 0.1 % (ref 0–1.9)
BILIRUB SERPL-MCNC: 0.4 MG/DL (ref 0.1–1)
BUN SERPL-MCNC: 33 MG/DL (ref 8–23)
CALCIUM SERPL-MCNC: 8.1 MG/DL (ref 8.7–10.5)
CHLORIDE SERPL-SCNC: 113 MMOL/L (ref 95–110)
CO2 SERPL-SCNC: 23 MMOL/L (ref 23–29)
CREAT SERPL-MCNC: 1.6 MG/DL (ref 0.5–1.4)
DIFFERENTIAL METHOD: ABNORMAL
EOSINOPHIL # BLD AUTO: 0 K/UL (ref 0–0.5)
EOSINOPHIL NFR BLD: 0.1 % (ref 0–8)
ERYTHROCYTE [DISTWIDTH] IN BLOOD BY AUTOMATED COUNT: 16 % (ref 11.5–14.5)
EST. GFR  (NO RACE VARIABLE): 42.8 ML/MIN/1.73 M^2
GLUCOSE SERPL-MCNC: 98 MG/DL (ref 70–110)
HCT VFR BLD AUTO: 26.5 % (ref 40–54)
HGB BLD-MCNC: 8.3 G/DL (ref 14–18)
IMM GRANULOCYTES # BLD AUTO: 0.04 K/UL (ref 0–0.04)
IMM GRANULOCYTES NFR BLD AUTO: 0.5 % (ref 0–0.5)
LYMPHOCYTES # BLD AUTO: 0.8 K/UL (ref 1–4.8)
LYMPHOCYTES NFR BLD: 10.5 % (ref 18–48)
MAGNESIUM SERPL-MCNC: 1.6 MG/DL (ref 1.6–2.6)
MCH RBC QN AUTO: 30.3 PG (ref 27–31)
MCHC RBC AUTO-ENTMCNC: 31.3 G/DL (ref 32–36)
MCV RBC AUTO: 97 FL (ref 82–98)
MONOCYTES # BLD AUTO: 1 K/UL (ref 0.3–1)
MONOCYTES NFR BLD: 13.6 % (ref 4–15)
NEUTROPHILS # BLD AUTO: 5.6 K/UL (ref 1.8–7.7)
NEUTROPHILS NFR BLD: 75.2 % (ref 38–73)
NRBC BLD-RTO: 0 /100 WBC
PLATELET # BLD AUTO: 88 K/UL (ref 150–450)
PMV BLD AUTO: 12.4 FL (ref 9.2–12.9)
POTASSIUM SERPL-SCNC: 4 MMOL/L (ref 3.5–5.1)
PROT SERPL-MCNC: 6.1 G/DL (ref 6–8.4)
RBC # BLD AUTO: 2.74 M/UL (ref 4.6–6.2)
SODIUM SERPL-SCNC: 142 MMOL/L (ref 136–145)
WBC # BLD AUTO: 7.45 K/UL (ref 3.9–12.7)

## 2023-07-23 PROCEDURE — 83735 ASSAY OF MAGNESIUM: CPT | Performed by: INTERNAL MEDICINE

## 2023-07-23 PROCEDURE — 25000003 PHARM REV CODE 250: Performed by: STUDENT IN AN ORGANIZED HEALTH CARE EDUCATION/TRAINING PROGRAM

## 2023-07-23 PROCEDURE — 80053 COMPREHEN METABOLIC PANEL: CPT | Performed by: INTERNAL MEDICINE

## 2023-07-23 PROCEDURE — 63600175 PHARM REV CODE 636 W HCPCS

## 2023-07-23 PROCEDURE — 85025 COMPLETE CBC W/AUTO DIFF WBC: CPT | Performed by: INTERNAL MEDICINE

## 2023-07-23 PROCEDURE — 63600175 PHARM REV CODE 636 W HCPCS: Performed by: STUDENT IN AN ORGANIZED HEALTH CARE EDUCATION/TRAINING PROGRAM

## 2023-07-23 PROCEDURE — 25000003 PHARM REV CODE 250

## 2023-07-23 PROCEDURE — 36415 COLL VENOUS BLD VENIPUNCTURE: CPT | Performed by: INTERNAL MEDICINE

## 2023-07-23 PROCEDURE — 21400001 HC TELEMETRY ROOM

## 2023-07-23 RX ADMIN — CIPROFLOXACIN 400 MG: 2 INJECTION, SOLUTION INTRAVENOUS at 06:07

## 2023-07-23 RX ADMIN — CLOPIDOGREL BISULFATE 75 MG: 75 TABLET ORAL at 08:07

## 2023-07-23 RX ADMIN — DOCUSATE SODIUM 100 MG: 100 CAPSULE, LIQUID FILLED ORAL at 08:07

## 2023-07-23 RX ADMIN — FLUTICASONE PROPIONATE 50 MCG: 50 SPRAY, METERED NASAL at 08:07

## 2023-07-23 RX ADMIN — ROPINIROLE HYDROCHLORIDE 1 MG: 1 TABLET, FILM COATED ORAL at 08:07

## 2023-07-23 RX ADMIN — CARVEDILOL 12.5 MG: 12.5 TABLET, FILM COATED ORAL at 08:07

## 2023-07-23 RX ADMIN — PREDNISONE 5 MG: 5 TABLET ORAL at 08:07

## 2023-07-23 RX ADMIN — FUROSEMIDE 20 MG: 20 TABLET ORAL at 08:07

## 2023-07-23 RX ADMIN — EZETIMIBE 10 MG: 10 TABLET ORAL at 08:07

## 2023-07-23 RX ADMIN — TAMSULOSIN HYDROCHLORIDE 0.4 MG: 0.4 CAPSULE ORAL at 08:07

## 2023-07-23 RX ADMIN — DULOXETINE 30 MG: 30 CAPSULE, DELAYED RELEASE ORAL at 08:07

## 2023-07-23 RX ADMIN — ATORVASTATIN CALCIUM 40 MG: 40 TABLET, FILM COATED ORAL at 08:07

## 2023-07-23 RX ADMIN — Medication 1 TABLET: at 08:07

## 2023-07-23 NOTE — ASSESSMENT & PLAN NOTE
Patient with acute kidney injury/acute renal failure likely due to infection DANIELLA is currently stable. Baseline creatinine unknown - Labs reviewed- Renal function/electrolytes with Estimated Creatinine Clearance: 37.9 mL/min (A) (based on SCr of 1.6 mg/dL (H)). according to latest data. Monitor urine output and serial BMP and adjust therapy as needed. Avoid nephrotoxins and renally dose meds for GFR listed above.

## 2023-07-23 NOTE — PLAN OF CARE
Plan of care reviewed and ongoing. Patient denies any pain, prn melatonin given for sleep. Will continue to monitor.     Problem: Adult Inpatient Plan of Care  Goal: Plan of Care Review  Outcome: Ongoing, Progressing

## 2023-07-23 NOTE — PLAN OF CARE
07/23/23 1033   Medicare Message   Important Message from Medicare regarding Discharge Appeal Rights Given to patient/caregiver;Explained to patient/caregiver;Signed/date by patient/caregiver   Date IMM was signed 07/23/23   Time IMM was signed 1009

## 2023-07-23 NOTE — PROGRESS NOTES
[facility] (formerly [facility]) Medicine  Progress Note    Patient Name: Hunter Navarrete  MRN: [id number]  Patient Class: IP- Inpatient   Admission Date: 7/20/2023  Length of Stay: 3 days  Attending Physician: Natalie Hector MD  Primary Care Provider: Mariella Bethea MD        Subjective:     Principal Problem:Pyelonephritis, acute        HPI:  ED HPI:  83-year-old male presents to the emergency room with vomiting since yesterday with dizziness, weakness.  Symptoms.  Temperature in triage was 101.1, no medication taken prior to arrival.    IM HPI:  Agree with above.  Patient reports fever, decreased appetite, nausea, vomiting, abdominal discomfort for the last few days.  Patient states he had urinary stent placed in mid to late June of this year.  Patient reports hospitalization for approximately 5 days at the end of June are sepsis secondary to pyelonephritis.  Patient states he received IV antibiotics while admitted and was discharged home with 5 day course of Cipro.  Patient currently receiving IV Cipro.  Patient's blood and urine cultures pending.  Patient's white blood cell count slightly improved this morning.  BUN and creatinine remain elevated.  Vital signs stable.  Patient afebrile since approximately 1800 hours yesterday.  Will continue current treatment pending blood in urine final sensitivity report.      Overview/Hospital Course:  7/22 AA, weekend crosscover, patient admitted with pyelonephritis, on IV antibiotic, urine culture with Gram-negative batsheva, sensitivities pending, lab pending, continue to monitor, deescalate when able  7/23 AA, weekend crosscover, patient admitted with pyelonephritis antibiotics on board, urine culture pending, no acute events overnight, lab stable, renal function stable potassium repleted yesterday, stable morning blood pressures elevated, adjust med if trend continues      Interval History: Patient seen and examined.    Review of Systems   Constitutional:  Positive for  activity change, appetite change, chills, fatigue and fever. Negative for diaphoresis and unexpected weight change.   HENT:  Negative for congestion, ear pain, facial swelling, postnasal drip, rhinorrhea, sinus pressure, sinus pain, sore throat and trouble swallowing.    Eyes:  Negative for photophobia, pain, redness and visual disturbance.   Respiratory:  Negative for cough, chest tightness, shortness of breath and wheezing.    Cardiovascular:  Negative for chest pain, palpitations and leg swelling.   Gastrointestinal:  Positive for abdominal pain, nausea and vomiting. Negative for anal bleeding, blood in stool, constipation and diarrhea.   Endocrine: Negative for polydipsia, polyphagia and polyuria.   Genitourinary:  Negative for decreased urine volume, difficulty urinating, dysuria, flank pain, frequency, hematuria and urgency.   Musculoskeletal:  Negative for arthralgias and myalgias.   Skin:  Negative for pallor, rash and wound.   Allergic/Immunologic: Negative.    Neurological:  Positive for dizziness, weakness and light-headedness. Negative for tremors, seizures, syncope, facial asymmetry, speech difficulty and headaches.   Psychiatric/Behavioral:  Negative for agitation, behavioral problems, confusion, hallucinations, sleep disturbance and suicidal ideas. The patient is not nervous/anxious.    Objective:     Vital Signs (Most Recent):  Temp: 97 °F (36.1 °C) (07/23/23 0736)  Pulse: 102 (07/23/23 0736)  Resp: 18 (07/23/23 0736)  BP: (!) 177/78 (07/23/23 0736)  SpO2: (!) 91 % (07/23/23 0736) Vital Signs (24h Range):  Temp:  [97 °F (36.1 °C)-98.8 °F (37.1 °C)] 97 °F (36.1 °C)  Pulse:  [] 102  Resp:  [18-20] 18  SpO2:  [91 %-96 %] 91 %  BP: (121-177)/(60-78) 177/78     Weight: 83.9 kg (185 lb)  Body mass index is 25.8 kg/m².    Intake/Output Summary (Last 24 hours) at 7/23/2023 0949  Last data filed at 7/22/2023 2024  Gross per 24 hour   Intake 560 ml   Output --   Net 560 ml           Physical Exam  Vitals  and nursing note reviewed.   Constitutional:       General: He is not in acute distress.     Appearance: Normal appearance. He is ill-appearing.   HENT:      Head: Normocephalic and atraumatic.      Nose: Nose normal. No congestion or rhinorrhea.      Mouth/Throat:      Mouth: Mucous membranes are moist.      Pharynx: Oropharynx is clear. No oropharyngeal exudate or posterior oropharyngeal erythema.   Eyes:      General: No scleral icterus.     Extraocular Movements: Extraocular movements intact.      Conjunctiva/sclera: Conjunctivae normal.      Pupils: Pupils are equal, round, and reactive to light.   Neck:      Vascular: No carotid bruit.   Cardiovascular:      Rate and Rhythm: Regular rhythm. Tachycardia present.      Pulses: Normal pulses.      Heart sounds: Normal heart sounds. No murmur heard.  Pulmonary:      Effort: Pulmonary effort is normal. No respiratory distress.      Breath sounds: Normal breath sounds. No wheezing, rhonchi or rales.   Abdominal:      General: Bowel sounds are normal. There is no distension.      Palpations: Abdomen is soft.      Tenderness: There is no abdominal tenderness. There is no guarding or rebound.   Musculoskeletal:         General: Normal range of motion.      Cervical back: Normal range of motion and neck supple.      Right lower leg: No edema.      Left lower leg: No edema.   Lymphadenopathy:      Cervical: No cervical adenopathy.   Skin:     General: Skin is warm and dry.      Capillary Refill: Capillary refill takes less than 2 seconds.      Coloration: Skin is pale.   Neurological:      General: No focal deficit present.      Mental Status: He is alert and oriented to person, place, and time.      Cranial Nerves: No cranial nerve deficit.      Motor: Weakness present.      Gait: Gait abnormal.   Psychiatric:         Mood and Affect: Mood normal.         Behavior: Behavior normal.         Thought Content: Thought content normal.         Judgment: Judgment normal.            Significant Labs: All pertinent labs within the past 24 hours have been reviewed.  Recent Lab Results         07/23/23  0543   07/22/23  1006   07/22/23  1005        Albumin 2.3     2.2       Alkaline Phosphatase 50     48       ALT 27     19       Anion Gap 6     5       AST 24     17       Baso # 0.01   0.00         Basophil % 0.1   0.0         BILIRUBIN TOTAL 0.4  Comment: For infants and newborns, interpretation of results should be based  on gestational age, weight and in agreement with clinical  observations.    Premature Infant recommended reference ranges:  Up to 24 hours.............<8.0 mg/dL  Up to 48 hours............<12.0 mg/dL  3-5 days..................<15.0 mg/dL  6-29 days.................<15.0 mg/dL    For patients on Eltrombopag therapy, use of Dimension [place] TBIL is   not   recommended.       0.6  Comment: For infants and newborns, interpretation of results should be based  on gestational age, weight and in agreement with clinical  observations.    Premature Infant recommended reference ranges:  Up to 24 hours.............<8.0 mg/dL  Up to 48 hours............<12.0 mg/dL  3-5 days..................<15.0 mg/dL  6-29 days.................<15.0 mg/dL    For patients on Eltrombopag therapy, use of Dimension [place] TBIL is   not   recommended.         BUN 33     36       Calcium 8.1     8.1       Chloride 113     111       CO2 23     25       Creatinine 1.6     1.8       Differential Method Automated   Automated         eGFR 42.8     37.1       Eos # 0.0   0.0         Eosinophil % 0.1   0.1         Glucose 98     134       Gran # (ANC) 5.6   6.8         Gran % 75.2   79.2         Hematocrit 26.5   26.3         Hemoglobin 8.3   8.4         Immature Grans (Abs) 0.04  Comment: Mild elevation in immature granulocytes is non specific and   can be seen in a variety of conditions including stress response,   acute inflammation, trauma and pregnancy. Correlation with other   laboratory and clinical  findings is essential.     0.13  Comment: Mild elevation in immature granulocytes is non specific and   can be seen in a variety of conditions including stress response,   acute inflammation, trauma and pregnancy. Correlation with other   laboratory and clinical findings is essential.           Immature Granulocytes 0.5   1.5         Lymph # 0.8   0.5         Lymph % 10.5   6.3         Magnesium 1.6     1.6       MCH 30.3   31.0         MCHC 31.3   31.9         MCV 97   97         Mono # 1.0   1.1         Mono % 13.6   12.9         MPV 12.4   11.5         nRBC 0   0         Platelets 88   82         Potassium 4.0     3.4       PROTEIN TOTAL 6.1     6.2       RBC 2.74   2.71         RDW 16.0   16.1         Sodium 142     141       WBC 7.45   8.56                 Significant Imaging: I have reviewed all pertinent imaging results/findings within the past 24 hours.      Assessment/Plan:      * Pyelonephritis, acute  Continue current IV antibiotics and IV fluids.  Urine and blood cultures pending.  7/22 urine culture Gram-negative rods, sensitivities pending, afebrile overnight, lab pending this morning  7/23 urine culture pending, afebrile overnight, continue IV antibiotic fluid    Weakness  PT/OT ordered.      DANIELLA (acute kidney injury)  Patient with acute kidney injury/acute renal failure likely due to infection DANIELLA is currently stable. Baseline creatinine unknown - Labs reviewed- Renal function/electrolytes with Estimated Creatinine Clearance: 37.9 mL/min (A) (based on SCr of 1.6 mg/dL (H)). according to latest data. Monitor urine output and serial BMP and adjust therapy as needed. Avoid nephrotoxins and renally dose meds for GFR listed above.    On deep vein thrombosis (DVT) prophylaxis  SCDs ordered.  Holding further anticoagulation at this time secondary to decreased platelet count.      Hypertension  Continue home medications.  Monitor blood pressures and adjust medications as  needed.      Hypercholesteremia  Continue statin.      Coronary artery disease  Continue home medications.        VTE Risk Mitigation (From admission, onward)         Ordered     IP VTE HIGH RISK PATIENT  Once         07/20/23 2145     Place sequential compression device  Until discontinued         07/20/23 2145                Discharge Planning   BAMBI:      Code Status: Full Code   Is the patient medically ready for discharge?:     Reason for patient still in hospital (select all that apply): Treatment  Discharge Plan A: Home with family, Home Health                  Leon Merida MD  Department of Hospital Medicine   [facility]

## 2023-07-23 NOTE — SUBJECTIVE & OBJECTIVE
Interval History: Patient seen and examined.    Review of Systems   Constitutional:  Positive for activity change, appetite change, chills, fatigue and fever. Negative for diaphoresis and unexpected weight change.   HENT:  Negative for congestion, ear pain, facial swelling, postnasal drip, rhinorrhea, sinus pressure, sinus pain, sore throat and trouble swallowing.    Eyes:  Negative for photophobia, pain, redness and visual disturbance.   Respiratory:  Negative for cough, chest tightness, shortness of breath and wheezing.    Cardiovascular:  Negative for chest pain, palpitations and leg swelling.   Gastrointestinal:  Positive for abdominal pain, nausea and vomiting. Negative for anal bleeding, blood in stool, constipation and diarrhea.   Endocrine: Negative for polydipsia, polyphagia and polyuria.   Genitourinary:  Negative for decreased urine volume, difficulty urinating, dysuria, flank pain, frequency, hematuria and urgency.   Musculoskeletal:  Negative for arthralgias and myalgias.   Skin:  Negative for pallor, rash and wound.   Allergic/Immunologic: Negative.    Neurological:  Positive for dizziness, weakness and light-headedness. Negative for tremors, seizures, syncope, facial asymmetry, speech difficulty and headaches.   Psychiatric/Behavioral:  Negative for agitation, behavioral problems, confusion, hallucinations, sleep disturbance and suicidal ideas. The patient is not nervous/anxious.    Objective:     Vital Signs (Most Recent):  Temp: 97 °F (36.1 °C) (07/23/23 0736)  Pulse: 102 (07/23/23 0736)  Resp: 18 (07/23/23 0736)  BP: (!) 177/78 (07/23/23 0736)  SpO2: (!) 91 % (07/23/23 0736) Vital Signs (24h Range):  Temp:  [97 °F (36.1 °C)-98.8 °F (37.1 °C)] 97 °F (36.1 °C)  Pulse:  [] 102  Resp:  [18-20] 18  SpO2:  [91 %-96 %] 91 %  BP: (121-177)/(60-78) 177/78     Weight: 83.9 kg (185 lb)  Body mass index is 25.8 kg/m².    Intake/Output Summary (Last 24 hours) at 7/23/2023 0931  Last data filed at  7/22/2023 2024  Gross per 24 hour   Intake 560 ml   Output --   Net 560 ml           Physical Exam  Vitals and nursing note reviewed.   Constitutional:       General: He is not in acute distress.     Appearance: Normal appearance. He is ill-appearing.   HENT:      Head: Normocephalic and atraumatic.      Nose: Nose normal. No congestion or rhinorrhea.      Mouth/Throat:      Mouth: Mucous membranes are moist.      Pharynx: Oropharynx is clear. No oropharyngeal exudate or posterior oropharyngeal erythema.   Eyes:      General: No scleral icterus.     Extraocular Movements: Extraocular movements intact.      Conjunctiva/sclera: Conjunctivae normal.      Pupils: Pupils are equal, round, and reactive to light.   Neck:      Vascular: No carotid bruit.   Cardiovascular:      Rate and Rhythm: Regular rhythm. Tachycardia present.      Pulses: Normal pulses.      Heart sounds: Normal heart sounds. No murmur heard.  Pulmonary:      Effort: Pulmonary effort is normal. No respiratory distress.      Breath sounds: Normal breath sounds. No wheezing, rhonchi or rales.   Abdominal:      General: Bowel sounds are normal. There is no distension.      Palpations: Abdomen is soft.      Tenderness: There is no abdominal tenderness. There is no guarding or rebound.   Musculoskeletal:         General: Normal range of motion.      Cervical back: Normal range of motion and neck supple.      Right lower leg: No edema.      Left lower leg: No edema.   Lymphadenopathy:      Cervical: No cervical adenopathy.   Skin:     General: Skin is warm and dry.      Capillary Refill: Capillary refill takes less than 2 seconds.      Coloration: Skin is pale.   Neurological:      General: No focal deficit present.      Mental Status: He is alert and oriented to person, place, and time.      Cranial Nerves: No cranial nerve deficit.      Motor: Weakness present.      Gait: Gait abnormal.   Psychiatric:         Mood and Affect: Mood normal.         Behavior:  Behavior normal.         Thought Content: Thought content normal.         Judgment: Judgment normal.           Significant Labs: All pertinent labs within the past 24 hours have been reviewed.  Recent Lab Results         07/23/23  0543   07/22/23  1006   07/22/23  1005        Albumin 2.3     2.2       Alkaline Phosphatase 50     48       ALT 27     19       Anion Gap 6     5       AST 24     17       Baso # 0.01   0.00         Basophil % 0.1   0.0         BILIRUBIN TOTAL 0.4  Comment: For infants and newborns, interpretation of results should be based  on gestational age, weight and in agreement with clinical  observations.    Premature Infant recommended reference ranges:  Up to 24 hours.............<8.0 mg/dL  Up to 48 hours............<12.0 mg/dL  3-5 days..................<15.0 mg/dL  6-29 days.................<15.0 mg/dL    For patients on Eltrombopag therapy, use of Dimension [place] TBIL is   not   recommended.       0.6  Comment: For infants and newborns, interpretation of results should be based  on gestational age, weight and in agreement with clinical  observations.    Premature Infant recommended reference ranges:  Up to 24 hours.............<8.0 mg/dL  Up to 48 hours............<12.0 mg/dL  3-5 days..................<15.0 mg/dL  6-29 days.................<15.0 mg/dL    For patients on Eltrombopag therapy, use of Dimension [place] TBIL is   not   recommended.         BUN 33     36       Calcium 8.1     8.1       Chloride 113     111       CO2 23     25       Creatinine 1.6     1.8       Differential Method Automated   Automated         eGFR 42.8     37.1       Eos # 0.0   0.0         Eosinophil % 0.1   0.1         Glucose 98     134       Gran # (ANC) 5.6   6.8         Gran % 75.2   79.2         Hematocrit 26.5   26.3         Hemoglobin 8.3   8.4         Immature Grans (Abs) 0.04  Comment: Mild elevation in immature granulocytes is non specific and   can be seen in a variety of conditions including stress  response,   acute inflammation, trauma and pregnancy. Correlation with other   laboratory and clinical findings is essential.     0.13  Comment: Mild elevation in immature granulocytes is non specific and   can be seen in a variety of conditions including stress response,   acute inflammation, trauma and pregnancy. Correlation with other   laboratory and clinical findings is essential.           Immature Granulocytes 0.5   1.5         Lymph # 0.8   0.5         Lymph % 10.5   6.3         Magnesium 1.6     1.6       MCH 30.3   31.0         MCHC 31.3   31.9         MCV 97   97         Mono # 1.0   1.1         Mono % 13.6   12.9         MPV 12.4   11.5         nRBC 0   0         Platelets 88   82         Potassium 4.0     3.4       PROTEIN TOTAL 6.1     6.2       RBC 2.74   2.71         RDW 16.0   16.1         Sodium 142     141       WBC 7.45   8.56                 Significant Imaging: I have reviewed all pertinent imaging results/findings within the past 24 hours.   Clofazimine Counseling:  I discussed with the patient the risks of clofazimine including but not limited to skin and eye pigmentation, liver damage, nausea/vomiting, gastrointestinal bleeding and allergy.

## 2023-07-23 NOTE — ASSESSMENT & PLAN NOTE
Continue current IV antibiotics and IV fluids.  Urine and blood cultures pending.  7/22 urine culture Gram-negative rods, sensitivities pending, afebrile overnight, lab pending this morning  7/23 urine culture pending, afebrile overnight, continue IV antibiotic fluid

## 2023-07-24 VITALS
DIASTOLIC BLOOD PRESSURE: 67 MMHG | TEMPERATURE: 98 F | RESPIRATION RATE: 20 BRPM | HEIGHT: 71 IN | BODY MASS INDEX: 25.9 KG/M2 | HEART RATE: 93 BPM | SYSTOLIC BLOOD PRESSURE: 148 MMHG | WEIGHT: 185 LBS | OXYGEN SATURATION: 95 %

## 2023-07-24 LAB
ALBUMIN SERPL BCP-MCNC: 2.4 G/DL (ref 3.5–5.2)
ALP SERPL-CCNC: 47 U/L (ref 55–135)
ALT SERPL W/O P-5'-P-CCNC: 21 U/L (ref 10–44)
ANION GAP SERPL CALC-SCNC: 4 MMOL/L (ref 8–16)
AST SERPL-CCNC: 16 U/L (ref 10–40)
BACTERIA UR CULT: ABNORMAL
BASOPHILS # BLD AUTO: 0.01 K/UL (ref 0–0.2)
BASOPHILS NFR BLD: 0.2 % (ref 0–1.9)
BILIRUB SERPL-MCNC: 0.4 MG/DL (ref 0.1–1)
BUN SERPL-MCNC: 31 MG/DL (ref 8–23)
CALCIUM SERPL-MCNC: 8.6 MG/DL (ref 8.7–10.5)
CHLORIDE SERPL-SCNC: 112 MMOL/L (ref 95–110)
CO2 SERPL-SCNC: 25 MMOL/L (ref 23–29)
CREAT SERPL-MCNC: 1.6 MG/DL (ref 0.5–1.4)
DIFFERENTIAL METHOD: ABNORMAL
EOSINOPHIL # BLD AUTO: 0 K/UL (ref 0–0.5)
EOSINOPHIL NFR BLD: 0.2 % (ref 0–8)
ERYTHROCYTE [DISTWIDTH] IN BLOOD BY AUTOMATED COUNT: 15.6 % (ref 11.5–14.5)
EST. GFR  (NO RACE VARIABLE): 42.8 ML/MIN/1.73 M^2
GLUCOSE SERPL-MCNC: 87 MG/DL (ref 70–110)
HCT VFR BLD AUTO: 26.6 % (ref 40–54)
HGB BLD-MCNC: 8.5 G/DL (ref 14–18)
IMM GRANULOCYTES # BLD AUTO: 0.06 K/UL (ref 0–0.04)
IMM GRANULOCYTES NFR BLD AUTO: 1 % (ref 0–0.5)
LYMPHOCYTES # BLD AUTO: 1.1 K/UL (ref 1–4.8)
LYMPHOCYTES NFR BLD: 17.9 % (ref 18–48)
MAGNESIUM SERPL-MCNC: 1.6 MG/DL (ref 1.6–2.6)
MCH RBC QN AUTO: 30.2 PG (ref 27–31)
MCHC RBC AUTO-ENTMCNC: 32 G/DL (ref 32–36)
MCV RBC AUTO: 95 FL (ref 82–98)
MONOCYTES # BLD AUTO: 1 K/UL (ref 0.3–1)
MONOCYTES NFR BLD: 16.3 % (ref 4–15)
NEUTROPHILS # BLD AUTO: 4.1 K/UL (ref 1.8–7.7)
NEUTROPHILS NFR BLD: 64.4 % (ref 38–73)
NRBC BLD-RTO: 0 /100 WBC
PLATELET # BLD AUTO: 97 K/UL (ref 150–450)
PMV BLD AUTO: 12.7 FL (ref 9.2–12.9)
POTASSIUM SERPL-SCNC: 3.7 MMOL/L (ref 3.5–5.1)
PROT SERPL-MCNC: 6.4 G/DL (ref 6–8.4)
RBC # BLD AUTO: 2.81 M/UL (ref 4.6–6.2)
SODIUM SERPL-SCNC: 141 MMOL/L (ref 136–145)
WBC # BLD AUTO: 6.27 K/UL (ref 3.9–12.7)

## 2023-07-24 PROCEDURE — 83735 ASSAY OF MAGNESIUM: CPT | Performed by: INTERNAL MEDICINE

## 2023-07-24 PROCEDURE — 97530 THERAPEUTIC ACTIVITIES: CPT

## 2023-07-24 PROCEDURE — 80053 COMPREHEN METABOLIC PANEL: CPT | Performed by: INTERNAL MEDICINE

## 2023-07-24 PROCEDURE — 63600175 PHARM REV CODE 636 W HCPCS

## 2023-07-24 PROCEDURE — 97530 THERAPEUTIC ACTIVITIES: CPT | Mod: CO

## 2023-07-24 PROCEDURE — 36415 COLL VENOUS BLD VENIPUNCTURE: CPT | Performed by: INTERNAL MEDICINE

## 2023-07-24 PROCEDURE — 25000003 PHARM REV CODE 250

## 2023-07-24 PROCEDURE — 97110 THERAPEUTIC EXERCISES: CPT | Mod: CO

## 2023-07-24 PROCEDURE — 63600175 PHARM REV CODE 636 W HCPCS: Performed by: STUDENT IN AN ORGANIZED HEALTH CARE EDUCATION/TRAINING PROGRAM

## 2023-07-24 PROCEDURE — 85025 COMPLETE CBC W/AUTO DIFF WBC: CPT | Performed by: INTERNAL MEDICINE

## 2023-07-24 PROCEDURE — 25000003 PHARM REV CODE 250: Performed by: STUDENT IN AN ORGANIZED HEALTH CARE EDUCATION/TRAINING PROGRAM

## 2023-07-24 RX ORDER — TAMSULOSIN HYDROCHLORIDE 0.4 MG/1
0.4 CAPSULE ORAL NIGHTLY
Qty: 30 CAPSULE | Refills: 0 | Status: SHIPPED | OUTPATIENT
Start: 2023-07-24 | End: 2024-01-18 | Stop reason: SDUPTHER

## 2023-07-24 RX ORDER — FUROSEMIDE 20 MG/1
20 TABLET ORAL DAILY
Qty: 30 TABLET | Refills: 11 | Status: SHIPPED | OUTPATIENT
Start: 2023-07-25 | End: 2023-09-11 | Stop reason: ALTCHOICE

## 2023-07-24 RX ORDER — CIPROFLOXACIN 500 MG/1
500 TABLET ORAL 2 TIMES DAILY
Qty: 20 TABLET | Refills: 0 | Status: SHIPPED | OUTPATIENT
Start: 2023-07-24 | End: 2023-08-03

## 2023-07-24 RX ORDER — LOSARTAN POTASSIUM 25 MG/1
25 TABLET ORAL DAILY
Status: DISCONTINUED | OUTPATIENT
Start: 2023-07-24 | End: 2023-07-24 | Stop reason: HOSPADM

## 2023-07-24 RX ADMIN — CARVEDILOL 12.5 MG: 12.5 TABLET, FILM COATED ORAL at 09:07

## 2023-07-24 RX ADMIN — ROPINIROLE HYDROCHLORIDE 1 MG: 1 TABLET, FILM COATED ORAL at 09:07

## 2023-07-24 RX ADMIN — Medication 1 TABLET: at 09:07

## 2023-07-24 RX ADMIN — FUROSEMIDE 20 MG: 20 TABLET ORAL at 09:07

## 2023-07-24 RX ADMIN — DOCUSATE SODIUM 100 MG: 100 CAPSULE, LIQUID FILLED ORAL at 09:07

## 2023-07-24 RX ADMIN — EZETIMIBE 10 MG: 10 TABLET ORAL at 09:07

## 2023-07-24 RX ADMIN — FLUTICASONE PROPIONATE 50 MCG: 50 SPRAY, METERED NASAL at 09:07

## 2023-07-24 RX ADMIN — PREDNISONE 5 MG: 5 TABLET ORAL at 09:07

## 2023-07-24 RX ADMIN — CLOPIDOGREL BISULFATE 75 MG: 75 TABLET ORAL at 09:07

## 2023-07-24 RX ADMIN — CIPROFLOXACIN 400 MG: 2 INJECTION, SOLUTION INTRAVENOUS at 07:07

## 2023-07-24 RX ADMIN — LOSARTAN POTASSIUM 25 MG: 25 TABLET, FILM COATED ORAL at 09:07

## 2023-07-24 RX ADMIN — DULOXETINE 30 MG: 30 CAPSULE, DELAYED RELEASE ORAL at 09:07

## 2023-07-24 RX ADMIN — ATORVASTATIN CALCIUM 40 MG: 40 TABLET, FILM COATED ORAL at 09:07

## 2023-07-24 NOTE — PLAN OF CARE
Problem: Physical Therapy  Goal: Physical Therapy Goal  Description: Goals to be met by: 2023     Patient will increase functional independence with mobility by performin. Supine to sit with Modified [place]  2. Sit to supine with Modified [place]  3. Rolling to Left and Right with Modified [place].  4. Sit to stand transfer with Modified [place] using a RW  5. Bed to chair transfer with Modified independence using Rolling Walker  6. Gait x 500 feet with Modified [place] using Rolling Walker.     2023 1328 by Saba Cabral PT  Outcome: Adequate for Care Transition

## 2023-07-24 NOTE — DISCHARGE INSTRUCTIONS
Keep twice daily blood pressure log.  Bring log to follow-up appointment with Dr. Bethea in one week.    Increase fluid intake.

## 2023-07-24 NOTE — PT/OT/SLP DISCHARGE
Physical Therapy Discharge Summary    Name: Hunter Navarrete  MRN: [id number]   Principal Problem: Pyelonephritis, acute     Patient Discharged from acute Physical Therapy on 2023.  Please refer to prior PT noted date on 2023 for functional status.     Assessment:     Patient appropriate for care in another setting.    Objective:     GOALS:   Multidisciplinary Problems       Physical Therapy Goals          Problem: Physical Therapy    Goal Priority Disciplines Outcome Goal Variances Interventions   Physical Therapy Goal     PT, PT/OT Adequate for Care Transition     Description: Goals to be met by: 2023     Patient will increase functional independence with mobility by performin. Supine to sit with Modified [place]  2. Sit to supine with Modified [place]  3. Rolling to Left and Right with Modified [place].  4. Sit to stand transfer with Modified [place] using a RW  5. Bed to chair transfer with Modified independence using Rolling Walker  6. Gait x 500 feet with Modified [place] using Rolling Walker.                          Reasons for Discontinuation of Therapy Services  Transfer to alternate level of care.      Plan:     Patient Discharged to: Home with Home Health Service.      2023

## 2023-07-24 NOTE — PT/OT/SLP PROGRESS
Physical Therapy Treatment    Patient Name:  Hunter Navarrete   MRN:  [id number]    Recommendations:     Discharge Recommendations: home, home with home health, home health PT  Discharge Equipment Recommendations: none  Barriers to discharge: None    Assessment:     Hunter Navarrete is a 83 y.o. male admitted with a medical diagnosis of Pyelonephritis, acute.  He presents with the following impairments/functional limitations: weakness, impaired endurance, impaired muscle length, impaired self care skills, decreased upper extremity function, impaired functional mobility, decreased lower extremity function, gait instability, pain, impaired balance, impaired cardiopulmonary response to activity Patient was found supine in bed agreeable to therapy. Patient is requesting for his diaper to be changed, assisted patient in changing his diaper while in standing position.  Offered to bring him to the bathroom but patient declined.  Patient  was modified independent with supine to sit with use of side rail, supervision with sit <> stand using a rolling walker, stood for about 2 minutes with stand by assistance while changing his diaper and ambulated ~450 feet using a rolling walker with stand by assistance, c/o left hip pain after walking 300 feet. Rollator that he used at home was not in the room, use the 2-wheeled rolljng walker without issues, able to mainatin upright standing posture during gait, no festinating gait pattern observed.     Rehab Prognosis: Good and Fair; patient would benefit from acute skilled PT services to address these deficits and reach maximum level of function.    Recent Surgery: * No surgery found *      Plan:     During this hospitalization, patient to be seen 5 x/week to address the identified rehab impairments via gait training, therapeutic activities, therapeutic exercises, neuromuscular re-education and progress toward the following goals:    Plan of Care Expires:  07/28/23    Subjective     Chief  "Complaint: "My hip hurt when I walk."   Patient/Family Comments/goals: Go home   Pain/Comfort:  Pain Rating 1: 4/10  Location - Side 1: Left  Location 1: leg  Pain Addressed 1: Cessation of Activity, Reposition, Distraction  Pain Rating Post-Intervention 1: 3/10      Objective:     Communicated with nurse and patient  prior to session.  Patient found supine with peripheral IV, telemetry upon PT entry to room.     General Precautions: Standard, fall, hearing impaired  Orthopedic Precautions: N/A  Braces: N/A  Respiratory Status: Room air     Functional Mobility:  Bed Mobility:     Rolling Left:  modified independence  Scooting: modified independence  Bridging: modified independence  Supine to Sit: modified independence  Transfers:     Sit to Stand:  supervision with rolling walker  Gait: ~450 feet with RW with SBA, reciprocal gait pattern   Balance: independent with static sitting, SBA with static standing using a RW       AM-PAC 6 CLICK MOBILITY  Turning over in bed (including adjusting bedclothes, sheets and blankets)?: 4  Sitting down on and standing up from a chair with arms (e.g., wheelchair, bedside commode, etc.): 4  Moving from lying on back to sitting on the side of the bed?: 4  Moving to and from a bed to a chair (including a wheelchair)?: 3  Need to walk in hospital room?: 3  Climbing 3-5 steps with a railing?: 3 (based on clinical judgement)  Basic Mobility Total Score: 21       Treatment & Education:  Rolling to the left   Supine to  sit  Scooting to the front of the bed   Sitting balance/tolerance  Sit <> stand with RW  Standing tolerance   Gait with RW  Out of bed       Patient left up in chair with call button in reach and nurse notified..    GOALS:   Multidisciplinary Problems       Physical Therapy Goals          Problem: Physical Therapy    Goal Priority Disciplines Outcome Goal Variances Interventions   Physical Therapy Goal     PT, PT/OT Ongoing, Progressing     Description: Goals to be met by: " 2023     Patient will increase functional independence with mobility by performin. Supine to sit with Modified [place]  2. Sit to supine with Modified [place]  3. Rolling to Left and Right with Modified [place].  4. Sit to stand transfer with Modified [place] using a RW  5. Bed to chair transfer with Modified independence using Rolling Walker  6. Gait x 500 feet with Modified [place] using Rolling Walker.                          Time Tracking:     PT Received On: 23  PT Start Time: 1100     PT Stop Time: 1115  PT Total Time (min): 15 min     Billable Minutes: Therapeutic Activity 15    Treatment Type: Evaluation  PT/PTA: PT           2023

## 2023-07-24 NOTE — ASSESSMENT & PLAN NOTE
Continue home medications.  Monitor blood pressures and adjust medications as needed.  7/24:  BP elevated.  Losartan added.

## 2023-07-24 NOTE — PLAN OF CARE
07/24/23 1307   Post-Acute Status   Post-Acute Authorization Home Health   Home Health Status Set-up Complete/Auth obtained  (Brenda with [facility] was informed that the patient will be discharging today.)   Discharge Delays None known at this time   Discharge Plan   Discharge Plan A Home;Home Health   Discharge Plan B Home;Home Health     The patient will resume home health care with [facility].

## 2023-07-24 NOTE — ASSESSMENT & PLAN NOTE
Continue current IV antibiotics and IV fluids.  Urine and blood cultures pending.  7/22 urine culture Gram-negative rods, sensitivities pending, afebrile overnight, lab pending this morning  7/23 urine culture pending, afebrile overnight, continue IV antibiotic fluid    7/24:  Final sensitivity report with positive sensitivity to Cipro.  Will discharge home with PO Cipro.  Patient advised to increase fluid intake.

## 2023-07-24 NOTE — PLAN OF CARE
Problem: Occupational Therapy  Goal: Occupational Therapy Goal  Description: Goals to be met by: 07/28/23     Patient will increase functional independence with ADLs by performing:    Feeding with Modified [place].  UE Dressing with Modified [place].  LE Dressing with Modified [place].  Grooming while seated at sink with Modified [place].  Toileting from toilet with Modified [place] for hygiene and clothing management.   Bathing from  shower chair/bench with Modified [place].  Step transfer with Modified [place]  Toilet transfer to toilet with Modified [place].    Outcome: Ongoing, Progressing

## 2023-07-24 NOTE — SUBJECTIVE & OBJECTIVE
Interval History: Patient seen and examined.    Review of Systems   Constitutional:  Positive for activity change, appetite change, chills, fatigue and fever. Negative for diaphoresis and unexpected weight change.   HENT:  Negative for congestion, ear pain, facial swelling, postnasal drip, rhinorrhea, sinus pressure, sinus pain, sore throat and trouble swallowing.    Eyes:  Negative for photophobia, pain, redness and visual disturbance.   Respiratory:  Negative for cough, chest tightness, shortness of breath and wheezing.    Cardiovascular:  Negative for chest pain, palpitations and leg swelling.   Gastrointestinal:  Positive for abdominal pain, nausea and vomiting. Negative for anal bleeding, blood in stool, constipation and diarrhea.   Endocrine: Negative for polydipsia, polyphagia and polyuria.   Genitourinary:  Negative for decreased urine volume, difficulty urinating, dysuria, flank pain, frequency, hematuria and urgency.   Musculoskeletal:  Negative for arthralgias and myalgias.   Skin:  Negative for pallor, rash and wound.   Allergic/Immunologic: Negative.    Neurological:  Positive for dizziness, weakness and light-headedness. Negative for tremors, seizures, syncope, facial asymmetry, speech difficulty and headaches.   Psychiatric/Behavioral:  Negative for agitation, behavioral problems, confusion, hallucinations, sleep disturbance and suicidal ideas. The patient is not nervous/anxious.    Objective:     Vital Signs (Most Recent):  Temp: 98.1 °F (36.7 °C) (07/24/23 0746)  Pulse: 102 (07/24/23 0746)  Resp: (!) 22 (07/24/23 0746)  BP: (!) 190/80 (07/24/23 0759)  SpO2: 95 % (07/24/23 0746) Vital Signs (24h Range):  Temp:  [98.1 °F (36.7 °C)-98.8 °F (37.1 °C)] 98.1 °F (36.7 °C)  Pulse:  [] 102  Resp:  [18-22] 22  SpO2:  [91 %-96 %] 95 %  BP: (155-209)/(69-86) 190/80     Weight: 83.9 kg (185 lb)  Body mass index is 25.8 kg/m².    Intake/Output Summary (Last 24 hours) at 7/24/2023 0813  Last data filed at  7/24/2023 0729  Gross per 24 hour   Intake 1160 ml   Output 600 ml   Net 560 ml           Physical Exam  Vitals and nursing note reviewed.   Constitutional:       General: He is not in acute distress.     Appearance: Normal appearance. He is ill-appearing.   HENT:      Head: Normocephalic and atraumatic.      Nose: Nose normal. No congestion or rhinorrhea.      Mouth/Throat:      Mouth: Mucous membranes are moist.      Pharynx: Oropharynx is clear. No oropharyngeal exudate or posterior oropharyngeal erythema.   Eyes:      General: No scleral icterus.     Extraocular Movements: Extraocular movements intact.      Conjunctiva/sclera: Conjunctivae normal.      Pupils: Pupils are equal, round, and reactive to light.   Neck:      Vascular: No carotid bruit.   Cardiovascular:      Rate and Rhythm: Regular rhythm. Tachycardia present.      Pulses: Normal pulses.      Heart sounds: Normal heart sounds. No murmur heard.  Pulmonary:      Effort: Pulmonary effort is normal. No respiratory distress.      Breath sounds: Normal breath sounds. No wheezing, rhonchi or rales.   Abdominal:      General: Bowel sounds are normal. There is no distension.      Palpations: Abdomen is soft.      Tenderness: There is no abdominal tenderness. There is no guarding or rebound.   Musculoskeletal:         General: Normal range of motion.      Cervical back: Normal range of motion and neck supple.      Right lower leg: No edema.      Left lower leg: No edema.   Lymphadenopathy:      Cervical: No cervical adenopathy.   Skin:     General: Skin is warm and dry.      Capillary Refill: Capillary refill takes less than 2 seconds.      Coloration: Skin is pale.   Neurological:      General: No focal deficit present.      Mental Status: He is alert and oriented to person, place, and time.      Cranial Nerves: No cranial nerve deficit.      Motor: Weakness present.      Gait: Gait abnormal.   Psychiatric:         Mood and Affect: Mood normal.          Behavior: Behavior normal.         Thought Content: Thought content normal.         Judgment: Judgment normal.           Significant Labs: All pertinent labs within the past 24 hours have been reviewed.  Recent Lab Results         07/24/23  0527        Albumin 2.4       Alkaline Phosphatase 47       ALT 21       Anion Gap 4       AST 16       Baso # 0.01       Basophil % 0.2       BILIRUBIN TOTAL 0.4  Comment: For infants and newborns, interpretation of results should be based  on gestational age, weight and in agreement with clinical  observations.    Premature Infant recommended reference ranges:  Up to 24 hours.............<8.0 mg/dL  Up to 48 hours............<12.0 mg/dL  3-5 days..................<15.0 mg/dL  6-29 days.................<15.0 mg/dL    For patients on Eltrombopag therapy, use of Dimension [place] TBIL is   not   recommended.         BUN 31       Calcium 8.6       Chloride 112       CO2 25       Creatinine 1.6       Differential Method Automated       eGFR 42.8       Eos # 0.0       Eosinophil % 0.2       Glucose 87       Gran # (ANC) 4.1       Gran % 64.4       Hematocrit 26.6       Hemoglobin 8.5       Immature Grans (Abs) 0.06  Comment: Mild elevation in immature granulocytes is non specific and   can be seen in a variety of conditions including stress response,   acute inflammation, trauma and pregnancy. Correlation with other   laboratory and clinical findings is essential.         Immature Granulocytes 1.0       Lymph # 1.1       Lymph % 17.9       Magnesium 1.6       MCH 30.2       MCHC 32.0       MCV 95       Mono # 1.0       Mono % 16.3       MPV 12.7       nRBC 0       Platelets 97       Potassium 3.7       PROTEIN TOTAL 6.4       RBC 2.81       RDW 15.6       Sodium 141       WBC 6.27               Significant Imaging: I have reviewed all pertinent imaging results/findings within the past 24 hours.

## 2023-07-24 NOTE — PLAN OF CARE
Brenda with [facility] informed me that they are currently using the patient's Medicare insurance.The patient has VA benefits. It was explained to him that [facility] is not in network with his VA benefits. He expressed to me that he would like to resume home health care with [facility] using his Medicare insurance. He also asked me to notify his daughter, Romy, as well.

## 2023-07-24 NOTE — DISCHARGE SUMMARY
[facility] Medicine  Discharge Summary      Patient Name: Hunter Navarrete  MRN: [id number]  ADWOA: [id number]  Patient Class: IP- Inpatient  Admission Date: 7/20/2023  Hospital Length of Stay: 4 days  Discharge Date and Time: No discharge date for patient encounter.  Attending Physician: Natalie Hector MD   Discharging Provider: Best Toledo NP  Primary Care Provider: Mariella Bethea MD    Primary Care Team: Networked reference to record PCT     HPI:   ED HPI:  83-year-old male presents to the emergency room with vomiting since yesterday with dizziness, weakness.  Symptoms.  Temperature in triage was 101.1, no medication taken prior to arrival.    IM HPI:  Agree with above.  Patient reports fever, decreased appetite, nausea, vomiting, abdominal discomfort for the last few days.  Patient states he had urinary stent placed in mid to late June of this year.  Patient reports hospitalization for approximately 5 days at the end of June are sepsis secondary to pyelonephritis.  Patient states he received IV antibiotics while admitted and was discharged home with 5 day course of Cipro.  Patient currently receiving IV Cipro.  Patient's blood and urine cultures pending.  Patient's white blood cell count slightly improved this morning.  BUN and creatinine remain elevated.  Vital signs stable.  Patient afebrile since approximately 1800 hours yesterday.  Will continue current treatment pending blood in urine final sensitivity report.      * No surgery found *      Hospital Course:   7/22 AA, weekend crosscover, patient admitted with pyelonephritis, on IV antibiotic, urine culture with Gram-negative batsheva, sensitivities pending, lab pending, continue to monitor, deescalate when able  7/23 AA, weekend crosscover, patient admitted with pyelonephritis antibiotics on board, urine culture pending, no acute events overnight, lab stable, renal function stable potassium repleted yesterday, stable morning blood  pressures elevated, adjust med if trend continues    7/24 DL Discharge Note:  Patient doing well this morning.  He is awake, alert, oriented.  Patient with slight increased agitation as he is anxious to go home.  Patient noted to have elevated blood pressure this morning.  Meds adjusted.  White count continues to trend down.  Patient remains afebrile.  Final urine culture and sensitivity shows positive sensitivity to Cipro.  We will discharge patient home on 10 day course of Cipro.  Patient and family advised to notify Dr. Pinon of recent hospitalization and follow-up as recommended.  Patient advised to follow up with PCP in 1 week.  Patient advised to monitor blood pressures upon discharge and report continued elevation to PCP.       Goals of Care Treatment Preferences:  Code Status: Full Code      Consults:     Cardiac/Vascular  Hypertension  Continue home medications.  Monitor blood pressures and adjust medications as needed.  7/24:  BP elevated.  Losartan added.      Hypercholesteremia  Continue statin.      Coronary artery disease  Continue home medications.      Renal/  DANIELLA (acute kidney injury)  Patient with acute kidney injury/acute renal failure likely due to infection DANIELLA is currently stable. Baseline creatinine unknown - Labs reviewed- Renal function/electrolytes with Estimated Creatinine Clearance: 37.9 mL/min (A) (based on SCr of 1.6 mg/dL (H)). according to latest data. Monitor urine output and serial BMP and adjust therapy as needed. Avoid nephrotoxins and renally dose meds for GFR listed above.    ID  * Pyelonephritis, acute  Continue current IV antibiotics and IV fluids.  Urine and blood cultures pending.  7/22 urine culture Gram-negative rods, sensitivities pending, afebrile overnight, lab pending this morning  7/23 urine culture pending, afebrile overnight, continue IV antibiotic fluid    7/24:  Final sensitivity report with positive sensitivity to Cipro.  Will discharge home with PO Cipro.   Patient advised to increase fluid intake.    Other  Weakness  PT/OT ordered.      On deep vein thrombosis (DVT) prophylaxis  SCDs ordered.  Holding further anticoagulation at this time secondary to decreased platelet count.        Final Active Diagnoses:    Diagnosis Date Noted POA    PRINCIPAL PROBLEM:  Pyelonephritis, acute [N10] 06/24/2023 Yes    On deep vein thrombosis (DVT) prophylaxis [Z79.899] 07/21/2023 Not Applicable    DANIELLA (acute kidney injury) [N17.9] 07/21/2023 Yes    Weakness [R53.1] 07/21/2023 Yes    Hypertension [I10]  Yes    Coronary artery disease [I25.10] 09/03/2019 Yes    Hypercholesteremia [E78.00] 09/03/2019 Yes      Problems Resolved During this Admission:    Diagnosis Date Noted Date Resolved POA    Fever [R50.9] 06/24/2023 07/23/2023 Yes       Discharged Condition: stable    Disposition: Home-Health Care [facility]    Follow Up:   Follow-up Information     Mariella Bethea MD. Schedule an appointment as soon as possible for a visit in 1 week(s).    Specialty: Internal Medicine  Contact information:  [address] [place] [id number] [phone]             Aaron Pinon MD. Call today.    Specialty: Urology  Why: Call Dr. Pinon to update on recent hospital admission.  Follow-up as recommended.  Contact information:  [address] [place] [id number] [phone]                       Patient Instructions:      Ambulatory referral/consult to Home Health   Standing Status: Future   Referral Priority: Routine Referral Type: Home Health   Referral Reason: Specialty Services Required   Requested Specialty: Home Health Services   Number of Visits Requested: 1     Diet Cardiac     Activity as tolerated       Significant Diagnostic Studies: Labs: All labs within the past 24 hours have been reviewed    Pending Diagnostic Studies:     None         Medications:  Reconciled Home Medications:      Medication List      START taking these medications    ciprofloxacin HCl 500 MG  tablet  Commonly known as: CIPRO  Take 1 tablet (500 mg total) by mouth 2 (two) times daily. for 10 days        CHANGE how you take these medications    furosemide 20 MG tablet  Commonly known as: LASIX  Take 1 tablet (20 mg total) by mouth once daily.  Start taking on: July 25, 2023  What changed:   · medication strength  · how much to take        CONTINUE taking these medications    atorvastatin 40 MG tablet  Commonly known as: LIPITOR  Take 1 tablet (40 mg total) by mouth once daily.     calcium carbonate-vitamin D3 500 mg-10 mcg (400 unit) Tab  Take 1 tablet by mouth once daily.     carvediloL 12.5 MG tablet  Commonly known as: COREG  Take 1 tablet (12.5 mg total) by mouth 2 (two) times daily.     clopidogreL 75 mg tablet  Commonly known as: PLAVIX  Take 1 tablet (75 mg total) by mouth once daily.     docusate sodium 100 MG capsule  Commonly known as: COLACE  Take 100 mg by mouth 2 (two) times daily.     DULoxetine 30 MG capsule  Commonly known as: CYMBALTA  Take 1 capsule (30 mg total) by mouth once daily.     ezetimibe 10 mg tablet  Commonly known as: ZETIA  Take 1 tablet (10 mg total) by mouth once daily.     fluticasone propionate 50 mcg/actuation nasal spray  Commonly known as: FLONASE  1 spray by Each Nostril route 2 (two) times a day.     HYDROcodone-acetaminophen 7.5-325 mg per tablet  Commonly known as: NORCO     predniSONE 5 MG tablet  Commonly known as: DELTASONE  Take 5 mg by mouth once daily.     rOPINIRole 1 MG tablet  Commonly known as: REQUIP  Take 1 tablet (1 mg total) by mouth 2 (two) times daily.     tamsulosin 0.4 mg Cap  Commonly known as: FLOMAX  Take 1 capsule (0.4 mg total) by mouth every evening.            Indwelling Lines/Drains at time of discharge:   Lines/Drains/Airways     None                 Time spent on the discharge of patient: 36 minutes         Best Toledo NP  Department of Hospital Medicine  [facility]

## 2023-07-24 NOTE — PT/OT/SLP PROGRESS
Occupational Therapy   Treatment    Name: Hunter Navarrete  MRN: [id number]  Admitting Diagnosis:  Pyelonephritis, acute       Recommendations:     Discharge Recommendations: home, home with home health  Discharge Equipment Recommendations:  none  Barriers to discharge:  None    Assessment:     Hunter Navarrete is a 83 y.o. male with a medical diagnosis of Pyelonephritis, acute.  He presents with good participation and motivation. Performance deficits affecting function are weakness, impaired endurance, impaired self care skills, impaired functional mobility, gait instability, impaired balance, decreased coordination, decreased upper extremity function, decreased lower extremity function, decreased safety awareness, decreased ROM, impaired cardiopulmonary response to activity, impaired joint extensibility.     Rehab Prognosis:  Good; patient would benefit from acute skilled OT services to address these deficits and reach maximum level of function.       Plan:     Patient to be seen 5 x/week to address the above listed problems via self-care/home management, therapeutic activities, therapeutic exercises, neuromuscular re-education  Plan of Care Expires: 07/28/23  Plan of Care Reviewed with: patient, daughter    Subjective     Chief Complaint: Reported none  Patient/Family Comments/goals: Im ready to go home  Pain/Comfort:  Pain Rating 1: 0/10  Pain Rating Post-Intervention 1: 0/10    Objective:     Communicated with: occupational therapist and RN prior to session.  Patient found up in chair with peripheral IV, telemetry upon OT entry to room.    General Precautions: Standard, fall, hearing impaired    Orthopedic Precautions:N/A  Braces: N/A  Respiratory Status: Room air     Occupational Performance:     Functional Mobility/Transfers:  Patient completed Sit <> Stand Transfer with contact guard assistance  with  rolling walker   Functional Mobility: patient completed 2 x 5 sit to stands with CGA and RW for safety.      [facility] 6 Click ADL: 19    Treatment & Education:  Patient engaged in active range of motion therapeutic exercise for 2 x 15 sitting up in chair in the following planes: shoulder flexion/extension, shoulder abduction/adduction, elbow flexion/extension, scapular retractions/protraction, scapular elevation/depression, shoulder rotations (forward and reverse), wrist flexion/extension, wrist radial/ulnar deviation, forearm supination/pronation, and composite fist. Patient needed rest breaks between each set dur to impaired muscle endurance and activity tolerance. Patient completed 2 x 5 sit to stands with CGA and rolling walker for safety. Patient educated to continue to complete exercise throughout the day to increase strength and endurance to facilitate an increase in ADL/IADLs. Patient verbally understood.      Patient left up in chair with all lines intact, call button in reach, and RN notified    GOALS:   Multidisciplinary Problems       Occupational Therapy Goals          Problem: Occupational Therapy    Goal Priority Disciplines Outcome Interventions   Occupational Therapy Goal     OT, PT/OT Ongoing, Progressing    Description: Goals to be met by: 07/28/23     Patient will increase functional independence with ADLs by performing:    Feeding with Modified [place].  UE Dressing with Modified [place].  LE Dressing with Modified [place].  Grooming while seated at sink with Modified [place].  Toileting from toilet with Modified [place] for hygiene and clothing management.   Bathing from  shower chair/bench with Modified [place].  Step transfer with Modified [place]  Toilet transfer to toilet with Modified [place].                         Time Tracking:     OT Date of Treatment: 07/24/23  OT Start Time: 0909  OT Stop Time: 0936  OT Total Time (min): 27 min    Billable Minutes:Therapeutic Activity 8 min  Therapeutic Exercise 19 min          Number of [place] visits since last OT  visit: 1    7/24/2023  Yasmine LIU

## 2023-07-24 NOTE — PLAN OF CARE
Discharge instructions reviewed with the patient and family. Peripheral Ivs removed and intact. Telemetry monitor removed and returned to proper location. Patient wheeled downstairs by staff to personal transportation with his daughter.

## 2023-07-25 ENCOUNTER — PATIENT OUTREACH (OUTPATIENT)
Dept: ADMINISTRATIVE | Facility: CLINIC | Age: 83
End: 2023-07-25
Payer: OTHER GOVERNMENT

## 2023-07-25 LAB — BACTERIA BLD CULT: NORMAL

## 2023-07-25 NOTE — PROGRESS NOTES
C3 nurse spoke with Hunter Navarrete, daughter, Romy, for a TCC post hospital discharge follow up call. The patient reports does not have a scheduled HOSFU appointment. C3 nurse was unable to schedule HOSFU appointment [facility]. Patient advised to contact their PCP to schedule a HOSPFU within 5-7 days.

## 2023-07-25 NOTE — PROGRESS NOTES
C3 nurse attempted to contact Hunter Navarrete and his daughter, Romy, for a TCC post hospital discharge follow up call. No answer. The patient does not have a scheduled HOSFU appointment, and the pt does not have an [facility] PCP.

## 2023-07-26 LAB — BACTERIA BLD CULT: NORMAL

## 2023-07-29 NOTE — PHYSICIAN QUERY
PT Name: Hunter Navarrete  MR #: [id number]                              Query withdrawn     DOCUMENTATION CLARIFICATION     CDS/: Diana Montes RN           Contact information: [email or web address]  This form is a permanent document in the medical record.    Query Date: July 29, 2023    By submitting this query, we are merely seeking further clarification of documentation.  Please utilize your independent clinical judgment when addressing the question(s) below.    The Medical Record contains the following:   Indicator Supporting Clinical Findings Location in Medical Record    Kidney (Renal) Insufficiency      x Kidney (Renal) Failure/Injury  DANIELLA (acute kidney injury)  Patient with acute kidney injury/acute renal failure likely due to infection DANIELLA is currently stable  DS    Nephrotoxic Agents      x  BUN/Creatinine           GFR  6/27  BUN/Creatinine   - 36/1.51        GFR - 46    7/20  BUN/Creatinine   - 34/1.9         GFR - 34.8    7/22  BUN/Creatinine - 36/1.8          GFR - 37.1   Labs    Urine: Casts         Eosinophils      Urine Output     x Dehydration  Differential - Dehydration   7/20 ED provider    Nausea/Vomiting      Dialysis/CRRT      x Treatment  Sodium chloride 0.9% bolus 1,000 mL 1,000 mL (1,000 mLs   7/20 ED Note    x Other  83-year-old male with 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  Patient found to be febrile tachycardic.  Dry mucous membrane.  Labs and imaging shows right-sided hydronephrosis with some fat stranding.  Bladder wall thickening.  Labs consistent with UTI.  Possible pyelonephritis.  Patient was discharged recently from outside facility with urine culture showing Pseudomonas UTI.  Will continue ciprofloxacin for now until cultures comes back.  Lactic normal.  Will continue hydrating.  Patient will for IV antibiotics and further hydration  7/20 ED Note        [facility] approved diagnostic criteria for acute kidney injury is based on KDIGO criteria:    An increase in serum creatinine > 0.3mg/dl within 48 hours  OR  Increase in serum creatinine to > 1.5x baseline, which is known or presumed to have occurred within the prior 7 days  OR  Urine volume <0.5 ml/kg/hr for 6 hours       The clinical guidelines noted above are only a system guideline. It does not replace the providers clinical judgment.     Provider, please specify the diagnosis or diagnoses associated with above clinical findings.     [    ] Acute Kidney Failure/Injury  ruled out   [    ] Acute Kidney Failure/Injury (please clarify with clinical indicators): ____________     [    ] Other (please specify): _______________________________       Please document in your progress notes daily for the duration of treatment until resolved and include in your discharge summary.    References:   KDIGO Clinical Practice Guideline for Acute Kidney Injury. (2012, March). Retrieved October 21, 2020, from https://kdigo.org/wp-content/uploads/2016/10/BVFYE-3505-IUJ-Guideline-English.pdf    JSOIAS Sin MD, CHIKA Hargrove MD, & ENIO Terrell MD. (1960). Renal medullary necrosis [Abstract]. The American Journal of Medicine, 29(1), 132-156. Doi:https://www.sciencedirect.com/science/article/abs/pii/3269737169414557    ENIO Riojas MD, & LUCIA Russ MD, MS. (2020, June 18). Definition and staging of chronic kidney disease in adults ([id number] CHIKA Dumont MD, ScD & [id number] RUBY Pelaez MD, MSc, Eds.). Retrieved October 21, 2020, from https://www.[employer].Inside Secure/contents/definition-and-staging-of-chronic-kidney-disease-in-adults?search=ckd%20staging&source=search_result&selectedTitle=1~150&usage_type=default&display_rank=1     JYOTI Holley MD, FACP. (2015, Gerri 15). Acute kidney injury revisited. Retrieved October 21, 2020, from  https://acphospitalist.org/archives/2015/06/coding-acute-kidney-injury.htm    CARMEN Savage MD. (2019, July). Renal Cortical Necrosis. Retrieved October 21, 2020, from https://www.[employer]/professional/genitourinary-disorders/renovascular-disorders/renal-cortical-necrosis    Form No. 64145

## 2023-09-11 ENCOUNTER — LAB VISIT (OUTPATIENT)
Dept: LAB | Facility: HOSPITAL | Age: 83
End: 2023-09-11
Attending: INTERNAL MEDICINE
Payer: MEDICARE

## 2023-09-11 DIAGNOSIS — Z79.899 ON DEEP VEIN THROMBOSIS (DVT) PROPHYLAXIS: ICD-10-CM

## 2023-09-11 DIAGNOSIS — R79.1 ELEVATED PARTIAL THROMBOPLASTIN TIME (PTT): ICD-10-CM

## 2023-09-11 DIAGNOSIS — N18.30 STAGE 3 CHRONIC KIDNEY DISEASE, UNSPECIFIED WHETHER STAGE 3A OR 3B CKD: ICD-10-CM

## 2023-09-11 DIAGNOSIS — I10 PRIMARY HYPERTENSION: ICD-10-CM

## 2023-09-11 DIAGNOSIS — Z79.899 ENCOUNTER FOR LONG-TERM (CURRENT) USE OF OTHER MEDICATIONS: ICD-10-CM

## 2023-09-11 DIAGNOSIS — Z01.89 ENCOUNTER FOR OTHER SPECIFIED SPECIAL EXAMINATIONS: ICD-10-CM

## 2023-09-11 DIAGNOSIS — Z95.1 S/P CABG X 2: ICD-10-CM

## 2023-09-11 DIAGNOSIS — D50.9 IRON DEFICIENCY ANEMIA, UNSPECIFIED IRON DEFICIENCY ANEMIA TYPE: ICD-10-CM

## 2023-09-11 DIAGNOSIS — G20.A1 PARKINSON DISEASE: ICD-10-CM

## 2023-09-11 DIAGNOSIS — D68.62 LUPUS ANTICOAGULANT INHIBITOR SYNDROME: ICD-10-CM

## 2023-09-11 DIAGNOSIS — I25.5 ISCHEMIC CARDIOMYOPATHY: ICD-10-CM

## 2023-09-11 DIAGNOSIS — Z76.89 ENCOUNTER TO ESTABLISH CARE: ICD-10-CM

## 2023-09-11 DIAGNOSIS — E78.00 HYPERCHOLESTEREMIA: ICD-10-CM

## 2023-09-11 DIAGNOSIS — I20.9 ANGINA, CLASS III: ICD-10-CM

## 2023-09-11 DIAGNOSIS — I25.10 CORONARY ARTERY DISEASE, UNSPECIFIED VESSEL OR LESION TYPE, UNSPECIFIED WHETHER ANGINA PRESENT, UNSPECIFIED WHETHER NATIVE OR TRANSPLANTED HEART: ICD-10-CM

## 2023-09-11 DIAGNOSIS — N17.9 AKI (ACUTE KIDNEY INJURY): ICD-10-CM

## 2023-09-11 DIAGNOSIS — R79.89 LOW VITAMIN B12 LEVEL: ICD-10-CM

## 2023-09-11 DIAGNOSIS — Z01.89 LABORATORY PROCEDURES: ICD-10-CM

## 2023-09-11 LAB
ALBUMIN SERPL BCP-MCNC: 3.3 G/DL (ref 3.5–5.2)
ALBUMIN/CREAT UR: 16.4 UG/MG (ref 0–30)
ALP SERPL-CCNC: 59 U/L (ref 55–135)
ALT SERPL W/O P-5'-P-CCNC: 16 U/L (ref 10–44)
ANION GAP SERPL CALC-SCNC: 2 MMOL/L (ref 8–16)
AST SERPL-CCNC: 15 U/L (ref 10–40)
BASOPHILS # BLD AUTO: 0.03 K/UL (ref 0–0.2)
BASOPHILS NFR BLD: 0.5 % (ref 0–1.9)
BILIRUB SERPL-MCNC: 0.5 MG/DL (ref 0.1–1)
BILIRUB UR QL STRIP: NEGATIVE
BUN SERPL-MCNC: 38 MG/DL (ref 8–23)
CALCIUM SERPL-MCNC: 8.9 MG/DL (ref 8.7–10.5)
CHLORIDE SERPL-SCNC: 109 MMOL/L (ref 95–110)
CHOLEST SERPL-MCNC: 190 MG/DL (ref 120–199)
CHOLEST/HDLC SERPL: 4 {RATIO} (ref 2–5)
CLARITY UR: CLEAR
CO2 SERPL-SCNC: 32 MMOL/L (ref 23–29)
COLOR UR: YELLOW
CREAT SERPL-MCNC: 2 MG/DL (ref 0.5–1.4)
CREAT UR-MCNC: 77.5 MG/DL (ref 23–375)
DIFFERENTIAL METHOD: ABNORMAL
EOSINOPHIL # BLD AUTO: 0 K/UL (ref 0–0.5)
EOSINOPHIL NFR BLD: 0.2 % (ref 0–8)
ERYTHROCYTE [DISTWIDTH] IN BLOOD BY AUTOMATED COUNT: 16.4 % (ref 11.5–14.5)
EST. GFR  (NO RACE VARIABLE): 32.7 ML/MIN/1.73 M^2
ESTIMATED AVG GLUCOSE: 114 MG/DL (ref 68–131)
GLUCOSE SERPL-MCNC: 103 MG/DL (ref 70–110)
GLUCOSE UR QL STRIP: NEGATIVE
HAV IGM SERPL QL IA: NORMAL
HBA1C MFR BLD: 5.6 % (ref 4–5.6)
HBV CORE IGM SERPL QL IA: NORMAL
HBV SURFACE AG SERPL QL IA: NORMAL
HCT VFR BLD AUTO: 31.1 % (ref 40–54)
HCV AB SERPL QL IA: NORMAL
HDLC SERPL-MCNC: 47 MG/DL (ref 40–75)
HDLC SERPL: 24.7 % (ref 20–50)
HGB BLD-MCNC: 10 G/DL (ref 14–18)
HGB UR QL STRIP: NEGATIVE
HIV 1+2 AB+HIV1 P24 AG SERPL QL IA: NORMAL
IMM GRANULOCYTES # BLD AUTO: 0.05 K/UL (ref 0–0.04)
IMM GRANULOCYTES NFR BLD AUTO: 0.8 % (ref 0–0.5)
KETONES UR QL STRIP: NEGATIVE
LDLC SERPL CALC-MCNC: 108 MG/DL (ref 63–159)
LEUKOCYTE ESTERASE UR QL STRIP: NEGATIVE
LYMPHOCYTES # BLD AUTO: 1.4 K/UL (ref 1–4.8)
LYMPHOCYTES NFR BLD: 21.9 % (ref 18–48)
MAGNESIUM SERPL-MCNC: 1.9 MG/DL (ref 1.6–2.6)
MCH RBC QN AUTO: 30.1 PG (ref 27–31)
MCHC RBC AUTO-ENTMCNC: 32.2 G/DL (ref 32–36)
MCV RBC AUTO: 94 FL (ref 82–98)
MICROALBUMIN UR DL<=1MG/L-MCNC: 12.7 MG/L
MONOCYTES # BLD AUTO: 0.7 K/UL (ref 0.3–1)
MONOCYTES NFR BLD: 11.1 % (ref 4–15)
NEUTROPHILS # BLD AUTO: 4.3 K/UL (ref 1.8–7.7)
NEUTROPHILS NFR BLD: 65.5 % (ref 38–73)
NITRITE UR QL STRIP: NEGATIVE
NONHDLC SERPL-MCNC: 143 MG/DL
NRBC BLD-RTO: 0 /100 WBC
PH UR STRIP: 6 [PH] (ref 5–8)
PLATELET # BLD AUTO: 176 K/UL (ref 150–450)
PMV BLD AUTO: 11.3 FL (ref 9.2–12.9)
POTASSIUM SERPL-SCNC: 3.6 MMOL/L (ref 3.5–5.1)
PROT SERPL-MCNC: 7.8 G/DL (ref 6–8.4)
PROT UR QL STRIP: NEGATIVE
RBC # BLD AUTO: 3.32 M/UL (ref 4.6–6.2)
SODIUM SERPL-SCNC: 143 MMOL/L (ref 136–145)
SP GR UR STRIP: 1.01 (ref 1–1.03)
TRIGL SERPL-MCNC: 175 MG/DL (ref 30–150)
URN SPEC COLLECT METH UR: NORMAL
UROBILINOGEN UR STRIP-ACNC: NEGATIVE EU/DL
WBC # BLD AUTO: 6.49 K/UL (ref 3.9–12.7)

## 2023-09-11 PROCEDURE — 87389 HIV-1 AG W/HIV-1&-2 AB AG IA: CPT | Performed by: INTERNAL MEDICINE

## 2023-09-11 PROCEDURE — 83036 HEMOGLOBIN GLYCOSYLATED A1C: CPT | Performed by: INTERNAL MEDICINE

## 2023-09-11 PROCEDURE — 86592 SYPHILIS TEST NON-TREP QUAL: CPT | Performed by: INTERNAL MEDICINE

## 2023-09-11 PROCEDURE — 80053 COMPREHEN METABOLIC PANEL: CPT | Performed by: INTERNAL MEDICINE

## 2023-09-11 PROCEDURE — 82570 ASSAY OF URINE CREATININE: CPT | Performed by: INTERNAL MEDICINE

## 2023-09-11 PROCEDURE — 86780 TREPONEMA PALLIDUM: CPT | Performed by: INTERNAL MEDICINE

## 2023-09-11 PROCEDURE — 86593 SYPHILIS TEST NON-TREP QUANT: CPT | Performed by: INTERNAL MEDICINE

## 2023-09-11 PROCEDURE — 86677 HELICOBACTER PYLORI ANTIBODY: CPT | Performed by: INTERNAL MEDICINE

## 2023-09-11 PROCEDURE — 81003 URINALYSIS AUTO W/O SCOPE: CPT | Performed by: INTERNAL MEDICINE

## 2023-09-11 PROCEDURE — 83735 ASSAY OF MAGNESIUM: CPT | Performed by: INTERNAL MEDICINE

## 2023-09-11 PROCEDURE — 36415 COLL VENOUS BLD VENIPUNCTURE: CPT | Performed by: INTERNAL MEDICINE

## 2023-09-11 PROCEDURE — 80074 ACUTE HEPATITIS PANEL: CPT | Performed by: INTERNAL MEDICINE

## 2023-09-11 PROCEDURE — 80061 LIPID PANEL: CPT | Performed by: INTERNAL MEDICINE

## 2023-09-11 PROCEDURE — 85025 COMPLETE CBC W/AUTO DIFF WBC: CPT | Performed by: INTERNAL MEDICINE

## 2023-09-12 LAB
H PYLORI IGG SERPL QL IA: NEGATIVE
RPR SER QL: REACTIVE
RPR SER-TITR: ABNORMAL {TITER}

## 2023-09-13 LAB — T PALLIDUM AB SER QL IF: NORMAL

## 2023-10-16 PROBLEM — Z00.00 ROUTINE GENERAL MEDICAL EXAMINATION AT A HEALTH CARE FACILITY: Status: ACTIVE | Noted: 2023-10-16

## 2023-10-16 PROBLEM — H04.129 DRY EYE: Status: ACTIVE | Noted: 2023-10-16

## 2023-10-23 PROBLEM — N17.9 AKI (ACUTE KIDNEY INJURY): Status: RESOLVED | Noted: 2023-07-21 | Resolved: 2023-10-23

## 2023-11-03 ENCOUNTER — LAB VISIT (OUTPATIENT)
Dept: LAB | Facility: HOSPITAL | Age: 83
End: 2023-11-03
Attending: INTERNAL MEDICINE
Payer: OTHER GOVERNMENT

## 2023-11-03 DIAGNOSIS — E78.00 HYPERCHOLESTEREMIA: ICD-10-CM

## 2023-11-03 DIAGNOSIS — N18.30 STAGE 3 CHRONIC KIDNEY DISEASE, UNSPECIFIED WHETHER STAGE 3A OR 3B CKD: ICD-10-CM

## 2023-11-03 DIAGNOSIS — Z79.899 ENCOUNTER FOR LONG-TERM (CURRENT) USE OF OTHER MEDICATIONS: ICD-10-CM

## 2023-11-03 DIAGNOSIS — D50.9 IRON DEFICIENCY ANEMIA, UNSPECIFIED IRON DEFICIENCY ANEMIA TYPE: ICD-10-CM

## 2023-11-03 DIAGNOSIS — D50.8 IRON DEFICIENCY ANEMIA SECONDARY TO INADEQUATE DIETARY IRON INTAKE: ICD-10-CM

## 2023-11-03 DIAGNOSIS — R79.89 LOW VITAMIN B12 LEVEL: ICD-10-CM

## 2023-11-03 DIAGNOSIS — I25.10 CORONARY ARTERY DISEASE, UNSPECIFIED VESSEL OR LESION TYPE, UNSPECIFIED WHETHER ANGINA PRESENT, UNSPECIFIED WHETHER NATIVE OR TRANSPLANTED HEART: ICD-10-CM

## 2023-11-03 DIAGNOSIS — I10 PRIMARY HYPERTENSION: ICD-10-CM

## 2023-11-03 LAB
ALBUMIN SERPL BCP-MCNC: 3.4 G/DL (ref 3.5–5.2)
ALP SERPL-CCNC: 59 U/L (ref 55–135)
ALT SERPL W/O P-5'-P-CCNC: 19 U/L (ref 10–44)
ANION GAP SERPL CALC-SCNC: 1 MMOL/L (ref 3–11)
AST SERPL-CCNC: 16 U/L (ref 10–40)
BASOPHILS # BLD AUTO: 0.01 K/UL (ref 0–0.2)
BASOPHILS NFR BLD: 0.2 % (ref 0–1.9)
BILIRUB SERPL-MCNC: 0.6 MG/DL (ref 0.1–1)
BUN SERPL-MCNC: 27 MG/DL (ref 8–23)
CALCIUM SERPL-MCNC: 9.1 MG/DL (ref 8.7–10.5)
CHLORIDE SERPL-SCNC: 108 MMOL/L (ref 95–110)
CO2 SERPL-SCNC: 30 MMOL/L (ref 23–29)
CREAT SERPL-MCNC: 1.9 MG/DL (ref 0.5–1.4)
DIFFERENTIAL METHOD: ABNORMAL
EOSINOPHIL # BLD AUTO: 0 K/UL (ref 0–0.5)
EOSINOPHIL NFR BLD: 0 % (ref 0–8)
ERYTHROCYTE [DISTWIDTH] IN BLOOD BY AUTOMATED COUNT: 15.3 % (ref 11.5–14.5)
EST. GFR  (NO RACE VARIABLE): 34.8 ML/MIN/1.73 M^2
FERRITIN SERPL-MCNC: 1442 NG/ML (ref 20–300)
FOLATE SERPL-MCNC: 15.6 NG/ML (ref 4–24)
GLUCOSE SERPL-MCNC: 94 MG/DL (ref 70–110)
HCT VFR BLD AUTO: 31.5 % (ref 40–54)
HGB BLD-MCNC: 10 G/DL (ref 14–18)
IMM GRANULOCYTES # BLD AUTO: 0.04 K/UL (ref 0–0.04)
IMM GRANULOCYTES NFR BLD AUTO: 0.8 % (ref 0–0.5)
IRON SATN MFR SERPL: 39 % (ref 20–50)
IRON SERPL-MCNC: 93 UG/DL (ref 45–160)
LYMPHOCYTES # BLD AUTO: 1.6 K/UL (ref 1–4.8)
LYMPHOCYTES NFR BLD: 30.3 % (ref 18–48)
MCH RBC QN AUTO: 30.6 PG (ref 27–31)
MCHC RBC AUTO-ENTMCNC: 31.7 G/DL (ref 32–36)
MCV RBC AUTO: 96 FL (ref 82–98)
MONOCYTES # BLD AUTO: 0.6 K/UL (ref 0.3–1)
MONOCYTES NFR BLD: 10.8 % (ref 4–15)
NEUTROPHILS # BLD AUTO: 3 K/UL (ref 1.8–7.7)
NEUTROPHILS NFR BLD: 57.9 % (ref 38–73)
NRBC BLD-RTO: 0 /100 WBC
PLATELET # BLD AUTO: 104 K/UL (ref 150–450)
PMV BLD AUTO: 12.7 FL (ref 9.2–12.9)
POTASSIUM SERPL-SCNC: 4.3 MMOL/L (ref 3.5–5.1)
PROT SERPL-MCNC: 7.6 G/DL (ref 6–8.4)
RBC # BLD AUTO: 3.27 M/UL (ref 4.6–6.2)
RETICS/RBC NFR AUTO: 1.3 % (ref 0.4–2)
SODIUM SERPL-SCNC: 139 MMOL/L (ref 136–145)
TOTAL IRON BINDING CAPACITY: 239 UG/DL (ref 250–450)
VIT B12 SERPL-MCNC: 588 PG/ML (ref 210–950)
WBC # BLD AUTO: 5.11 K/UL (ref 3.9–12.7)

## 2023-11-03 PROCEDURE — 82728 ASSAY OF FERRITIN: CPT | Performed by: INTERNAL MEDICINE

## 2023-11-03 PROCEDURE — 82607 VITAMIN B-12: CPT | Performed by: INTERNAL MEDICINE

## 2023-11-03 PROCEDURE — 85025 COMPLETE CBC W/AUTO DIFF WBC: CPT | Performed by: INTERNAL MEDICINE

## 2023-11-03 PROCEDURE — 80053 COMPREHEN METABOLIC PANEL: CPT | Performed by: INTERNAL MEDICINE

## 2023-11-03 PROCEDURE — 83550 IRON BINDING TEST: CPT | Performed by: INTERNAL MEDICINE

## 2023-11-03 PROCEDURE — 36415 COLL VENOUS BLD VENIPUNCTURE: CPT | Performed by: INTERNAL MEDICINE

## 2023-11-03 PROCEDURE — 82746 ASSAY OF FOLIC ACID SERUM: CPT | Performed by: INTERNAL MEDICINE

## 2023-11-03 PROCEDURE — 85045 AUTOMATED RETICULOCYTE COUNT: CPT | Performed by: INTERNAL MEDICINE

## 2023-11-03 PROCEDURE — 83540 ASSAY OF IRON: CPT | Performed by: INTERNAL MEDICINE

## 2023-11-16 PROBLEM — D49.59 URETERAL TUMOR: Status: ACTIVE | Noted: 2023-11-16

## 2023-11-27 PROBLEM — N10 PYELONEPHRITIS, ACUTE: Status: RESOLVED | Noted: 2023-06-24 | Resolved: 2023-11-27

## 2023-12-01 ENCOUNTER — HOSPITAL ENCOUNTER (OUTPATIENT)
Dept: RADIOLOGY | Facility: HOSPITAL | Age: 83
Discharge: HOME | End: 2023-12-01
Attending: PHYSICIAN ASSISTANT
Payer: OTHER GOVERNMENT

## 2023-12-01 DIAGNOSIS — Z01.89 ENCOUNTER FOR OTHER SPECIFIED SPECIAL EXAMINATIONS: ICD-10-CM

## 2023-12-01 PROCEDURE — 71271 CT THORAX LUNG CANCER SCR C-: CPT | Mod: TC

## 2023-12-07 PROBLEM — E86.0 DEHYDRATION: Status: RESOLVED | Noted: 2021-10-26 | Resolved: 2023-12-07

## 2023-12-28 ENCOUNTER — HOSPITAL ENCOUNTER (EMERGENCY)
Facility: HOSPITAL | Age: 83
Discharge: HOME | End: 2023-12-28
Attending: EMERGENCY MEDICINE
Payer: MEDICARE

## 2023-12-28 VITALS
SYSTOLIC BLOOD PRESSURE: 146 MMHG | WEIGHT: 175 LBS | RESPIRATION RATE: 17 BRPM | HEIGHT: 70 IN | TEMPERATURE: 98 F | BODY MASS INDEX: 25.05 KG/M2 | HEART RATE: 84 BPM | OXYGEN SATURATION: 98 % | DIASTOLIC BLOOD PRESSURE: 67 MMHG

## 2023-12-28 DIAGNOSIS — E86.0 DEHYDRATION: Primary | ICD-10-CM

## 2023-12-28 DIAGNOSIS — R31.9 URINARY TRACT INFECTION WITH HEMATURIA, SITE UNSPECIFIED: ICD-10-CM

## 2023-12-28 DIAGNOSIS — N39.0 URINARY TRACT INFECTION WITH HEMATURIA, SITE UNSPECIFIED: ICD-10-CM

## 2023-12-28 LAB
ALBUMIN SERPL BCP-MCNC: 3 G/DL (ref 3.5–5.2)
ALP SERPL-CCNC: 67 U/L (ref 55–135)
ALT SERPL W/O P-5'-P-CCNC: 45 U/L (ref 10–44)
ANION GAP SERPL CALC-SCNC: 9 MMOL/L (ref 3–11)
AST SERPL-CCNC: 33 U/L (ref 10–40)
BACTERIA #/AREA URNS HPF: ABNORMAL /HPF
BASOPHILS # BLD AUTO: 0.02 K/UL (ref 0–0.2)
BASOPHILS NFR BLD: 0.2 % (ref 0–1.9)
BILIRUB SERPL-MCNC: 0.4 MG/DL (ref 0.1–1)
BILIRUB UR QL STRIP: NEGATIVE
BUN SERPL-MCNC: 36 MG/DL (ref 8–23)
CALCIUM SERPL-MCNC: 8.5 MG/DL (ref 8.7–10.5)
CHLORIDE SERPL-SCNC: 107 MMOL/L (ref 95–110)
CLARITY UR: ABNORMAL
CO2 SERPL-SCNC: 27 MMOL/L (ref 23–29)
COLOR UR: YELLOW
CREAT SERPL-MCNC: 2.2 MG/DL (ref 0.5–1.4)
DIFFERENTIAL METHOD BLD: ABNORMAL
EOSINOPHIL # BLD AUTO: 0 K/UL (ref 0–0.5)
EOSINOPHIL NFR BLD: 0 % (ref 0–8)
ERYTHROCYTE [DISTWIDTH] IN BLOOD BY AUTOMATED COUNT: 15.2 % (ref 11.5–14.5)
EST. GFR  (NO RACE VARIABLE): 29.2 ML/MIN/1.73 M^2
GLUCOSE SERPL-MCNC: 166 MG/DL (ref 70–110)
GLUCOSE UR QL STRIP: NEGATIVE
HCT VFR BLD AUTO: 28.1 % (ref 40–54)
HGB BLD-MCNC: 9 G/DL (ref 14–18)
HGB UR QL STRIP: ABNORMAL
HYALINE CASTS #/AREA URNS LPF: 1.5 /LPF
IMM GRANULOCYTES # BLD AUTO: 0.09 K/UL (ref 0–0.04)
IMM GRANULOCYTES NFR BLD AUTO: 1.1 % (ref 0–0.5)
KETONES UR QL STRIP: NEGATIVE
LACTATE SERPL-SCNC: 1.6 MMOL/L (ref 0.5–2.2)
LEUKOCYTE ESTERASE UR QL STRIP: ABNORMAL
LYMPHOCYTES # BLD AUTO: 1.6 K/UL (ref 1–4.8)
LYMPHOCYTES NFR BLD: 19.3 % (ref 18–48)
MCH RBC QN AUTO: 31.1 PG (ref 27–31)
MCHC RBC AUTO-ENTMCNC: 32 G/DL (ref 32–36)
MCV RBC AUTO: 97 FL (ref 82–98)
MICROSCOPIC COMMENT: ABNORMAL
MONOCYTES # BLD AUTO: 0.9 K/UL (ref 0.3–1)
MONOCYTES NFR BLD: 10.5 % (ref 4–15)
NEUTROPHILS # BLD AUTO: 5.6 K/UL (ref 1.8–7.7)
NEUTROPHILS NFR BLD: 68.9 % (ref 38–73)
NITRITE UR QL STRIP: NEGATIVE
NRBC BLD-RTO: 0 /100 WBC
PH UR STRIP: 7 [PH] (ref 5–8)
PLATELET # BLD AUTO: 116 K/UL (ref 150–450)
PMV BLD AUTO: 11.6 FL (ref 9.2–12.9)
POTASSIUM SERPL-SCNC: 3.8 MMOL/L (ref 3.5–5.1)
PROT SERPL-MCNC: 7 G/DL (ref 6–8.4)
PROT UR QL STRIP: NEGATIVE
RBC # BLD AUTO: 2.89 M/UL (ref 4.6–6.2)
RBC #/AREA URNS HPF: 48 /HPF (ref 0–4)
SODIUM SERPL-SCNC: 143 MMOL/L (ref 136–145)
SP GR UR STRIP: 1.01 (ref 1–1.03)
SQUAMOUS #/AREA URNS HPF: 0 /HPF
URN SPEC COLLECT METH UR: ABNORMAL
UROBILINOGEN UR STRIP-ACNC: NEGATIVE EU/DL
WBC # BLD AUTO: 8.07 K/UL (ref 3.9–12.7)
WBC #/AREA URNS HPF: >100 /HPF (ref 0–5)

## 2023-12-28 PROCEDURE — 99284 EMERGENCY DEPT VISIT MOD MDM: CPT | Mod: 25

## 2023-12-28 PROCEDURE — 96365 THER/PROPH/DIAG IV INF INIT: CPT

## 2023-12-28 PROCEDURE — 83605 ASSAY OF LACTIC ACID: CPT | Performed by: EMERGENCY MEDICINE

## 2023-12-28 PROCEDURE — 36415 COLL VENOUS BLD VENIPUNCTURE: CPT | Performed by: EMERGENCY MEDICINE

## 2023-12-28 PROCEDURE — 87077 CULTURE AEROBIC IDENTIFY: CPT | Performed by: EMERGENCY MEDICINE

## 2023-12-28 PROCEDURE — 85025 COMPLETE CBC W/AUTO DIFF WBC: CPT | Performed by: EMERGENCY MEDICINE

## 2023-12-28 PROCEDURE — 81000 URINALYSIS NONAUTO W/SCOPE: CPT | Performed by: EMERGENCY MEDICINE

## 2023-12-28 PROCEDURE — 87088 URINE BACTERIA CULTURE: CPT | Performed by: EMERGENCY MEDICINE

## 2023-12-28 PROCEDURE — 87186 SC STD MICRODIL/AGAR DIL: CPT | Performed by: EMERGENCY MEDICINE

## 2023-12-28 PROCEDURE — 63600175 PHARM REV CODE 636 W HCPCS: Performed by: EMERGENCY MEDICINE

## 2023-12-28 PROCEDURE — 96361 HYDRATE IV INFUSION ADD-ON: CPT

## 2023-12-28 PROCEDURE — 80053 COMPREHEN METABOLIC PANEL: CPT | Performed by: EMERGENCY MEDICINE

## 2023-12-28 PROCEDURE — 25000003 PHARM REV CODE 250: Performed by: EMERGENCY MEDICINE

## 2023-12-28 PROCEDURE — 87086 URINE CULTURE/COLONY COUNT: CPT | Performed by: EMERGENCY MEDICINE

## 2023-12-28 RX ORDER — LEVOFLOXACIN 500 MG/1
500 TABLET, FILM COATED ORAL DAILY
Qty: 5 TABLET | Refills: 0 | Status: SHIPPED | OUTPATIENT
Start: 2023-12-28 | End: 2024-01-02

## 2023-12-28 RX ADMIN — SODIUM CHLORIDE 1000 ML: 9 INJECTION, SOLUTION INTRAVENOUS at 10:12

## 2023-12-28 RX ADMIN — CEFTRIAXONE 1 G: 1 INJECTION, POWDER, FOR SOLUTION INTRAMUSCULAR; INTRAVENOUS at 01:12

## 2023-12-28 NOTE — ED PROVIDER NOTES
Encounter Date: 12/28/2023       History     Chief Complaint   Patient presents with    Hypotension     Patient to the ER with complaints of hypotension (82/42), weakness, and back pain.     82 yo male with extensive history as below here via POV with report of low blood pressure for the last few days. In the ED, patient is without complaint and reports that he feels well overall. He does report chronic low back pain with no acute finding. Patient denies urinary symptoms. No fever. No cough. No CP or dyspnea. No hemorrhage.       Review of patient's allergies indicates:   Allergen Reactions    Cardura [doxazosin] Hives    Lyrica [pregabalin] Other (See Comments)    Paxil [paroxetine hcl] Other (See Comments)    Restoril [temazepam] Hallucinations    Vioxx [rofecoxib] Swelling    Zithromax [azithromycin] Hives    Alpha 1 blocker- quinazolines     Benzodiazepines      Past Medical History:   Diagnosis Date    Acid reflux     Anemia     Anticoagulant long-term use     Anxiety disorder, unspecified     Aortic aneurysm, abdominal     Arthritis     Bronchitis     Carotid stenosis 05/13/2022    60-79% Right ICA 60-70% Left ICA    Cataract     Celiac artery stenosis     50% by CTA abdomen 7/27/2020    CKD (chronic kidney disease)     45% FUNCTION    Coronary artery disease     stents x4    Encounter for blood transfusion     Enlarged prostate     GERD (gastroesophageal reflux disease)     Heart attack     Hemorrhoids     Hypercholesteremia     Hypertension     Iron deficiency anemia, unspecified     Leaky heart valve     Lupus anticoagulant disorder     Mood disorder     Parkinson's disease     Renal artery stenosis     by CTA 7/27/2020    Restless leg syndrome     Skin cancer     Skin tear of elbow without complication     above elbow    Unspecified chronic bronchitis     UTI (urinary tract infection)      Past Surgical History:   Procedure Laterality Date    ACF      Anterior Cervical Fusion    BACK SURGERY      RODS IN  BACK; 4 SURGIERIES    BIOPSY OF URETER Right 06/22/2023    Procedure: RESECTION/BIOPSY, URETER;  Surgeon: Aaron Pinon MD;  Location: [facility] OR;  Service: Urology;  Laterality: Right;    BONE MARROW BIOPSY      CARDIAC ANGIOGRAM WITH STENT      CATARACT SX Bilateral     CHOLECYSTECTOMY      CORONARY ANGIOGRAPHY N/A 02/15/2023    Procedure: ANGIOGRAM, CORONARY ARTERY;  Surgeon: Rito Vega MD;  Location: [facility] CATH;  Service: Cardiology;  Laterality: N/A;    CORONARY ARTERY BYPASS GRAFT (CABG) N/A 02/24/2023    Procedure: CORONARY ARTERY BYPASS GRAFT (CABG);  Surgeon: Spencer Hagan MD;  Location: [facility] OR;  Service: Cardiothoracic;  Laterality: N/A;  CABG / EVH //   ECHO NOTIFIED    CYSTOSCOPY W/ RETROGRADES Bilateral 06/20/2023    Procedure: CYSTOSCOPY WITH RETROGRADE PYELOGRAM;  Surgeon: Aaron Pinon MD;  Location: [facility] OR;  Service: Urology;  Laterality: Bilateral;  Pt has a Spinal Cord Stimulator  9am per [place], To follow RM4    CYSTOSCOPY W/ RETROGRADES Right 06/22/2023    Procedure: CYSTOSCOPY, WITH RETROGRADE PYELOGRAM;  Surgeon: Aaron Pinon MD;  Location: [facility] OR;  Service: Urology;  Laterality: Right;    CYSTOSCOPY W/ RETROGRADES Bilateral 8/24/2023    Procedure: CYSTOSCOPY, WITH RETROGRADE PYELOGRAM;  Surgeon: Aaron Pinon MD;  Location: [facility] OR;  Service: Urology;  Laterality: Bilateral;    CYSTOSCOPY W/ RETROGRADES Bilateral 12/4/2023    Procedure: CYSTOSCOPY WITH RETROGRADE PYELOGRAM;  Surgeon: Aaron Pinon MD;  Location: [facility] OR;  Service: Urology;  Laterality: Bilateral;  pt has spinal cord stimulator    DIGITAL RECTAL EXAMINATION UNDER ANESTHESIA N/A 12/4/2023    Procedure: EXAM UNDER ANESTHESIA, DIGITAL, RECTUM;  Surgeon: Aaron Pinon MD;  Location: [facility] OR;  Service: Urology;  Laterality: N/A;    HIP SURGERY Right     THR    KNEE SURGERY Right     ATS    LEFT HEART CATHETERIZATION Left 07/22/2021    Procedure: Left heart cath;  Surgeon: Curtis Loredo MD;  Location:  [facility] CATH;  Service: Cardiology;  Laterality: Left;    LEFT HEART CATHETERIZATION Left 2022    Procedure: Left heart cath;  Surgeon: Giorgi Barker MD;  Location: [facility] CATH;  Service: Cardiology;  Laterality: Left;    REMOVAL, STENT, URETER Right 2023    Procedure: REMOVAL, STENT, URETER;  Surgeon: Aaron Pinon MD;  Location: [facility] OR;  Service: Urology;  Laterality: Right;    ROTATOR CUFF REPAIR Bilateral     SHOULDER SURGERY Bilateral     SPINAL CORD STIMULATOR IMPLANT      STENT, URETERAL Left 2023    Procedure: STENT, URETERAL;  Surgeon: Aaron Pinon MD;  Location: [facility] OR;  Service: Urology;  Laterality: Left;    steroid injections Right 2023    Knee    ULNAR NERVE TRANSPOSITION      URETEROSCOPY Right 2023    Procedure: URETEROSCOPY;  Surgeon: Aaron Pinon MD;  Location: [facility] OR;  Service: Urology;  Laterality: Right;     Family History   Problem Relation Age of Onset    Heart disease Father     Heart disease Sister     Lung cancer Brother     Throat cancer Brother     Cancer Brother     Cancer Brother     Heart disease Brother         PPM    Heart disease Brother     Heart disease Brother      Social History     Tobacco Use    Smoking status: Former     Current packs/day: 0.00     Types: Cigarettes     Quit date:      Years since quittin.0    Smokeless tobacco: Never   Substance Use Topics    Alcohol use: Yes     Alcohol/week: 2.0 standard drinks of alcohol     Types: 1 Glasses of wine, 1 Cans of beer per week     Comment: RARELY    Drug use: No     Review of Systems   Constitutional: Negative.    HENT: Negative.     Respiratory: Negative.     Cardiovascular: Negative.    Gastrointestinal: Negative.    Musculoskeletal:  Positive for back pain.   All other systems reviewed and are negative.      Physical Exam     Initial Vitals [23 0934]   BP Pulse Resp Temp SpO2   (!) 102/48 93 16 98.1 °F (36.7 °C) 97 %      MAP       --         Physical Exam    Nursing  note and vitals reviewed.  Constitutional: He is not diaphoretic. No distress.   HENT:   Head: Normocephalic and atraumatic.   Dry oral mucosa   Eyes: EOM are normal. Pupils are equal, round, and reactive to light.   Neck: Neck supple.   Normal range of motion.  Cardiovascular:  Normal rate, regular rhythm and intact distal pulses.           Pulmonary/Chest: Breath sounds normal. No respiratory distress. He has no wheezes. He has no rales.   Abdominal: Abdomen is soft. Bowel sounds are normal. He exhibits no distension. There is no abdominal tenderness. There is no rebound.   Musculoskeletal:         General: No tenderness or edema. Normal range of motion.      Cervical back: Normal range of motion and neck supple.     Neurological: He is alert and oriented to person, place, and time. GCS score is 15. GCS eye subscore is 4. GCS verbal subscore is 5. GCS motor subscore is 6.   Skin: Skin is warm and dry. Capillary refill takes less than 2 seconds.   Psychiatric: He has a normal mood and affect. Thought content normal.         ED Course   Procedures  Labs Reviewed   CBC W/ AUTO DIFFERENTIAL - Abnormal; Notable for the following components:       Result Value    RBC 2.89 (*)     Hemoglobin 9.0 (*)     Hematocrit 28.1 (*)     MCH 31.1 (*)     RDW 15.2 (*)     Platelets 116 (*)     Immature Granulocytes 1.1 (*)     Immature Grans (Abs) 0.09 (*)     All other components within normal limits   COMPREHENSIVE METABOLIC PANEL - Abnormal; Notable for the following components:    Glucose 166 (*)     BUN 36 (*)     Creatinine 2.2 (*)     Calcium 8.5 (*)     Albumin 3.0 (*)     ALT 45 (*)     eGFR 29.2 (*)     All other components within normal limits   URINALYSIS, REFLEX TO URINE CULTURE - Abnormal; Notable for the following components:    Appearance, UA Cloudy (*)     Occult Blood UA 2+ (*)     Leukocytes, UA 2+ (*)     All other components within normal limits    Narrative:     Preferred Collection Type->Urine, Clean  Catch  Specimen Source->Urine   URINALYSIS MICROSCOPIC - Abnormal; Notable for the following components:    RBC, UA 48 (*)     WBC, UA >100 (*)     Bacteria Few (*)     Hyaline Casts, UA 1.5 (*)     All other components within normal limits    Narrative:     Preferred Collection Type->Urine, Clean Catch  Specimen Source->Urine   CULTURE, URINE   LACTIC ACID, PLASMA          Imaging Results    None          Medications   cefTRIAXone (ROCEPHIN) 1 g in dextrose 5 % in water (D5W) 100 mL IVPB (MB+) (has no administration in time range)   sodium chloride 0.9% bolus 1,000 mL 1,000 mL ( Intravenous Stopped 12/28/23 1208)     Medical Decision Making  BP improved with IVF. Patient remains without complaint. Discussed with suri Sun with d/c home will see in office early next week for follow up.     Problems Addressed:  Dehydration: acute illness or injury  Urinary tract infection with hematuria, site unspecified: acute illness or injury    Amount and/or Complexity of Data Reviewed  Labs: ordered. Decision-making details documented in ED Course.    Risk  Prescription drug management.                                      Clinical Impression:  Final diagnoses:  [E86.0] Dehydration (Primary)  [N39.0, R31.9] Urinary tract infection with hematuria, site unspecified          ED Disposition Condition    Discharge Stable          ED Prescriptions       Medication Sig Dispense Start Date End Date Auth. Provider    levoFLOXacin (LEVAQUIN) 500 MG tablet Take 1 tablet (500 mg total) by mouth once daily. for 5 days 5 tablet 12/28/2023 1/2/2024 Nima Maddox MD          Follow-up Information       Follow up With Specialties Details Why Contact Info    Shawn Purdy Jr., MD Internal Medicine Schedule an appointment as soon as possible for a visit   [address] [place] [id number]  [phone]               Nima Maddox MD  12/28/23 8896

## 2023-12-31 LAB — BACTERIA UR CULT: ABNORMAL

## 2024-01-15 PROBLEM — Z00.00 ROUTINE GENERAL MEDICAL EXAMINATION AT A HEALTH CARE FACILITY: Status: RESOLVED | Noted: 2023-10-16 | Resolved: 2024-01-15

## 2024-02-01 ENCOUNTER — LAB VISIT (OUTPATIENT)
Dept: LAB | Facility: HOSPITAL | Age: 84
End: 2024-02-01
Attending: INTERNAL MEDICINE
Payer: OTHER GOVERNMENT

## 2024-02-01 DIAGNOSIS — R79.89 LOW VITAMIN B12 LEVEL: ICD-10-CM

## 2024-02-01 DIAGNOSIS — N18.32 STAGE 3B CHRONIC KIDNEY DISEASE: ICD-10-CM

## 2024-02-01 DIAGNOSIS — E78.00 HYPERCHOLESTEREMIA: ICD-10-CM

## 2024-02-01 DIAGNOSIS — D50.9 IRON DEFICIENCY ANEMIA, UNSPECIFIED IRON DEFICIENCY ANEMIA TYPE: ICD-10-CM

## 2024-02-01 DIAGNOSIS — Z79.899 ENCOUNTER FOR LONG-TERM (CURRENT) USE OF OTHER MEDICATIONS: ICD-10-CM

## 2024-02-01 DIAGNOSIS — I25.10 CORONARY ARTERY DISEASE WITHOUT ANGINA PECTORIS, UNSPECIFIED VESSEL OR LESION TYPE, UNSPECIFIED WHETHER NATIVE OR TRANSPLANTED HEART: ICD-10-CM

## 2024-02-01 DIAGNOSIS — I10 HYPERTENSION, UNSPECIFIED TYPE: ICD-10-CM

## 2024-02-01 LAB
ALBUMIN SERPL BCP-MCNC: 3.3 G/DL (ref 3.5–5.2)
ALP SERPL-CCNC: 65 U/L (ref 55–135)
ALT SERPL W/O P-5'-P-CCNC: 17 U/L (ref 10–44)
ANION GAP SERPL CALC-SCNC: 6 MMOL/L (ref 3–11)
AST SERPL-CCNC: 20 U/L (ref 10–40)
BASOPHILS # BLD AUTO: 0.01 K/UL (ref 0–0.2)
BASOPHILS NFR BLD: 0.2 % (ref 0–1.9)
BILIRUB SERPL-MCNC: 0.6 MG/DL (ref 0.1–1)
BUN SERPL-MCNC: 26 MG/DL (ref 8–23)
CALCIUM SERPL-MCNC: 9.1 MG/DL (ref 8.7–10.5)
CHLORIDE SERPL-SCNC: 108 MMOL/L (ref 95–110)
CO2 SERPL-SCNC: 27 MMOL/L (ref 23–29)
CREAT SERPL-MCNC: 1.8 MG/DL (ref 0.5–1.4)
DIFFERENTIAL METHOD BLD: ABNORMAL
EOSINOPHIL # BLD AUTO: 0 K/UL (ref 0–0.5)
EOSINOPHIL NFR BLD: 0 % (ref 0–8)
ERYTHROCYTE [DISTWIDTH] IN BLOOD BY AUTOMATED COUNT: 14.4 % (ref 11.5–14.5)
EST. GFR  (NO RACE VARIABLE): 37.1 ML/MIN/1.73 M^2
GLUCOSE SERPL-MCNC: 87 MG/DL (ref 70–110)
HCT VFR BLD AUTO: 29.8 % (ref 40–54)
HGB BLD-MCNC: 9.7 G/DL (ref 14–18)
IMM GRANULOCYTES # BLD AUTO: 0.06 K/UL (ref 0–0.04)
IMM GRANULOCYTES NFR BLD AUTO: 1.2 % (ref 0–0.5)
IRON SATN MFR SERPL: 28 % (ref 20–50)
IRON SERPL-MCNC: 62 UG/DL (ref 45–160)
LYMPHOCYTES # BLD AUTO: 1.5 K/UL (ref 1–4.8)
LYMPHOCYTES NFR BLD: 28.9 % (ref 18–48)
MCH RBC QN AUTO: 32.2 PG (ref 27–31)
MCHC RBC AUTO-ENTMCNC: 32.6 G/DL (ref 32–36)
MCV RBC AUTO: 99 FL (ref 82–98)
MONOCYTES # BLD AUTO: 0.7 K/UL (ref 0.3–1)
MONOCYTES NFR BLD: 12.6 % (ref 4–15)
NEUTROPHILS # BLD AUTO: 3 K/UL (ref 1.8–7.7)
NEUTROPHILS NFR BLD: 57.1 % (ref 38–73)
NRBC BLD-RTO: 0 /100 WBC
PLATELET # BLD AUTO: 114 K/UL (ref 150–450)
PMV BLD AUTO: 13.5 FL (ref 9.2–12.9)
POTASSIUM SERPL-SCNC: 3.8 MMOL/L (ref 3.5–5.1)
PROT SERPL-MCNC: 7.7 G/DL (ref 6–8.4)
RBC # BLD AUTO: 3.01 M/UL (ref 4.6–6.2)
RETICS/RBC NFR AUTO: 2 % (ref 0.4–2)
SODIUM SERPL-SCNC: 141 MMOL/L (ref 136–145)
TOTAL IRON BINDING CAPACITY: 224 UG/DL (ref 250–450)
WBC # BLD AUTO: 5.16 K/UL (ref 3.9–12.7)

## 2024-02-01 PROCEDURE — 80053 COMPREHEN METABOLIC PANEL: CPT | Performed by: INTERNAL MEDICINE

## 2024-02-01 PROCEDURE — 85045 AUTOMATED RETICULOCYTE COUNT: CPT | Performed by: INTERNAL MEDICINE

## 2024-02-01 PROCEDURE — 36415 COLL VENOUS BLD VENIPUNCTURE: CPT | Performed by: INTERNAL MEDICINE

## 2024-02-01 PROCEDURE — 83540 ASSAY OF IRON: CPT | Performed by: INTERNAL MEDICINE

## 2024-02-01 PROCEDURE — 85025 COMPLETE CBC W/AUTO DIFF WBC: CPT | Performed by: INTERNAL MEDICINE

## 2024-03-25 PROBLEM — L02.92 BOIL: Status: ACTIVE | Noted: 2024-03-25

## 2024-04-08 ENCOUNTER — HOSPITAL ENCOUNTER (INPATIENT)
Facility: HOSPITAL | Age: 84
LOS: 4 days | Discharge: HOME HEALTH | DRG: 811 | End: 2024-04-12
Attending: EMERGENCY MEDICINE | Admitting: INTERNAL MEDICINE
Payer: OTHER GOVERNMENT

## 2024-04-08 DIAGNOSIS — D64.9 ANEMIA: ICD-10-CM

## 2024-04-08 DIAGNOSIS — D64.9 ANEMIA, UNSPECIFIED TYPE: Primary | ICD-10-CM

## 2024-04-08 DIAGNOSIS — I47.20 WIDE QRS VENTRICULAR TACHYCARDIA: ICD-10-CM

## 2024-04-08 DIAGNOSIS — D64.9 SYMPTOMATIC ANEMIA: ICD-10-CM

## 2024-04-08 DIAGNOSIS — I95.9 HYPOTENSION: ICD-10-CM

## 2024-04-08 DIAGNOSIS — I47.10 SVT (SUPRAVENTRICULAR TACHYCARDIA): ICD-10-CM

## 2024-04-08 LAB
ABO + RH BLD: ABNORMAL
ALBUMIN SERPL BCP-MCNC: 3 G/DL (ref 3.5–5.2)
ALP SERPL-CCNC: 62 U/L (ref 55–135)
ALT SERPL W/O P-5'-P-CCNC: 11 U/L (ref 10–44)
ANION GAP SERPL CALC-SCNC: 5 MMOL/L (ref 3–11)
AST SERPL-CCNC: 26 U/L (ref 10–40)
BACTERIA #/AREA URNS HPF: NEGATIVE /HPF
BASOPHILS # BLD AUTO: 0.01 K/UL (ref 0–0.2)
BASOPHILS NFR BLD: 0.2 % (ref 0–1.9)
BILIRUB SERPL-MCNC: 0.8 MG/DL (ref 0.1–1)
BILIRUB UR QL STRIP: NEGATIVE
BLD GP AB SCN CELLS X3 SERPL QL: ABNORMAL
BLD PROD TYP BPU: NORMAL
BLOOD GROUP ANTIBODIES SERPL: NORMAL
BLOOD UNIT EXPIRATION DATE: NORMAL
BLOOD UNIT TYPE CODE: 600
BLOOD UNIT TYPE CODE: 6200
BLOOD UNIT TYPE CODE: 6200
BLOOD UNIT TYPE: NORMAL
BUN SERPL-MCNC: 29 MG/DL (ref 8–23)
CALCIUM SERPL-MCNC: 8.7 MG/DL (ref 8.7–10.5)
CHLORIDE SERPL-SCNC: 108 MMOL/L (ref 95–110)
CLARITY UR: CLEAR
CO2 SERPL-SCNC: 26 MMOL/L (ref 23–29)
CODING SYSTEM: NORMAL
COLOR UR: YELLOW
CREAT SERPL-MCNC: 2.1 MG/DL (ref 0.5–1.4)
CROSSMATCH INTERPRETATION: NORMAL
DIFFERENTIAL METHOD BLD: ABNORMAL
DISPENSE STATUS: NORMAL
EOSINOPHIL # BLD AUTO: 0 K/UL (ref 0–0.5)
EOSINOPHIL NFR BLD: 0 % (ref 0–8)
ERYTHROCYTE [DISTWIDTH] IN BLOOD BY AUTOMATED COUNT: 16 % (ref 11.5–14.5)
EST. GFR  (NO RACE VARIABLE): 30.8 ML/MIN/1.73 M^2
GLUCOSE SERPL-MCNC: 121 MG/DL (ref 70–110)
GLUCOSE UR QL STRIP: NEGATIVE
HCT VFR BLD AUTO: 24.7 % (ref 40–54)
HGB BLD-MCNC: 7.8 G/DL (ref 14–18)
HGB UR QL STRIP: NEGATIVE
HYALINE CASTS #/AREA URNS LPF: 6.9 /LPF
IMM GRANULOCYTES # BLD AUTO: 0.07 K/UL (ref 0–0.04)
IMM GRANULOCYTES NFR BLD AUTO: 1.3 % (ref 0–0.5)
KETONES UR QL STRIP: NEGATIVE
LACTATE SERPL-SCNC: 2.6 MMOL/L (ref 0.5–2.2)
LEUKOCYTE ESTERASE UR QL STRIP: NEGATIVE
LIPASE SERPL-CCNC: 28 U/L (ref 13–75)
LYMPHOCYTES # BLD AUTO: 1 K/UL (ref 1–4.8)
LYMPHOCYTES NFR BLD: 19.6 % (ref 18–48)
MCH RBC QN AUTO: 30.4 PG (ref 27–31)
MCHC RBC AUTO-ENTMCNC: 31.6 G/DL (ref 32–36)
MCV RBC AUTO: 96 FL (ref 82–98)
MICROSCOPIC COMMENT: ABNORMAL
MONOCYTES # BLD AUTO: 0.6 K/UL (ref 0.3–1)
MONOCYTES NFR BLD: 12.1 % (ref 4–15)
NEUTROPHILS # BLD AUTO: 3.5 K/UL (ref 1.8–7.7)
NEUTROPHILS NFR BLD: 66.8 % (ref 38–73)
NITRITE UR QL STRIP: NEGATIVE
NRBC BLD-RTO: 0 /100 WBC
NUM UNITS TRANS PACKED RBC: NORMAL
OB PNL STL: NEGATIVE
OHS QRS DURATION: 138 MS
OHS QTC CALCULATION: 539 MS
PH UR STRIP: 6 [PH] (ref 5–8)
PLATELET # BLD AUTO: 119 K/UL (ref 150–450)
PMV BLD AUTO: 11.8 FL (ref 9.2–12.9)
POTASSIUM SERPL-SCNC: 4.3 MMOL/L (ref 3.5–5.1)
PROT SERPL-MCNC: 7.3 G/DL (ref 6–8.4)
PROT UR QL STRIP: ABNORMAL
RBC # BLD AUTO: 2.57 M/UL (ref 4.6–6.2)
RBC #/AREA URNS HPF: 2 /HPF (ref 0–4)
SODIUM SERPL-SCNC: 139 MMOL/L (ref 136–145)
SP GR UR STRIP: 1.01 (ref 1–1.03)
SPECIMEN OUTDATE: ABNORMAL
SQUAMOUS #/AREA URNS HPF: 1 /HPF
TROPONIN I SERPL DL<=0.01 NG/ML-MCNC: 138.1 PG/ML (ref 0–60)
TROPONIN I SERPL DL<=0.01 NG/ML-MCNC: 49.5 PG/ML (ref 0–60)
TROPONIN I SERPL DL<=0.01 NG/ML-MCNC: 63.5 PG/ML (ref 0–60)
URN SPEC COLLECT METH UR: ABNORMAL
UROBILINOGEN UR STRIP-ACNC: NEGATIVE EU/DL
WBC # BLD AUTO: 5.2 K/UL (ref 3.9–12.7)
WBC #/AREA URNS HPF: 1 /HPF (ref 0–5)

## 2024-04-08 PROCEDURE — 82272 OCCULT BLD FECES 1-3 TESTS: CPT | Performed by: NURSE PRACTITIONER

## 2024-04-08 PROCEDURE — 83690 ASSAY OF LIPASE: CPT | Performed by: NURSE PRACTITIONER

## 2024-04-08 PROCEDURE — 36430 TRANSFUSION BLD/BLD COMPNT: CPT

## 2024-04-08 PROCEDURE — 86870 RBC ANTIBODY IDENTIFICATION: CPT | Performed by: NURSE PRACTITIONER

## 2024-04-08 PROCEDURE — P9016 RBC LEUKOCYTES REDUCED: HCPCS | Performed by: EMERGENCY MEDICINE

## 2024-04-08 PROCEDURE — 25000003 PHARM REV CODE 250: Performed by: EMERGENCY MEDICINE

## 2024-04-08 PROCEDURE — 84484 ASSAY OF TROPONIN QUANT: CPT | Mod: 91 | Performed by: NURSE PRACTITIONER

## 2024-04-08 PROCEDURE — 99900031 HC PATIENT EDUCATION (STAT)

## 2024-04-08 PROCEDURE — 86901 BLOOD TYPING SEROLOGIC RH(D): CPT | Performed by: NURSE PRACTITIONER

## 2024-04-08 PROCEDURE — 30233N1 TRANSFUSION OF NONAUTOLOGOUS RED BLOOD CELLS INTO PERIPHERAL VEIN, PERCUTANEOUS APPROACH: ICD-10-PCS | Performed by: EMERGENCY MEDICINE

## 2024-04-08 PROCEDURE — 81000 URINALYSIS NONAUTO W/SCOPE: CPT | Performed by: NURSE PRACTITIONER

## 2024-04-08 PROCEDURE — 36415 COLL VENOUS BLD VENIPUNCTURE: CPT | Performed by: NURSE PRACTITIONER

## 2024-04-08 PROCEDURE — 86922 COMPATIBILITY TEST ANTIGLOB: CPT | Performed by: EMERGENCY MEDICINE

## 2024-04-08 PROCEDURE — 93010 ELECTROCARDIOGRAM REPORT: CPT | Mod: 77,,, | Performed by: INTERNAL MEDICINE

## 2024-04-08 PROCEDURE — 99291 CRITICAL CARE FIRST HOUR: CPT

## 2024-04-08 PROCEDURE — 27000221 HC OXYGEN, UP TO 24 HOURS

## 2024-04-08 PROCEDURE — 93005 ELECTROCARDIOGRAM TRACING: CPT

## 2024-04-08 PROCEDURE — 36415 COLL VENOUS BLD VENIPUNCTURE: CPT | Mod: XB | Performed by: EMERGENCY MEDICINE

## 2024-04-08 PROCEDURE — 96360 HYDRATION IV INFUSION INIT: CPT

## 2024-04-08 PROCEDURE — 80053 COMPREHEN METABOLIC PANEL: CPT | Performed by: NURSE PRACTITIONER

## 2024-04-08 PROCEDURE — 84484 ASSAY OF TROPONIN QUANT: CPT | Mod: 91 | Performed by: EMERGENCY MEDICINE

## 2024-04-08 PROCEDURE — 25000003 PHARM REV CODE 250: Performed by: INTERNAL MEDICINE

## 2024-04-08 PROCEDURE — 99900035 HC TECH TIME PER 15 MIN (STAT)

## 2024-04-08 PROCEDURE — 94761 N-INVAS EAR/PLS OXIMETRY MLT: CPT

## 2024-04-08 PROCEDURE — 25000003 PHARM REV CODE 250: Performed by: NURSE PRACTITIONER

## 2024-04-08 PROCEDURE — 21400001 HC TELEMETRY ROOM

## 2024-04-08 PROCEDURE — 85025 COMPLETE CBC W/AUTO DIFF WBC: CPT | Performed by: NURSE PRACTITIONER

## 2024-04-08 PROCEDURE — 87040 BLOOD CULTURE FOR BACTERIA: CPT | Mod: 59 | Performed by: NURSE PRACTITIONER

## 2024-04-08 PROCEDURE — 83605 ASSAY OF LACTIC ACID: CPT | Performed by: NURSE PRACTITIONER

## 2024-04-08 RX ORDER — CARBIDOPA AND LEVODOPA 25; 100 MG/1; MG/1
1 TABLET, ORALLY DISINTEGRATING ORAL 3 TIMES DAILY
Status: DISCONTINUED | OUTPATIENT
Start: 2024-04-08 | End: 2024-04-08

## 2024-04-08 RX ORDER — SODIUM CHLORIDE 0.9 % (FLUSH) 0.9 %
10 SYRINGE (ML) INJECTION
Status: DISCONTINUED | OUTPATIENT
Start: 2024-04-08 | End: 2024-04-12 | Stop reason: HOSPADM

## 2024-04-08 RX ORDER — FAMOTIDINE 20 MG/1
20 TABLET, FILM COATED ORAL DAILY
Status: DISCONTINUED | OUTPATIENT
Start: 2024-04-08 | End: 2024-04-12 | Stop reason: HOSPADM

## 2024-04-08 RX ORDER — HYDROCODONE BITARTRATE AND ACETAMINOPHEN 5; 325 MG/1; MG/1
1 TABLET ORAL EVERY 4 HOURS PRN
Status: DISCONTINUED | OUTPATIENT
Start: 2024-04-08 | End: 2024-04-12 | Stop reason: HOSPADM

## 2024-04-08 RX ORDER — CARVEDILOL 3.12 MG/1
3.12 TABLET ORAL 2 TIMES DAILY WITH MEALS
Status: DISCONTINUED | OUTPATIENT
Start: 2024-04-08 | End: 2024-04-09

## 2024-04-08 RX ORDER — HYDROCODONE BITARTRATE AND ACETAMINOPHEN 10; 325 MG/1; MG/1
1 TABLET ORAL EVERY 4 HOURS PRN
Status: DISCONTINUED | OUTPATIENT
Start: 2024-04-08 | End: 2024-04-12 | Stop reason: HOSPADM

## 2024-04-08 RX ORDER — ACETAMINOPHEN 325 MG/1
650 TABLET ORAL EVERY 8 HOURS PRN
Status: DISCONTINUED | OUTPATIENT
Start: 2024-04-08 | End: 2024-04-12 | Stop reason: HOSPADM

## 2024-04-08 RX ORDER — TALC
6 POWDER (GRAM) TOPICAL NIGHTLY PRN
Status: DISCONTINUED | OUTPATIENT
Start: 2024-04-08 | End: 2024-04-12 | Stop reason: HOSPADM

## 2024-04-08 RX ORDER — CLOPIDOGREL BISULFATE 75 MG/1
75 TABLET ORAL DAILY
Status: DISCONTINUED | OUTPATIENT
Start: 2024-04-08 | End: 2024-04-09

## 2024-04-08 RX ORDER — CARBIDOPA AND LEVODOPA 25; 100 MG/1; MG/1
1 TABLET ORAL 3 TIMES DAILY
Status: DISCONTINUED | OUTPATIENT
Start: 2024-04-08 | End: 2024-04-12 | Stop reason: HOSPADM

## 2024-04-08 RX ORDER — TAMSULOSIN HYDROCHLORIDE 0.4 MG/1
0.4 CAPSULE ORAL NIGHTLY
Status: DISCONTINUED | OUTPATIENT
Start: 2024-04-08 | End: 2024-04-12 | Stop reason: HOSPADM

## 2024-04-08 RX ORDER — HYDROCODONE BITARTRATE AND ACETAMINOPHEN 500; 5 MG/1; MG/1
TABLET ORAL
Status: DISCONTINUED | OUTPATIENT
Start: 2024-04-08 | End: 2024-04-12 | Stop reason: HOSPADM

## 2024-04-08 RX ORDER — CARVEDILOL 3.12 MG/1
3.12 TABLET ORAL ONCE
Status: COMPLETED | OUTPATIENT
Start: 2024-04-08 | End: 2024-04-08

## 2024-04-08 RX ORDER — ONDANSETRON HYDROCHLORIDE 2 MG/ML
4 INJECTION, SOLUTION INTRAVENOUS EVERY 8 HOURS PRN
Status: DISCONTINUED | OUTPATIENT
Start: 2024-04-08 | End: 2024-04-12 | Stop reason: HOSPADM

## 2024-04-08 RX ADMIN — CARBIDOPA AND LEVODOPA 1 TABLET: 25; 100 TABLET ORAL at 09:04

## 2024-04-08 RX ADMIN — SODIUM CHLORIDE 500 ML: 9 INJECTION, SOLUTION INTRAVENOUS at 10:04

## 2024-04-08 RX ADMIN — Medication 6 MG: at 09:04

## 2024-04-08 RX ADMIN — CARVEDILOL 3.12 MG: 3.12 TABLET, FILM COATED ORAL at 05:04

## 2024-04-08 RX ADMIN — FAMOTIDINE 20 MG: 20 TABLET, FILM COATED ORAL at 02:04

## 2024-04-08 RX ADMIN — TAMSULOSIN HYDROCHLORIDE 0.4 MG: 0.4 CAPSULE ORAL at 09:04

## 2024-04-08 RX ADMIN — CARVEDILOL 3.12 MG: 3.12 TABLET, FILM COATED ORAL at 09:04

## 2024-04-08 NOTE — CONSULTS
[facility]  Cardiology  Consult Note    Patient Name: Hunter Navarrete  MRN: [id number]  Admission Date: 4/8/2024  Hospital Length of Stay: 1 days  Code Status: Full Code   Attending Provider: Shawn Purdy Jr., MD   Consulting Provider: Farzad Henderson MD  Primary Care Physician: Shawn Purdy Jr., MD  Principal Problem:<principal problem not specified>    Patient information was obtained from patient, past medical records, and ER records.     Inpatient consult to Cardiology  Consult performed by: Socorro Garcia PA-C  Consult ordered by: Jemal Bhardwaj MD        Subjective:     Chief Complaint:  anemia, wide QRS on EKG     HPI:   This is an 82 yo male with PMHx CAD s/p CABG, Parkinson's, and HTN who presented to the emergency department with c/o weakness, nausea, vomiting, and overall fatigue onset x 1 day. On arrival to ED, he was found to be significantly anemic with H/H 7.8/24.7. Cardiology consulted due to wide complex QRS on 12-lead EKG. He denies any chest pain, shortness of breath, or dyspnea at this time. He was admitted to the floor for blood transfusion and close telemetry monitoring.     Pt is seen receiving 1 unit of pRBCs. He states he is feeling better and denies any symptoms of chest pain or shortness of breath at this time. Telemetry monitoring revealing improvement in QRS complex. We will get repeat 12-lead EKG after completion of the 3 units of pRBCs.       Past Medical History:   Diagnosis Date    Acid reflux     Anemia     Anticoagulant long-term use     Anxiety disorder, unspecified     Aortic aneurysm, abdominal     Arthritis     Bronchitis     Carotid stenosis 05/13/2022    60-79% Right ICA 60-70% Left ICA    Cataract     Celiac artery stenosis     50% by CTA abdomen 7/27/2020    CKD (chronic kidney disease)     45% FUNCTION    Coronary artery disease     stents x4    Encounter for blood transfusion     Enlarged prostate     GERD (gastroesophageal reflux disease)     Heart  attack     Hemorrhoids     Hypercholesteremia     Hypertension     Iron deficiency anemia, unspecified     Leaky heart valve     Lupus anticoagulant disorder     Mood disorder     Parkinson's disease     Renal artery stenosis     by CTA 7/27/2020    Restless leg syndrome     Skin cancer     Skin tear of elbow without complication     above elbow    Unspecified chronic bronchitis     UTI (urinary tract infection)        Past Surgical History:   Procedure Laterality Date    ACF      Anterior Cervical Fusion    BACK SURGERY      RODS IN BACK; 4 SURGIERIES    BIOPSY OF URETER Right 06/22/2023    Procedure: RESECTION/BIOPSY, URETER;  Surgeon: Aaron Pinon MD;  Location: [facility] OR;  Service: Urology;  Laterality: Right;    BONE MARROW BIOPSY      CARDIAC ANGIOGRAM WITH STENT      CATARACT SX Bilateral     CHOLECYSTECTOMY      CORONARY ANGIOGRAPHY N/A 02/15/2023    Procedure: ANGIOGRAM, CORONARY ARTERY;  Surgeon: Rito Vega MD;  Location: [facility] CATH;  Service: Cardiology;  Laterality: N/A;    CORONARY ARTERY BYPASS GRAFT (CABG) N/A 02/24/2023    Procedure: CORONARY ARTERY BYPASS GRAFT (CABG);  Surgeon: Spencer Hagan MD;  Location: [facility] OR;  Service: Cardiothoracic;  Laterality: N/A;  CABG / EVH //   ECHO NOTIFIED    CYSTOSCOPY W/ RETROGRADES Bilateral 06/20/2023    Procedure: CYSTOSCOPY WITH RETROGRADE PYELOGRAM;  Surgeon: Aaron Pinon MD;  Location: [facility] OR;  Service: Urology;  Laterality: Bilateral;  Pt has a Spinal Cord Stimulator  9am per Revee, To follow RM4    CYSTOSCOPY W/ RETROGRADES Right 06/22/2023    Procedure: CYSTOSCOPY, WITH RETROGRADE PYELOGRAM;  Surgeon: Aaron Pinon MD;  Location: [facility] OR;  Service: Urology;  Laterality: Right;    CYSTOSCOPY W/ RETROGRADES Bilateral 8/24/2023    Procedure: CYSTOSCOPY, WITH RETROGRADE PYELOGRAM;  Surgeon: Aaron Pinon MD;  Location: [facility] OR;  Service: Urology;  Laterality: Bilateral;    CYSTOSCOPY W/ RETROGRADES Bilateral 12/4/2023    Procedure:  CYSTOSCOPY WITH RETROGRADE PYELOGRAM;  Surgeon: Aaron Pinon MD;  Location: [facility] OR;  Service: Urology;  Laterality: Bilateral;  pt has spinal cord stimulator    DIGITAL RECTAL EXAMINATION UNDER ANESTHESIA N/A 12/4/2023    Procedure: EXAM UNDER ANESTHESIA, DIGITAL, RECTUM;  Surgeon: Aaron Pinon MD;  Location: [facility] OR;  Service: Urology;  Laterality: N/A;    HIP SURGERY Right     THR    KNEE SURGERY Right     ATS    LEFT HEART CATHETERIZATION Left 07/22/2021    Procedure: Left heart cath;  Surgeon: Curtis Loredo MD;  Location: [facility] CATH;  Service: Cardiology;  Laterality: Left;    LEFT HEART CATHETERIZATION Left 09/22/2022    Procedure: Left heart cath;  Surgeon: Giorgi Barker MD;  Location: [facility] CATH;  Service: Cardiology;  Laterality: Left;    REMOVAL, STENT, URETER Right 8/24/2023    Procedure: REMOVAL, STENT, URETER;  Surgeon: Aaron Pinon MD;  Location: [facility] OR;  Service: Urology;  Laterality: Right;    ROTATOR CUFF REPAIR Bilateral     SHOULDER SURGERY Bilateral     SPINAL CORD STIMULATOR IMPLANT      STENT, URETERAL Left 06/22/2023    Procedure: STENT, URETERAL;  Surgeon: Aaron Pinon MD;  Location: [facility] OR;  Service: Urology;  Laterality: Left;    steroid injections Right 06/13/2023    Knee    ULNAR NERVE TRANSPOSITION      URETEROSCOPY Right 06/22/2023    Procedure: URETEROSCOPY;  Surgeon: Aaron Pinon MD;  Location: [facility] OR;  Service: Urology;  Laterality: Right;       Review of patient's allergies indicates:   Allergen Reactions    Cardura [doxazosin] Hives    Lyrica [pregabalin] Other (See Comments)    Paxil [paroxetine hcl] Other (See Comments)    Restoril [temazepam] Hallucinations    Vioxx [rofecoxib] Swelling    Zithromax [azithromycin] Hives    Alpha 1 blocker- quinazolines     Benzodiazepines        No current facility-administered medications on file prior to encounter.     Current Outpatient Medications on File Prior to Encounter   Medication Sig    azelastine (ASTELIN)  137 mcg (0.1 %) nasal spray 1 spray (137 mcg total) by Nasal route 2 (two) times daily.    calcium carbonate-vitamin D3 500 mg-10 mcg (400 unit) Tab Take 1 tablet by mouth once daily.    carbidopa-levodopa (PARCOPA)  mg per disintegrating tablet Take 1 tablet by mouth 3 (three) times daily.    carvediloL (COREG) 3.125 MG tablet Take 1 tablet (3.125 mg total) by mouth 2 (two) times daily with meals.    clindamycin phosphate 1% (CLINDAGEL) 1 % gel Apply topically 2 (two) times daily.    clopidogreL (PLAVIX) 75 mg tablet Take 75 mg by mouth once daily.    dextran 70/hypromellose (ARTIFICIAL TEARS, PF, OPHT) Apply 1 drop to eye 3 (three) times daily.    docusate sodium (COLACE) 100 MG capsule Take 100 mg by mouth 2 (two) times daily.    DULoxetine (CYMBALTA) 30 MG capsule Take 1 capsule (30 mg total) by mouth once daily.    ezetimibe (ZETIA) 10 mg tablet Take 1 tablet (10 mg total) by mouth once daily.    fluticasone propionate (FLONASE) 50 mcg/actuation nasal spray 1 spray by Each Nostril route 2 (two) times a day.    furosemide (LASIX) 20 MG tablet Take 20 mg by mouth once daily. Prescribed by Dr. Loredo    gabapentin (NEURONTIN) 100 MG capsule Take 500 mg by mouth. 2C QAM, 2C QPM, 1C QHS    HYDROcodone-acetaminophen (NORCO) 7.5-325 mg per tablet Prescribed by Dr. Hernandez    hydroxychloroquine (PLAQUENIL) 200 mg tablet Take 200 mg by mouth once daily.    lanolin/mineral oil/petrolatum (ARTIFICIAL TEARS OPHT) Apply to eye.    niacin 1,000 mg TbSR Take 1 tablet by mouth Daily.    pantoprazole (PROTONIX) 40 MG tablet Take 1 tablet (40 mg total) by mouth once daily.    polyvinyl alcohol, artificial tears, (LIQUIFILM TEARS) 1.4 % ophthalmic solution Place 1 drop into both eyes as needed.    predniSONE (DELTASONE) 5 MG tablet Take 2.5 mg by mouth once daily.    QUEtiapine (SEROQUEL) 50 MG tablet Take 50 mg by mouth every evening.    rOPINIRole (REQUIP) 1 MG tablet Take 1 tablet (1 mg total) by mouth 2 (two) times daily.     rosuvastatin (CRESTOR) 40 MG Tab Take 1 tablet (40 mg total) by mouth every evening.    tamsulosin (FLOMAX) 0.4 mg Cap Take 1 capsule (0.4 mg total) by mouth every evening.     Family History       Problem Relation (Age of Onset)    Cancer Brother, Brother    Heart disease Father, Sister, Brother, Brother, Brother    Lung cancer Brother    Throat cancer Brother          Tobacco Use    Smoking status: Former     Current packs/day: 0.00     Types: Cigarettes     Quit date:      Years since quittin.2    Smokeless tobacco: Never   Substance and Sexual Activity    Alcohol use: Yes     Alcohol/week: 2.0 standard drinks of alcohol     Types: 1 Glasses of wine, 1 Cans of beer per week     Comment: RARELY    Drug use: No    Sexual activity: Not on file     Review of Systems   Constitutional: Positive for malaise/fatigue.   Cardiovascular:  Positive for near-syncope (light-headed). Negative for chest pain, dyspnea on exertion, irregular heartbeat, palpitations and syncope.   Respiratory:  Negative for shortness of breath.    Gastrointestinal:  Positive for abdominal pain, nausea and vomiting. Negative for hematemesis, hematochezia and melena.   Genitourinary:  Negative for hematuria.   Neurological:  Positive for dizziness, light-headedness and weakness.   Psychiatric/Behavioral:  Negative for altered mental status.    All other systems reviewed and are negative.    Objective:     Vital Signs (Most Recent):  Temp: 98.3 °F (36.8 °C) (24)  Pulse: 97 (24)  Resp: 18 (24)  BP: (!) 146/70 (24)  SpO2: 96 % (24) Vital Signs (24h Range):  Temp:  [97.6 °F (36.4 °C)-98.7 °F (37.1 °C)] 98.3 °F (36.8 °C)  Pulse:  [] 97  Resp:  [16-22] 18  SpO2:  [85 %-100 %] 96 %  BP: ()/(44-73) 146/70     Weight: 82.9 kg (182 lb 12.8 oz)  Body mass index is 26.23 kg/m².    SpO2: 96 %         Intake/Output Summary (Last 24 hours) at 2024 0758  Last data filed at 2024  0600  Gross per 24 hour   Intake 2170.83 ml   Output 300 ml   Net 1870.83 ml       Lines/Drains/Airways       Peripheral Intravenous Line  Duration                  Peripheral IV - Single Lumen 04/08/24 20 G Left Upper Arm 1 day                    Physical Exam  Vitals and nursing note reviewed.   Constitutional:       Appearance: Normal appearance. He is obese.   HENT:      Head: Normocephalic and atraumatic.      Mouth/Throat:      Mouth: Mucous membranes are moist.      Pharynx: Oropharynx is clear.   Eyes:      Extraocular Movements: Extraocular movements intact.   Cardiovascular:      Rate and Rhythm: Normal rate and regular rhythm.      Pulses: Normal pulses.      Heart sounds: Normal heart sounds. No murmur heard.     No friction rub. No gallop.   Pulmonary:      Effort: Pulmonary effort is normal.      Breath sounds: Normal breath sounds. No wheezing, rhonchi or rales.   Musculoskeletal:      Cervical back: Normal range of motion.      Right lower leg: No edema.      Left lower leg: No edema.   Skin:     General: Skin is warm.      Capillary Refill: Capillary refill takes less than 2 seconds.   Neurological:      General: No focal deficit present.      Mental Status: He is alert and oriented to person, place, and time.         Significant Labs: BMP:   Recent Labs   Lab 04/08/24  1105 04/09/24  0530   * 70    139   K 4.3 4.5    109   CO2 26 26   BUN 29* 30*   CREATININE 2.1* 1.8*   CALCIUM 8.7 8.8   , CMP   Recent Labs   Lab 04/08/24  1105 04/09/24  0530    139   K 4.3 4.5    109   CO2 26 26   * 70   BUN 29* 30*   CREATININE 2.1* 1.8*   CALCIUM 8.7 8.8   PROT 7.3 7.2   ALBUMIN 3.0* 3.0*   BILITOT 0.8 0.9   ALKPHOS 62 64   AST 26 22   ALT 11 8*   ANIONGAP 5 4   , CBC   Recent Labs   Lab 04/08/24  1105 04/09/24  0530   WBC 5.20 5.04   HGB 7.8* 11.2*   HCT 24.7* 34.6*   * 99*   , and All pertinent lab results from the last 24 hours have been reviewed.    Significant  Imaging:       Assessment and Plan:    Wide QRS on EKG  -suspected related to anemia  -will repeat EKG tomorrow after completed 3 units pRBCs   -telemetry revealing improving QRS   -continue to monitor closely   -initially HS-trop negative - now mildly elevated 63.5 - asymptomatic at this time.    Anemia  -H/H 7.8/24/7  -s/p 1 of 3 units pRBCs   -denies any active bleeding - no melena, hematochezia, hematemesis   -continue management per primary     HTN  -BP normotensive   -continue to monitor   -resume home medications as appropriate     CAD s/p CABG  -stable   -pt currently denies chest pain, shortness of breath  -continue to monitor          There are no hospital problems to display for this patient.      VTE Risk Mitigation (From admission, onward)           Ordered     IP VTE HIGH RISK PATIENT  Once         04/08/24 1410     Place sequential compression device  Until discontinued         04/08/24 1410     Place ASHOK hose  Until discontinued         04/08/24 1410                    Thank you for your consult. I will follow-up with patient. Please contact us if you have any additional questions.    GILMAR Frias-C - scribed for Dr. Henderson   Cardiology     Provider's Attestation:  I, Farzad Henderson MD, confirm that GILMAR Burks worked under my direction at all times.  Documentation shall reflect findings and decisions made by myself in the course of the patient's treatment.  I have reviewed the notes and assessment, and I concur with her documentation.  CMS guidelines regarding the use of scribes shall be adhered to.  MD Farzad Wright MD  Cardiology   [facility]

## 2024-04-08 NOTE — ED PROVIDER NOTES
Encounter Date: 4/8/2024       History     Chief Complaint   Patient presents with    Nausea    Diarrhea    Vomiting    Weakness     Pt here per [facility] with N/V/D and weakness since this am. Pt vomited x 1. Diarrhea x 1. Pt received 500mg IV bolus and 4mg Zofran PtA     This is an 83-year-old white male with multiple medical problems including anemia requiring transfusion, CAD status post CABG, chronic kidney disease, Parkinson's, and UTI who presents to the emergency department via EMS with complaints of generalized weakness that began this morning.  Patient reports that he was in his regular state of health, and shortly after eating breakfast he developed nausea with 1 episode of vomiting, diarrhea, and brief episode of anterior chest pain with shortness of breath.  He also had transient episode of mid abdominal aching pain.  Since that time has been experiencing weakness and lightheadedness.  Family members report that his blood pressure has been fluctuating over the last several days, experiencing many lows, and he has been taking his blood pressure medications as directed.  Patient denies any pain at the time of evaluation and has had resolution nausea after receiving Zofran in route to the hospital.  He further denies headache, URI signs and symptoms, dysuria, or any other relative symptoms at this time.      Review of patient's allergies indicates:   Allergen Reactions    Cardura [doxazosin] Hives    Lyrica [pregabalin] Other (See Comments)    Paxil [paroxetine hcl] Other (See Comments)    Restoril [temazepam] Hallucinations    Vioxx [rofecoxib] Swelling    Zithromax [azithromycin] Hives    Alpha 1 blocker- quinazolines     Benzodiazepines      Past Medical History:   Diagnosis Date    Acid reflux     Anemia     Anticoagulant long-term use     Anxiety disorder, unspecified     Aortic aneurysm, abdominal     Arthritis     Bronchitis     Carotid stenosis 05/13/2022    60-79% Right ICA 60-70% Left ICA     Cataract     Celiac artery stenosis     50% by CTA abdomen 7/27/2020    CKD (chronic kidney disease)     45% FUNCTION    Coronary artery disease     stents x4    Encounter for blood transfusion     Enlarged prostate     GERD (gastroesophageal reflux disease)     Heart attack     Hemorrhoids     Hypercholesteremia     Hypertension     Iron deficiency anemia, unspecified     Leaky heart valve     Lupus anticoagulant disorder     Mood disorder     Parkinson's disease     Renal artery stenosis     by CTA 7/27/2020    Restless leg syndrome     Skin cancer     Skin tear of elbow without complication     above elbow    Unspecified chronic bronchitis     UTI (urinary tract infection)      Past Surgical History:   Procedure Laterality Date    ACF      Anterior Cervical Fusion    BACK SURGERY      RODS IN BACK; 4 SURGIERIES    BIOPSY OF URETER Right 06/22/2023    Procedure: RESECTION/BIOPSY, URETER;  Surgeon: Aaron Pinon MD;  Location: [facility] OR;  Service: Urology;  Laterality: Right;    BONE MARROW BIOPSY      CARDIAC ANGIOGRAM WITH STENT      CATARACT SX Bilateral     CHOLECYSTECTOMY      CORONARY ANGIOGRAPHY N/A 02/15/2023    Procedure: ANGIOGRAM, CORONARY ARTERY;  Surgeon: Rito Vega MD;  Location: [facility] CATH;  Service: Cardiology;  Laterality: N/A;    CORONARY ARTERY BYPASS GRAFT (CABG) N/A 02/24/2023    Procedure: CORONARY ARTERY BYPASS GRAFT (CABG);  Surgeon: Spencer Hagan MD;  Location: [facility] OR;  Service: Cardiothoracic;  Laterality: N/A;  CABG / EVH //   ECHO NOTIFIED    CYSTOSCOPY W/ RETROGRADES Bilateral 06/20/2023    Procedure: CYSTOSCOPY WITH RETROGRADE PYELOGRAM;  Surgeon: Aaron Pinon MD;  Location: [facility] OR;  Service: Urology;  Laterality: Bilateral;  Pt has a Spinal Cord Stimulator  9am per [place], To follow RM4    CYSTOSCOPY W/ RETROGRADES Right 06/22/2023    Procedure: CYSTOSCOPY, WITH RETROGRADE PYELOGRAM;  Surgeon: Aaron Pinon MD;  Location: [facility] OR;  Service: Urology;  Laterality: Right;     CYSTOSCOPY W/ RETROGRADES Bilateral 8/24/2023    Procedure: CYSTOSCOPY, WITH RETROGRADE PYELOGRAM;  Surgeon: Aaron Pinon MD;  Location: [facility] OR;  Service: Urology;  Laterality: Bilateral;    CYSTOSCOPY W/ RETROGRADES Bilateral 12/4/2023    Procedure: CYSTOSCOPY WITH RETROGRADE PYELOGRAM;  Surgeon: Aaron Pinon MD;  Location: [facility] OR;  Service: Urology;  Laterality: Bilateral;  pt has spinal cord stimulator    DIGITAL RECTAL EXAMINATION UNDER ANESTHESIA N/A 12/4/2023    Procedure: EXAM UNDER ANESTHESIA, DIGITAL, RECTUM;  Surgeon: Aaron Pinon MD;  Location: [facility] OR;  Service: Urology;  Laterality: N/A;    HIP SURGERY Right     THR    KNEE SURGERY Right     ATS    LEFT HEART CATHETERIZATION Left 07/22/2021    Procedure: Left heart cath;  Surgeon: Curtis Loredo MD;  Location: [facility] CATH;  Service: Cardiology;  Laterality: Left;    LEFT HEART CATHETERIZATION Left 09/22/2022    Procedure: Left heart cath;  Surgeon: Giorgi Barkre MD;  Location: [facility] CATH;  Service: Cardiology;  Laterality: Left;    REMOVAL, STENT, URETER Right 8/24/2023    Procedure: REMOVAL, STENT, URETER;  Surgeon: Aaron Pinon MD;  Location: [facility] OR;  Service: Urology;  Laterality: Right;    ROTATOR CUFF REPAIR Bilateral     SHOULDER SURGERY Bilateral     SPINAL CORD STIMULATOR IMPLANT      STENT, URETERAL Left 06/22/2023    Procedure: STENT, URETERAL;  Surgeon: Aaron Pinon MD;  Location: [facility] OR;  Service: Urology;  Laterality: Left;    steroid injections Right 06/13/2023    Knee    ULNAR NERVE TRANSPOSITION      URETEROSCOPY Right 06/22/2023    Procedure: URETEROSCOPY;  Surgeon: Aaron Pinon MD;  Location: [facility] OR;  Service: Urology;  Laterality: Right;     Family History   Problem Relation Age of Onset    Heart disease Father     Heart disease Sister     Lung cancer Brother     Throat cancer Brother     Cancer Brother     Cancer Brother     Heart disease Brother         PPM    Heart disease Brother     Heart disease  Brother      Social History     Tobacco Use    Smoking status: Former     Current packs/day: 0.00     Types: Cigarettes     Quit date:      Years since quittin.2    Smokeless tobacco: Never   Substance Use Topics    Alcohol use: Yes     Alcohol/week: 2.0 standard drinks of alcohol     Types: 1 Glasses of wine, 1 Cans of beer per week     Comment: RARELY    Drug use: No     Review of Systems   Constitutional:  Positive for fatigue. Negative for appetite change and fever.   HENT:  Negative for congestion and rhinorrhea.    Respiratory:  Positive for shortness of breath. Negative for cough.    Cardiovascular:  Positive for chest pain.   Gastrointestinal:  Positive for abdominal pain, diarrhea, nausea and vomiting. Negative for blood in stool and constipation.   Genitourinary:  Negative for dysuria.   Skin:  Positive for pallor.   Neurological:  Positive for weakness.       Physical Exam     Initial Vitals [24 1051]   BP Pulse Resp Temp SpO2   (!) 79/44 (!) 115 18 97.6 °F (36.4 °C) (!) 94 %      MAP       --         Physical Exam    Nursing note and vitals reviewed.  Constitutional: He appears well-developed and well-nourished. He is active.   HENT:   Head: Normocephalic and atraumatic.   Dry oral mucous membranes   Eyes: EOM are normal. Pupils are equal, round, and reactive to light.   Neck: Neck supple.   Normal range of motion.  Cardiovascular:  Normal rate, regular rhythm and intact distal pulses.           Murmur heard.  Pulmonary/Chest: He has rales.   Abdominal: Abdomen is soft. Bowel sounds are normal. He exhibits no distension. There is no abdominal tenderness.   Musculoskeletal:         General: No edema. Normal range of motion.      Cervical back: Normal range of motion and neck supple.     Neurological: He is alert and oriented to person, place, and time. He has normal strength. GCS score is 15. GCS eye subscore is 4. GCS verbal subscore is 5. GCS motor subscore is 6.   Skin: Skin is warm and  dry. Capillary refill takes less than 2 seconds. There is pallor.   Psychiatric: He has a normal mood and affect. His behavior is normal. Thought content normal.         ED Course   Critical Care    Date/Time: 4/8/2024 1:45 PM    Performed by: Jemal Bhardwaj MD  Authorized by: Jemal Bhardwaj MD  Direct patient critical care time: 10 minutes  Additional history critical care time: 10 minutes  Ordering / reviewing critical care time: 10 minutes  Documentation critical care time: 10 minutes  Consulting other physicians critical care time: 10 minutes  Consult with family critical care time: 10 minutes  Total critical care time (exclusive of procedural time) : 60 minutes  Critical care was necessary to treat or prevent imminent or life-threatening deterioration of the following conditions: Anemia requiring packed red blood cell transfusion.  Critical care was time spent personally by me on the following activities: review of old charts, re-evaluation of patient's condition, pulse oximetry, ordering and review of radiographic studies, ordering and review of laboratory studies, ordering and performing treatments and interventions, obtaining history from patient or surrogate, examination of patient, evaluation of patient's response to treatment, discussions with consultants and development of treatment plan with patient or surrogate.        Labs Reviewed   CBC W/ AUTO DIFFERENTIAL - Abnormal; Notable for the following components:       Result Value    RBC 2.57 (*)     Hemoglobin 7.8 (*)     Hematocrit 24.7 (*)     MCHC 31.6 (*)     RDW 16.0 (*)     Platelets 119 (*)     Immature Granulocytes 1.3 (*)     Immature Grans (Abs) 0.07 (*)     All other components within normal limits   COMPREHENSIVE METABOLIC PANEL - Abnormal; Notable for the following components:    Glucose 121 (*)     BUN 29 (*)     Creatinine 2.1 (*)     Albumin 3.0 (*)     eGFR 30.8 (*)     All other components within normal limits   URINALYSIS,  REFLEX TO URINE CULTURE - Abnormal; Notable for the following components:    Protein, UA 1+ (*)     All other components within normal limits    Narrative:     Preferred Collection Type->Urine, Clean Catch  Specimen Source->Urine   LACTIC ACID, PLASMA - Abnormal; Notable for the following components:    Lactate (Lactic Acid) 2.6 (*)     All other components within normal limits   URINALYSIS MICROSCOPIC - Abnormal; Notable for the following components:    Hyaline Casts, UA 6.9 (*)     All other components within normal limits    Narrative:     Preferred Collection Type->Urine, Clean Catch  Specimen Source->Urine   TROPONIN I HIGH SENSITIVITY - Abnormal; Notable for the following components:    Troponin I High Sensitivity 63.5 (*)     All other components within normal limits   TYPE & SCREEN - Abnormal; Notable for the following components:    Indirect Jose R POS (*)     All other components within normal limits   LIPASE   TROPONIN I HIGH SENSITIVITY   OCCULT BLOOD X 1, STOOL   ANTIBODY IDENTIFICATION     EKG Readings: (Independently Interpreted)   Initial Reading: No STEMI. Rhythm: Sinus Tachycardia. Heart Rate: 116. Ectopy: No Ectopy. ST Segments: Normal ST Segments.     ECG Results              EKG 12-lead (Final result)        Collection Time Result Time QRS Duration OHS QTC Calculation    04/08/24 10:54:49 04/08/24 14:50:54 138 539                     Final result by Interface, Lab In [facility] (04/08/24 14:50:59)                   Narrative:    Test Reason : I95.9,    Vent. Rate : 116 BPM     Atrial Rate : 116 BPM     P-R Int : 000 ms          QRS Dur : 138 ms      QT Int : 388 ms       P-R-T Axes : 000 -73 086 degrees     QTc Int : 539 ms    Wide QRS rhythm  Left axis deviation  Right bundle branch block  Abnormal ECG  When compared with ECG of 23-JUN-2023 19:32,  Wide QRS rhythm has replaced Sinus rhythm  Confirmed by Nima Samuel MD ([id number]) on 4/8/2024 2:50:50 PM    Referred By: [email or web address]   SELF            Confirmed By:Nima Samuel MD                                  Imaging Results              CT Abdomen Pelvis  Without Contrast (Final result)  Result time 04/08/24 13:32:26      Final result by Shawn Mcclelland MD (04/08/24 13:32:26)                   Impression:      1. No acute findings.  2. Stable infrarenal abdominal aortic aneurysm.      Electronically signed by: Shawn Mcclelland MD  Date:    04/08/2024  Time:    13:32               Narrative:    EXAMINATION:  CT ABDOMEN PELVIS WITHOUT CONTRAST    CLINICAL HISTORY:  Abdominal pain, acute, nonlocalized;    TECHNIQUE:  Axial CT images were obtained. Iterative reconstruction technique was used. CT/cardiac nuclear exam/s in prior 12 months: 6.    COMPARISON:  CT abdomen pelvis without IV contrast 07/20/2023.    FINDINGS:  There is no hepatic abnormality.  Cholecystectomy.  Spleen, pancreas, adrenal glands demonstrate no abnormality.  No renal stones or hydronephrosis.  No gross gastric abnormality.  No dilated bowel.  Colonic diverticulosis.  The appendix is normal.  No free fluid or free air.  Diffuse urinary bladder wall thickening with urinary bladder diverticula, as noted on prior exam.  The prostate is enlarged.  There is an infrarenal abdominal aortic aneurysm measuring 3.7 cm in maximum axial dimension, stable from prior exam.  No lymph node enlargement.  Visualized lung bases demonstrate small bilateral pleural effusions with bibasilar atelectasis.  No osseous abnormality.                                       X-Ray Chest AP Portable (Final result)  Result time 04/08/24 13:39:55      Final result by Shawn Mcclelland MD (04/08/24 13:39:55)                   Impression:      No evidence of acute disease.      Electronically signed by: Shawn Mcclelland MD  Date:    04/08/2024  Time:    13:39               Narrative:    EXAMINATION:  XR CHEST AP PORTABLE    CLINICAL HISTORY:  chest pain;    COMPARISON:  Portable chest  07/20/2023.    FINDINGS:  Frontal chest radiograph demonstrates scattered fine chronic linear opacities bilaterally.  No consolidation.  No pleural fluid.  Mild enlargement of cardiac silhouette with prior cardiac surgery.  Multilevel lumbar fusion.                                       Medications   0.9%  NaCl infusion (for blood administration) (has no administration in time range)   carvediloL tablet 3.125 mg (3.125 mg Oral Given 4/8/24 1740)   clopidogreL tablet 75 mg (0 mg Oral Hold 4/8/24 1445)   tamsulosin 24 hr capsule 0.4 mg (0.4 mg Oral Given 4/8/24 2104)   sodium chloride 0.9% flush 10 mL (has no administration in time range)   melatonin tablet 6 mg (6 mg Oral Given 4/8/24 2105)   famotidine tablet 20 mg (20 mg Oral Given 4/8/24 1450)   ondansetron injection 4 mg (has no administration in time range)   HYDROcodone-acetaminophen 5-325 mg per tablet 1 tablet (has no administration in time range)   HYDROcodone-acetaminophen  mg per tablet 1 tablet (has no administration in time range)   acetaminophen tablet 650 mg (650 mg Oral Given 4/9/24 0616)   carbidopa-levodopa  mg per tablet 1 tablet (1 tablet Oral Given 4/8/24 2105)   sodium chloride 0.9% bolus 500 mL 500 mL (0 mLs Intravenous Stopped 4/8/24 1158)   carvediloL tablet 3.125 mg (3.125 mg Oral Given 4/8/24 2104)     Medical Decision Making  Amount and/or Complexity of Data Reviewed  Labs: ordered.  Radiology: ordered.    Risk  OTC drugs.  Prescription drug management.  Decision regarding hospitalization.               ED Course as of 04/09/24 0621   Mon Apr 08, 2024   1349 Discussed rhythm changes with  - does not want to start on any medications at this time, states he believes giving him blood may fixed this issue.  Packed red blood cells ordered [SD]      ED Course User Index  [SD] Jemal Bhardwaj MD               Medical Decision Making:   ED Management:  Patient seen and examined by me as well, all responsibility of this  patient assumed by me.  This is an 83-year-old white male with multiple medical problems including anemia requiring transfusion, CAD status post CABG, chronic kidney disease, Parkinson's, and UTI who presents to the emergency department via EMS with complaints of generalized weakness that began this morning.  Patient reports that he was in his regular state of health, and shortly after eating breakfast he developed nausea with 1 episode of vomiting, diarrhea, and brief episode of anterior chest pain with shortness of breath.  He also had transient episode of mid abdominal aching pain.  Since that time has been experiencing weakness and lightheadedness.  Family members report that his blood pressure has been fluctuating over the last several days, experiencing many lows, and he has been taking his blood pressure medications as directed.  Patient denies any pain at the time of evaluation and has had resolution nausea after receiving Zofran in route to the hospital.  He further denies headache, URI signs and symptoms, dysuria, or any other relative symptoms at this time.    CT abdomen is negative.  Labs consistent with anemia, renal functions are at baseline.  Discussed case with  and well admit for blood transfusion    Jemal Bhardwaj MD                 Clinical Impression:  Final diagnoses:  [I95.9] Hypotension  [D64.9] Anemia, unspecified type (Primary)          ED Disposition Condition    Admit Stable                Jemal Bhardwaj MD  04/08/24 3855       Jemal Bhardwaj MD  04/09/24 6035

## 2024-04-08 NOTE — PLAN OF CARE
Problem: Infection  Goal: Absence of Infection Signs and Symptoms  Outcome: Ongoing, Not Progressing     Problem: Adult Inpatient Plan of Care  Goal: Plan of Care Review  Outcome: Ongoing, Not Progressing  Goal: Patient-Specific Goal (Individualized)  Outcome: Ongoing, Not Progressing  Goal: Absence of Hospital-Acquired Illness or Injury  Outcome: Ongoing, Not Progressing  Goal: Optimal Comfort and Wellbeing  Outcome: Ongoing, Not Progressing  Goal: Readiness for Transition of Care  Outcome: Ongoing, Not Progressing     Problem: Fall Injury Risk  Goal: Absence of Fall and Fall-Related Injury  Outcome: Ongoing, Not Progressing

## 2024-04-08 NOTE — ED NOTES
Pt george first unit of PRBCs well. Rate now at 150cc/hr per pump. No signs of transfusion reaction.

## 2024-04-09 PROBLEM — I47.20 WIDE QRS VENTRICULAR TACHYCARDIA: Status: ACTIVE | Noted: 2024-04-09

## 2024-04-09 PROBLEM — D64.9 SYMPTOMATIC ANEMIA: Status: ACTIVE | Noted: 2024-04-09

## 2024-04-09 PROBLEM — R79.89 ELEVATED TROPONIN: Status: ACTIVE | Noted: 2024-04-09

## 2024-04-09 LAB
ALBUMIN SERPL BCP-MCNC: 3 G/DL (ref 3.5–5.2)
ALP SERPL-CCNC: 64 U/L (ref 55–135)
ALT SERPL W/O P-5'-P-CCNC: 8 U/L (ref 10–44)
ANION GAP SERPL CALC-SCNC: 4 MMOL/L (ref 3–11)
AST SERPL-CCNC: 22 U/L (ref 10–40)
BASOPHILS # BLD AUTO: 0.02 K/UL (ref 0–0.2)
BASOPHILS NFR BLD: 0.4 % (ref 0–1.9)
BILIRUB SERPL-MCNC: 0.9 MG/DL (ref 0.1–1)
BUN SERPL-MCNC: 30 MG/DL (ref 8–23)
CALCIUM SERPL-MCNC: 8.8 MG/DL (ref 8.7–10.5)
CHLORIDE SERPL-SCNC: 109 MMOL/L (ref 95–110)
CO2 SERPL-SCNC: 26 MMOL/L (ref 23–29)
CREAT SERPL-MCNC: 1.8 MG/DL (ref 0.5–1.4)
DIFFERENTIAL METHOD BLD: ABNORMAL
EOSINOPHIL # BLD AUTO: 0 K/UL (ref 0–0.5)
EOSINOPHIL NFR BLD: 0.2 % (ref 0–8)
ERYTHROCYTE [DISTWIDTH] IN BLOOD BY AUTOMATED COUNT: 17.7 % (ref 11.5–14.5)
EST. GFR  (NO RACE VARIABLE): 37.1 ML/MIN/1.73 M^2
GLUCOSE SERPL-MCNC: 70 MG/DL (ref 70–110)
HCT VFR BLD AUTO: 34.6 % (ref 40–54)
HGB BLD-MCNC: 11.2 G/DL (ref 14–18)
IMM GRANULOCYTES # BLD AUTO: 0.09 K/UL (ref 0–0.04)
IMM GRANULOCYTES NFR BLD AUTO: 1.8 % (ref 0–0.5)
LYMPHOCYTES # BLD AUTO: 1.4 K/UL (ref 1–4.8)
LYMPHOCYTES NFR BLD: 27 % (ref 18–48)
MCH RBC QN AUTO: 30 PG (ref 27–31)
MCHC RBC AUTO-ENTMCNC: 32.4 G/DL (ref 32–36)
MCV RBC AUTO: 93 FL (ref 82–98)
MONOCYTES # BLD AUTO: 0.6 K/UL (ref 0.3–1)
MONOCYTES NFR BLD: 11.9 % (ref 4–15)
NEUTROPHILS # BLD AUTO: 3 K/UL (ref 1.8–7.7)
NEUTROPHILS NFR BLD: 58.7 % (ref 38–73)
NRBC BLD-RTO: 0 /100 WBC
OHS QRS DURATION: 116 MS
OHS QRS DURATION: 146 MS
OHS QTC CALCULATION: 453 MS
OHS QTC CALCULATION: 529 MS
PLATELET # BLD AUTO: 99 K/UL (ref 150–450)
PMV BLD AUTO: 11.8 FL (ref 9.2–12.9)
POTASSIUM SERPL-SCNC: 4.5 MMOL/L (ref 3.5–5.1)
PROT SERPL-MCNC: 7.2 G/DL (ref 6–8.4)
RBC # BLD AUTO: 3.73 M/UL (ref 4.6–6.2)
SODIUM SERPL-SCNC: 139 MMOL/L (ref 136–145)
TROPONIN I SERPL DL<=0.01 NG/ML-MCNC: 81.4 PG/ML (ref 0–60)
WBC # BLD AUTO: 5.04 K/UL (ref 3.9–12.7)

## 2024-04-09 PROCEDURE — 80053 COMPREHEN METABOLIC PANEL: CPT | Performed by: EMERGENCY MEDICINE

## 2024-04-09 PROCEDURE — 85025 COMPLETE CBC W/AUTO DIFF WBC: CPT | Performed by: EMERGENCY MEDICINE

## 2024-04-09 PROCEDURE — 93005 ELECTROCARDIOGRAM TRACING: CPT

## 2024-04-09 PROCEDURE — 93010 ELECTROCARDIOGRAM REPORT: CPT | Mod: 76,,, | Performed by: INTERNAL MEDICINE

## 2024-04-09 PROCEDURE — 84484 ASSAY OF TROPONIN QUANT: CPT

## 2024-04-09 PROCEDURE — 94761 N-INVAS EAR/PLS OXIMETRY MLT: CPT

## 2024-04-09 PROCEDURE — 25000003 PHARM REV CODE 250: Performed by: EMERGENCY MEDICINE

## 2024-04-09 PROCEDURE — 21400001 HC TELEMETRY ROOM

## 2024-04-09 PROCEDURE — 99900031 HC PATIENT EDUCATION (STAT)

## 2024-04-09 PROCEDURE — 36415 COLL VENOUS BLD VENIPUNCTURE: CPT

## 2024-04-09 PROCEDURE — 99900035 HC TECH TIME PER 15 MIN (STAT)

## 2024-04-09 PROCEDURE — 25000003 PHARM REV CODE 250: Performed by: INTERNAL MEDICINE

## 2024-04-09 PROCEDURE — 36415 COLL VENOUS BLD VENIPUNCTURE: CPT | Performed by: EMERGENCY MEDICINE

## 2024-04-09 PROCEDURE — 25000003 PHARM REV CODE 250

## 2024-04-09 PROCEDURE — 27000221 HC OXYGEN, UP TO 24 HOURS

## 2024-04-09 PROCEDURE — 93010 ELECTROCARDIOGRAM REPORT: CPT | Mod: ,,, | Performed by: INTERNAL MEDICINE

## 2024-04-09 RX ORDER — EZETIMIBE 10 MG/1
10 TABLET ORAL DAILY
Status: DISCONTINUED | OUTPATIENT
Start: 2024-04-09 | End: 2024-04-12 | Stop reason: HOSPADM

## 2024-04-09 RX ORDER — ATORVASTATIN CALCIUM 40 MG/1
40 TABLET, FILM COATED ORAL DAILY
Status: DISCONTINUED | OUTPATIENT
Start: 2024-04-09 | End: 2024-04-12 | Stop reason: HOSPADM

## 2024-04-09 RX ORDER — CARVEDILOL 3.12 MG/1
6.25 TABLET ORAL 2 TIMES DAILY WITH MEALS
Status: DISCONTINUED | OUTPATIENT
Start: 2024-04-09 | End: 2024-04-12 | Stop reason: HOSPADM

## 2024-04-09 RX ORDER — QUETIAPINE FUMARATE 50 MG/1
50 TABLET, FILM COATED ORAL NIGHTLY
Status: DISCONTINUED | OUTPATIENT
Start: 2024-04-09 | End: 2024-04-12 | Stop reason: HOSPADM

## 2024-04-09 RX ORDER — PANTOPRAZOLE SODIUM 40 MG/1
40 TABLET, DELAYED RELEASE ORAL DAILY
Status: DISCONTINUED | OUTPATIENT
Start: 2024-04-09 | End: 2024-04-12 | Stop reason: HOSPADM

## 2024-04-09 RX ADMIN — CARVEDILOL 3.12 MG: 3.12 TABLET, FILM COATED ORAL at 08:04

## 2024-04-09 RX ADMIN — CARVEDILOL 6.25 MG: 3.12 TABLET, FILM COATED ORAL at 05:04

## 2024-04-09 RX ADMIN — TAMSULOSIN HYDROCHLORIDE 0.4 MG: 0.4 CAPSULE ORAL at 08:04

## 2024-04-09 RX ADMIN — ACETAMINOPHEN 650 MG: 325 TABLET ORAL at 06:04

## 2024-04-09 RX ADMIN — FAMOTIDINE 20 MG: 20 TABLET, FILM COATED ORAL at 08:04

## 2024-04-09 RX ADMIN — CARBIDOPA AND LEVODOPA 1 TABLET: 25; 100 TABLET ORAL at 05:04

## 2024-04-09 RX ADMIN — CLOPIDOGREL BISULFATE 75 MG: 75 TABLET ORAL at 08:04

## 2024-04-09 RX ADMIN — QUETIAPINE FUMARATE 50 MG: 50 TABLET ORAL at 08:04

## 2024-04-09 RX ADMIN — PANTOPRAZOLE SODIUM 40 MG: 40 TABLET, DELAYED RELEASE ORAL at 12:04

## 2024-04-09 RX ADMIN — Medication 6 MG: at 08:04

## 2024-04-09 RX ADMIN — ATORVASTATIN CALCIUM 40 MG: 40 TABLET, FILM COATED ORAL at 12:04

## 2024-04-09 RX ADMIN — CARBIDOPA AND LEVODOPA 1 TABLET: 25; 100 TABLET ORAL at 08:04

## 2024-04-09 RX ADMIN — EZETIMIBE 10 MG: 10 TABLET ORAL at 12:04

## 2024-04-09 NOTE — HPI
Chief Complaint   Patient presents with    Nausea    Diarrhea    Vomiting    Weakness       Pt here per [facility] with N/V/D and weakness since this am. Pt vomited x 1. Diarrhea x 1. Pt received 500mg IV bolus and 4mg Zofran PtA      This is an 83-year-old white male with multiple medical problems including anemia requiring transfusion, CAD status post CABG, chronic kidney disease, Parkinson's, and UTI who presents to the emergency department via EMS with complaints of generalized weakness that began this morning.  Patient reports that he was in his regular state of health, and shortly after eating breakfast he developed nausea with 1 episode of vomiting, diarrhea, and brief episode of anterior chest pain with shortness of breath.  He also had transient episode of mid abdominal aching pain.  Since that time has been experiencing weakness and lightheadedness.  Family members report that his blood pressure has been fluctuating over the last several days, experiencing many lows, and he has been taking his blood pressure medications as directed.  Patient denies any pain at the time of evaluation and has had resolution nausea after receiving Zofran in route to the hospital.  He further denies headache, URI signs and symptoms, dysuria, or any other relative symptoms at this time.

## 2024-04-09 NOTE — ASSESSMENT & PLAN NOTE
Appreciate cardiology input.  Suspect secondary to anemia.  Troponin mildly elevated.  Patient asymptomatic.  Likely anemia induced.

## 2024-04-09 NOTE — NURSING
Pt in v-tach on telemetry at 128 bpm. Pt sitting and brushing teeth. Denies palpations or feeling of hear racing. Pt states he feels fine. Dr Purdy and Dr solares notified.

## 2024-04-09 NOTE — NURSING
Spoke to Dr. Paul about patient running episodes of v-tach. New orders noted. Will continue to monitor.

## 2024-04-09 NOTE — ASSESSMENT & PLAN NOTE
Patient's anemia is currently controlled. Has received 3 units of PRBCs on 4/9/24 . Etiology likely d/t chronic blood loss  Current CBC reviewed-   Lab Results   Component Value Date    HGB 11.2 (L) 04/09/2024    HCT 34.6 (L) 04/09/2024     Monitor serial CBC and transfuse if patient becomes hemodynamically unstable, symptomatic or H/H drops below 7/21.    Recent Labs   Lab 02/01/24  0846 04/08/24  1105 04/09/24  0530   Hemoglobin 9.7 L 7.8 L 11.2 L

## 2024-04-09 NOTE — PLAN OF CARE
04/09/24 1201   Medicare Message   Important Message from Medicare regarding Discharge Appeal Rights Given to patient/caregiver;Explained to patient/caregiver;Signed/date by patient/caregiver   Date IMM was signed 04/09/24   Time IMM was signed 1035

## 2024-04-09 NOTE — ASSESSMENT & PLAN NOTE
Chronic, controlled. Latest blood pressure and vitals reviewed-     Temp:  [98 °F (36.7 °C)-98.7 °F (37.1 °C)]   Pulse:  []   Resp:  [16-22]   BP: (100-146)/(54-73)   SpO2:  [93 %-100 %] .   Home meds for hypertension were reviewed and noted below.   Hypertension Medications               carvediloL (COREG) 3.125 MG tablet Take 1 tablet (3.125 mg total) by mouth 2 (two) times daily with meals.    furosemide (LASIX) 20 MG tablet Take 20 mg by mouth once daily. Prescribed by Dr. Loredo            While in the hospital, will manage blood pressure as follows; Continue home antihypertensive regimen    Will utilize p.r.n. blood pressure medication only if patient's blood pressure greater than 180/110 and he develops symptoms such as worsening chest pain or shortness of breath.

## 2024-04-09 NOTE — PLAN OF CARE
A Sweringer NP for [facility] notified.      Yohana Bliss RN  03/03/20 7[address] [facility]  Initial Discharge Assessment       Primary Care Provider: Shawn Purdy Jr., MD    Admission Diagnosis: Hypotension [I95.9]  Anemia, unspecified type [D64.9]    Admission Date: 4/8/2024  Expected Discharge Date:     Transition of Care Barriers: None    Payor: MEDICARE / Plan: MEDICARE PART A & B / Product Type: Government /     Extended Emergency Contact Information  Primary Emergency Contact: Romy Calloway  Mobile Phone: [phone]  Relation: Daughter   needed? No  Secondary Emergency Contact: Janeth Navarrete  Mobile Phone: [phone]  Relation: Relative   needed? No    Discharge Plan A: Home, Home Health  Discharge Plan B: Home      [facility] - [place], [place] - [address]  [address] [address]  Phone: [phone] Fax: [phone]    [id number] - [place], [place] - [address]  [address]  [facility] [address]  Phone: [phone] Fax: [phone]      Initial Assessment (most recent)       Adult Discharge Assessment - 04/09/24 1259          Discharge Assessment    Assessment Type Discharge Planning Assessment     Confirmed/corrected address, phone number and insurance Yes     Confirmed Demographics Correct on Facesheet     Source of Information patient     When was your last doctors appointment? 04/04/24     Reason For Admission Symptomatic anemia     People in Home alone     Do you expect to return to your current living situation? Yes     Do you have help at home or someone to help you manage your care at home? Yes     Who are your caregiver(s) and their phone number(s)? The patient does have a homemaker who assist him with his care. The patient's stated that his niece/homemaker assist him in the morning for breakfast.     Prior to hospitilization cognitive status: Alert/Oriented     Current cognitive status: Alert/Oriented     Walking or Climbing Stairs Difficulty yes     Walking or Climbing  Stairs ambulation difficulty, requires equipment     Mobility Management rollator and cane     Dressing/Bathing Difficulty yes     Dressing/Bathing bathing difficulty, requires equipment     Dressing/Bathing Management shower chair     Do you have any problems with: Needs other help     Specify other help The patient stated that his niece assist him with his groceries     Home Layout Able to live on 1st floor     Equipment Currently Used at Home other (see comments);shower chair;rollator   The patient stated that he does have a bedside commode, but he currently does not use it.    Readmission within 30 days? No     Patient currently being followed by outpatient case management? No     Do you currently have service(s) that help you manage your care at home? Yes     Name and Contact number of agency [facility]-: [phone]     Is the pt/caregiver preference to resume services with current agency Yes     Do you take prescription medications? Yes     Do you have prescription coverage? Yes     Coverage [facility]     Do you have any problems affording any of your prescribed medications? No     Is the patient taking medications as prescribed? yes     How do you get to doctors appointments? family or friend will provide     Are you on dialysis? No     Discharge Plan A Home;Home Health     Discharge Plan B Home     DME Needed Upon Discharge  other (see comments)   TBD    Discharge Plan discussed with: Patient     Transition of Care Barriers None                 Initial discharge assessment is completed. Spoke to the patient in his room. He was awake, alert, and oriented to the questions being asked. The patient lives alone, but he has family who checks on him throughout the day. The patient is established with the homemaker services through the [facility], and he has home health. He plans to return home once he is discharged. The patient gave me permission to reach out to his niece/homemaker, Janeth, and she confirmed this  information. Contact information was left in the patient's room. SW/CM will remain available.

## 2024-04-09 NOTE — ASSESSMENT & PLAN NOTE
Creatine stable for now. BMP reviewed- noted Estimated Creatinine Clearance: 32.7 mL/min (A) (based on SCr of 1.8 mg/dL (H)). according to latest data. Based on current GFR, CKD stage is stage 3 - GFR 30-59.  Monitor UOP and serial BMP and adjust therapy as needed. Renally dose meds. Avoid nephrotoxic medications and procedures.    Lab Results   Component Value Date    CREATININE 1.8 (H) 04/09/2024    CREATININE 2.1 (H) 04/08/2024    CREATININE 1.8 (H) 02/01/2024

## 2024-04-09 NOTE — PLAN OF CARE
POC reviewed with patient. VSS. All questions and concerns answered. Free of pain. No acite events throughout the night. Urinal and personal items in reach. Bed lowered and call bell in reach.   Problem: Infection  Goal: Absence of Infection Signs and Symptoms  Outcome: Ongoing, Progressing     Problem: Adult Inpatient Plan of Care  Goal: Plan of Care Review  Outcome: Ongoing, Progressing  Goal: Patient-Specific Goal (Individualized)  Outcome: Ongoing, Progressing  Goal: Absence of Hospital-Acquired Illness or Injury  Outcome: Ongoing, Progressing  Goal: Optimal Comfort and Wellbeing  Outcome: Ongoing, Progressing  Goal: Readiness for Transition of Care  Outcome: Ongoing, Progressing     Problem: Fall Injury Risk  Goal: Absence of Fall and Fall-Related Injury  Outcome: Ongoing, Progressing

## 2024-04-09 NOTE — ASSESSMENT & PLAN NOTE
Likely from demand mismatch in the setting of anemia.  Continue to monitor.  Appreciate cardiology input

## 2024-04-09 NOTE — SUBJECTIVE & OBJECTIVE
Past Medical History:   Diagnosis Date    Acid reflux     Anemia     Anticoagulant long-term use     Anxiety disorder, unspecified     Aortic aneurysm, abdominal     Arthritis     Bronchitis     Carotid stenosis 05/13/2022    60-79% Right ICA 60-70% Left ICA    Cataract     Celiac artery stenosis     50% by CTA abdomen 7/27/2020    CKD (chronic kidney disease)     45% FUNCTION    Coronary artery disease     stents x4    Encounter for blood transfusion     Enlarged prostate     GERD (gastroesophageal reflux disease)     Heart attack     Hemorrhoids     Hypercholesteremia     Hypertension     Iron deficiency anemia, unspecified     Leaky heart valve     Lupus anticoagulant disorder     Mood disorder     Parkinson's disease     Renal artery stenosis     by CTA 7/27/2020    Restless leg syndrome     Skin cancer     Skin tear of elbow without complication     above elbow    Unspecified chronic bronchitis     UTI (urinary tract infection)        Past Surgical History:   Procedure Laterality Date    ACF      Anterior Cervical Fusion    BACK SURGERY      RODS IN BACK; 4 SURGIERIES    BIOPSY OF URETER Right 06/22/2023    Procedure: RESECTION/BIOPSY, URETER;  Surgeon: Aaron Pinon MD;  Location: [facility] OR;  Service: Urology;  Laterality: Right;    BONE MARROW BIOPSY      CARDIAC ANGIOGRAM WITH STENT      CATARACT SX Bilateral     CHOLECYSTECTOMY      CORONARY ANGIOGRAPHY N/A 02/15/2023    Procedure: ANGIOGRAM, CORONARY ARTERY;  Surgeon: Rito Vega MD;  Location: [facility] CATH;  Service: Cardiology;  Laterality: N/A;    CORONARY ARTERY BYPASS GRAFT (CABG) N/A 02/24/2023    Procedure: CORONARY ARTERY BYPASS GRAFT (CABG);  Surgeon: Spencer Hagan MD;  Location: [facility] OR;  Service: Cardiothoracic;  Laterality: N/A;  CABG / EVH //   ECHO NOTIFIED    CYSTOSCOPY W/ RETROGRADES Bilateral 06/20/2023    Procedure: CYSTOSCOPY WITH RETROGRADE PYELOGRAM;  Surgeon: Aaron Pinon MD;  Location: [facility] OR;  Service: Urology;   Laterality: Bilateral;  Pt has a Spinal Cord Stimulator  9am per [place]vee, To follow RM4    CYSTOSCOPY W/ RETROGRADES Right 06/22/2023    Procedure: CYSTOSCOPY, WITH RETROGRADE PYELOGRAM;  Surgeon: Aaron Pionn MD;  Location: [facility] OR;  Service: Urology;  Laterality: Right;    CYSTOSCOPY W/ RETROGRADES Bilateral 8/24/2023    Procedure: CYSTOSCOPY, WITH RETROGRADE PYELOGRAM;  Surgeon: Aaron Pinon MD;  Location: [facility] OR;  Service: Urology;  Laterality: Bilateral;    CYSTOSCOPY W/ RETROGRADES Bilateral 12/4/2023    Procedure: CYSTOSCOPY WITH RETROGRADE PYELOGRAM;  Surgeon: Aaron Pinon MD;  Location: [facility] OR;  Service: Urology;  Laterality: Bilateral;  pt has spinal cord stimulator    DIGITAL RECTAL EXAMINATION UNDER ANESTHESIA N/A 12/4/2023    Procedure: EXAM UNDER ANESTHESIA, DIGITAL, RECTUM;  Surgeon: Aaron Pinon MD;  Location: [facility] OR;  Service: Urology;  Laterality: N/A;    HIP SURGERY Right     THR    KNEE SURGERY Right     ATS    LEFT HEART CATHETERIZATION Left 07/22/2021    Procedure: Left heart cath;  Surgeon: Curtis Loredo MD;  Location: [facility] CATH;  Service: Cardiology;  Laterality: Left;    LEFT HEART CATHETERIZATION Left 09/22/2022    Procedure: Left heart cath;  Surgeon: Giorgi Barker MD;  Location: [facility] CATH;  Service: Cardiology;  Laterality: Left;    REMOVAL, STENT, URETER Right 8/24/2023    Procedure: REMOVAL, STENT, URETER;  Surgeon: Aaron Pinon MD;  Location: [facility] OR;  Service: Urology;  Laterality: Right;    ROTATOR CUFF REPAIR Bilateral     SHOULDER SURGERY Bilateral     SPINAL CORD STIMULATOR IMPLANT      STENT, URETERAL Left 06/22/2023    Procedure: STENT, URETERAL;  Surgeon: Aaron Pinon MD;  Location: [facility] OR;  Service: Urology;  Laterality: Left;    steroid injections Right 06/13/2023    Knee    ULNAR NERVE TRANSPOSITION      URETEROSCOPY Right 06/22/2023    Procedure: URETEROSCOPY;  Surgeon: Aaron Pinon MD;  Location: [facility] OR;  Service: Urology;  Laterality:  Right;       Review of patient's allergies indicates:   Allergen Reactions    Cardura [doxazosin] Hives    Lyrica [pregabalin] Other (See Comments)    Paxil [paroxetine hcl] Other (See Comments)    Restoril [temazepam] Hallucinations    Vioxx [rofecoxib] Swelling    Zithromax [azithromycin] Hives    Alpha 1 blocker- quinazolines     Benzodiazepines        No current facility-administered medications on file prior to encounter.     Current Outpatient Medications on File Prior to Encounter   Medication Sig    azelastine (ASTELIN) 137 mcg (0.1 %) nasal spray 1 spray (137 mcg total) by Nasal route 2 (two) times daily.    calcium carbonate-vitamin D3 500 mg-10 mcg (400 unit) Tab Take 1 tablet by mouth once daily.    carbidopa-levodopa (PARCOPA)  mg per disintegrating tablet Take 1 tablet by mouth 3 (three) times daily.    carvediloL (COREG) 3.125 MG tablet Take 1 tablet (3.125 mg total) by mouth 2 (two) times daily with meals.    clindamycin phosphate 1% (CLINDAGEL) 1 % gel Apply topically 2 (two) times daily.    clopidogreL (PLAVIX) 75 mg tablet Take 75 mg by mouth once daily.    dextran 70/hypromellose (ARTIFICIAL TEARS, PF, OPHT) Apply 1 drop to eye 3 (three) times daily.    docusate sodium (COLACE) 100 MG capsule Take 100 mg by mouth 2 (two) times daily.    DULoxetine (CYMBALTA) 30 MG capsule Take 1 capsule (30 mg total) by mouth once daily.    ezetimibe (ZETIA) 10 mg tablet Take 1 tablet (10 mg total) by mouth once daily.    fluticasone propionate (FLONASE) 50 mcg/actuation nasal spray 1 spray by Each Nostril route 2 (two) times a day.    furosemide (LASIX) 20 MG tablet Take 20 mg by mouth once daily. Prescribed by Dr. Loredo    gabapentin (NEURONTIN) 100 MG capsule Take 500 mg by mouth. 2C QAM, 2C QPM, 1C QHS    HYDROcodone-acetaminophen (NORCO) 7.5-325 mg per tablet Prescribed by Dr. Hernandez    hydroxychloroquine (PLAQUENIL) 200 mg tablet Take 200 mg by mouth once daily.    lanolin/mineral oil/petrolatum  (ARTIFICIAL TEARS OPHT) Apply to eye.    niacin 1,000 mg TbSR Take 1 tablet by mouth Daily.    pantoprazole (PROTONIX) 40 MG tablet Take 1 tablet (40 mg total) by mouth once daily.    polyvinyl alcohol, artificial tears, (LIQUIFILM TEARS) 1.4 % ophthalmic solution Place 1 drop into both eyes as needed.    predniSONE (DELTASONE) 5 MG tablet Take 2.5 mg by mouth once daily.    QUEtiapine (SEROQUEL) 50 MG tablet Take 50 mg by mouth every evening.    rOPINIRole (REQUIP) 1 MG tablet Take 1 tablet (1 mg total) by mouth 2 (two) times daily.    rosuvastatin (CRESTOR) 40 MG Tab Take 1 tablet (40 mg total) by mouth every evening.    tamsulosin (FLOMAX) 0.4 mg Cap Take 1 capsule (0.4 mg total) by mouth every evening.     Family History       Problem Relation (Age of Onset)    Cancer Brother, Brother    Heart disease Father, Sister, Brother, Brother, Brother    Lung cancer Brother    Throat cancer Brother          Tobacco Use    Smoking status: Former     Current packs/day: 0.00     Types: Cigarettes     Quit date:      Years since quittin.2    Smokeless tobacco: Never   Substance and Sexual Activity    Alcohol use: Yes     Alcohol/week: 2.0 standard drinks of alcohol     Types: 1 Glasses of wine, 1 Cans of beer per week     Comment: RARELY    Drug use: No    Sexual activity: Not on file     Review of Systems   Constitutional:  Positive for fatigue. Negative for chills and fever.   HENT:  Negative for rhinorrhea, sneezing and sore throat.    Eyes:  Negative for pain and visual disturbance.   Respiratory:  Positive for shortness of breath. Negative for cough.    Cardiovascular:  Positive for chest pain.   Gastrointestinal:  Positive for abdominal pain, diarrhea, nausea and vomiting. Negative for constipation.   Endocrine: Negative for cold intolerance and heat intolerance.   Genitourinary:  Negative for difficulty urinating.   Musculoskeletal:  Negative for arthralgias and joint swelling.   Skin:  Positive for pallor.  Negative for rash.   Allergic/Immunologic: Negative for environmental allergies.   Neurological:  Positive for weakness. Negative for dizziness and syncope.   Hematological:  Does not bruise/bleed easily.   Psychiatric/Behavioral:  Negative for dysphoric mood. The patient is not nervous/anxious.      Objective:     Vital Signs (Most Recent):  Temp: 98.7 °F (37.1 °C) (04/09/24 1158)  Pulse: (!) 123 (04/09/24 1158)  Resp: 18 (04/09/24 1158)  BP: (!) 115/58 (04/09/24 1158)  SpO2: (!) 93 % (04/09/24 1158) Vital Signs (24h Range):  Temp:  [98 °F (36.7 °C)-98.7 °F (37.1 °C)] 98.7 °F (37.1 °C)  Pulse:  [] 123  Resp:  [16-22] 18  SpO2:  [93 %-100 %] 93 %  BP: (100-146)/(54-73) 115/58     Weight: 82.9 kg (182 lb 12.8 oz)  Body mass index is 26.23 kg/m².     Physical Exam  Vitals and nursing note reviewed.   Constitutional:       Appearance: Normal appearance. He is ill-appearing.   HENT:      Head: Normocephalic and atraumatic.      Nose: Nose normal.   Eyes:      Extraocular Movements: Extraocular movements intact.      Pupils: Pupils are equal, round, and reactive to light.   Cardiovascular:      Rate and Rhythm: Normal rate and regular rhythm.      Heart sounds: Murmur heard.      No friction rub. No gallop.   Pulmonary:      Effort: Pulmonary effort is normal.      Breath sounds: Rales present.   Abdominal:      General: Abdomen is flat. Bowel sounds are normal.      Palpations: Abdomen is soft.   Musculoskeletal:         General: No swelling or deformity.      Cervical back: Normal range of motion and neck supple.   Skin:     General: Skin is warm and dry.      Capillary Refill: Capillary refill takes less than 2 seconds.      Coloration: Skin is pale.   Neurological:      General: No focal deficit present.      Mental Status: He is alert and oriented to person, place, and time.      Motor: Weakness present.   Psychiatric:         Mood and Affect: Mood normal.         Behavior: Behavior normal.               CRANIAL NERVES     CN III, IV, VI   Pupils are equal, round, and reactive to light.       Significant Labs: All pertinent labs within the past 24 hours have been reviewed.    Significant Imaging: I have reviewed all pertinent imaging results/findings within the past 24 hours.

## 2024-04-09 NOTE — H&P
[facility] Medicine  History & Physical    Patient Name: Hunter Navarrete  MRN: [id number]  Patient Class: IP- Inpatient  Admission Date: 4/8/2024  Attending Physician: Shawn Purdy Jr., MD   Primary Care Provider: Shawn Purdy Jr., MD         Patient information was obtained from ER records.     Subjective:     Principal Problem:Symptomatic anemia    Chief Complaint:   Chief Complaint   Patient presents with    Nausea    Diarrhea    Vomiting    Weakness     Pt here per [facility] with N/V/D and weakness since this am. Pt vomited x 1. Diarrhea x 1. Pt received 500mg IV bolus and 4mg Zofran PtA        HPI:   Chief Complaint   Patient presents with    Nausea    Diarrhea    Vomiting    Weakness       Pt here per [facility] with N/V/D and weakness since this am. Pt vomited x 1. Diarrhea x 1. Pt received 500mg IV bolus and 4mg Zofran PtA      This is an 83-year-old white male with multiple medical problems including anemia requiring transfusion, CAD status post CABG, chronic kidney disease, Parkinson's, and UTI who presents to the emergency department via EMS with complaints of generalized weakness that began this morning.  Patient reports that he was in his regular state of health, and shortly after eating breakfast he developed nausea with 1 episode of vomiting, diarrhea, and brief episode of anterior chest pain with shortness of breath.  He also had transient episode of mid abdominal aching pain.  Since that time has been experiencing weakness and lightheadedness.  Family members report that his blood pressure has been fluctuating over the last several days, experiencing many lows, and he has been taking his blood pressure medications as directed.  Patient denies any pain at the time of evaluation and has had resolution nausea after receiving Zofran in route to the hospital.  He further denies headache, URI signs and symptoms, dysuria, or any other relative symptoms at this time.       Past  Medical History:   Diagnosis Date    Acid reflux     Anemia     Anticoagulant long-term use     Anxiety disorder, unspecified     Aortic aneurysm, abdominal     Arthritis     Bronchitis     Carotid stenosis 05/13/2022    60-79% Right ICA 60-70% Left ICA    Cataract     Celiac artery stenosis     50% by CTA abdomen 7/27/2020    CKD (chronic kidney disease)     45% FUNCTION    Coronary artery disease     stents x4    Encounter for blood transfusion     Enlarged prostate     GERD (gastroesophageal reflux disease)     Heart attack     Hemorrhoids     Hypercholesteremia     Hypertension     Iron deficiency anemia, unspecified     Leaky heart valve     Lupus anticoagulant disorder     Mood disorder     Parkinson's disease     Renal artery stenosis     by CTA 7/27/2020    Restless leg syndrome     Skin cancer     Skin tear of elbow without complication     above elbow    Unspecified chronic bronchitis     UTI (urinary tract infection)        Past Surgical History:   Procedure Laterality Date    ACF      Anterior Cervical Fusion    BACK SURGERY      RODS IN BACK; 4 SURGIERIES    BIOPSY OF URETER Right 06/22/2023    Procedure: RESECTION/BIOPSY, URETER;  Surgeon: Aaron Pinon MD;  Location: [facility] OR;  Service: Urology;  Laterality: Right;    BONE MARROW BIOPSY      CARDIAC ANGIOGRAM WITH STENT      CATARACT SX Bilateral     CHOLECYSTECTOMY      CORONARY ANGIOGRAPHY N/A 02/15/2023    Procedure: ANGIOGRAM, CORONARY ARTERY;  Surgeon: Rito Vega MD;  Location: [facility] CATH;  Service: Cardiology;  Laterality: N/A;    CORONARY ARTERY BYPASS GRAFT (CABG) N/A 02/24/2023    Procedure: CORONARY ARTERY BYPASS GRAFT (CABG);  Surgeon: Spencer Hagan MD;  Location: [facility] OR;  Service: Cardiothoracic;  Laterality: N/A;  CABG / EVH //   ECHO NOTIFIED    CYSTOSCOPY W/ RETROGRADES Bilateral 06/20/2023    Procedure: CYSTOSCOPY WITH RETROGRADE PYELOGRAM;  Surgeon: Aaron Pinon MD;  Location: [facility] OR;  Service: Urology;  Laterality:  Bilateral;  Pt has a Spinal Cord Stimulator  9am per [facility], To follow RM4    CYSTOSCOPY W/ RETROGRADES Right 06/22/2023    Procedure: CYSTOSCOPY, WITH RETROGRADE PYELOGRAM;  Surgeon: Aaron Pinon MD;  Location: [facility] OR;  Service: Urology;  Laterality: Right;    CYSTOSCOPY W/ RETROGRADES Bilateral 8/24/2023    Procedure: CYSTOSCOPY, WITH RETROGRADE PYELOGRAM;  Surgeon: Aaron Pinon MD;  Location: [facility] OR;  Service: Urology;  Laterality: Bilateral;    CYSTOSCOPY W/ RETROGRADES Bilateral 12/4/2023    Procedure: CYSTOSCOPY WITH RETROGRADE PYELOGRAM;  Surgeon: Aaron Pinon MD;  Location: [facility] OR;  Service: Urology;  Laterality: Bilateral;  pt has spinal cord stimulator    DIGITAL RECTAL EXAMINATION UNDER ANESTHESIA N/A 12/4/2023    Procedure: EXAM UNDER ANESTHESIA, DIGITAL, RECTUM;  Surgeon: Aaron Pinon MD;  Location: [facility] OR;  Service: Urology;  Laterality: N/A;    HIP SURGERY Right     THR    KNEE SURGERY Right     ATS    LEFT HEART CATHETERIZATION Left 07/22/2021    Procedure: Left heart cath;  Surgeon: Curtis Loredo MD;  Location: [facility] CATH;  Service: Cardiology;  Laterality: Left;    LEFT HEART CATHETERIZATION Left 09/22/2022    Procedure: Left heart cath;  Surgeon: Giorgi Barker MD;  Location: [facility] CATH;  Service: Cardiology;  Laterality: Left;    REMOVAL, STENT, URETER Right 8/24/2023    Procedure: REMOVAL, STENT, URETER;  Surgeon: Aaron Pinon MD;  Location: [facility] OR;  Service: Urology;  Laterality: Right;    ROTATOR CUFF REPAIR Bilateral     SHOULDER SURGERY Bilateral     SPINAL CORD STIMULATOR IMPLANT      STENT, URETERAL Left 06/22/2023    Procedure: STENT, URETERAL;  Surgeon: Aaron Pinon MD;  Location: [facility] OR;  Service: Urology;  Laterality: Left;    steroid injections Right 06/13/2023    Knee    ULNAR NERVE TRANSPOSITION      URETEROSCOPY Right 06/22/2023    Procedure: URETEROSCOPY;  Surgeon: Aaron Pinon MD;  Location: [facility] OR;  Service: Urology;  Laterality: Right;        Review of patient's allergies indicates:   Allergen Reactions    Cardura [doxazosin] Hives    Lyrica [pregabalin] Other (See Comments)    Paxil [paroxetine hcl] Other (See Comments)    Restoril [temazepam] Hallucinations    Vioxx [rofecoxib] Swelling    Zithromax [azithromycin] Hives    Alpha 1 blocker- quinazolines     Benzodiazepines        No current facility-administered medications on file prior to encounter.     Current Outpatient Medications on File Prior to Encounter   Medication Sig    azelastine (ASTELIN) 137 mcg (0.1 %) nasal spray 1 spray (137 mcg total) by Nasal route 2 (two) times daily.    calcium carbonate-vitamin D3 500 mg-10 mcg (400 unit) Tab Take 1 tablet by mouth once daily.    carbidopa-levodopa (PARCOPA)  mg per disintegrating tablet Take 1 tablet by mouth 3 (three) times daily.    carvediloL (COREG) 3.125 MG tablet Take 1 tablet (3.125 mg total) by mouth 2 (two) times daily with meals.    clindamycin phosphate 1% (CLINDAGEL) 1 % gel Apply topically 2 (two) times daily.    clopidogreL (PLAVIX) 75 mg tablet Take 75 mg by mouth once daily.    dextran 70/hypromellose (ARTIFICIAL TEARS, PF, OPHT) Apply 1 drop to eye 3 (three) times daily.    docusate sodium (COLACE) 100 MG capsule Take 100 mg by mouth 2 (two) times daily.    DULoxetine (CYMBALTA) 30 MG capsule Take 1 capsule (30 mg total) by mouth once daily.    ezetimibe (ZETIA) 10 mg tablet Take 1 tablet (10 mg total) by mouth once daily.    fluticasone propionate (FLONASE) 50 mcg/actuation nasal spray 1 spray by Each Nostril route 2 (two) times a day.    furosemide (LASIX) 20 MG tablet Take 20 mg by mouth once daily. Prescribed by Dr. Loredo    gabapentin (NEURONTIN) 100 MG capsule Take 500 mg by mouth. 2C QAM, 2C QPM, 1C QHS    HYDROcodone-acetaminophen (NORCO) 7.5-325 mg per tablet Prescribed by Dr. Hernandez    hydroxychloroquine (PLAQUENIL) 200 mg tablet Take 200 mg by mouth once daily.    lanolin/mineral oil/petrolatum (ARTIFICIAL  TEARS OPHT) Apply to eye.    niacin 1,000 mg TbSR Take 1 tablet by mouth Daily.    pantoprazole (PROTONIX) 40 MG tablet Take 1 tablet (40 mg total) by mouth once daily.    polyvinyl alcohol, artificial tears, (LIQUIFILM TEARS) 1.4 % ophthalmic solution Place 1 drop into both eyes as needed.    predniSONE (DELTASONE) 5 MG tablet Take 2.5 mg by mouth once daily.    QUEtiapine (SEROQUEL) 50 MG tablet Take 50 mg by mouth every evening.    rOPINIRole (REQUIP) 1 MG tablet Take 1 tablet (1 mg total) by mouth 2 (two) times daily.    rosuvastatin (CRESTOR) 40 MG Tab Take 1 tablet (40 mg total) by mouth every evening.    tamsulosin (FLOMAX) 0.4 mg Cap Take 1 capsule (0.4 mg total) by mouth every evening.     Family History       Problem Relation (Age of Onset)    Cancer Brother, Brother    Heart disease Father, Sister, Brother, Brother, Brother    Lung cancer Brother    Throat cancer Brother          Tobacco Use    Smoking status: Former     Current packs/day: 0.00     Types: Cigarettes     Quit date:      Years since quittin.2    Smokeless tobacco: Never   Substance and Sexual Activity    Alcohol use: Yes     Alcohol/week: 2.0 standard drinks of alcohol     Types: 1 Glasses of wine, 1 Cans of beer per week     Comment: RARELY    Drug use: No    Sexual activity: Not on file     Review of Systems   Constitutional:  Positive for fatigue. Negative for chills and fever.   HENT:  Negative for rhinorrhea, sneezing and sore throat.    Eyes:  Negative for pain and visual disturbance.   Respiratory:  Positive for shortness of breath. Negative for cough.    Cardiovascular:  Positive for chest pain.   Gastrointestinal:  Positive for abdominal pain, diarrhea, nausea and vomiting. Negative for constipation.   Endocrine: Negative for cold intolerance and heat intolerance.   Genitourinary:  Negative for difficulty urinating.   Musculoskeletal:  Negative for arthralgias and joint swelling.   Skin:  Positive for pallor. Negative for  rash.   Allergic/Immunologic: Negative for environmental allergies.   Neurological:  Positive for weakness. Negative for dizziness and syncope.   Hematological:  Does not bruise/bleed easily.   Psychiatric/Behavioral:  Negative for dysphoric mood. The patient is not nervous/anxious.      Objective:     Vital Signs (Most Recent):  Temp: 98.7 °F (37.1 °C) (04/09/24 1158)  Pulse: (!) 123 (04/09/24 1158)  Resp: 18 (04/09/24 1158)  BP: (!) 115/58 (04/09/24 1158)  SpO2: (!) 93 % (04/09/24 1158) Vital Signs (24h Range):  Temp:  [98 °F (36.7 °C)-98.7 °F (37.1 °C)] 98.7 °F (37.1 °C)  Pulse:  [] 123  Resp:  [16-22] 18  SpO2:  [93 %-100 %] 93 %  BP: (100-146)/(54-73) 115/58     Weight: 82.9 kg (182 lb 12.8 oz)  Body mass index is 26.23 kg/m².     Physical Exam  Vitals and nursing note reviewed.   Constitutional:       Appearance: Normal appearance. He is ill-appearing.   HENT:      Head: Normocephalic and atraumatic.      Nose: Nose normal.   Eyes:      Extraocular Movements: Extraocular movements intact.      Pupils: Pupils are equal, round, and reactive to light.   Cardiovascular:      Rate and Rhythm: Normal rate and regular rhythm.      Heart sounds: Murmur heard.      No friction rub. No gallop.   Pulmonary:      Effort: Pulmonary effort is normal.      Breath sounds: Rales present.   Abdominal:      General: Abdomen is flat. Bowel sounds are normal.      Palpations: Abdomen is soft.   Musculoskeletal:         General: No swelling or deformity.      Cervical back: Normal range of motion and neck supple.   Skin:     General: Skin is warm and dry.      Capillary Refill: Capillary refill takes less than 2 seconds.      Coloration: Skin is pale.   Neurological:      General: No focal deficit present.      Mental Status: He is alert and oriented to person, place, and time.      Motor: Weakness present.   Psychiatric:         Mood and Affect: Mood normal.         Behavior: Behavior normal.              CRANIAL NERVES      CN III, IV, VI   Pupils are equal, round, and reactive to light.       Significant Labs: All pertinent labs within the past 24 hours have been reviewed.    Significant Imaging: I have reviewed all pertinent imaging results/findings within the past 24 hours.  Assessment/Plan:     * Symptomatic anemia  Patient's anemia is currently controlled. Has received 3 units of PRBCs on 4/9/24 . Etiology likely d/t chronic blood loss  Current CBC reviewed-   Lab Results   Component Value Date    HGB 11.2 (L) 04/09/2024    HCT 34.6 (L) 04/09/2024     Monitor serial CBC and transfuse if patient becomes hemodynamically unstable, symptomatic or H/H drops below 7/21.    Recent Labs   Lab 02/01/24  0846 04/08/24  1105 04/09/24  0530   Hemoglobin 9.7 L 7.8 L 11.2 L         Elevated troponin  Likely from demand mismatch in the setting of anemia.  Continue to monitor.  Appreciate cardiology input      Wide QRS ventricular tachycardia  Appreciate cardiology input.  Suspect secondary to anemia.  Troponin mildly elevated.  Patient asymptomatic.  Likely anemia induced.      GERD (gastroesophageal reflux disease)  PPI while hospitalized.      Hypertension  Chronic, controlled. Latest blood pressure and vitals reviewed-     Temp:  [98 °F (36.7 °C)-98.7 °F (37.1 °C)]   Pulse:  []   Resp:  [16-22]   BP: (100-146)/(54-73)   SpO2:  [93 %-100 %] .   Home meds for hypertension were reviewed and noted below.   Hypertension Medications               carvediloL (COREG) 3.125 MG tablet Take 1 tablet (3.125 mg total) by mouth 2 (two) times daily with meals.    furosemide (LASIX) 20 MG tablet Take 20 mg by mouth once daily. Prescribed by Dr. Loredo            While in the hospital, will manage blood pressure as follows; Continue home antihypertensive regimen    Will utilize p.r.n. blood pressure medication only if patient's blood pressure greater than 180/110 and he develops symptoms such as worsening chest pain or shortness of breath.    Stage 3  chronic kidney disease  Creatine stable for now. BMP reviewed- noted Estimated Creatinine Clearance: 32.7 mL/min (A) (based on SCr of 1.8 mg/dL (H)). according to latest data. Based on current GFR, CKD stage is stage 3 - GFR 30-59.  Monitor UOP and serial BMP and adjust therapy as needed. Renally dose meds. Avoid nephrotoxic medications and procedures.    Lab Results   Component Value Date    CREATININE 1.8 (H) 04/09/2024    CREATININE 2.1 (H) 04/08/2024    CREATININE 1.8 (H) 02/01/2024          Coronary artery disease  Patient is status post CABG.  Denies chest pain currently.  Continue to monitor.    Lupus anticoagulant inhibitor syndrome  Hold anticoagulants setting of symptomatic anemia        VTE Risk Mitigation (From admission, onward)           Ordered     IP VTE HIGH RISK PATIENT  Once         04/08/24 1410     Place sequential compression device  Until discontinued         04/08/24 1410     Place ASHOK hose  Until discontinued         04/08/24 1410                                    Shawn Purdy Jr, MD  Department of Hospital Medicine  [facility]

## 2024-04-09 NOTE — NURSING
Pt cardiac rhythm currently SVT at 124bpm. Having runs of V-tach w/ ST elevation. Pt states he feels fine and not experiencing and feelings of palpitations or racing heart. Pt sitting in bed and appears to be in no acute distress at this time. Charge nurse Maritza notified. And Dr. Purdy and Dr. Henderson notified.

## 2024-04-10 LAB
ALBUMIN SERPL BCP-MCNC: 3 G/DL (ref 3.5–5.2)
ALP SERPL-CCNC: 64 U/L (ref 55–135)
ALT SERPL W/O P-5'-P-CCNC: 7 U/L (ref 10–44)
ANION GAP SERPL CALC-SCNC: 5 MMOL/L (ref 3–11)
AST SERPL-CCNC: 17 U/L (ref 10–40)
BASOPHILS # BLD AUTO: 0.01 K/UL (ref 0–0.2)
BASOPHILS NFR BLD: 0.2 % (ref 0–1.9)
BILIRUB SERPL-MCNC: 0.9 MG/DL (ref 0.1–1)
BUN SERPL-MCNC: 34 MG/DL (ref 8–23)
CALCIUM SERPL-MCNC: 9.2 MG/DL (ref 8.7–10.5)
CHLORIDE SERPL-SCNC: 110 MMOL/L (ref 95–110)
CO2 SERPL-SCNC: 24 MMOL/L (ref 23–29)
CREAT SERPL-MCNC: 1.8 MG/DL (ref 0.5–1.4)
DIFFERENTIAL METHOD BLD: ABNORMAL
EOSINOPHIL # BLD AUTO: 0 K/UL (ref 0–0.5)
EOSINOPHIL NFR BLD: 0 % (ref 0–8)
ERYTHROCYTE [DISTWIDTH] IN BLOOD BY AUTOMATED COUNT: 17.7 % (ref 11.5–14.5)
EST. GFR  (NO RACE VARIABLE): 37.1 ML/MIN/1.73 M^2
GLUCOSE SERPL-MCNC: 90 MG/DL (ref 70–110)
HCT VFR BLD AUTO: 33.2 % (ref 40–54)
HGB BLD-MCNC: 10.9 G/DL (ref 14–18)
IMM GRANULOCYTES # BLD AUTO: 0.17 K/UL (ref 0–0.04)
IMM GRANULOCYTES NFR BLD AUTO: 3.2 % (ref 0–0.5)
LYMPHOCYTES # BLD AUTO: 1.2 K/UL (ref 1–4.8)
LYMPHOCYTES NFR BLD: 22.6 % (ref 18–48)
MAGNESIUM SERPL-MCNC: 2.3 MG/DL (ref 1.6–2.6)
MCH RBC QN AUTO: 29.8 PG (ref 27–31)
MCHC RBC AUTO-ENTMCNC: 32.8 G/DL (ref 32–36)
MCV RBC AUTO: 91 FL (ref 82–98)
MONOCYTES # BLD AUTO: 0.8 K/UL (ref 0.3–1)
MONOCYTES NFR BLD: 15.3 % (ref 4–15)
NEUTROPHILS # BLD AUTO: 3.1 K/UL (ref 1.8–7.7)
NEUTROPHILS NFR BLD: 58.7 % (ref 38–73)
NRBC BLD-RTO: 0 /100 WBC
PLATELET # BLD AUTO: 105 K/UL (ref 150–450)
PMV BLD AUTO: 11.4 FL (ref 9.2–12.9)
POCT GLUCOSE: 109 MG/DL (ref 70–110)
POTASSIUM SERPL-SCNC: 3.9 MMOL/L (ref 3.5–5.1)
PROT SERPL-MCNC: 6.9 G/DL (ref 6–8.4)
RBC # BLD AUTO: 3.66 M/UL (ref 4.6–6.2)
SODIUM SERPL-SCNC: 139 MMOL/L (ref 136–145)
WBC # BLD AUTO: 5.31 K/UL (ref 3.9–12.7)

## 2024-04-10 PROCEDURE — 97166 OT EVAL MOD COMPLEX 45 MIN: CPT

## 2024-04-10 PROCEDURE — 27100171 HC OXYGEN HIGH FLOW UP TO 24 HOURS

## 2024-04-10 PROCEDURE — 99900031 HC PATIENT EDUCATION (STAT)

## 2024-04-10 PROCEDURE — 80053 COMPREHEN METABOLIC PANEL: CPT | Performed by: INTERNAL MEDICINE

## 2024-04-10 PROCEDURE — 21400001 HC TELEMETRY ROOM

## 2024-04-10 PROCEDURE — 27000190 HC CPAP FULL FACE MASK W/VALVE

## 2024-04-10 PROCEDURE — 25000003 PHARM REV CODE 250

## 2024-04-10 PROCEDURE — 85025 COMPLETE CBC W/AUTO DIFF WBC: CPT | Performed by: INTERNAL MEDICINE

## 2024-04-10 PROCEDURE — 99900035 HC TECH TIME PER 15 MIN (STAT)

## 2024-04-10 PROCEDURE — 27000221 HC OXYGEN, UP TO 24 HOURS

## 2024-04-10 PROCEDURE — 25000003 PHARM REV CODE 250: Performed by: INTERNAL MEDICINE

## 2024-04-10 PROCEDURE — 97161 PT EVAL LOW COMPLEX 20 MIN: CPT

## 2024-04-10 PROCEDURE — 94660 CPAP INITIATION&MGMT: CPT

## 2024-04-10 PROCEDURE — 63600175 PHARM REV CODE 636 W HCPCS: Performed by: INTERNAL MEDICINE

## 2024-04-10 PROCEDURE — 83735 ASSAY OF MAGNESIUM: CPT | Performed by: INTERNAL MEDICINE

## 2024-04-10 PROCEDURE — 94761 N-INVAS EAR/PLS OXIMETRY MLT: CPT

## 2024-04-10 PROCEDURE — 36415 COLL VENOUS BLD VENIPUNCTURE: CPT | Performed by: INTERNAL MEDICINE

## 2024-04-10 RX ORDER — FUROSEMIDE 10 MG/ML
40 INJECTION INTRAMUSCULAR; INTRAVENOUS ONCE
Status: COMPLETED | OUTPATIENT
Start: 2024-04-10 | End: 2024-04-10

## 2024-04-10 RX ADMIN — EZETIMIBE 10 MG: 10 TABLET ORAL at 08:04

## 2024-04-10 RX ADMIN — ACETAMINOPHEN 650 MG: 325 TABLET ORAL at 07:04

## 2024-04-10 RX ADMIN — QUETIAPINE FUMARATE 50 MG: 50 TABLET ORAL at 10:04

## 2024-04-10 RX ADMIN — TAMSULOSIN HYDROCHLORIDE 0.4 MG: 0.4 CAPSULE ORAL at 10:04

## 2024-04-10 RX ADMIN — CARBIDOPA AND LEVODOPA 1 TABLET: 25; 100 TABLET ORAL at 10:04

## 2024-04-10 RX ADMIN — AMIODARONE HYDROCHLORIDE 150 MG: 1.5 INJECTION, SOLUTION INTRAVENOUS at 09:04

## 2024-04-10 RX ADMIN — FAMOTIDINE 20 MG: 20 TABLET, FILM COATED ORAL at 08:04

## 2024-04-10 RX ADMIN — CARBIDOPA AND LEVODOPA 1 TABLET: 25; 100 TABLET ORAL at 08:04

## 2024-04-10 RX ADMIN — PANTOPRAZOLE SODIUM 40 MG: 40 TABLET, DELAYED RELEASE ORAL at 08:04

## 2024-04-10 RX ADMIN — CARVEDILOL 6.25 MG: 3.12 TABLET, FILM COATED ORAL at 07:04

## 2024-04-10 RX ADMIN — CARVEDILOL 6.25 MG: 3.12 TABLET, FILM COATED ORAL at 04:04

## 2024-04-10 RX ADMIN — ATORVASTATIN CALCIUM 40 MG: 40 TABLET, FILM COATED ORAL at 08:04

## 2024-04-10 RX ADMIN — FUROSEMIDE 40 MG: 10 INJECTION, SOLUTION INTRAVENOUS at 09:04

## 2024-04-10 RX ADMIN — AMIODARONE HYDROCHLORIDE 1 MG/MIN: 1.8 INJECTION, SOLUTION INTRAVENOUS at 09:04

## 2024-04-10 RX ADMIN — CARBIDOPA AND LEVODOPA 1 TABLET: 25; 100 TABLET ORAL at 04:04

## 2024-04-10 NOTE — PT/OT/SLP EVAL
Occupational Therapy   Evaluation    Name: Hunter Navarrete  MRN: [id number]  Admitting Diagnosis: Symptomatic anemia  Recent Surgery: * No surgery found *      Recommendations:     Discharge Recommendations: Low Intensity Therapy  Discharge Equipment Recommendations:  none  Barriers to discharge:  Other (Comment) (Medical status)    Assessment:     Hunter Navarrete is a 83 y.o. male with a medical diagnosis of Symptomatic anemia.  He presents with functional deficits impacting independence with ADL's including functional mobility. Performance deficits affecting function: weakness, impaired endurance, impaired self care skills, impaired functional mobility, impaired balance, decreased coordination, decreased upper extremity function, decreased lower extremity function, decreased safety awareness, pain, decreased ROM, impaired cardiopulmonary response to activity, impaired joint extensibility, impaired muscle length.      Rehab Prognosis: Good; patient would benefit from acute skilled OT services to address these deficits and reach maximum level of function.       Plan:     Patient to be seen 5 x/week to address the above listed problems via self-care/home management, therapeutic activities, therapeutic exercises  Plan of Care Expires: 04/17/24  Plan of Care Reviewed with: patient    Subjective     Chief Complaint: SOB, weakness, balance (but reports no falls), and lower back pain  Patient/Family Comments/goals: Pt would like to increase his endurance, strength, and balance.     Occupational Profile:  Living Environment: Pt lives alone in a SSH without steps as per patient report.   Previous level of function: Modified Independent  Roles and Routines: ADL's  Equipment Used at Home: grab bar, shower chair (Also, patient has RW, Rollator, and Wheelchair but does not utilize.)  Assistance upon Discharge: Pt has a niece who checks on him daily.    Pain/Comfort:  Pain Rating 1: 3/10  Location - Side 1: Bilateral  Location -  Orientation 1: lower  Location 1: back  Pain Addressed 1: Reposition, Distraction, Cessation of Activity, Nurse notified  Pain Rating Post-Intervention 1: 0/10    Patients cultural, spiritual, Mosque conflicts given the current situation: no    Objective:     Communicated with: nurse prior to session.  Patient found HOB elevated with peripheral IV upon OT entry to room.    General Precautions: Standard, fall  Orthopedic Precautions: N/A  Braces: N/A  Respiratory Status: Room air    Occupational Performance:    Bed Mobility:    Patient completed Rolling/Turning to Left with  supervision  Patient completed Rolling/Turning to Right with supervision  Patient completed Scooting/Bridging with supervision  Patient completed Supine to Sit with supervision  Patient completed Sit to Supine with supervision    Functional Mobility/Transfers:  Patient completed Sit <> Stand Transfer with stand by assistance  with  no assistive device   Patient completed Bed <> Chair Transfer using Step Transfer technique with contact guard assistance with no assistive device  Patient completed Toilet Transfer Step Transfer technique with contact guard assistance with  grab bars  Functional Mobility: Pt ambulated 169' requiring CGA without use of AD.     Activities of Daily Living:  Feeding:  modified independence    Grooming: setup assistance    Bathing: contact guard assistance    Upper Body Dressing: supervision    Lower Body Dressing: contact guard assistance    Toileting: contact guard assistance      Cognitive/Visual Perceptual:  Cognitive/Psychosocial Skills:  -       Oriented to: Person, Place, Time, and Situation   -       Follows Commands/attention:Follows multistep  commands  -       Communication: anomia  -       Memory: Impaired STM  -       Safety awareness/insight to disability: impaired   -       Mood/Affect/Coping skills/emotional control: Cooperative    Physical Exam:  Postural examination/scapula alignment: -       Rounded  shoulders  -       Forward head  Sensation: -       Intact  bilateral UE's  Dominant hand: -       Right  Upper Extremity Range of Motion:  -       Right Upper Extremity: WFL  -       Left Upper Extremity: WFL  Upper Extremity Strength: -       Right Upper Extremity: 4- to 4/5  -       Left Upper Extremity: 4- to 4/5  Fine Motor Coordination: -       Intact  Left hand, manipulation of objects and Right hand, manipulation of objects  Gross motor coordination: Impaired bilateral hand-eye coordination    AMPAC 6 Click ADL:  AMPAC Total Score: 19    Treatment & Education:  Pt was provided education / instruction regarding role of OT and established OT POC.     Patient left HOB elevated with all lines intact, call button in reach, and nurse notified    GOALS:   Goals to be met by: 04/17/24     Patient will increase functional independence with ADLs by performing:    UE Dressing with Modified [place].  LE Dressing with Modified [place].  Grooming while standing at sink with Modified [place].  Toileting from toilet with Modified [place] for hygiene and clothing management.   Bathing from  shower chair/bench with Modified [place].  Toilet transfer to toilet with Modified [place].      History:     Past Medical History:   Diagnosis Date    Acid reflux     Anemia     Anticoagulant long-term use     Anxiety disorder, unspecified     Aortic aneurysm, abdominal     Arthritis     Bronchitis     Carotid stenosis 05/13/2022    60-79% Right ICA 60-70% Left ICA    Cataract     Celiac artery stenosis     50% by CTA abdomen 7/27/2020    CKD (chronic kidney disease)     45% FUNCTION    Coronary artery disease     stents x4    Encounter for blood transfusion     Enlarged prostate     GERD (gastroesophageal reflux disease)     Heart attack     Hemorrhoids     Hypercholesteremia     Hypertension     Iron deficiency anemia, unspecified     Leaky heart valve     Lupus anticoagulant disorder     Mood disorder      Parkinson's disease     Renal artery stenosis     by CTA 7/27/2020    Restless leg syndrome     Skin cancer     Skin tear of elbow without complication     above elbow    Unspecified chronic bronchitis     UTI (urinary tract infection)          Past Surgical History:   Procedure Laterality Date    ACF      Anterior Cervical Fusion    BACK SURGERY      RODS IN BACK; 4 SURGIERIES    BIOPSY OF URETER Right 06/22/2023    Procedure: RESECTION/BIOPSY, URETER;  Surgeon: Aaron Pinon MD;  Location: [facility] OR;  Service: Urology;  Laterality: Right;    BONE MARROW BIOPSY      CARDIAC ANGIOGRAM WITH STENT      CATARACT SX Bilateral     CHOLECYSTECTOMY      CORONARY ANGIOGRAPHY N/A 02/15/2023    Procedure: ANGIOGRAM, CORONARY ARTERY;  Surgeon: Rito Vega MD;  Location: [facility] CATH;  Service: Cardiology;  Laterality: N/A;    CORONARY ARTERY BYPASS GRAFT (CABG) N/A 02/24/2023    Procedure: CORONARY ARTERY BYPASS GRAFT (CABG);  Surgeon: Spencer Hagan MD;  Location: [facility] OR;  Service: Cardiothoracic;  Laterality: N/A;  CABG / EVH //   ECHO NOTIFIED    CYSTOSCOPY W/ RETROGRADES Bilateral 06/20/2023    Procedure: CYSTOSCOPY WITH RETROGRADE PYELOGRAM;  Surgeon: Aaron Pinon MD;  Location: [facility] OR;  Service: Urology;  Laterality: Bilateral;  Pt has a Spinal Cord Stimulator  9am per [place], To follow RM4    CYSTOSCOPY W/ RETROGRADES Right 06/22/2023    Procedure: CYSTOSCOPY, WITH RETROGRADE PYELOGRAM;  Surgeon: Aaron Pinon MD;  Location: [facility] OR;  Service: Urology;  Laterality: Right;    CYSTOSCOPY W/ RETROGRADES Bilateral 8/24/2023    Procedure: CYSTOSCOPY, WITH RETROGRADE PYELOGRAM;  Surgeon: Aaron Pinon MD;  Location: [facility] OR;  Service: Urology;  Laterality: Bilateral;    CYSTOSCOPY W/ RETROGRADES Bilateral 12/4/2023    Procedure: CYSTOSCOPY WITH RETROGRADE PYELOGRAM;  Surgeon: Aaron Pinon MD;  Location: [facility] OR;  Service: Urology;  Laterality: Bilateral;  pt has spinal cord stimulator    DIGITAL RECTAL  EXAMINATION UNDER ANESTHESIA N/A 12/4/2023    Procedure: EXAM UNDER ANESTHESIA, DIGITAL, RECTUM;  Surgeon: Aaron Pinon MD;  Location: [facility] OR;  Service: Urology;  Laterality: N/A;    HIP SURGERY Right     THR    KNEE SURGERY Right     ATS    LEFT HEART CATHETERIZATION Left 07/22/2021    Procedure: Left heart cath;  Surgeon: Curtis Loredo MD;  Location: [facility] CATH;  Service: Cardiology;  Laterality: Left;    LEFT HEART CATHETERIZATION Left 09/22/2022    Procedure: Left heart cath;  Surgeon: Giorgi Barker MD;  Location: [facility] CATH;  Service: Cardiology;  Laterality: Left;    REMOVAL, STENT, URETER Right 8/24/2023    Procedure: REMOVAL, STENT, URETER;  Surgeon: Aaron Pinon MD;  Location: [facility] OR;  Service: Urology;  Laterality: Right;    ROTATOR CUFF REPAIR Bilateral     SHOULDER SURGERY Bilateral     SPINAL CORD STIMULATOR IMPLANT      STENT, URETERAL Left 06/22/2023    Procedure: STENT, URETERAL;  Surgeon: Aaron Pinon MD;  Location: [facility] OR;  Service: Urology;  Laterality: Left;    steroid injections Right 06/13/2023    Knee    ULNAR NERVE TRANSPOSITION      URETEROSCOPY Right 06/22/2023    Procedure: URETEROSCOPY;  Surgeon: Aaron Pinon MD;  Location: [facility];  Service: Urology;  Laterality: Right;       Time Tracking:     OT Date of Treatment: 04/10/24  OT Start Time: 1343  OT Stop Time: 1415  OT Total Time (min): 32 min    Billable Minutes:Evaluation 32    4/10/2024

## 2024-04-10 NOTE — PROGRESS NOTES
[facility]  Cardiology  Progress Note    Patient Name: Hunter Navarrete  MRN: [id number]  Admission Date: 4/8/2024  Hospital Length of Stay: 2 days  Code Status: Full Code   Attending Provider: Shawn Purdy Jr., MD   Primary Care Physician: Shawn Purdy Jr., MD  Principal Problem:Symptomatic anemia    Patient information was obtained from patient, past medical records, and ER records.     Subjective:     Chief Complaint:  anemia, wide QRS on EKG     HPI:   This is an 84 yo male with PMHx CAD s/p CABG, Parkinson's, and HTN who presented to the emergency department with c/o weakness, nausea, vomiting, and overall fatigue onset x 1 day. On arrival to ED, he was found to be significantly anemic with H/H 7.8/24.7. Cardiology consulted due to wide complex QRS on 12-lead EKG. He denies any chest pain, shortness of breath, or dyspnea at this time. He was admitted to the floor for blood transfusion and close telemetry monitoring.     Pt is seen receiving 1 unit of pRBCs. He states he is feeling better and denies any symptoms of chest pain or shortness of breath at this time. Telemetry monitoring revealing improvement in QRS complex. We will get repeat 12-lead EKG after completion of the 3 units of pRBCs.     Interval hx:  4/9/24: Pt widened QRS improved with blood transfusion; however, later today pt's rate became tachycardic into the 130's with a wide complex. 12-lead EKG not showing any acute ST changes. His carvedilol was increased to 6.25 mg po bid. May consider addition of amiodarone if HR continues to remain elevated. Pt denies any symptoms at this time, and he is seen in his room resting comfortably in bed with no acute distress. He does appear mildly short of breath, however denies any chest pain or palpitations. His HS troponin peaked at 138.1 which has down trended to 81.4 - likely related to demand ischemia to significant anemia. H/H now stable s/p 2 units blood transfusion.     4/10/24: Overnight,  pt was moved to a room with functioning AC. He was able to cool down. His HR is now controlled with increase in carvedilol dose. Telemetry stable, no acute rhythm changes. He denies any chest pain, shortness of breath, diaphoresis. Continue to monitor with telemetry. Continue current regimen.       Past Medical History:   Diagnosis Date    Acid reflux     Anemia     Anticoagulant long-term use     Anxiety disorder, unspecified     Aortic aneurysm, abdominal     Arthritis     Bronchitis     Carotid stenosis 05/13/2022    60-79% Right ICA 60-70% Left ICA    Cataract     Celiac artery stenosis     50% by CTA abdomen 7/27/2020    CKD (chronic kidney disease)     45% FUNCTION    Coronary artery disease     stents x4    Encounter for blood transfusion     Enlarged prostate     GERD (gastroesophageal reflux disease)     Heart attack     Hemorrhoids     Hypercholesteremia     Hypertension     Iron deficiency anemia, unspecified     Leaky heart valve     Lupus anticoagulant disorder     Mood disorder     Parkinson's disease     Renal artery stenosis     by CTA 7/27/2020    Restless leg syndrome     Skin cancer     Skin tear of elbow without complication     above elbow    Unspecified chronic bronchitis     UTI (urinary tract infection)        Past Surgical History:   Procedure Laterality Date    ACF      Anterior Cervical Fusion    BACK SURGERY      RODS IN BACK; 4 SURGIERIES    BIOPSY OF URETER Right 06/22/2023    Procedure: RESECTION/BIOPSY, URETER;  Surgeon: Aaron Pinon MD;  Location: [facility] OR;  Service: Urology;  Laterality: Right;    BONE MARROW BIOPSY      CARDIAC ANGIOGRAM WITH STENT      CATARACT SX Bilateral     CHOLECYSTECTOMY      CORONARY ANGIOGRAPHY N/A 02/15/2023    Procedure: ANGIOGRAM, CORONARY ARTERY;  Surgeon: Rito Vega MD;  Location: [facility] CATH;  Service: Cardiology;  Laterality: N/A;    CORONARY ARTERY BYPASS GRAFT (CABG) N/A 02/24/2023    Procedure: CORONARY ARTERY BYPASS GRAFT (CABG);  Surgeon:  Spencer Hagan MD;  Location: [facility];  Service: Cardiothoracic;  Laterality: N/A;  CABG / EVH //   ECHO NOTIFIED    CYSTOSCOPY W/ RETROGRADES Bilateral 06/20/2023    Procedure: CYSTOSCOPY WITH RETROGRADE PYELOGRAM;  Surgeon: Aaron Pinon MD;  Location: [facility] OR;  Service: Urology;  Laterality: Bilateral;  Pt has a Spinal Cord Stimulator  9am per Revee, To follow RM4    CYSTOSCOPY W/ RETROGRADES Right 06/22/2023    Procedure: CYSTOSCOPY, WITH RETROGRADE PYELOGRAM;  Surgeon: Aaron Pinon MD;  Location: [facility] OR;  Service: Urology;  Laterality: Right;    CYSTOSCOPY W/ RETROGRADES Bilateral 8/24/2023    Procedure: CYSTOSCOPY, WITH RETROGRADE PYELOGRAM;  Surgeon: Aaron Pinon MD;  Location: [facility] OR;  Service: Urology;  Laterality: Bilateral;    CYSTOSCOPY W/ RETROGRADES Bilateral 12/4/2023    Procedure: CYSTOSCOPY WITH RETROGRADE PYELOGRAM;  Surgeon: Aaron Pinon MD;  Location: [facility];  Service: Urology;  Laterality: Bilateral;  pt has spinal cord stimulator    DIGITAL RECTAL EXAMINATION UNDER ANESTHESIA N/A 12/4/2023    Procedure: EXAM UNDER ANESTHESIA, DIGITAL, RECTUM;  Surgeon: Aaron Pinon MD;  Location: [facility];  Service: Urology;  Laterality: N/A;    HIP SURGERY Right     THR    KNEE SURGERY Right     ATS    LEFT HEART CATHETERIZATION Left 07/22/2021    Procedure: Left heart cath;  Surgeon: Curtis Loredo MD;  Location: [facility] CATH;  Service: Cardiology;  Laterality: Left;    LEFT HEART CATHETERIZATION Left 09/22/2022    Procedure: Left heart cath;  Surgeon: Giorgi Barker MD;  Location: [facility] CATH;  Service: Cardiology;  Laterality: Left;    REMOVAL, STENT, URETER Right 8/24/2023    Procedure: REMOVAL, STENT, URETER;  Surgeon: Aaron Pinon MD;  Location: [facility];  Service: Urology;  Laterality: Right;    ROTATOR CUFF REPAIR Bilateral     SHOULDER SURGERY Bilateral     SPINAL CORD STIMULATOR IMPLANT      STENT, URETERAL Left 06/22/2023    Procedure: STENT, URETERAL;  Surgeon: Aaron STEEN  MD Kathrin;  Location: [facility] OR;  Service: Urology;  Laterality: Left;    steroid injections Right 06/13/2023    Knee    ULNAR NERVE TRANSPOSITION      URETEROSCOPY Right 06/22/2023    Procedure: URETEROSCOPY;  Surgeon: Aaron Pinon MD;  Location: [facility] OR;  Service: Urology;  Laterality: Right;       Review of patient's allergies indicates:   Allergen Reactions    Cardura [doxazosin] Hives    Lyrica [pregabalin] Other (See Comments)    Paxil [paroxetine hcl] Other (See Comments)    Restoril [temazepam] Hallucinations    Vioxx [rofecoxib] Swelling    Zithromax [azithromycin] Hives    Alpha 1 blocker- quinazolines     Benzodiazepines        No current facility-administered medications on file prior to encounter.     Current Outpatient Medications on File Prior to Encounter   Medication Sig    azelastine (ASTELIN) 137 mcg (0.1 %) nasal spray 1 spray (137 mcg total) by Nasal route 2 (two) times daily.    calcium carbonate-vitamin D3 500 mg-10 mcg (400 unit) Tab Take 1 tablet by mouth once daily.    carbidopa-levodopa (PARCOPA)  mg per disintegrating tablet Take 1 tablet by mouth 3 (three) times daily.    carvediloL (COREG) 3.125 MG tablet Take 1 tablet (3.125 mg total) by mouth 2 (two) times daily with meals.    clindamycin phosphate 1% (CLINDAGEL) 1 % gel Apply topically 2 (two) times daily.    clopidogreL (PLAVIX) 75 mg tablet Take 75 mg by mouth once daily.    dextran 70/hypromellose (ARTIFICIAL TEARS, PF, OPHT) Apply 1 drop to eye 3 (three) times daily.    docusate sodium (COLACE) 100 MG capsule Take 100 mg by mouth 2 (two) times daily.    ezetimibe (ZETIA) 10 mg tablet Take 1 tablet (10 mg total) by mouth once daily.    fluticasone propionate (FLONASE) 50 mcg/actuation nasal spray 1 spray by Each Nostril route 2 (two) times a day.    furosemide (LASIX) 20 MG tablet Take 20 mg by mouth once daily. Prescribed by Dr. Loredo    gabapentin (NEURONTIN) 100 MG capsule Take 500 mg by mouth. 2C QAM, 2C QPM, 1C QHS     HYDROcodone-acetaminophen (NORCO) 7.5-325 mg per tablet Prescribed by Dr. Hernandez    hydroxychloroquine (PLAQUENIL) 200 mg tablet Take 200 mg by mouth once daily.    lanolin/mineral oil/petrolatum (ARTIFICIAL TEARS OPHT) Apply to eye.    niacin 1,000 mg TbSR Take 1 tablet by mouth Daily.    pantoprazole (PROTONIX) 40 MG tablet Take 1 tablet (40 mg total) by mouth once daily.    polyvinyl alcohol, artificial tears, (LIQUIFILM TEARS) 1.4 % ophthalmic solution Place 1 drop into both eyes as needed.    predniSONE (DELTASONE) 5 MG tablet Take 2.5 mg by mouth once daily.    QUEtiapine (SEROQUEL) 50 MG tablet Take 50 mg by mouth every evening.    rosuvastatin (CRESTOR) 40 MG Tab Take 1 tablet (40 mg total) by mouth every evening.    tamsulosin (FLOMAX) 0.4 mg Cap Take 1 capsule (0.4 mg total) by mouth every evening.    DULoxetine (CYMBALTA) 30 MG capsule Take 1 capsule (30 mg total) by mouth once daily.    rOPINIRole (REQUIP) 1 MG tablet Take 1 tablet (1 mg total) by mouth 2 (two) times daily.     Family History       Problem Relation (Age of Onset)    Cancer Brother, Brother    Heart disease Father, Sister, Brother, Brother, Brother    Lung cancer Brother    Throat cancer Brother          Tobacco Use    Smoking status: Former     Current packs/day: 0.00     Types: Cigarettes     Quit date:      Years since quittin.2    Smokeless tobacco: Never   Substance and Sexual Activity    Alcohol use: Yes     Alcohol/week: 2.0 standard drinks of alcohol     Types: 1 Glasses of wine, 1 Cans of beer per week     Comment: RARELY    Drug use: No    Sexual activity: Not on file     Review of Systems   Constitutional: Negative for malaise/fatigue.   Cardiovascular:  Negative for chest pain, dyspnea on exertion, irregular heartbeat, near-syncope (light-headed), palpitations and syncope.   Respiratory:  Positive for shortness of breath.    Gastrointestinal:  Negative for abdominal pain, hematemesis, hematochezia, melena, nausea  and vomiting.   Genitourinary:  Negative for hematuria.   Neurological:  Negative for dizziness, light-headedness and weakness.   Psychiatric/Behavioral:  Negative for altered mental status.    All other systems reviewed and are negative.    Objective:     Vital Signs (Most Recent):  Temp: 98.4 °F (36.9 °C) (04/10/24 2015)  Pulse: (!) 136 (04/10/24 2118)  Resp: (!) 28 (04/10/24 2118)  BP: (!) 210/93 (04/10/24 2015)  SpO2: (S) 100 % (04/10/24 2118) Vital Signs (24h Range):  Temp:  [97.8 °F (36.6 °C)-98.7 °F (37.1 °C)] 98.4 °F (36.9 °C)  Pulse:  [] 136  Resp:  [16-28] 28  SpO2:  [90 %-100 %] 100 %  BP: (151-210)/(70-93) 210/93     Weight: 82.9 kg (182 lb 12.8 oz)  Body mass index is 26.23 kg/m².    SpO2: (S) 100 %         Intake/Output Summary (Last 24 hours) at 4/10/2024 2152  Last data filed at 4/10/2024 1924  Gross per 24 hour   Intake 1150 ml   Output --   Net 1150 ml       Lines/Drains/Airways       Peripheral Intravenous Line  Duration                  Peripheral IV - Single Lumen 04/08/24 20 G Left Upper Arm 2 days                    Physical Exam  Vitals and nursing note reviewed.   Constitutional:       Appearance: Normal appearance. He is obese.   HENT:      Head: Normocephalic and atraumatic.      Mouth/Throat:      Mouth: Mucous membranes are moist.      Pharynx: Oropharynx is clear.   Eyes:      Extraocular Movements: Extraocular movements intact.   Cardiovascular:      Rate and Rhythm: Regular rhythm. Tachycardia present.      Pulses: Normal pulses.      Heart sounds: Normal heart sounds. No murmur heard.     No friction rub. No gallop.   Pulmonary:      Effort: Pulmonary effort is normal.      Breath sounds: Normal breath sounds. No wheezing, rhonchi or rales.   Musculoskeletal:      Cervical back: Normal range of motion.      Right lower leg: No edema.      Left lower leg: No edema.   Skin:     General: Skin is warm.      Capillary Refill: Capillary refill takes less than 2 seconds.    Neurological:      General: No focal deficit present.      Mental Status: He is alert and oriented to person, place, and time.         Significant Labs: BMP:   Recent Labs   Lab 04/09/24  0530 04/10/24  0524   GLU 70 90    139   K 4.5 3.9    110   CO2 26 24   BUN 30* 34*   CREATININE 1.8* 1.8*   CALCIUM 8.8 9.2   MG  --  2.3   , CMP   Recent Labs   Lab 04/09/24  0530 04/10/24  0524    139   K 4.5 3.9    110   CO2 26 24   GLU 70 90   BUN 30* 34*   CREATININE 1.8* 1.8*   CALCIUM 8.8 9.2   PROT 7.2 6.9   ALBUMIN 3.0* 3.0*   BILITOT 0.9 0.9   ALKPHOS 64 64   AST 22 17   ALT 8* 7*   ANIONGAP 4 5   , CBC   Recent Labs   Lab 04/09/24  0530 04/10/24  0524   WBC 5.04 5.31   HGB 11.2* 10.9*   HCT 34.6* 33.2*   PLT 99* 105*   , and All pertinent lab results from the last 24 hours have been reviewed.    Significant Imaging:       Assessment and Plan:    Wide QRS Tachycardia - improved   -suspected related to anemia - improved   -HR improved to 80-90s with increased carvedilol overnight; however, he became tachycardic again this afternoon. Amiodarone initiated  -continue to monitor closely   -initially HS-trop trend - 49.5>63.5>138.1>81.4 - likely related to demand iscemia 2/2 significant anemia - pt asymptomatic at this time.     Anemia, improved   -H/H 7.8/24/7 > 11.2/34.6 > 10.9/33.2  -s/p 2 units pRBCs   -denies any active bleeding - no melena, hematochezia, hematemesis   -continue management per primary     HTN  -BP normotensive/low end of normal  -continue to monitor   -resume home medications as appropriate     CAD s/p CABG  -stable   -pt currently denies chest pain, shortness of breath  -continue to monitor   -will need outpatient ischemic eval once stabilized          Active Diagnoses:    Diagnosis Date Noted POA    PRINCIPAL PROBLEM:  Symptomatic anemia [D64.9] 04/09/2024 Unknown    Wide QRS ventricular tachycardia [I47.20] 04/09/2024 Unknown    Elevated troponin [R79.89] 04/09/2024 Unknown     GERD (gastroesophageal reflux disease) [K21.9]  Yes    Hypertension [I10]  Yes    Coronary artery disease [I25.10] 09/03/2019 Yes    Stage 3 chronic kidney disease [N18.30] 09/03/2019 Yes    Lupus anticoagulant inhibitor syndrome [D68.62] 03/02/2017 Yes      Problems Resolved During this Admission:         VTE Risk Mitigation (From admission, onward)           Ordered     IP VTE HIGH RISK PATIENT  Once         04/08/24 1410     Place sequential compression device  Until discontinued         04/08/24 1410     Place ASHOK hose  Until discontinued         04/08/24 1410                        GILMAR Frias-C - scribed for Dr. Henderson   Cardiology     Provider's Attestation:  I, Farzad Henderson MD, confirm that GILMAR Bruno worked under my direction at all times.  Documentation shall reflect findings and decisions made by myself in the course of the patient's treatment.  I have reviewed the notes and assessment, and I concur with her documentation.  CMS guidelines regarding the use of scribes shall be adhered to.  MD Farzad Wright MD  Cardiology   [facility]

## 2024-04-10 NOTE — ASSESSMENT & PLAN NOTE
Patient's anemia is currently controlled. Has received 3 units of PRBCs on 4/9/24 . Etiology likely d/t chronic blood loss  Current CBC reviewed-   Lab Results   Component Value Date    HGB 10.9 (L) 04/10/2024    HCT 33.2 (L) 04/10/2024     Monitor serial CBC and transfuse if patient becomes hemodynamically unstable, symptomatic or H/H drops below 7/21.    Recent Labs   Lab 04/08/24  1105 04/09/24  0530 04/10/24  0524   Hemoglobin 7.8 L 11.2 L 10.9 L

## 2024-04-10 NOTE — HOSPITAL COURSE
4/10:  Patient has responded nicely to blood products.  States that he does not feel well today.  Feels generally weak and fatigued.  Will have therapy work with the patient throughout the day and consider discharge tomorrow should hemoglobin remained stable and he reach his normal functional capacity.    4/11:  Patient had an acute decompensation yesterday evening.  He was noted to have atrial fibrillation with rapid ventricular response.  He was noted to be diaphoretic and tachypneic.  The patient was given a 1 time dose of Lasix.  He was placed on BiPAP.  Amnio drip was started.  He has since stabilized.  Breathing more comfortably on BiPAP and now resting.  Heart rate in the 80s.  Will transitioned to oral amiodarone tomorrow if he remains stable.  Should you have any further decompensation will consider transition to outside facility.  Long discussion held with the patient's family now more strongly considering inpatient rehab as an option given his recent decompensation.  This would be ideal for strength training in addition to ongoing inpatient monitoring.    4/12: Patient resting comfortably this morning.  Will transition to oral amiodarone.  Continue physical and occupational therapy.  After discussion with the patient and the patient's family will pursue inpatient rehab if qualifies.  Plan to transition inpatient rehab on Monday.    Patient did extremely well with physical therapy.  They are currently recommending home with home health.  The [facility] has [facility] will reinstitute this at time of discharge which will be later today

## 2024-04-10 NOTE — ASSESSMENT & PLAN NOTE
Creatine stable for now. BMP reviewed- noted Estimated Creatinine Clearance: 32.7 mL/min (A) (based on SCr of 1.8 mg/dL (H)). according to latest data. Based on current GFR, CKD stage is stage 3 - GFR 30-59.  Monitor UOP and serial BMP and adjust therapy as needed. Renally dose meds. Avoid nephrotoxic medications and procedures.    Lab Results   Component Value Date    CREATININE 1.8 (H) 04/10/2024    CREATININE 1.8 (H) 04/09/2024    CREATININE 2.1 (H) 04/08/2024

## 2024-04-10 NOTE — SUBJECTIVE & OBJECTIVE
Past Medical History:   Diagnosis Date    Acid reflux     Anemia     Anticoagulant long-term use     Anxiety disorder, unspecified     Aortic aneurysm, abdominal     Arthritis     Bronchitis     Carotid stenosis 05/13/2022    60-79% Right ICA 60-70% Left ICA    Cataract     Celiac artery stenosis     50% by CTA abdomen 7/27/2020    CKD (chronic kidney disease)     45% FUNCTION    Coronary artery disease     stents x4    Encounter for blood transfusion     Enlarged prostate     GERD (gastroesophageal reflux disease)     Heart attack     Hemorrhoids     Hypercholesteremia     Hypertension     Iron deficiency anemia, unspecified     Leaky heart valve     Lupus anticoagulant disorder     Mood disorder     Parkinson's disease     Renal artery stenosis     by CTA 7/27/2020    Restless leg syndrome     Skin cancer     Skin tear of elbow without complication     above elbow    Unspecified chronic bronchitis     UTI (urinary tract infection)        Past Surgical History:   Procedure Laterality Date    ACF      Anterior Cervical Fusion    BACK SURGERY      RODS IN BACK; 4 SURGIERIES    BIOPSY OF URETER Right 06/22/2023    Procedure: RESECTION/BIOPSY, URETER;  Surgeon: Aaron Pinon MD;  Location: [facility] OR;  Service: Urology;  Laterality: Right;    BONE MARROW BIOPSY      CARDIAC ANGIOGRAM WITH STENT      CATARACT SX Bilateral     CHOLECYSTECTOMY      CORONARY ANGIOGRAPHY N/A 02/15/2023    Procedure: ANGIOGRAM, CORONARY ARTERY;  Surgeon: Rito Vega MD;  Location: [facility] CATH;  Service: Cardiology;  Laterality: N/A;    CORONARY ARTERY BYPASS GRAFT (CABG) N/A 02/24/2023    Procedure: CORONARY ARTERY BYPASS GRAFT (CABG);  Surgeon: Spencer Hagan MD;  Location: [facility] OR;  Service: Cardiothoracic;  Laterality: N/A;  CABG / EVH //   ECHO NOTIFIED    CYSTOSCOPY W/ RETROGRADES Bilateral 06/20/2023    Procedure: CYSTOSCOPY WITH RETROGRADE PYELOGRAM;  Surgeon: Aaron Pinon MD;  Location: [facility] OR;  Service: Urology;   Laterality: Bilateral;  Pt has a Spinal Cord Stimulator  9am per [facility], To follow RM4    CYSTOSCOPY W/ RETROGRADES Right 06/22/2023    Procedure: CYSTOSCOPY, WITH RETROGRADE PYELOGRAM;  Surgeon: Aaron Pinon MD;  Location: [facility] OR;  Service: Urology;  Laterality: Right;    CYSTOSCOPY W/ RETROGRADES Bilateral 8/24/2023    Procedure: CYSTOSCOPY, WITH RETROGRADE PYELOGRAM;  Surgeon: Aaron Pinon MD;  Location: [facility] OR;  Service: Urology;  Laterality: Bilateral;    CYSTOSCOPY W/ RETROGRADES Bilateral 12/4/2023    Procedure: CYSTOSCOPY WITH RETROGRADE PYELOGRAM;  Surgeon: Aaron Pinon MD;  Location: [facility] OR;  Service: Urology;  Laterality: Bilateral;  pt has spinal cord stimulator    DIGITAL RECTAL EXAMINATION UNDER ANESTHESIA N/A 12/4/2023    Procedure: EXAM UNDER ANESTHESIA, DIGITAL, RECTUM;  Surgeon: Aaron Pinon MD;  Location: [facility] OR;  Service: Urology;  Laterality: N/A;    HIP SURGERY Right     THR    KNEE SURGERY Right     ATS    LEFT HEART CATHETERIZATION Left 07/22/2021    Procedure: Left heart cath;  Surgeon: Curtis Loredo MD;  Location: [facility] CATH;  Service: Cardiology;  Laterality: Left;    LEFT HEART CATHETERIZATION Left 09/22/2022    Procedure: Left heart cath;  Surgeon: Giorgi Barker MD;  Location: [facility] CATH;  Service: Cardiology;  Laterality: Left;    REMOVAL, STENT, URETER Right 8/24/2023    Procedure: REMOVAL, STENT, URETER;  Surgeon: Aaron Pinon MD;  Location: [facility] OR;  Service: Urology;  Laterality: Right;    ROTATOR CUFF REPAIR Bilateral     SHOULDER SURGERY Bilateral     SPINAL CORD STIMULATOR IMPLANT      STENT, URETERAL Left 06/22/2023    Procedure: STENT, URETERAL;  Surgeon: Aaron Pinon MD;  Location: [facility] OR;  Service: Urology;  Laterality: Left;    steroid injections Right 06/13/2023    Knee    ULNAR NERVE TRANSPOSITION      URETEROSCOPY Right 06/22/2023    Procedure: URETEROSCOPY;  Surgeon: Aaron Pinon MD;  Location: [facility] OR;  Service: Urology;  Laterality:  Right;       Review of patient's allergies indicates:   Allergen Reactions    Cardura [doxazosin] Hives    Lyrica [pregabalin] Other (See Comments)    Paxil [paroxetine hcl] Other (See Comments)    Restoril [temazepam] Hallucinations    Vioxx [rofecoxib] Swelling    Zithromax [azithromycin] Hives    Alpha 1 blocker- quinazolines     Benzodiazepines        No current facility-administered medications on file prior to encounter.     Current Outpatient Medications on File Prior to Encounter   Medication Sig    azelastine (ASTELIN) 137 mcg (0.1 %) nasal spray 1 spray (137 mcg total) by Nasal route 2 (two) times daily.    calcium carbonate-vitamin D3 500 mg-10 mcg (400 unit) Tab Take 1 tablet by mouth once daily.    carbidopa-levodopa (PARCOPA)  mg per disintegrating tablet Take 1 tablet by mouth 3 (three) times daily.    carvediloL (COREG) 3.125 MG tablet Take 1 tablet (3.125 mg total) by mouth 2 (two) times daily with meals.    clindamycin phosphate 1% (CLINDAGEL) 1 % gel Apply topically 2 (two) times daily.    clopidogreL (PLAVIX) 75 mg tablet Take 75 mg by mouth once daily.    dextran 70/hypromellose (ARTIFICIAL TEARS, PF, OPHT) Apply 1 drop to eye 3 (three) times daily.    docusate sodium (COLACE) 100 MG capsule Take 100 mg by mouth 2 (two) times daily.    ezetimibe (ZETIA) 10 mg tablet Take 1 tablet (10 mg total) by mouth once daily.    fluticasone propionate (FLONASE) 50 mcg/actuation nasal spray 1 spray by Each Nostril route 2 (two) times a day.    furosemide (LASIX) 20 MG tablet Take 20 mg by mouth once daily. Prescribed by Dr. Loredo    gabapentin (NEURONTIN) 100 MG capsule Take 500 mg by mouth. 2C QAM, 2C QPM, 1C QHS    HYDROcodone-acetaminophen (NORCO) 7.5-325 mg per tablet Prescribed by Dr. Hernandez    hydroxychloroquine (PLAQUENIL) 200 mg tablet Take 200 mg by mouth once daily.    lanolin/mineral oil/petrolatum (ARTIFICIAL TEARS OPHT) Apply to eye.    niacin 1,000 mg TbSR Take 1 tablet by mouth Daily.     pantoprazole (PROTONIX) 40 MG tablet Take 1 tablet (40 mg total) by mouth once daily.    polyvinyl alcohol, artificial tears, (LIQUIFILM TEARS) 1.4 % ophthalmic solution Place 1 drop into both eyes as needed.    predniSONE (DELTASONE) 5 MG tablet Take 2.5 mg by mouth once daily.    QUEtiapine (SEROQUEL) 50 MG tablet Take 50 mg by mouth every evening.    rosuvastatin (CRESTOR) 40 MG Tab Take 1 tablet (40 mg total) by mouth every evening.    tamsulosin (FLOMAX) 0.4 mg Cap Take 1 capsule (0.4 mg total) by mouth every evening.    DULoxetine (CYMBALTA) 30 MG capsule Take 1 capsule (30 mg total) by mouth once daily.    rOPINIRole (REQUIP) 1 MG tablet Take 1 tablet (1 mg total) by mouth 2 (two) times daily.     Family History       Problem Relation (Age of Onset)    Cancer Brother, Brother    Heart disease Father, Sister, Brother, Brother, Brother    Lung cancer Brother    Throat cancer Brother          Tobacco Use    Smoking status: Former     Current packs/day: 0.00     Types: Cigarettes     Quit date:      Years since quittin.2    Smokeless tobacco: Never   Substance and Sexual Activity    Alcohol use: Yes     Alcohol/week: 2.0 standard drinks of alcohol     Types: 1 Glasses of wine, 1 Cans of beer per week     Comment: RARELY    Drug use: No    Sexual activity: Not on file     Review of Systems   Constitutional:  Positive for fatigue. Negative for chills and fever.   HENT:  Negative for rhinorrhea, sneezing and sore throat.    Eyes:  Negative for pain and visual disturbance.   Respiratory:  Positive for shortness of breath. Negative for cough.    Cardiovascular:  Positive for chest pain.   Gastrointestinal:  Positive for abdominal pain, diarrhea, nausea and vomiting. Negative for constipation.   Endocrine: Negative for cold intolerance and heat intolerance.   Genitourinary:  Negative for difficulty urinating.   Musculoskeletal:  Negative for arthralgias and joint swelling.   Skin:  Positive for pallor.  Negative for rash.   Allergic/Immunologic: Negative for environmental allergies.   Neurological:  Positive for weakness. Negative for dizziness and syncope.   Hematological:  Does not bruise/bleed easily.   Psychiatric/Behavioral:  Negative for dysphoric mood. The patient is not nervous/anxious.      Objective:     Vital Signs (Most Recent):  Temp: 98.1 °F (36.7 °C) (04/10/24 1102)  Pulse: 90 (04/10/24 1137)  Resp: 20 (04/10/24 1102)  BP: (!) 160/70 (04/10/24 1102)  SpO2: (!) 94 % (04/10/24 1102) Vital Signs (24h Range):  Temp:  [97.8 °F (36.6 °C)-98.7 °F (37.1 °C)] 98.1 °F (36.7 °C)  Pulse:  [] 90  Resp:  [18-23] 20  SpO2:  [90 %-99 %] 94 %  BP: (146-215)/(67-80) 160/70     Weight: 82.9 kg (182 lb 12.8 oz)  Body mass index is 26.23 kg/m².     Physical Exam  Vitals and nursing note reviewed.   Constitutional:       Appearance: Normal appearance. He is ill-appearing.   HENT:      Head: Normocephalic and atraumatic.      Nose: Nose normal.   Eyes:      Extraocular Movements: Extraocular movements intact.      Pupils: Pupils are equal, round, and reactive to light.   Cardiovascular:      Rate and Rhythm: Normal rate and regular rhythm.      Heart sounds: Murmur heard.      No friction rub. No gallop.   Pulmonary:      Effort: Pulmonary effort is normal.      Breath sounds: Rales present.   Abdominal:      General: Abdomen is flat. Bowel sounds are normal.      Palpations: Abdomen is soft.   Musculoskeletal:         General: No swelling or deformity.      Cervical back: Normal range of motion and neck supple.   Skin:     General: Skin is warm and dry.      Capillary Refill: Capillary refill takes less than 2 seconds.      Coloration: Skin is not pale.   Neurological:      General: No focal deficit present.      Mental Status: He is alert and oriented to person, place, and time.      Motor: Weakness present.   Psychiatric:         Mood and Affect: Mood normal.         Behavior: Behavior normal.              CRANIAL  NERVES     CN III, IV, VI   Pupils are equal, round, and reactive to light.       Significant Labs: All pertinent labs within the past 24 hours have been reviewed.    Significant Imaging: I have reviewed all pertinent imaging results/findings within the past 24 hours.

## 2024-04-10 NOTE — PROGRESS NOTES
[facility] Surg  Cardiology  Progress Note    Patient Name: Hunter Navarrete  MRN: [id number]  Admission Date: 4/8/2024  Hospital Length of Stay: 1 days  Code Status: Full Code   Attending Provider: Shawn Purdy Jr., MD   Primary Care Physician: Shawn Purdy Jr., MD  Principal Problem:Symptomatic anemia    Patient information was obtained from patient, past medical records, and ER records.     Subjective:     Chief Complaint:  anemia, wide QRS on EKG     HPI:   This is an 84 yo male with PMHx CAD s/p CABG, Parkinson's, and HTN who presented to the emergency department with c/o weakness, nausea, vomiting, and overall fatigue onset x 1 day. On arrival to ED, he was found to be significantly anemic with H/H 7.8/24.7. Cardiology consulted due to wide complex QRS on 12-lead EKG. He denies any chest pain, shortness of breath, or dyspnea at this time. He was admitted to the floor for blood transfusion and close telemetry monitoring.     Pt is seen receiving 1 unit of pRBCs. He states he is feeling better and denies any symptoms of chest pain or shortness of breath at this time. Telemetry monitoring revealing improvement in QRS complex. We will get repeat 12-lead EKG after completion of the 3 units of pRBCs.     Interval hx:  4/9/24: Pt widened QRS improved with blood transfusion; however, later today pt's rate became tachycardic into the 130's with a wide complex. 12-lead EKG not showing any acute ST changes. His carvedilol was increased to 6.25 mg po bid. May consider addition of amiodarone if HR continues to remain elevated. Pt denies any symptoms at this time, and he is seen in his room resting comfortably in bed with no acute distress. He does appear mildly short of breath, however denies any chest pain or palpitations. His HS troponin peaked at 138.1 which has down trended to 81.4 - likely related to demand ischemia to significant anemia. H/H now stable s/p 2 units blood transfusion.       Past Medical  History:   Diagnosis Date    Acid reflux     Anemia     Anticoagulant long-term use     Anxiety disorder, unspecified     Aortic aneurysm, abdominal     Arthritis     Bronchitis     Carotid stenosis 05/13/2022    60-79% Right ICA 60-70% Left ICA    Cataract     Celiac artery stenosis     50% by CTA abdomen 7/27/2020    CKD (chronic kidney disease)     45% FUNCTION    Coronary artery disease     stents x4    Encounter for blood transfusion     Enlarged prostate     GERD (gastroesophageal reflux disease)     Heart attack     Hemorrhoids     Hypercholesteremia     Hypertension     Iron deficiency anemia, unspecified     Leaky heart valve     Lupus anticoagulant disorder     Mood disorder     Parkinson's disease     Renal artery stenosis     by CTA 7/27/2020    Restless leg syndrome     Skin cancer     Skin tear of elbow without complication     above elbow    Unspecified chronic bronchitis     UTI (urinary tract infection)        Past Surgical History:   Procedure Laterality Date    ACF      Anterior Cervical Fusion    BACK SURGERY      RODS IN BACK; 4 SURGIERIES    BIOPSY OF URETER Right 06/22/2023    Procedure: RESECTION/BIOPSY, URETER;  Surgeon: Aaron Pinon MD;  Location: [facility] OR;  Service: Urology;  Laterality: Right;    BONE MARROW BIOPSY      CARDIAC ANGIOGRAM WITH STENT      CATARACT SX Bilateral     CHOLECYSTECTOMY      CORONARY ANGIOGRAPHY N/A 02/15/2023    Procedure: ANGIOGRAM, CORONARY ARTERY;  Surgeon: Rito Vega MD;  Location: [facility] CATH;  Service: Cardiology;  Laterality: N/A;    CORONARY ARTERY BYPASS GRAFT (CABG) N/A 02/24/2023    Procedure: CORONARY ARTERY BYPASS GRAFT (CABG);  Surgeon: Spencer Hagan MD;  Location: [facility] OR;  Service: Cardiothoracic;  Laterality: N/A;  CABG / EVH //   ECHO NOTIFIED    CYSTOSCOPY W/ RETROGRADES Bilateral 06/20/2023    Procedure: CYSTOSCOPY WITH RETROGRADE PYELOGRAM;  Surgeon: Aaron Pinon MD;  Location: [facility] OR;  Service: Urology;  Laterality: Bilateral;   Pt has a Spinal Cord Stimulator  9am per [place], To follow RM4    CYSTOSCOPY W/ RETROGRADES Right 06/22/2023    Procedure: CYSTOSCOPY, WITH RETROGRADE PYELOGRAM;  Surgeon: Aaron Pinon MD;  Location: [facility] OR;  Service: Urology;  Laterality: Right;    CYSTOSCOPY W/ RETROGRADES Bilateral 8/24/2023    Procedure: CYSTOSCOPY, WITH RETROGRADE PYELOGRAM;  Surgeon: Aaron Pinon MD;  Location: [facility] OR;  Service: Urology;  Laterality: Bilateral;    CYSTOSCOPY W/ RETROGRADES Bilateral 12/4/2023    Procedure: CYSTOSCOPY WITH RETROGRADE PYELOGRAM;  Surgeon: Aaron Pinon MD;  Location: [facility] OR;  Service: Urology;  Laterality: Bilateral;  pt has spinal cord stimulator    DIGITAL RECTAL EXAMINATION UNDER ANESTHESIA N/A 12/4/2023    Procedure: EXAM UNDER ANESTHESIA, DIGITAL, RECTUM;  Surgeon: Aaron Pinon MD;  Location: [facility] OR;  Service: Urology;  Laterality: N/A;    HIP SURGERY Right     THR    KNEE SURGERY Right     ATS    LEFT HEART CATHETERIZATION Left 07/22/2021    Procedure: Left heart cath;  Surgeon: Curtis Loredo MD;  Location: [facility] CATH;  Service: Cardiology;  Laterality: Left;    LEFT HEART CATHETERIZATION Left 09/22/2022    Procedure: Left heart cath;  Surgeon: Giorgi Barker MD;  Location: [facility] CATH;  Service: Cardiology;  Laterality: Left;    REMOVAL, STENT, URETER Right 8/24/2023    Procedure: REMOVAL, STENT, URETER;  Surgeon: Aaron Pinon MD;  Location: [facility] OR;  Service: Urology;  Laterality: Right;    ROTATOR CUFF REPAIR Bilateral     SHOULDER SURGERY Bilateral     SPINAL CORD STIMULATOR IMPLANT      STENT, URETERAL Left 06/22/2023    Procedure: STENT, URETERAL;  Surgeon: Aaron Pinon MD;  Location: [facility] OR;  Service: Urology;  Laterality: Left;    steroid injections Right 06/13/2023    Knee    ULNAR NERVE TRANSPOSITION      URETEROSCOPY Right 06/22/2023    Procedure: URETEROSCOPY;  Surgeon: Aaron Pinon MD;  Location: [facility] OR;  Service: Urology;  Laterality: Right;       Review of  patient's allergies indicates:   Allergen Reactions    Cardura [doxazosin] Hives    Lyrica [pregabalin] Other (See Comments)    Paxil [paroxetine hcl] Other (See Comments)    Restoril [temazepam] Hallucinations    Vioxx [rofecoxib] Swelling    Zithromax [azithromycin] Hives    Alpha 1 blocker- quinazolines     Benzodiazepines        No current facility-administered medications on file prior to encounter.     Current Outpatient Medications on File Prior to Encounter   Medication Sig    azelastine (ASTELIN) 137 mcg (0.1 %) nasal spray 1 spray (137 mcg total) by Nasal route 2 (two) times daily.    calcium carbonate-vitamin D3 500 mg-10 mcg (400 unit) Tab Take 1 tablet by mouth once daily.    carbidopa-levodopa (PARCOPA)  mg per disintegrating tablet Take 1 tablet by mouth 3 (three) times daily.    carvediloL (COREG) 3.125 MG tablet Take 1 tablet (3.125 mg total) by mouth 2 (two) times daily with meals.    clindamycin phosphate 1% (CLINDAGEL) 1 % gel Apply topically 2 (two) times daily.    clopidogreL (PLAVIX) 75 mg tablet Take 75 mg by mouth once daily.    dextran 70/hypromellose (ARTIFICIAL TEARS, PF, OPHT) Apply 1 drop to eye 3 (three) times daily.    docusate sodium (COLACE) 100 MG capsule Take 100 mg by mouth 2 (two) times daily.    ezetimibe (ZETIA) 10 mg tablet Take 1 tablet (10 mg total) by mouth once daily.    fluticasone propionate (FLONASE) 50 mcg/actuation nasal spray 1 spray by Each Nostril route 2 (two) times a day.    furosemide (LASIX) 20 MG tablet Take 20 mg by mouth once daily. Prescribed by Dr. Loredo    gabapentin (NEURONTIN) 100 MG capsule Take 500 mg by mouth. 2C QAM, 2C QPM, 1C QHS    HYDROcodone-acetaminophen (NORCO) 7.5-325 mg per tablet Prescribed by Dr. Hernandez    hydroxychloroquine (PLAQUENIL) 200 mg tablet Take 200 mg by mouth once daily.    lanolin/mineral oil/petrolatum (ARTIFICIAL TEARS OPHT) Apply to eye.    niacin 1,000 mg TbSR Take 1 tablet by mouth Daily.    pantoprazole (PROTONIX)  40 MG tablet Take 1 tablet (40 mg total) by mouth once daily.    polyvinyl alcohol, artificial tears, (LIQUIFILM TEARS) 1.4 % ophthalmic solution Place 1 drop into both eyes as needed.    predniSONE (DELTASONE) 5 MG tablet Take 2.5 mg by mouth once daily.    QUEtiapine (SEROQUEL) 50 MG tablet Take 50 mg by mouth every evening.    rosuvastatin (CRESTOR) 40 MG Tab Take 1 tablet (40 mg total) by mouth every evening.    tamsulosin (FLOMAX) 0.4 mg Cap Take 1 capsule (0.4 mg total) by mouth every evening.    DULoxetine (CYMBALTA) 30 MG capsule Take 1 capsule (30 mg total) by mouth once daily.    rOPINIRole (REQUIP) 1 MG tablet Take 1 tablet (1 mg total) by mouth 2 (two) times daily.     Family History       Problem Relation (Age of Onset)    Cancer Brother, Brother    Heart disease Father, Sister, Brother, Brother, Brother    Lung cancer Brother    Throat cancer Brother          Tobacco Use    Smoking status: Former     Current packs/day: 0.00     Types: Cigarettes     Quit date:      Years since quittin.2    Smokeless tobacco: Never   Substance and Sexual Activity    Alcohol use: Yes     Alcohol/week: 2.0 standard drinks of alcohol     Types: 1 Glasses of wine, 1 Cans of beer per week     Comment: RARELY    Drug use: No    Sexual activity: Not on file     Review of Systems   Constitutional: Negative for malaise/fatigue.   Cardiovascular:  Negative for chest pain, dyspnea on exertion, irregular heartbeat, near-syncope (light-headed), palpitations and syncope.   Respiratory:  Positive for shortness of breath.    Gastrointestinal:  Negative for abdominal pain, hematemesis, hematochezia, melena, nausea and vomiting.   Genitourinary:  Negative for hematuria.   Neurological:  Negative for dizziness, light-headedness and weakness.   Psychiatric/Behavioral:  Negative for altered mental status.    All other systems reviewed and are negative.    Objective:     Vital Signs (Most Recent):  Temp: 97.8 °F (36.6 °C) (24  2325)  Pulse: 90 (04/09/24 2326)  Resp: (!) 21 (04/09/24 2325)  BP: (!) 170/77 (04/09/24 2325)  SpO2: (!) 90 % (04/09/24 2325) Vital Signs (24h Range):  Temp:  [97.8 °F (36.6 °C)-98.7 °F (37.1 °C)] 97.8 °F (36.6 °C)  Pulse:  [] 90  Resp:  [17-22] 21  SpO2:  [90 %-99 %] 90 %  BP: (115-215)/(58-80) 170/77     Weight: 82.9 kg (182 lb 12.8 oz)  Body mass index is 26.23 kg/m².    SpO2: (!) 90 %         Intake/Output Summary (Last 24 hours) at 4/9/2024 2355  Last data filed at 4/9/2024 0600  Gross per 24 hour   Intake 1550.83 ml   Output 300 ml   Net 1250.83 ml       Lines/Drains/Airways       Peripheral Intravenous Line  Duration                  Peripheral IV - Single Lumen 04/08/24 20 G Left Upper Arm 1 day                    Physical Exam  Vitals and nursing note reviewed.   Constitutional:       Appearance: Normal appearance. He is obese.   HENT:      Head: Normocephalic and atraumatic.      Mouth/Throat:      Mouth: Mucous membranes are moist.      Pharynx: Oropharynx is clear.   Eyes:      Extraocular Movements: Extraocular movements intact.   Cardiovascular:      Rate and Rhythm: Regular rhythm. Tachycardia present.      Pulses: Normal pulses.      Heart sounds: Normal heart sounds. No murmur heard.     No friction rub. No gallop.   Pulmonary:      Effort: Pulmonary effort is normal.      Breath sounds: Normal breath sounds. No wheezing, rhonchi or rales.   Musculoskeletal:      Cervical back: Normal range of motion.      Right lower leg: No edema.      Left lower leg: No edema.   Skin:     General: Skin is warm.      Capillary Refill: Capillary refill takes less than 2 seconds.   Neurological:      General: No focal deficit present.      Mental Status: He is alert and oriented to person, place, and time.         Significant Labs: BMP:   Recent Labs   Lab 04/08/24  1105 04/09/24  0530   * 70    139   K 4.3 4.5    109   CO2 26 26   BUN 29* 30*   CREATININE 2.1* 1.8*   CALCIUM 8.7 8.8   ,  CMP   Recent Labs   Lab 04/08/24  1105 04/09/24  0530    139   K 4.3 4.5    109   CO2 26 26   * 70   BUN 29* 30*   CREATININE 2.1* 1.8*   CALCIUM 8.7 8.8   PROT 7.3 7.2   ALBUMIN 3.0* 3.0*   BILITOT 0.8 0.9   ALKPHOS 62 64   AST 26 22   ALT 11 8*   ANIONGAP 5 4   , CBC   Recent Labs   Lab 04/08/24  1105 04/09/24  0530   WBC 5.20 5.04   HGB 7.8* 11.2*   HCT 24.7* 34.6*   * 99*   , and All pertinent lab results from the last 24 hours have been reviewed.    Significant Imaging:       Assessment and Plan:    Wide QRS Tachycardia  -suspected related to anemia   -increased carvedilol 3.25 mg po bid - if tachycardia continues, consider amiodarone   -telemetry revealing widening QRS - stable - HR 130s   -continue to monitor closely   -initially HS-trop trend - 49.5>63.5>138.1>81.4 - likely related to demand iscemia 2/2 significant anemia - pt asymptomatic at this time.     Anemia, improved   -H/H 7.8/24/7 > 11.2/34.6  -s/p 2 units pRBCs   -denies any active bleeding - no melena, hematochezia, hematemesis   -continue management per primary     HTN  -BP normotensive/low end of normal  -continue to monitor   -resume home medications as appropriate     CAD s/p CABG  -stable   -pt currently denies chest pain, shortness of breath  -continue to monitor   -will need outpatient ischemic eval once stabilized          Active Diagnoses:    Diagnosis Date Noted POA    PRINCIPAL PROBLEM:  Symptomatic anemia [D64.9] 04/09/2024 Unknown    Wide QRS ventricular tachycardia [I47.20] 04/09/2024 Unknown    Elevated troponin [R79.89] 04/09/2024 Unknown    GERD (gastroesophageal reflux disease) [K21.9]  Yes    Hypertension [I10]  Yes    Coronary artery disease [I25.10] 09/03/2019 Yes    Stage 3 chronic kidney disease [N18.30] 09/03/2019 Yes    Lupus anticoagulant inhibitor syndrome [D68.62] 03/02/2017 Yes      Problems Resolved During this Admission:         VTE Risk Mitigation (From admission, onward)           Ordered      IP VTE HIGH RISK PATIENT  Once         04/08/24 1410     Place sequential compression device  Until discontinued         04/08/24 1410     Place ASHOK hose  Until discontinued         04/08/24 1410                    Thank you for your consult. I will follow-up with patient. Please contact us if you have any additional questions.    GILMAR Frias-FILOMENA - scribed for Dr. Hendreson   Cardiology     Provider's Attestation:  I, Farzad Henderson MD, confirm that GILMAR Bruno worked under my direction at all times.  Documentation shall reflect findings and decisions made by myself in the course of the patient's treatment.  I have reviewed the notes and assessment, and I concur with her documentation.  CMS guidelines regarding the use of scribes shall be adhered to.  MD Farzad Wright MD  Cardiology   [facility]

## 2024-04-10 NOTE — PT/OT/SLP EVAL
"Physical Therapy Evaluation     Patient Name: Hunter Navarrete   MRN: [id number]  Recent Surgery: * No surgery found *      Recommendations:     Discharge Recommendations: Low Intensity Therapy   Discharge Equipment Recommendations: none   Barriers to discharge: None    Assessment:     Hunter Navarrete is a 83 y.o. male admitted with a medical diagnosis of Symptomatic anemia. He presents with the following impairments/functional limitations: weakness, impaired joint extensibility, impaired endurance, decreased ROM, impaired muscle length, impaired functional mobility, decreased lower extremity function, decreased upper extremity function, impaired balance, impaired cardiopulmonary response to activity. Patient reports that he is independent with ambulation without A.D. at a household level and he uses a rollator for long distance ambulation prior to this hospital's re-admission. He has a homemaker that assist with his needs.  At the time of evaluation, patient received blood transfusion yesterday, his H and H is 10.9 mg/dl and 33.2%. Functional status are as follows: set up assistance with supine to sit, set up assistance with sit <> stand with no A.D. and he ambulated ~183 feet  x 2 with no A.D. on room air with SPO2 dropping to 83% after walker and rebounding to 90% with sitting rest break and deep breathing.  Anticipate discharge to home tomorrow once medically stable.     Rehab Prognosis: Good; patient would benefit from acute PT services to address these deficits and reach maximum level of function.    Plan:     During this hospitalization, patient to be seen 5 x/week to address the above listed problems via gait training, therapeutic activities, therapeutic exercises, neuromuscular re-education    Plan of Care Expires: 04/17/24    Subjective     Chief Complaint: "I am okay, I can walk."   Patient Comments/Goals: Be home.   Pain/Comfort:  Pain Rating 1: 0/10  Pain Rating Post-Intervention 1: 0/10    Social " "History:  Living Environment: Patient lives alone in a single story home with can live on 1st floor  Prior Level of Function: Prior to admission, patient was modified independent  Equipment Used at Home: other (see comments) (patient has a rollator and bedside commode but he does not use it)  DME owned (not currently used): bedside commode  Assistance Upon Discharge:  niece who is his primary "homemaker."     Objective:     Communicated with nurse and patient  prior to session. Patient found right sidelying with peripheral IV upon PT entry to room.    General Precautions: Standard, fall   Orthopedic Precautions: N/A   Braces: N/A    Respiratory Status: Room air    Exams:  Cognition: Patient is oriented to Person, Place, Time, Situation  RLE ROM: WFL  RLE Strength: WFL  LLE ROM: WFL  LLE Strength: WFL  Fine Motor Coordination:    -       Intact  Gross Motor Coordination: WFL  Postural Exam: Patient presented with the following abnormalities:    -       Rounded shoulders  -       Forward head  Sensation:    -       Intact  Skin Integrity/Edema:     -       Skin integrity: Visible skin intact    Functional Mobility:  Gait belt applied - Yes  Bed Mobility  Rolling Left: supervision  Rolling Right: supervision  Scooting: supervision  Supine to Sit: supervision for LE management and trunk management  Sit to Supine: supervision for LE management and trunk management  Transfers  Sit to Stand: supervision with no AD and with cues for hand placement and foot placement  Gait  Patient ambulated ~183 feet x 2 trials  with no AD and supervision. Patient demonstrates steady gait. .. chair follow for patient safety.  Balance  Sitting: modified independence  Standing: supervision      Therapeutic Activities and Exercises:   Patient educated on role of acute care PT and PT POC, safety while in hospital including calling nurse for mobility, and call light usage  Patient educated about importance of OOB mobility and remaining up in " chair most of the day.      AM-PAC 6 CLICK MOBILITY  Total Score:18    Patient left up in chair with all lines intact, call button in reach, and RN notified.    GOALS:   Multidisciplinary Problems       Physical Therapy Goals          Problem: Physical Therapy    Goal Priority Disciplines Outcome Goal Variances Interventions   Physical Therapy Goal     PT, PT/OT Ongoing, Progressing     Description: Goals to be met by: 2024    Patient will increase functional independence with mobility by performin. Supine to sit with Modified Independent.  2. Sit to supine with Modified Independent.  3. Bed to chair transfer with Modified Independent with no A.D.  using Step Transfer technique.  4. Sit to Stand with Modified Independent with no A.D. .  5. Gait  x 200  feet with Modified Independent with no A.D. .  6. Lower extremity exercise program x10 reps.                          History:     Past Medical History:   Diagnosis Date    Acid reflux     Anemia     Anticoagulant long-term use     Anxiety disorder, unspecified     Aortic aneurysm, abdominal     Arthritis     Bronchitis     Carotid stenosis 2022    60-79% Right ICA 60-70% Left ICA    Cataract     Celiac artery stenosis     50% by CTA abdomen 2020    CKD (chronic kidney disease)     45% FUNCTION    Coronary artery disease     stents x4    Encounter for blood transfusion     Enlarged prostate     GERD (gastroesophageal reflux disease)     Heart attack     Hemorrhoids     Hypercholesteremia     Hypertension     Iron deficiency anemia, unspecified     Leaky heart valve     Lupus anticoagulant disorder     Mood disorder     Parkinson's disease     Renal artery stenosis     by CTA 2020    Restless leg syndrome     Skin cancer     Skin tear of elbow without complication     above elbow    Unspecified chronic bronchitis     UTI (urinary tract infection)        Past Surgical History:   Procedure Laterality Date    ACF      Anterior Cervical Fusion     BACK SURGERY      RODS IN BACK; 4 SURGIERIES    BIOPSY OF URETER Right 06/22/2023    Procedure: RESECTION/BIOPSY, URETER;  Surgeon: Aaron Pinon MD;  Location: [facility] OR;  Service: Urology;  Laterality: Right;    BONE MARROW BIOPSY      CARDIAC ANGIOGRAM WITH STENT      CATARACT SX Bilateral     CHOLECYSTECTOMY      CORONARY ANGIOGRAPHY N/A 02/15/2023    Procedure: ANGIOGRAM, CORONARY ARTERY;  Surgeon: Rito Vega MD;  Location: [facility] CATH;  Service: Cardiology;  Laterality: N/A;    CORONARY ARTERY BYPASS GRAFT (CABG) N/A 02/24/2023    Procedure: CORONARY ARTERY BYPASS GRAFT (CABG);  Surgeon: Spencer Hagan MD;  Location: [facility] OR;  Service: Cardiothoracic;  Laterality: N/A;  CABG / EVH //   ECHO NOTIFIED    CYSTOSCOPY W/ RETROGRADES Bilateral 06/20/2023    Procedure: CYSTOSCOPY WITH RETROGRADE PYELOGRAM;  Surgeon: Aaron Pinon MD;  Location: [facility] OR;  Service: Urology;  Laterality: Bilateral;  Pt has a Spinal Cord Stimulator  9am per [place], To follow RM4    CYSTOSCOPY W/ RETROGRADES Right 06/22/2023    Procedure: CYSTOSCOPY, WITH RETROGRADE PYELOGRAM;  Surgeon: Aaron Pinon MD;  Location: [facility] OR;  Service: Urology;  Laterality: Right;    CYSTOSCOPY W/ RETROGRADES Bilateral 8/24/2023    Procedure: CYSTOSCOPY, WITH RETROGRADE PYELOGRAM;  Surgeon: Aaron Pinon MD;  Location: [facility] OR;  Service: Urology;  Laterality: Bilateral;    CYSTOSCOPY W/ RETROGRADES Bilateral 12/4/2023    Procedure: CYSTOSCOPY WITH RETROGRADE PYELOGRAM;  Surgeon: Aaron Pinon MD;  Location: [facility] OR;  Service: Urology;  Laterality: Bilateral;  pt has spinal cord stimulator    DIGITAL RECTAL EXAMINATION UNDER ANESTHESIA N/A 12/4/2023    Procedure: EXAM UNDER ANESTHESIA, DIGITAL, RECTUM;  Surgeon: Aaron Pinon MD;  Location: [facility] OR;  Service: Urology;  Laterality: N/A;    HIP SURGERY Right     THR    KNEE SURGERY Right     ATS    LEFT HEART CATHETERIZATION Left 07/22/2021    Procedure: Left heart cath;  Surgeon:  Curtis Loredo MD;  Location: [facility] CATH;  Service: Cardiology;  Laterality: Left;    LEFT HEART CATHETERIZATION Left 09/22/2022    Procedure: Left heart cath;  Surgeon: Giorgi Barker MD;  Location: [facility] CATH;  Service: Cardiology;  Laterality: Left;    REMOVAL, STENT, URETER Right 8/24/2023    Procedure: REMOVAL, STENT, URETER;  Surgeon: Aaron Pinon MD;  Location: [facility] OR;  Service: Urology;  Laterality: Right;    ROTATOR CUFF REPAIR Bilateral     SHOULDER SURGERY Bilateral     SPINAL CORD STIMULATOR IMPLANT      STENT, URETERAL Left 06/22/2023    Procedure: STENT, URETERAL;  Surgeon: Aaron Pinon MD;  Location: [facility] OR;  Service: Urology;  Laterality: Left;    steroid injections Right 06/13/2023    Knee    ULNAR NERVE TRANSPOSITION      URETEROSCOPY Right 06/22/2023    Procedure: URETEROSCOPY;  Surgeon: Aaron Pinon MD;  Location: [facility] OR;  Service: Urology;  Laterality: Right;       Time Tracking:     PT Received On: 04/10/24  PT Start Time: 0938  PT Stop Time: 0954  PT Total Time (min): 16 min     Billable Minutes: Evaluation low complexity     4/10/2024

## 2024-04-10 NOTE — ASSESSMENT & PLAN NOTE
Likely from demand mismatch in the setting of anemia.  Continue to monitor.  Appreciate cardiology input    Recent Labs   Lab 04/08/24  1338 04/08/24  1751 04/09/24  1513   Troponin I High Sensitivity 63.5 H 138.1 H 81.4 H

## 2024-04-10 NOTE — PLAN OF CARE
Problem: Physical Therapy  Goal: Physical Therapy Goal  Description: Goals to be met by: 2024    Patient will increase functional independence with mobility by performin. Supine to sit with Modified [place].  2. Sit to supine with Modified [place].  3. Bed to chair transfer with Modified [place] with no A.D.  using Step Transfer technique.  4. Sit to Stand with Modified [place] with no A.D. .  5. Gait  x 200  feet with Modified [place] with no A.D. .  6. Lower extremity exercise program x10 reps.     Outcome: Plan of care established

## 2024-04-10 NOTE — PROGRESS NOTES
[facility] Medicine  Progress Note    Patient Name: Hunter Navarrete  MRN: [id number]  Patient Class: IP- Inpatient   Admission Date: 4/8/2024  Length of Stay: 2 days  Attending Physician: Shawn Purdy Jr., MD  Primary Care Provider: Shawn Purdy Jr., MD        Subjective:     Principal Problem:Symptomatic anemia        HPI:  Chief Complaint   Patient presents with    Nausea    Diarrhea    Vomiting    Weakness       Pt here per [place] with N/V/D and weakness since this am. Pt vomited x 1. Diarrhea x 1. Pt received 500mg IV bolus and 4mg Zofran PtA      This is an 83-year-old white male with multiple medical problems including anemia requiring transfusion, CAD status post CABG, chronic kidney disease, Parkinson's, and UTI who presents to the emergency department via EMS with complaints of generalized weakness that began this morning.  Patient reports that he was in his regular state of health, and shortly after eating breakfast he developed nausea with 1 episode of vomiting, diarrhea, and brief episode of anterior chest pain with shortness of breath.  He also had transient episode of mid abdominal aching pain.  Since that time has been experiencing weakness and lightheadedness.  Family members report that his blood pressure has been fluctuating over the last several days, experiencing many lows, and he has been taking his blood pressure medications as directed.  Patient denies any pain at the time of evaluation and has had resolution nausea after receiving Zofran in route to the hospital.  He further denies headache, URI signs and symptoms, dysuria, or any other relative symptoms at this time.       Overview/Hospital Course:  4/10:  Patient has responded nicely to blood products.  States that he does not feel well today.  Feels generally weak and fatigued.  Will have therapy work with the patient throughout the day and consider discharge tomorrow should hemoglobin remained stable and he  reach his normal functional capacity.    Past Medical History:   Diagnosis Date    Acid reflux     Anemia     Anticoagulant long-term use     Anxiety disorder, unspecified     Aortic aneurysm, abdominal     Arthritis     Bronchitis     Carotid stenosis 05/13/2022    60-79% Right ICA 60-70% Left ICA    Cataract     Celiac artery stenosis     50% by CTA abdomen 7/27/2020    CKD (chronic kidney disease)     45% FUNCTION    Coronary artery disease     stents x4    Encounter for blood transfusion     Enlarged prostate     GERD (gastroesophageal reflux disease)     Heart attack     Hemorrhoids     Hypercholesteremia     Hypertension     Iron deficiency anemia, unspecified     Leaky heart valve     Lupus anticoagulant disorder     Mood disorder     Parkinson's disease     Renal artery stenosis     by CTA 7/27/2020    Restless leg syndrome     Skin cancer     Skin tear of elbow without complication     above elbow    Unspecified chronic bronchitis     UTI (urinary tract infection)        Past Surgical History:   Procedure Laterality Date    ACF      Anterior Cervical Fusion    BACK SURGERY      RODS IN BACK; 4 SURGIERIES    BIOPSY OF URETER Right 06/22/2023    Procedure: RESECTION/BIOPSY, URETER;  Surgeon: Aaron Pinon MD;  Location: [facility] OR;  Service: Urology;  Laterality: Right;    BONE MARROW BIOPSY      CARDIAC ANGIOGRAM WITH STENT      CATARACT SX Bilateral     CHOLECYSTECTOMY      CORONARY ANGIOGRAPHY N/A 02/15/2023    Procedure: ANGIOGRAM, CORONARY ARTERY;  Surgeon: Rito Vega MD;  Location: [facility] CATH;  Service: Cardiology;  Laterality: N/A;    CORONARY ARTERY BYPASS GRAFT (CABG) N/A 02/24/2023    Procedure: CORONARY ARTERY BYPASS GRAFT (CABG);  Surgeon: Spencer Hagan MD;  Location: [facility] OR;  Service: Cardiothoracic;  Laterality: N/A;  CABG / EVH //   ECHO NOTIFIED    CYSTOSCOPY W/ RETROGRADES Bilateral 06/20/2023    Procedure: CYSTOSCOPY WITH RETROGRADE PYELOGRAM;  Surgeon: Aaron Pinon MD;   Location: [facility] OR;  Service: Urology;  Laterality: Bilateral;  Pt has a Spinal Cord Stimulator  9am per [place], To follow RM4    CYSTOSCOPY W/ RETROGRADES Right 06/22/2023    Procedure: CYSTOSCOPY, WITH RETROGRADE PYELOGRAM;  Surgeon: Aaron Pinon MD;  Location: [facility] OR;  Service: Urology;  Laterality: Right;    CYSTOSCOPY W/ RETROGRADES Bilateral 8/24/2023    Procedure: CYSTOSCOPY, WITH RETROGRADE PYELOGRAM;  Surgeon: Aaron Pinon MD;  Location: [facility] OR;  Service: Urology;  Laterality: Bilateral;    CYSTOSCOPY W/ RETROGRADES Bilateral 12/4/2023    Procedure: CYSTOSCOPY WITH RETROGRADE PYELOGRAM;  Surgeon: Aaron Pinon MD;  Location: [facility] ER;  Service: Urology;  Laterality: Bilateral;  pt has spinal cord stimulator    DIGITAL RECTAL EXAMINATION UNDER ANESTHESIA N/A 12/4/2023    Procedure: EXAM UNDER ANESTHESIA, DIGITAL, RECTUM;  Surgeon: Aaron Pinon MD;  Location: [facility] ER;  Service: Urology;  Laterality: N/A;    HIP SURGERY Right     THR    KNEE SURGERY Right     ATS    LEFT HEART CATHETERIZATION Left 07/22/2021    Procedure: Left heart cath;  Surgeon: Curtis Loredo MD;  Location: [facility] CATH;  Service: Cardiology;  Laterality: Left;    LEFT HEART CATHETERIZATION Left 09/22/2022    Procedure: Left heart cath;  Surgeon: Giorgi Barker MD;  Location: [facility] CATH;  Service: Cardiology;  Laterality: Left;    REMOVAL, STENT, URETER Right 8/24/2023    Procedure: REMOVAL, STENT, URETER;  Surgeon: Aaron Pinon MD;  Location: [facility] OR;  Service: Urology;  Laterality: Right;    ROTATOR CUFF REPAIR Bilateral     SHOULDER SURGERY Bilateral     SPINAL CORD STIMULATOR IMPLANT      STENT, URETERAL Left 06/22/2023    Procedure: STENT, URETERAL;  Surgeon: Aaron Pinon MD;  Location: [facility];  Service: Urology;  Laterality: Left;    steroid injections Right 06/13/2023    Knee    ULNAR NERVE TRANSPOSITION      URETEROSCOPY Right 06/22/2023    Procedure: URETEROSCOPY;  Surgeon: Aaron Pinon MD;  Location:  [facility] OR;  Service: Urology;  Laterality: Right;       Review of patient's allergies indicates:   Allergen Reactions    Cardura [doxazosin] Hives    Lyrica [pregabalin] Other (See Comments)    Paxil [paroxetine hcl] Other (See Comments)    Restoril [temazepam] Hallucinations    Vioxx [rofecoxib] Swelling    Zithromax [azithromycin] Hives    Alpha 1 blocker- quinazolines     Benzodiazepines        No current facility-administered medications on file prior to encounter.     Current Outpatient Medications on File Prior to Encounter   Medication Sig    azelastine (ASTELIN) 137 mcg (0.1 %) nasal spray 1 spray (137 mcg total) by Nasal route 2 (two) times daily.    calcium carbonate-vitamin D3 500 mg-10 mcg (400 unit) Tab Take 1 tablet by mouth once daily.    carbidopa-levodopa (PARCOPA)  mg per disintegrating tablet Take 1 tablet by mouth 3 (three) times daily.    carvediloL (COREG) 3.125 MG tablet Take 1 tablet (3.125 mg total) by mouth 2 (two) times daily with meals.    clindamycin phosphate 1% (CLINDAGEL) 1 % gel Apply topically 2 (two) times daily.    clopidogreL (PLAVIX) 75 mg tablet Take 75 mg by mouth once daily.    dextran 70/hypromellose (ARTIFICIAL TEARS, PF, OPHT) Apply 1 drop to eye 3 (three) times daily.    docusate sodium (COLACE) 100 MG capsule Take 100 mg by mouth 2 (two) times daily.    ezetimibe (ZETIA) 10 mg tablet Take 1 tablet (10 mg total) by mouth once daily.    fluticasone propionate (FLONASE) 50 mcg/actuation nasal spray 1 spray by Each Nostril route 2 (two) times a day.    furosemide (LASIX) 20 MG tablet Take 20 mg by mouth once daily. Prescribed by Dr. Loredo    gabapentin (NEURONTIN) 100 MG capsule Take 500 mg by mouth. 2C QAM, 2C QPM, 1C QHS    HYDROcodone-acetaminophen (NORCO) 7.5-325 mg per tablet Prescribed by Dr. Hernandez    hydroxychloroquine (PLAQUENIL) 200 mg tablet Take 200 mg by mouth once daily.    lanolin/mineral oil/petrolatum (ARTIFICIAL TEARS OPHT) Apply to eye.    niacin 1,000  mg TbSR Take 1 tablet by mouth Daily.    pantoprazole (PROTONIX) 40 MG tablet Take 1 tablet (40 mg total) by mouth once daily.    polyvinyl alcohol, artificial tears, (LIQUIFILM TEARS) 1.4 % ophthalmic solution Place 1 drop into both eyes as needed.    predniSONE (DELTASONE) 5 MG tablet Take 2.5 mg by mouth once daily.    QUEtiapine (SEROQUEL) 50 MG tablet Take 50 mg by mouth every evening.    rosuvastatin (CRESTOR) 40 MG Tab Take 1 tablet (40 mg total) by mouth every evening.    tamsulosin (FLOMAX) 0.4 mg Cap Take 1 capsule (0.4 mg total) by mouth every evening.    DULoxetine (CYMBALTA) 30 MG capsule Take 1 capsule (30 mg total) by mouth once daily.    rOPINIRole (REQUIP) 1 MG tablet Take 1 tablet (1 mg total) by mouth 2 (two) times daily.     Family History       Problem Relation (Age of Onset)    Cancer Brother, Brother    Heart disease Father, Sister, Brother, Brother, Brother    Lung cancer Brother    Throat cancer Brother          Tobacco Use    Smoking status: Former     Current packs/day: 0.00     Types: Cigarettes     Quit date:      Years since quittin.2    Smokeless tobacco: Never   Substance and Sexual Activity    Alcohol use: Yes     Alcohol/week: 2.0 standard drinks of alcohol     Types: 1 Glasses of wine, 1 Cans of beer per week     Comment: RARELY    Drug use: No    Sexual activity: Not on file     Review of Systems   Constitutional:  Positive for fatigue. Negative for chills and fever.   HENT:  Negative for rhinorrhea, sneezing and sore throat.    Eyes:  Negative for pain and visual disturbance.   Respiratory:  Positive for shortness of breath. Negative for cough.    Cardiovascular:  Positive for chest pain.   Gastrointestinal:  Positive for abdominal pain, diarrhea, nausea and vomiting. Negative for constipation.   Endocrine: Negative for cold intolerance and heat intolerance.   Genitourinary:  Negative for difficulty urinating.   Musculoskeletal:  Negative for arthralgias and joint  swelling.   Skin:  Positive for pallor. Negative for rash.   Allergic/Immunologic: Negative for environmental allergies.   Neurological:  Positive for weakness. Negative for dizziness and syncope.   Hematological:  Does not bruise/bleed easily.   Psychiatric/Behavioral:  Negative for dysphoric mood. The patient is not nervous/anxious.      Objective:     Vital Signs (Most Recent):  Temp: 98.1 °F (36.7 °C) (04/10/24 1102)  Pulse: 90 (04/10/24 1137)  Resp: 20 (04/10/24 1102)  BP: (!) 160/70 (04/10/24 1102)  SpO2: (!) 94 % (04/10/24 1102) Vital Signs (24h Range):  Temp:  [97.8 °F (36.6 °C)-98.7 °F (37.1 °C)] 98.1 °F (36.7 °C)  Pulse:  [] 90  Resp:  [18-23] 20  SpO2:  [90 %-99 %] 94 %  BP: (146-215)/(67-80) 160/70     Weight: 82.9 kg (182 lb 12.8 oz)  Body mass index is 26.23 kg/m².     Physical Exam  Vitals and nursing note reviewed.   Constitutional:       Appearance: Normal appearance. He is ill-appearing.   HENT:      Head: Normocephalic and atraumatic.      Nose: Nose normal.   Eyes:      Extraocular Movements: Extraocular movements intact.      Pupils: Pupils are equal, round, and reactive to light.   Cardiovascular:      Rate and Rhythm: Normal rate and regular rhythm.      Heart sounds: Murmur heard.      No friction rub. No gallop.   Pulmonary:      Effort: Pulmonary effort is normal.      Breath sounds: Rales present.   Abdominal:      General: Abdomen is flat. Bowel sounds are normal.      Palpations: Abdomen is soft.   Musculoskeletal:         General: No swelling or deformity.      Cervical back: Normal range of motion and neck supple.   Skin:     General: Skin is warm and dry.      Capillary Refill: Capillary refill takes less than 2 seconds.      Coloration: Skin is not pale.   Neurological:      General: No focal deficit present.      Mental Status: He is alert and oriented to person, place, and time.      Motor: Weakness present.   Psychiatric:         Mood and Affect: Mood normal.          Behavior: Behavior normal.              CRANIAL NERVES     CN III, IV, VI   Pupils are equal, round, and reactive to light.       Significant Labs: All pertinent labs within the past 24 hours have been reviewed.    Significant Imaging: I have reviewed all pertinent imaging results/findings within the past 24 hours.    Assessment/Plan:      * Symptomatic anemia  Patient's anemia is currently controlled. Has received 3 units of PRBCs on 4/9/24 . Etiology likely d/t chronic blood loss  Current CBC reviewed-   Lab Results   Component Value Date    HGB 10.9 (L) 04/10/2024    HCT 33.2 (L) 04/10/2024     Monitor serial CBC and transfuse if patient becomes hemodynamically unstable, symptomatic or H/H drops below 7/21.    Recent Labs   Lab 04/08/24  1105 04/09/24  0530 04/10/24  0524   Hemoglobin 7.8 L 11.2 L 10.9 L           Elevated troponin  Likely from demand mismatch in the setting of anemia.  Continue to monitor.  Appreciate cardiology input    Recent Labs   Lab 04/08/24  1338 04/08/24  1751 04/09/24  1513   Troponin I High Sensitivity 63.5 H 138.1 H 81.4 H           Wide QRS ventricular tachycardia  Appreciate cardiology input.  Suspect secondary to anemia.  Troponin mildly elevated.  Patient asymptomatic.  Likely anemia induced.      GERD (gastroesophageal reflux disease)  PPI while hospitalized.      Hypertension  Chronic, controlled. Latest blood pressure and vitals reviewed-     Temp:  [97.8 °F (36.6 °C)-98.7 °F (37.1 °C)]   Pulse:  []   Resp:  [18-23]   BP: (146-215)/(67-80)   SpO2:  [90 %-99 %] .   Home meds for hypertension were reviewed and noted below.   Hypertension Medications               carvediloL (COREG) 3.125 MG tablet Take 1 tablet (3.125 mg total) by mouth 2 (two) times daily with meals.    furosemide (LASIX) 20 MG tablet Take 20 mg by mouth once daily. Prescribed by Dr. Loredo            While in the hospital, will manage blood pressure as follows; Continue home antihypertensive  regimen    Will utilize p.r.n. blood pressure medication only if patient's blood pressure greater than 180/110 and he develops symptoms such as worsening chest pain or shortness of breath.    Stage 3 chronic kidney disease  Creatine stable for now. BMP reviewed- noted Estimated Creatinine Clearance: 32.7 mL/min (A) (based on SCr of 1.8 mg/dL (H)). according to latest data. Based on current GFR, CKD stage is stage 3 - GFR 30-59.  Monitor UOP and serial BMP and adjust therapy as needed. Renally dose meds. Avoid nephrotoxic medications and procedures.    Lab Results   Component Value Date    CREATININE 1.8 (H) 04/10/2024    CREATININE 1.8 (H) 04/09/2024    CREATININE 2.1 (H) 04/08/2024          Coronary artery disease  Patient is status post CABG.  Denies chest pain currently.  Continue to monitor.    Lupus anticoagulant inhibitor syndrome  Hold anticoagulants setting of symptomatic anemia        VTE Risk Mitigation (From admission, onward)           Ordered     IP VTE HIGH RISK PATIENT  Once         04/08/24 1410     Place sequential compression device  Until discontinued         04/08/24 1410     Place ASHOK hose  Until discontinued         04/08/24 1410                    Discharge Planning   BAMBI:      Code Status: Full Code   Is the patient medically ready for discharge?:     Reason for patient still in hospital (select all that apply): Treatment  Discharge Plan A: Home, Home Health                  Shawn Purdy Jr, MD  Department of Hospital Medicine   [facility]

## 2024-04-10 NOTE — PLAN OF CARE
Problem: Occupational Therapy  Goal: Occupational Therapy Goal  Description: Goals to be met by: 04/17/24     Patient will increase functional independence with ADLs by performing:    UE Dressing with Modified [place].  LE Dressing with Modified [place].  Grooming while standing at sink with Modified [place].  Toileting from toilet with Modified [place] for hygiene and clothing management.   Bathing from  shower chair/bench with Modified [place].  Toilet transfer to toilet with Modified [place].    Outcome: Established OT POC

## 2024-04-10 NOTE — ASSESSMENT & PLAN NOTE
Chronic, controlled. Latest blood pressure and vitals reviewed-     Temp:  [97.8 °F (36.6 °C)-98.7 °F (37.1 °C)]   Pulse:  []   Resp:  [18-23]   BP: (146-215)/(67-80)   SpO2:  [90 %-99 %] .   Home meds for hypertension were reviewed and noted below.   Hypertension Medications               carvediloL (COREG) 3.125 MG tablet Take 1 tablet (3.125 mg total) by mouth 2 (two) times daily with meals.    furosemide (LASIX) 20 MG tablet Take 20 mg by mouth once daily. Prescribed by Dr. Loredo            While in the hospital, will manage blood pressure as follows; Continue home antihypertensive regimen    Will utilize p.r.n. blood pressure medication only if patient's blood pressure greater than 180/110 and he develops symptoms such as worsening chest pain or shortness of breath.

## 2024-04-11 PROBLEM — J96.01 ACUTE HYPOXEMIC RESPIRATORY FAILURE: Status: ACTIVE | Noted: 2024-04-11

## 2024-04-11 PROBLEM — I48.91 ATRIAL FIBRILLATION WITH RVR: Status: ACTIVE | Noted: 2024-04-11

## 2024-04-11 LAB
ALBUMIN SERPL BCP-MCNC: 2.9 G/DL (ref 3.5–5.2)
ALP SERPL-CCNC: 63 U/L (ref 55–135)
ALT SERPL W/O P-5'-P-CCNC: 7 U/L (ref 10–44)
ANION GAP SERPL CALC-SCNC: 3 MMOL/L (ref 3–11)
AST SERPL-CCNC: 18 U/L (ref 10–40)
BASOPHILS # BLD AUTO: ABNORMAL K/UL (ref 0–0.2)
BASOPHILS NFR BLD: 0 % (ref 0–1.9)
BILIRUB SERPL-MCNC: 0.8 MG/DL (ref 0.1–1)
BUN SERPL-MCNC: 29 MG/DL (ref 8–23)
CALCIUM SERPL-MCNC: 9 MG/DL (ref 8.7–10.5)
CHLORIDE SERPL-SCNC: 108 MMOL/L (ref 95–110)
CO2 SERPL-SCNC: 28 MMOL/L (ref 23–29)
CREAT SERPL-MCNC: 1.9 MG/DL (ref 0.5–1.4)
DIFFERENTIAL METHOD BLD: ABNORMAL
EOSINOPHIL # BLD AUTO: ABNORMAL K/UL (ref 0–0.5)
EOSINOPHIL NFR BLD: 0 % (ref 0–8)
ERYTHROCYTE [DISTWIDTH] IN BLOOD BY AUTOMATED COUNT: 16.8 % (ref 11.5–14.5)
EST. GFR  (NO RACE VARIABLE): 34.8 ML/MIN/1.73 M^2
GIANT PLATELETS BLD QL SMEAR: PRESENT
GLUCOSE SERPL-MCNC: 98 MG/DL (ref 70–110)
HCT VFR BLD AUTO: 32.3 % (ref 40–54)
HGB BLD-MCNC: 10.4 G/DL (ref 14–18)
IMM GRANULOCYTES # BLD AUTO: ABNORMAL K/UL (ref 0–0.04)
IMM GRANULOCYTES NFR BLD AUTO: ABNORMAL % (ref 0–0.5)
LYMPHOCYTES # BLD AUTO: ABNORMAL K/UL (ref 1–4.8)
LYMPHOCYTES NFR BLD: 17 % (ref 18–48)
MAGNESIUM SERPL-MCNC: 2 MG/DL (ref 1.6–2.6)
MCH RBC QN AUTO: 29.6 PG (ref 27–31)
MCHC RBC AUTO-ENTMCNC: 32.2 G/DL (ref 32–36)
MCV RBC AUTO: 92 FL (ref 82–98)
METAMYELOCYTES NFR BLD MANUAL: 2 %
MONOCYTES # BLD AUTO: ABNORMAL K/UL (ref 0.3–1)
MONOCYTES NFR BLD: 11 % (ref 4–15)
NEUTROPHILS NFR BLD: 68 % (ref 38–73)
NEUTS BAND NFR BLD MANUAL: 2 %
NRBC BLD-RTO: 0 /100 WBC
PLATELET # BLD AUTO: 96 K/UL (ref 150–450)
PMV BLD AUTO: 11.6 FL (ref 9.2–12.9)
POTASSIUM SERPL-SCNC: 4 MMOL/L (ref 3.5–5.1)
PROT SERPL-MCNC: 6.9 G/DL (ref 6–8.4)
RBC # BLD AUTO: 3.51 M/UL (ref 4.6–6.2)
SODIUM SERPL-SCNC: 139 MMOL/L (ref 136–145)
TROPONIN I SERPL DL<=0.01 NG/ML-MCNC: 48.8 PG/ML (ref 0–60)
WBC # BLD AUTO: 4.41 K/UL (ref 3.9–12.7)

## 2024-04-11 PROCEDURE — 36415 COLL VENOUS BLD VENIPUNCTURE: CPT | Performed by: INTERNAL MEDICINE

## 2024-04-11 PROCEDURE — 36415 COLL VENOUS BLD VENIPUNCTURE: CPT

## 2024-04-11 PROCEDURE — 80053 COMPREHEN METABOLIC PANEL: CPT | Performed by: INTERNAL MEDICINE

## 2024-04-11 PROCEDURE — 94761 N-INVAS EAR/PLS OXIMETRY MLT: CPT

## 2024-04-11 PROCEDURE — 99900035 HC TECH TIME PER 15 MIN (STAT)

## 2024-04-11 PROCEDURE — 85007 BL SMEAR W/DIFF WBC COUNT: CPT | Performed by: INTERNAL MEDICINE

## 2024-04-11 PROCEDURE — 85027 COMPLETE CBC AUTOMATED: CPT | Performed by: INTERNAL MEDICINE

## 2024-04-11 PROCEDURE — 5A09357 ASSISTANCE WITH RESPIRATORY VENTILATION, LESS THAN 24 CONSECUTIVE HOURS, CONTINUOUS POSITIVE AIRWAY PRESSURE: ICD-10-PCS | Performed by: FAMILY MEDICINE

## 2024-04-11 PROCEDURE — 25000003 PHARM REV CODE 250: Performed by: INTERNAL MEDICINE

## 2024-04-11 PROCEDURE — 97110 THERAPEUTIC EXERCISES: CPT | Mod: CO

## 2024-04-11 PROCEDURE — 25000003 PHARM REV CODE 250

## 2024-04-11 PROCEDURE — 27000190 HC CPAP FULL FACE MASK W/VALVE

## 2024-04-11 PROCEDURE — 84484 ASSAY OF TROPONIN QUANT: CPT

## 2024-04-11 PROCEDURE — 63600175 PHARM REV CODE 636 W HCPCS: Performed by: INTERNAL MEDICINE

## 2024-04-11 PROCEDURE — 83735 ASSAY OF MAGNESIUM: CPT | Performed by: INTERNAL MEDICINE

## 2024-04-11 PROCEDURE — 21400001 HC TELEMETRY ROOM

## 2024-04-11 PROCEDURE — 99900031 HC PATIENT EDUCATION (STAT)

## 2024-04-11 PROCEDURE — 27100171 HC OXYGEN HIGH FLOW UP TO 24 HOURS

## 2024-04-11 RX ADMIN — AMIODARONE HYDROCHLORIDE 0.5 MG/MIN: 1.8 INJECTION, SOLUTION INTRAVENOUS at 03:04

## 2024-04-11 RX ADMIN — CARVEDILOL 6.25 MG: 3.12 TABLET, FILM COATED ORAL at 04:04

## 2024-04-11 RX ADMIN — PANTOPRAZOLE SODIUM 40 MG: 40 TABLET, DELAYED RELEASE ORAL at 08:04

## 2024-04-11 RX ADMIN — CARBIDOPA AND LEVODOPA 1 TABLET: 25; 100 TABLET ORAL at 02:04

## 2024-04-11 RX ADMIN — FAMOTIDINE 20 MG: 20 TABLET, FILM COATED ORAL at 08:04

## 2024-04-11 RX ADMIN — CARBIDOPA AND LEVODOPA 1 TABLET: 25; 100 TABLET ORAL at 09:04

## 2024-04-11 RX ADMIN — CARVEDILOL 6.25 MG: 3.12 TABLET, FILM COATED ORAL at 08:04

## 2024-04-11 RX ADMIN — ATORVASTATIN CALCIUM 40 MG: 40 TABLET, FILM COATED ORAL at 08:04

## 2024-04-11 RX ADMIN — AMIODARONE HYDROCHLORIDE 0.5 MG/MIN: 1.8 INJECTION, SOLUTION INTRAVENOUS at 02:04

## 2024-04-11 RX ADMIN — EZETIMIBE 10 MG: 10 TABLET ORAL at 08:04

## 2024-04-11 RX ADMIN — TAMSULOSIN HYDROCHLORIDE 0.4 MG: 0.4 CAPSULE ORAL at 09:04

## 2024-04-11 RX ADMIN — QUETIAPINE FUMARATE 50 MG: 50 TABLET ORAL at 09:04

## 2024-04-11 RX ADMIN — Medication 6 MG: at 09:04

## 2024-04-11 RX ADMIN — CARBIDOPA AND LEVODOPA 1 TABLET: 25; 100 TABLET ORAL at 08:04

## 2024-04-11 NOTE — CARE UPDATE
04/10/24 2220   PRE-TX-O2   Device (Oxygen Therapy) (S)  BIPAP   $ Is the patient on High Flow Oxygen? (S)  Yes   Oxygen Concentration (%) (S)  45       BIPAP ordered per  Dr. Purdy to [facility].     WOB and respiratory rate has decreased.     Will continue to monitor.

## 2024-04-11 NOTE — PLAN OF CARE
Problem: Infection  Goal: Absence of Infection Signs and Symptoms  Outcome: Ongoing, Progressing     Problem: Adult Inpatient Plan of Care  Goal: Plan of Care Review  Outcome: Ongoing, Progressing  Goal: Patient-Specific Goal (Individualized)  Outcome: Ongoing, Progressing  Goal: Absence of Hospital-Acquired Illness or Injury  Outcome: Ongoing, Progressing  Goal: Optimal Comfort and Wellbeing  Outcome: Ongoing, Progressing  Goal: Readiness for Transition of Care  Outcome: Ongoing, Progressing     Problem: Fall Injury Risk  Goal: Absence of Fall and Fall-Related Injury  Outcome: Ongoing, Progressing     Problem: Cardiac Output Decreased (Heart Failure)  Goal: Optimal Cardiac Output  Outcome: Ongoing, Progressing     Problem: Dysrhythmia (Heart Failure)  Goal: Stable Heart Rate and Rhythm  Outcome: Ongoing, Progressing

## 2024-04-11 NOTE — PT/OT/SLP PROGRESS
Physical Therapy      Patient Name:  Hunter Navarrete   MRN:  [id number]    Patient not seen today secondary to patient is unwilling to participate in therapy due to c/o fatigue and having a rough night because of not sleeping well and having respiratory issues last night..  Nurse Maritza Esteves RN aware.. Will follow-up tomorrow, 4/12/2024.

## 2024-04-11 NOTE — PLAN OF CARE
Problem: Occupational Therapy  Goal: Occupational Therapy Goal  Description: Goals to be met by: 04/17/24     Patient will increase functional independence with ADLs by performing:    UE Dressing with Modified [place].  LE Dressing with Modified [place].  Grooming while standing at sink with Modified [place].  Toileting from toilet with Modified [place] for hygiene and clothing management.   Bathing from  shower chair/bench with Modified [place].  Toilet transfer to toilet with Modified [place].    Outcome: Ongoing, Progressing

## 2024-04-11 NOTE — ASSESSMENT & PLAN NOTE
Creatine stable for now. BMP reviewed- noted Estimated Creatinine Clearance: 31 mL/min (A) (based on SCr of 1.9 mg/dL (H)). according to latest data. Based on current GFR, CKD stage is stage 3 - GFR 30-59.  Monitor UOP and serial BMP and adjust therapy as needed. Renally dose meds. Avoid nephrotoxic medications and procedures.    Lab Results   Component Value Date    CREATININE 1.9 (H) 04/11/2024    CREATININE 1.8 (H) 04/10/2024    CREATININE 1.8 (H) 04/09/2024

## 2024-04-11 NOTE — PROGRESS NOTES
[facility] Medicine  Progress Note    Patient Name: Hunter Navarrete  MRN: [id number]  Patient Class: IP- Inpatient   Admission Date: 4/8/2024  Length of Stay: 3 days  Attending Physician: Shawn Purdy Jr., MD  Primary Care Provider: Shawn Purdy Jr., MD        Subjective:     Principal Problem:Symptomatic anemia        HPI:  Chief Complaint   Patient presents with    Nausea    Diarrhea    Vomiting    Weakness       Pt here per [facility] with N/V/D and weakness since this am. Pt vomited x 1. Diarrhea x 1. Pt received 500mg IV bolus and 4mg Zofran PtA      This is an 83-year-old white male with multiple medical problems including anemia requiring transfusion, CAD status post CABG, chronic kidney disease, Parkinson's, and UTI who presents to the emergency department via EMS with complaints of generalized weakness that began this morning.  Patient reports that he was in his regular state of health, and shortly after eating breakfast he developed nausea with 1 episode of vomiting, diarrhea, and brief episode of anterior chest pain with shortness of breath.  He also had transient episode of mid abdominal aching pain.  Since that time has been experiencing weakness and lightheadedness.  Family members report that his blood pressure has been fluctuating over the last several days, experiencing many lows, and he has been taking his blood pressure medications as directed.  Patient denies any pain at the time of evaluation and has had resolution nausea after receiving Zofran in route to the hospital.  He further denies headache, URI signs and symptoms, dysuria, or any other relative symptoms at this time.       Overview/Hospital Course:  4/10:  Patient has responded nicely to blood products.  States that he does not feel well today.  Feels generally weak and fatigued.  Will have therapy work with the patient throughout the day and consider discharge tomorrow should hemoglobin remained stable and he  reach his normal functional capacity.    4/11:  Patient had an acute decompensation yesterday evening.  He was noted to have atrial fibrillation with rapid ventricular response.  He was noted to be diaphoretic and tachypneic.  The patient was given a 1 time dose of Lasix.  He was placed on BiPAP.  Amnio drip was started.  He has since stabilized.  Breathing more comfortably on BiPAP and now resting.  Heart rate in the 80s.  Will transitioned to oral amiodarone tomorrow if he remains stable.  Should you have any further decompensation will consider transition to outside facility.  Long discussion held with the patient's family now more strongly considering inpatient rehab as an option given his recent decompensation.  This would be ideal for strength training in addition to ongoing inpatient monitoring.    Past Medical History:   Diagnosis Date    Acid reflux     Anemia     Anticoagulant long-term use     Anxiety disorder, unspecified     Aortic aneurysm, abdominal     Arthritis     Bronchitis     Carotid stenosis 05/13/2022    60-79% Right ICA 60-70% Left ICA    Cataract     Celiac artery stenosis     50% by CTA abdomen 7/27/2020    CKD (chronic kidney disease)     45% FUNCTION    Coronary artery disease     stents x4    Encounter for blood transfusion     Enlarged prostate     GERD (gastroesophageal reflux disease)     Heart attack     Hemorrhoids     Hypercholesteremia     Hypertension     Iron deficiency anemia, unspecified     Leaky heart valve     Lupus anticoagulant disorder     Mood disorder     Parkinson's disease     Renal artery stenosis     by CTA 7/27/2020    Restless leg syndrome     Skin cancer     Skin tear of elbow without complication     above elbow    Unspecified chronic bronchitis     UTI (urinary tract infection)        Past Surgical History:   Procedure Laterality Date    ACF      Anterior Cervical Fusion    BACK SURGERY      RODS IN BACK; 4 SURGIERIES    BIOPSY OF URETER Right 06/22/2023     Procedure: RESECTION/BIOPSY, URETER;  Surgeon: Aaron Pinon MD;  Location: [facility] OR;  Service: Urology;  Laterality: Right;    BONE MARROW BIOPSY      CARDIAC ANGIOGRAM WITH STENT      CATARACT SX Bilateral     CHOLECYSTECTOMY      CORONARY ANGIOGRAPHY N/A 02/15/2023    Procedure: ANGIOGRAM, CORONARY ARTERY;  Surgeon: Rito Vega MD;  Location: [facility] CATH;  Service: Cardiology;  Laterality: N/A;    CORONARY ARTERY BYPASS GRAFT (CABG) N/A 02/24/2023    Procedure: CORONARY ARTERY BYPASS GRAFT (CABG);  Surgeon: Spencer Hagan MD;  Location: [facility] OR;  Service: Cardiothoracic;  Laterality: N/A;  CABG / EVH //   ECHO NOTIFIED    CYSTOSCOPY W/ RETROGRADES Bilateral 06/20/2023    Procedure: CYSTOSCOPY WITH RETROGRADE PYELOGRAM;  Surgeon: Aaron Pinon MD;  Location: [facility] OR;  Service: Urology;  Laterality: Bilateral;  Pt has a Spinal Cord Stimulator  9am per [facility], To follow RM4    CYSTOSCOPY W/ RETROGRADES Right 06/22/2023    Procedure: CYSTOSCOPY, WITH RETROGRADE PYELOGRAM;  Surgeon: Aaron Pinon MD;  Location: [facility] OR;  Service: Urology;  Laterality: Right;    CYSTOSCOPY W/ RETROGRADES Bilateral 8/24/2023    Procedure: CYSTOSCOPY, WITH RETROGRADE PYELOGRAM;  Surgeon: Aaron Pinon MD;  Location: [facility] OR;  Service: Urology;  Laterality: Bilateral;    CYSTOSCOPY W/ RETROGRADES Bilateral 12/4/2023    Procedure: CYSTOSCOPY WITH RETROGRADE PYELOGRAM;  Surgeon: Aaron Pinon MD;  Location: [facility] OR;  Service: Urology;  Laterality: Bilateral;  pt has spinal cord stimulator    DIGITAL RECTAL EXAMINATION UNDER ANESTHESIA N/A 12/4/2023    Procedure: EXAM UNDER ANESTHESIA, DIGITAL, RECTUM;  Surgeon: Aaron Pinon MD;  Location: [facility] OR;  Service: Urology;  Laterality: N/A;    HIP SURGERY Right     THR    KNEE SURGERY Right     ATS    LEFT HEART CATHETERIZATION Left 07/22/2021    Procedure: Left heart cath;  Surgeon: Curtis Loredo MD;  Location: [facility] CATH;  Service: Cardiology;  Laterality: Left;     LEFT HEART CATHETERIZATION Left 09/22/2022    Procedure: Left heart cath;  Surgeon: Giorgi Barker MD;  Location: [facility] CATH;  Service: Cardiology;  Laterality: Left;    REMOVAL, STENT, URETER Right 8/24/2023    Procedure: REMOVAL, STENT, URETER;  Surgeon: Aaron Pinon MD;  Location: [facility] OR;  Service: Urology;  Laterality: Right;    ROTATOR CUFF REPAIR Bilateral     SHOULDER SURGERY Bilateral     SPINAL CORD STIMULATOR IMPLANT      STENT, URETERAL Left 06/22/2023    Procedure: STENT, URETERAL;  Surgeon: Aaron Pinon MD;  Location: [facility] OR;  Service: Urology;  Laterality: Left;    steroid injections Right 06/13/2023    Knee    ULNAR NERVE TRANSPOSITION      URETEROSCOPY Right 06/22/2023    Procedure: URETEROSCOPY;  Surgeon: Aaron Pinon MD;  Location: [facility] OR;  Service: Urology;  Laterality: Right;       Review of patient's allergies indicates:   Allergen Reactions    Cardura [doxazosin] Hives    Lyrica [pregabalin] Other (See Comments)    Paxil [paroxetine hcl] Other (See Comments)    Restoril [temazepam] Hallucinations    Vioxx [rofecoxib] Swelling    Zithromax [azithromycin] Hives    Alpha 1 blocker- quinazolines     Benzodiazepines        No current facility-administered medications on file prior to encounter.     Current Outpatient Medications on File Prior to Encounter   Medication Sig    azelastine (ASTELIN) 137 mcg (0.1 %) nasal spray 1 spray (137 mcg total) by Nasal route 2 (two) times daily.    calcium carbonate-vitamin D3 500 mg-10 mcg (400 unit) Tab Take 1 tablet by mouth once daily.    carbidopa-levodopa (PARCOPA)  mg per disintegrating tablet Take 1 tablet by mouth 3 (three) times daily.    carvediloL (COREG) 3.125 MG tablet Take 1 tablet (3.125 mg total) by mouth 2 (two) times daily with meals.    clindamycin phosphate 1% (CLINDAGEL) 1 % gel Apply topically 2 (two) times daily.    clopidogreL (PLAVIX) 75 mg tablet Take 75 mg by mouth once daily.    dextran 70/hypromellose (ARTIFICIAL  TEARS, PF, OPHT) Apply 1 drop to eye 3 (three) times daily.    docusate sodium (COLACE) 100 MG capsule Take 100 mg by mouth 2 (two) times daily.    ezetimibe (ZETIA) 10 mg tablet Take 1 tablet (10 mg total) by mouth once daily.    fluticasone propionate (FLONASE) 50 mcg/actuation nasal spray 1 spray by Each Nostril route 2 (two) times a day.    furosemide (LASIX) 20 MG tablet Take 20 mg by mouth once daily. Prescribed by Dr. Loredo    gabapentin (NEURONTIN) 100 MG capsule Take 500 mg by mouth. 2C QAM, 2C QPM, 1C QHS    HYDROcodone-acetaminophen (NORCO) 7.5-325 mg per tablet Prescribed by Dr. Hernandez    hydroxychloroquine (PLAQUENIL) 200 mg tablet Take 200 mg by mouth once daily.    lanolin/mineral oil/petrolatum (ARTIFICIAL TEARS OPHT) Apply to eye.    niacin 1,000 mg TbSR Take 1 tablet by mouth Daily.    pantoprazole (PROTONIX) 40 MG tablet Take 1 tablet (40 mg total) by mouth once daily.    polyvinyl alcohol, artificial tears, (LIQUIFILM TEARS) 1.4 % ophthalmic solution Place 1 drop into both eyes as needed.    predniSONE (DELTASONE) 5 MG tablet Take 2.5 mg by mouth once daily.    QUEtiapine (SEROQUEL) 50 MG tablet Take 50 mg by mouth every evening.    rosuvastatin (CRESTOR) 40 MG Tab Take 1 tablet (40 mg total) by mouth every evening.    tamsulosin (FLOMAX) 0.4 mg Cap Take 1 capsule (0.4 mg total) by mouth every evening.    DULoxetine (CYMBALTA) 30 MG capsule Take 1 capsule (30 mg total) by mouth once daily.    rOPINIRole (REQUIP) 1 MG tablet Take 1 tablet (1 mg total) by mouth 2 (two) times daily.     Family History       Problem Relation (Age of Onset)    Cancer Brother, Brother    Heart disease Father, Sister, Brother, Brother, Brother    Lung cancer Brother    Throat cancer Brother          Tobacco Use    Smoking status: Former     Current packs/day: 0.00     Types: Cigarettes     Quit date:      Years since quittin.2    Smokeless tobacco: Never   Substance and Sexual Activity    Alcohol use: Yes      Alcohol/week: 2.0 standard drinks of alcohol     Types: 1 Glasses of wine, 1 Cans of beer per week     Comment: RARELY    Drug use: No    Sexual activity: Not on file     Review of Systems   Unable to perform ROS: Mental status change   Constitutional:  Positive for fatigue. Negative for chills and fever.   HENT:  Negative for rhinorrhea, sneezing and sore throat.    Eyes:  Negative for pain and visual disturbance.   Respiratory:  Positive for shortness of breath. Negative for cough.    Cardiovascular:  Positive for chest pain.   Gastrointestinal:  Positive for abdominal pain, diarrhea, nausea and vomiting. Negative for constipation.   Endocrine: Negative for cold intolerance and heat intolerance.   Genitourinary:  Negative for difficulty urinating.   Musculoskeletal:  Negative for arthralgias and joint swelling.   Skin:  Positive for pallor. Negative for rash.   Allergic/Immunologic: Negative for environmental allergies.   Neurological:  Positive for weakness. Negative for dizziness and syncope.   Hematological:  Does not bruise/bleed easily.   Psychiatric/Behavioral:  Negative for dysphoric mood. The patient is not nervous/anxious.      Objective:     Vital Signs (Most Recent):  Temp: 98.3 °F (36.8 °C) (04/11/24 1120)  Pulse: 78 (04/11/24 1120)  Resp: 20 (04/11/24 1120)  BP: 138/61 (04/11/24 1120)  SpO2: (!) 94 % (04/11/24 1120) Vital Signs (24h Range):  Temp:  [98 °F (36.7 °C)-99.1 °F (37.3 °C)] 98.3 °F (36.8 °C)  Pulse:  [] 78  Resp:  [16-32] 20  SpO2:  [94 %-100 %] 94 %  BP: (125-210)/(58-93) 138/61     Weight: 82.9 kg (182 lb 12.8 oz)  Body mass index is 26.23 kg/m².     Physical Exam  Vitals and nursing note reviewed.   Constitutional:       Appearance: Normal appearance. He is ill-appearing.   HENT:      Head: Normocephalic and atraumatic.      Nose: Nose normal.   Eyes:      Extraocular Movements: Extraocular movements intact.      Pupils: Pupils are equal, round, and reactive to light.    Cardiovascular:      Rate and Rhythm: Normal rate and regular rhythm.      Heart sounds: Murmur heard.      No friction rub. No gallop.   Pulmonary:      Effort: Pulmonary effort is normal.      Breath sounds: Normal breath sounds.      Comments: bipap  Abdominal:      General: Abdomen is flat. Bowel sounds are normal.      Palpations: Abdomen is soft.   Musculoskeletal:         General: No swelling or deformity.      Cervical back: Normal range of motion and neck supple.   Skin:     General: Skin is warm and dry.      Capillary Refill: Capillary refill takes less than 2 seconds.      Coloration: Skin is not pale.   Neurological:      General: No focal deficit present.      Mental Status: He is alert and oriented to person, place, and time.      Motor: Weakness present.   Psychiatric:         Mood and Affect: Mood normal.         Behavior: Behavior normal.              CRANIAL NERVES     CN III, IV, VI   Pupils are equal, round, and reactive to light.       Significant Labs: All pertinent labs within the past 24 hours have been reviewed.    Significant Imaging: I have reviewed all pertinent imaging results/findings within the past 24 hours.    Assessment/Plan:      * Symptomatic anemia  Patient's anemia is currently controlled. Has received 3 units of PRBCs on 4/9/24 . Etiology likely d/t chronic blood loss  Current CBC reviewed-   Lab Results   Component Value Date    HGB 10.4 (L) 04/11/2024    HCT 32.3 (L) 04/11/2024     Monitor serial CBC and transfuse if patient becomes hemodynamically unstable, symptomatic or H/H drops below 7/21.    Recent Labs   Lab 04/09/24  0530 04/10/24  0524 04/11/24  0544   Hemoglobin 11.2 L 10.9 L 10.4 L           Acute hypoxemic respiratory failure  Patient with Hypoxic Respiratory failure which is Acute.  he is not on home oxygen. Supplemental oxygen was provided and noted- Oxygen Concentration (%):  [45-50] 45    .   Signs/symptoms of respiratory failure include- tachypnea,  increased work of breathing, respiratory distress, and use of accessory muscles. Contributing diagnoses includes - CHF and Pleural effusion Labs and images were reviewed. Patient Has recent ABG, which has been reviewed. Will treat underlying causes and adjust management of respiratory failure as follows- now resolved with BiPAP and treatment of underlying atrial fibrillation with rapid ventricular response.  Wean oxygen as tolerated.    Atrial fibrillation with RVR  Now on amiodarone drip.  Rate control achieved.  Transitioned to oral amiodarone once stable    Elevated troponin  Likely from demand mismatch in the setting of anemia.  Continue to monitor.  Appreciate cardiology input    Recent Labs   Lab 04/08/24  1751 04/09/24  1513 04/11/24  0914   Troponin I High Sensitivity 138.1 H 81.4 H 48.8             Wide QRS ventricular tachycardia  Appreciate cardiology input.  Suspect secondary to anemia.  Troponin mildly elevated.  Patient asymptomatic.  Likely anemia induced.      GERD (gastroesophageal reflux disease)  PPI while hospitalized.      Hypertension  Chronic, controlled. Latest blood pressure and vitals reviewed-     Temp:  [98 °F (36.7 °C)-99.1 °F (37.3 °C)]   Pulse:  []   Resp:  [16-32]   BP: (125-210)/(58-93)   SpO2:  [94 %-100 %] .   Home meds for hypertension were reviewed and noted below.   Hypertension Medications               carvediloL (COREG) 3.125 MG tablet Take 1 tablet (3.125 mg total) by mouth 2 (two) times daily with meals.    furosemide (LASIX) 20 MG tablet Take 20 mg by mouth once daily. Prescribed by Dr. Loredo            While in the hospital, will manage blood pressure as follows; Continue home antihypertensive regimen    Will utilize p.r.n. blood pressure medication only if patient's blood pressure greater than 180/110 and he develops symptoms such as worsening chest pain or shortness of breath.    Stage 3 chronic kidney disease  Creatine stable for now. BMP reviewed- noted  Estimated Creatinine Clearance: 31 mL/min (A) (based on SCr of 1.9 mg/dL (H)). according to latest data. Based on current GFR, CKD stage is stage 3 - GFR 30-59.  Monitor UOP and serial BMP and adjust therapy as needed. Renally dose meds. Avoid nephrotoxic medications and procedures.    Lab Results   Component Value Date    CREATININE 1.9 (H) 04/11/2024    CREATININE 1.8 (H) 04/10/2024    CREATININE 1.8 (H) 04/09/2024          Coronary artery disease  Patient is status post CABG.  Denies chest pain currently.  Continue to monitor.    Recent Labs   Lab 04/08/24  1751 04/09/24  1513 04/11/24  0914   Troponin I High Sensitivity 138.1 H 81.4 H 48.8         Lupus anticoagulant inhibitor syndrome  Hold anticoagulants setting of symptomatic anemia        VTE Risk Mitigation (From admission, onward)           Ordered     IP VTE HIGH RISK PATIENT  Once         04/08/24 1410     Place sequential compression device  Until discontinued         04/08/24 1410     Place ASHOK hose  Until discontinued         04/08/24 1410                    Discharge Planning   ABMBI:      Code Status: Full Code   Is the patient medically ready for discharge?:     Reason for patient still in hospital (select all that apply): Treatment  Discharge Plan A: Home, Home Health                  Shawn Purdy Jr, MD  Department of Hospital Medicine   [facility]

## 2024-04-11 NOTE — ASSESSMENT & PLAN NOTE
Chronic, controlled. Latest blood pressure and vitals reviewed-     Temp:  [98 °F (36.7 °C)-99.1 °F (37.3 °C)]   Pulse:  []   Resp:  [16-32]   BP: (125-210)/(58-93)   SpO2:  [94 %-100 %] .   Home meds for hypertension were reviewed and noted below.   Hypertension Medications               carvediloL (COREG) 3.125 MG tablet Take 1 tablet (3.125 mg total) by mouth 2 (two) times daily with meals.    furosemide (LASIX) 20 MG tablet Take 20 mg by mouth once daily. Prescribed by Dr. Loredo            While in the hospital, will manage blood pressure as follows; Continue home antihypertensive regimen    Will utilize p.r.n. blood pressure medication only if patient's blood pressure greater than 180/110 and he develops symptoms such as worsening chest pain or shortness of breath.

## 2024-04-11 NOTE — ASSESSMENT & PLAN NOTE
Patient with Hypoxic Respiratory failure which is Acute.  he is not on home oxygen. Supplemental oxygen was provided and noted- Oxygen Concentration (%):  [45-50] 45    .   Signs/symptoms of respiratory failure include- tachypnea, increased work of breathing, respiratory distress, and use of accessory muscles. Contributing diagnoses includes - CHF and Pleural effusion Labs and images were reviewed. Patient Has recent ABG, which has been reviewed. Will treat underlying causes and adjust management of respiratory failure as follows- now resolved with BiPAP and treatment of underlying atrial fibrillation with rapid ventricular response.  Wean oxygen as tolerated.

## 2024-04-11 NOTE — PROGRESS NOTES
[facility]  Cardiology  Progress Note    Patient Name: Hunter Navarrete  MRN: [id number]  Admission Date: 4/8/2024  Hospital Length of Stay: 4 days  Code Status: Prior   Attending Provider: No att. providers found   Primary Care Physician: Shawn Purdy Jr., MD  Principal Problem:Symptomatic anemia    Patient information was obtained from patient, past medical records, and ER records.     Subjective:     Chief Complaint:  anemia, wide QRS on EKG     HPI:   This is an 84 yo male with PMHx CAD s/p CABG 2/2023, Parkinson's, and HTN who presented to the emergency department with c/o weakness, nausea, vomiting, and overall fatigue onset x 1 day. On arrival to ED, he was found to be significantly anemic with H/H 7.8/24.7. Cardiology consulted for wide complex QRS on 12-lead EKG. He denies any chest pain, shortness of breath, or dyspnea at this time. He was admitted to the floor for blood transfusion and close telemetry monitoring.     Pt is seen receiving 1 unit of pRBCs. He states he is feeling better and denies any symptoms of chest pain or shortness of breath at this time. Telemetry monitoring revealing improvement in QRS complex. We will get repeat 12-lead EKG after completion of the 3 units of pRBCs.     Interval hx:  4/9/24: Pt widened QRS improved with blood transfusion; however, later today pt's rate became tachycardic into the 130's with a wide complex. 12-lead EKG not showing any acute ST changes. His carvedilol was increased to 6.25 mg po bid. May consider addition of amiodarone if HR continues to remain elevated. Pt denies any symptoms at this time, and he is seen in his room resting comfortably in bed with no acute distress. He does appear mildly short of breath, however denies any chest pain or palpitations. His HS troponin peaked at 138.1 which has down trended to 81.4 - likely related to demand ischemia to significant anemia. H/H now stable s/p 2 units blood transfusion.     4/10/24: Overnight,  pt was moved to a room with functioning AC. He was able to cool down. His HR is now controlled with increase in carvedilol dose. Telemetry stable, no acute rhythm changes. He denies any chest pain, shortness of breath, diaphoresis. Continue to monitor with telemetry. Continue current regimen.     4/11/2024: Yesterday afternoon, pt became tachycardic so IV amiodarone was initiated. This morning, his HR is better controlled in 70-80s. BP stable. H/H stable, however with slight downward trend 11.2/34.6>10.9/33.2>10.4/32.3. Pt's vitals are stable; however, his clinical picture appears to be worsening. He is somnolent but arousable to conversation. He states he is feeling fatigued, chills/cold, and just overall unwell. Per family at beside, pt was clammy yesterday evening. His family is requesting transfer to [place] should this be recommended.  He denies chest pain or pressure.       Past Medical History:   Diagnosis Date    Acid reflux     Anemia     Anticoagulant long-term use     Anxiety disorder, unspecified     Aortic aneurysm, abdominal     Arthritis     Bronchitis     Carotid stenosis 05/13/2022    60-79% Right ICA 60-70% Left ICA    Cataract     Celiac artery stenosis     50% by CTA abdomen 7/27/2020    CKD (chronic kidney disease)     45% FUNCTION    Coronary artery disease     stents x4    Encounter for blood transfusion     Enlarged prostate     GERD (gastroesophageal reflux disease)     Heart attack     Hemorrhoids     Hypercholesteremia     Hypertension     Iron deficiency anemia, unspecified     Leaky heart valve     Lupus anticoagulant disorder     Mood disorder     Parkinson's disease     Renal artery stenosis     by CTA 7/27/2020    Restless leg syndrome     Skin cancer     Skin tear of elbow without complication     above elbow    Unspecified chronic bronchitis     UTI (urinary tract infection)        Past Surgical History:   Procedure Laterality Date    ACF      Anterior Cervical Fusion    BACK  SURGERY      RODS IN BACK; 4 SURGIERIES    BIOPSY OF URETER Right 06/22/2023    Procedure: RESECTION/BIOPSY, URETER;  Surgeon: Aaron Pinon MD;  Location: [facility] OR;  Service: Urology;  Laterality: Right;    BONE MARROW BIOPSY      CARDIAC ANGIOGRAM WITH STENT      CATARACT SX Bilateral     CHOLECYSTECTOMY      CORONARY ANGIOGRAPHY N/A 02/15/2023    Procedure: ANGIOGRAM, CORONARY ARTERY;  Surgeon: Rito Vega MD;  Location: [facility] CATH;  Service: Cardiology;  Laterality: N/A;    CORONARY ARTERY BYPASS GRAFT (CABG) N/A 02/24/2023    Procedure: CORONARY ARTERY BYPASS GRAFT (CABG);  Surgeon: Spencer Hagan MD;  Location: [facility] OR;  Service: Cardiothoracic;  Laterality: N/A;  CABG / EVH //   ECHO NOTIFIED    CYSTOSCOPY W/ RETROGRADES Bilateral 06/20/2023    Procedure: CYSTOSCOPY WITH RETROGRADE PYELOGRAM;  Surgeon: Aaron Pinon MD;  Location: [facility] OR;  Service: Urology;  Laterality: Bilateral;  Pt has a Spinal Cord Stimulator  9am per [place], To follow RM4    CYSTOSCOPY W/ RETROGRADES Right 06/22/2023    Procedure: CYSTOSCOPY, WITH RETROGRADE PYELOGRAM;  Surgeon: Aaron Pinon MD;  Location: [facility] OR;  Service: Urology;  Laterality: Right;    CYSTOSCOPY W/ RETROGRADES Bilateral 8/24/2023    Procedure: CYSTOSCOPY, WITH RETROGRADE PYELOGRAM;  Surgeon: Aaron Pinon MD;  Location: [facility] OR;  Service: Urology;  Laterality: Bilateral;    CYSTOSCOPY W/ RETROGRADES Bilateral 12/4/2023    Procedure: CYSTOSCOPY WITH RETROGRADE PYELOGRAM;  Surgeon: Aaron Pinon MD;  Location: [facility] OR;  Service: Urology;  Laterality: Bilateral;  pt has spinal cord stimulator    DIGITAL RECTAL EXAMINATION UNDER ANESTHESIA N/A 12/4/2023    Procedure: EXAM UNDER ANESTHESIA, DIGITAL, RECTUM;  Surgeon: Aaron Pinon MD;  Location: [facility] OR;  Service: Urology;  Laterality: N/A;    HIP SURGERY Right     THR    KNEE SURGERY Right     ATS    LEFT HEART CATHETERIZATION Left 07/22/2021    Procedure: Left heart cath;  Surgeon: Curtis HICKMAN  MD Facundo;  [facility];  Service: Cardiology;  Laterality: Left;    LEFT HEART CATHETERIZATION Left 09/22/2022    Procedure: Left heart cath;  Surgeon: Giorgi Barker MD;  [facility];  Service: Cardiology;  Laterality: Left;    REMOVAL, STENT, URETER Right 8/24/2023    Procedure: REMOVAL, STENT, URETER;  Surgeon: Aaron Pinon MD;  [facility];  Service: Urology;  Laterality: Right;    ROTATOR CUFF REPAIR Bilateral     SHOULDER SURGERY Bilateral     SPINAL CORD STIMULATOR IMPLANT      STENT, URETERAL Left 06/22/2023    Procedure: STENT, URETERAL;  Surgeon: Araon Pinon MD;  [facility];  Service: Urology;  Laterality: Left;    steroid injections Right 06/13/2023    Knee    ULNAR NERVE TRANSPOSITION      URETEROSCOPY Right 06/22/2023    Procedure: URETEROSCOPY;  Surgeon: Aaron Pinon MD;  [facility];  Service: Urology;  Laterality: Right;       Review of patient's allergies indicates:   Allergen Reactions    Cardura [doxazosin] Hives    Lyrica [pregabalin] Other (See Comments)    Paxil [paroxetine hcl] Other (See Comments)    Restoril [temazepam] Hallucinations    Vioxx [rofecoxib] Swelling    Zithromax [azithromycin] Hives    Alpha 1 blocker- quinazolines     Benzodiazepines        No current facility-administered medications for this encounter.     Current Outpatient Medications   Medication Sig Dispense Refill    azelastine (ASTELIN) 137 mcg (0.1 %) nasal spray 1 spray (137 mcg total) by Nasal route 2 (two) times daily. 30 mL 0    calcium carbonate-vitamin D3 500 mg-10 mcg (400 unit) Tab Take 1 tablet by mouth once daily.      carbidopa-levodopa (PARCOPA)  mg per disintegrating tablet Take 1 tablet by mouth 3 (three) times daily.      carvediloL (COREG) 3.125 MG tablet Take 1 tablet (3.125 mg total) by mouth 2 (two) times daily with meals. 180 tablet 1    dextran 70/hypromellose (ARTIFICIAL TEARS, PF, OPHT) Apply 1 drop to eye 3 (three) times daily.      docusate  sodium (COLACE) 100 MG capsule Take 100 mg by mouth 2 (two) times daily.      ezetimibe (ZETIA) 10 mg tablet Take 1 tablet (10 mg total) by mouth once daily. 90 tablet 1    fluticasone propionate (FLONASE) 50 mcg/actuation nasal spray 1 spray by Each Nostril route 2 (two) times a day.      furosemide (LASIX) 20 MG tablet Take 20 mg by mouth once daily. Prescribed by Dr. Loredo      HYDROcodone-acetaminophen (NORCO) 7.5-325 mg per tablet Prescribed by Dr. Hernandez      hydroxychloroquine (PLAQUENIL) 200 mg tablet Take 200 mg by mouth once daily.      niacin 1,000 mg TbSR Take 1 tablet by mouth Daily.      predniSONE (DELTASONE) 5 MG tablet Take 2.5 mg by mouth once daily.      QUEtiapine (SEROQUEL) 50 MG tablet Take 50 mg by mouth every evening.      rosuvastatin (CRESTOR) 40 MG Tab Take 1 tablet (40 mg total) by mouth every evening. 90 tablet 1    tamsulosin (FLOMAX) 0.4 mg Cap Take 1 capsule (0.4 mg total) by mouth every evening. 90 capsule 1    amiodarone (PACERONE) 200 MG Tab Take 1 tablet (200 mg total) by mouth 2 (two) times daily. 60 tablet 0    clindamycin phosphate 1% (CLINDAGEL) 1 % gel Apply topically 2 (two) times daily. 30 g 1    DULoxetine (CYMBALTA) 30 MG capsule Take 1 capsule (30 mg total) by mouth once daily. 30 capsule 2    pantoprazole (PROTONIX) 40 MG tablet Take 1 tablet (40 mg total) by mouth once daily. 90 tablet 1    rOPINIRole (REQUIP) 1 MG tablet Take 1 tablet (1 mg total) by mouth 2 (two) times daily. 60 tablet 0     Family History       Problem Relation (Age of Onset)    Cancer Brother, Brother    Heart disease Father, Sister, Brother, Brother, Brother    Lung cancer Brother    Throat cancer Brother          Tobacco Use    Smoking status: Former     Current packs/day: 0.00     Types: Cigarettes     Quit date:      Years since quittin.3    Smokeless tobacco: Never   Substance and Sexual Activity    Alcohol use: Yes     Alcohol/week: 2.0 standard drinks of alcohol     Types: 1 Glasses  of wine, 1 Cans of beer per week     Comment: RARELY    Drug use: No    Sexual activity: Not on file     Review of Systems   Constitutional: Positive for chills, diaphoresis and malaise/fatigue.   Cardiovascular:  Negative for chest pain, dyspnea on exertion, irregular heartbeat, leg swelling, near-syncope (light-headed), palpitations and syncope.   Respiratory:  Positive for shortness of breath. Negative for cough.    Gastrointestinal:  Negative for abdominal pain, hematemesis, hematochezia, melena, nausea and vomiting.   Genitourinary:  Negative for hematuria.   Neurological:  Positive for weakness. Negative for dizziness and light-headedness.   Psychiatric/Behavioral:  Negative for altered mental status.    All other systems reviewed and are negative.    Objective:     Vital Signs (Most Recent):  Temp: 98.6 °F (37 °C) (04/12/24 0702)  Pulse: 83 (04/12/24 0745)  Resp: 18 (04/12/24 0745)  BP: (!) 166/72 (04/12/24 0702)  SpO2: 95 % (04/12/24 0745) Vital Signs (24h Range):        Weight: 82.9 kg (182 lb 12.8 oz)  Body mass index is 26.23 kg/m².    SpO2: 95 %       No intake or output data in the 24 hours ending 04/23/24 0847      Lines/Drains/Airways       None                   Physical Exam  Vitals and nursing note reviewed.   Constitutional:       General: He is sleeping.      Appearance: He is ill-appearing.      Comments: Somnolent but arousable   HENT:      Head: Normocephalic and atraumatic.      Mouth/Throat:      Mouth: Mucous membranes are moist.      Pharynx: Oropharynx is clear.   Eyes:      Extraocular Movements: Extraocular movements intact.   Cardiovascular:      Rate and Rhythm: Normal rate and regular rhythm.      Pulses: Normal pulses.      Heart sounds: Murmur heard.      No friction rub. No gallop.   Pulmonary:      Effort: Pulmonary effort is normal.      Breath sounds: Normal breath sounds. No wheezing, rhonchi or rales.   Musculoskeletal:      Cervical back: Normal range of motion.      Right  "lower leg: No edema.      Left lower leg: No edema.   Skin:     General: Skin is warm.      Capillary Refill: Capillary refill takes less than 2 seconds.   Neurological:      General: No focal deficit present.      Mental Status: He is oriented to person, place, and time and easily aroused.         Significant Labs: BMP:   No results for input(s): "GLU", "NA", "K", "CL", "CO2", "BUN", "CREATININE", "CALCIUM", "MG" in the last 48 hours.  , CMP   No results for input(s): "NA", "K", "CL", "CO2", "GLU", "BUN", "CREATININE", "CALCIUM", "PROT", "ALBUMIN", "BILITOT", "ALKPHOS", "AST", "ALT", "ANIONGAP", "ESTGFRAFRICA", "EGFRNONAA" in the last 48 hours.  , CBC   No results for input(s): "WBC", "HGB", "HCT", "PLT" in the last 48 hours.  , and All pertinent lab results from the last 24 hours have been reviewed.    Significant Imaging:       Assessment and Plan:     Wide QRS Tachycardia - improved with Amiodarone   -telemetry with improved QRS and rate 70-80s - no acute rhythm changes noted - HR improved with initiation of amiodarone   -initial  troponin mildly elevated with peak 138.1 and down trend - checking STAT troponin this morning   -initially HS-trop trend - 49.5>63.5>138.1>81.4 - likely related to demand iscemia 2/2 significant anemia - pt initially asymptomatic, now with generalized weakness, fatigue, and intermittent diaphoresis and chills - repeating STAT HStrop - if elevated, elect for transfer to cath lab capable facility given significant CAD with CABG Feb 2023.     Anemia, improved   -H/H 7.8/24/7 > 11.2/34.6 > 10.9/33.2 > 10.4/32.3  -s/p 3 units pRBCs   -denies any active bleeding - no melena, hematochezia, hematemesis   -continue management per primary    HTN  -BP normotensive/low end of normal  -continue to monitor   -resume home medications as appropriate     CAD s/p CABG  -pt currently denies chest pain, shortness of breath; however is fatigued, chills - evaluating cardiac enzymes today   -continue to " monitor     Would agree to transfer to higher level of care facility. Family is requesting transfer to [facility] which is closer to his family and surgeon who performed his CABG.        Active Diagnoses:    Diagnosis Date Noted POA    PRINCIPAL PROBLEM:  Symptomatic anemia [D64.9] 04/09/2024 Unknown    Atrial fibrillation with RVR [I48.91] 04/11/2024 Unknown    Acute hypoxemic respiratory failure [J96.01] 04/11/2024 Yes    Wide QRS ventricular tachycardia [I47.20] 04/09/2024 Unknown    Elevated troponin [R79.89] 04/09/2024 Unknown    Weakness [R53.1] 07/21/2023 Yes    GERD (gastroesophageal reflux disease) [K21.9]  Yes    Hypertension [I10]  Yes    Coronary artery disease [I25.10] 09/03/2019 Yes    Stage 3 chronic kidney disease [N18.30] 09/03/2019 Yes    Lupus anticoagulant inhibitor syndrome [D68.62] 03/02/2017 Yes      Problems Resolved During this Admission:         VTE Risk Mitigation (From admission, onward)           Ordered     IP VTE HIGH RISK PATIENT  Once         04/08/24 1410                        GILMAR Frias-C - scribed for Dr. Henderson   Cardiology     Provider's Attestation:  I, Farzad Henderson MD, confirm that GILMAR Bruno worked under my direction at all times.  Documentation shall reflect findings and decisions made by myself in the course of the patient's treatment.  I have reviewed the notes and assessment, and I concur with her documentation.  CMS guidelines regarding the use of scribes shall be adhered to.  MD Farzad Wright MD  Cardiology   [facility]

## 2024-04-11 NOTE — CARE UPDATE
04/10/24 2045   PRE-TX-O2   Device (Oxygen Therapy) (S)  non rebreather mask   $ Is the patient on Low Flow Oxygen? (S)  Yes   Flow (L/min) (S)  15   SpO2 (S)  100 %   Pulse Oximetry Type (S)  Intermittent   Pulse (!) (S)  144   Resp (!) (S)  28       Pt became diaphoretic, tachypnic, hypoxic.    Piedad ALEMAN contacted doctor.    Pt on NRBM @ 100%, O2 at 100%.

## 2024-04-11 NOTE — ASSESSMENT & PLAN NOTE
Likely from demand mismatch in the setting of anemia.  Continue to monitor.  Appreciate cardiology input    Recent Labs   Lab 04/08/24  1751 04/09/24  1513 04/11/24  0914   Troponin I High Sensitivity 138.1 H 81.4 H 48.8

## 2024-04-11 NOTE — ASSESSMENT & PLAN NOTE
Patient's anemia is currently controlled. Has received 3 units of PRBCs on 4/9/24 . Etiology likely d/t chronic blood loss  Current CBC reviewed-   Lab Results   Component Value Date    HGB 10.4 (L) 04/11/2024    HCT 32.3 (L) 04/11/2024     Monitor serial CBC and transfuse if patient becomes hemodynamically unstable, symptomatic or H/H drops below 7/21.    Recent Labs   Lab 04/09/24  0530 04/10/24  0524 04/11/24  0544   Hemoglobin 11.2 L 10.9 L 10.4 L

## 2024-04-11 NOTE — NURSING
Called 's office to let him know  wanted him to see pt today.  Message left with Darcy in his office.

## 2024-04-11 NOTE — ASSESSMENT & PLAN NOTE
Patient is status post CABG.  Denies chest pain currently.  Continue to monitor.    Recent Labs   Lab 04/08/24  1751 04/09/24  1513 04/11/24  0914   Troponin I High Sensitivity 138.1 H 81.4 H 48.8

## 2024-04-11 NOTE — PT/OT/SLP PROGRESS
Occupational Therapy   Treatment    Name: Hunter Navarrete  MRN: [id number]  Admitting Diagnosis:  Symptomatic anemia       Recommendations:     Discharge Recommendations: Low Intensity Therapy  Discharge Equipment Recommendations:  none  Barriers to discharge:       Assessment:     Hunter Navarrete is a 83 y.o. male with a medical diagnosis of Symptomatic anemia.  He presents with weakness and drowsiness. Performance deficits affecting function are weakness, impaired endurance, impaired self care skills, impaired functional mobility, impaired balance, decreased coordination, decreased upper extremity function, decreased lower extremity function, decreased safety awareness, pain, decreased ROM, impaired cardiopulmonary response to activity, impaired joint extensibility, impaired muscle length.     Rehab Prognosis:  Good; patient would benefit from acute skilled OT services to address these deficits and reach maximum level of function.       Plan:     Patient to be seen 5 x/week to address the above listed problems via self-care/home management, therapeutic activities, therapeutic exercises  Plan of Care Expires: 04/17/24  Plan of Care Reviewed with: patient    Subjective     Chief Complaint: weakness and drowsy  Patient/Family Comments/goals: Patient family member reported he had a rough night.   Pain/Comfort:  Pain Rating 1: 0/10  Pain Rating Post-Intervention 1: 0/10    Objective:     Communicated with: Nurse prior to session.  Patient found HOB elevated with peripheral IV upon OT entry to room.    General Precautions: Standard, fall    Orthopedic Precautions:N/A  Braces: N/A  Respiratory Status: Room air     Occupational Performance:       AMPA 6 Click ADL:      Treatment & Education:  Patient pressure elevated last night and he had a rough night following event. Patient is very drowsy and fatigued. Family member stated doctors suggested he get some rest today. Patient performed BUE exercises in bed for 3 sets of 5  for shoulder flexion/extension, elbow flexion/extension, and scapular protraction/retraction. Patient educated on roles and goals of OT.    Patient left HOB elevated with all lines intact, call button in reach, nurse notified, and family member present    GOALS:   Multidisciplinary Problems       Occupational Therapy Goals          Problem: Occupational Therapy    Goal Priority Disciplines Outcome Interventions   Occupational Therapy Goal     OT, PT/OT Ongoing, Progressing    Description: Goals to be met by: 04/17/24     Patient will increase functional independence with ADLs by performing:    UE Dressing with Modified [place].  LE Dressing with Modified [place].  Grooming while standing at sink with Modified [place].  Toileting from toilet with Modified [place] for hygiene and clothing management.   Bathing from  shower chair/bench with Modified [place].  Toilet transfer to toilet with Modified [place].                         Time Tracking:     OT Date of Treatment: 04/11/24  OT Start Time: 0855  OT Stop Time: 0908  OT Total Time (min): 13 min    Billable Minutes:Therapeutic Exercise 13    OT/LONDON: LONDON     Number of LONDON visits since last OT visit: 1    4/11/2024

## 2024-04-11 NOTE — CARE UPDATE
04/11/24 0816   PRE-TX-O2   Device (Oxygen Therapy) (S)  room air   SpO2 100 %   Pulse Oximetry Type Intermittent   $ Pulse Oximetry - Multiple Charge Pulse Oximetry - Multiple   Probe Placed On (Pulse Ox) Left:;ear   Pulse 86   Resp 20     RT assessed patient this AM for SOB. Patient was stable with RR of 20. RT removed patient from bipap. Patient SAT on RA was 100% ten minutes later. Maritza ALEMAN notified.

## 2024-04-11 NOTE — SUBJECTIVE & OBJECTIVE
Past Medical History:   Diagnosis Date    Acid reflux     Anemia     Anticoagulant long-term use     Anxiety disorder, unspecified     Aortic aneurysm, abdominal     Arthritis     Bronchitis     Carotid stenosis 05/13/2022    60-79% Right ICA 60-70% Left ICA    Cataract     Celiac artery stenosis     50% by CTA abdomen 7/27/2020    CKD (chronic kidney disease)     45% FUNCTION    Coronary artery disease     stents x4    Encounter for blood transfusion     Enlarged prostate     GERD (gastroesophageal reflux disease)     Heart attack     Hemorrhoids     Hypercholesteremia     Hypertension     Iron deficiency anemia, unspecified     Leaky heart valve     Lupus anticoagulant disorder     Mood disorder     Parkinson's disease     Renal artery stenosis     by CTA 7/27/2020    Restless leg syndrome     Skin cancer     Skin tear of elbow without complication     above elbow    Unspecified chronic bronchitis     UTI (urinary tract infection)        Past Surgical History:   Procedure Laterality Date    ACF      Anterior Cervical Fusion    BACK SURGERY      RODS IN BACK; 4 SURGIERIES    BIOPSY OF URETER Right 06/22/2023    Procedure: RESECTION/BIOPSY, URETER;  Surgeon: Aaron Pinon MD;  Location: [facility] OR;  Service: Urology;  Laterality: Right;    BONE MARROW BIOPSY      CARDIAC ANGIOGRAM WITH STENT      CATARACT SX Bilateral     CHOLECYSTECTOMY      CORONARY ANGIOGRAPHY N/A 02/15/2023    Procedure: ANGIOGRAM, CORONARY ARTERY;  Surgeon: Rito Vega MD;  Location: [facility] CATH;  Service: Cardiology;  Laterality: N/A;    CORONARY ARTERY BYPASS GRAFT (CABG) N/A 02/24/2023    Procedure: CORONARY ARTERY BYPASS GRAFT (CABG);  Surgeon: Spencer Hagan MD;  Location: [facility] OR;  Service: Cardiothoracic;  Laterality: N/A;  CABG / EVH //   ECHO NOTIFIED    CYSTOSCOPY W/ RETROGRADES Bilateral 06/20/2023    Procedure: CYSTOSCOPY WITH RETROGRADE PYELOGRAM;  Surgeon: Aaron Pinon MD;  Location: [facility] OR;  Service: Urology;   Laterality: Bilateral;  Pt has a Spinal Cord Stimulator  9am per [facility], To follow RM4    CYSTOSCOPY W/ RETROGRADES Right 06/22/2023    Procedure: CYSTOSCOPY, WITH RETROGRADE PYELOGRAM;  Surgeon: Aaron Pinon MD;  Location: [facility] OR;  Service: Urology;  Laterality: Right;    CYSTOSCOPY W/ RETROGRADES Bilateral 8/24/2023    Procedure: CYSTOSCOPY, WITH RETROGRADE PYELOGRAM;  Surgeon: Aaron Pinon MD;  Location: [facility] OR;  Service: Urology;  Laterality: Bilateral;    CYSTOSCOPY W/ RETROGRADES Bilateral 12/4/2023    Procedure: CYSTOSCOPY WITH RETROGRADE PYELOGRAM;  Surgeon: Aaron Pinon MD;  Location: [facility] OR;  Service: Urology;  Laterality: Bilateral;  pt has spinal cord stimulator    DIGITAL RECTAL EXAMINATION UNDER ANESTHESIA N/A 12/4/2023    Procedure: EXAM UNDER ANESTHESIA, DIGITAL, RECTUM;  Surgeon: Aaron Pinon MD;  Location: [facility] OR;  Service: Urology;  Laterality: N/A;    HIP SURGERY Right     THR    KNEE SURGERY Right     ATS    LEFT HEART CATHETERIZATION Left 07/22/2021    Procedure: Left heart cath;  Surgeon: Curits Loredo MD;  Location: [facility] CATH;  Service: Cardiology;  Laterality: Left;    LEFT HEART CATHETERIZATION Left 09/22/2022    Procedure: Left heart cath;  Surgeon: Giorgi Barker MD;  Location: [facility] CATH;  Service: Cardiology;  Laterality: Left;    REMOVAL, STENT, URETER Right 8/24/2023    Procedure: REMOVAL, STENT, URETER;  Surgeon: Aaron Pinon MD;  Location: [facility] OR;  Service: Urology;  Laterality: Right;    ROTATOR CUFF REPAIR Bilateral     SHOULDER SURGERY Bilateral     SPINAL CORD STIMULATOR IMPLANT      STENT, URETERAL Left 06/22/2023    Procedure: STENT, URETERAL;  Surgeon: Aaron Pinon MD;  Location: [facility] OR;  Service: Urology;  Laterality: Left;    steroid injections Right 06/13/2023    Knee    ULNAR NERVE TRANSPOSITION      URETEROSCOPY Right 06/22/2023    Procedure: URETEROSCOPY;  Surgeon: Aaron Pinon MD;  Location: [facility] OR;  Service: Urology;  Laterality:  Right;       Review of patient's allergies indicates:   Allergen Reactions    Cardura [doxazosin] Hives    Lyrica [pregabalin] Other (See Comments)    Paxil [paroxetine hcl] Other (See Comments)    Restoril [temazepam] Hallucinations    Vioxx [rofecoxib] Swelling    Zithromax [azithromycin] Hives    Alpha 1 blocker- quinazolines     Benzodiazepines        No current facility-administered medications on file prior to encounter.     Current Outpatient Medications on File Prior to Encounter   Medication Sig    azelastine (ASTELIN) 137 mcg (0.1 %) nasal spray 1 spray (137 mcg total) by Nasal route 2 (two) times daily.    calcium carbonate-vitamin D3 500 mg-10 mcg (400 unit) Tab Take 1 tablet by mouth once daily.    carbidopa-levodopa (PARCOPA)  mg per disintegrating tablet Take 1 tablet by mouth 3 (three) times daily.    carvediloL (COREG) 3.125 MG tablet Take 1 tablet (3.125 mg total) by mouth 2 (two) times daily with meals.    clindamycin phosphate 1% (CLINDAGEL) 1 % gel Apply topically 2 (two) times daily.    clopidogreL (PLAVIX) 75 mg tablet Take 75 mg by mouth once daily.    dextran 70/hypromellose (ARTIFICIAL TEARS, PF, OPHT) Apply 1 drop to eye 3 (three) times daily.    docusate sodium (COLACE) 100 MG capsule Take 100 mg by mouth 2 (two) times daily.    ezetimibe (ZETIA) 10 mg tablet Take 1 tablet (10 mg total) by mouth once daily.    fluticasone propionate (FLONASE) 50 mcg/actuation nasal spray 1 spray by Each Nostril route 2 (two) times a day.    furosemide (LASIX) 20 MG tablet Take 20 mg by mouth once daily. Prescribed by Dr. Loredo    gabapentin (NEURONTIN) 100 MG capsule Take 500 mg by mouth. 2C QAM, 2C QPM, 1C QHS    HYDROcodone-acetaminophen (NORCO) 7.5-325 mg per tablet Prescribed by Dr. Hernandez    hydroxychloroquine (PLAQUENIL) 200 mg tablet Take 200 mg by mouth once daily.    lanolin/mineral oil/petrolatum (ARTIFICIAL TEARS OPHT) Apply to eye.    niacin 1,000 mg TbSR Take 1 tablet by mouth Daily.     pantoprazole (PROTONIX) 40 MG tablet Take 1 tablet (40 mg total) by mouth once daily.    polyvinyl alcohol, artificial tears, (LIQUIFILM TEARS) 1.4 % ophthalmic solution Place 1 drop into both eyes as needed.    predniSONE (DELTASONE) 5 MG tablet Take 2.5 mg by mouth once daily.    QUEtiapine (SEROQUEL) 50 MG tablet Take 50 mg by mouth every evening.    rosuvastatin (CRESTOR) 40 MG Tab Take 1 tablet (40 mg total) by mouth every evening.    tamsulosin (FLOMAX) 0.4 mg Cap Take 1 capsule (0.4 mg total) by mouth every evening.    DULoxetine (CYMBALTA) 30 MG capsule Take 1 capsule (30 mg total) by mouth once daily.    rOPINIRole (REQUIP) 1 MG tablet Take 1 tablet (1 mg total) by mouth 2 (two) times daily.     Family History       Problem Relation (Age of Onset)    Cancer Brother, Brother    Heart disease Father, Sister, Brother, Brother, Brother    Lung cancer Brother    Throat cancer Brother          Tobacco Use    Smoking status: Former     Current packs/day: 0.00     Types: Cigarettes     Quit date:      Years since quittin.2    Smokeless tobacco: Never   Substance and Sexual Activity    Alcohol use: Yes     Alcohol/week: 2.0 standard drinks of alcohol     Types: 1 Glasses of wine, 1 Cans of beer per week     Comment: RARELY    Drug use: No    Sexual activity: Not on file     Review of Systems   Unable to perform ROS: Mental status change   Constitutional:  Positive for fatigue. Negative for chills and fever.   HENT:  Negative for rhinorrhea, sneezing and sore throat.    Eyes:  Negative for pain and visual disturbance.   Respiratory:  Positive for shortness of breath. Negative for cough.    Cardiovascular:  Positive for chest pain.   Gastrointestinal:  Positive for abdominal pain, diarrhea, nausea and vomiting. Negative for constipation.   Endocrine: Negative for cold intolerance and heat intolerance.   Genitourinary:  Negative for difficulty urinating.   Musculoskeletal:  Negative for arthralgias and joint  swelling.   Skin:  Positive for pallor. Negative for rash.   Allergic/Immunologic: Negative for environmental allergies.   Neurological:  Positive for weakness. Negative for dizziness and syncope.   Hematological:  Does not bruise/bleed easily.   Psychiatric/Behavioral:  Negative for dysphoric mood. The patient is not nervous/anxious.      Objective:     Vital Signs (Most Recent):  Temp: 98.3 °F (36.8 °C) (04/11/24 1120)  Pulse: 78 (04/11/24 1120)  Resp: 20 (04/11/24 1120)  BP: 138/61 (04/11/24 1120)  SpO2: (!) 94 % (04/11/24 1120) Vital Signs (24h Range):  Temp:  [98 °F (36.7 °C)-99.1 °F (37.3 °C)] 98.3 °F (36.8 °C)  Pulse:  [] 78  Resp:  [16-32] 20  SpO2:  [94 %-100 %] 94 %  BP: (125-210)/(58-93) 138/61     Weight: 82.9 kg (182 lb 12.8 oz)  Body mass index is 26.23 kg/m².     Physical Exam  Vitals and nursing note reviewed.   Constitutional:       Appearance: Normal appearance. He is ill-appearing.   HENT:      Head: Normocephalic and atraumatic.      Nose: Nose normal.   Eyes:      Extraocular Movements: Extraocular movements intact.      Pupils: Pupils are equal, round, and reactive to light.   Cardiovascular:      Rate and Rhythm: Normal rate and regular rhythm.      Heart sounds: Murmur heard.      No friction rub. No gallop.   Pulmonary:      Effort: Pulmonary effort is normal.      Breath sounds: Normal breath sounds.      Comments: bipap  Abdominal:      General: Abdomen is flat. Bowel sounds are normal.      Palpations: Abdomen is soft.   Musculoskeletal:         General: No swelling or deformity.      Cervical back: Normal range of motion and neck supple.   Skin:     General: Skin is warm and dry.      Capillary Refill: Capillary refill takes less than 2 seconds.      Coloration: Skin is not pale.   Neurological:      General: No focal deficit present.      Mental Status: He is alert and oriented to person, place, and time.      Motor: Weakness present.   Psychiatric:         Mood and Affect: Mood  normal.         Behavior: Behavior normal.              CRANIAL NERVES     CN III, IV, VI   Pupils are equal, round, and reactive to light.       Significant Labs: All pertinent labs within the past 24 hours have been reviewed.    Significant Imaging: I have reviewed all pertinent imaging results/findings within the past 24 hours.

## 2024-04-12 VITALS
HEART RATE: 83 BPM | DIASTOLIC BLOOD PRESSURE: 72 MMHG | HEIGHT: 70 IN | OXYGEN SATURATION: 95 % | TEMPERATURE: 99 F | RESPIRATION RATE: 18 BRPM | BODY MASS INDEX: 26.17 KG/M2 | SYSTOLIC BLOOD PRESSURE: 166 MMHG | WEIGHT: 182.81 LBS

## 2024-04-12 LAB
ALBUMIN SERPL BCP-MCNC: 3 G/DL (ref 3.5–5.2)
ALP SERPL-CCNC: 66 U/L (ref 55–135)
ALT SERPL W/O P-5'-P-CCNC: 7 U/L (ref 10–44)
ANION GAP SERPL CALC-SCNC: 4 MMOL/L (ref 3–11)
AST SERPL-CCNC: 18 U/L (ref 10–40)
BASOPHILS # BLD AUTO: 0.02 K/UL (ref 0–0.2)
BASOPHILS NFR BLD: 0.4 % (ref 0–1.9)
BILIRUB SERPL-MCNC: 1 MG/DL (ref 0.1–1)
BUN SERPL-MCNC: 26 MG/DL (ref 8–23)
CALCIUM SERPL-MCNC: 9.1 MG/DL (ref 8.7–10.5)
CHLORIDE SERPL-SCNC: 107 MMOL/L (ref 95–110)
CO2 SERPL-SCNC: 28 MMOL/L (ref 23–29)
CREAT SERPL-MCNC: 1.9 MG/DL (ref 0.5–1.4)
DIFFERENTIAL METHOD BLD: ABNORMAL
EOSINOPHIL # BLD AUTO: 0 K/UL (ref 0–0.5)
EOSINOPHIL NFR BLD: 0.2 % (ref 0–8)
ERYTHROCYTE [DISTWIDTH] IN BLOOD BY AUTOMATED COUNT: 16 % (ref 11.5–14.5)
EST. GFR  (NO RACE VARIABLE): 34.8 ML/MIN/1.73 M^2
GLUCOSE SERPL-MCNC: 96 MG/DL (ref 70–110)
HCT VFR BLD AUTO: 34 % (ref 40–54)
HGB BLD-MCNC: 11.2 G/DL (ref 14–18)
IMM GRANULOCYTES # BLD AUTO: 0.14 K/UL (ref 0–0.04)
IMM GRANULOCYTES NFR BLD AUTO: 2.9 % (ref 0–0.5)
LYMPHOCYTES # BLD AUTO: 1 K/UL (ref 1–4.8)
LYMPHOCYTES NFR BLD: 20.1 % (ref 18–48)
MAGNESIUM SERPL-MCNC: 1.9 MG/DL (ref 1.6–2.6)
MCH RBC QN AUTO: 29.9 PG (ref 27–31)
MCHC RBC AUTO-ENTMCNC: 32.9 G/DL (ref 32–36)
MCV RBC AUTO: 91 FL (ref 82–98)
MONOCYTES # BLD AUTO: 0.8 K/UL (ref 0.3–1)
MONOCYTES NFR BLD: 15.7 % (ref 4–15)
NEUTROPHILS # BLD AUTO: 2.9 K/UL (ref 1.8–7.7)
NEUTROPHILS NFR BLD: 60.7 % (ref 38–73)
NRBC BLD-RTO: 0 /100 WBC
PLATELET # BLD AUTO: 98 K/UL (ref 150–450)
PMV BLD AUTO: 11.7 FL (ref 9.2–12.9)
POTASSIUM SERPL-SCNC: 3.8 MMOL/L (ref 3.5–5.1)
PROT SERPL-MCNC: 7 G/DL (ref 6–8.4)
RBC # BLD AUTO: 3.75 M/UL (ref 4.6–6.2)
SODIUM SERPL-SCNC: 139 MMOL/L (ref 136–145)
WBC # BLD AUTO: 4.78 K/UL (ref 3.9–12.7)

## 2024-04-12 PROCEDURE — 94761 N-INVAS EAR/PLS OXIMETRY MLT: CPT

## 2024-04-12 PROCEDURE — 85025 COMPLETE CBC W/AUTO DIFF WBC: CPT | Performed by: INTERNAL MEDICINE

## 2024-04-12 PROCEDURE — 63600175 PHARM REV CODE 636 W HCPCS: Performed by: INTERNAL MEDICINE

## 2024-04-12 PROCEDURE — 99900031 HC PATIENT EDUCATION (STAT)

## 2024-04-12 PROCEDURE — 99900035 HC TECH TIME PER 15 MIN (STAT)

## 2024-04-12 PROCEDURE — 97116 GAIT TRAINING THERAPY: CPT

## 2024-04-12 PROCEDURE — 80053 COMPREHEN METABOLIC PANEL: CPT | Performed by: INTERNAL MEDICINE

## 2024-04-12 PROCEDURE — 83735 ASSAY OF MAGNESIUM: CPT | Performed by: INTERNAL MEDICINE

## 2024-04-12 PROCEDURE — 97530 THERAPEUTIC ACTIVITIES: CPT

## 2024-04-12 PROCEDURE — 97110 THERAPEUTIC EXERCISES: CPT | Mod: CO

## 2024-04-12 PROCEDURE — 25000003 PHARM REV CODE 250: Performed by: INTERNAL MEDICINE

## 2024-04-12 PROCEDURE — 36415 COLL VENOUS BLD VENIPUNCTURE: CPT | Performed by: INTERNAL MEDICINE

## 2024-04-12 PROCEDURE — 97530 THERAPEUTIC ACTIVITIES: CPT | Mod: CO

## 2024-04-12 PROCEDURE — 25000003 PHARM REV CODE 250

## 2024-04-12 RX ORDER — AMIODARONE HYDROCHLORIDE 200 MG/1
200 TABLET ORAL 2 TIMES DAILY
Status: DISCONTINUED | OUTPATIENT
Start: 2024-04-12 | End: 2024-04-12 | Stop reason: HOSPADM

## 2024-04-12 RX ORDER — AMIODARONE HYDROCHLORIDE 200 MG/1
200 TABLET ORAL 2 TIMES DAILY
Qty: 60 TABLET | Refills: 0 | Status: SHIPPED | OUTPATIENT
Start: 2024-04-12 | End: 2024-05-06 | Stop reason: SDUPTHER

## 2024-04-12 RX ADMIN — CARBIDOPA AND LEVODOPA 1 TABLET: 25; 100 TABLET ORAL at 09:04

## 2024-04-12 RX ADMIN — AMIODARONE HYDROCHLORIDE 0.5 MG/MIN: 1.8 INJECTION, SOLUTION INTRAVENOUS at 02:04

## 2024-04-12 RX ADMIN — EZETIMIBE 10 MG: 10 TABLET ORAL at 09:04

## 2024-04-12 RX ADMIN — FAMOTIDINE 20 MG: 20 TABLET, FILM COATED ORAL at 09:04

## 2024-04-12 RX ADMIN — PANTOPRAZOLE SODIUM 40 MG: 40 TABLET, DELAYED RELEASE ORAL at 09:04

## 2024-04-12 RX ADMIN — CARVEDILOL 6.25 MG: 3.12 TABLET, FILM COATED ORAL at 09:04

## 2024-04-12 RX ADMIN — AMIODARONE HYDROCHLORIDE 200 MG: 200 TABLET ORAL at 09:04

## 2024-04-12 RX ADMIN — ATORVASTATIN CALCIUM 40 MG: 40 TABLET, FILM COATED ORAL at 09:04

## 2024-04-12 NOTE — PLAN OF CARE
FU appointment has been scheduled with Marry at Dr. Purdy's office for April 16 at 1:30.  Patient to discharge to [facility] with written discharge instructions.  Darryl Tate, patient's nurse.

## 2024-04-12 NOTE — DISCHARGE INSTRUCTIONS
FOLLOW-UP WITH YOUR PRIMARY CARE DOCTOR AS SCHEDULED.    THANK YOU FOR CHOOSING [facility] FOR YOUR CARE !!!    KELLY ARELLANO LPN

## 2024-04-12 NOTE — ASSESSMENT & PLAN NOTE
Chronic, controlled. Latest blood pressure and vitals reviewed-     Temp:  [98.1 °F (36.7 °C)-98.6 °F (37 °C)]   Pulse:  []   Resp:  [16-20]   BP: (138-166)/(61-86)   SpO2:  [93 %-97 %] .   Home meds for hypertension were reviewed and noted below.   Hypertension Medications               carvediloL (COREG) 3.125 MG tablet Take 1 tablet (3.125 mg total) by mouth 2 (two) times daily with meals.    furosemide (LASIX) 20 MG tablet Take 20 mg by mouth once daily. Prescribed by Dr. Loredo            While in the hospital, will manage blood pressure as follows; Continue home antihypertensive regimen    Will utilize p.r.n. blood pressure medication only if patient's blood pressure greater than 180/110 and he develops symptoms such as worsening chest pain or shortness of breath.

## 2024-04-12 NOTE — PROGRESS NOTES
[facility]  Progress Note    Patient Name: Hunter Navarrete  MRN: [id number]  Patient Class: IP- Inpatient   Admission Date: 4/8/2024  Length of Stay: 4 days  Attending Physician: Shawn uPrdy Jr., MD  Primary Care Provider: Shawn Purdy Jr., MD        Subjective:     Principal Problem:Symptomatic anemia        HPI:  Chief Complaint   Patient presents with    Nausea    Diarrhea    Vomiting    Weakness       Pt here per [facility] with N/V/D and weakness since this am. Pt vomited x 1. Diarrhea x 1. Pt received 500mg IV bolus and 4mg Zofran PtA      This is an 83-year-old white male with multiple medical problems including anemia requiring transfusion, CAD status post CABG, chronic kidney disease, Parkinson's, and UTI who presents to the emergency department via EMS with complaints of generalized weakness that began this morning.  Patient reports that he was in his regular state of health, and shortly after eating breakfast he developed nausea with 1 episode of vomiting, diarrhea, and brief episode of anterior chest pain with shortness of breath.  He also had transient episode of mid abdominal aching pain.  Since that time has been experiencing weakness and lightheadedness.  Family members report that his blood pressure has been fluctuating over the last several days, experiencing many lows, and he has been taking his blood pressure medications as directed.  Patient denies any pain at the time of evaluation and has had resolution nausea after receiving Zofran in route to the hospital.  He further denies headache, URI signs and symptoms, dysuria, or any other relative symptoms at this time.       Overview/Hospital Course:  4/10:  Patient has responded nicely to blood products.  States that he does not feel well today.  Feels generally weak and fatigued.  Will have therapy work with the patient throughout the day and consider discharge tomorrow should hemoglobin remained stable and he  reach his normal functional capacity.    4/11:  Patient had an acute decompensation yesterday evening.  He was noted to have atrial fibrillation with rapid ventricular response.  He was noted to be diaphoretic and tachypneic.  The patient was given a 1 time dose of Lasix.  He was placed on BiPAP.  Amnio drip was started.  He has since stabilized.  Breathing more comfortably on BiPAP and now resting.  Heart rate in the 80s.  Will transitioned to oral amiodarone tomorrow if he remains stable.  Should you have any further decompensation will consider transition to outside facility.  Long discussion held with the patient's family now more strongly considering inpatient rehab as an option given his recent decompensation.  This would be ideal for strength training in addition to ongoing inpatient monitoring.    4/12: Patient resting comfortably this morning.  Will transition to oral amiodarone.  Continue physical and occupational therapy.  After discussion with the patient and the patient's family will pursue inpatient rehab if qualifies.  Plan to transition inpatient rehab on Monday.    Past Medical History:   Diagnosis Date    Acid reflux     Anemia     Anticoagulant long-term use     Anxiety disorder, unspecified     Aortic aneurysm, abdominal     Arthritis     Bronchitis     Carotid stenosis 05/13/2022    60-79% Right ICA 60-70% Left ICA    Cataract     Celiac artery stenosis     50% by CTA abdomen 7/27/2020    CKD (chronic kidney disease)     45% FUNCTION    Coronary artery disease     stents x4    Encounter for blood transfusion     Enlarged prostate     GERD (gastroesophageal reflux disease)     Heart attack     Hemorrhoids     Hypercholesteremia     Hypertension     Iron deficiency anemia, unspecified     Leaky heart valve     Lupus anticoagulant disorder     Mood disorder     Parkinson's disease     Renal artery stenosis     by CTA 7/27/2020    Restless leg syndrome     Skin cancer     Skin tear of elbow without  complication     above elbow    Unspecified chronic bronchitis     UTI (urinary tract infection)        Past Surgical History:   Procedure Laterality Date    ACF      Anterior Cervical Fusion    BACK SURGERY      RODS IN BACK; 4 SURGIERIES    BIOPSY OF URETER Right 06/22/2023    Procedure: RESECTION/BIOPSY, URETER;  Surgeon: Aaron Pinon MD;  Location: [facility] OR;  Service: Urology;  Laterality: Right;    BONE MARROW BIOPSY      CARDIAC ANGIOGRAM WITH STENT      CATARACT SX Bilateral     CHOLECYSTECTOMY      CORONARY ANGIOGRAPHY N/A 02/15/2023    Procedure: ANGIOGRAM, CORONARY ARTERY;  Surgeon: Rito Vega MD;  Location: [facility] CATH;  Service: Cardiology;  Laterality: N/A;    CORONARY ARTERY BYPASS GRAFT (CABG) N/A 02/24/2023    Procedure: CORONARY ARTERY BYPASS GRAFT (CABG);  Surgeon: Spencer Hagan MD;  Location: [facility] OR;  Service: Cardiothoracic;  Laterality: N/A;  CABG / EVH //   ECHO NOTIFIED    CYSTOSCOPY W/ RETROGRADES Bilateral 06/20/2023    Procedure: CYSTOSCOPY WITH RETROGRADE PYELOGRAM;  Surgeon: Aaron Pinon MD;  Location: [facility] OR;  Service: Urology;  Laterality: Bilateral;  Pt has a Spinal Cord Stimulator  9am per [facility], To follow RM4    CYSTOSCOPY W/ RETROGRADES Right 06/22/2023    Procedure: CYSTOSCOPY, WITH RETROGRADE PYELOGRAM;  Surgeon: Aaron Pinon MD;  Location: [facility] OR;  Service: Urology;  Laterality: Right;    CYSTOSCOPY W/ RETROGRADES Bilateral 8/24/2023    Procedure: CYSTOSCOPY, WITH RETROGRADE PYELOGRAM;  Surgeon: Aaron Pinon MD;  Location: [facility] OR;  Service: Urology;  Laterality: Bilateral;    CYSTOSCOPY W/ RETROGRADES Bilateral 12/4/2023    Procedure: CYSTOSCOPY WITH RETROGRADE PYELOGRAM;  Surgeon: Aaron Pinon MD;  Location: [facility] OR;  Service: Urology;  Laterality: Bilateral;  pt has spinal cord stimulator    DIGITAL RECTAL EXAMINATION UNDER ANESTHESIA N/A 12/4/2023    Procedure: EXAM UNDER ANESTHESIA, DIGITAL, RECTUM;  Surgeon: Aaron Pinon MD;  Location:  [facility] OR;  Service: Urology;  Laterality: N/A;    HIP SURGERY Right     THR    KNEE SURGERY Right     ATS    LEFT HEART CATHETERIZATION Left 07/22/2021    Procedure: Left heart cath;  Surgeon: Curtis Loredo MD;  Location: [facility] CATH;  Service: Cardiology;  Laterality: Left;    LEFT HEART CATHETERIZATION Left 09/22/2022    Procedure: Left heart cath;  Surgeon: Giorgi Barker MD;  Location: [facility] CATH;  Service: Cardiology;  Laterality: Left;    REMOVAL, STENT, URETER Right 8/24/2023    Procedure: REMOVAL, STENT, URETER;  Surgeon: Aaron Pinon MD;  Location: [facility] OR;  Service: Urology;  Laterality: Right;    ROTATOR CUFF REPAIR Bilateral     SHOULDER SURGERY Bilateral     SPINAL CORD STIMULATOR IMPLANT      STENT, URETERAL Left 06/22/2023    Procedure: STENT, URETERAL;  Surgeon: Aaron Pinon MD;  Location: [facility] OR;  Service: Urology;  Laterality: Left;    steroid injections Right 06/13/2023    Knee    ULNAR NERVE TRANSPOSITION      URETEROSCOPY Right 06/22/2023    Procedure: URETEROSCOPY;  Surgeon: Aaron Pinon MD;  Location: [facility] OR;  Service: Urology;  Laterality: Right;       Review of patient's allergies indicates:   Allergen Reactions    Cardura [doxazosin] Hives    Lyrica [pregabalin] Other (See Comments)    Paxil [paroxetine hcl] Other (See Comments)    Restoril [temazepam] Hallucinations    Vioxx [rofecoxib] Swelling    Zithromax [azithromycin] Hives    Alpha 1 blocker- quinazolines     Benzodiazepines        No current facility-administered medications on file prior to encounter.     Current Outpatient Medications on File Prior to Encounter   Medication Sig    azelastine (ASTELIN) 137 mcg (0.1 %) nasal spray 1 spray (137 mcg total) by Nasal route 2 (two) times daily.    calcium carbonate-vitamin D3 500 mg-10 mcg (400 unit) Tab Take 1 tablet by mouth once daily.    carbidopa-levodopa (PARCOPA)  mg per disintegrating tablet Take 1 tablet by mouth 3 (three) times daily.    carvediloL (COREG)  3.125 MG tablet Take 1 tablet (3.125 mg total) by mouth 2 (two) times daily with meals.    clindamycin phosphate 1% (CLINDAGEL) 1 % gel Apply topically 2 (two) times daily.    clopidogreL (PLAVIX) 75 mg tablet Take 75 mg by mouth once daily.    dextran 70/hypromellose (ARTIFICIAL TEARS, PF, OPHT) Apply 1 drop to eye 3 (three) times daily.    docusate sodium (COLACE) 100 MG capsule Take 100 mg by mouth 2 (two) times daily.    ezetimibe (ZETIA) 10 mg tablet Take 1 tablet (10 mg total) by mouth once daily.    fluticasone propionate (FLONASE) 50 mcg/actuation nasal spray 1 spray by Each Nostril route 2 (two) times a day.    furosemide (LASIX) 20 MG tablet Take 20 mg by mouth once daily. Prescribed by Dr. Loredo    gabapentin (NEURONTIN) 100 MG capsule Take 500 mg by mouth. 2C QAM, 2C QPM, 1C QHS    HYDROcodone-acetaminophen (NORCO) 7.5-325 mg per tablet Prescribed by Dr. Hernandez    hydroxychloroquine (PLAQUENIL) 200 mg tablet Take 200 mg by mouth once daily.    lanolin/mineral oil/petrolatum (ARTIFICIAL TEARS OPHT) Apply to eye.    niacin 1,000 mg TbSR Take 1 tablet by mouth Daily.    pantoprazole (PROTONIX) 40 MG tablet Take 1 tablet (40 mg total) by mouth once daily.    polyvinyl alcohol, artificial tears, (LIQUIFILM TEARS) 1.4 % ophthalmic solution Place 1 drop into both eyes as needed.    predniSONE (DELTASONE) 5 MG tablet Take 2.5 mg by mouth once daily.    QUEtiapine (SEROQUEL) 50 MG tablet Take 50 mg by mouth every evening.    rosuvastatin (CRESTOR) 40 MG Tab Take 1 tablet (40 mg total) by mouth every evening.    tamsulosin (FLOMAX) 0.4 mg Cap Take 1 capsule (0.4 mg total) by mouth every evening.    DULoxetine (CYMBALTA) 30 MG capsule Take 1 capsule (30 mg total) by mouth once daily.    rOPINIRole (REQUIP) 1 MG tablet Take 1 tablet (1 mg total) by mouth 2 (two) times daily.     Family History       Problem Relation (Age of Onset)    Cancer Brother, Brother    Heart disease Father, Sister, Brother, Brother, Brother     Lung cancer Brother    Throat cancer Brother          Tobacco Use    Smoking status: Former     Current packs/day: 0.00     Types: Cigarettes     Quit date:      Years since quittin.2    Smokeless tobacco: Never   Substance and Sexual Activity    Alcohol use: Yes     Alcohol/week: 2.0 standard drinks of alcohol     Types: 1 Glasses of wine, 1 Cans of beer per week     Comment: RARELY    Drug use: No    Sexual activity: Not on file     Review of Systems   Constitutional:  Positive for fatigue. Negative for chills and fever.   HENT:  Negative for rhinorrhea, sneezing and sore throat.    Eyes:  Negative for pain and visual disturbance.   Respiratory:  Negative for cough and shortness of breath.    Cardiovascular:  Negative for chest pain.   Gastrointestinal:  Positive for diarrhea. Negative for abdominal pain, constipation, nausea and vomiting.   Endocrine: Negative for cold intolerance and heat intolerance.   Genitourinary:  Negative for difficulty urinating.   Musculoskeletal:  Negative for arthralgias and joint swelling.   Skin:  Negative for pallor and rash.   Allergic/Immunologic: Negative for environmental allergies.   Neurological:  Positive for weakness. Negative for dizziness and syncope.   Hematological:  Does not bruise/bleed easily.   Psychiatric/Behavioral:  Negative for dysphoric mood. The patient is not nervous/anxious.      Objective:     Vital Signs (Most Recent):  Temp: 98.6 °F (37 °C) (24 0702)  Pulse: 82 (24 0736)  Resp: 18 (24 0702)  BP: (!) 166/72 (24 0702)  SpO2: 95 % (24 07) Vital Signs (24h Range):  Temp:  [98.1 °F (36.7 °C)-98.6 °F (37 °C)] 98.6 °F (37 °C)  Pulse:  [] 82  Resp:  [16-20] 18  SpO2:  [93 %-97 %] 95 %  BP: (138-166)/(61-86) 166/72     Weight: 82.9 kg (182 lb 12.8 oz)  Body mass index is 26.23 kg/m².     Physical Exam  Vitals and nursing note reviewed.   Constitutional:       Appearance: Normal appearance. He is not ill-appearing.       Comments: Frail and fatigued   HENT:      Head: Normocephalic and atraumatic.      Nose: Nose normal.   Eyes:      Extraocular Movements: Extraocular movements intact.      Pupils: Pupils are equal, round, and reactive to light.   Cardiovascular:      Rate and Rhythm: Normal rate and regular rhythm.      Heart sounds: Murmur heard.      No friction rub. No gallop.   Pulmonary:      Effort: Pulmonary effort is normal.      Breath sounds: Normal breath sounds.   Abdominal:      General: Abdomen is flat. Bowel sounds are normal.      Palpations: Abdomen is soft.   Musculoskeletal:         General: No swelling or deformity.      Cervical back: Normal range of motion and neck supple.   Skin:     General: Skin is warm and dry.      Capillary Refill: Capillary refill takes less than 2 seconds.      Coloration: Skin is not pale.   Neurological:      General: No focal deficit present.      Mental Status: He is alert and oriented to person, place, and time.      Motor: Weakness present.   Psychiatric:         Mood and Affect: Mood normal.         Behavior: Behavior normal.              CRANIAL NERVES     CN III, IV, VI   Pupils are equal, round, and reactive to light.       Significant Labs: All pertinent labs within the past 24 hours have been reviewed.    Significant Imaging: I have reviewed all pertinent imaging results/findings within the past 24 hours.    Assessment/Plan:      * Symptomatic anemia  Patient's anemia is currently controlled. Has received 3 units of PRBCs on 4/9/24 . Etiology likely d/t chronic blood loss  Current CBC reviewed-   Lab Results   Component Value Date    HGB 11.2 (L) 04/12/2024    HCT 34.0 (L) 04/12/2024     Monitor serial CBC and transfuse if patient becomes hemodynamically unstable, symptomatic or H/H drops below 7/21.    Recent Labs   Lab 04/10/24  0524 04/11/24  0544 04/12/24  0522   Hemoglobin 10.9 L 10.4 L 11.2 L           Acute hypoxemic respiratory failure  Patient with Hypoxic  Respiratory failure which is Acute.  he is not on home oxygen. Supplemental oxygen was provided and noted-      .   Signs/symptoms of respiratory failure include- tachypnea, increased work of breathing, respiratory distress, and use of accessory muscles. Contributing diagnoses includes - CHF and Pleural effusion Labs and images were reviewed. Patient Has recent ABG, which has been reviewed. Will treat underlying causes and adjust management of respiratory failure as follows- now resolved with BiPAP and treatment of underlying atrial fibrillation with rapid ventricular response.  Wean oxygen as tolerated.    Atrial fibrillation with RVR  Now on amiodarone drip.  Rate control achieved.  Transitioned to oral amiodarone once stable    Elevated troponin  Likely from demand mismatch in the setting of anemia.  Continue to monitor.  Appreciate cardiology input    Recent Labs   Lab 04/08/24  1751 04/09/24  1513 04/11/24  0914   Troponin I High Sensitivity 138.1 H 81.4 H 48.8             Wide QRS ventricular tachycardia  Appreciate cardiology input.  Suspect secondary to anemia.  Troponin mildly elevated.  Patient asymptomatic.  Likely anemia induced.      Weakness  Continue PT/OT.  IPR on monday      GERD (gastroesophageal reflux disease)  PPI while hospitalized.      Hypertension  Chronic, controlled. Latest blood pressure and vitals reviewed-     Temp:  [98.1 °F (36.7 °C)-98.6 °F (37 °C)]   Pulse:  []   Resp:  [16-20]   BP: (138-166)/(61-86)   SpO2:  [93 %-97 %] .   Home meds for hypertension were reviewed and noted below.   Hypertension Medications               carvediloL (COREG) 3.125 MG tablet Take 1 tablet (3.125 mg total) by mouth 2 (two) times daily with meals.    furosemide (LASIX) 20 MG tablet Take 20 mg by mouth once daily. Prescribed by Dr. Loredo            While in the hospital, will manage blood pressure as follows; Continue home antihypertensive regimen    Will utilize p.r.n. blood pressure medication  only if patient's blood pressure greater than 180/110 and he develops symptoms such as worsening chest pain or shortness of breath.    Stage 3 chronic kidney disease  Creatine stable for now. BMP reviewed- noted Estimated Creatinine Clearance: 31 mL/min (A) (based on SCr of 1.9 mg/dL (H)). according to latest data. Based on current GFR, CKD stage is stage 3 - GFR 30-59.  Monitor UOP and serial BMP and adjust therapy as needed. Renally dose meds. Avoid nephrotoxic medications and procedures.    Lab Results   Component Value Date    CREATININE 1.9 (H) 04/12/2024    CREATININE 1.9 (H) 04/11/2024    CREATININE 1.8 (H) 04/10/2024          Coronary artery disease  Patient is status post CABG.  Denies chest pain currently.  Continue to monitor.    Recent Labs   Lab 04/08/24  1751 04/09/24  1513 04/11/24  0914   Troponin I High Sensitivity 138.1 H 81.4 H 48.8           Lupus anticoagulant inhibitor syndrome  Hold anticoagulants setting of symptomatic anemia        VTE Risk Mitigation (From admission, onward)           Ordered     IP VTE HIGH RISK PATIENT  Once         04/08/24 1410     Place sequential compression device  Until discontinued         04/08/24 1410     Place ASHOK hose  Until discontinued         04/08/24 1410                    Discharge Planning   BAMBI:      Code Status: Full Code   Is the patient medically ready for discharge?:     Reason for patient still in hospital (select all that apply): Treatment  Discharge Plan A: Home, Home Health                  Shawn Purdy Jr, MD  Department of Hospital Medicine   [facility]

## 2024-04-12 NOTE — NURSING
DISCHARGE INSTRUCTIONS GIVEN TO PATIENT.  SALINE-LOCK D/C'D WITH CATHETER INTACT.  TELEMETRY D/C'D AS WELL.  VOICED COMPLETE UNDERSTANDING OF ALL INSTRUCTIONS.    KELLY ARELLANO LPN

## 2024-04-12 NOTE — PLAN OF CARE
Problem: Physical Therapy  Goal: Physical Therapy Goal  Description: Goals to be met by: 2024    Patient will increase functional independence with mobility by performin. Supine to sit with Modified Independent.  2. Sit to supine with Modified Independent.  3. Bed to chair transfer with Modified Independent with no A.D.  using Step Transfer technique.  4. Sit to Stand with Modified Independent with no A.D. .  5. Gait  x 200  feet with Modified Independent with no A.D. .  6. Lower extremity exercise program x10 reps.     Outcome: Adequate for Care Transition

## 2024-04-12 NOTE — ASSESSMENT & PLAN NOTE
Patient's anemia is currently controlled. Has received 3 units of PRBCs on 4/9/24 . Etiology likely d/t chronic blood loss  Current CBC reviewed-   Lab Results   Component Value Date    HGB 11.2 (L) 04/12/2024    HCT 34.0 (L) 04/12/2024     Monitor serial CBC and transfuse if patient becomes hemodynamically unstable, symptomatic or H/H drops below 7/21.    Recent Labs   Lab 04/10/24  0524 04/11/24  0544 04/12/24  0522   Hemoglobin 10.9 L 10.4 L 11.2 L

## 2024-04-12 NOTE — PLAN OF CARE
[facility]  Discharge Final Note    Primary Care Provider: Shawn Purdy Jr., MD    Expected Discharge Date: 4/12/2024    Final Discharge Note (most recent)       Final Note - 04/12/24 1117          Final Note    Assessment Type Final Discharge Note     Anticip[place] Disposition [place] [facility] [place]ospital Resources/Appts/Education Provided Appointments scheduled and added to AVS        Post-Acute Status    Post-Acute Authorization Placement;Home Health     Post-Acute Placement Status Discharge Plan Changed   The discharge plan of care changed. The patient was too high functional for IPR. He will be discharging home, and he will resume home health.    Home Health Status Set-up Complete/Auth obtained     Coverage MEDICARE - MEDICARE PART A & B     Discharge Delays None known at this time                     Important Message from Medicare  Important Message from Medicare regarding Discharge Appeal Rights: Given to patient/caregiver, Explained to patient/caregiver, Signed/date by patient/caregiver     Date IMM was signed: 04/12/24  Time IMM was signed: 0950     Follow-up providers       Shawn Purdy Jr., MD   Specialty: Internal Medicine   Relationship: PCP - General    [address] [place] [id number]   Phone: [phone]       Next Steps: Go on 4/16/2024    Instructions: 1:30 with Marry              After-discharge care                Home Medical Care       [facility]   Service: Home Health Services    [address] [place] [id number]   Phone: [phone]                           Final note is completed. The patient will be discharging home with home health.

## 2024-04-12 NOTE — SUBJECTIVE & OBJECTIVE
Past Medical History:   Diagnosis Date    Acid reflux     Anemia     Anticoagulant long-term use     Anxiety disorder, unspecified     Aortic aneurysm, abdominal     Arthritis     Bronchitis     Carotid stenosis 05/13/2022    60-79% Right ICA 60-70% Left ICA    Cataract     Celiac artery stenosis     50% by CTA abdomen 7/27/2020    CKD (chronic kidney disease)     45% FUNCTION    Coronary artery disease     stents x4    Encounter for blood transfusion     Enlarged prostate     GERD (gastroesophageal reflux disease)     Heart attack     Hemorrhoids     Hypercholesteremia     Hypertension     Iron deficiency anemia, unspecified     Leaky heart valve     Lupus anticoagulant disorder     Mood disorder     Parkinson's disease     Renal artery stenosis     by CTA 7/27/2020    Restless leg syndrome     Skin cancer     Skin tear of elbow without complication     above elbow    Unspecified chronic bronchitis     UTI (urinary tract infection)        Past Surgical History:   Procedure Laterality Date    ACF      Anterior Cervical Fusion    BACK SURGERY      RODS IN BACK; 4 SURGIERIES    BIOPSY OF URETER Right 06/22/2023    Procedure: RESECTION/BIOPSY, URETER;  Surgeon: Aaron Pinon MD;  Location: [facility] OR;  Service: Urology;  Laterality: Right;    BONE MARROW BIOPSY      CARDIAC ANGIOGRAM WITH STENT      CATARACT SX Bilateral     CHOLECYSTECTOMY      CORONARY ANGIOGRAPHY N/A 02/15/2023    Procedure: ANGIOGRAM, CORONARY ARTERY;  Surgeon: Rito Vega MD;  Location: [facility] CATH;  Service: Cardiology;  Laterality: N/A;    CORONARY ARTERY BYPASS GRAFT (CABG) N/A 02/24/2023    Procedure: CORONARY ARTERY BYPASS GRAFT (CABG);  Surgeon: Spencer Hagan MD;  Location: [facility] OR;  Service: Cardiothoracic;  Laterality: N/A;  CABG / EVH //   ECHO NOTIFIED    CYSTOSCOPY W/ RETROGRADES Bilateral 06/20/2023    Procedure: CYSTOSCOPY WITH RETROGRADE PYELOGRAM;  Surgeon: Aaron Pinon MD;  Location: [facility] OR;  Service: Urology;   Laterality: Bilateral;  Pt has a Spinal Cord Stimulator  9am per [place]vee, To follow RM4    CYSTOSCOPY W/ RETROGRADES Right 06/22/2023    Procedure: CYSTOSCOPY, WITH RETROGRADE PYELOGRAM;  Surgeon: Aaron Pinon MD;  Location: [facility] OR;  Service: Urology;  Laterality: Right;    CYSTOSCOPY W/ RETROGRADES Bilateral 8/24/2023    Procedure: CYSTOSCOPY, WITH RETROGRADE PYELOGRAM;  Surgeon: Aaron Pinon MD;  Location: [facility] OR;  Service: Urology;  Laterality: Bilateral;    CYSTOSCOPY W/ RETROGRADES Bilateral 12/4/2023    Procedure: CYSTOSCOPY WITH RETROGRADE PYELOGRAM;  Surgeon: Aaron Pinon MD;  Location: [facility] OR;  Service: Urology;  Laterality: Bilateral;  pt has spinal cord stimulator    DIGITAL RECTAL EXAMINATION UNDER ANESTHESIA N/A 12/4/2023    Procedure: EXAM UNDER ANESTHESIA, DIGITAL, RECTUM;  Surgeon: Aaron Pinon MD;  Location: [facility] OR;  Service: Urology;  Laterality: N/A;    HIP SURGERY Right     THR    KNEE SURGERY Right     ATS    LEFT HEART CATHETERIZATION Left 07/22/2021    Procedure: Left heart cath;  Surgeon: Curtis Loredo MD;  Location: [facility] CATH;  Service: Cardiology;  Laterality: Left;    LEFT HEART CATHETERIZATION Left 09/22/2022    Procedure: Left heart cath;  Surgeon: Giorgi Barker MD;  Location: [facility] CATH;  Service: Cardiology;  Laterality: Left;    REMOVAL, STENT, URETER Right 8/24/2023    Procedure: REMOVAL, STENT, URETER;  Surgeon: Aaron Pinon MD;  Location: [facility] OR;  Service: Urology;  Laterality: Right;    ROTATOR CUFF REPAIR Bilateral     SHOULDER SURGERY Bilateral     SPINAL CORD STIMULATOR IMPLANT      STENT, URETERAL Left 06/22/2023    Procedure: STENT, URETERAL;  Surgeon: Aaron Pinon MD;  Location: [facility] OR;  Service: Urology;  Laterality: Left;    steroid injections Right 06/13/2023    Knee    ULNAR NERVE TRANSPOSITION      URETEROSCOPY Right 06/22/2023    Procedure: URETEROSCOPY;  Surgeon: Aaron Pinon MD;  Location: [facility] OR;  Service: Urology;  Laterality:  Right;       Review of patient's allergies indicates:   Allergen Reactions    Cardura [doxazosin] Hives    Lyrica [pregabalin] Other (See Comments)    Paxil [paroxetine hcl] Other (See Comments)    Restoril [temazepam] Hallucinations    Vioxx [rofecoxib] Swelling    Zithromax [azithromycin] Hives    Alpha 1 blocker- quinazolines     Benzodiazepines        No current facility-administered medications on file prior to encounter.     Current Outpatient Medications on File Prior to Encounter   Medication Sig    azelastine (ASTELIN) 137 mcg (0.1 %) nasal spray 1 spray (137 mcg total) by Nasal route 2 (two) times daily.    calcium carbonate-vitamin D3 500 mg-10 mcg (400 unit) Tab Take 1 tablet by mouth once daily.    carbidopa-levodopa (PARCOPA)  mg per disintegrating tablet Take 1 tablet by mouth 3 (three) times daily.    carvediloL (COREG) 3.125 MG tablet Take 1 tablet (3.125 mg total) by mouth 2 (two) times daily with meals.    clindamycin phosphate 1% (CLINDAGEL) 1 % gel Apply topically 2 (two) times daily.    clopidogreL (PLAVIX) 75 mg tablet Take 75 mg by mouth once daily.    dextran 70/hypromellose (ARTIFICIAL TEARS, PF, OPHT) Apply 1 drop to eye 3 (three) times daily.    docusate sodium (COLACE) 100 MG capsule Take 100 mg by mouth 2 (two) times daily.    ezetimibe (ZETIA) 10 mg tablet Take 1 tablet (10 mg total) by mouth once daily.    fluticasone propionate (FLONASE) 50 mcg/actuation nasal spray 1 spray by Each Nostril route 2 (two) times a day.    furosemide (LASIX) 20 MG tablet Take 20 mg by mouth once daily. Prescribed by Dr. Loredo    gabapentin (NEURONTIN) 100 MG capsule Take 500 mg by mouth. 2C QAM, 2C QPM, 1C QHS    HYDROcodone-acetaminophen (NORCO) 7.5-325 mg per tablet Prescribed by Dr. Hernandez    hydroxychloroquine (PLAQUENIL) 200 mg tablet Take 200 mg by mouth once daily.    lanolin/mineral oil/petrolatum (ARTIFICIAL TEARS OPHT) Apply to eye.    niacin 1,000 mg TbSR Take 1 tablet by mouth Daily.     pantoprazole (PROTONIX) 40 MG tablet Take 1 tablet (40 mg total) by mouth once daily.    polyvinyl alcohol, artificial tears, (LIQUIFILM TEARS) 1.4 % ophthalmic solution Place 1 drop into both eyes as needed.    predniSONE (DELTASONE) 5 MG tablet Take 2.5 mg by mouth once daily.    QUEtiapine (SEROQUEL) 50 MG tablet Take 50 mg by mouth every evening.    rosuvastatin (CRESTOR) 40 MG Tab Take 1 tablet (40 mg total) by mouth every evening.    tamsulosin (FLOMAX) 0.4 mg Cap Take 1 capsule (0.4 mg total) by mouth every evening.    DULoxetine (CYMBALTA) 30 MG capsule Take 1 capsule (30 mg total) by mouth once daily.    rOPINIRole (REQUIP) 1 MG tablet Take 1 tablet (1 mg total) by mouth 2 (two) times daily.     Family History       Problem Relation (Age of Onset)    Cancer Brother, Brother    Heart disease Father, Sister, Brother, Brother, Brother    Lung cancer Brother    Throat cancer Brother          Tobacco Use    Smoking status: Former     Current packs/day: 0.00     Types: Cigarettes     Quit date:      Years since quittin.2    Smokeless tobacco: Never   Substance and Sexual Activity    Alcohol use: Yes     Alcohol/week: 2.0 standard drinks of alcohol     Types: 1 Glasses of wine, 1 Cans of beer per week     Comment: RARELY    Drug use: No    Sexual activity: Not on file     Review of Systems   Constitutional:  Positive for fatigue. Negative for chills and fever.   HENT:  Negative for rhinorrhea, sneezing and sore throat.    Eyes:  Negative for pain and visual disturbance.   Respiratory:  Negative for cough and shortness of breath.    Cardiovascular:  Negative for chest pain.   Gastrointestinal:  Positive for diarrhea. Negative for abdominal pain, constipation, nausea and vomiting.   Endocrine: Negative for cold intolerance and heat intolerance.   Genitourinary:  Negative for difficulty urinating.   Musculoskeletal:  Negative for arthralgias and joint swelling.   Skin:  Negative for pallor and rash.    Allergic/Immunologic: Negative for environmental allergies.   Neurological:  Positive for weakness. Negative for dizziness and syncope.   Hematological:  Does not bruise/bleed easily.   Psychiatric/Behavioral:  Negative for dysphoric mood. The patient is not nervous/anxious.      Objective:     Vital Signs (Most Recent):  Temp: 98.6 °F (37 °C) (04/12/24 0702)  Pulse: 82 (04/12/24 0736)  Resp: 18 (04/12/24 0702)  BP: (!) 166/72 (04/12/24 0702)  SpO2: 95 % (04/12/24 0702) Vital Signs (24h Range):  Temp:  [98.1 °F (36.7 °C)-98.6 °F (37 °C)] 98.6 °F (37 °C)  Pulse:  [] 82  Resp:  [16-20] 18  SpO2:  [93 %-97 %] 95 %  BP: (138-166)/(61-86) 166/72     Weight: 82.9 kg (182 lb 12.8 oz)  Body mass index is 26.23 kg/m².     Physical Exam  Vitals and nursing note reviewed.   Constitutional:       Appearance: Normal appearance. He is not ill-appearing.      Comments: Frail and fatigued   HENT:      Head: Normocephalic and atraumatic.      Nose: Nose normal.   Eyes:      Extraocular Movements: Extraocular movements intact.      Pupils: Pupils are equal, round, and reactive to light.   Cardiovascular:      Rate and Rhythm: Normal rate and regular rhythm.      Heart sounds: Murmur heard.      No friction rub. No gallop.   Pulmonary:      Effort: Pulmonary effort is normal.      Breath sounds: Normal breath sounds.   Abdominal:      General: Abdomen is flat. Bowel sounds are normal.      Palpations: Abdomen is soft.   Musculoskeletal:         General: No swelling or deformity.      Cervical back: Normal range of motion and neck supple.   Skin:     General: Skin is warm and dry.      Capillary Refill: Capillary refill takes less than 2 seconds.      Coloration: Skin is not pale.   Neurological:      General: No focal deficit present.      Mental Status: He is alert and oriented to person, place, and time.      Motor: Weakness present.   Psychiatric:         Mood and Affect: Mood normal.         Behavior: Behavior normal.               CRANIAL NERVES     CN III, IV, VI   Pupils are equal, round, and reactive to light.       Significant Labs: All pertinent labs within the past 24 hours have been reviewed.    Significant Imaging: I have reviewed all pertinent imaging results/findings within the past 24 hours.

## 2024-04-12 NOTE — ASSESSMENT & PLAN NOTE
Creatine stable for now. BMP reviewed- noted Estimated Creatinine Clearance: 31 mL/min (A) (based on SCr of 1.9 mg/dL (H)). according to latest data. Based on current GFR, CKD stage is stage 3 - GFR 30-59.  Monitor UOP and serial BMP and adjust therapy as needed. Renally dose meds. Avoid nephrotoxic medications and procedures.    Lab Results   Component Value Date    CREATININE 1.9 (H) 04/12/2024    CREATININE 1.9 (H) 04/11/2024    CREATININE 1.8 (H) 04/10/2024

## 2024-04-12 NOTE — PLAN OF CARE
Problem: Occupational Therapy  Goal: Occupational Therapy Goal  Description: Goals to be met by: 04/17/24     Patient will increase functional independence with ADLs by performing:    UE Dressing with Modified [place].  LE Dressing with Modified [place].  Grooming while standing at sink with Modified [place].  Toileting from toilet with Modified [place] for hygiene and clothing management.   Bathing from  shower chair/bench with Modified [place].  Toilet transfer to toilet with Modified [place].    Outcome: Ongoing, Progressing

## 2024-04-12 NOTE — ASSESSMENT & PLAN NOTE
Patient with Hypoxic Respiratory failure which is Acute.  he is not on home oxygen. Supplemental oxygen was provided and noted-      .   Signs/symptoms of respiratory failure include- tachypnea, increased work of breathing, respiratory distress, and use of accessory muscles. Contributing diagnoses includes - CHF and Pleural effusion Labs and images were reviewed. Patient Has recent ABG, which has been reviewed. Will treat underlying causes and adjust management of respiratory failure as follows- now resolved with BiPAP and treatment of underlying atrial fibrillation with rapid ventricular response.  Wean oxygen as tolerated.

## 2024-04-12 NOTE — NURSING
PATIENT LEFT FLOOR IN STABLE CONDITION PER WHEEL CHAIR.  SAFETY MAINTAINED.  NOTIFIED [facility] THAT PATIENT IS DISCHARGING TODAY.   KELLY ARELLANO LPN

## 2024-04-12 NOTE — PLAN OF CARE
04/12/24 0952   Medicare Message   Important Message from Medicare regarding Discharge Appeal Rights Given to patient/caregiver;Explained to patient/caregiver;Signed/date by patient/caregiver   Date IMM was signed 04/12/24   Time IMM was signed 0950     Spoke to patient at bedside,  Awake, oriented.  Gave him copy of Medicare Important Message.  Explained IM discharge appeal rights.  Verbalizes understanding.  Signs IM.

## 2024-04-12 NOTE — DISCHARGE SUMMARY
[facility] Medicine  Discharge Summary      Patient Name: Hunter Navarrete  MRN: [id number]  [facility]: [id number]  Patient Class: IP- Inpatient  Admission Date: 4/8/2024  Hospital Length of Stay: 4 days  Discharge Date and Time:  04/12/2024 10:37 AM  Attending Physician: Shawn Purdy Jr., MD   Discharging Provider: Shawn Purdy Jr, MD  Primary Care Provider: Shawn Purdy Jr., MD    Primary Care Team: Networked reference to record PCT     HPI:   Chief Complaint   Patient presents with    Nausea    Diarrhea    Vomiting    Weakness       Pt here per [facility] with N/V/D and weakness since this am. Pt vomited x 1. Diarrhea x 1. Pt received 500mg IV bolus and 4mg Zofran PtA      This is an 83-year-old white male with multiple medical problems including anemia requiring transfusion, CAD status post CABG, chronic kidney disease, Parkinson's, and UTI who presents to the emergency department via EMS with complaints of generalized weakness that began this morning.  Patient reports that he was in his regular state of health, and shortly after eating breakfast he developed nausea with 1 episode of vomiting, diarrhea, and brief episode of anterior chest pain with shortness of breath.  He also had transient episode of mid abdominal aching pain.  Since that time has been experiencing weakness and lightheadedness.  Family members report that his blood pressure has been fluctuating over the last several days, experiencing many lows, and he has been taking his blood pressure medications as directed.  Patient denies any pain at the time of evaluation and has had resolution nausea after receiving Zofran in route to the hospital.  He further denies headache, URI signs and symptoms, dysuria, or any other relative symptoms at this time.       * No surgery found *      Hospital Course:   4/10:  Patient has responded nicely to blood products.  States that he does not feel well today.  Feels generally weak and  fatigued.  Will have therapy work with the patient throughout the day and consider discharge tomorrow should hemoglobin remained stable and he reach his normal functional capacity.    4/11:  Patient had an acute decompensation yesterday evening.  He was noted to have atrial fibrillation with rapid ventricular response.  He was noted to be diaphoretic and tachypneic.  The patient was given a 1 time dose of Lasix.  He was placed on BiPAP.  Amnio drip was started.  He has since stabilized.  Breathing more comfortably on BiPAP and now resting.  Heart rate in the 80s.  Will transitioned to oral amiodarone tomorrow if he remains stable.  Should you have any further decompensation will consider transition to outside facility.  Long discussion held with the patient's family now more strongly considering inpatient rehab as an option given his recent decompensation.  This would be ideal for strength training in addition to ongoing inpatient monitoring.    4/12: Patient resting comfortably this morning.  Will transition to oral amiodarone.  Continue physical and occupational therapy.  After discussion with the patient and the patient's family will pursue inpatient rehab if qualifies.  Plan to transition inpatient rehab on Monday.    Patient did extremely well with physical therapy.  They are currently recommending home with home health.  [facility] will reinstitute this at time of discharge which will be later today     Goals of Care Treatment Preferences:  Code Status: Full Code      Consults:   Consults (From admission, onward)          Status Ordering Provider     Inpatient consult to Cardiology  Once        Provider:  Farzad Henderson MD    Completed HARLAN KIMBALL            Pulmonary  Acute hypoxemic respiratory failure  Patient with Hypoxic Respiratory failure which is Acute.  he is not on home oxygen. Supplemental oxygen was provided and noted-      .   Signs/symptoms of respiratory failure  include- tachypnea, increased work of breathing, respiratory distress, and use of accessory muscles. Contributing diagnoses includes - CHF and Pleural effusion Labs and images were reviewed. Patient Has recent ABG, which has been reviewed. Will treat underlying causes and adjust management of respiratory failure as follows- now resolved with BiPAP and treatment of underlying atrial fibrillation with rapid ventricular response.  Wean oxygen as tolerated.    Cardiac/Vascular  Atrial fibrillation with RVR  Now on amiodarone drip.  Rate control achieved.  Transitioned to oral amiodarone once stable    Elevated troponin  Likely from demand mismatch in the setting of anemia.  Continue to monitor.  Appreciate cardiology input    Recent Labs   Lab 04/08/24  1751 04/09/24  1513 04/11/24  0914   Troponin I High Sensitivity 138.1 H 81.4 H 48.8             Wide QRS ventricular tachycardia  Appreciate cardiology input.  Suspect secondary to anemia.  Troponin mildly elevated.  Patient asymptomatic.  Likely anemia induced.      Hypertension  Chronic, controlled. Latest blood pressure and vitals reviewed-     Temp:  [98.1 °F (36.7 °C)-98.6 °F (37 °C)]   Pulse:  []   Resp:  [16-20]   BP: (138-166)/(61-86)   SpO2:  [93 %-97 %] .   Home meds for hypertension were reviewed and noted below.   Hypertension Medications               carvediloL (COREG) 3.125 MG tablet Take 1 tablet (3.125 mg total) by mouth 2 (two) times daily with meals.    furosemide (LASIX) 20 MG tablet Take 20 mg by mouth once daily. Prescribed by Dr. Loredo            While in the hospital, will manage blood pressure as follows; Continue home antihypertensive regimen    Will utilize p.r.n. blood pressure medication only if patient's blood pressure greater than 180/110 and he develops symptoms such as worsening chest pain or shortness of breath.    Coronary artery disease  Patient is status post CABG.  Denies chest pain currently.  Continue to monitor.    Recent  Labs   Lab 04/08/24  1751 04/09/24  1513 04/11/24  0914   Troponin I High Sensitivity 138.1 H 81.4 H 48.8           Renal/  Stage 3 chronic kidney disease  Creatine stable for now. BMP reviewed- noted Estimated Creatinine Clearance: 31 mL/min (A) (based on SCr of 1.9 mg/dL (H)). according to latest data. Based on current GFR, CKD stage is stage 3 - GFR 30-59.  Monitor UOP and serial BMP and adjust therapy as needed. Renally dose meds. Avoid nephrotoxic medications and procedures.    Lab Results   Component Value Date    CREATININE 1.9 (H) 04/12/2024    CREATININE 1.9 (H) 04/11/2024    CREATININE 1.8 (H) 04/10/2024          Hematology  Lupus anticoagulant inhibitor syndrome  Hold anticoagulants setting of symptomatic anemia      Oncology  * Symptomatic anemia  Patient's anemia is currently controlled. Has received 3 units of PRBCs on 4/9/24 . Etiology likely d/t chronic blood loss  Current CBC reviewed-   Lab Results   Component Value Date    HGB 11.2 (L) 04/12/2024    HCT 34.0 (L) 04/12/2024     Monitor serial CBC and transfuse if patient becomes hemodynamically unstable, symptomatic or H/H drops below 7/21.    Recent Labs   Lab 04/10/24  0524 04/11/24  0544 04/12/24  0522   Hemoglobin 10.9 L 10.4 L 11.2 L           GI  GERD (gastroesophageal reflux disease)  PPI while hospitalized.      Other  Weakness  Continue PT/OT.  HH at time of dc.      Final Active Diagnoses:    Diagnosis Date Noted POA    PRINCIPAL PROBLEM:  Symptomatic anemia [D64.9] 04/09/2024 Unknown    Atrial fibrillation with RVR [I48.91] 04/11/2024 Unknown    Acute hypoxemic respiratory failure [J96.01] 04/11/2024 Unknown    Wide QRS ventricular tachycardia [I47.20] 04/09/2024 Unknown    Elevated troponin [R79.89] 04/09/2024 Unknown    Weakness [R53.1] 07/21/2023 Yes    GERD (gastroesophageal reflux disease) [K21.9]  Yes    Hypertension [I10]  Yes    Coronary artery disease [I25.10] 09/03/2019 Yes    Stage 3 chronic kidney disease [N18.30]  09/03/2019 Yes    Lupus anticoagulant inhibitor syndrome [D68.62] 03/02/2017 Yes      Problems Resolved During this Admission:       Discharged Condition: fair    Disposition:     Follow Up:   Contact information for follow-up providers       Shawn Purdy Jr., MD Follow up in 4 day(s).    Specialty: Internal Medicine  Contact information:  [address] [facility] [address]  [phone]                       Contact information for after-discharge care       Destination       [facility] .    Service: Inpatient Rehabilitation  Contact information:  [address] [facility], [place] [address] [phone]                                 Patient Instructions:      Diet Cardiac     SUBSEQUENT HOME HEALTH ORDERS   Order Comments: [facility] for skilled/PT 3x/week for 4 weeks.     Order Specific Question Answer Comments   What Home Health Agency is the patient currently using? Other/External        Significant Diagnostic Studies: Labs: All labs within the past 24 hours have been reviewed    Pending Diagnostic Studies:       None           Medications:  Reconciled Home Medications:      Medication List        START taking these medications      amiodarone 200 MG Tab  Commonly known as: PACERONE  Take 1 tablet (200 mg total) by mouth 2 (two) times daily.            CONTINUE taking these medications      ARTIFICIAL TEARS (PF) OPHT  Apply 1 drop to eye 3 (three) times daily.     ARTIFICIAL TEARS OPHT  Apply to eye.     azelastine 137 mcg (0.1 %) nasal spray  Commonly known as: ASTELIN  1 spray (137 mcg total) by Nasal route 2 (two) times daily.     calcium carbonate-vitamin D3 500 mg-10 mcg (400 unit) Tab  Take 1 tablet by mouth once daily.     carbidopa-levodopa  mg per disintegrating tablet  Commonly known as: PARCOPA  Take 1 tablet by mouth 3 (three) times daily.     carvediloL 3.125 MG tablet  Commonly known as: COREG  Take 1 tablet (3.125 mg total) by mouth 2 (two) times  daily with meals.     clindamycin phosphate 1% 1 % gel  Commonly known as: CLINDAGEL  Apply topically 2 (two) times daily.     docusate sodium 100 MG capsule  Commonly known as: COLACE  Take 100 mg by mouth 2 (two) times daily.     DULoxetine 30 MG capsule  Commonly known as: CYMBALTA  Take 1 capsule (30 mg total) by mouth once daily.     ezetimibe 10 mg tablet  Commonly known as: ZETIA  Take 1 tablet (10 mg total) by mouth once daily.     fluticasone propionate 50 mcg/actuation nasal spray  Commonly known as: FLONASE  1 spray by Each Nostril route 2 (two) times a day.     furosemide 20 MG tablet  Commonly known as: LASIX  Take 20 mg by mouth once daily. Prescribed by Dr. Loredo     HYDROcodone-acetaminophen 7.5-325 mg per tablet  Commonly known as: NORCO  Prescribed by Dr. Hernandez     hydroxychloroquine 200 mg tablet  Commonly known as: PLAQUENIL  Take 200 mg by mouth once daily.     niacin 1,000 mg Tbsr  Take 1 tablet by mouth Daily.     pantoprazole 40 MG tablet  Commonly known as: PROTONIX  Take 1 tablet (40 mg total) by mouth once daily.     polyvinyl alcohol (artificial tears) 1.4 % ophthalmic solution  Commonly known as: LIQUIFILM TEARS  Place 1 drop into both eyes as needed.     predniSONE 5 MG tablet  Commonly known as: DELTASONE  Take 2.5 mg by mouth once daily.     QUEtiapine 50 MG tablet  Commonly known as: SEROQUEL  Take 50 mg by mouth every evening.     rOPINIRole 1 MG tablet  Commonly known as: REQUIP  Take 1 tablet (1 mg total) by mouth 2 (two) times daily.     rosuvastatin 40 MG Tab  Commonly known as: CRESTOR  Take 1 tablet (40 mg total) by mouth every evening.     tamsulosin 0.4 mg Cap  Commonly known as: FLOMAX  Take 1 capsule (0.4 mg total) by mouth every evening.            STOP taking these medications      clopidogreL 75 mg tablet  Commonly known as: PLAVIX     gabapentin 100 MG capsule  Commonly known as: NEURONTIN              Indwelling Lines/Drains at time of discharge:    Lines/Drains/Airways       None                   Time spent on the discharge of patient: 60 minutes         Shawn Purdy Jr, MD  Department of Hospital Medicine  [facility]

## 2024-04-12 NOTE — PT/OT/SLP PROGRESS
"Occupational Therapy   Treatment    Name: Hunter Navarrete  MRN: [id number]  Admitting Diagnosis:  Symptomatic anemia       Recommendations:     Discharge Recommendations: Low Intensity Therapy  Discharge Equipment Recommendations:  none  Barriers to discharge:  Other (Comment) (medical and functional status)    Assessment:     Hunter Navarrete is a 83 y.o. male with a medical diagnosis of Symptomatic anemia.  He presents with good participation and motivation to get better. . Performance deficits affecting function are weakness, impaired endurance, impaired self care skills, impaired functional mobility, gait instability, impaired balance, decreased upper extremity function, decreased lower extremity function, decreased safety awareness, decreased ROM, impaired cardiopulmonary response to activity, impaired joint extensibility, impaired muscle length. Pt needed increase breaks and unable to compelte as many reps with LUE secondary to patient reporting he has a "messed up rotator cuff"    Rehab Prognosis:  Good; patient would benefit from acute skilled OT services to address these deficits and reach maximum level of function.       Plan:     Patient to be seen 5 x/week to address the above listed problems via self-care/home management, therapeutic activities, therapeutic exercises  Plan of Care Expires: 04/17/24  Plan of Care Reviewed with: patient    Subjective     Chief Complaint: Reported none  Patient/Family Comments/goals: Go to rehab and get better.   Pain/Comfort:  Pain Rating 1: 0/10  Pain Rating Post-Intervention 1: 0/10    Objective:     Communicated with: occupational therapist and nurse prior to session.  Patient found up in chair with peripheral IV upon OT entry to room.    General Precautions: Standard, fall    Orthopedic Precautions:N/A  Braces: N/A  Respiratory Status: Room air     Occupational Performance:     Bed Mobility:    Patient completed Rolling/Turning to Right with supervision  Patient " completed Scooting/Bridging with supervision  Patient completed Sit to Supine with supervision     Functional Mobility/Transfers:  Patient completed Sit <> Stand Transfer with stand by assistance  with  no assistive device   Patient completed Bed <> Chair Transfer using Step Transfer technique with stand by assistance with no assistive device  Functional Mobility: Pt completed 2 x 5 sit to stands with above assistance. Pt with no LOB during functional mobility tasks.     [facility] 6 Click ADL: 19    Treatment & Education:  Patient engaged in active range of motion therapeutic exercise for 2 x 15 sitting up in chair in the following planes: shoulder flexion/extension, shoulder abduction/adduction, elbow flexion/extension, scapular retractions/protraction, scapular elevation/depression, shoulder rotations (forward and reverse), wrist flexion/extension, wrist radial/ulnar deviation, forearm supination/pronation, and composite fist. Patient needed rest breaks between each set due to impaired muscle endurance and activity tolerance. Patient educated to continue to complete exercise throughout the day to increase strength and endurance to facilitate an increase in ADL/IADLs. Patient verbally understood. Pt was provided education / instruction regarding role of OT and established OT POC.      Patient left HOB elevated with all lines intact, call button in reach, and nurse notified    GOALS:   Multidisciplinary Problems       Occupational Therapy Goals          Problem: Occupational Therapy    Goal Priority Disciplines Outcome Interventions   Occupational Therapy Goal     OT, PT/OT Ongoing, Progressing    Description: Goals to be met by: 04/17/24     Patient will increase functional independence with ADLs by performing:    UE Dressing with Modified [place].  LE Dressing with Modified [place].  Grooming while standing at sink with Modified [place].  Toileting from toilet with Modified [place] for hygiene and  clothing management.   Bathing from  shower chair/bench with Modified [place].  Toilet transfer to toilet with Modified [place].                         Time Tracking:     OT Date of Treatment: 04/12/24  OT Start Time: 0921  OT Stop Time: 0950  OT Total Time (min): 29 min    Billable Minutes:Therapeutic Activity 14 min  Therapeutic Exercise 15 min    OT/LONDON: LONDON     Number of LONDON visits since last OT visit: 2    4/12/2024  Yasmine LIU

## 2024-04-12 NOTE — PT/OT/SLP PROGRESS
"Physical Therapy Treatment    Patient Name:  Hunter Navarrete   MRN:  [id number]    Recommendations:     Discharge Recommendations: Low Intensity Therapy (home with home health P.T.)  Discharge Equipment Recommendations: none  Barriers to discharge: None    Assessment:     Hunter Navarrete is a 83 y.o. male admitted with a medical diagnosis of Symptomatic anemia.  He presents with the following impairments/functional limitations: weakness, impaired joint extensibility, impaired endurance, decreased ROM, impaired muscle length, decreased upper extremity function, decreased lower extremity function, impaired functional mobility, pain, impaired balance, impaired cardiopulmonary response to activity, gait instability Patient is feeling better today, agreeable to therapy.  Ambulated ~617 feet with no A.D. with SBA, 2 standing rest breaks in between due to c/o chronic back pain. Anticipated discharge to home with follow up from home health P.T.  Celia Allan informed. No DME needed. .    Rehab Prognosis: Fair; patient would benefit from acute skilled PT services to address these deficits and reach maximum level of function.    Recent Surgery: * No surgery found *      Plan:     During this hospitalization, patient to be seen 5 x/week to address the identified rehab impairments via gait training, therapeutic activities, therapeutic exercises, neuromuscular re-education and progress toward the following goals:    Plan of Care Expires:  04/17/24    Subjective     Chief Complaint: "This is more than what I walked at home."   Patient/Family Comments/goals: Be home.   Pain/Comfort:  Pain Rating 1:  (did not quantify)  Location - Side 1: Bilateral  Location - Orientation 1: lower  Location 1: back  Pain Addressed 1: Reposition, Distraction, Nurse notified  Pain Rating Post-Intervention 1:  (did not quantify)      Objective:     Communicated with nurse and patient  prior to session.  Patient found HOB elevated with " peripheral IV upon PT entry to room.     General Precautions: Standard, fall  Orthopedic Precautions: N/A  Braces: N/A  Respiratory Status: Room air     Functional Mobility:  Bed Mobility:     Rolling Left:  modified independence  Rolling Right: modified independence  Scooting: modified independence  Bridging: modified independence  Supine to Sit: modified independence  Sit to Supine: modified independence  Transfers:     Sit to Stand:  modified independence with no AD  Gait:~617 feet with no A.D. with SBA, 2 standing rest breaks in between due to c/o chronic back pain.  Balance: mod I with static sitting, set up with static standing with no A.D.       AM-PAC 6 CLICK MOBILITY  Turning over in bed (including adjusting bedclothes, sheets and blankets)?: 4  Sitting down on and standing up from a chair with arms (e.g., wheelchair, bedside commode, etc.): 4  Moving from lying on back to sitting on the side of the bed?: 4  Moving to and from a bed to a chair (including a wheelchair)?: 4  Need to walk in hospital room?: 4  Climbing 3-5 steps with a railing?: 3 (based on clincal judgement)  Basic Mobility Total Score: 23       Treatment & Education:  Rolling side <> side  Supine <> sit  Scooting to the edge  of the bed   Sitting balance/tolerance  Sit <> stand with no A.D.   Standing balance/tolerance   Out of bed   Gait with no A.D.        Seated   Both Lower Extremity   Active ROM Sets / Reps / Resistance / Etc.   Hip Flexion 1 x 10    LAQ 1 x 10    Ankle DF/PF                 1 x 10         PT discussed the  importance of patient's performance of Home Exercise Program (HEP)  and out of bed with patient verbalizing understanding.      Patient left up in chair with all lines intact, call button in reach, and nurse  notified..    GOALS:   Multidisciplinary Problems       Physical Therapy Goals          Problem: Physical Therapy    Goal Priority Disciplines Outcome Goal Variances Interventions   Physical Therapy Goal     PT,  PT/OT Adequate for Care Transition     Description: Goals to be met by: 2024    Patient will increase functional independence with mobility by performin. Supine to sit with Modified Independent.  2. Sit to supine with Modified Independent.  3. Bed to chair transfer with Modified Independent with no A.D.  using Step Transfer technique.  4. Sit to Stand with Modified Independent with no A.D. .  5. Gait  x 200  feet with Modified Independent with no A.D. .  6. Lower extremity exercise program x10 reps.                          Time Tracking:     PT Received On: 24  PT Start Time: 857     PT Stop Time: 920  PT Total Time (min): 23 min     Billable Minutes: Gait Training 10 and Therapeutic Activity 13    Treatment Type: Treatment  PT/PTA: PT           2024

## 2024-04-13 LAB
BACTERIA BLD CULT: NORMAL
BACTERIA BLD CULT: NORMAL

## 2024-04-24 ENCOUNTER — OFFICE VISIT (OUTPATIENT)
Dept: CARDIAC SURGERY | Facility: CLINIC | Age: 84
End: 2024-04-24
Payer: MEDICARE

## 2024-04-24 ENCOUNTER — HOSPITAL ENCOUNTER (OUTPATIENT)
Dept: RADIOLOGY | Facility: HOSPITAL | Age: 84
Discharge: HOME | End: 2024-04-24
Attending: THORACIC SURGERY (CARDIOTHORACIC VASCULAR SURGERY)
Payer: MEDICARE

## 2024-04-24 VITALS
WEIGHT: 174 LBS | HEIGHT: 70 IN | OXYGEN SATURATION: 97 % | HEART RATE: 73 BPM | BODY MASS INDEX: 24.91 KG/M2 | SYSTOLIC BLOOD PRESSURE: 126 MMHG | RESPIRATION RATE: 18 BRPM | DIASTOLIC BLOOD PRESSURE: 66 MMHG

## 2024-04-24 DIAGNOSIS — I65.23 SYMPTOMATIC STENOSIS OF BOTH CAROTID ARTERIES: Primary | ICD-10-CM

## 2024-04-24 DIAGNOSIS — R06.02 SOB (SHORTNESS OF BREATH): Primary | ICD-10-CM

## 2024-04-24 DIAGNOSIS — R06.02 SOB (SHORTNESS OF BREATH): ICD-10-CM

## 2024-04-24 PROCEDURE — 71046 X-RAY EXAM CHEST 2 VIEWS: CPT | Mod: TC

## 2024-04-24 PROCEDURE — 99214 OFFICE O/P EST MOD 30 MIN: CPT | Mod: ,,, | Performed by: THORACIC SURGERY (CARDIOTHORACIC VASCULAR SURGERY)

## 2024-04-24 NOTE — PROGRESS NOTES
History & Physical    SUBJECTIVE:     History of Present Illness:  The patient returns to the clinic for follow-up carotid artery disease.  He has been reporting feeling weak.  He also reports some pain over the left chest cavity.  This is reproducible.      Chief Complaint   Patient presents with    Follow-up     1 YEAR F/U W/ BILAT CAROTID U/S-LAST RESULTS CTA 50% RT, <50% LEFT. DX: BILAT CAROTID ARTERY DISEASE.  C/o new intermittent pain to L side chest, increasing in intensity.       Review of patient's allergies indicates:   Allergen Reactions    Cardura [doxazosin] Hives    Lyrica [pregabalin] Other (See Comments)    Paxil [paroxetine hcl] Other (See Comments)    Restoril [temazepam] Hallucinations    Vioxx [rofecoxib] Swelling    Zithromax [azithromycin] Hives    Alpha 1 blocker- quinazolines     Benzodiazepines        Current Outpatient Medications   Medication Sig Dispense Refill    amiodarone (PACERONE) 200 MG Tab Take 1 tablet (200 mg total) by mouth 2 (two) times daily. 60 tablet 0    azelastine (ASTELIN) 137 mcg (0.1 %) nasal spray 1 spray (137 mcg total) by Nasal route 2 (two) times daily. 30 mL 0    calcium carbonate-vitamin D3 500 mg-10 mcg (400 unit) Tab Take 1 tablet by mouth once daily.      carbidopa-levodopa (PARCOPA)  mg per disintegrating tablet Take 1 tablet by mouth 3 (three) times daily.      carvediloL (COREG) 3.125 MG tablet Take 1 tablet (3.125 mg total) by mouth 2 (two) times daily with meals. 180 tablet 1    dextran 70/hypromellose (ARTIFICIAL TEARS, PF, OPHT) Apply 1 drop to eye 3 (three) times daily.      docusate sodium (COLACE) 100 MG capsule Take 100 mg by mouth 2 (two) times daily.      ezetimibe (ZETIA) 10 mg tablet Take 1 tablet (10 mg total) by mouth once daily. 90 tablet 1    fluticasone propionate (FLONASE) 50 mcg/actuation nasal spray 1 spray by Each Nostril route 2 (two) times a day.      furosemide (LASIX) 20 MG tablet Take 20 mg by mouth once daily. Prescribed by  Dr. Loredo      HYDROcodone-acetaminophen (NORCO) 7.5-325 mg per tablet Prescribed by Dr. Hernandez      hydroxychloroquine (PLAQUENIL) 200 mg tablet Take 200 mg by mouth once daily.      niacin 1,000 mg TbSR Take 1 tablet by mouth Daily.      predniSONE (DELTASONE) 5 MG tablet Take 2.5 mg by mouth once daily.      QUEtiapine (SEROQUEL) 50 MG tablet Take 50 mg by mouth every evening.      rosuvastatin (CRESTOR) 40 MG Tab Take 1 tablet (40 mg total) by mouth every evening. 90 tablet 1    tamsulosin (FLOMAX) 0.4 mg Cap Take 1 capsule (0.4 mg total) by mouth every evening. 90 capsule 1    clindamycin phosphate 1% (CLINDAGEL) 1 % gel Apply topically 2 (two) times daily. 30 g 1    DULoxetine (CYMBALTA) 30 MG capsule Take 1 capsule (30 mg total) by mouth once daily. 30 capsule 2    pantoprazole (PROTONIX) 40 MG tablet Take 1 tablet (40 mg total) by mouth once daily. 90 tablet 1    rOPINIRole (REQUIP) 1 MG tablet Take 1 tablet (1 mg total) by mouth 2 (two) times daily. 60 tablet 0     No current facility-administered medications for this visit.       Past Medical History:   Diagnosis Date    Acid reflux     Anemia     Anticoagulant long-term use     Anxiety disorder, unspecified     Aortic aneurysm, abdominal     Arthritis     Bronchitis     Carotid stenosis 05/13/2022    60-79% Right ICA 60-70% Left ICA    Cataract     Celiac artery stenosis     50% by CTA abdomen 7/27/2020    CKD (chronic kidney disease)     45% FUNCTION    Coronary artery disease     stents x4    Encounter for blood transfusion     Enlarged prostate     GERD (gastroesophageal reflux disease)     Heart attack     Hemorrhoids     Hypercholesteremia     Hypertension     Iron deficiency anemia, unspecified     Leaky heart valve     Lupus anticoagulant disorder     Mood disorder     Parkinson's disease     Renal artery stenosis     by CTA 7/27/2020    Restless leg syndrome     Skin cancer     Skin tear of elbow without complication     above elbow    Unspecified  chronic bronchitis     UTI (urinary tract infection)      Past Surgical History:   Procedure Laterality Date    ACF      Anterior Cervical Fusion    BACK SURGERY      RODS IN BACK; 4 SURGIERIES    BIOPSY OF URETER Right 06/22/2023    Procedure: RESECTION/BIOPSY, URETER;  Surgeon: Aaron Pinon MD;  Location: [facility] OR;  Service: Urology;  Laterality: Right;    BONE MARROW BIOPSY      CARDIAC ANGIOGRAM WITH STENT      CATARACT SX Bilateral     CHOLECYSTECTOMY      CORONARY ANGIOGRAPHY N/A 02/15/2023    Procedure: ANGIOGRAM, CORONARY ARTERY;  Surgeon: Rito Vega MD;  Location: [facility] CATH;  Service: Cardiology;  Laterality: N/A;    CORONARY ARTERY BYPASS GRAFT (CABG) N/A 02/24/2023    Procedure: CORONARY ARTERY BYPASS GRAFT (CABG);  Surgeon: Spencer Hagan MD;  Location: [facility] OR;  Service: Cardiothoracic;  Laterality: N/A;  CABG / EVH //   ECHO NOTIFIED    CYSTOSCOPY W/ RETROGRADES Bilateral 06/20/2023    Procedure: CYSTOSCOPY WITH RETROGRADE PYELOGRAM;  Surgeon: Aaron Pinon MD;  Location: [facility] OR;  Service: Urology;  Laterality: Bilateral;  Pt has a Spinal Cord Stimulator  9am per [place], To follow RM4    CYSTOSCOPY W/ RETROGRADES Right 06/22/2023    Procedure: CYSTOSCOPY, WITH RETROGRADE PYELOGRAM;  Surgeon: Aaron Pinon MD;  Location: [facility] OR;  Service: Urology;  Laterality: Right;    CYSTOSCOPY W/ RETROGRADES Bilateral 8/24/2023    Procedure: CYSTOSCOPY, WITH RETROGRADE PYELOGRAM;  Surgeon: Aaron Pinon MD;  Location: [facility] OR;  Service: Urology;  Laterality: Bilateral;    CYSTOSCOPY W/ RETROGRADES Bilateral 12/4/2023    Procedure: CYSTOSCOPY WITH RETROGRADE PYELOGRAM;  Surgeon: Aaron Pinon MD;  Location: [facility] OR;  Service: Urology;  Laterality: Bilateral;  pt has spinal cord stimulator    DIGITAL RECTAL EXAMINATION UNDER ANESTHESIA N/A 12/4/2023    Procedure: EXAM UNDER ANESTHESIA, DIGITAL, RECTUM;  Surgeon: Aaron Pinon MD;  Location: [facility] OR;  Service: Urology;  Laterality: N/A;     HIP SURGERY Right     THR    KNEE SURGERY Right     ATS    LEFT HEART CATHETERIZATION Left 2021    Procedure: Left heart cath;  Surgeon: Curtis Loredo MD;  Location: [facility] CATH;  Service: Cardiology;  Laterality: Left;    LEFT HEART CATHETERIZATION Left 2022    Procedure: Left heart cath;  Surgeon: Giorgi Barker MD;  Location: [facility] CATH;  Service: Cardiology;  Laterality: Left;    REMOVAL, STENT, URETER Right 2023    Procedure: REMOVAL, STENT, URETER;  Surgeon: Aaron Pinon MD;  Location: [facility] OR;  Service: Urology;  Laterality: Right;    ROTATOR CUFF REPAIR Bilateral     SHOULDER SURGERY Bilateral     SPINAL CORD STIMULATOR IMPLANT      STENT, URETERAL Left 2023    Procedure: STENT, URETERAL;  Surgeon: Aaron Pinon MD;  Location: [facility] OR;  Service: Urology;  Laterality: Left;    steroid injections Right 2023    Knee    ULNAR NERVE TRANSPOSITION      URETEROSCOPY Right 2023    Procedure: URETEROSCOPY;  Surgeon: Aaron Pinon MD;  Location: [facility] OR;  Service: Urology;  Laterality: Right;     Family History   Problem Relation Name Age of Onset    Heart disease Father      Heart disease Sister      Lung cancer Brother      Throat cancer Brother      Cancer Brother      Cancer Brother      Heart disease Brother          PPM    Heart disease Brother      Heart disease Brother       Social History     Tobacco Use    Smoking status: Former     Current packs/day: 0.00     Types: Cigarettes     Quit date:      Years since quittin.3    Smokeless tobacco: Never   Substance Use Topics    Alcohol use: Yes     Alcohol/week: 2.0 standard drinks of alcohol     Types: 1 Glasses of wine, 1 Cans of beer per week     Comment: RARELY    Drug use: No        Review of Systems:  Review of Systems   Constitutional:  Positive for fatigue.   HENT: Negative.     Eyes: Negative.    Respiratory: Negative.     Cardiovascular: Negative.    Gastrointestinal: Negative.    Endocrine: Negative.   "  Genitourinary: Negative.    Musculoskeletal:  Positive for myalgias.        Claudications   Skin: Negative.    Allergic/Immunologic: Negative.    Neurological: Negative.    Hematological: Negative.    Psychiatric/Behavioral: Negative.         OBJECTIVE:     Vital Signs (Most Recent)  Pulse: 73 (04/24/24 1132)  Resp: 18 (04/24/24 1132)  BP: 126/66 (04/24/24 1132)  SpO2: 97 % (04/24/24 1132)  5' 10" (1.778 m)  78.9 kg (174 lb)     Physical Exam:  Physical Exam  Vitals reviewed.   Constitutional:       Appearance: Normal appearance.   HENT:      Head: Normocephalic and atraumatic.      Nose: Nose normal.      Mouth/Throat:      Mouth: Mucous membranes are dry.      Pharynx: Oropharynx is clear.   Eyes:      Extraocular Movements: Extraocular movements intact.      Conjunctiva/sclera: Conjunctivae normal.      Pupils: Pupils are equal, round, and reactive to light.   Cardiovascular:      Rate and Rhythm: Normal rate and regular rhythm.      Pulses: Normal pulses.   Pulmonary:      Effort: Pulmonary effort is normal.      Breath sounds: Normal breath sounds.   Abdominal:      General: Abdomen is flat.      Palpations: Abdomen is soft.   Musculoskeletal:         General: Normal range of motion.      Cervical back: Neck supple.   Skin:     General: Skin is warm and dry.   Neurological:      General: No focal deficit present.   Psychiatric:         Mood and Affect: Mood normal.         Laboratory:  None      Diagnostic Results:  Carotid ultrasound revealed bilateral 40-59% stenosis left greater than right.      ASSESSMENT/PLAN:     Stable carotid artery disease.  Follow up in 1 year with carotid ultrasound  Left chest wall pain.  We will get chest x-ray                "

## 2024-05-06 PROBLEM — T46.6X5A STATIN MYOPATHY: Status: ACTIVE | Noted: 2024-05-06

## 2024-05-06 PROBLEM — L02.92 BOIL: Status: RESOLVED | Noted: 2024-03-25 | Resolved: 2024-05-06

## 2024-05-06 PROBLEM — G72.0 STATIN MYOPATHY: Status: ACTIVE | Noted: 2024-05-06

## 2024-05-06 PROBLEM — R58 BLEEDING: Status: RESOLVED | Noted: 2019-09-03 | Resolved: 2024-05-06

## 2024-05-06 PROBLEM — I20.9 ANGINA, CLASS III: Status: RESOLVED | Noted: 2017-12-27 | Resolved: 2024-05-06

## 2024-05-06 PROBLEM — R21 RASH: Status: ACTIVE | Noted: 2024-05-06

## 2024-05-06 PROBLEM — I50.9 CONGESTIVE HEART FAILURE, UNSPECIFIED HF CHRONICITY, UNSPECIFIED HEART FAILURE TYPE: Status: ACTIVE | Noted: 2024-05-06

## 2024-05-06 PROBLEM — F33.42 MAJOR DEPRESSIVE DISORDER, RECURRENT, IN FULL REMISSION: Status: ACTIVE | Noted: 2024-05-06

## 2024-05-06 PROBLEM — J96.01 ACUTE HYPOXEMIC RESPIRATORY FAILURE: Status: RESOLVED | Noted: 2024-04-11 | Resolved: 2024-05-06

## 2024-05-21 ENCOUNTER — HOSPITAL ENCOUNTER (INPATIENT)
Facility: HOSPITAL | Age: 84
LOS: 2 days | Discharge: HOME HEALTH | DRG: 291 | End: 2024-05-23
Attending: EMERGENCY MEDICINE | Admitting: INTERNAL MEDICINE
Payer: MEDICARE

## 2024-05-21 DIAGNOSIS — D64.9 ANEMIA, UNSPECIFIED TYPE: ICD-10-CM

## 2024-05-21 DIAGNOSIS — Z95.1 S/P CABG X 2: ICD-10-CM

## 2024-05-21 DIAGNOSIS — I50.9 ACUTE ON CHRONIC CONGESTIVE HEART FAILURE, UNSPECIFIED HEART FAILURE TYPE: Primary | ICD-10-CM

## 2024-05-21 DIAGNOSIS — R06.02 SHORTNESS OF BREATH: ICD-10-CM

## 2024-05-21 DIAGNOSIS — N40.0 ENLARGED PROSTATE: ICD-10-CM

## 2024-05-21 LAB
ALBUMIN SERPL BCP-MCNC: 3.1 G/DL (ref 3.5–5.2)
ALP SERPL-CCNC: 68 U/L (ref 55–135)
ALT SERPL W/O P-5'-P-CCNC: 20 U/L (ref 10–44)
ANION GAP SERPL CALC-SCNC: 5 MMOL/L (ref 3–11)
AST SERPL-CCNC: 20 U/L (ref 10–40)
BASOPHILS # BLD AUTO: 0.01 K/UL (ref 0–0.2)
BASOPHILS # BLD AUTO: 0.01 K/UL (ref 0–0.2)
BASOPHILS NFR BLD: 0.2 % (ref 0–1.9)
BASOPHILS NFR BLD: 0.2 % (ref 0–1.9)
BILIRUB SERPL-MCNC: 1.1 MG/DL (ref 0.1–1)
BUN SERPL-MCNC: 27 MG/DL (ref 8–23)
CALCIUM SERPL-MCNC: 8.5 MG/DL (ref 8.7–10.5)
CHLORIDE SERPL-SCNC: 103 MMOL/L (ref 95–110)
CO2 SERPL-SCNC: 29 MMOL/L (ref 23–29)
CREAT SERPL-MCNC: 2.4 MG/DL (ref 0.5–1.4)
DIFFERENTIAL METHOD BLD: ABNORMAL
DIFFERENTIAL METHOD BLD: ABNORMAL
EOSINOPHIL # BLD AUTO: 0 K/UL (ref 0–0.5)
EOSINOPHIL # BLD AUTO: 0 K/UL (ref 0–0.5)
EOSINOPHIL NFR BLD: 0 % (ref 0–8)
EOSINOPHIL NFR BLD: 0 % (ref 0–8)
ERYTHROCYTE [DISTWIDTH] IN BLOOD BY AUTOMATED COUNT: 16.1 % (ref 11.5–14.5)
ERYTHROCYTE [DISTWIDTH] IN BLOOD BY AUTOMATED COUNT: 16.1 % (ref 11.5–14.5)
EST. GFR  (NO RACE VARIABLE): 26.3 ML/MIN/1.73 M^2
GLUCOSE SERPL-MCNC: 101 MG/DL (ref 70–110)
HCT VFR BLD AUTO: 26.7 % (ref 40–54)
HCT VFR BLD AUTO: 26.7 % (ref 40–54)
HGB BLD-MCNC: 8.8 G/DL (ref 14–18)
HGB BLD-MCNC: 8.8 G/DL (ref 14–18)
IMM GRANULOCYTES # BLD AUTO: 0.06 K/UL (ref 0–0.04)
IMM GRANULOCYTES # BLD AUTO: 0.06 K/UL (ref 0–0.04)
IMM GRANULOCYTES NFR BLD AUTO: 1.3 % (ref 0–0.5)
IMM GRANULOCYTES NFR BLD AUTO: 1.5 % (ref 0–0.5)
LYMPHOCYTES # BLD AUTO: 0.7 K/UL (ref 1–4.8)
LYMPHOCYTES # BLD AUTO: 0.9 K/UL (ref 1–4.8)
LYMPHOCYTES NFR BLD: 15.1 % (ref 18–48)
LYMPHOCYTES NFR BLD: 20.9 % (ref 18–48)
MAGNESIUM SERPL-MCNC: 2 MG/DL (ref 1.6–2.6)
MCH RBC QN AUTO: 30 PG (ref 27–31)
MCH RBC QN AUTO: 30.3 PG (ref 27–31)
MCHC RBC AUTO-ENTMCNC: 33 G/DL (ref 32–36)
MCHC RBC AUTO-ENTMCNC: 33 G/DL (ref 32–36)
MCV RBC AUTO: 91 FL (ref 82–98)
MCV RBC AUTO: 92 FL (ref 82–98)
MONOCYTES # BLD AUTO: 0.5 K/UL (ref 0.3–1)
MONOCYTES # BLD AUTO: 0.6 K/UL (ref 0.3–1)
MONOCYTES NFR BLD: 12.5 % (ref 4–15)
MONOCYTES NFR BLD: 12.5 % (ref 4–15)
NEUTROPHILS # BLD AUTO: 2.6 K/UL (ref 1.8–7.7)
NEUTROPHILS # BLD AUTO: 3.3 K/UL (ref 1.8–7.7)
NEUTROPHILS NFR BLD: 64.9 % (ref 38–73)
NEUTROPHILS NFR BLD: 70.9 % (ref 38–73)
NRBC BLD-RTO: 0 /100 WBC
NRBC BLD-RTO: 0 /100 WBC
NT-PROBNP SERPL-MCNC: ABNORMAL PG/ML (ref 5–1800)
OHS QRS DURATION: 126 MS
OHS QTC CALCULATION: 507 MS
PLATELET # BLD AUTO: 85 K/UL (ref 150–450)
PLATELET # BLD AUTO: 89 K/UL (ref 150–450)
PMV BLD AUTO: 11.3 FL (ref 9.2–12.9)
PMV BLD AUTO: 12.1 FL (ref 9.2–12.9)
POTASSIUM SERPL-SCNC: 3.7 MMOL/L (ref 3.5–5.1)
PROT SERPL-MCNC: 7.4 G/DL (ref 6–8.4)
RBC # BLD AUTO: 2.9 M/UL (ref 4.6–6.2)
RBC # BLD AUTO: 2.93 M/UL (ref 4.6–6.2)
SODIUM SERPL-SCNC: 137 MMOL/L (ref 136–145)
T4 FREE SERPL-MCNC: 1.11 NG/DL (ref 0.71–1.51)
TROPONIN I SERPL DL<=0.01 NG/ML-MCNC: 67.4 PG/ML (ref 0–60)
TROPONIN I SERPL DL<=0.01 NG/ML-MCNC: 70.5 PG/ML (ref 0–60)
TROPONIN I SERPL DL<=0.01 NG/ML-MCNC: 91.6 PG/ML (ref 0–60)
TSH SERPL DL<=0.005 MIU/L-ACNC: 6.72 UIU/ML (ref 0.4–4)
WBC # BLD AUTO: 4.07 K/UL (ref 3.9–12.7)
WBC # BLD AUTO: 4.65 K/UL (ref 3.9–12.7)

## 2024-05-21 PROCEDURE — 83735 ASSAY OF MAGNESIUM: CPT | Performed by: EMERGENCY MEDICINE

## 2024-05-21 PROCEDURE — 85025 COMPLETE CBC W/AUTO DIFF WBC: CPT | Mod: 91 | Performed by: EMERGENCY MEDICINE

## 2024-05-21 PROCEDURE — 84484 ASSAY OF TROPONIN QUANT: CPT | Mod: 91 | Performed by: EMERGENCY MEDICINE

## 2024-05-21 PROCEDURE — 36415 COLL VENOUS BLD VENIPUNCTURE: CPT | Performed by: EMERGENCY MEDICINE

## 2024-05-21 PROCEDURE — 21400001 HC TELEMETRY ROOM

## 2024-05-21 PROCEDURE — 93010 ELECTROCARDIOGRAM REPORT: CPT | Mod: ,,, | Performed by: INTERNAL MEDICINE

## 2024-05-21 PROCEDURE — 63600150 PHARM REV CODE 636: Performed by: EMERGENCY MEDICINE

## 2024-05-21 PROCEDURE — 99285 EMERGENCY DEPT VISIT HI MDM: CPT | Mod: 25

## 2024-05-21 PROCEDURE — 11000001 HC ACUTE MED/SURG PRIVATE ROOM

## 2024-05-21 PROCEDURE — 99900035 HC TECH TIME PER 15 MIN (STAT)

## 2024-05-21 PROCEDURE — 27000221 HC OXYGEN, UP TO 24 HOURS

## 2024-05-21 PROCEDURE — 25000003 PHARM REV CODE 250: Performed by: EMERGENCY MEDICINE

## 2024-05-21 PROCEDURE — 93005 ELECTROCARDIOGRAM TRACING: CPT

## 2024-05-21 PROCEDURE — 84439 ASSAY OF FREE THYROXINE: CPT | Performed by: EMERGENCY MEDICINE

## 2024-05-21 PROCEDURE — 83880 ASSAY OF NATRIURETIC PEPTIDE: CPT | Performed by: EMERGENCY MEDICINE

## 2024-05-21 PROCEDURE — 80053 COMPREHEN METABOLIC PANEL: CPT | Performed by: EMERGENCY MEDICINE

## 2024-05-21 PROCEDURE — 84443 ASSAY THYROID STIM HORMONE: CPT | Performed by: EMERGENCY MEDICINE

## 2024-05-21 PROCEDURE — 99900031 HC PATIENT EDUCATION (STAT)

## 2024-05-21 RX ORDER — TAMSULOSIN HYDROCHLORIDE 0.4 MG/1
0.4 CAPSULE ORAL NIGHTLY
Status: DISCONTINUED | OUTPATIENT
Start: 2024-05-21 | End: 2024-05-23 | Stop reason: HOSPADM

## 2024-05-21 RX ORDER — DULOXETIN HYDROCHLORIDE 30 MG/1
30 CAPSULE, DELAYED RELEASE ORAL DAILY
Status: DISCONTINUED | OUTPATIENT
Start: 2024-05-21 | End: 2024-05-23 | Stop reason: HOSPADM

## 2024-05-21 RX ORDER — ONDANSETRON HYDROCHLORIDE 2 MG/ML
4 INJECTION, SOLUTION INTRAVENOUS EVERY 8 HOURS PRN
Status: DISCONTINUED | OUTPATIENT
Start: 2024-05-21 | End: 2024-05-23 | Stop reason: HOSPADM

## 2024-05-21 RX ORDER — CARBIDOPA AND LEVODOPA 25; 100 MG/1; MG/1
1 TABLET ORAL 3 TIMES DAILY
Status: DISCONTINUED | OUTPATIENT
Start: 2024-05-21 | End: 2024-05-22

## 2024-05-21 RX ORDER — TALC
6 POWDER (GRAM) TOPICAL NIGHTLY PRN
Status: DISCONTINUED | OUTPATIENT
Start: 2024-05-21 | End: 2024-05-23 | Stop reason: HOSPADM

## 2024-05-21 RX ORDER — EZETIMIBE 10 MG/1
10 TABLET ORAL DAILY
Status: DISCONTINUED | OUTPATIENT
Start: 2024-05-21 | End: 2024-05-23 | Stop reason: HOSPADM

## 2024-05-21 RX ORDER — SODIUM CHLORIDE 0.9 % (FLUSH) 0.9 %
10 SYRINGE (ML) INJECTION
Status: DISCONTINUED | OUTPATIENT
Start: 2024-05-21 | End: 2024-05-23 | Stop reason: HOSPADM

## 2024-05-21 RX ORDER — FAMOTIDINE 10 MG/ML
20 INJECTION INTRAVENOUS DAILY
Status: DISCONTINUED | OUTPATIENT
Start: 2024-05-22 | End: 2024-05-23 | Stop reason: HOSPADM

## 2024-05-21 RX ORDER — QUETIAPINE FUMARATE 50 MG/1
50 TABLET, FILM COATED ORAL NIGHTLY
Status: DISCONTINUED | OUTPATIENT
Start: 2024-05-21 | End: 2024-05-23 | Stop reason: HOSPADM

## 2024-05-21 RX ORDER — FAMOTIDINE 10 MG/ML
20 INJECTION INTRAVENOUS EVERY 12 HOURS
Status: DISCONTINUED | OUTPATIENT
Start: 2024-05-21 | End: 2024-05-21

## 2024-05-21 RX ORDER — AMIODARONE HYDROCHLORIDE 200 MG/1
200 TABLET ORAL 2 TIMES DAILY
Status: DISCONTINUED | OUTPATIENT
Start: 2024-05-21 | End: 2024-05-23 | Stop reason: HOSPADM

## 2024-05-21 RX ORDER — CARVEDILOL 3.12 MG/1
3.12 TABLET ORAL 2 TIMES DAILY WITH MEALS
Status: DISCONTINUED | OUTPATIENT
Start: 2024-05-21 | End: 2024-05-23 | Stop reason: HOSPADM

## 2024-05-21 RX ORDER — ACETAMINOPHEN 325 MG/1
650 TABLET ORAL EVERY 8 HOURS PRN
Status: DISCONTINUED | OUTPATIENT
Start: 2024-05-21 | End: 2024-05-23 | Stop reason: HOSPADM

## 2024-05-21 RX ADMIN — EZETIMIBE 10 MG: 10 TABLET ORAL at 11:05

## 2024-05-21 RX ADMIN — CARBIDOPA AND LEVODOPA 1 TABLET: 25; 100 TABLET ORAL at 09:05

## 2024-05-21 RX ADMIN — FUROSEMIDE 5 MG/HR: 10 INJECTION, SOLUTION INTRAMUSCULAR; INTRAVENOUS at 12:05

## 2024-05-21 RX ADMIN — Medication 6 MG: at 09:05

## 2024-05-21 RX ADMIN — AMIODARONE HYDROCHLORIDE 200 MG: 200 TABLET ORAL at 11:05

## 2024-05-21 RX ADMIN — CARBIDOPA AND LEVODOPA 1 TABLET: 25; 100 TABLET ORAL at 03:05

## 2024-05-21 RX ADMIN — QUETIAPINE FUMARATE 50 MG: 50 TABLET ORAL at 09:05

## 2024-05-21 RX ADMIN — CARVEDILOL 3.12 MG: 3.12 TABLET, FILM COATED ORAL at 05:05

## 2024-05-21 RX ADMIN — TAMSULOSIN HYDROCHLORIDE 0.4 MG: 0.4 CAPSULE ORAL at 09:05

## 2024-05-21 RX ADMIN — AMIODARONE HYDROCHLORIDE 200 MG: 200 TABLET ORAL at 09:05

## 2024-05-21 RX ADMIN — DULOXETINE HYDROCHLORIDE 30 MG: 30 CAPSULE, DELAYED RELEASE ORAL at 11:05

## 2024-05-21 RX ADMIN — FAMOTIDINE 20 MG: 10 INJECTION, SOLUTION INTRAVENOUS at 11:05

## 2024-05-21 NOTE — PROGRESS NOTES
Pharmacist Renal Dose Adjustment Note    Hunter Navarrete is a 83 y.o. male being treated with the medication famotidine    Patient Data:    Vital Signs (Most Recent):  Temp: 98.6 °F (37 °C) (05/21/24 1138)  Pulse: 95 (05/21/24 1230)  Resp: 18 (05/21/24 1230)  BP: (!) 168/82 (05/21/24 1138)  SpO2: 95 % (05/21/24 1230) Vital Signs (72h Range):  Temp:  [98.1 °F (36.7 °C)-98.6 °F (37 °C)]   Pulse:  [95-96]   Resp:  [18-30]   BP: (164-168)/(74-82)   SpO2:  [93 %-95 %]      Recent Labs   Lab 05/21/24  0956   CREATININE 2.4*     Serum creatinine: 2.4 mg/dL (H) 05/21/24 0956  Estimated creatinine clearance: 25.3 mL/min (A)    Medication: famotidine dose:  20 mg frequency  Q12H will be changed to medication:famotidine dose: 20 mg frequency:Q24H due to crcl <51 ml/min    Pharmacist's Name: Maegan Garcia  Pharmacist's Extension: [phone]

## 2024-05-21 NOTE — PLAN OF CARE
Plan of care reviewed with patient.  Patient verbalized understanding and had no further questions.  Patient placed on Lasix drip, and purewick applied for accurate I&Os.  Patient resting comfortably on 2L NC at this time.  Patient now resting comfortably in bed locked in lowest position, side rails up x3, and call bell in reach.  Will continue to monitor.       Problem: Adult Inpatient Plan of Care  Goal: Plan of Care Review  Outcome: Progressing  Goal: Patient-Specific Goal (Individualized)  Outcome: Progressing  Goal: Absence of Hospital-Acquired Illness or Injury  Outcome: Progressing  Goal: Optimal Comfort and Wellbeing  Outcome: Progressing  Goal: Readiness for Transition of Care  Outcome: Progressing     Problem: Skin Injury Risk Increased  Goal: Skin Health and Integrity  Outcome: Progressing     Problem: Wound  Goal: Optimal Coping  Outcome: Progressing  Goal: Optimal Functional Ability  Outcome: Progressing  Goal: Absence of Infection Signs and Symptoms  Outcome: Progressing  Goal: Improved Oral Intake  Outcome: Progressing  Goal: Optimal Pain Control and Function  Outcome: Progressing  Goal: Skin Health and Integrity  Outcome: Progressing  Goal: Optimal Wound Healing  Outcome: Progressing

## 2024-05-21 NOTE — NURSING
Patient arrived to room 650 via stretcher with nurse.  Patient is a,a,ox2, VSS, and has no complaints at this time.  Patient comfortable on 2L NC with adequate oxygen saturation.  Patient has bilateral forearm skin tears and bruising to right hip from previous fall.  Patient now resting comfortably in bed locked in lowest position, side rails up x3, and call bell in reach.  Will continue to monitor.

## 2024-05-21 NOTE — ED PROVIDER NOTES
Encounter Date: 5/21/2024       History     Chief Complaint   Patient presents with    Shortness of Breath     EMS Reports patients son took patient off of all his medications due to him being on too many medication. Patient reports he has been short of breath since this morning. Patient has been off of medication x 3 days.       83-year-old male with a history of atrial fibrillation, chronic kidney disease, anxiety, CHF presents the emergency depart with gradually worsening shortness of breath over the past 3 days.  His son took him off of some of his medications which include amiodarone and Lasix.  Endorses mild orthopnea as well.  Mild dyspnea on exertion.  Denies fever.  Alert oriented x4, GCS is 15.  He is speaking in full sentences without issue.  Original oxygen saturation at home was 90%, dropped to 89% with EMS.  Here in the emergency department his oxygen saturation is 93% on room air, increases nicely with supplemental oxygen.  Denies chest pain      Review of patient's allergies indicates:   Allergen Reactions    Cardura [doxazosin] Hives    Lyrica [pregabalin] Other (See Comments)    Paxil [paroxetine hcl] Other (See Comments)    Restoril [temazepam] Hallucinations    Vioxx [rofecoxib] Swelling    Zithromax [azithromycin] Hives    Alpha 1 blocker- quinazolines     Benzodiazepines      Past Medical History:   Diagnosis Date    Acid reflux     Anemia     Anticoagulant long-term use     Anxiety disorder, unspecified     Aortic aneurysm, abdominal     Arthritis     Bleeding 09/03/2019    Bronchitis     Carotid stenosis 05/13/2022    60-79% Right ICA 60-70% Left ICA    Cataract     Celiac artery stenosis     50% by CTA abdomen 7/27/2020    CKD (chronic kidney disease)     45% FUNCTION    Coronary artery disease     stents x4    Encounter for blood transfusion     Enlarged prostate     GERD (gastroesophageal reflux disease)     Heart attack     Hemorrhoids     Hypercholesteremia     Hypertension     Iron  deficiency anemia, unspecified     Leaky heart valve     Lupus anticoagulant disorder     Mood disorder     Parkinson's disease     Renal artery stenosis     by CTA 7/27/2020    Restless leg syndrome     Skin cancer     Skin tear of elbow without complication     above elbow    Unspecified chronic bronchitis     UTI (urinary tract infection)      Past Surgical History:   Procedure Laterality Date    ACF      Anterior Cervical Fusion    BACK SURGERY      RODS IN BACK; 4 SURGIERIES    BIOPSY OF URETER Right 06/22/2023    Procedure: RESECTION/BIOPSY, URETER;  Surgeon: Aaron Pinon MD;  Location: [facility] OR;  Service: Urology;  Laterality: Right;    BONE MARROW BIOPSY      CARDIAC ANGIOGRAM WITH STENT      CATARACT SX Bilateral     CHOLECYSTECTOMY      CORONARY ANGIOGRAPHY N/A 02/15/2023    Procedure: ANGIOGRAM, CORONARY ARTERY;  Surgeon: Rito Vega MD;  Location: [facility] CATH;  Service: Cardiology;  Laterality: N/A;    CORONARY ARTERY BYPASS GRAFT (CABG) N/A 02/24/2023    Procedure: CORONARY ARTERY BYPASS GRAFT (CABG);  Surgeon: Spencer Hagan MD;  Location: [facility] OR;  Service: Cardiothoracic;  Laterality: N/A;  CABG / EVH //   ECHO NOTIFIED    CYSTOSCOPY W/ RETROGRADES Bilateral 06/20/2023    Procedure: CYSTOSCOPY WITH RETROGRADE PYELOGRAM;  Surgeon: Aaron Pinon MD;  Location: [facility] OR;  Service: Urology;  Laterality: Bilateral;  Pt has a Spinal Cord Stimulator  9am per Revee, To follow RM4    CYSTOSCOPY W/ RETROGRADES Right 06/22/2023    Procedure: CYSTOSCOPY, WITH RETROGRADE PYELOGRAM;  Surgeon: Aaron Pinon MD;  Location: [facility] OR;  Service: Urology;  Laterality: Right;    CYSTOSCOPY W/ RETROGRADES Bilateral 8/24/2023    Procedure: CYSTOSCOPY, WITH RETROGRADE PYELOGRAM;  Surgeon: Aaron Pinon MD;  Location: [facility] OR;  Service: Urology;  Laterality: Bilateral;    CYSTOSCOPY W/ RETROGRADES Bilateral 12/4/2023    Procedure: CYSTOSCOPY WITH RETROGRADE PYELOGRAM;  Surgeon: Aaron Pinon MD;  Location:  [facility] OR;  Service: Urology;  Laterality: Bilateral;  pt has spinal cord stimulator    DIGITAL RECTAL EXAMINATION UNDER ANESTHESIA N/A 2023    Procedure: EXAM UNDER ANESTHESIA, DIGITAL, RECTUM;  Surgeon: Aaron Pinon MD;  Location: [facility];  Service: Urology;  Laterality: N/A;    HIP SURGERY Right     THR    KNEE SURGERY Right     ATS    LEFT HEART CATHETERIZATION Left 2021    Procedure: Left heart cath;  Surgeon: Curtis Loredo MD;  Location: [facility] CATH;  Service: Cardiology;  Laterality: Left;    LEFT HEART CATHETERIZATION Left 2022    Procedure: Left heart cath;  Surgeon: Giorgi Barker MD;  Location: [facility] CATH;  Service: Cardiology;  Laterality: Left;    REMOVAL, STENT, URETER Right 2023    Procedure: REMOVAL, STENT, URETER;  Surgeon: Aaron Pinon MD;  Location: [facility];  Service: Urology;  Laterality: Right;    ROTATOR CUFF REPAIR Bilateral     SHOULDER SURGERY Bilateral     SPINAL CORD STIMULATOR IMPLANT      STENT, URETERAL Left 2023    Procedure: STENT, URETERAL;  Surgeon: Aaron Pinon MD;  Location: [facility];  Service: Urology;  Laterality: Left;    steroid injections Right 2023    Knee    ULNAR NERVE TRANSPOSITION      URETEROSCOPY Right 2023    Procedure: URETEROSCOPY;  Surgeon: Aaron Pinon MD;  Location: [facility];  Service: Urology;  Laterality: Right;     Family History   Problem Relation Name Age of Onset    Heart disease Father      Heart disease Sister      Lung cancer Brother      Throat cancer Brother      Cancer Brother      Cancer Brother      Heart disease Brother          PPM    Heart disease Brother      Heart disease Brother       Social History     Tobacco Use    Smoking status: Former     Current packs/day: 0.00     Types: Cigarettes     Quit date:      Years since quittin.4    Smokeless tobacco: Never   Substance Use Topics    Alcohol use: Yes     Alcohol/week: 2.0 standard drinks of alcohol     Types: 1 Glasses of wine, 1  Cans of beer per week     Comment: RARELY    Drug use: No     Review of Systems   Constitutional:  Negative for fever.   HENT:  Negative for sore throat.    Respiratory:  Positive for shortness of breath.    Cardiovascular:  Negative for chest pain.   Gastrointestinal:  Negative for nausea.   Genitourinary:  Negative for dysuria.   Musculoskeletal:  Negative for back pain.   Skin:  Negative for rash.   Neurological:  Negative for weakness.   Hematological:  Does not bruise/bleed easily.   All other systems reviewed and are negative.      Physical Exam     Initial Vitals [05/21/24 0948]   BP Pulse Resp Temp SpO2   (!) 164/74 96 (!) 30 98.1 °F (36.7 °C) (!) 93 %      MAP       --         Physical Exam    Nursing note and vitals reviewed.  Constitutional: He appears well-developed and well-nourished. He is not diaphoretic. No distress.   HENT:   Head: Normocephalic and atraumatic.   Eyes: Conjunctivae and EOM are normal. Pupils are equal, round, and reactive to light. Right eye exhibits no discharge. Left eye exhibits no discharge. No scleral icterus.   Neck: Neck supple. No JVD present.   Normal range of motion.  Cardiovascular:  Normal rate, regular rhythm, normal heart sounds and intact distal pulses.           No murmur heard.  Pulmonary/Chest: Breath sounds normal. No stridor. No respiratory distress. He has no wheezes. He has no rhonchi. He has no rales. He exhibits no tenderness.   Abdominal: Abdomen is soft. Bowel sounds are normal. He exhibits no distension and no mass. There is no abdominal tenderness. There is no rebound and no guarding.   Musculoskeletal:         General: Edema present. No tenderness. Normal range of motion.      Cervical back: Normal range of motion and neck supple.      Comments:  Minimal edema noted to lower extremity     Neurological: He is alert and oriented to person, place, and time. He has normal strength. GCS score is 15. GCS eye subscore is 4. GCS verbal subscore is 5. GCS motor  subscore is 6.   Skin: Skin is warm and dry. Capillary refill takes less than 2 seconds.         ED Course   Procedures  Labs Reviewed   CBC W/ AUTO DIFFERENTIAL - Abnormal; Notable for the following components:       Result Value    RBC 2.90 (*)     Hemoglobin 8.8 (*)     Hematocrit 26.7 (*)     RDW 16.1 (*)     Platelets 85 (*)     Immature Granulocytes 1.3 (*)     Immature Grans (Abs) 0.06 (*)     Lymph # 0.7 (*)     Lymph % 15.1 (*)     All other components within normal limits   COMPREHENSIVE METABOLIC PANEL - Abnormal; Notable for the following components:    BUN 27 (*)     Creatinine 2.4 (*)     Calcium 8.5 (*)     Albumin 3.1 (*)     Total Bilirubin 1.1 (*)     eGFR 26.3 (*)     All other components within normal limits   TROPONIN I HIGH SENSITIVITY - Abnormal; Notable for the following components:    Troponin I High Sensitivity 67.4 (*)     All other components within normal limits   TSH - Abnormal; Notable for the following components:    TSH 6.720 (*)     All other components within normal limits   NT-PRO NATRIURETIC PEPTIDE - Abnormal; Notable for the following components:    NT-proBNP 51423 (*)     All other components within normal limits   MAGNESIUM   T4, FREE     EKG Readings: (Independently Interpreted)   Initial Reading: No STEMI. Rhythm: Normal Sinus Rhythm. Heart Rate: 96. Ectopy: No Ectopy. ST Segments: Normal ST Segments. T Waves: Normal. Axis: Normal. Clinical Impression: Normal Sinus Rhythm     ECG Results              EKG 12-lead (Final result)        Collection Time Result Time QRS Duration OHS QTC Calculation    05/21/24 09:59:15 05/21/24 16:28:31 126 507                     Final result by Interface, Lab In [facility] (05/21/24 16:28:36)                   Narrative:    Test Reason : R06.02,    Vent. Rate : 096 BPM     Atrial Rate : 096 BPM     P-R Int : 202 ms          QRS Dur : 126 ms      QT Int : 402 ms       P-R-T Axes : 049 016 068 degrees     QTc Int : 507 ms    Normal sinus  rhythm  Nonspecific intraventricular block  Abnormal ECG  When compared with ECG of 09-APR-2024 15:25,  Sinus rhythm has replaced Wide QRS tachycardia  Confirmed by CARLTON CARLOS MD ([id number]) on 5/21/2024 4:28:25 PM    Referred By: [email or web address]   SELF           Confirmed By:CARLTON CARLOS MD                                  Imaging Results              X-Ray Chest 1 View (Final result)  Result time 05/21/24 10:40:45      Final result by Shawn Mcclelland MD (05/21/24 10:40:45)                   Impression:      Evidence of atypical pneumonia.      Electronically signed by: Shawn Mcclelland MD  Date:    05/21/2024  Time:    10:40               Narrative:    EXAMINATION:  XR CHEST 1 VIEW    CLINICAL HISTORY:  Shortness of breath    COMPARISON:  Two view chest 04/24/2024.    FINDINGS:  Frontal chest radiograph demonstrates scattered linear opacities in both lungs with some peripheral subsegmental opacities.  No pleural fluid.  Mild enlargement of cardiac silhouette with prior cardiac surgery.  No osseous abnormality.                                       Medications - No data to display  Medical Decision Making  Amount and/or Complexity of Data Reviewed  Labs: ordered. Decision-making details documented in ED Course.  Radiology: ordered.               ED Course as of 05/24/24 0621   Tue May 21, 2024   1010  Chest x-ray with what appears to be some pulmonary edema slightly worsened from previous [SD]   1026 Troponin I High Sensitivity(!): 67.4   Patient's troponin is slightly elevated, but has a history of chronically elevated troponins in the past.  He has no chest pain [SD]   1051  I do not believe this to be infectious process, white count is normal, no fever, his proBNP is very elevated, discussed case with  -  will place in observation to restart his medications, IV Lasix, repeat hemoglobin and hematocrit [SD]      ED Course User Index  [SD] Jemal Bhardwaj MD               Medical Decision Making:    Differential Diagnosis:    Pulmonary edema, CHF, shortness of breath, noncompliance medication, hypoxia             Clinical Impression:  Final diagnoses:  [R06.02] Shortness of breath  [I50.9] Acute on chronic congestive heart failure, unspecified heart failure type (Primary)  [D64.9] Anemia, unspecified type          ED Disposition Condition    Observation Stable                Jemal Bhardwaj MD  05/24/24 3747

## 2024-05-22 PROBLEM — R54 SENILE DEBILITY: Status: ACTIVE | Noted: 2023-07-21

## 2024-05-22 PROBLEM — I50.43 ACUTE ON CHRONIC COMBINED SYSTOLIC AND DIASTOLIC CONGESTIVE HEART FAILURE: Status: ACTIVE | Noted: 2024-05-22

## 2024-05-22 PROBLEM — E87.6 HYPOKALEMIA: Status: ACTIVE | Noted: 2024-05-22

## 2024-05-22 LAB
ALBUMIN SERPL BCP-MCNC: 3.1 G/DL (ref 3.5–5.2)
ALP SERPL-CCNC: 66 U/L (ref 55–135)
ALT SERPL W/O P-5'-P-CCNC: 7 U/L (ref 10–44)
ANION GAP SERPL CALC-SCNC: 6 MMOL/L (ref 3–11)
AST SERPL-CCNC: 21 U/L (ref 10–40)
BASOPHILS # BLD AUTO: 0.01 K/UL (ref 0–0.2)
BASOPHILS NFR BLD: 0.3 % (ref 0–1.9)
BILIRUB SERPL-MCNC: 1.3 MG/DL (ref 0.1–1)
BUN SERPL-MCNC: 25 MG/DL (ref 8–23)
CALCIUM SERPL-MCNC: 8.9 MG/DL (ref 8.7–10.5)
CHLORIDE SERPL-SCNC: 99 MMOL/L (ref 95–110)
CO2 SERPL-SCNC: 32 MMOL/L (ref 23–29)
CREAT SERPL-MCNC: 2.2 MG/DL (ref 0.5–1.4)
DIFFERENTIAL METHOD BLD: ABNORMAL
EOSINOPHIL # BLD AUTO: 0 K/UL (ref 0–0.5)
EOSINOPHIL NFR BLD: 0 % (ref 0–8)
ERYTHROCYTE [DISTWIDTH] IN BLOOD BY AUTOMATED COUNT: 15.9 % (ref 11.5–14.5)
EST. GFR  (NO RACE VARIABLE): 29.2 ML/MIN/1.73 M^2
GLUCOSE SERPL-MCNC: 91 MG/DL (ref 70–110)
HCT VFR BLD AUTO: 27 % (ref 40–54)
HGB BLD-MCNC: 9 G/DL (ref 14–18)
IMM GRANULOCYTES # BLD AUTO: 0.09 K/UL (ref 0–0.04)
IMM GRANULOCYTES NFR BLD AUTO: 2.4 % (ref 0–0.5)
LYMPHOCYTES # BLD AUTO: 0.7 K/UL (ref 1–4.8)
LYMPHOCYTES NFR BLD: 19.1 % (ref 18–48)
MCH RBC QN AUTO: 30.2 PG (ref 27–31)
MCHC RBC AUTO-ENTMCNC: 33.3 G/DL (ref 32–36)
MCV RBC AUTO: 91 FL (ref 82–98)
MONOCYTES # BLD AUTO: 0.6 K/UL (ref 0.3–1)
MONOCYTES NFR BLD: 15.1 % (ref 4–15)
NEUTROPHILS # BLD AUTO: 2.4 K/UL (ref 1.8–7.7)
NEUTROPHILS NFR BLD: 63.1 % (ref 38–73)
NRBC BLD-RTO: 0 /100 WBC
PLATELET # BLD AUTO: 81 K/UL (ref 150–450)
PMV BLD AUTO: 12 FL (ref 9.2–12.9)
POTASSIUM SERPL-SCNC: 3.3 MMOL/L (ref 3.5–5.1)
PROT SERPL-MCNC: 7.3 G/DL (ref 6–8.4)
RBC # BLD AUTO: 2.98 M/UL (ref 4.6–6.2)
SODIUM SERPL-SCNC: 137 MMOL/L (ref 136–145)
WBC # BLD AUTO: 3.72 K/UL (ref 3.9–12.7)

## 2024-05-22 PROCEDURE — 97161 PT EVAL LOW COMPLEX 20 MIN: CPT

## 2024-05-22 PROCEDURE — 97166 OT EVAL MOD COMPLEX 45 MIN: CPT

## 2024-05-22 PROCEDURE — 25000003 PHARM REV CODE 250: Performed by: EMERGENCY MEDICINE

## 2024-05-22 PROCEDURE — 99900035 HC TECH TIME PER 15 MIN (STAT)

## 2024-05-22 PROCEDURE — 99900031 HC PATIENT EDUCATION (STAT)

## 2024-05-22 PROCEDURE — 36415 COLL VENOUS BLD VENIPUNCTURE: CPT | Performed by: EMERGENCY MEDICINE

## 2024-05-22 PROCEDURE — 27000221 HC OXYGEN, UP TO 24 HOURS

## 2024-05-22 PROCEDURE — 25000003 PHARM REV CODE 250: Performed by: INTERNAL MEDICINE

## 2024-05-22 PROCEDURE — 21400001 HC TELEMETRY ROOM

## 2024-05-22 PROCEDURE — 80053 COMPREHEN METABOLIC PANEL: CPT | Performed by: EMERGENCY MEDICINE

## 2024-05-22 PROCEDURE — 63600175 PHARM REV CODE 636 W HCPCS: Performed by: INTERNAL MEDICINE

## 2024-05-22 PROCEDURE — 94761 N-INVAS EAR/PLS OXIMETRY MLT: CPT

## 2024-05-22 PROCEDURE — 85025 COMPLETE CBC W/AUTO DIFF WBC: CPT | Performed by: EMERGENCY MEDICINE

## 2024-05-22 RX ORDER — ATORVASTATIN CALCIUM 10 MG/1
10 TABLET, FILM COATED ORAL NIGHTLY
Status: DISCONTINUED | OUTPATIENT
Start: 2024-05-22 | End: 2024-05-23 | Stop reason: HOSPADM

## 2024-05-22 RX ORDER — FUROSEMIDE 10 MG/ML
40 INJECTION INTRAMUSCULAR; INTRAVENOUS
Status: DISCONTINUED | OUTPATIENT
Start: 2024-05-22 | End: 2024-05-23 | Stop reason: HOSPADM

## 2024-05-22 RX ORDER — PANTOPRAZOLE SODIUM 40 MG/1
40 TABLET, DELAYED RELEASE ORAL DAILY
Status: DISCONTINUED | OUTPATIENT
Start: 2024-05-22 | End: 2024-05-22

## 2024-05-22 RX ORDER — ATORVASTATIN CALCIUM 10 MG/1
10 TABLET, FILM COATED ORAL DAILY
Status: DISCONTINUED | OUTPATIENT
Start: 2024-05-22 | End: 2024-05-22

## 2024-05-22 RX ORDER — FUROSEMIDE 10 MG/ML
40 INJECTION INTRAMUSCULAR; INTRAVENOUS
Status: DISCONTINUED | OUTPATIENT
Start: 2024-05-22 | End: 2024-05-22

## 2024-05-22 RX ORDER — DOCUSATE SODIUM 100 MG/1
100 CAPSULE, LIQUID FILLED ORAL 2 TIMES DAILY
Status: DISCONTINUED | OUTPATIENT
Start: 2024-05-22 | End: 2024-05-23 | Stop reason: HOSPADM

## 2024-05-22 RX ORDER — PANTOPRAZOLE SODIUM 40 MG/1
40 TABLET, DELAYED RELEASE ORAL DAILY
Status: DISCONTINUED | OUTPATIENT
Start: 2024-05-23 | End: 2024-05-23 | Stop reason: HOSPADM

## 2024-05-22 RX ADMIN — DULOXETINE HYDROCHLORIDE 30 MG: 30 CAPSULE, DELAYED RELEASE ORAL at 08:05

## 2024-05-22 RX ADMIN — FUROSEMIDE 40 MG: 10 INJECTION, SOLUTION INTRAMUSCULAR; INTRAVENOUS at 08:05

## 2024-05-22 RX ADMIN — DOCUSATE SODIUM 100 MG: 100 CAPSULE, LIQUID FILLED ORAL at 09:05

## 2024-05-22 RX ADMIN — QUETIAPINE FUMARATE 50 MG: 50 TABLET ORAL at 09:05

## 2024-05-22 RX ADMIN — CARVEDILOL 3.12 MG: 3.12 TABLET, FILM COATED ORAL at 05:05

## 2024-05-22 RX ADMIN — CARBIDOPA AND LEVODOPA 1 TABLET: 25; 100 TABLET ORAL at 08:05

## 2024-05-22 RX ADMIN — FAMOTIDINE 20 MG: 10 INJECTION, SOLUTION INTRAVENOUS at 08:05

## 2024-05-22 RX ADMIN — TAMSULOSIN HYDROCHLORIDE 0.4 MG: 0.4 CAPSULE ORAL at 09:05

## 2024-05-22 RX ADMIN — ATORVASTATIN CALCIUM 10 MG: 10 TABLET, FILM COATED ORAL at 09:05

## 2024-05-22 RX ADMIN — CARVEDILOL 3.12 MG: 3.12 TABLET, FILM COATED ORAL at 08:05

## 2024-05-22 RX ADMIN — EZETIMIBE 10 MG: 10 TABLET ORAL at 08:05

## 2024-05-22 RX ADMIN — AMIODARONE HYDROCHLORIDE 200 MG: 200 TABLET ORAL at 09:05

## 2024-05-22 RX ADMIN — AMIODARONE HYDROCHLORIDE 200 MG: 200 TABLET ORAL at 08:05

## 2024-05-22 NOTE — ASSESSMENT & PLAN NOTE
"Patient is identified as having Combined Systolic and Diastolic heart failure that is Acute on chronic. CHF is currently controlled. Latest ECHO performed and demonstrates- Results for orders placed during the hospital encounter of 02/19/23    Echo    Interpretation Summary  · Technically study due to poor acoustic windows and post-CABG bandages.  · The estimated ejection fraction is 50-55%.  · Low normal systolic function.  · Trivial pericardial effusion. No evidence of cardiac tamponade, within technical limitations of the study.  . Continue Beta Blocker, ACE/ARB, and Furosemide and monitor clinical status closely. Monitor on telemetry. Patient is on CHF pathway.  Monitor strict Is&Os and daily weights.  Place on fluid restriction of 1 L. Cardiology has been consulted. Continue to stress to patient importance of self efficacy and  on diet for CHF. Last BNP reviewed- and noted below No results for input(s): "BNP", "BNPTRIAGEBLO" in the last 168 hours.  "

## 2024-05-22 NOTE — PT/OT/SLP EVAL
"Physical Therapy Evaluation    Patient Name:  Hunter Navarrete   MRN:  [id number]    Recommendations:     Discharge Recommendations: Low Intensity Therapy   Discharge Equipment Recommendations: none   Barriers to discharge: None    Assessment:     Hunter Navarrete is a 83 y.o. male admitted with a medical diagnosis of Acute on chronic combined systolic and diastolic congestive heart failure.  He presents with the following impairments/functional limitations: weakness, impaired self care skills, impaired functional mobility, impaired balance, decreased coordination . Pt initially reports of just finished walking with staff. Currently patient able to complete bed mobility, transfers and ambulation with contact guard assistance with RW and supplemental oxygen. Pt able to tolerate side stepping and retro-walking with contact guard assistance.     Rehab Prognosis: Good; patient would benefit from acute skilled PT services to address these deficits and reach maximum level of function.    Recent Surgery: * No surgery found *      Plan:     During this hospitalization, patient to be seen 5 x/week to address the identified rehab impairments via gait training, therapeutic activities, therapeutic exercises and progress toward the following goals:    Plan of Care Expires:  05/27/24    Subjective     Chief Complaint: "I've been walking with the staff earlier"  Patient/Family Comments/goals: to go home  Pain/Comfort:  Pain Rating 1: 0/10  Pain Rating Post-Intervention 1: 0/10    Patients cultural, spiritual, Congregation conflicts given the current situation: yes    Living Environment:  Pt lives alone in a single story home.  Pt's plan is to go home with his daughter who resides in a one story home. Pt states that he will have a caregiver to assist him with his ADLs.  Prior to admission, patients level of function was ambulatory with a rolling walker. Pt states that he had 2 falls in the past month.  Equipment used at home: bedside " commode, rollator, shower chair, cane, straight, wheelchair.  DME owned (not currently used): wheelchair.  Upon discharge, patient will have assistance from daughter and a caregiver.    Objective:     Communicated with patient prior to session.  Patient found supine with telemetry, peripheral IV, PureWick, oxygen  upon PT entry to room.    General Precautions: Standard, fall, hearing impaired  Orthopedic Precautions:N/A   Braces: N/A  Respiratory Status: Nasal cannula, flow 1 L/min    Exams:  Cognitive Exam:  Patient is oriented to Person, Place, Time, and Situation  Gross Motor Coordination:  WFL  RLE ROM: WFL  RLE Strength: WFL  LLE ROM: WFL  LLE Strength: WFL    Functional Mobility:  Bed Mobility:     Rolling Left:  stand by assistance and contact guard assistance  Rolling Right: stand by assistance and contact guard assistance  Scooting: stand by assistance and contact guard assistance  Bridging: stand by assistance and contact guard assistance  Supine to Sit: stand by assistance and contact guard assistance  Sit to Supine: stand by assistance and contact guard assistance  Transfers:     Sit to Stand:  stand by assistance and contact guard assistance with rolling walker  Gait: Pt able to ambulate x 50 ft with RW with stand by assistance/CGA  Tinetti Total Score: 20    < 18 = High Risk of Falls, 19-23 = Moderate Risk of Falls, > 24 = Low Risk of Falls      AM-PAC 6 CLICK MOBILITY  Total Score:18       Treatment & Education:  Pt able to tolerate bed mobility, sitting, standing and ambulation with RW.   Patient educated on role of acute care PT and PT POC, safety while in hospital including calling nurse for mobility, and call light usage  Patient educated about importance of OOB mobility and remaining up in chair most of the day.    Patient left supine with all lines intact and call button in reach.    GOALS:   Multidisciplinary Problems       Physical Therapy Goals          Problem: Physical Therapy    Goal  Priority Disciplines Outcome Goal Variances Interventions   Physical Therapy Goal     PT, PT/OT Progressing     Description: Goals to be met by: 24    Patient will increase functional independence with mobility by performin. Supine to sit with Independent  2. Sit to supine with Independent  3. Bed to chair transfer with Modified Independentwith or without rolling walker using Step Transfer TECHNIQUE  4. Gait  x 200  feet with Supervision or Set-up Assistance with or without rolling walker  5. Lower extremity exercise program x10 reps per handout, with assistance as needed                           History:     Past Medical History:   Diagnosis Date    Acid reflux     Anemia     Anticoagulant long-term use     Anxiety disorder, unspecified     Aortic aneurysm, abdominal     Arthritis     Bleeding 2019    Bronchitis     Carotid stenosis 2022    60-79% Right ICA 60-70% Left ICA    Cataract     Celiac artery stenosis     50% by CTA abdomen 2020    CKD (chronic kidney disease)     45% FUNCTION    Coronary artery disease     stents x4    Encounter for blood transfusion     Enlarged prostate     GERD (gastroesophageal reflux disease)     Heart attack     Hemorrhoids     Hypercholesteremia     Hypertension     Iron deficiency anemia, unspecified     Leaky heart valve     Lupus anticoagulant disorder     Mood disorder     Parkinson's disease     Renal artery stenosis     by CTA 2020    Restless leg syndrome     Skin cancer     Skin tear of elbow without complication     above elbow    Unspecified chronic bronchitis     UTI (urinary tract infection)        Past Surgical History:   Procedure Laterality Date    ACF      Anterior Cervical Fusion    BACK SURGERY      RODS IN BACK; 4 SURGIERIES    BIOPSY OF URETER Right 2023    Procedure: RESECTION/BIOPSY, URETER;  Surgeon: Aaron Pinon MD;  Location: [facility];  Service: Urology;  Laterality: Right;    BONE MARROW BIOPSY      CARDIAC  ANGIOGRAM WITH STENT      CATARACT SX Bilateral     CHOLECYSTECTOMY      CORONARY ANGIOGRAPHY N/A 02/15/2023    Procedure: ANGIOGRAM, CORONARY ARTERY;  Surgeon: Rito Vega MD;  Location: [facility] CATH;  Service: Cardiology;  Laterality: N/A;    CORONARY ARTERY BYPASS GRAFT (CABG) N/A 02/24/2023    Procedure: CORONARY ARTERY BYPASS GRAFT (CABG);  Surgeon: Spencer Hagan MD;  Location: [facility] OR;  Service: Cardiothoracic;  Laterality: N/A;  CABG / EVH //   ECHO NOTIFIED    CYSTOSCOPY W/ RETROGRADES Bilateral 06/20/2023    Procedure: CYSTOSCOPY WITH RETROGRADE PYELOGRAM;  Surgeon: Aaron Pinon MD;  Location: [facility] OR;  Service: Urology;  Laterality: Bilateral;  Pt has a Spinal Cord Stimulator  9am per Revee, To follow RM4    CYSTOSCOPY W/ RETROGRADES Right 06/22/2023    Procedure: CYSTOSCOPY, WITH RETROGRADE PYELOGRAM;  Surgeon: Aaron Pinon MD;  Location: [facility] OR;  Service: Urology;  Laterality: Right;    CYSTOSCOPY W/ RETROGRADES Bilateral 8/24/2023    Procedure: CYSTOSCOPY, WITH RETROGRADE PYELOGRAM;  Surgeon: Aaron Pinon MD;  Location: [facility] OR;  Service: Urology;  Laterality: Bilateral;    CYSTOSCOPY W/ RETROGRADES Bilateral 12/4/2023    Procedure: CYSTOSCOPY WITH RETROGRADE PYELOGRAM;  Surgeon: Aaron Pinon MD;  Location: [facility] OR;  Service: Urology;  Laterality: Bilateral;  pt has spinal cord stimulator    DIGITAL RECTAL EXAMINATION UNDER ANESTHESIA N/A 12/4/2023    Procedure: EXAM UNDER ANESTHESIA, DIGITAL, RECTUM;  Surgeon: Aaron Pinon MD;  Location: [facility] OR;  Service: Urology;  Laterality: N/A;    HIP SURGERY Right     THR    KNEE SURGERY Right     ATS    LEFT HEART CATHETERIZATION Left 07/22/2021    Procedure: Left heart cath;  Surgeon: Curtis Loredo MD;  Location: [facility] CATH;  Service: Cardiology;  Laterality: Left;    LEFT HEART CATHETERIZATION Left 09/22/2022    Procedure: Left heart cath;  Surgeon: Giorgi Barker MD;  Location: [facility] CATH;  Service: Cardiology;  Laterality: Left;     REMOVAL, STENT, URETER Right 8/24/2023    Procedure: REMOVAL, STENT, URETER;  Surgeon: Aaron Pinon MD;  [facility];  Service: Urology;  Laterality: Right;    ROTATOR CUFF REPAIR Bilateral     SHOULDER SURGERY Bilateral     SPINAL CORD STIMULATOR IMPLANT      STENT, URETERAL Left 06/22/2023    Procedure: STENT, URETERAL;  Surgeon: Aaron Pinon MD;  [facility];  Service: Urology;  Laterality: Left;    steroid injections Right 06/13/2023    Knee    ULNAR NERVE TRANSPOSITION      URETEROSCOPY Right 06/22/2023    Procedure: URETEROSCOPY;  Surgeon: Aaron Pinon MD;  [facility];  Service: Urology;  Laterality: Right;       Time Tracking:     PT Received On: 05/22/24  PT Start Time: 1440     PT Stop Time: 1501  PT Total Time (min): 21 min     Billable Minutes: Evaluation 20 05/22/2024

## 2024-05-22 NOTE — SUBJECTIVE & OBJECTIVE
Past Medical History:   Diagnosis Date    Acid reflux     Anemia     Anticoagulant long-term use     Anxiety disorder, unspecified     Aortic aneurysm, abdominal     Arthritis     Bleeding 09/03/2019    Bronchitis     Carotid stenosis 05/13/2022    60-79% Right ICA 60-70% Left ICA    Cataract     Celiac artery stenosis     50% by CTA abdomen 7/27/2020    CKD (chronic kidney disease)     45% FUNCTION    Coronary artery disease     stents x4    Encounter for blood transfusion     Enlarged prostate     GERD (gastroesophageal reflux disease)     Heart attack     Hemorrhoids     Hypercholesteremia     Hypertension     Iron deficiency anemia, unspecified     Leaky heart valve     Lupus anticoagulant disorder     Mood disorder     Parkinson's disease     Renal artery stenosis     by CTA 7/27/2020    Restless leg syndrome     Skin cancer     Skin tear of elbow without complication     above elbow    Unspecified chronic bronchitis     UTI (urinary tract infection)        Past Surgical History:   Procedure Laterality Date    ACF      Anterior Cervical Fusion    BACK SURGERY      RODS IN BACK; 4 SURGIERIES    BIOPSY OF URETER Right 06/22/2023    Procedure: RESECTION/BIOPSY, URETER;  Surgeon: Aaron Pinon MD;  Location: [facility] OR;  Service: Urology;  Laterality: Right;    BONE MARROW BIOPSY      CARDIAC ANGIOGRAM WITH STENT      CATARACT SX Bilateral     CHOLECYSTECTOMY      CORONARY ANGIOGRAPHY N/A 02/15/2023    Procedure: ANGIOGRAM, CORONARY ARTERY;  Surgeon: Rito Vega MD;  Location: [facility] CATH;  Service: Cardiology;  Laterality: N/A;    CORONARY ARTERY BYPASS GRAFT (CABG) N/A 02/24/2023    Procedure: CORONARY ARTERY BYPASS GRAFT (CABG);  Surgeon: Spencer Hagan MD;  Location: [facility] OR;  Service: Cardiothoracic;  Laterality: N/A;  CABG / EVH //   ECHO NOTIFIED    CYSTOSCOPY W/ RETROGRADES Bilateral 06/20/2023    Procedure: CYSTOSCOPY WITH RETROGRADE PYELOGRAM;  Surgeon: Aaron Pinon MD;  Location: [facility] OR;   Service: Urology;  Laterality: Bilateral;  Pt has a Spinal Cord Stimulator  9am per [place]vee, To follow RM4    CYSTOSCOPY W/ RETROGRADES Right 06/22/2023    Procedure: CYSTOSCOPY, WITH RETROGRADE PYELOGRAM;  Surgeon: Aaron Pinon MD;  Location: [facility] OR;  Service: Urology;  Laterality: Right;    CYSTOSCOPY W/ RETROGRADES Bilateral 8/24/2023    Procedure: CYSTOSCOPY, WITH RETROGRADE PYELOGRAM;  Surgeon: Aaron Pinon MD;  Location: [facility] OR;  Service: Urology;  Laterality: Bilateral;    CYSTOSCOPY W/ RETROGRADES Bilateral 12/4/2023    Procedure: CYSTOSCOPY WITH RETROGRADE PYELOGRAM;  Surgeon: Aaron Pinon MD;  Location: [facility] OR;  Service: Urology;  Laterality: Bilateral;  pt has spinal cord stimulator    DIGITAL RECTAL EXAMINATION UNDER ANESTHESIA N/A 12/4/2023    Procedure: EXAM UNDER ANESTHESIA, DIGITAL, RECTUM;  Surgeon: Aaron Pinon MD;  Location: [facility] OR;  Service: Urology;  Laterality: N/A;    HIP SURGERY Right     THR    KNEE SURGERY Right     ATS    LEFT HEART CATHETERIZATION Left 07/22/2021    Procedure: Left heart cath;  Surgeon: Curtis Loredo MD;  Location: [facility] CATH;  Service: Cardiology;  Laterality: Left;    LEFT HEART CATHETERIZATION Left 09/22/2022    Procedure: Left heart cath;  Surgeon: Giorgi Barker MD;  Location: [facility] CATH;  Service: Cardiology;  Laterality: Left;    REMOVAL, STENT, URETER Right 8/24/2023    Procedure: REMOVAL, STENT, URETER;  Surgeon: Aaron Pinon MD;  Location: [facility] OR;  Service: Urology;  Laterality: Right;    ROTATOR CUFF REPAIR Bilateral     SHOULDER SURGERY Bilateral     SPINAL CORD STIMULATOR IMPLANT      STENT, URETERAL Left 06/22/2023    Procedure: STENT, URETERAL;  Surgeon: Aaron Pinon MD;  Location: [facility] OR;  Service: Urology;  Laterality: Left;    steroid injections Right 06/13/2023    Knee    ULNAR NERVE TRANSPOSITION      URETEROSCOPY Right 06/22/2023    Procedure: URETEROSCOPY;  Surgeon: Aaron Pinon MD;  Location: [facility] OR;  Service:  Urology;  Laterality: Right;       Review of patient's allergies indicates:   Allergen Reactions    Cardura [doxazosin] Hives    Lyrica [pregabalin] Other (See Comments)    Paxil [paroxetine hcl] Other (See Comments)    Restoril [temazepam] Hallucinations    Vioxx [rofecoxib] Swelling    Zithromax [azithromycin] Hives    Alpha 1 blocker- quinazolines     Benzodiazepines        No current facility-administered medications on file prior to encounter.     Current Outpatient Medications on File Prior to Encounter   Medication Sig    amiodarone (PACERONE) 200 MG Tab Take 1 tablet (200 mg total) by mouth 2 (two) times daily.    azelastine (ASTELIN) 137 mcg (0.1 %) nasal spray 1 spray (137 mcg total) by Nasal route 2 (two) times daily.    calcium carbonate-vitamin D3 500 mg-10 mcg (400 unit) Tab Take 1 tablet by mouth once daily.    carbidopa-levodopa (PARCOPA)  mg per disintegrating tablet Take 1 tablet by mouth 3 (three) times daily.    carvediloL (COREG) 3.125 MG tablet Take 1 tablet (3.125 mg total) by mouth 2 (two) times daily with meals.    clindamycin phosphate 1% (CLINDAGEL) 1 % gel Apply topically 2 (two) times daily.    dextran 70/hypromellose (ARTIFICIAL TEARS, PF, OPHT) Apply 1 drop to eye 3 (three) times daily.    docusate sodium (COLACE) 100 MG capsule Take 100 mg by mouth 2 (two) times daily.    DULoxetine (CYMBALTA) 30 MG capsule Take 1 capsule (30 mg total) by mouth once daily.    ezetimibe (ZETIA) 10 mg tablet Take 1 tablet (10 mg total) by mouth once daily.    fluticasone propionate (FLONASE) 50 mcg/actuation nasal spray 1 spray by Each Nostril route 2 (two) times a day.    furosemide (LASIX) 20 MG tablet Take 20 mg by mouth once daily. Prescribed by Dr. Loredo    HYDROcodone-acetaminophen (NORCO) 7.5-325 mg per tablet Prescribed by Dr. Hernandez    hydroxychloroquine (PLAQUENIL) 200 mg tablet Take 200 mg by mouth once daily.    niacin 1,000 mg TbSR Take 1 tablet by mouth Daily.    nystatin (MYCOSTATIN)  ointment Apply topically 3 (three) times daily.    pantoprazole (PROTONIX) 40 MG tablet Take 1 tablet (40 mg total) by mouth once daily.    predniSONE (DELTASONE) 5 MG tablet Take 2.5 mg by mouth once daily.    QUEtiapine (SEROQUEL) 50 MG tablet Take 50 mg by mouth every evening.    rOPINIRole (REQUIP) 1 MG tablet Take 1 tablet (1 mg total) by mouth 2 (two) times daily.    rosuvastatin (CRESTOR) 40 MG Tab Take 1 tablet (40 mg total) by mouth every evening.    tamsulosin (FLOMAX) 0.4 mg Cap Take 1 capsule (0.4 mg total) by mouth every evening.     Family History       Problem Relation (Age of Onset)    Cancer Brother, Brother    Heart disease Father, Sister, Brother, Brother, Brother    Lung cancer Brother    Throat cancer Brother          Tobacco Use    Smoking status: Former     Current packs/day: 0.00     Types: Cigarettes     Quit date:      Years since quittin.4    Smokeless tobacco: Never   Substance and Sexual Activity    Alcohol use: Yes     Alcohol/week: 2.0 standard drinks of alcohol     Types: 1 Glasses of wine, 1 Cans of beer per week     Comment: RARELY    Drug use: No    Sexual activity: Not on file     Review of Systems   Constitutional:  Positive for fatigue. Negative for chills and fever.   HENT:  Negative for rhinorrhea, sneezing and sore throat.    Eyes:  Negative for pain and visual disturbance.   Respiratory:  Positive for shortness of breath and wheezing. Negative for cough.    Cardiovascular:  Positive for leg swelling. Negative for chest pain.   Gastrointestinal:  Negative for abdominal pain, constipation, diarrhea, nausea and vomiting.   Endocrine: Negative for cold intolerance and heat intolerance.   Genitourinary:  Negative for difficulty urinating.   Musculoskeletal:  Negative for arthralgias and joint swelling.   Skin:  Negative for pallor and rash.   Allergic/Immunologic: Negative for environmental allergies.   Neurological:  Positive for weakness. Negative for dizziness and  syncope.   Hematological:  Does not bruise/bleed easily.   Psychiatric/Behavioral:  Negative for dysphoric mood. The patient is not nervous/anxious.      Objective:     Vital Signs (Most Recent):  Temp: 98.6 °F (37 °C) (05/22/24 0720)  Pulse: 84 (05/22/24 0728)  Resp: 20 (05/22/24 0728)  BP: (!) 145/67 (05/22/24 0858)  SpO2: 96 % (05/22/24 0728) Vital Signs (24h Range):  Temp:  [97.7 °F (36.5 °C)-98.7 °F (37.1 °C)] 98.6 °F (37 °C)  Pulse:  [72-95] 84  Resp:  [16-22] 20  SpO2:  [95 %-99 %] 96 %  BP: (136-182)/(62-82) 145/67     Weight: 78.3 kg (172 lb 9.6 oz)  Body mass index is 24.07 kg/m².     Physical Exam  Vitals and nursing note reviewed.   Constitutional:       Appearance: Normal appearance. He is not ill-appearing.      Comments: Frail and fatigued   HENT:      Head: Normocephalic and atraumatic.      Nose: Nose normal.   Eyes:      Extraocular Movements: Extraocular movements intact.      Pupils: Pupils are equal, round, and reactive to light.   Cardiovascular:      Rate and Rhythm: Normal rate and regular rhythm.      Heart sounds: Murmur heard.      No friction rub. No gallop.   Pulmonary:      Effort: Pulmonary effort is normal.      Breath sounds: Normal breath sounds.   Abdominal:      General: Abdomen is flat. Bowel sounds are normal.      Palpations: Abdomen is soft.   Musculoskeletal:         General: No swelling or deformity.      Cervical back: Normal range of motion and neck supple.      Right lower leg: Edema present.      Left lower leg: Edema present.   Skin:     General: Skin is warm and dry.      Capillary Refill: Capillary refill takes less than 2 seconds.      Coloration: Skin is not pale.   Neurological:      General: No focal deficit present.      Mental Status: He is alert and oriented to person, place, and time.      Motor: Weakness present.      Coordination: Coordination abnormal.      Gait: Gait normal.   Psychiatric:         Mood and Affect: Mood normal.         Behavior: Behavior  normal.              CRANIAL NERVES     CN III, IV, VI   Pupils are equal, round, and reactive to light.       Significant Labs: All pertinent labs within the past 24 hours have been reviewed.    Significant Imaging: I have reviewed all pertinent imaging results/findings within the past 24 hours.

## 2024-05-22 NOTE — PLAN OF CARE
[facility]  Initial Discharge Assessment       Primary Care Provider: Shawn Purdy Jr., MD    Admission Diagnosis: Shortness of breath [R06.02]  Anemia, unspecified type [D64.9]  Acute on chronic congestive heart failure, unspecified heart failure type [I50.9]    Admission Date: 5/21/2024  Expected Discharge Date:     Transition of Care Barriers: None    Payor: MEDICARE / Plan: MEDICARE PART A & B / Product Type: Government /     Extended Emergency Contact Information  Primary Emergency Contact: Romy Calloway  Mobile Phone: [phone]  Relation: Daughter   needed? No  Secondary Emergency Contact: Janeth Navarrete  Mobile Phone: [phone]  Relation: Relative   needed? No    Discharge Plan A: Home with family, Home Health  Discharge Plan B: Home with family, Home Health      [facility] - [place], [place] - [address]  [address] [address]  Phone: [phone] Fax: [phone]    [id number] - [place], [place] - [address]  [address]  [facility] [address]  Phone: [phone] Fax: [phone]      Initial Assessment (most recent)       Adult Discharge Assessment - 05/22/24 1224          Discharge Assessment    Assessment Type Discharge Planning Assessment     Confirmed/corrected address, phone number and insurance Yes     Confirmed Demographics Correct on Facesheet     Source of Information patient     When was your last doctors appointment? 05/06/24     Reason For Admission Acute on chronic combined systolic and diastolic congestive heart failure     People in Home alone   The patient currently lives alone, but he will be discharging home with his daughter, Romy, upon discharge. [address] [place] [address]    Do you expect to return to your current living situation? No     Do you have help at home or someone to help you manage your care at home? Yes     Who are your caregiver(s) and their phone  number(s)? Romy (daughter) [phone] and Janeth (niece) [phone]     Prior to hospitilization cognitive status: Alert/Oriented     Current cognitive status: Alert/Oriented     Walking or Climbing Stairs Difficulty yes     Walking or Climbing Stairs ambulation difficulty, requires equipment     Mobility Management rollator and cane     Dressing/Bathing Difficulty yes     Dressing/Bathing bathing difficulty, requires equipment;bathing difficulty, assistance 1 person;dressing difficulty, assistance 1 person     Dressing/Bathing Management shower chair     Do you have any problems with: Errands/Grocery;Needs other help     Specify other help The patient has family who assist him with his care.     Home Accessibility not wheelchair accessible     Home Layout Able to live on 1st floor     Equipment Currently Used at Home bedside commode;rollator;shower chair;cane, straight     Readmission within 30 days? No     Patient currently being followed by outpatient case management? No     Do you currently have service(s) that help you manage your care at home? Yes     Name and Contact number of agency [facility]- · [phone]     Is the pt/caregiver preference to resume services with current agency No     Do you take prescription medications? Yes     Do you have prescription coverage? Yes     Coverage [facility]     Do you have any problems affording any of your prescribed medications? TBD     Is the patient taking medications as prescribed? yes     How do you get to doctors appointments? family or friend will provide     Are you on dialysis? No     Discharge Plan A Home with family;Home Health     Discharge Plan B Home with family;Home Health     DME Needed Upon Discharge  other (see comments)     Discharge Plan discussed with: Patient;Adult children     Transition of Care Barriers None                 Initial discharge assessment is completed. Spoke to the patient in his room. The patient was awake, alert,  and oriented to the questions being asked. I was also able to speak with the patient's daughter, Romy, via phone. The patient currently lives at home. [facility]. The patient and I had a discussion about his discharge plan of care. The patient plans to discharge home with his daughter, Romy. Dr. Purdy is aware of this plan.     The patient currently has home health services with [facility]. He also requires equipment for his care.     At this time, the patient does not require any assistance from case management.

## 2024-05-22 NOTE — PLAN OF CARE
Problem: Physical Therapy  Goal: Physical Therapy Goal  Description: Goals to be met by: 24    Patient will increase functional independence with mobility by performin. Supine to sit with Independent  2. Sit to supine with Independent  3. Bed to chair transfer with Modified Independentwith or without rolling walker using Step Transfer TECHNIQUE  4. Gait  x 200  feet with Supervision or Set-up Assistance with or without rolling walker  5. Lower extremity exercise program x10 reps per handout, with assistance as needed      Outcome: Progressing; POC initiated today

## 2024-05-22 NOTE — HPI
Chief Complaint    Complaint Comment   Shortness of Breath EMS Reports patients son took patient off of all his medications due to him being on too many medication. Patient reports he has been short of breath since this morning. Patient has been off of medication x 3 days.     Updated HPI:  Above history confirmed.  Patient began to develop worsening lower extremity edema and shortness of breath.  Responding very nicely to diuresis as of this morning.

## 2024-05-22 NOTE — ASSESSMENT & PLAN NOTE
Patient with Acute on chronic debility due to age-related physical debility and Parkinsons . Latest AMPAC and GEMS scores have been reviewed. Evaluation for etiology is complete. Plan includes PT/OT consulted.

## 2024-05-22 NOTE — PLAN OF CARE
Problem: Occupational Therapy  Goal: Occupational Therapy Goal  Description: Goals to be met by: 05/29/24     Patient will increase functional independence with ADLs by performing:    Feeding with Modified [place].  UE Dressing with Set-up Assistance.  LE Dressing with Supervision.  Grooming while standing at sink with Set-up Assistance.  Toileting from toilet with Supervision for hygiene and clothing management.   Bathing from  shower chair/bench with Stand-by Assistance.  Toilet transfer to toilet with Supervision.    Outcome: Progressing

## 2024-05-22 NOTE — PLAN OF CARE
POC reviewed w/ pt and purposeful rounding ongoing. Meds administered per MAR. 20 G L UA IV infusing Lasix 2.5 ml/h. PRN sleep aids given per MD orders w/ full relief obtained. Pt disoriented to time place, situation and confused at times. Minimum assist to bsc. VSS 2 L NC NADN. All personal belongings and call bell in reach. Questions and concerns addressed. Pt denies needs at this time. Tele electrodes and pure wick in place and working properly.  Problem: Adult Inpatient Plan of Care  Goal: Plan of Care Review  Outcome: Progressing  Goal: Patient-Specific Goal (Individualized)  Outcome: Progressing  Goal: Absence of Hospital-Acquired Illness or Injury  Outcome: Progressing  Goal: Optimal Comfort and Wellbeing  Outcome: Progressing  Goal: Readiness for Transition of Care  Outcome: Progressing     Problem: Skin Injury Risk Increased  Goal: Skin Health and Integrity  Outcome: Progressing     Problem: Wound  Goal: Optimal Coping  Outcome: Progressing  Goal: Optimal Functional Ability  Outcome: Progressing  Goal: Absence of Infection Signs and Symptoms  Outcome: Progressing  Goal: Improved Oral Intake  Outcome: Progressing  Goal: Optimal Pain Control and Function  Outcome: Progressing  Goal: Skin Health and Integrity  Outcome: Progressing  Goal: Optimal Wound Healing  Outcome: Progressing

## 2024-05-22 NOTE — H&P
[facility]  History & Physical    Patient Name: Hunter Navarrete  MRN: [id number]  Patient Class: IP- Inpatient  Admission Date: 5/21/2024  Attending Physician: Shawn Purdy Jr., MD   Primary Care Provider: Shawn Purdy Jr., MD         Patient information was obtained from ER records.     Subjective:     Principal Problem:Acute on chronic combined systolic and diastolic congestive heart failure    Chief Complaint:   Chief Complaint   Patient presents with    Shortness of Breath     EMS Reports patients son took patient off of all his medications due to him being on too many medication. Patient reports he has been short of breath since this morning. Patient has been off of medication x 3 days.         HPI: Chief Complaint    Complaint Comment   Shortness of Breath EMS Reports patients son took patient off of all his medications due to him being on too many medication. Patient reports he has been short of breath since this morning. Patient has been off of medication x 3 days.     Updated HPI:  Above history confirmed.  Patient began to develop worsening lower extremity edema and shortness of breath.  Responding very nicely to diuresis as of this morning.    Past Medical History:   Diagnosis Date    Acid reflux     Anemia     Anticoagulant long-term use     Anxiety disorder, unspecified     Aortic aneurysm, abdominal     Arthritis     Bleeding 09/03/2019    Bronchitis     Carotid stenosis 05/13/2022    60-79% Right ICA 60-70% Left ICA    Cataract     Celiac artery stenosis     50% by CTA abdomen 7/27/2020    CKD (chronic kidney disease)     45% FUNCTION    Coronary artery disease     stents x4    Encounter for blood transfusion     Enlarged prostate     GERD (gastroesophageal reflux disease)     Heart attack     Hemorrhoids     Hypercholesteremia     Hypertension     Iron deficiency anemia, unspecified     Leaky heart valve     Lupus anticoagulant disorder     Mood disorder      Parkinson's disease     Renal artery stenosis     by CTA 7/27/2020    Restless leg syndrome     Skin cancer     Skin tear of elbow without complication     above elbow    Unspecified chronic bronchitis     UTI (urinary tract infection)        Past Surgical History:   Procedure Laterality Date    ACF      Anterior Cervical Fusion    BACK SURGERY      RODS IN BACK; 4 SURGIERIES    BIOPSY OF URETER Right 06/22/2023    Procedure: RESECTION/BIOPSY, URETER;  Surgeon: Aaron Pinon MD;  Location: [facility] OR;  Service: Urology;  Laterality: Right;    BONE MARROW BIOPSY      CARDIAC ANGIOGRAM WITH STENT      CATARACT SX Bilateral     CHOLECYSTECTOMY      CORONARY ANGIOGRAPHY N/A 02/15/2023    Procedure: ANGIOGRAM, CORONARY ARTERY;  Surgeon: Rito Vega MD;  Location: [facility] CATH;  Service: Cardiology;  Laterality: N/A;    CORONARY ARTERY BYPASS GRAFT (CABG) N/A 02/24/2023    Procedure: CORONARY ARTERY BYPASS GRAFT (CABG);  Surgeon: Spencer Hagan MD;  Location: [facility] OR;  Service: Cardiothoracic;  Laterality: N/A;  CABG / EVH //   ECHO NOTIFIED    CYSTOSCOPY W/ RETROGRADES Bilateral 06/20/2023    Procedure: CYSTOSCOPY WITH RETROGRADE PYELOGRAM;  Surgeon: Aaron Pinon MD;  Location: [facility] OR;  Service: Urology;  Laterality: Bilateral;  Pt has a Spinal Cord Stimulator  9am per [place], To follow RM4    CYSTOSCOPY W/ RETROGRADES Right 06/22/2023    Procedure: CYSTOSCOPY, WITH RETROGRADE PYELOGRAM;  Surgeon: Aaron Pinon MD;  Location: [facility] OR;  Service: Urology;  Laterality: Right;    CYSTOSCOPY W/ RETROGRADES Bilateral 8/24/2023    Procedure: CYSTOSCOPY, WITH RETROGRADE PYELOGRAM;  Surgeon: Aaron Pinon MD;  Location: [facility] OR;  Service: Urology;  Laterality: Bilateral;    CYSTOSCOPY W/ RETROGRADES Bilateral 12/4/2023    Procedure: CYSTOSCOPY WITH RETROGRADE PYELOGRAM;  Surgeon: Aaron Pinon MD;  Location: [facility] OR;  Service: Urology;  Laterality: Bilateral;  pt has spinal cord stimulator    DIGITAL RECTAL  EXAMINATION UNDER ANESTHESIA N/A 12/4/2023    Procedure: EXAM UNDER ANESTHESIA, DIGITAL, RECTUM;  Surgeon: Aaron Pinon MD;  Location: [facility] OR;  Service: Urology;  Laterality: N/A;    HIP SURGERY Right     THR    KNEE SURGERY Right     ATS    LEFT HEART CATHETERIZATION Left 07/22/2021    Procedure: Left heart cath;  Surgeon: Curtis Loredo MD;  Location: [facility] CATH;  Service: Cardiology;  Laterality: Left;    LEFT HEART CATHETERIZATION Left 09/22/2022    Procedure: Left heart cath;  Surgeon: Giorgi Barker MD;  Location: [facility] CATH;  Service: Cardiology;  Laterality: Left;    REMOVAL, STENT, URETER Right 8/24/2023    Procedure: REMOVAL, STENT, URETER;  Surgeon: Aaron Pinon MD;  Location: [facility] OR;  Service: Urology;  Laterality: Right;    ROTATOR CUFF REPAIR Bilateral     SHOULDER SURGERY Bilateral     SPINAL CORD STIMULATOR IMPLANT      STENT, URETERAL Left 06/22/2023    Procedure: STENT, URETERAL;  Surgeon: Aaron Pinon MD;  Location: [facility] OR;  Service: Urology;  Laterality: Left;    steroid injections Right 06/13/2023    Knee    ULNAR NERVE TRANSPOSITION      URETEROSCOPY Right 06/22/2023    Procedure: URETEROSCOPY;  Surgeon: Aaron Pinon MD;  Location: [facility] OR;  Service: Urology;  Laterality: Right;       Review of patient's allergies indicates:   Allergen Reactions    Cardura [doxazosin] Hives    Lyrica [pregabalin] Other (See Comments)    Paxil [paroxetine hcl] Other (See Comments)    Restoril [temazepam] Hallucinations    Vioxx [rofecoxib] Swelling    Zithromax [azithromycin] Hives    Alpha 1 blocker- quinazolines     Benzodiazepines        No current facility-administered medications on file prior to encounter.     Current Outpatient Medications on File Prior to Encounter   Medication Sig    amiodarone (PACERONE) 200 MG Tab Take 1 tablet (200 mg total) by mouth 2 (two) times daily.    azelastine (ASTELIN) 137 mcg (0.1 %) nasal spray 1 spray (137 mcg total) by Nasal route 2 (two) times daily.     calcium carbonate-vitamin D3 500 mg-10 mcg (400 unit) Tab Take 1 tablet by mouth once daily.    carbidopa-levodopa (PARCOPA)  mg per disintegrating tablet Take 1 tablet by mouth 3 (three) times daily.    carvediloL (COREG) 3.125 MG tablet Take 1 tablet (3.125 mg total) by mouth 2 (two) times daily with meals.    clindamycin phosphate 1% (CLINDAGEL) 1 % gel Apply topically 2 (two) times daily.    dextran 70/hypromellose (ARTIFICIAL TEARS, PF, OPHT) Apply 1 drop to eye 3 (three) times daily.    docusate sodium (COLACE) 100 MG capsule Take 100 mg by mouth 2 (two) times daily.    DULoxetine (CYMBALTA) 30 MG capsule Take 1 capsule (30 mg total) by mouth once daily.    ezetimibe (ZETIA) 10 mg tablet Take 1 tablet (10 mg total) by mouth once daily.    fluticasone propionate (FLONASE) 50 mcg/actuation nasal spray 1 spray by Each Nostril route 2 (two) times a day.    furosemide (LASIX) 20 MG tablet Take 20 mg by mouth once daily. Prescribed by Dr. Loredo    HYDROcodone-acetaminophen (NORCO) 7.5-325 mg per tablet Prescribed by Dr. Hernnadez    hydroxychloroquine (PLAQUENIL) 200 mg tablet Take 200 mg by mouth once daily.    niacin 1,000 mg TbSR Take 1 tablet by mouth Daily.    nystatin (MYCOSTATIN) ointment Apply topically 3 (three) times daily.    pantoprazole (PROTONIX) 40 MG tablet Take 1 tablet (40 mg total) by mouth once daily.    predniSONE (DELTASONE) 5 MG tablet Take 2.5 mg by mouth once daily.    QUEtiapine (SEROQUEL) 50 MG tablet Take 50 mg by mouth every evening.    rOPINIRole (REQUIP) 1 MG tablet Take 1 tablet (1 mg total) by mouth 2 (two) times daily.    rosuvastatin (CRESTOR) 40 MG Tab Take 1 tablet (40 mg total) by mouth every evening.    tamsulosin (FLOMAX) 0.4 mg Cap Take 1 capsule (0.4 mg total) by mouth every evening.     Family History       Problem Relation (Age of Onset)    Cancer Brother, Brother    Heart disease Father, Sister, Brother, Brother, Brother    Lung cancer Brother    Throat cancer  Brother          Tobacco Use    Smoking status: Former     Current packs/day: 0.00     Types: Cigarettes     Quit date:      Years since quittin.4    Smokeless tobacco: Never   Substance and Sexual Activity    Alcohol use: Yes     Alcohol/week: 2.0 standard drinks of alcohol     Types: 1 Glasses of wine, 1 Cans of beer per week     Comment: RARELY    Drug use: No    Sexual activity: Not on file     Review of Systems   Constitutional:  Positive for fatigue. Negative for chills and fever.   HENT:  Negative for rhinorrhea, sneezing and sore throat.    Eyes:  Negative for pain and visual disturbance.   Respiratory:  Positive for shortness of breath and wheezing. Negative for cough.    Cardiovascular:  Positive for leg swelling. Negative for chest pain.   Gastrointestinal:  Negative for abdominal pain, constipation, diarrhea, nausea and vomiting.   Endocrine: Negative for cold intolerance and heat intolerance.   Genitourinary:  Negative for difficulty urinating.   Musculoskeletal:  Negative for arthralgias and joint swelling.   Skin:  Negative for pallor and rash.   Allergic/Immunologic: Negative for environmental allergies.   Neurological:  Positive for weakness. Negative for dizziness and syncope.   Hematological:  Does not bruise/bleed easily.   Psychiatric/Behavioral:  Negative for dysphoric mood. The patient is not nervous/anxious.      Objective:     Vital Signs (Most Recent):  Temp: 98.6 °F (37 °C) (24 0720)  Pulse: 84 (24 0728)  Resp: 20 (24 07)  BP: (!) 145/67 (24 0858)  SpO2: 96 % (24 07) Vital Signs (24h Range):  Temp:  [97.7 °F (36.5 °C)-98.7 °F (37.1 °C)] 98.6 °F (37 °C)  Pulse:  [72-95] 84  Resp:  [16-22] 20  SpO2:  [95 %-99 %] 96 %  BP: (136-182)/(62-82) 145/67     Weight: 78.3 kg (172 lb 9.6 oz)  Body mass index is 24.07 kg/m².     Physical Exam  Vitals and nursing note reviewed.   Constitutional:       Appearance: Normal appearance. He is not ill-appearing.       Comments: Frail and fatigued   HENT:      Head: Normocephalic and atraumatic.      Nose: Nose normal.   Eyes:      Extraocular Movements: Extraocular movements intact.      Pupils: Pupils are equal, round, and reactive to light.   Cardiovascular:      Rate and Rhythm: Normal rate and regular rhythm.      Heart sounds: Murmur heard.      No friction rub. No gallop.   Pulmonary:      Effort: Pulmonary effort is normal.      Breath sounds: Normal breath sounds.   Abdominal:      General: Abdomen is flat. Bowel sounds are normal.      Palpations: Abdomen is soft.   Musculoskeletal:         General: No swelling or deformity.      Cervical back: Normal range of motion and neck supple.      Right lower leg: Edema present.      Left lower leg: Edema present.   Skin:     General: Skin is warm and dry.      Capillary Refill: Capillary refill takes less than 2 seconds.      Coloration: Skin is not pale.   Neurological:      General: No focal deficit present.      Mental Status: He is alert and oriented to person, place, and time.      Motor: Weakness present.      Coordination: Coordination abnormal.      Gait: Gait normal.   Psychiatric:         Mood and Affect: Mood normal.         Behavior: Behavior normal.              CRANIAL NERVES     CN III, IV, VI   Pupils are equal, round, and reactive to light.       Significant Labs: All pertinent labs within the past 24 hours have been reviewed.    Significant Imaging: I have reviewed all pertinent imaging results/findings within the past 24 hours.  Assessment/Plan:     * Acute on chronic combined systolic and diastolic congestive heart failure  Patient is identified as having Combined Systolic and Diastolic heart failure that is Acute on chronic. CHF is currently controlled. Latest ECHO performed and demonstrates- Results for orders placed during the hospital encounter of 02/19/23    Echo    Interpretation Summary  · Technically study due to poor acoustic windows and post-CABG  "bandages.  · The estimated ejection fraction is 50-55%.  · Low normal systolic function.  · Trivial pericardial effusion. No evidence of cardiac tamponade, within technical limitations of the study.  . Continue Beta Blocker, ACE/ARB, and Furosemide and monitor clinical status closely. Monitor on telemetry. Patient is on CHF pathway.  Monitor strict Is&Os and daily weights.  Place on fluid restriction of 1 L. Cardiology has been consulted. Continue to stress to patient importance of self efficacy and  on diet for CHF. Last BNP reviewed- and noted below No results for input(s): "BNP", "BNPTRIAGEBLO" in the last 168 hours.    Hypokalemia  Patient has hypokalemia which is Acute and currently controlled. Most recent potassium levels reviewed-   Lab Results   Component Value Date    K 3.3 (L) 05/22/2024   . Will continue potassium replacement per protocol and recheck repeat levels after replacement completed.     Senile debility  Patient with Acute on chronic debility due to age-related physical debility and Parkinsons . Latest AMPAC and GEMS scores have been reviewed. Evaluation for etiology is complete. Plan includes PT/OT consulted.    Parkinson disease  End stage now with frequent falls.  Plan to take patient home with family and sitters at [place].       GERD (gastroesophageal reflux disease)  Continue PPI      CAD (coronary artery disease)  Patient with known CAD s/p stent placement and CABG, which is controlled Will continue ASA, Plavix, and Statin and monitor for S/Sx of angina/ACS. Continue to monitor on telemetry.     Ischemic cardiomyopathy      Results for orders placed during the hospital encounter of 02/19/23    Echo    Interpretation Summary  · Technically study due to poor acoustic windows and post-CABG bandages.  · The estimated ejection fraction is 50-55%.  · Low normal systolic function.  · Trivial pericardial effusion. No evidence of cardiac tamponade, within technical limitations of the " study.        Hypertension  Chronic, uncontrolled. Latest blood pressure and vitals reviewed-     Temp:  [97.7 °F (36.5 °C)-98.7 °F (37.1 °C)]   Pulse:  [72-95]   Resp:  [16-22]   BP: (136-182)/(62-82)   SpO2:  [95 %-99 %] .   Home meds for hypertension were reviewed and noted below.   Hypertension Medications               carvediloL (COREG) 3.125 MG tablet Take 1 tablet (3.125 mg total) by mouth 2 (two) times daily with meals.    furosemide (LASIX) 20 MG tablet Take 20 mg by mouth once daily. Prescribed by Dr. Loredo            While in the hospital, will manage blood pressure as follows; Continue home antihypertensive regimen    Will utilize p.r.n. blood pressure medication only if patient's blood pressure greater than 180/110 and he develops symptoms such as worsening chest pain or shortness of breath.    Stage 3 chronic kidney disease  Creatine stable for now. BMP reviewed- noted Estimated Creatinine Clearance: 27.6 mL/min (A) (based on SCr of 2.2 mg/dL (H)). according to latest data. Based on current GFR, CKD stage is stage 3 - GFR 30-59.  Monitor UOP and serial BMP and adjust therapy as needed. Renally dose meds. Avoid nephrotoxic medications and procedures.  Lab Results   Component Value Date    CREATININE 2.2 (H) 05/22/2024    CREATININE 2.4 (H) 05/21/2024    CREATININE 2.4 (H) 05/21/2024            VTE Risk Mitigation (From admission, onward)           Ordered     IP VTE HIGH RISK PATIENT  Once         05/21/24 1124     Place sequential compression device  Until discontinued         05/21/24 1124     Place ASHOK hose  Until discontinued         05/21/24 1124                               Pharmacist Renal Dose Adjustment Note    Hunter Navarrete is a 83 y.o. male being treated with the medication famotidine    Patient Data:    Vital Signs (Most Recent):  Temp: 98.6 °F (37 °C) (05/21/24 1138)  Pulse: 95 (05/21/24 1230)  Resp: 18 (05/21/24 1230)  BP: (!) 168/82 (05/21/24 1138)  SpO2: 95 % (05/21/24 1230) Vital  Signs (72h Range):  Temp:  [98.1 °F (36.7 °C)-98.6 °F (37 °C)]   Pulse:  [95-96]   Resp:  [18-30]   BP: (164-168)/(74-82)   SpO2:  [93 %-95 %]      Recent Labs   Lab 05/21/24  0956   CREATININE 2.4*     Serum creatinine: 2.4 mg/dL (H) 05/21/24 0956  Estimated creatinine clearance: 25.3 mL/min (A)    Medication: famotidine dose:  20 mg frequency  Q12H will be changed to medication:famotidine dose: 20 mg frequency:Q24H due to crcl <51 ml/min    Pharmacist's Name: Maegan Garcia  Pharmacist's Extension: [phone]      Shawn Purdy Jr, MD  Department of Hospital Medicine  [facility]

## 2024-05-22 NOTE — ASSESSMENT & PLAN NOTE
Patient has hypokalemia which is Acute and currently controlled. Most recent potassium levels reviewed-   Lab Results   Component Value Date    K 3.3 (L) 05/22/2024   . Will continue potassium replacement per protocol and recheck repeat levels after replacement completed.

## 2024-05-22 NOTE — PLAN OF CARE
The patient is in agreement with establishing care at [facility] once he discharge.

## 2024-05-22 NOTE — PT/OT/SLP EVAL
Occupational Therapy   Evaluation    Name: Hunter Navarrete  MRN: [id number]  Admitting Diagnosis: Acute on chronic combined systolic and diastolic congestive heart failure  Recent Surgery: * No surgery found *      Recommendations:     Discharge Recommendations: Low Intensity Therapy  Discharge Equipment Recommendations:  none  Barriers to discharge:  Other (Comment) (Medical status)    Assessment:     Hunter Navarrete is a 83 y.o. male with a medical diagnosis of Acute on chronic combined systolic and diastolic congestive heart failure.  He presents with functional deficits impacting independence with ADL's including functional mobility. Performance deficits affecting function: weakness, impaired endurance, impaired self care skills, impaired functional mobility, impaired balance, decreased coordination, decreased upper extremity function, decreased lower extremity function, decreased safety awareness, decreased ROM, impaired fine motor, impaired cardiopulmonary response to activity, impaired joint extensibility, impaired muscle length.      Rehab Prognosis: Good; patient would benefit from acute skilled OT services to address these deficits and reach maximum level of function.       Plan:     Patient to be seen 5 x/week to address the above listed problems via self-care/home management, therapeutic activities, therapeutic exercises  Plan of Care Expires: 05/29/24  Plan of Care Reviewed with: patient    Subjective     Chief Complaint: SOB and weakness  Patient/Family Comments/goals: Pt would like to regain independence with ADL's including functional mobility in order to eventually return back home alone.    Occupational Profile:  Living Environment: Pt lives alone in a [facility] without steps to enter/exit. However, upon discharge, patient will stay with his daughter in [place], [place].   Previous level of function: Modified Independent  Roles and Routines: ADL's and light IADL's  Equipment Used at Home: bedside commode,  rollator, shower chair, cane, straight  Assistance upon Discharge: Daughter    Pain/Comfort:  Pain Rating 1: 0/10  Pain Rating Post-Intervention 1: 0/10    Patients cultural, spiritual, Buddhism conflicts given the current situation: no    Objective:     Communicated with: nurse prior to session.  Patient found HOB elevated with telemetry, peripheral IV, PureWick, oxygen upon OT entry to room.    General Precautions: Standard, fall, hearing impaired  Orthopedic Precautions: N/A  Braces: N/A  Respiratory Status: Nasal cannula, flow 2 L/min    Occupational Performance:    Bed Mobility:    Patient completed Rolling/Turning to Left with  stand by assistance  Patient completed Rolling/Turning to Right with stand by assistance  Patient completed Scooting/Bridging with contact guard assistance  Patient completed Supine to Sit with contact guard assistance  Patient completed Sit to Supine with stand by assistance    Functional Mobility/Transfers:  Patient completed Sit <> Stand Transfer with contact guard assistance  with  rolling walker   Patient completed Bed <> Chair Transfer using Step Transfer technique with contact guard assistance with rolling walker  Patient completed Toilet Transfer Step Transfer technique with contact guard assistance with  grab bars  Functional Mobility: Pt ambulated 132' between surfaces requiring CGA utilizing RW.     Activities of Daily Living:  Feeding:  setup assistance    Grooming: supervision    Bathing: minimum assistance    Upper Body Dressing: supervision    Lower Body Dressing: minimum assistance    Toileting: minimum assistance      Cognitive/Visual Perceptual:  Cognitive/Psychosocial Skills:  -       Oriented to: Person, Place, Time, and Situation   -       Follows Commands/attention:Follows multistep  commands  -       Communication: anomia  -       Memory: Impaired STM  -       Safety awareness/insight to disability: impaired   -       Mood/Affect/Coping skills/emotional control:  Cooperative    Physical Exam:  Postural examination/scapula alignment: -       Rounded shoulders  -       Forward head  Edema:  Mild bilateral lower legs  Sensation: -       Intact  bilateral UE's  Dominant hand: -       Right  Upper Extremity Range of Motion:  -       Right Upper Extremity: WFL  -       Left Upper Extremity: WFL  Upper Extremity Strength: -       Right Upper Extremity: 4- to 4/5  -       Left Upper Extremity: 4- to 4/5  Fine Motor Coordination: -       Impaired     Gross motor coordination: Impaired    AMPAC 6 Click ADL:  AMPAC Total Score: 20    Treatment & Education:  Pt was provided education / instruction regarding role of OT and established OT POC.    Patient left HOB elevated with all lines intact, call button in reach, and nurse notified    GOALS:   Goals to be met by: 05/29/24     Patient will increase functional independence with ADLs by performing:    Feeding with Modified [place].  UE Dressing with Set-up Assistance.  LE Dressing with Supervision.  Grooming while standing at sink with Set-up Assistance.  Toileting from toilet with Supervision for hygiene and clothing management.   Bathing from  shower chair/bench with Stand-by Assistance.  Toilet transfer to toilet with Supervision.      History:     Past Medical History:   Diagnosis Date    Acid reflux     Anemia     Anticoagulant long-term use     Anxiety disorder, unspecified     Aortic aneurysm, abdominal     Arthritis     Bleeding 09/03/2019    Bronchitis     Carotid stenosis 05/13/2022    60-79% Right ICA 60-70% Left ICA    Cataract     Celiac artery stenosis     50% by CTA abdomen 7/27/2020    CKD (chronic kidney disease)     45% FUNCTION    Coronary artery disease     stents x4    Encounter for blood transfusion     Enlarged prostate     GERD (gastroesophageal reflux disease)     Heart attack     Hemorrhoids     Hypercholesteremia     Hypertension     Iron deficiency anemia, unspecified     Leaky heart valve     Lupus  anticoagulant disorder     Mood disorder     Parkinson's disease     Renal artery stenosis     by CTA 7/27/2020    Restless leg syndrome     Skin cancer     Skin tear of elbow without complication     above elbow    Unspecified chronic bronchitis     UTI (urinary tract infection)          Past Surgical History:   Procedure Laterality Date    ACF      Anterior Cervical Fusion    BACK SURGERY      RODS IN BACK; 4 SURGIERIES    BIOPSY OF URETER Right 06/22/2023    Procedure: RESECTION/BIOPSY, URETER;  Surgeon: Aaron Pinon MD;  Location: [facility] OR;  Service: Urology;  Laterality: Right;    BONE MARROW BIOPSY      CARDIAC ANGIOGRAM WITH STENT      CATARACT SX Bilateral     CHOLECYSTECTOMY      CORONARY ANGIOGRAPHY N/A 02/15/2023    Procedure: ANGIOGRAM, CORONARY ARTERY;  Surgeon: Rito Vega MD;  Location: [facility] CATH;  Service: Cardiology;  Laterality: N/A;    CORONARY ARTERY BYPASS GRAFT (CABG) N/A 02/24/2023    Procedure: CORONARY ARTERY BYPASS GRAFT (CABG);  Surgeon: Spencer Hagan MD;  Location: [facility] OR;  Service: Cardiothoracic;  Laterality: N/A;  CABG / EVH //   ECHO NOTIFIED    CYSTOSCOPY W/ RETROGRADES Bilateral 06/20/2023    Procedure: CYSTOSCOPY WITH RETROGRADE PYELOGRAM;  Surgeon: Aaron Pinon MD;  Location: [facility] OR;  Service: Urology;  Laterality: Bilateral;  Pt has a Spinal Cord Stimulator  9am per [place], To follow RM4    CYSTOSCOPY W/ RETROGRADES Right 06/22/2023    Procedure: CYSTOSCOPY, WITH RETROGRADE PYELOGRAM;  Surgeon: Aaron Pinon MD;  Location: [facility] OR;  Service: Urology;  Laterality: Right;    CYSTOSCOPY W/ RETROGRADES Bilateral 8/24/2023    Procedure: CYSTOSCOPY, WITH RETROGRADE PYELOGRAM;  Surgeon: Aaron Pinon MD;  Location: [facility] OR;  Service: Urology;  Laterality: Bilateral;    CYSTOSCOPY W/ RETROGRADES Bilateral 12/4/2023    Procedure: CYSTOSCOPY WITH RETROGRADE PYELOGRAM;  Surgeon: Aaron Pinon MD;  Location: [facility] OR;  Service: Urology;  Laterality: Bilateral;  pt  has spinal cord stimulator    DIGITAL RECTAL EXAMINATION UNDER ANESTHESIA N/A 12/4/2023    Procedure: EXAM UNDER ANESTHESIA, DIGITAL, RECTUM;  Surgeon: Aaron Pinon MD;  Location: [facility] OR;  Service: Urology;  Laterality: N/A;    HIP SURGERY Right     THR    KNEE SURGERY Right     ATS    LEFT HEART CATHETERIZATION Left 07/22/2021    Procedure: Left heart cath;  Surgeon: Curtis Loredo MD;  Location: [facility] CATH;  Service: Cardiology;  Laterality: Left;    LEFT HEART CATHETERIZATION Left 09/22/2022    Procedure: Left heart cath;  Surgeon: Giorgi Barker MD;  Location: [facility] CATH;  Service: Cardiology;  Laterality: Left;    REMOVAL, STENT, URETER Right 8/24/2023    Procedure: REMOVAL, STENT, URETER;  Surgeon: Aaron Pinon MD;  Location: [facility];  Service: Urology;  Laterality: Right;    ROTATOR CUFF REPAIR Bilateral     SHOULDER SURGERY Bilateral     SPINAL CORD STIMULATOR IMPLANT      STENT, URETERAL Left 06/22/2023    Procedure: STENT, URETERAL;  Surgeon: Aaron Pinon MD;  Location: [facility];  Service: Urology;  Laterality: Left;    steroid injections Right 06/13/2023    Knee    ULNAR NERVE TRANSPOSITION      URETEROSCOPY Right 06/22/2023    Procedure: URETEROSCOPY;  Surgeon: Aaron Pinon MD;  Location: [facility];  Service: Urology;  Laterality: Right;       Time Tracking:     OT Date of Treatment: 05/22/24  OT Start Time: 1405  OT Stop Time: 1434  OT Total Time (min): 29 min    Billable Minutes:Evaluation 29 5/22/2024

## 2024-05-22 NOTE — PLAN OF CARE
05/22/24 1309   Medicare Message   Important Message from Medicare regarding Discharge Appeal Rights Given to patient/caregiver;Explained to patient/caregiver;Signed/date by patient/caregiver   Date IMM was signed 05/22/24   Time IMM was signed 1257

## 2024-05-22 NOTE — ASSESSMENT & PLAN NOTE
Results for orders placed during the hospital encounter of 02/19/23    Echo    Interpretation Summary  · Technically study due to poor acoustic windows and post-CABG bandages.  · The estimated ejection fraction is 50-55%.  · Low normal systolic function.  · Trivial pericardial effusion. No evidence of cardiac tamponade, within technical limitations of the study.

## 2024-05-22 NOTE — ASSESSMENT & PLAN NOTE
Chronic, uncontrolled. Latest blood pressure and vitals reviewed-     Temp:  [97.7 °F (36.5 °C)-98.7 °F (37.1 °C)]   Pulse:  [72-95]   Resp:  [16-22]   BP: (136-182)/(62-82)   SpO2:  [95 %-99 %] .   Home meds for hypertension were reviewed and noted below.   Hypertension Medications               carvediloL (COREG) 3.125 MG tablet Take 1 tablet (3.125 mg total) by mouth 2 (two) times daily with meals.    furosemide (LASIX) 20 MG tablet Take 20 mg by mouth once daily. Prescribed by Dr. Loredo            While in the hospital, will manage blood pressure as follows; Continue home antihypertensive regimen    Will utilize p.r.n. blood pressure medication only if patient's blood pressure greater than 180/110 and he develops symptoms such as worsening chest pain or shortness of breath.

## 2024-05-22 NOTE — ASSESSMENT & PLAN NOTE
Creatine stable for now. BMP reviewed- noted Estimated Creatinine Clearance: 27.6 mL/min (A) (based on SCr of 2.2 mg/dL (H)). according to latest data. Based on current GFR, CKD stage is stage 3 - GFR 30-59.  Monitor UOP and serial BMP and adjust therapy as needed. Renally dose meds. Avoid nephrotoxic medications and procedures.  Lab Results   Component Value Date    CREATININE 2.2 (H) 05/22/2024    CREATININE 2.4 (H) 05/21/2024    CREATININE 2.4 (H) 05/21/2024

## 2024-05-23 VITALS
HEIGHT: 71 IN | BODY MASS INDEX: 24.17 KG/M2 | SYSTOLIC BLOOD PRESSURE: 157 MMHG | DIASTOLIC BLOOD PRESSURE: 67 MMHG | TEMPERATURE: 98 F | OXYGEN SATURATION: 98 % | RESPIRATION RATE: 20 BRPM | HEART RATE: 80 BPM | WEIGHT: 172.63 LBS

## 2024-05-23 LAB
ALBUMIN SERPL BCP-MCNC: 3.1 G/DL (ref 3.5–5.2)
ALP SERPL-CCNC: 67 U/L (ref 55–135)
ALT SERPL W/O P-5'-P-CCNC: 17 U/L (ref 10–44)
ANION GAP SERPL CALC-SCNC: 6 MMOL/L (ref 3–11)
AST SERPL-CCNC: 32 U/L (ref 10–40)
BASOPHILS # BLD AUTO: 0.01 K/UL (ref 0–0.2)
BASOPHILS NFR BLD: 0.2 % (ref 0–1.9)
BILIRUB SERPL-MCNC: 1.2 MG/DL (ref 0.1–1)
BUN SERPL-MCNC: 36 MG/DL (ref 8–23)
CALCIUM SERPL-MCNC: 8.7 MG/DL (ref 8.7–10.5)
CHLORIDE SERPL-SCNC: 98 MMOL/L (ref 95–110)
CO2 SERPL-SCNC: 33 MMOL/L (ref 23–29)
CREAT SERPL-MCNC: 2.4 MG/DL (ref 0.5–1.4)
DIFFERENTIAL METHOD BLD: ABNORMAL
EOSINOPHIL # BLD AUTO: 0 K/UL (ref 0–0.5)
EOSINOPHIL NFR BLD: 0 % (ref 0–8)
ERYTHROCYTE [DISTWIDTH] IN BLOOD BY AUTOMATED COUNT: 15.8 % (ref 11.5–14.5)
EST. GFR  (NO RACE VARIABLE): 26.3 ML/MIN/1.73 M^2
GLUCOSE SERPL-MCNC: 94 MG/DL (ref 70–110)
HCT VFR BLD AUTO: 27.4 % (ref 40–54)
HGB BLD-MCNC: 9.1 G/DL (ref 14–18)
IMM GRANULOCYTES # BLD AUTO: 0.07 K/UL (ref 0–0.04)
IMM GRANULOCYTES NFR BLD AUTO: 1.7 % (ref 0–0.5)
LYMPHOCYTES # BLD AUTO: 1 K/UL (ref 1–4.8)
LYMPHOCYTES NFR BLD: 23.7 % (ref 18–48)
MAGNESIUM SERPL-MCNC: 1.6 MG/DL (ref 1.6–2.6)
MCH RBC QN AUTO: 29.7 PG (ref 27–31)
MCHC RBC AUTO-ENTMCNC: 33.2 G/DL (ref 32–36)
MCV RBC AUTO: 90 FL (ref 82–98)
MONOCYTES # BLD AUTO: 0.7 K/UL (ref 0.3–1)
MONOCYTES NFR BLD: 17.2 % (ref 4–15)
NEUTROPHILS # BLD AUTO: 2.3 K/UL (ref 1.8–7.7)
NEUTROPHILS NFR BLD: 57.2 % (ref 38–73)
NRBC BLD-RTO: 0 /100 WBC
PLATELET # BLD AUTO: 81 K/UL (ref 150–450)
PMV BLD AUTO: 12.2 FL (ref 9.2–12.9)
POTASSIUM SERPL-SCNC: 3.1 MMOL/L (ref 3.5–5.1)
PROT SERPL-MCNC: 7.2 G/DL (ref 6–8.4)
RBC # BLD AUTO: 3.06 M/UL (ref 4.6–6.2)
SODIUM SERPL-SCNC: 137 MMOL/L (ref 136–145)
WBC # BLD AUTO: 4.01 K/UL (ref 3.9–12.7)

## 2024-05-23 PROCEDURE — 85025 COMPLETE CBC W/AUTO DIFF WBC: CPT | Performed by: INTERNAL MEDICINE

## 2024-05-23 PROCEDURE — 25000003 PHARM REV CODE 250: Performed by: INTERNAL MEDICINE

## 2024-05-23 PROCEDURE — 99900035 HC TECH TIME PER 15 MIN (STAT)

## 2024-05-23 PROCEDURE — 94761 N-INVAS EAR/PLS OXIMETRY MLT: CPT

## 2024-05-23 PROCEDURE — 36415 COLL VENOUS BLD VENIPUNCTURE: CPT | Performed by: INTERNAL MEDICINE

## 2024-05-23 PROCEDURE — 97116 GAIT TRAINING THERAPY: CPT

## 2024-05-23 PROCEDURE — 80053 COMPREHEN METABOLIC PANEL: CPT | Performed by: INTERNAL MEDICINE

## 2024-05-23 PROCEDURE — 99900031 HC PATIENT EDUCATION (STAT)

## 2024-05-23 PROCEDURE — 97530 THERAPEUTIC ACTIVITIES: CPT

## 2024-05-23 PROCEDURE — 63600175 PHARM REV CODE 636 W HCPCS: Performed by: INTERNAL MEDICINE

## 2024-05-23 PROCEDURE — 83735 ASSAY OF MAGNESIUM: CPT | Performed by: INTERNAL MEDICINE

## 2024-05-23 RX ORDER — FUROSEMIDE 40 MG/1
40 TABLET ORAL 2 TIMES DAILY PRN
Qty: 60 TABLET | Refills: 2 | Status: SHIPPED | OUTPATIENT
Start: 2024-05-23 | End: 2025-05-23

## 2024-05-23 RX ORDER — POTASSIUM CHLORIDE 1500 MG/1
20 TABLET, EXTENDED RELEASE ORAL DAILY
Qty: 30 TABLET | Refills: 2 | Status: SHIPPED | OUTPATIENT
Start: 2024-05-23

## 2024-05-23 RX ADMIN — AMIODARONE HYDROCHLORIDE 200 MG: 200 TABLET ORAL at 09:05

## 2024-05-23 RX ADMIN — CARVEDILOL 3.12 MG: 3.12 TABLET, FILM COATED ORAL at 09:05

## 2024-05-23 RX ADMIN — FAMOTIDINE 20 MG: 10 INJECTION, SOLUTION INTRAVENOUS at 09:05

## 2024-05-23 RX ADMIN — DOCUSATE SODIUM 100 MG: 100 CAPSULE, LIQUID FILLED ORAL at 09:05

## 2024-05-23 RX ADMIN — DULOXETINE HYDROCHLORIDE 30 MG: 30 CAPSULE, DELAYED RELEASE ORAL at 09:05

## 2024-05-23 RX ADMIN — EZETIMIBE 10 MG: 10 TABLET ORAL at 09:05

## 2024-05-23 RX ADMIN — FUROSEMIDE 40 MG: 10 INJECTION, SOLUTION INTRAMUSCULAR; INTRAVENOUS at 09:05

## 2024-05-23 RX ADMIN — PANTOPRAZOLE SODIUM 40 MG: 40 TABLET, DELAYED RELEASE ORAL at 09:05

## 2024-05-23 NOTE — PLAN OF CARE
Problem: Physical Therapy  Goal: Physical Therapy Goal  Description: Goals to be met by: 24    Patient will increase functional independence with mobility by performin. Supine to sit with Independent  2. Sit to supine with Independent  3. Bed to chair transfer with Modified Independent with rolling walker using Step Transfer TECHNIQUE  4. Gait  x 200  feet with Supervision or Set-up Assistance with rolling walker  5. Lower extremity exercise program x10 reps with assistance as needed      Outcome: Adequate for Care Transition

## 2024-05-23 NOTE — ASSESSMENT & PLAN NOTE
Patient has hypokalemia which is Acute and currently controlled. Most recent potassium levels reviewed-   Lab Results   Component Value Date    K 3.1 (L) 05/23/2024   . Will continue potassium replacement per protocol and recheck repeat levels after replacement completed.

## 2024-05-23 NOTE — ASSESSMENT & PLAN NOTE
Creatine stable for now. BMP reviewed- noted Estimated Creatinine Clearance: 25.3 mL/min (A) (based on SCr of 2.4 mg/dL (H)). according to latest data. Based on current GFR, CKD stage is stage 3 - GFR 30-59.  Monitor UOP and serial BMP and adjust therapy as needed. Renally dose meds. Avoid nephrotoxic medications and procedures.  Lab Results   Component Value Date    CREATININE 2.4 (H) 05/23/2024    CREATININE 2.2 (H) 05/22/2024    CREATININE 2.4 (H) 05/21/2024

## 2024-05-23 NOTE — PLAN OF CARE
Plan of care reviewed and ongoing with patient. 20 gauge IV to L UA saline locked, room air tolerating well, tele electrodes and pure wick connections intact. Catrachito Hugger applied to patient during the night, no other complaints. Call light and personal items within reach of patient.       Problem: Adult Inpatient Plan of Care  Goal: Plan of Care Review  Outcome: Progressing  Goal: Absence of Hospital-Acquired Illness or Injury  Outcome: Progressing     Problem: Adult Inpatient Plan of Care  Goal: Patient-Specific Goal (Individualized)  Outcome: Not Progressing  Goal: Optimal Comfort and Wellbeing  Outcome: Not Progressing  Goal: Readiness for Transition of Care  Outcome: Not Progressing     Problem: Skin Injury Risk Increased  Goal: Skin Health and Integrity  Outcome: Not Progressing     Problem: Wound  Goal: Optimal Coping  Outcome: Not Progressing  Goal: Optimal Functional Ability  Outcome: Not Progressing  Goal: Absence of Infection Signs and Symptoms  Outcome: Not Progressing  Goal: Improved Oral Intake  Outcome: Not Progressing  Goal: Optimal Pain Control and Function  Outcome: Not Progressing  Goal: Skin Health and Integrity  Outcome: Not Progressing  Goal: Optimal Wound Healing  Outcome: Not Progressing

## 2024-05-23 NOTE — ASSESSMENT & PLAN NOTE
Chronic, uncontrolled. Latest blood pressure and vitals reviewed-     Temp:  [98.1 °F (36.7 °C)-98.6 °F (37 °C)]   Pulse:  [76-93]   Resp:  [6-20]   BP: (116-157)/(55-69)   SpO2:  [90 %-99 %] .   Home meds for hypertension were reviewed and noted below.   Hypertension Medications               carvediloL (COREG) 3.125 MG tablet Take 1 tablet (3.125 mg total) by mouth 2 (two) times daily with meals.    furosemide (LASIX) 20 MG tablet Take 20 mg by mouth once daily. Prescribed by Dr. Loredo            While in the hospital, will manage blood pressure as follows; Continue home antihypertensive regimen    Will utilize p.r.n. blood pressure medication only if patient's blood pressure greater than 180/110 and he develops symptoms such as worsening chest pain or shortness of breath.

## 2024-05-23 NOTE — PT/OT/SLP DISCHARGE
Physical Therapy Discharge Summary    Name: Hunter Navarrete  MRN: [id number]   Principal Problem: Acute on chronic combined systolic and diastolic congestive heart failure     Patient Discharged from acute Physical Therapy on 2024.  Please refer to prior PT note dated 2024  for functional status.     Assessment:     Patient appropriate for care in another setting.    Objective:     GOALS:   Multidisciplinary Problems       Physical Therapy Goals          Problem: Physical Therapy    Goal Priority Disciplines Outcome Goal Variances Interventions   Physical Therapy Goal     PT, PT/OT Adequate for Care Transition     Description: Goals to be met by: 24    Patient will increase functional independence with mobility by performin. Supine to sit with Independent  2. Sit to supine with Independent  3. Bed to chair transfer with Modified Independent with rolling walker using Step Transfer TECHNIQUE  4. Gait  x 200  feet with Supervision or Set-up Assistance with rolling walker  5. Lower extremity exercise program x10 reps with assistance as needed                           Reasons for Discontinuation of Therapy Services  Transfer to alternate level of care.      Plan:     Patient Discharged to: Home with Home Health Service.      2024

## 2024-05-23 NOTE — HOSPITAL COURSE
5/23:  No acute events overnight.  Patient ambulating to the bathroom without issue.  Overall feeling much better with diuresis.  Family has plans to take the patient to their home in [place] for ongoing care.

## 2024-05-23 NOTE — PLAN OF CARE
[facility]  Discharge Final Note    Primary Care Provider: Shawn Purdy Jr., MD    Expected Discharge Date: 5/23/2024    Final Discharge Note (most recent)       Final Note - 05/23/24 1624          Final Note    Assessment Type Final Discharge Note     Anticipated Discharge Disposition Home-Health Care [facility]     Hospital Resources/Appts/Education Provided Appointments scheduled and added to AVS        Post-Acute Status    Discharge Delays None known at this time                     Important Message from Medicare  Important Message from Medicare regarding Discharge Appeal Rights: Given to patient/caregiver, Explained to patient/caregiver, Signed/date by patient/caregiver     Date IMM was signed: 05/22/24  Time IMM was signed: 1257     Follow-up providers       Shawn Purdy Jr., MD   Specialty: Internal Medicine   Relationship: PCP - General    [address] [place] [id number]   Phone: [phone]       Next Steps: Follow up in 3 day(s)              After-discharge care                Home Medical Care       [facility]   Service: Home Health Services    [address] [facility] [address]

## 2024-05-23 NOTE — PLAN OF CARE
Discharge instructions reviewed with the patient and 3 family members. Peripheral IV removed and intact. Telemetry monitor removed and returned to proper location. Patient wheeled downstairs by staff to personal transportation.

## 2024-05-23 NOTE — PT/OT/SLP PROGRESS
"Physical Therapy Treatment    Patient Name:  Hunter Navarrete   MRN:  [id number]    Recommendations:     Discharge Recommendations: Low Intensity Therapy  Discharge Equipment Recommendations: none  Barriers to discharge: None    Assessment:     Hunter Navarrete is a 83 y.o. male admitted with a medical diagnosis of Acute on chronic combined systolic and diastolic congestive heart failure.  He presents with the following impairments/functional limitations: weakness, impaired joint extensibility, impaired endurance, decreased ROM, impaired muscle length, impaired self care skills, decreased upper extremity function, impaired functional mobility, decreased lower extremity function, gait instability, decreased safety awareness, impaired balance, impaired cardiopulmonary response to activity Patient was found supine in bed agreeable to therapy.  .he required min assist with supine <> sit, min assist with sit <> stand with definite UE support and ambulated ~338 feet and ~328 feet with RW with CGA/SBA, slow avani, sitting rest break in between. Anticipate discharge today with follow up from home health P.T.  Patient will need assistance with functional mobility.     Rehab Prognosis: Fair; patient would benefit from acute skilled PT services to address these deficits and reach maximum level of function.    Recent Surgery: * No surgery found *      Plan:     During this hospitalization, patient to be seen 5 x/week to address the identified rehab impairments via gait training, therapeutic activities, therapeutic exercises, neuromuscular re-education and progress toward the following goals:    Plan of Care Expires:  05/27/24    Subjective     Chief Complaint: "I am ready."   Patient/Family Comments/goals: Go home.  Pain/Comfort:  Pain Rating 1: 0/10  Pain Rating Post-Intervention 1: 0/10      Objective:     Communicated with nurse and patient  prior to session.  Patient found supine with peripheral IV, telemetry upon PT entry to " room.     General Precautions: Standard, fall, hearing impaired  Orthopedic Precautions: N/A  Braces: N/A  Respiratory Status: Room air     Functional Mobility:  Bed Mobility:     Rolling Left:  minimum assistance  Rolling Right: minimum assistance  Scooting: minimum assistance  Bridging: minimum assistance  Supine to Sit: minimum assistance  Sit to Supine: minimum assistance  Transfers:     Sit to Stand:  minimum assistance with rolling walker  Bed to Chair: minimum assistance with  rolling walker  using  Step Transfer  Gait: ~338 feet and ~328 feet with RW with CGA/SBA, slow avani, sitting rest break in between.   Balance: Set up with static sitting, CGA with static standing using a RW       AM-PAC 6 CLICK MOBILITY  Turning over in bed (including adjusting bedclothes, sheets and blankets)?: 3  Sitting down on and standing up from a chair with arms (e.g., wheelchair, bedside commode, etc.): 3  Moving from lying on back to sitting on the side of the bed?: 3  Moving to and from a bed to a chair (including a wheelchair)?: 3  Need to walk in hospital room?: 3  Climbing 3-5 steps with a railing?: 3 (based on clinical judgement)  Basic Mobility Total Score: 18       Treatment & Education:  Rolling side <> side  Supine <> sit  Scooting to the edge  of the bed   Sitting balance/tolerance  Sit <> stand with RW  Standing balance/tolerance   Stand step transfer with RW   Gait with RW   Bed positioning    Patient left HOB elevated with all lines intact, call button in reach, and nurse  notified..    GOALS:   Multidisciplinary Problems       Physical Therapy Goals          Problem: Physical Therapy    Goal Priority Disciplines Outcome Goal Variances Interventions   Physical Therapy Goal     PT, PT/OT Adequate for Care Transition     Description: Goals to be met by: 24    Patient will increase functional independence with mobility by performin. Supine to sit with Independent  2. Sit to supine with Independent  3.  Bed to chair transfer with Modified Independent with rolling walker using Step Transfer TECHNIQUE  4. Gait  x 200  feet with Supervision or Set-up Assistance with rolling walker  5. Lower extremity exercise program x10 reps with assistance as needed                           Time Tracking:     PT Received On: 05/23/24  PT Start Time: 0910     PT Stop Time: 0933  PT Total Time (min): 23 min     Billable Minutes: Gait Training 13 and Therapeutic Activity 10    Treatment Type: Treatment  PT/PTA: PT           05/23/2024

## 2024-05-23 NOTE — DISCHARGE SUMMARY
[facility]  Discharge Summary      Patient Name: Hunter Navarrete  MRN: [id number]  ADWOA: [id number]  Patient Class: IP- Inpatient  Admission Date: 5/21/2024  Hospital Length of Stay: 2 days  Discharge Date and Time:  05/23/2024 8:54 AM  Attending Physician: Shawn Purdy Jr., MD   Discharging Provider: Shawn Purdy Jr, MD  Primary Care Provider: Shawn Purdy Jr., MD    Primary Care Team: Networked reference to record PCT     HPI:   Chief Complaint    Complaint Comment   Shortness of Breath EMS Reports patients son took patient off of all his medications due to him being on too many medication. Patient reports he has been short of breath since this morning. Patient has been off of medication x 3 days.     Updated HPI:  Above history confirmed.  Patient began to develop worsening lower extremity edema and shortness of breath.  Responding very nicely to diuresis as of this morning.    * No surgery found *      Hospital Course:   5/23:  No acute events overnight.  Patient ambulating to the bathroom without issue.  Overall feeling much better with diuresis.  Family has plans to take the patient to their home in [place] for ongoing care.     Goals of Care Treatment Preferences:  Code Status: Full Code      Consults:     Neuro  Parkinson disease  End stage now with frequent falls.  Plan [facility]       Cardiac/Vascular  * Acute on chronic combined systolic and diastolic congestive heart failure  Patient is identified as having Combined Systolic and Diastolic heart failure that is Acute on chronic. CHF is currently controlled. Latest ECHO performed and demonstrates- Results for orders placed during the hospital encounter of 02/19/23    Echo    Interpretation Summary  · Technically study due to poor acoustic windows and post-CABG bandages.  · The estimated ejection fraction is 50-55%.  · Low normal systolic function.  · Trivial pericardial  "effusion. No evidence of cardiac tamponade, within technical limitations of the study.  . Continue Beta Blocker, ACE/ARB, and Furosemide and monitor clinical status closely. Monitor on telemetry. Patient is on CHF pathway.  Monitor strict Is&Os and daily weights.  Place on fluid restriction of 1 L. Cardiology has been consulted. Continue to stress to patient importance of self efficacy and  on diet for CHF. Last BNP reviewed- and noted below No results for input(s): "BNP", "BNPTRIAGEBLO" in the last 168 hours.    CAD (coronary artery disease)  Patient with known CAD s/p stent placement and CABG, which is controlled Will continue ASA, Plavix, and Statin and monitor for S/Sx of angina/ACS. Continue to monitor on telemetry.     Ischemic cardiomyopathy      Results for orders placed during the hospital encounter of 02/19/23    Echo    Interpretation Summary  · Technically study due to poor acoustic windows and post-CABG bandages.  · The estimated ejection fraction is 50-55%.  · Low normal systolic function.  · Trivial pericardial effusion. No evidence of cardiac tamponade, within technical limitations of the study.        Hypertension  Chronic, uncontrolled. Latest blood pressure and vitals reviewed-     Temp:  [98.1 °F (36.7 °C)-98.6 °F (37 °C)]   Pulse:  [76-93]   Resp:  [6-20]   BP: (116-157)/(55-69)   SpO2:  [90 %-99 %] .   Home meds for hypertension were reviewed and noted below.   Hypertension Medications               carvediloL (COREG) 3.125 MG tablet Take 1 tablet (3.125 mg total) by mouth 2 (two) times daily with meals.    furosemide (LASIX) 20 MG tablet Take 20 mg by mouth once daily. Prescribed by Dr. Loredo            While in the hospital, will manage blood pressure as follows; Continue home antihypertensive regimen    Will utilize p.r.n. blood pressure medication only if patient's blood pressure greater than 180/110 and he develops symptoms such as worsening chest pain or shortness of " breath.    Renal/  Hypokalemia  Patient has hypokalemia which is Acute and currently controlled. Most recent potassium levels reviewed-   Lab Results   Component Value Date    K 3.1 (L) 05/23/2024   . Will continue potassium replacement per protocol and recheck repeat levels after replacement completed.     Stage 3 chronic kidney disease  Creatine stable for now. BMP reviewed- noted Estimated Creatinine Clearance: 25.3 mL/min (A) (based on SCr of 2.4 mg/dL (H)). according to latest data. Based on current GFR, CKD stage is stage 3 - GFR 30-59.  Monitor UOP and serial BMP and adjust therapy as needed. Renally dose meds. Avoid nephrotoxic medications and procedures.  Lab Results   Component Value Date    CREATININE 2.4 (H) 05/23/2024    CREATININE 2.2 (H) 05/22/2024    CREATININE 2.4 (H) 05/21/2024          GI  GERD (gastroesophageal reflux disease)  Continue PPI      Other  Senile debility  Patient with Acute on chronic debility due to age-related physical debility and Parkinsons . Latest AMPAC and GEMS scores have been reviewed. Evaluation for etiology is complete. Plan includes PT/OT consulted.      Final Active Diagnoses:    Diagnosis Date Noted POA    PRINCIPAL PROBLEM:  Acute on chronic combined systolic and diastolic congestive heart failure [I50.43] 05/22/2024 Unknown    Hypokalemia [E87.6] 05/22/2024 Yes    Senile debility [R54] 07/21/2023 Yes    Parkinson disease [G20.A1] 03/06/2023 Yes    CAD (coronary artery disease) [I25.10] 03/03/2023 Yes    GERD (gastroesophageal reflux disease) [K21.9]  Yes    Ischemic cardiomyopathy [I25.5] 02/15/2023 Yes    Hypertension [I10]  Yes    Stage 3 chronic kidney disease [N18.30] 09/03/2019 Yes      Problems Resolved During this Admission:       Discharged Condition: fair    Disposition:     Follow Up:   Follow-up Information       Shawn Purdy Jr., MD Follow up in 3 day(s).    Specialty: Internal Medicine  Contact information:  [address] [facility]  [address]  [phone]                           Patient Instructions:      Diet Cardiac     SUBSEQUENT HOME HEALTH ORDERS   Order Comments: PT/OT/Skilled 3x/week for 4 weeks     Order Specific Question Answer Comments   What Home Health Agency is the patient currently using? Other/External      Activity as tolerated       Significant Diagnostic Studies: Labs: All labs within the past 24 hours have been reviewed    Pending Diagnostic Studies:       None           Medications:  Reconciled Home Medications:      Medication List        START taking these medications      potassium chloride 20 mEq  Commonly known as: K-TAB  Take 1 tablet (20 mEq total) by mouth Daily.            CHANGE how you take these medications      furosemide 40 MG tablet  Commonly known as: LASIX  Take 1 tablet (40 mg total) by mouth 2 (two) times daily as needed.  What changed:   medication strength  how much to take  when to take this  reasons to take this  additional instructions            CONTINUE taking these medications      ARTIFICIAL TEARS (PF) OPHT  Apply 1 drop to eye 3 (three) times daily.     azelastine 137 mcg (0.1 %) nasal spray  Commonly known as: ASTELIN  1 spray (137 mcg total) by Nasal route 2 (two) times daily.     calcium carbonate-vitamin D3 500 mg-10 mcg (400 unit) Tab  Take 1 tablet by mouth once daily.     carvediloL 3.125 MG tablet  Commonly known as: COREG  Take 1 tablet (3.125 mg total) by mouth 2 (two) times daily with meals.     clindamycin phosphate 1% 1 % gel  Commonly known as: CLINDAGEL  Apply topically 2 (two) times daily.     docusate sodium 100 MG capsule  Commonly known as: COLACE  Take 100 mg by mouth 2 (two) times daily.     DULoxetine 30 MG capsule  Commonly known as: CYMBALTA  Take 1 capsule (30 mg total) by mouth once daily.     fluticasone propionate 50 mcg/actuation nasal spray  Commonly known as: FLONASE  1 spray by Each Nostril route 2 (two) times a day.     HYDROcodone-acetaminophen 7.5-325 mg per  tablet  Commonly known as: NORCO  Prescribed by Dr. Hernandez     niacin 1,000 mg Tbsr  Take 1 tablet by mouth Daily.     nystatin ointment  Commonly known as: MYCOSTATIN  Apply topically 3 (three) times daily.     pantoprazole 40 MG tablet  Commonly known as: PROTONIX  Take 1 tablet (40 mg total) by mouth once daily.     predniSONE 5 MG tablet  Commonly known as: DELTASONE  Take 2.5 mg by mouth once daily.     QUEtiapine 50 MG tablet  Commonly known as: SEROQUEL  Take 50 mg by mouth every evening.     rOPINIRole 1 MG tablet  Commonly known as: REQUIP  Take 1 tablet (1 mg total) by mouth 2 (two) times daily.     tamsulosin 0.4 mg Cap  Commonly known as: FLOMAX  Take 1 capsule (0.4 mg total) by mouth every evening.            STOP taking these medications      amiodarone 200 MG Tab  Commonly known as: PACERONE     carbidopa-levodopa  mg per disintegrating tablet  Commonly known as: PARCOPA     ezetimibe 10 mg tablet  Commonly known as: ZETIA     hydroxychloroquine 200 mg tablet  Commonly known as: PLAQUENIL     rosuvastatin 40 MG Tab  Commonly known as: CRESTOR              Indwelling Lines/Drains at time of discharge:   Lines/Drains/Airways       None                   Time spent on the discharge of patient: 60 minutes         Shawn Purdy Jr, MD  Department of Hospital Medicine  [facility]

## 2024-07-22 ENCOUNTER — INFUSION (OUTPATIENT)
Dept: INFUSION THERAPY | Facility: HOSPITAL | Age: 84
End: 2024-07-22
Payer: MEDICARE

## 2024-07-22 VITALS
DIASTOLIC BLOOD PRESSURE: 58 MMHG | BODY MASS INDEX: 23.8 KG/M2 | OXYGEN SATURATION: 91 % | HEIGHT: 71 IN | HEART RATE: 90 BPM | RESPIRATION RATE: 20 BRPM | SYSTOLIC BLOOD PRESSURE: 105 MMHG | TEMPERATURE: 98 F | WEIGHT: 170 LBS

## 2024-07-22 DIAGNOSIS — D50.9 IRON DEFICIENCY ANEMIA, UNSPECIFIED IRON DEFICIENCY ANEMIA TYPE: Primary | ICD-10-CM

## 2024-07-22 PROCEDURE — 96365 THER/PROPH/DIAG IV INF INIT: CPT

## 2024-07-22 PROCEDURE — 63600175 PHARM REV CODE 636 W HCPCS: Mod: JZ,JG | Performed by: INTERNAL MEDICINE

## 2024-07-22 PROCEDURE — A4216 STERILE WATER/SALINE, 10 ML: HCPCS | Performed by: INTERNAL MEDICINE

## 2024-07-22 PROCEDURE — 25000003 PHARM REV CODE 250: Performed by: INTERNAL MEDICINE

## 2024-07-22 RX ORDER — DIPHENHYDRAMINE HYDROCHLORIDE 50 MG/ML
50 INJECTION INTRAMUSCULAR; INTRAVENOUS ONCE AS NEEDED
Status: DISCONTINUED | OUTPATIENT
Start: 2024-07-22 | End: 2024-07-22 | Stop reason: HOSPADM

## 2024-07-22 RX ORDER — SODIUM CHLORIDE 9 MG/ML
INJECTION, SOLUTION INTRAVENOUS CONTINUOUS
Status: DISCONTINUED | OUTPATIENT
Start: 2024-07-22 | End: 2024-07-22 | Stop reason: HOSPADM

## 2024-07-22 RX ORDER — HEPARIN 100 UNIT/ML
5 SYRINGE INTRAVENOUS
Status: DISCONTINUED | OUTPATIENT
Start: 2024-07-22 | End: 2024-07-22 | Stop reason: HOSPADM

## 2024-07-22 RX ORDER — DIPHENHYDRAMINE HYDROCHLORIDE 50 MG/ML
50 INJECTION INTRAMUSCULAR; INTRAVENOUS ONCE AS NEEDED
OUTPATIENT
Start: 2024-07-29

## 2024-07-22 RX ORDER — HEPARIN 100 UNIT/ML
5 SYRINGE INTRAVENOUS
OUTPATIENT
Start: 2024-07-29

## 2024-07-22 RX ORDER — EPINEPHRINE 0.3 MG/.3ML
0.3 INJECTION SUBCUTANEOUS ONCE AS NEEDED
OUTPATIENT
Start: 2024-07-29

## 2024-07-22 RX ORDER — SODIUM CHLORIDE 9 MG/ML
INJECTION, SOLUTION INTRAVENOUS CONTINUOUS
OUTPATIENT
Start: 2024-07-29

## 2024-07-22 RX ORDER — EPINEPHRINE 0.3 MG/.3ML
0.3 INJECTION SUBCUTANEOUS ONCE AS NEEDED
Status: DISCONTINUED | OUTPATIENT
Start: 2024-07-22 | End: 2024-07-22 | Stop reason: HOSPADM

## 2024-07-22 RX ORDER — SODIUM CHLORIDE 0.9 % (FLUSH) 0.9 %
10 SYRINGE (ML) INJECTION
Status: DISCONTINUED | OUTPATIENT
Start: 2024-07-22 | End: 2024-07-22 | Stop reason: HOSPADM

## 2024-07-22 RX ORDER — SODIUM CHLORIDE 0.9 % (FLUSH) 0.9 %
10 SYRINGE (ML) INJECTION
OUTPATIENT
Start: 2024-07-29

## 2024-07-22 RX ADMIN — SODIUM CHLORIDE: 9 INJECTION, SOLUTION INTRAVENOUS at 01:07

## 2024-07-22 RX ADMIN — FERRIC CARBOXYMALTOSE INJECTION 750 MG: 50 INJECTION, SOLUTION INTRAVENOUS at 01:07

## 2024-07-22 RX ADMIN — Medication 10 ML: at 01:07

## 2024-07-29 ENCOUNTER — INFUSION (OUTPATIENT)
Dept: INFUSION THERAPY | Facility: HOSPITAL | Age: 84
End: 2024-07-29
Attending: INTERNAL MEDICINE
Payer: MEDICARE

## 2024-07-29 VITALS
TEMPERATURE: 98 F | HEART RATE: 70 BPM | SYSTOLIC BLOOD PRESSURE: 138 MMHG | OXYGEN SATURATION: 98 % | RESPIRATION RATE: 18 BRPM | DIASTOLIC BLOOD PRESSURE: 64 MMHG | HEIGHT: 71 IN | BODY MASS INDEX: 23.55 KG/M2 | WEIGHT: 168.19 LBS

## 2024-07-29 DIAGNOSIS — D50.9 IRON DEFICIENCY ANEMIA, UNSPECIFIED IRON DEFICIENCY ANEMIA TYPE: Primary | ICD-10-CM

## 2024-07-29 PROCEDURE — 63600175 PHARM REV CODE 636 W HCPCS: Mod: JZ,JG | Performed by: INTERNAL MEDICINE

## 2024-07-29 PROCEDURE — 25000003 PHARM REV CODE 250: Performed by: INTERNAL MEDICINE

## 2024-07-29 PROCEDURE — 96365 THER/PROPH/DIAG IV INF INIT: CPT

## 2024-07-29 RX ORDER — EPINEPHRINE 0.3 MG/.3ML
0.3 INJECTION SUBCUTANEOUS ONCE AS NEEDED
Status: DISCONTINUED | OUTPATIENT
Start: 2024-07-29 | End: 2024-07-30 | Stop reason: HOSPADM

## 2024-07-29 RX ORDER — SODIUM CHLORIDE 9 MG/ML
INJECTION, SOLUTION INTRAVENOUS CONTINUOUS
Status: DISCONTINUED | OUTPATIENT
Start: 2024-07-29 | End: 2024-07-30 | Stop reason: HOSPADM

## 2024-07-29 RX ORDER — DIPHENHYDRAMINE HYDROCHLORIDE 50 MG/ML
50 INJECTION INTRAMUSCULAR; INTRAVENOUS ONCE AS NEEDED
OUTPATIENT
Start: 2024-08-05

## 2024-07-29 RX ORDER — DIPHENHYDRAMINE HYDROCHLORIDE 50 MG/ML
50 INJECTION INTRAMUSCULAR; INTRAVENOUS ONCE AS NEEDED
Status: DISCONTINUED | OUTPATIENT
Start: 2024-07-29 | End: 2024-07-30 | Stop reason: HOSPADM

## 2024-07-29 RX ORDER — SODIUM CHLORIDE 9 MG/ML
INJECTION, SOLUTION INTRAVENOUS CONTINUOUS
OUTPATIENT
Start: 2024-08-05

## 2024-07-29 RX ORDER — HEPARIN 100 UNIT/ML
5 SYRINGE INTRAVENOUS
OUTPATIENT
Start: 2024-08-05

## 2024-07-29 RX ORDER — EPINEPHRINE 0.3 MG/.3ML
0.3 INJECTION SUBCUTANEOUS ONCE AS NEEDED
OUTPATIENT
Start: 2024-08-05

## 2024-07-29 RX ORDER — HEPARIN 100 UNIT/ML
5 SYRINGE INTRAVENOUS
Status: DISCONTINUED | OUTPATIENT
Start: 2024-07-29 | End: 2024-07-30 | Stop reason: HOSPADM

## 2024-07-29 RX ORDER — SODIUM CHLORIDE 0.9 % (FLUSH) 0.9 %
10 SYRINGE (ML) INJECTION
OUTPATIENT
Start: 2024-08-05

## 2024-07-29 RX ORDER — SODIUM CHLORIDE 0.9 % (FLUSH) 0.9 %
10 SYRINGE (ML) INJECTION
Status: DISCONTINUED | OUTPATIENT
Start: 2024-07-29 | End: 2024-07-30 | Stop reason: HOSPADM

## 2024-07-29 RX ADMIN — FERRIC CARBOXYMALTOSE INJECTION 750 MG: 50 INJECTION, SOLUTION INTRAVENOUS at 01:07

## 2024-08-17 DIAGNOSIS — I10 HYPERTENSION, UNSPECIFIED TYPE: Primary | ICD-10-CM

## 2024-08-19 RX ORDER — FUROSEMIDE 40 MG/1
40 TABLET ORAL 2 TIMES DAILY PRN
Qty: 180 TABLET | Refills: 2 | Status: SHIPPED | OUTPATIENT
Start: 2024-08-19

## 2024-08-23 ENCOUNTER — HOSPITAL ENCOUNTER (OUTPATIENT)
Dept: RADIOLOGY | Facility: HOSPITAL | Age: 84
Discharge: HOME | End: 2024-08-23
Attending: INTERNAL MEDICINE
Payer: MEDICARE

## 2024-08-23 DIAGNOSIS — H53.2 DIPLOPIA: ICD-10-CM

## 2024-08-23 PROCEDURE — 70450 CT HEAD/BRAIN W/O DYE: CPT | Mod: TC

## 2024-08-29 DIAGNOSIS — R09.89 OTHER SPECIFIED SYMPTOMS AND SIGNS INVOLVING THE CIRCULATORY AND RESPIRATORY SYSTEMS: ICD-10-CM

## 2024-08-29 DIAGNOSIS — R42 DIZZINESS AND GIDDINESS: Primary | ICD-10-CM

## 2024-08-29 DIAGNOSIS — R54 SENILE TREMOR: ICD-10-CM

## 2024-08-29 DIAGNOSIS — I99.8 VASCULAR INSUFFICIENCY: ICD-10-CM

## 2024-08-29 DIAGNOSIS — H81.4 VERTIGO OF CENTRAL ORIGIN: ICD-10-CM

## 2024-08-29 DIAGNOSIS — H53.2 DIPLOPIA: ICD-10-CM

## 2024-10-27 DIAGNOSIS — K21.9 GASTROESOPHAGEAL REFLUX DISEASE, UNSPECIFIED WHETHER ESOPHAGITIS PRESENT: ICD-10-CM

## 2024-10-28 RX ORDER — PANTOPRAZOLE SODIUM 40 MG/1
40 TABLET, DELAYED RELEASE ORAL
Qty: 90 TABLET | Refills: 1 | Status: SHIPPED | OUTPATIENT
Start: 2024-10-28

## 2024-11-04 ENCOUNTER — PATIENT MESSAGE (OUTPATIENT)
Dept: GASTROENTEROLOGY | Facility: CLINIC | Age: 84
End: 2024-11-04
Payer: MEDICARE

## 2024-12-20 NOTE — PLAN OF CARE
Problem: Physical Therapy  Goal: Physical Therapy Goal  Description: Goals to be met by: 2023     Patient will increase functional independence with mobility by performin. Supine to sit with Modified [place]  2. Sit to supine with Modified [place]  3. Rolling to Left and Right with Modified [place].  4. Sit to stand transfer with Modified [place] using a RW  5. Bed to chair transfer with Modified independence using Rolling Walker  6. Gait x 500 feet with Modified [place] using Rolling Walker.     Outcome: Ongoing, Progressing      Opt out

## 2025-04-30 ENCOUNTER — OFFICE VISIT (OUTPATIENT)
Dept: CARDIAC SURGERY | Facility: CLINIC | Age: 85
End: 2025-04-30
Payer: MEDICARE

## 2025-04-30 VITALS
DIASTOLIC BLOOD PRESSURE: 58 MMHG | OXYGEN SATURATION: 99 % | BODY MASS INDEX: 25.34 KG/M2 | HEIGHT: 71 IN | WEIGHT: 181 LBS | HEART RATE: 77 BPM | SYSTOLIC BLOOD PRESSURE: 116 MMHG

## 2025-04-30 DIAGNOSIS — I71.40 ABDOMINAL ANEURYSM: ICD-10-CM

## 2025-04-30 DIAGNOSIS — I65.23 SYMPTOMATIC STENOSIS OF BOTH CAROTID ARTERIES: Primary | ICD-10-CM

## 2025-04-30 PROCEDURE — 99214 OFFICE O/P EST MOD 30 MIN: CPT | Mod: ,,, | Performed by: THORACIC SURGERY (CARDIOTHORACIC VASCULAR SURGERY)

## 2025-04-30 NOTE — PROGRESS NOTES
History & Physical    SUBJECTIVE:     History of Present Illness:  The patient is presenting for follow-up bilateral carotid artery stenosis.  He has been asymptomatic except for back pain.  Apparently has a history of the abdominal. aortic aneurysm that has not been followed.      Chief Complaint   Patient presents with    Follow-up     1 YEAR F/U W/ BILAT CAROTID U/S-PREVIOUS TESTING BILAT 40-59% STENOSIS LT >RT. LT CHEST WALL PAIN-CXR DONE LAST VISIT-WNL DX: STABLE CAROTID DISEASE.       Review of patient's allergies indicates:   Allergen Reactions    Cardura [doxazosin] Hives    Lyrica [pregabalin] Other (See Comments)    Paxil [paroxetine hcl] Other (See Comments)    Restoril [temazepam] Hallucinations    Vioxx [rofecoxib] Swelling    Zithromax [azithromycin] Hives    Alpha 1 blocker- quinazolines     Benzodiazepines        Current Medications[1]    Past Medical History:   Diagnosis Date    Acid reflux     Anemia     Anticoagulant long-term use     Anxiety disorder, unspecified     Aortic aneurysm, abdominal     Arthritis     Bleeding 09/03/2019    Bronchitis     Carotid stenosis 05/13/2022    60-79% Right ICA 60-70% Left ICA    Cataract     Celiac artery stenosis     50% by CTA abdomen 7/27/2020    CKD (chronic kidney disease)     45% FUNCTION    Coronary artery disease     stents x4    Encounter for blood transfusion     Enlarged prostate     GERD (gastroesophageal reflux disease)     Heart attack     Hemorrhoids     Hypercholesteremia     Hypertension     Iron deficiency anemia, unspecified     Leaky heart valve     Lupus anticoagulant disorder     Mood disorder     Parkinson's disease     Renal artery stenosis     by CTA 7/27/2020    Restless leg syndrome     Skin cancer     Skin tear of elbow without complication     above elbow    Unspecified chronic bronchitis     UTI (urinary tract infection)      Past Surgical History:   Procedure Laterality Date    ACF      Anterior Cervical Fusion    BACK SURGERY       RODS IN BACK; 4 SURGIERIES    BIOPSY OF URETER Right 06/22/2023    Procedure: RESECTION/BIOPSY, URETER;  Surgeon: Aaron Pinon MD;  Location: [facility] OR;  Service: Urology;  Laterality: Right;    BONE MARROW BIOPSY      CARDIAC ANGIOGRAM WITH STENT      CATARACT SX Bilateral     CHOLECYSTECTOMY      CORONARY ANGIOGRAPHY N/A 02/15/2023    Procedure: ANGIOGRAM, CORONARY ARTERY;  Surgeon: Rito Vega MD;  Location: [facility] CATH;  Service: Cardiology;  Laterality: N/A;    CORONARY ARTERY BYPASS GRAFT (CABG) N/A 02/24/2023    Procedure: CORONARY ARTERY BYPASS GRAFT (CABG);  Surgeon: Spencer Hagan MD;  Location: [facility] OR;  Service: Cardiothoracic;  Laterality: N/A;  CABG / EVH //   ECHO NOTIFIED    CYSTOSCOPY W/ RETROGRADES Bilateral 06/20/2023    Procedure: CYSTOSCOPY WITH RETROGRADE PYELOGRAM;  Surgeon: Aaron Pinon MD;  Location: [facility] OR;  Service: Urology;  Laterality: Bilateral;  Pt has a Spinal Cord Stimulator  9am per [place], To follow RM4    CYSTOSCOPY W/ RETROGRADES Right 06/22/2023    Procedure: CYSTOSCOPY, WITH RETROGRADE PYELOGRAM;  Surgeon: Aaron Pinon MD;  Location: [facility] OR;  Service: Urology;  Laterality: Right;    CYSTOSCOPY W/ RETROGRADES Bilateral 8/24/2023    Procedure: CYSTOSCOPY, WITH RETROGRADE PYELOGRAM;  Surgeon: Aaron Pinon MD;  Location: [facility] OR;  Service: Urology;  Laterality: Bilateral;    CYSTOSCOPY W/ RETROGRADES Bilateral 12/4/2023    Procedure: CYSTOSCOPY WITH RETROGRADE PYELOGRAM;  Surgeon: Aaron Pinon MD;  Location: [facility] OR;  Service: Urology;  Laterality: Bilateral;  pt has spinal cord stimulator    DIGITAL RECTAL EXAMINATION UNDER ANESTHESIA N/A 12/4/2023    Procedure: EXAM UNDER ANESTHESIA, DIGITAL, RECTUM;  Surgeon: Aaron Pinon MD;  Location: [facility] OR;  Service: Urology;  Laterality: N/A;    HIP SURGERY Right     THR    KNEE SURGERY Right     ATS    LEFT HEART CATHETERIZATION Left 07/22/2021    Procedure: Left heart cath;  Surgeon: Curtis Loredo MD;  " Location: [facility] CATH;  Service: Cardiology;  Laterality: Left;    LEFT HEART CATHETERIZATION Left 09/22/2022    Procedure: Left heart cath;  Surgeon: Giorgi Barker MD;  Location: [facility] CATH;  Service: Cardiology;  Laterality: Left;    REMOVAL, STENT, URETER Right 8/24/2023    Procedure: REMOVAL, STENT, URETER;  Surgeon: Aaron Pinon MD;  Location: [facility] OR;  Service: Urology;  Laterality: Right;    ROTATOR CUFF REPAIR Bilateral     SHOULDER SURGERY Bilateral     SPINAL CORD STIMULATOR IMPLANT      STENT, URETERAL Left 06/22/2023    Procedure: STENT, URETERAL;  Surgeon: Aaron Pinon MD;  Location: [facility] OR;  Service: Urology;  Laterality: Left;    steroid injections Right 06/13/2023    Knee    ULNAR NERVE TRANSPOSITION      URETEROSCOPY Right 06/22/2023    Procedure: URETEROSCOPY;  Surgeon: Aaron Pinon MD;  Location: [facility] OR;  Service: Urology;  Laterality: Right;     Family History   Problem Relation Name Age of Onset    Heart disease Father      Heart disease Sister      Lung cancer Brother      Throat cancer Brother      Cancer Brother      Cancer Brother      Heart disease Brother          PPM    Heart disease Brother      Heart disease Brother       Social History[2]     Review of Systems:  Review of Systems   Constitutional: Negative.    HENT: Negative.     Eyes: Negative.    Respiratory: Negative.     Cardiovascular: Negative.    Gastrointestinal: Negative.    Endocrine: Negative.    Genitourinary: Negative.    Musculoskeletal: Negative.         Claudications   Skin: Negative.    Allergic/Immunologic: Negative.    Neurological: Negative.    Hematological: Negative.    Psychiatric/Behavioral: Negative.         OBJECTIVE:     Vital Signs (Most Recent)  Pulse: 77 (04/30/25 1106)  BP: (!) 116/58 (04/30/25 1107)  SpO2: 99 % (04/30/25 1106)  5' 11" (1.803 m)  82.1 kg (181 lb)     Physical Exam:  Physical Exam  Vitals reviewed.   Constitutional:       Appearance: Normal appearance.   HENT:      Head: " Normocephalic and atraumatic.      Nose: Nose normal.      Mouth/Throat:      Mouth: Mucous membranes are dry.      Pharynx: Oropharynx is clear.   Eyes:      Extraocular Movements: Extraocular movements intact.      Conjunctiva/sclera: Conjunctivae normal.      Pupils: Pupils are equal, round, and reactive to light.   Cardiovascular:      Rate and Rhythm: Normal rate and regular rhythm.      Pulses: Normal pulses.   Pulmonary:      Effort: Pulmonary effort is normal.      Breath sounds: Normal breath sounds.   Abdominal:      General: Abdomen is flat.      Palpations: Abdomen is soft.   Musculoskeletal:         General: Normal range of motion.      Cervical back: Neck supple.   Skin:     General: Skin is warm and dry.   Neurological:      General: No focal deficit present.   Psychiatric:         Mood and Affect: Mood normal.         Laboratory:  None      Diagnostic Results:  Carotid ultrasound revealed 4059 stenosis of the bilateral carotid artery internally.      ASSESSMENT/PLAN:     Stable carotid artery disease.  We will repeat an ultrasound in 1 year.  History of abdominal aortic aneurysm.  We will check the patient for abdominal ultrasound.                     [1]   Current Outpatient Medications   Medication Sig Dispense Refill    azelastine (ASTELIN) 137 mcg (0.1 %) nasal spray 1 spray (137 mcg total) by Nasal route 2 (two) times daily. 30 mL 0    calcium carbonate-vitamin D3 500 mg-10 mcg (400 unit) Tab Take 1 tablet by mouth once daily.      carvediloL (COREG) 3.125 MG tablet Take 1 tablet (3.125 mg total) by mouth 2 (two) times daily with meals. 180 tablet 1    clindamycin phosphate 1% (CLINDAGEL) 1 % gel Apply topically 2 (two) times daily. 30 g 1    dextran 70/hypromellose (ARTIFICIAL TEARS, PF, OPHT) Apply 1 drop to eye 3 (three) times daily.      docusate sodium (COLACE) 100 MG capsule Take 100 mg by mouth 2 (two) times daily.      DULoxetine (CYMBALTA) 30 MG capsule Take 1 capsule (30 mg total) by  mouth once daily. 30 capsule 2    fluticasone propionate (FLONASE) 50 mcg/actuation nasal spray 1 spray by Each Nostril route 2 (two) times a day.      furosemide (LASIX) 40 MG tablet TAKE 1 TABLET BY MOUTH TWICE A DAY AS NEEDED 180 tablet 2    HYDROcodone-acetaminophen (NORCO) 7.5-325 mg per tablet Prescribed by Dr. Hernandez      niaemily 1,000 mg TbSR Take 1 tablet by mouth Daily.      nystatin (MYCOSTATIN) ointment Apply topically 3 (three) times daily. 60 g 5    pantoprazole (PROTONIX) 40 MG tablet TAKE 1 TABLET BY MOUTH EVERY DAY 90 tablet 1    potassium chloride (K-TAB) 20 mEq Take 1 tablet (20 mEq total) by mouth Daily. 30 tablet 2    predniSONE (DELTASONE) 5 MG tablet Take 2.5 mg by mouth once daily.      QUEtiapine (SEROQUEL) 50 MG tablet Take 50 mg by mouth every evening.      rOPINIRole (REQUIP) 1 MG tablet Take 1 tablet (1 mg total) by mouth 2 (two) times daily. 60 tablet 0    tamsulosin (FLOMAX) 0.4 mg Cap Take 1 capsule (0.4 mg total) by mouth every evening. 90 capsule 1     No current facility-administered medications for this visit.   [2]   Social History  Tobacco Use    Smoking status: Former     Current packs/day: 0.00     Types: Cigarettes     Quit date:      Years since quittin.3    Smokeless tobacco: Never   Substance Use Topics    Alcohol use: Yes     Alcohol/week: 2.0 standard drinks of alcohol     Types: 1 Glasses of wine, 1 Cans of beer per week     Comment: RARELY    Drug use: No

## 2025-06-11 DIAGNOSIS — I71.40 ABDOMINAL ANEURYSM: Primary | ICD-10-CM

## (undated) DEVICE — DRESSING TELFA + RECT 6X10IN

## (undated) DEVICE — CANNULA NON-VENT 24FR 14.5IN

## (undated) DEVICE — CARTRIDGE HEPARIN 2 CHNNL ACT

## (undated) DEVICE — HEMOSTAT SURGICEL FIBRLR 2X4IN

## (undated) DEVICE — SUT PROLENE 7-0 BV175-6

## (undated) DEVICE — SOL NORMAL USPCA 0.9%

## (undated) DEVICE — Device

## (undated) DEVICE — GLOVE PROTEXIS NEOPRN SZ8

## (undated) DEVICE — SEE MEDLINE ITEM 159606

## (undated) DEVICE — PATCH SEALANT TACHOSIL 4.8X4.8

## (undated) DEVICE — APPLICATOR ENDOSCP 32CM5MM2TIP

## (undated) DEVICE — SYS VIRTUOSAPH PLUS EVM

## (undated) DEVICE — BLANKET HYPER ADULT 24X60IN

## (undated) DEVICE — SUT PROLENE 6-0 C-1 30IN BL

## (undated) DEVICE — BANDAGE ACE DOUBLE STER 6IN

## (undated) DEVICE — DRAIN CHEST DRY SUCTION

## (undated) DEVICE — BLADE SCALP OPHTL BEVEL STR

## (undated) DEVICE — SUT 2 30IN SILK BLK BRAIDE

## (undated) DEVICE — SENSOR LOW LEVEL OXYGEN

## (undated) DEVICE — PUNCH AORTIC ROT TIP 4.0MM

## (undated) DEVICE — DRESSING TELFA + BARR 4X6IN

## (undated) DEVICE — CATH THORACIC 28FR ST

## (undated) DEVICE — GLOVE PROTEXIS PI SYN SURG 7.5

## (undated) DEVICE — INSERT INTRACK CLAMP ULT 66MM

## (undated) DEVICE — GOWN SMARTSLEEVE XXL/XLONG

## (undated) DEVICE — CANNULA MC2 OVAL NVENT 32/40FR

## (undated) DEVICE — KIT C.A.T.S. FAST START 4/CASE

## (undated) DEVICE — APPLIER LIGACLIP SM 9.38IN

## (undated) DEVICE — SOL ELECTROLYTE PH 7.4 500ML

## (undated) DEVICE — CONNECTOR Y STRL 3/8X3/8X3/8IN

## (undated) DEVICE — CATH THOR STND RGHT ANG 28F

## (undated) DEVICE — APPLICATOR SURG 2 SPRY 1 PLUNG

## (undated) DEVICE — GLOVE PROTEXIS BLUE LATEX 7

## (undated) DEVICE — CARTRIDGE HEPARIN DOSE

## (undated) DEVICE — SUT PROLENE 7-0 BV-1 30 BLU

## (undated) DEVICE — SOL PLASMALYTE PH 7.4 1000ML

## (undated) DEVICE — SOL LAC RINGERS 1000ML INJ

## (undated) DEVICE — SUT MONOCRYL PLUS UD 3-0 27

## (undated) DEVICE — DRAPE SLUSH WARMER WITH DISC

## (undated) DEVICE — GLOVE COTTON KNITTED CUFF

## (undated) DEVICE — GLOVE PROTEXIS HYDROGEL SZ6.5

## (undated) DEVICE — TRAY CATH FOL SIL TEMP 10 16FR

## (undated) DEVICE — STOPCOCK 4-WAY

## (undated) DEVICE — NDL ASPIRATOR AIR 16G

## (undated) DEVICE — SOL .9NACL PF 100 ML

## (undated) DEVICE — SUT ETHBND XTRA 1 OS-8 30IN

## (undated) DEVICE — KIT SURGICAL TURNOVER

## (undated) DEVICE — BAG MEDI-PLAST DECANTER C-FLOW

## (undated) DEVICE — CATH ALL PUR URTHL 20FR

## (undated) DEVICE — CARTRIDGE SILV 4CHAN 2.0-3.5MG

## (undated) DEVICE — PAD DEFIB CADENCE ADULT R2

## (undated) DEVICE — ELECTRODE UTAHLOOP EXT 10CM

## (undated) DEVICE — SUT VICRYL 1 OB 36 CTX

## (undated) DEVICE — SUT PROLENE 5-0 36IN C-1

## (undated) DEVICE — KIT CATHGARD DBL LUMN 9FRX11.5

## (undated) DEVICE — HOLDER STRIP-T SELF ADH 2X10IN

## (undated) DEVICE — SOL NACL IRR 3000ML

## (undated) DEVICE — DRESSING ANTIMIC ISLAND 4X10IN